# Patient Record
Sex: FEMALE | Race: WHITE | NOT HISPANIC OR LATINO | URBAN - METROPOLITAN AREA
[De-identification: names, ages, dates, MRNs, and addresses within clinical notes are randomized per-mention and may not be internally consistent; named-entity substitution may affect disease eponyms.]

---

## 2018-01-15 ENCOUNTER — OUTPATIENT (OUTPATIENT)
Dept: OUTPATIENT SERVICES | Facility: HOSPITAL | Age: 83
LOS: 1 days | Discharge: HOME | End: 2018-01-15

## 2018-01-15 DIAGNOSIS — R55 SYNCOPE AND COLLAPSE: ICD-10-CM

## 2018-01-15 DIAGNOSIS — I48.91 UNSPECIFIED ATRIAL FIBRILLATION: ICD-10-CM

## 2018-01-15 DIAGNOSIS — E78.5 HYPERLIPIDEMIA, UNSPECIFIED: ICD-10-CM

## 2024-01-24 ENCOUNTER — INPATIENT (INPATIENT)
Facility: HOSPITAL | Age: 89
LOS: 38 days | Discharge: NURSING, REHAB OR LONG-TERM CARE | DRG: 673 | End: 2024-03-03
Attending: HOSPITALIST | Admitting: HOSPITALIST
Payer: MEDICARE

## 2024-01-24 VITALS
HEART RATE: 75 BPM | SYSTOLIC BLOOD PRESSURE: 155 MMHG | RESPIRATION RATE: 18 BRPM | TEMPERATURE: 98 F | WEIGHT: 229.06 LBS | OXYGEN SATURATION: 96 % | DIASTOLIC BLOOD PRESSURE: 88 MMHG

## 2024-01-24 DIAGNOSIS — R26.2 DIFFICULTY IN WALKING, NOT ELSEWHERE CLASSIFIED: ICD-10-CM

## 2024-01-24 DIAGNOSIS — I48.20 CHRONIC ATRIAL FIBRILLATION, UNSPECIFIED: ICD-10-CM

## 2024-01-24 DIAGNOSIS — M48.50XA COLLAPSED VERTEBRA, NOT ELSEWHERE CLASSIFIED, SITE UNSPECIFIED, INITIAL ENCOUNTER FOR FRACTURE: ICD-10-CM

## 2024-01-24 DIAGNOSIS — I50.9 HEART FAILURE, UNSPECIFIED: ICD-10-CM

## 2024-01-24 DIAGNOSIS — K21.9 GASTRO-ESOPHAGEAL REFLUX DISEASE WITHOUT ESOPHAGITIS: ICD-10-CM

## 2024-01-24 LAB
ANION GAP SERPL CALC-SCNC: 11 MMOL/L — SIGNIFICANT CHANGE UP (ref 7–14)
ANION GAP SERPL CALC-SCNC: 12 MMOL/L — SIGNIFICANT CHANGE UP (ref 7–14)
BASOPHILS # BLD AUTO: 0.03 K/UL — SIGNIFICANT CHANGE UP (ref 0–0.2)
BASOPHILS NFR BLD AUTO: 0.6 % — SIGNIFICANT CHANGE UP (ref 0–1)
BUN SERPL-MCNC: 31 MG/DL — HIGH (ref 10–20)
BUN SERPL-MCNC: 32 MG/DL — HIGH (ref 10–20)
CALCIUM SERPL-MCNC: 9.1 MG/DL — SIGNIFICANT CHANGE UP (ref 8.4–10.5)
CALCIUM SERPL-MCNC: 9.1 MG/DL — SIGNIFICANT CHANGE UP (ref 8.4–10.5)
CHLORIDE SERPL-SCNC: 100 MMOL/L — SIGNIFICANT CHANGE UP (ref 98–110)
CHLORIDE SERPL-SCNC: 99 MMOL/L — SIGNIFICANT CHANGE UP (ref 98–110)
CO2 SERPL-SCNC: 25 MMOL/L — SIGNIFICANT CHANGE UP (ref 17–32)
CO2 SERPL-SCNC: 26 MMOL/L — SIGNIFICANT CHANGE UP (ref 17–32)
CREAT SERPL-MCNC: 1.2 MG/DL — SIGNIFICANT CHANGE UP (ref 0.7–1.5)
CREAT SERPL-MCNC: 1.3 MG/DL — SIGNIFICANT CHANGE UP (ref 0.7–1.5)
EGFR: 39 ML/MIN/1.73M2 — LOW
EGFR: 43 ML/MIN/1.73M2 — LOW
EOSINOPHIL # BLD AUTO: 0.07 K/UL — SIGNIFICANT CHANGE UP (ref 0–0.7)
EOSINOPHIL NFR BLD AUTO: 1.4 % — SIGNIFICANT CHANGE UP (ref 0–8)
GLUCOSE SERPL-MCNC: 101 MG/DL — HIGH (ref 70–99)
GLUCOSE SERPL-MCNC: 90 MG/DL — SIGNIFICANT CHANGE UP (ref 70–99)
HCT VFR BLD CALC: 37.2 % — SIGNIFICANT CHANGE UP (ref 37–47)
HGB BLD-MCNC: 12.3 G/DL — SIGNIFICANT CHANGE UP (ref 12–16)
IMM GRANULOCYTES NFR BLD AUTO: 0.4 % — HIGH (ref 0.1–0.3)
LYMPHOCYTES # BLD AUTO: 1.11 K/UL — LOW (ref 1.2–3.4)
LYMPHOCYTES # BLD AUTO: 21.5 % — SIGNIFICANT CHANGE UP (ref 20.5–51.1)
MCHC RBC-ENTMCNC: 32.3 PG — HIGH (ref 27–31)
MCHC RBC-ENTMCNC: 33.1 G/DL — SIGNIFICANT CHANGE UP (ref 32–37)
MCV RBC AUTO: 97.6 FL — SIGNIFICANT CHANGE UP (ref 81–99)
MONOCYTES # BLD AUTO: 0.33 K/UL — SIGNIFICANT CHANGE UP (ref 0.1–0.6)
MONOCYTES NFR BLD AUTO: 6.4 % — SIGNIFICANT CHANGE UP (ref 1.7–9.3)
NEUTROPHILS # BLD AUTO: 3.6 K/UL — SIGNIFICANT CHANGE UP (ref 1.4–6.5)
NEUTROPHILS NFR BLD AUTO: 69.7 % — SIGNIFICANT CHANGE UP (ref 42.2–75.2)
NRBC # BLD: 0 /100 WBCS — SIGNIFICANT CHANGE UP (ref 0–0)
NT-PROBNP SERPL-SCNC: 5909 PG/ML — HIGH (ref 0–300)
PLATELET # BLD AUTO: 139 K/UL — SIGNIFICANT CHANGE UP (ref 130–400)
PMV BLD: 9.9 FL — SIGNIFICANT CHANGE UP (ref 7.4–10.4)
POTASSIUM SERPL-MCNC: 4.4 MMOL/L — SIGNIFICANT CHANGE UP (ref 3.5–5)
POTASSIUM SERPL-MCNC: 5.6 MMOL/L — HIGH (ref 3.5–5)
POTASSIUM SERPL-SCNC: 4.4 MMOL/L — SIGNIFICANT CHANGE UP (ref 3.5–5)
POTASSIUM SERPL-SCNC: 5.6 MMOL/L — HIGH (ref 3.5–5)
RBC # BLD: 3.81 M/UL — LOW (ref 4.2–5.4)
RBC # FLD: 13.6 % — SIGNIFICANT CHANGE UP (ref 11.5–14.5)
SODIUM SERPL-SCNC: 136 MMOL/L — SIGNIFICANT CHANGE UP (ref 135–146)
SODIUM SERPL-SCNC: 137 MMOL/L — SIGNIFICANT CHANGE UP (ref 135–146)
WBC # BLD: 5.16 K/UL — SIGNIFICANT CHANGE UP (ref 4.8–10.8)
WBC # FLD AUTO: 5.16 K/UL — SIGNIFICANT CHANGE UP (ref 4.8–10.8)

## 2024-01-24 PROCEDURE — 83540 ASSAY OF IRON: CPT

## 2024-01-24 PROCEDURE — 74177 CT ABD & PELVIS W/CONTRAST: CPT

## 2024-01-24 PROCEDURE — 94640 AIRWAY INHALATION TREATMENT: CPT

## 2024-01-24 PROCEDURE — 72100 X-RAY EXAM L-S SPINE 2/3 VWS: CPT | Mod: 26

## 2024-01-24 PROCEDURE — 84165 PROTEIN E-PHORESIS SERUM: CPT

## 2024-01-24 PROCEDURE — 88360 TUMOR IMMUNOHISTOCHEM/MANUAL: CPT

## 2024-01-24 PROCEDURE — 72131 CT LUMBAR SPINE W/O DYE: CPT

## 2024-01-24 PROCEDURE — 36415 COLL VENOUS BLD VENIPUNCTURE: CPT

## 2024-01-24 PROCEDURE — 88305 TISSUE EXAM BY PATHOLOGIST: CPT

## 2024-01-24 PROCEDURE — 86334 IMMUNOFIX E-PHORESIS SERUM: CPT

## 2024-01-24 PROCEDURE — 83735 ASSAY OF MAGNESIUM: CPT

## 2024-01-24 PROCEDURE — 80048 BASIC METABOLIC PNL TOTAL CA: CPT

## 2024-01-24 PROCEDURE — 85027 COMPLETE CBC AUTOMATED: CPT

## 2024-01-24 PROCEDURE — 20225 BONE BIOPSY TROCAR/NDL DEEP: CPT

## 2024-01-24 PROCEDURE — 78306 BONE IMAGING WHOLE BODY: CPT

## 2024-01-24 PROCEDURE — 83036 HEMOGLOBIN GLYCOSYLATED A1C: CPT

## 2024-01-24 PROCEDURE — 95819 EEG AWAKE AND ASLEEP: CPT

## 2024-01-24 PROCEDURE — 99222 1ST HOSP IP/OBS MODERATE 55: CPT

## 2024-01-24 PROCEDURE — 83521 IG LIGHT CHAINS FREE EACH: CPT

## 2024-01-24 PROCEDURE — 88173 CYTOPATH EVAL FNA REPORT: CPT

## 2024-01-24 PROCEDURE — 88172 CYTP DX EVAL FNA 1ST EA SITE: CPT

## 2024-01-24 PROCEDURE — 71045 X-RAY EXAM CHEST 1 VIEW: CPT

## 2024-01-24 PROCEDURE — 74018 RADEX ABDOMEN 1 VIEW: CPT

## 2024-01-24 PROCEDURE — 83605 ASSAY OF LACTIC ACID: CPT

## 2024-01-24 PROCEDURE — 86901 BLOOD TYPING SEROLOGIC RH(D): CPT

## 2024-01-24 PROCEDURE — 82306 VITAMIN D 25 HYDROXY: CPT

## 2024-01-24 PROCEDURE — 84295 ASSAY OF SERUM SODIUM: CPT

## 2024-01-24 PROCEDURE — 84443 ASSAY THYROID STIM HORMONE: CPT

## 2024-01-24 PROCEDURE — 80053 COMPREHEN METABOLIC PANEL: CPT

## 2024-01-24 PROCEDURE — 99223 1ST HOSP IP/OBS HIGH 75: CPT

## 2024-01-24 PROCEDURE — 97530 THERAPEUTIC ACTIVITIES: CPT | Mod: GP

## 2024-01-24 PROCEDURE — 85730 THROMBOPLASTIN TIME PARTIAL: CPT

## 2024-01-24 PROCEDURE — 83550 IRON BINDING TEST: CPT

## 2024-01-24 PROCEDURE — 97110 THERAPEUTIC EXERCISES: CPT | Mod: GP

## 2024-01-24 PROCEDURE — 82570 ASSAY OF URINE CREATININE: CPT

## 2024-01-24 PROCEDURE — 88344 IMHCHEM/IMCYTCHM EA MLT ANTB: CPT

## 2024-01-24 PROCEDURE — 87040 BLOOD CULTURE FOR BACTERIA: CPT

## 2024-01-24 PROCEDURE — 84156 ASSAY OF PROTEIN URINE: CPT

## 2024-01-24 PROCEDURE — 97162 PT EVAL MOD COMPLEX 30 MIN: CPT | Mod: GP

## 2024-01-24 PROCEDURE — 93306 TTE W/DOPPLER COMPLETE: CPT

## 2024-01-24 PROCEDURE — 83935 ASSAY OF URINE OSMOLALITY: CPT

## 2024-01-24 PROCEDURE — 72148 MRI LUMBAR SPINE W/O DYE: CPT

## 2024-01-24 PROCEDURE — 87635 SARS-COV-2 COVID-19 AMP PRB: CPT

## 2024-01-24 PROCEDURE — 86304 IMMUNOASSAY TUMOR CA 125: CPT

## 2024-01-24 PROCEDURE — 85018 HEMOGLOBIN: CPT

## 2024-01-24 PROCEDURE — 86300 IMMUNOASSAY TUMOR CA 15-3: CPT

## 2024-01-24 PROCEDURE — 78803 RP LOCLZJ TUM SPECT 1 AREA: CPT

## 2024-01-24 PROCEDURE — 84300 ASSAY OF URINE SODIUM: CPT

## 2024-01-24 PROCEDURE — C1751: CPT

## 2024-01-24 PROCEDURE — 72131 CT LUMBAR SPINE W/O DYE: CPT | Mod: 26

## 2024-01-24 PROCEDURE — 84100 ASSAY OF PHOSPHORUS: CPT

## 2024-01-24 PROCEDURE — 71045 X-RAY EXAM CHEST 1 VIEW: CPT | Mod: 26

## 2024-01-24 PROCEDURE — 83880 ASSAY OF NATRIURETIC PEPTIDE: CPT

## 2024-01-24 PROCEDURE — 93005 ELECTROCARDIOGRAM TRACING: CPT

## 2024-01-24 PROCEDURE — 84540 ASSAY OF URINE/UREA-N: CPT

## 2024-01-24 PROCEDURE — A9579: CPT

## 2024-01-24 PROCEDURE — 86900 BLOOD TYPING SEROLOGIC ABO: CPT

## 2024-01-24 PROCEDURE — 77417 THER RADIOLOGY PORT IMAGE(S): CPT

## 2024-01-24 PROCEDURE — 83930 ASSAY OF BLOOD OSMOLALITY: CPT

## 2024-01-24 PROCEDURE — 77307 TELETHX ISODOSE PLAN CPLX: CPT

## 2024-01-24 PROCEDURE — 82728 ASSAY OF FERRITIN: CPT

## 2024-01-24 PROCEDURE — 70553 MRI BRAIN STEM W/O & W/DYE: CPT

## 2024-01-24 PROCEDURE — 82330 ASSAY OF CALCIUM: CPT

## 2024-01-24 PROCEDURE — 85610 PROTHROMBIN TIME: CPT

## 2024-01-24 PROCEDURE — 77290 THER RAD SIMULAJ FIELD CPLX: CPT

## 2024-01-24 PROCEDURE — A9503: CPT

## 2024-01-24 PROCEDURE — 70450 CT HEAD/BRAIN W/O DYE: CPT

## 2024-01-24 PROCEDURE — 73552 X-RAY EXAM OF FEMUR 2/>: CPT | Mod: LT

## 2024-01-24 PROCEDURE — 74178 CT ABD&PLV WO CNTR FLWD CNTR: CPT

## 2024-01-24 PROCEDURE — 86301 IMMUNOASSAY TUMOR CA 19-9: CPT

## 2024-01-24 PROCEDURE — 86850 RBC ANTIBODY SCREEN: CPT

## 2024-01-24 PROCEDURE — 84484 ASSAY OF TROPONIN QUANT: CPT

## 2024-01-24 PROCEDURE — 77412 RADIATION TX DELIVERY LVL 3: CPT

## 2024-01-24 PROCEDURE — 77334 RADIATION TREATMENT AID(S): CPT

## 2024-01-24 PROCEDURE — 84132 ASSAY OF SERUM POTASSIUM: CPT

## 2024-01-24 PROCEDURE — 82378 CARCINOEMBRYONIC ANTIGEN: CPT

## 2024-01-24 PROCEDURE — 77333 RADIATION TREATMENT AID(S): CPT

## 2024-01-24 PROCEDURE — 84155 ASSAY OF PROTEIN SERUM: CPT

## 2024-01-24 PROCEDURE — 88341 IMHCHEM/IMCYTCHM EA ADD ANTB: CPT

## 2024-01-24 PROCEDURE — 88112 CYTOPATH CELL ENHANCE TECH: CPT

## 2024-01-24 PROCEDURE — 77012 CT SCAN FOR NEEDLE BIOPSY: CPT

## 2024-01-24 PROCEDURE — 73522 X-RAY EXAM HIPS BI 3-4 VIEWS: CPT | Mod: 26

## 2024-01-24 PROCEDURE — 0225U NFCT DS DNA&RNA 21 SARSCOV2: CPT

## 2024-01-24 PROCEDURE — 85014 HEMATOCRIT: CPT

## 2024-01-24 PROCEDURE — 93010 ELECTROCARDIOGRAM REPORT: CPT

## 2024-01-24 PROCEDURE — 82962 GLUCOSE BLOOD TEST: CPT

## 2024-01-24 PROCEDURE — 77280 THER RAD SIMULAJ FIELD SMPL: CPT

## 2024-01-24 PROCEDURE — 77336 RADIATION PHYSICS CONSULT: CPT

## 2024-01-24 PROCEDURE — 99285 EMERGENCY DEPT VISIT HI MDM: CPT

## 2024-01-24 PROCEDURE — 85025 COMPLETE CBC W/AUTO DIFF WBC: CPT

## 2024-01-24 PROCEDURE — 71260 CT THORAX DX C+: CPT

## 2024-01-24 PROCEDURE — 84133 ASSAY OF URINE POTASSIUM: CPT

## 2024-01-24 PROCEDURE — 81001 URINALYSIS AUTO W/SCOPE: CPT

## 2024-01-24 PROCEDURE — 82803 BLOOD GASES ANY COMBINATION: CPT

## 2024-01-24 RX ORDER — SENNA PLUS 8.6 MG/1
2 TABLET ORAL
Refills: 0 | DISCHARGE

## 2024-01-24 RX ORDER — MORPHINE SULFATE 50 MG/1
4 CAPSULE, EXTENDED RELEASE ORAL ONCE
Refills: 0 | Status: DISCONTINUED | OUTPATIENT
Start: 2024-01-24 | End: 2024-01-24

## 2024-01-24 RX ORDER — APIXABAN 2.5 MG/1
2.5 TABLET, FILM COATED ORAL
Refills: 0 | Status: DISCONTINUED | OUTPATIENT
Start: 2024-01-24 | End: 2024-01-28

## 2024-01-24 RX ORDER — CALCIUM CARBONATE 500(1250)
1 TABLET ORAL DAILY
Refills: 0 | Status: DISCONTINUED | OUTPATIENT
Start: 2024-01-24 | End: 2024-02-15

## 2024-01-24 RX ORDER — GABAPENTIN 400 MG/1
2 CAPSULE ORAL
Refills: 0 | DISCHARGE

## 2024-01-24 RX ORDER — LIDOCAINE 4 G/100G
1 CREAM TOPICAL EVERY 24 HOURS
Refills: 0 | Status: DISCONTINUED | OUTPATIENT
Start: 2024-01-24 | End: 2024-02-15

## 2024-01-24 RX ORDER — FUROSEMIDE 40 MG
1 TABLET ORAL
Refills: 0 | DISCHARGE

## 2024-01-24 RX ORDER — CHOLECALCIFEROL (VITAMIN D3) 125 MCG
5000 CAPSULE ORAL DAILY
Refills: 0 | Status: DISCONTINUED | OUTPATIENT
Start: 2024-01-24 | End: 2024-02-15

## 2024-01-24 RX ORDER — MEMANTINE HYDROCHLORIDE 10 MG/1
1 TABLET ORAL
Refills: 0 | DISCHARGE

## 2024-01-24 RX ORDER — ACETAMINOPHEN 500 MG
650 TABLET ORAL EVERY 6 HOURS
Refills: 0 | Status: DISCONTINUED | OUTPATIENT
Start: 2024-01-24 | End: 2024-01-24

## 2024-01-24 RX ORDER — FUROSEMIDE 40 MG
40 TABLET ORAL ONCE
Refills: 0 | Status: COMPLETED | OUTPATIENT
Start: 2024-01-24 | End: 2024-01-24

## 2024-01-24 RX ORDER — METHOCARBAMOL 500 MG/1
750 TABLET, FILM COATED ORAL EVERY 12 HOURS
Refills: 0 | Status: DISCONTINUED | OUTPATIENT
Start: 2024-01-24 | End: 2024-01-25

## 2024-01-24 RX ORDER — ASCORBIC ACID 60 MG
1 TABLET,CHEWABLE ORAL
Refills: 0 | DISCHARGE

## 2024-01-24 RX ORDER — ACETAMINOPHEN 500 MG
975 TABLET ORAL EVERY 8 HOURS
Refills: 0 | Status: DISCONTINUED | OUTPATIENT
Start: 2024-01-24 | End: 2024-02-15

## 2024-01-24 RX ORDER — DICLOFENAC SODIUM 75 MG/1
1 TABLET, DELAYED RELEASE ORAL
Refills: 0 | DISCHARGE

## 2024-01-24 RX ORDER — DOCUSATE SODIUM 100 MG
1 CAPSULE ORAL
Refills: 0 | DISCHARGE

## 2024-01-24 RX ORDER — OMEPRAZOLE 10 MG/1
1 CAPSULE, DELAYED RELEASE ORAL
Refills: 0 | DISCHARGE

## 2024-01-24 RX ORDER — GABAPENTIN 400 MG/1
300 CAPSULE ORAL THREE TIMES A DAY
Refills: 0 | Status: DISCONTINUED | OUTPATIENT
Start: 2024-01-24 | End: 2024-02-14

## 2024-01-24 RX ORDER — METOPROLOL TARTRATE 50 MG
3 TABLET ORAL
Refills: 0 | DISCHARGE

## 2024-01-24 RX ORDER — PANTOPRAZOLE SODIUM 20 MG/1
40 TABLET, DELAYED RELEASE ORAL
Refills: 0 | Status: DISCONTINUED | OUTPATIENT
Start: 2024-01-25 | End: 2024-02-15

## 2024-01-24 RX ORDER — METHOCARBAMOL 500 MG/1
1000 TABLET, FILM COATED ORAL ONCE
Refills: 0 | Status: COMPLETED | OUTPATIENT
Start: 2024-01-24 | End: 2024-01-24

## 2024-01-24 RX ORDER — AZELASTINE HCL 0.05 %
1 DROPS OPHTHALMIC (EYE)
Refills: 0 | DISCHARGE

## 2024-01-24 RX ORDER — SENNA PLUS 8.6 MG/1
2 TABLET ORAL AT BEDTIME
Refills: 0 | Status: DISCONTINUED | OUTPATIENT
Start: 2024-01-24 | End: 2024-02-15

## 2024-01-24 RX ORDER — FEXOFENADINE HCL 30 MG
1 TABLET ORAL
Refills: 0 | DISCHARGE

## 2024-01-24 RX ADMIN — METHOCARBAMOL 1000 MILLIGRAM(S): 500 TABLET, FILM COATED ORAL at 11:09

## 2024-01-24 RX ADMIN — Medication 975 MILLIGRAM(S): at 21:56

## 2024-01-24 RX ADMIN — APIXABAN 2.5 MILLIGRAM(S): 2.5 TABLET, FILM COATED ORAL at 18:55

## 2024-01-24 RX ADMIN — METHOCARBAMOL 750 MILLIGRAM(S): 500 TABLET, FILM COATED ORAL at 23:03

## 2024-01-24 RX ADMIN — Medication 40 MILLIGRAM(S): at 18:55

## 2024-01-24 RX ADMIN — GABAPENTIN 300 MILLIGRAM(S): 400 CAPSULE ORAL at 21:57

## 2024-01-24 RX ADMIN — MORPHINE SULFATE 4 MILLIGRAM(S): 50 CAPSULE, EXTENDED RELEASE ORAL at 11:10

## 2024-01-24 NOTE — H&P ADULT - NSHPSOCIALHISTORY_GEN_ALL_CORE
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 No reported tobacco, alcohol, or illicit/recreational drug use. ADL's limited by progressively worsening ability to ambulate. Ambulates w/ walker.

## 2024-01-24 NOTE — ED ADULT TRIAGE NOTE - CHIEF COMPLAINT QUOTE
Pt BIBEMS for fell this morning onto her butt. No LOC, No HT, pt on Eliquis.  PT reports pain to back from a fall two weeks ago. PT also reports leg swelling. Pt also is non compliant with her medication per her daughter

## 2024-01-24 NOTE — H&P ADULT - ASSESSMENT
89F w/ h/o chronic afib (on Eliquis), GERD, and chronic low back pain presenting s/p fall. Admitted to medicine for inability to ambulate s/p fall.    #Fall, s/p  #Inability to Ambulate  #Acute on Chronic Low Back Pain  Initially presenting s/p fall. XR Bilateral Hips, Pelvis, and L-Spine negative for fracture (wet read). Known to have progressively worsening chronic low back pain over several years.  - start Tylenol 975mg PO Q8H  - increase gabapentin from 200mg to 300mg Q8H (increasing to max dose based on CrCl; monitor for oversedation)  - start lidocaine 4% patch Q24H  - obtain UA (r/o UTI; no reported dysuria, but daughter reports malodorous urine)  - f/u final XR reads  - pain management consulted; f/u recs  - PT consulted; f/u recs    #CHF, suspected  Reports LE swelling over past 1-2 weeks. Recently started on furosemide 20 qd for swelling. No reported CP or dyspnea at rest or exertion (but functional status limited by lower back pain). No known prior h/o CHF. TTE (May 2013) demonstrated LVEF 55-65% w/ normal global LVSF and no significant valvulopathy. No other recent TTE available. Exam concerning for volume overload (bibasilar crackles; b/l LE pitting edema). Overall concerning for new onset CHF (possibly contributing to pt's inability to ambulate).   - give furosemide 40mg IV once now  - obtain TTE and BNP  - daily standing weights; strict I's & O's; 1.5L fluid restrict  - monitor 'lytes and replete accordingly (K>4; Mg>2)  - obtain iron studies  - if TSAT <20% + ferritin <300 OR ferritin <100, start Venofer 200mg IV x5 days  - consider transfer to telemetry if CHF confirmed    #Chronic Atrial Fibrillation  CHADS-VASC 3+ (Age x2, Female). Only on AC. Previously on Lopressor, but only takes intermittently as HR currently rate-controlled off meds.  - c/w apixaban 2.5mg PO BID (consider dose increase if Cr persistently <1.5)    #GERD  - c/w pantoprazole 40mg PO QD (therapeutic interchange for omeprazole)    DVT PPX: apixaban 2.5mg PO BID  GI PPX: pantoprazole 40mg PO QD  DIET: DASH/TLC + 1.5L fluid restrict  ACTIVITY: IAT  CODE STATUS: Full Code  DISPOSITION: From Home; possible d/c to home w/ HCS vs SNF (f/u PT consult)    PENDING: CXR; 4PM BMP (K) & BNP; TTE; PT consult 89F w/ h/o chronic afib (on Eliquis), GERD, and chronic low back pain presenting s/p fall. Admitted to medicine for inability to ambulate s/p fall.    #Fall, s/p  #Inability to Ambulate  #Acute on Chronic Low Back Pain  Initially presenting s/p fall. XR Bilateral Hips, Pelvis, and L-Spine negative for fracture (wet read). Known to have progressively worsening chronic low back pain over several years. Previously managed via gabapentin 200 tid. Previously trialed Mobic and diclofenac w/ minimal relief. Recently started on Percocet 5-325 q12h w/ mild relief.  - start Tylenol 975mg PO Q8H  - increase gabapentin from 200mg to 300mg Q8H (increasing to max dose based on CrCl; monitor for oversedation)  - start lidocaine 4% patch Q24H  - obtain UA (r/o UTI; no reported dysuria, but daughter reports malodorous urine)  - f/u final XR reads  - pain management consulted; f/u recs  - PT consulted; f/u recs    #CHF, suspected  Reports LE swelling over past 1-2 weeks. Recently started on furosemide 20 qd for swelling. No reported CP or dyspnea at rest or exertion (but functional status limited by lower back pain). No known prior h/o CHF. TTE (May 2013) demonstrated LVEF 55-65% w/ normal global LVSF and no significant valvulopathy. No other recent TTE available. Exam concerning for volume overload (bibasilar crackles; b/l LE pitting edema). Overall concerning for new onset CHF (possibly contributing to pt's inability to ambulate).   - give furosemide 40mg IV once now; consider additional dosing based on response  - obtain TTE and BNP  - daily standing weights; strict I's & O's; 1.5L fluid restrict  - monitor 'lytes and replete accordingly (K>4; Mg>2)  - obtain iron studies  - if TSAT <20% + ferritin <300 OR ferritin <100, start Venofer 200mg IV x5 days (if labs/TTE confirm CHF)  - consider transfer to telemetry if CHF confirmed    #Chronic Atrial Fibrillation  CHADS-VASC 3+ (Age x2, Female). Only on AC. Previously on Lopressor, but only takes intermittently as HR currently rate-controlled off meds.  - c/w apixaban 2.5mg PO BID (consider dose increase if Cr persistently <1.5)    #GERD  - c/w pantoprazole 40mg PO QD (therapeutic interchange for omeprazole)    DVT PPX: apixaban 2.5mg PO BID  GI PPX: pantoprazole 40mg PO QD  DIET: DASH/TLC + 1.5L fluid restrict  ACTIVITY: IAT  CODE STATUS: Full Code  DISPOSITION: From Home; possible d/c to home w/ HCS vs SNF (f/u PT consult)    PENDING: CXR; 4PM BMP (K) & BNP; TTE; PT consult 89F w/ h/o chronic afib (on Eliquis), GERD, and chronic low back pain presenting s/p fall. Admitted to medicine for back pain 2/2 Lumbar compression fracture    #Lumbar Compression Fracture  Initially presenting s/p fa. Known to have progressively worsening chronic low back pain over several years. Previously managed via gabapentin 200 tid. Previously trialed Mobic and diclofenac w/ minimal relief. Recently started on Percocet 5-325 q12h w/ mild relief.  - start Tylenol 975mg PO Q8H  - increase gabapentin from 200mg to 300mg Q8H (increasing to max dose based on CrCl; monitor for oversedation)  - start lidocaine 4% patch Q24H  - obtain UA (r/o UTI; no reported dysuria, but daughter reports malodorous urine)  - CT lumbar spine to further characterize  - pain management consulted; f/u recs  - PT consulted; f/u recs    #CHF, suspected  Reports LE swelling over past 1-2 weeks. Recently started on furosemide 20 qd for swelling. No reported CP or dyspnea at rest or exertion (but functional status limited by lower back pain). No known prior h/o CHF. TTE (May 2013) demonstrated LVEF 55-65% w/ normal global LVSF and no significant valvulopathy. No other recent TTE available. Exam concerning for volume overload (bibasilar crackles; b/l LE pitting edema). Overall concerning for new onset CHF (possibly contributing to pt's inability to ambulate).   - give furosemide 40mg IV once now; consider additional dosing based on response  - obtain TTE and BNP  - daily standing weights; strict I's & O's; 1.5L fluid restrict  - monitor 'lytes and replete accordingly (K>4; Mg>2)  - obtain iron studies  - if TSAT <20% + ferritin <300 OR ferritin <100, start Venofer 200mg IV x5 days (if labs/TTE confirm CHF)  - consider transfer to telemetry if CHF confirmed    #Chronic Atrial Fibrillation  CHADS-VASC 3+ (Age x2, Female). Only on AC. Previously on Lopressor, but only takes intermittently as HR currently rate-controlled off meds.  - c/w apixaban 2.5mg PO BID (consider dose increase if Cr persistently <1.5)    #GERD  - c/w pantoprazole 40mg PO QD (therapeutic interchange for omeprazole)    DVT PPX: apixaban 2.5mg PO BID  GI PPX: pantoprazole 40mg PO QD  DIET: DASH/TLC + 1.5L fluid restrict  ACTIVITY: IAT  CODE STATUS: Full Code  DISPOSITION: From Home; possible d/c to home w/ HCS vs SNF (f/u PT consult)    PENDING: CXR; 4PM BMP (K) & BNP; TTE; PT consult

## 2024-01-24 NOTE — H&P ADULT - HISTORY OF PRESENT ILLNESS
89F w/ h/o chronic afib (on Eliquis), GERD, and chronic low back pain presenting s/p fall. Patient known to have chronic low back pain that has progressively worsened over the years. Pain acutely worsened two weeks ago and patient subsequently fell due to inability to support own weight. Evaluated by PCP after initial fall due to worsening pain. Prescribed Percocet 5-325 bid w/ mild relief. However, pt once again fell earlier today after pt unable to support her own weight while trying to ambulate. Now unable to ambulate or bear weight, even w/ assistance. No reported HT, LOC, lightheadedness, confusion, loss of continence, or focal weakness a/w either fall. No reported fevers, chills, CP, palpitations, SOB, abdominal pain, n/v/d, or dysuria (although daughter did note malodorous urine recently).    Of note, pt also reports new onset LE swelling over past 1-2 weeks. Recently started on furosemide 20 qd by PCP for swelling. No reported CP or dyspnea at rest or exertion (but functional status limited by lower back pain). No known prior h/o CHF.     In ED, pt HD stable on vitals. Labs demonstrated K 5.6 (hemolyzed). XR Bilateral Hips, Pelvis, and L-Spine overall unremarkable (wet read). S/P morphine 4mg IV and methocarbamol 1000mg PO. Admitted to medicine for inability to ambulate s/p fall.

## 2024-01-24 NOTE — H&P ADULT - NSICDXPASTMEDICALHX_GEN_ALL_CORE_FT
PAST MEDICAL HISTORY:  Chronic atrial fibrillation     Chronic lower back pain     GERD (gastroesophageal reflux disease)

## 2024-01-24 NOTE — ED PROVIDER NOTE - PHYSICAL EXAMINATION
CONST: Well appearing in NAD  EYES: PERRL, EOMI, Sclera and conjunctiva clear.   NECK: Non-tender, no meningeal signs  CARD  Normal rate and rhythm  RESP: Equal BS B/L, No wheezes, rhonchi or rales. No distress  GI: Soft, non-tender, non-distended.  MS: Normal ROM in all extremities. tender lower lumbar. No hip tenderness or pain on ROM. Pulses intact distally  SKIN: Warm, dry, no acute rashes. Good turgor  NEURO: A&Ox3, No focal deficits. Strength 5/5 with no sensory deficits.

## 2024-01-24 NOTE — ED PROVIDER NOTE - ATTENDING APP SHARED VISIT CONTRIBUTION OF CARE
I personally evaluated the patient. I reviewed the Resident´s or Physician Assistant´s note (as assigned above), and agree with the findings and plan except as documented in my note.    89-year-old female presents to the emergency department for fall from home.  Patient is a limited historian, family present to assist with HPI and review of systems.  Admits to bilateral hip pain not improved with home opiates and gabapentin   PMH includes atrial fibrillation on Eliquis but is noncompliant      GENERAL: female in no distress.   HEENT: EOMI non icteric no hematomas   CHEST: normal work of breathing noted. CTA bilateral.   CV: pulses intact S1S2 regular  ABD: soft, non rigid, non distended   EXTR: FROM   NEURO: AAO  globally deconditioned no hemiparesis  SKIN: normal no pallor    Impression: Back pain, gait instability    Plan: IV, labs, imaging, supportive care & reevaluation

## 2024-01-24 NOTE — ED PROVIDER NOTE - CLINICAL SUMMARY MEDICAL DECISION MAKING FREE TEXT BOX
89-year-old female presents to the emergency department for fall with inability to ambulate.  Symptoms are refractory to outpatient medications by PMD.  In the emergency department screening exam and imaging, labs, will admit for PT OT and gait stability potential DC to rehab for continued therapy.

## 2024-01-24 NOTE — ED ADULT NURSE NOTE - NSFALLUNIVINTERV_ED_ALL_ED
Bed/Stretcher in lowest position, wheels locked, appropriate side rails in place/Call bell, personal items and telephone in reach/Instruct patient to call for assistance before getting out of bed/chair/stretcher/Non-slip footwear applied when patient is off stretcher/[place] to call system/Physically safe environment - no spills, clutter or unnecessary equipment/Purposeful proactive rounding/Room/bathroom lighting operational, light cord in reach

## 2024-01-24 NOTE — H&P ADULT - CONVERSATION DETAILS
Discussed advanced care planning with patient and daughter (Marlys Felixzulyvalerie) at bedside. Discussed concept of goals of care, including limited vs full medical management. Further explained concepts of DNR/DNI. 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 Patient has not thought about goals of care previously. Will discuss with daughter, but as of now patient is FULL CODE.

## 2024-01-24 NOTE — H&P ADULT - ATTENDING COMMENTS
89F w/ h/o chronic afib (on Eliquis), GERD, and chronic low back pain presenting s/p fall. Admitted to medicine for back pain 2/2 Lumbar compression fracture    - back pain 2/2 L1 compression fracture identified on lumbar xray- will get lumbar CT spine to further characterize and determine if other vertebral compression fracture present. will need TLSO brace. pt. check vit D level. most likely 2/2 osteoporosis  - concern for recent fluid overload likely mild acute HF, no reported cp (symptom hx progressive over weeks duration). agree w/ lasix IV today, eval tomorrow if still required. TTE to further characterize  - chronic afib- c/w eliquis

## 2024-01-24 NOTE — ED PROVIDER NOTE - OBJECTIVE STATEMENT
Patient with history A-fib on Eliquis presents with pain to lower back and bilateral hips since fall 2 weeks ago.  Patient always has chronic back pain but since fall has worse pain and now unable to walk without assistance.  Fell again today and since the fall unable to bear weight or walk even with assistance.  Pain today is in bilateral hips and lower back.  Patient takes Percocet and gabapentin for the past 2 weeks without relief.  Denies head injury.  Patient is supposed to take Eliquis but is noncompliant according to the daughter

## 2024-01-24 NOTE — ED PROVIDER NOTE - CARE PLAN
Principal Discharge DX:	Inability to ambulate due to multiple joints  Secondary Diagnosis:	Back pain   1

## 2024-01-24 NOTE — CONSULT NOTE ADULT - SUBJECTIVE AND OBJECTIVE BOX
PAIN MANAGEMENT CONSULT NOTE    Chief Complaint: low back pain    HPI:  89F w/ h/o chronic afib (on Eliquis), GERD, and chronic low back pain presenting s/p fall. Patient known to have chronic low back pain that has progressively worsened over the years. Pain acutely worsened two weeks ago and patient subsequently fell due to inability to support own weight. Evaluated by PCP after initial fall due to worsening pain. Prescribed Percocet 5-325 bid w/ mild relief. However, pt once again fell earlier today after pt unable to support her own weight while trying to ambulate. Now unable to ambulate or bear weight, even w/ assistance. No reported HT, LOC, lightheadedness, confusion, loss of continence, or focal weakness a/w either fall. No reported fevers, chills, CP, palpitations, SOB, abdominal pain, n/v/d, or dysuria (although daughter did note malodorous urine recently).    Of note, pt also reports new onset LE swelling over past 1-2 weeks. Recently started on furosemide 20 qd by PCP for swelling. No reported CP or dyspnea at rest or exertion (but functional status limited by lower back pain). No known prior h/o CHF.     In ED, pt HD stable on vitals. Labs demonstrated K 5.6 (hemolyzed). XR Bilateral Hips, Pelvis, and L-Spine overall unremarkable (wet read). S/P morphine 4mg IV and methocarbamol 1000mg PO. Admitted to medicine for inability to ambulate s/p fall. (24 Jan 2024 15:07)      PAST MEDICAL & SURGICAL HISTORY:  Chronic atrial fibrillation      GERD (gastroesophageal reflux disease)      Chronic lower back pain      No significant past surgical history          FAMILY HISTORY:  Family history of stroke (Father)        SOCIAL HISTORY:  [x ] Denies Smoking, Alcohol, or Drug Use    HOME MEDICATIONS:   Please refer to initial HNP    PAIN HOME MEDICATIONS:    Allergies    No Known Allergies    Intolerances        PAIN MEDICATIONS:  acetaminophen     Tablet .. 975 milliGRAM(s) Oral every 8 hours  gabapentin 300 milliGRAM(s) Oral three times a day    Heme:  apixaban 2.5 milliGRAM(s) Oral two times a day    Antibiotics:    Cardiovascular:    GI:  senna 2 Tablet(s) Oral at bedtime    Endocrine:    All Other Medications:  lidocaine   4% Patch 1 Patch Transdermal every 24 hours  multivitamin 1 Tablet(s) Oral daily      Vital Signs Last 24 Hrs  T(C): 36.5 (24 Jan 2024 16:04), Max: 36.5 (24 Jan 2024 16:04)  T(F): 97.7 (24 Jan 2024 16:04), Max: 97.7 (24 Jan 2024 16:04)  HR: 88 (24 Jan 2024 16:04) (75 - 88)  BP: 141/76 (24 Jan 2024 16:04) (141/76 - 155/88)  BP(mean): 110 (24 Jan 2024 15:07) (110 - 110)  RR: 18 (24 Jan 2024 16:04) (18 - 18)  SpO2: 94% (24 Jan 2024 16:04) (94% - 96%)    Parameters below as of 24 Jan 2024 16:04  Patient On (Oxygen Delivery Method): room air        LABS:                        12.3   5.16  )-----------( 139      ( 24 Jan 2024 11:15 )             37.2     01-24    137  |  100  |  31<H>  ----------------------------<  90  4.4   |  25  |  1.2    Ca    9.1      24 Jan 2024 16:54        Urinalysis Basic - ( 24 Jan 2024 16:54 )    Color: x / Appearance: x / SG: x / pH: x  Gluc: 90 mg/dL / Ketone: x  / Bili: x / Urobili: x   Blood: x / Protein: x / Nitrite: x   Leuk Esterase: x / RBC: x / WBC x   Sq Epi: x / Non Sq Epi: x / Bacteria: x      REVIEW OF SYSTEMS:  see hpi    PHYSICAL EXAM  GENERAL: Laying in bed, NAD  Neuro:  EOMI  Cranial nerves grossly intact  CHEST/LUNG: no audible wheeze, no accessory muscle usage  HEART: Regular rate and rhythm; No edema  ABDOMEN: Soft, Nontender  EXTREMITIES: No clubbing, cyanosis  SKIN: No rashes or lesions      ASSESSMENT:   low back pain    PLAN:   - Pain:  c/w tylenol 975 q8hr  increase gabapentin 400mg TID  start lidocaine patch to low back  start methocarbomal 750mg q8hr  can start oxycodone 5mg q6hr prn

## 2024-01-25 LAB
24R-OH-CALCIDIOL SERPL-MCNC: 50 NG/ML — SIGNIFICANT CHANGE UP (ref 30–80)
A1C WITH ESTIMATED AVERAGE GLUCOSE RESULT: 5.8 % — HIGH (ref 4–5.6)
ALBUMIN SERPL ELPH-MCNC: 3.9 G/DL — SIGNIFICANT CHANGE UP (ref 3.5–5.2)
ALP SERPL-CCNC: 61 U/L — SIGNIFICANT CHANGE UP (ref 30–115)
ALT FLD-CCNC: 9 U/L — SIGNIFICANT CHANGE UP (ref 0–41)
ANION GAP SERPL CALC-SCNC: 11 MMOL/L — SIGNIFICANT CHANGE UP (ref 7–14)
AST SERPL-CCNC: 21 U/L — SIGNIFICANT CHANGE UP (ref 0–41)
BASOPHILS # BLD AUTO: 0.04 K/UL — SIGNIFICANT CHANGE UP (ref 0–0.2)
BASOPHILS NFR BLD AUTO: 1 % — SIGNIFICANT CHANGE UP (ref 0–1)
BILIRUB SERPL-MCNC: 0.6 MG/DL — SIGNIFICANT CHANGE UP (ref 0.2–1.2)
BUN SERPL-MCNC: 29 MG/DL — HIGH (ref 10–20)
CALCIUM SERPL-MCNC: 9.4 MG/DL — SIGNIFICANT CHANGE UP (ref 8.4–10.4)
CHLORIDE SERPL-SCNC: 99 MMOL/L — SIGNIFICANT CHANGE UP (ref 98–110)
CO2 SERPL-SCNC: 28 MMOL/L — SIGNIFICANT CHANGE UP (ref 17–32)
CREAT SERPL-MCNC: 1.4 MG/DL — SIGNIFICANT CHANGE UP (ref 0.7–1.5)
EGFR: 36 ML/MIN/1.73M2 — LOW
EOSINOPHIL # BLD AUTO: 0.25 K/UL — SIGNIFICANT CHANGE UP (ref 0–0.7)
EOSINOPHIL NFR BLD AUTO: 6.1 % — SIGNIFICANT CHANGE UP (ref 0–8)
ESTIMATED AVERAGE GLUCOSE: 120 MG/DL — HIGH (ref 68–114)
FERRITIN SERPL-MCNC: 80 NG/ML — SIGNIFICANT CHANGE UP (ref 13–330)
GLUCOSE SERPL-MCNC: 87 MG/DL — SIGNIFICANT CHANGE UP (ref 70–99)
HCT VFR BLD CALC: 38.1 % — SIGNIFICANT CHANGE UP (ref 37–47)
HGB BLD-MCNC: 12.6 G/DL — SIGNIFICANT CHANGE UP (ref 12–16)
IMM GRANULOCYTES NFR BLD AUTO: 0.2 % — SIGNIFICANT CHANGE UP (ref 0.1–0.3)
IRON SATN MFR SERPL: 18 % — SIGNIFICANT CHANGE UP (ref 15–50)
IRON SATN MFR SERPL: 47 UG/DL — SIGNIFICANT CHANGE UP (ref 35–150)
LYMPHOCYTES # BLD AUTO: 1.4 K/UL — SIGNIFICANT CHANGE UP (ref 1.2–3.4)
LYMPHOCYTES # BLD AUTO: 34.2 % — SIGNIFICANT CHANGE UP (ref 20.5–51.1)
MAGNESIUM SERPL-MCNC: 2.5 MG/DL — HIGH (ref 1.8–2.4)
MCHC RBC-ENTMCNC: 32.5 PG — HIGH (ref 27–31)
MCHC RBC-ENTMCNC: 33.1 G/DL — SIGNIFICANT CHANGE UP (ref 32–37)
MCV RBC AUTO: 98.2 FL — SIGNIFICANT CHANGE UP (ref 81–99)
MONOCYTES # BLD AUTO: 0.33 K/UL — SIGNIFICANT CHANGE UP (ref 0.1–0.6)
MONOCYTES NFR BLD AUTO: 8.1 % — SIGNIFICANT CHANGE UP (ref 1.7–9.3)
NEUTROPHILS # BLD AUTO: 2.06 K/UL — SIGNIFICANT CHANGE UP (ref 1.4–6.5)
NEUTROPHILS NFR BLD AUTO: 50.4 % — SIGNIFICANT CHANGE UP (ref 42.2–75.2)
NRBC # BLD: 0 /100 WBCS — SIGNIFICANT CHANGE UP (ref 0–0)
PHOSPHATE SERPL-MCNC: 4.2 MG/DL — SIGNIFICANT CHANGE UP (ref 2.1–4.9)
PLATELET # BLD AUTO: 129 K/UL — LOW (ref 130–400)
PMV BLD: 9.7 FL — SIGNIFICANT CHANGE UP (ref 7.4–10.4)
POTASSIUM SERPL-MCNC: 4.2 MMOL/L — SIGNIFICANT CHANGE UP (ref 3.5–5)
POTASSIUM SERPL-SCNC: 4.2 MMOL/L — SIGNIFICANT CHANGE UP (ref 3.5–5)
PROT SERPL-MCNC: 5.7 G/DL — LOW (ref 6–8)
RBC # BLD: 3.88 M/UL — LOW (ref 4.2–5.4)
RBC # FLD: 13.7 % — SIGNIFICANT CHANGE UP (ref 11.5–14.5)
SODIUM SERPL-SCNC: 138 MMOL/L — SIGNIFICANT CHANGE UP (ref 135–146)
T4 FREE+ TSH PNL SERPL: 1.19 UIU/ML — SIGNIFICANT CHANGE UP (ref 0.27–4.2)
TIBC SERPL-MCNC: 260 UG/DL — SIGNIFICANT CHANGE UP (ref 220–430)
UIBC SERPL-MCNC: 213 UG/DL — SIGNIFICANT CHANGE UP (ref 110–370)
WBC # BLD: 4.09 K/UL — LOW (ref 4.8–10.8)
WBC # FLD AUTO: 4.09 K/UL — LOW (ref 4.8–10.8)

## 2024-01-25 PROCEDURE — 93010 ELECTROCARDIOGRAM REPORT: CPT

## 2024-01-25 PROCEDURE — 71045 X-RAY EXAM CHEST 1 VIEW: CPT | Mod: 26

## 2024-01-25 PROCEDURE — 99232 SBSQ HOSP IP/OBS MODERATE 35: CPT

## 2024-01-25 RX ORDER — BUMETANIDE 0.25 MG/ML
2 INJECTION INTRAMUSCULAR; INTRAVENOUS ONCE
Refills: 0 | Status: COMPLETED | OUTPATIENT
Start: 2024-01-25 | End: 2024-01-25

## 2024-01-25 RX ORDER — METHOCARBAMOL 500 MG/1
750 TABLET, FILM COATED ORAL EVERY 8 HOURS
Refills: 0 | Status: DISCONTINUED | OUTPATIENT
Start: 2024-01-25 | End: 2024-02-10

## 2024-01-25 RX ORDER — OXYCODONE HYDROCHLORIDE 5 MG/1
5 TABLET ORAL EVERY 6 HOURS
Refills: 0 | Status: DISCONTINUED | OUTPATIENT
Start: 2024-01-25 | End: 2024-01-26

## 2024-01-25 RX ORDER — ROPINIROLE 8 MG/1
0.25 TABLET, FILM COATED, EXTENDED RELEASE ORAL
Refills: 0 | Status: DISCONTINUED | OUTPATIENT
Start: 2024-01-25 | End: 2024-02-15

## 2024-01-25 RX ADMIN — Medication 975 MILLIGRAM(S): at 14:54

## 2024-01-25 RX ADMIN — METHOCARBAMOL 750 MILLIGRAM(S): 500 TABLET, FILM COATED ORAL at 13:47

## 2024-01-25 RX ADMIN — METHOCARBAMOL 750 MILLIGRAM(S): 500 TABLET, FILM COATED ORAL at 21:50

## 2024-01-25 RX ADMIN — Medication 1 TABLET(S): at 13:47

## 2024-01-25 RX ADMIN — OXYCODONE HYDROCHLORIDE 5 MILLIGRAM(S): 5 TABLET ORAL at 18:36

## 2024-01-25 RX ADMIN — GABAPENTIN 300 MILLIGRAM(S): 400 CAPSULE ORAL at 21:49

## 2024-01-25 RX ADMIN — Medication 975 MILLIGRAM(S): at 13:46

## 2024-01-25 RX ADMIN — Medication 975 MILLIGRAM(S): at 05:26

## 2024-01-25 RX ADMIN — Medication 975 MILLIGRAM(S): at 21:49

## 2024-01-25 RX ADMIN — BUMETANIDE 2 MILLIGRAM(S): 0.25 INJECTION INTRAMUSCULAR; INTRAVENOUS at 14:01

## 2024-01-25 RX ADMIN — APIXABAN 2.5 MILLIGRAM(S): 2.5 TABLET, FILM COATED ORAL at 05:26

## 2024-01-25 RX ADMIN — ROPINIROLE 0.25 MILLIGRAM(S): 8 TABLET, FILM COATED, EXTENDED RELEASE ORAL at 21:49

## 2024-01-25 RX ADMIN — SENNA PLUS 2 TABLET(S): 8.6 TABLET ORAL at 21:50

## 2024-01-25 RX ADMIN — PANTOPRAZOLE SODIUM 40 MILLIGRAM(S): 20 TABLET, DELAYED RELEASE ORAL at 05:26

## 2024-01-25 RX ADMIN — OXYCODONE HYDROCHLORIDE 5 MILLIGRAM(S): 5 TABLET ORAL at 17:21

## 2024-01-25 RX ADMIN — METHOCARBAMOL 750 MILLIGRAM(S): 500 TABLET, FILM COATED ORAL at 05:27

## 2024-01-25 RX ADMIN — GABAPENTIN 300 MILLIGRAM(S): 400 CAPSULE ORAL at 13:47

## 2024-01-25 RX ADMIN — APIXABAN 2.5 MILLIGRAM(S): 2.5 TABLET, FILM COATED ORAL at 17:20

## 2024-01-25 RX ADMIN — Medication 5000 UNIT(S): at 14:12

## 2024-01-25 RX ADMIN — Medication 1 TABLET(S): at 13:46

## 2024-01-25 RX ADMIN — GABAPENTIN 300 MILLIGRAM(S): 400 CAPSULE ORAL at 05:26

## 2024-01-25 NOTE — PROGRESS NOTE ADULT - SUBJECTIVE AND OBJECTIVE BOX
Patient is a 89y old  Female who presents with a chief complaint of Back Pain (25 Jan 2024 16:26)      Patient seen and examined at bedside.  Patient reports leg cramps and back pain, denies any chest pain or shortness of breath   ALLERGIES:  No Known Allergies    MEDICATIONS:  acetaminophen     Tablet .. 975 milliGRAM(s) Oral every 8 hours  apixaban 2.5 milliGRAM(s) Oral two times a day  calcium carbonate   1250 mG (OsCal) 1 Tablet(s) Oral daily  cholecalciferol 5000 Unit(s) Oral daily  gabapentin 300 milliGRAM(s) Oral three times a day  lidocaine   4% Patch 1 Patch Transdermal every 24 hours  methocarbamol 750 milliGRAM(s) Oral every 8 hours  multivitamin 1 Tablet(s) Oral daily  oxyCODONE    IR 5 milliGRAM(s) Oral every 6 hours PRN  pantoprazole    Tablet 40 milliGRAM(s) Oral before breakfast  rOPINIRole 0.25 milliGRAM(s) Oral two times a day  senna 2 Tablet(s) Oral at bedtime    Vital Signs Last 24 Hrs  T(F): 97.3 (25 Jan 2024 15:27), Max: 97.8 (25 Jan 2024 02:00)  HR: 99 (25 Jan 2024 15:27) (79 - 106)  BP: 104/61 (25 Jan 2024 15:27) (104/61 - 151/70)  RR: 18 (25 Jan 2024 15:27) (18 - 18)  SpO2: 97% (25 Jan 2024 08:07) (94% - 97%)  I&O's Summary    24 Jan 2024 07:01  -  25 Jan 2024 07:00  --------------------------------------------------------  IN: 0 mL / OUT: 900 mL / NET: -900 mL    25 Jan 2024 07:01  -  25 Jan 2024 17:25  --------------------------------------------------------  IN: 0 mL / OUT: 300 mL / NET: -300 mL        PHYSICAL EXAM:  General: NAD, A/O x 3  ENT: MMM  Neck: Supple, No JVD  Lungs: left lower crackles  Cardio: IRR, S1/S2, 2/6 systolic murmur   Abdomen: Soft, Nontender, Nondistended; Bowel sounds present  Extremities: No cyanosis, +1 LE edema    LABS:                        12.6   4.09  )-----------( 129      ( 25 Jan 2024 06:42 )             38.1     01-25    138  |  99  |  29  ----------------------------<  87  4.2   |  28  |  1.4    Ca    9.4      25 Jan 2024 06:42  Phos  4.2     01-25  Mg     2.5     01-25    TPro  5.7  /  Alb  3.9  /  TBili  0.6  /  DBili  x   /  AST  21  /  ALT  9   /  AlkPhos  61  01-25                TSH --   TSH with FT4 reflex 1.19  Total T3 --                  Urinalysis Basic - ( 25 Jan 2024 06:42 )    Color: x / Appearance: x / SG: x / pH: x  Gluc: 87 mg/dL / Ketone: x  / Bili: x / Urobili: x   Blood: x / Protein: x / Nitrite: x   Leuk Esterase: x / RBC: x / WBC x   Sq Epi: x / Non Sq Epi: x / Bacteria: x            RADIOLOGY & ADDITIONAL TESTS:    Care Discussed with Consultants/Other Providers:

## 2024-01-25 NOTE — PHYSICAL THERAPY INITIAL EVALUATION ADULT - PERTINENT HX OF CURRENT PROBLEM, REHAB EVAL
89F w/ h/o chronic afib (on Eliquis), GERD, and chronic low back pain presenting s/p fall. Patient known to have chronic low back pain that has progressively worsened over the years. Pain acutely worsened two weeks ago and patient subsequently fell due to inability to support own weight. Evaluated by PCP after initial fall due to worsening pain. Prescribed Percocet 5-325 bid w/ mild relief. However, pt once again fell earlier today after pt unable to support her own weight while trying to ambulate. Now unable to ambulate or bear weight, even w/ assistance. No reported HT, LOC, lightheadedness, confusion, loss of continence, or focal weakness a/w either fall. No reported fevers, chills, CP, palpitations, SOB, abdominal pain, n/v/d, or dysuria (although daughter did note malodorous urine recently).    Of note, pt also reports new onset LE swelling over past 1-2 weeks. Recently started on furosemide 20 qd by PCP for swelling. No reported CP or dyspnea at rest or exertion (but functional status limited by lower back pain). No known prior h/o CHF.

## 2024-01-25 NOTE — PROGRESS NOTE ADULT - ASSESSMENT
89F w/ h/o chronic afib (on Eliquis), GERD, and chronic low back pain presenting s/p fall. Admitted to medicine for back pain 2/2 Lumbar compression fracture    #Lumbar Compression Fracture  Initially presenting s/p fa. Known to have progressively worsening chronic low back pain over several years. Previously managed via gabapentin 200 tid. Previously trialed Mobic and diclofenac w/ minimal relief. Recently started on Percocet 5-325 q12h w/ mild relief.  - start Tylenol 975mg PO Q8H  - increase gabapentin from 200mg to 300mg Q8H (increasing to max dose based on CrCl; monitor for oversedation)  - start lidocaine 4% patch Q24H  - 1/25 started ropinirole for leg cramps   - obtain UA (r/o UTI; no reported dysuria, but daughter reports malodorous urine)  - CT lumbar spine: pathologic severe compression fracture of L1  - neuroSx consulted  - pain management eval appreciated  - PT consulted; STR vs home PT    #CHF, new onset  Reports LE swelling over past 1-2 weeks.   Recently started on furosemide 20 qd for swelling.   No reported CP or dyspnea at rest or exertion (but functional status limited by lower back pain).   - No known prior h/o CHF.  (bibasilar crackles; b/l LE pitting edema)    - s/p furosemide 40mg IV once   - TTE (May 2013) demonstrated LVEF 55-65% w/ normal global LVSF and no significant valvulopathy.  -  TTE ordered    - elevated BNP  - daily standing weights; strict I's & O's; 1.5L fluid restrict  - monitor 'lytes and replete accordingly (K>4; Mg>2)  - s/p bumex 2g IV 1x dose (1/25)  - pain mgt: percocet 5mg BID  - f/u troponin    #Chronic Atrial Fibrillation  CHADS-VASC 3+ (Age x2, Female). Only on AC. Previously on Lopressor, but only takes intermittently as HR currently rate-controlled off meds.  - c/w apixaban 2.5mg PO BID (consider dose increase if Cr persistently <1.5)    #GERD  - c/w pantoprazole 40mg PO QD (therapeutic interchange for omeprazole)    #CKD 3b   per PCP baseline is 1.2, currently 1.4    DVT PPX: apixaban 2.5mg PO BID  GI PPX: pantoprazole 40mg PO QD  DIET: DASH/TLC + 1.5L fluid restrict  ACTIVITY: IAT  CODE STATUS: Full Code  DISPOSITION: active     PENDING:  TTE; NeuroSx consult, f/u troponin, f/u   CXr AM, pain control

## 2024-01-25 NOTE — PROGRESS NOTE ADULT - SUBJECTIVE AND OBJECTIVE BOX
24H events:    Patient is a 89y old Female who presents with a chief complaint of Fall (24 Jan 2024 20:12)    Primary diagnosis of Inability to ambulate due to multiple joints      Today is hospital day 1d. This morning patient was seen and examined at bedside, resting comfortably in bed. Patient complaining of b/l hip/lumbar pain this AM. CT lumbar showing pathologic severe compression fracture, neuroSx consulted.      Code Status: full    Family communication:  Contact date: 1/25/24  Relationship to patient: Daughter at bedside  Communication details: discussed plan for CT and that we will plan based on findings, neuroSx consulted.  What matters most: pain mgt    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 No reported tobacco, alcohol, or illicit/recreational drug use. ADL's limited by progressively worsening ability to ambulate. Ambulates w/ walker. (24 Jan 2024 15:07)      ALLERGIES:  No Known Allergies    MEDICATIONS:  STANDING MEDICATIONS  acetaminophen     Tablet .. 975 milliGRAM(s) Oral every 8 hours  apixaban 2.5 milliGRAM(s) Oral two times a day  calcium carbonate   1250 mG (OsCal) 1 Tablet(s) Oral daily  cholecalciferol 5000 Unit(s) Oral daily  gabapentin 300 milliGRAM(s) Oral three times a day  lidocaine   4% Patch 1 Patch Transdermal every 24 hours  methocarbamol 750 milliGRAM(s) Oral every 8 hours  multivitamin 1 Tablet(s) Oral daily  pantoprazole    Tablet 40 milliGRAM(s) Oral before breakfast  rOPINIRole 0.25 milliGRAM(s) Oral two times a day  senna 2 Tablet(s) Oral at bedtime    PRN MEDICATIONS  oxyCODONE    IR 5 milliGRAM(s) Oral every 6 hours PRN    VITALS:   T(F): 97.3  HR: 99  BP: 104/61  RR: 18  SpO2: 97%    PHYSICAL EXAM:  GENERAL:   ( + ) NAD, lying in bed comfortably     (  ) obtunded     (  ) lethargic     (  ) somnolent    HEAD:   (  +) Atraumatic     (  ) hematoma     (  ) laceration (specify location:       )     NECK:  ( + ) Supple     (  ) neck stiffness     (  ) nuchal rigidity     (  )  no JVD     (  ) JVD present ( -- cm)    HEART:  Rate -->     ( ) normal rate     (  ) bradycardic     ( + ) tachycardic  Rhythm -->     (  +) regular     (  ) regularly irregular     (  ) irregularly irregular  Murmurs -->     (  ) normal s1s2     (  ) systolic murmur     (  ) diastolic murmur     (  ) continuous murmur      (  ) S3 present     (  ) S4 present    LUNGS:   ( + )Unlabored respirations     (  ) tachypnea  ( + ) B/L air entry     (  ) decreased breath sounds in:  (location     )    (  ) no adventitious sound     ( + ) bibasilar crackles     (  ) wheezing      (  ) rhonchi      (specify location:       )  (  ) chest wall tenderness (specify location:       )    ABDOMEN:   ( + ) Soft     (  ) tense   |   ( + ) nondistended     (  ) distended   |   (  ) +BS     (  ) hypoactive bowel sounds     (  ) hyperactive bowel sounds  ( + ) nontender     (  ) RUQ tenderness     (  ) RLQ tenderness     (  ) LLQ tenderness     (  ) epigastric tenderness     (  ) diffuse tenderness  (  ) Splenomegaly      (  ) Hepatomegaly      (  ) Jaundice     (  ) ecchymosis     EXTREMITIES: 2+ peripheral pulses bilaterally. No clubbing, cyanosis, or edema  (  ) Normal     (  ) Rash     (  ) ecchymosis     (  ) varicose veins      ( + ) 1+ pitting edema     (  ) non-pitting edema   (  ) ulceration     (  ) gangrene:     (location:     )    NERVOUS SYSTEM:    ( + ) A&Ox3     (  ) confused     (  ) lethargic  CN II-XII:     ( + ) Intact     (  ) deficits found     (Specify:     )   Upper extremities:     (  ) no sensorimotor deficits     (  ) weakness     (  ) loss of proprioception/vibration     (  ) loss of touch/temperature (specify:    )  Lower extremities:     (  ) no sensorimotor deficits     (  ) weakness     (  ) loss of proprioception/vibration     (  ) loss of touch/temperature (specify:    )    SKIN:   (  ) No rashes or lesions     (  ) maculopapular rash     (  ) pustules     (  ) vesicles     (  ) ulcer     (  ) ecchymosis     (specify location:     )    AMPAC score:    ( - ) Indwelling David Catheter:   Date insterted:    Reason (  ) Critical illness     (  ) urinary retention    (  ) Accurate Ins/Outs Monitoring     (  ) CMO patient    ( - ) Central Line:   Date inserted:  Location: (  ) Right IJ     (  ) Left IJ     (  ) Right Fem     (  ) Left Fem    (  ) SPC        (  ) pigtail       (  ) PEG tube       (  ) colostomy       (  ) jejunostomy  (  ) U-Dall    LABS:                        12.6   4.09  )-----------( 129      ( 25 Jan 2024 06:42 )             38.1     01-25    138  |  99  |  29<H>  ----------------------------<  87  4.2   |  28  |  1.4    Ca    9.4      25 Jan 2024 06:42  Phos  4.2     01-25  Mg     2.5     01-25    TPro  5.7<L>  /  Alb  3.9  /  TBili  0.6  /  DBili  x   /  AST  21  /  ALT  9   /  AlkPhos  61  01-25      Urinalysis Basic - ( 25 Jan 2024 06:42 )    Color: x / Appearance: x / SG: x / pH: x  Gluc: 87 mg/dL / Ketone: x  / Bili: x / Urobili: x   Blood: x / Protein: x / Nitrite: x   Leuk Esterase: x / RBC: x / WBC x   Sq Epi: x / Non Sq Epi: x / Bacteria: x                RADIOLOGY:      < from: CT Lumbar Spine No Cont (01.24.24 @ 20:58) >  IMPRESSION:    Age-indeterminate severe compression deformity of L1 with mixed   lytic/sclerotic changes extending to the posterior elements, likely   pathologic in nature. Soft tissue component associated with the lytic   changes in the posterior inferior endplate of L1 encroaches the spinal   space resulting in L1-2 severe spinal stenosis and severe bilateral   foraminal stenosis.    Multilevel lumbar spondylosis exacerbated by moderate levoscoliosis   resulting in multilevel high-grade foraminal stenosis as above.    Partially imaged markedly distended urinary bladder. Please clinically   correlate for neurogenic bladder.    < end of copied text >

## 2024-01-25 NOTE — PHYSICAL THERAPY INITIAL EVALUATION ADULT - TRANSFER SAFETY CONCERNS NOTED: BED/CHAIR, REHAB EVAL
B knees buckling/decreased safety awareness/decreased sequencing ability/decreased weight-shifting ability

## 2024-01-25 NOTE — PHYSICAL THERAPY INITIAL EVALUATION ADULT - ADDITIONAL COMMENTS
Patient lives with daughter in house with 8 steps top enter home. Prior to fall, patient independent in ADLs and ambulation using RW.

## 2024-01-25 NOTE — PROGRESS NOTE ADULT - ASSESSMENT
89F w/ h/o chronic afib (on Eliquis), GERD, and chronic low back pain presenting s/p fall. Admitted to medicine for back pain 2/2 Lumbar compression fracture    #Lumbar Compression Fracture  Initially presenting s/p fa. Known to have progressively worsening chronic low back pain over several years. Previously managed via gabapentin 200 tid. Previously trialed Mobic and diclofenac w/ minimal relief. Recently started on Percocet 5-325 q12h w/ mild relief.  - start Tylenol 975mg PO Q8H  - increase gabapentin from 200mg to 300mg Q8H (increasing to max dose based on CrCl; monitor for oversedation)  - start lidocaine 4% patch Q24H  - obtain UA (r/o UTI; no reported dysuria, but daughter reports malodorous urine)  - CT lumbar spine: pathologic severe compression fracture of L1  - neuroSx consulted  - pain management eval appreciated  - PT consulted; STR vs home PT    #CHF, new onset  Reports LE swelling over past 1-2 weeks.   Recently started on furosemide 20 qd for swelling.   No reported CP or dyspnea at rest or exertion (but functional status limited by lower back pain).   - No known prior h/o CHF.  (bibasilar crackles; b/l LE pitting edema)    - s/p furosemide 40mg IV once   - TTE (May 2013) demonstrated LVEF 55-65% w/ normal global LVSF and no significant valvulopathy.  - obtain TTE   - elevated BNP  - daily standing weights; strict I's & O's; 1.5L fluid restrict  - monitor 'lytes and replete accordingly (K>4; Mg>2)  - obtain iron studies  - if TSAT <20% + ferritin <300 OR ferritin <100, start Venofer 200mg IV x5 days (if labs/TTE confirm CHF)  - consider transfer to telemetry if CHF confirmed  - s/p bumex 2g IV 1x dose (1/25)  - pain mgt: percocet 5mg BID  - f/u troponin    #Chronic Atrial Fibrillation  CHADS-VASC 3+ (Age x2, Female). Only on AC. Previously on Lopressor, but only takes intermittently as HR currently rate-controlled off meds.  - c/w apixaban 2.5mg PO BID (consider dose increase if Cr persistently <1.5)    #GERD  - c/w pantoprazole 40mg PO QD (therapeutic interchange for omeprazole)    #Elevated Cr: per PCP baseline is 1.2, currently 1.4    DVT PPX: apixaban 2.5mg PO BID  GI PPX: pantoprazole 40mg PO QD  DIET: DASH/TLC + 1.5L fluid restrict  ACTIVITY: IAT  CODE STATUS: Full Code  DISPOSITION: From Home; possible d/c to home w/ HCS vs STR    PENDING:  TTE; NeuroSx consult, f/u troponin   89F w/ h/o chronic afib (on Eliquis), GERD, and chronic low back pain presenting s/p fall. Admitted to medicine for back pain 2/2 Lumbar compression fracture    #Lumbar Compression Fracture  Initially presenting s/p fa. Known to have progressively worsening chronic low back pain over several years. Previously managed via gabapentin 200 tid. Previously trialed Mobic and diclofenac w/ minimal relief. Recently started on Percocet 5-325 q12h w/ mild relief.  - start Tylenol 975mg PO Q8H  - increase gabapentin from 200mg to 300mg Q8H (increasing to max dose based on CrCl; monitor for oversedation)  - start lidocaine 4% patch Q24H  - obtain UA (r/o UTI; no reported dysuria, but daughter reports malodorous urine)  - CT lumbar spine: pathologic severe compression fracture of L1  - neuroSx consulted  - pain management eval appreciated  - PT consulted; STR vs home PT    #CHF, new onset  Reports LE swelling over past 1-2 weeks.   Recently started on furosemide 20 qd for swelling.   No reported CP or dyspnea at rest or exertion (but functional status limited by lower back pain).   - No known prior h/o CHF.  (bibasilar crackles; b/l LE pitting edema)    - s/p furosemide 40mg IV once   - TTE (May 2013) demonstrated LVEF 55-65% w/ normal global LVSF and no significant valvulopathy.  - obtain TTE   - elevated BNP  - daily standing weights; strict I's & O's; 1.5L fluid restrict  - monitor 'lytes and replete accordingly (K>4; Mg>2)  - obtain iron studies  - if TSAT <20% + ferritin <300 OR ferritin <100, start Venofer 200mg IV x5 days (if labs/TTE confirm CHF)  - consider transfer to telemetry if CHF confirmed  - s/p bumex 2g IV 1x dose (1/25)  - pain mgt: percocet 5mg BID  - f/u troponin    #Chronic Atrial Fibrillation  CHADS-VASC 3+ (Age x2, Female). Only on AC. Previously on Lopressor, but only takes intermittently as HR currently rate-controlled off meds.  - c/w apixaban 2.5mg PO BID (consider dose increase if Cr persistently <1.5)    #GERD  - c/w pantoprazole 40mg PO QD (therapeutic interchange for omeprazole)    #Elevated Cr: per PCP baseline is 1.2, currently 1.4    DVT PPX: apixaban 2.5mg PO BID  GI PPX: pantoprazole 40mg PO QD  DIET: DASH/TLC + 1.5L fluid restrict  ACTIVITY: IAT  CODE STATUS: Full Code  DISPOSITION: From Home; possible d/c to home w/ HCS vs STR    PENDING:  TTE; NeuroSx consult, f/u troponin, f/u   CXr AM

## 2024-01-25 NOTE — PHYSICAL THERAPY INITIAL EVALUATION ADULT - GENERAL OBSERVATIONS, REHAB EVAL
Patient encountered lying in bed. Daughter at bedside to interpret. Agreed to participate in therapy

## 2024-01-26 LAB
ALBUMIN SERPL ELPH-MCNC: 3.7 G/DL — SIGNIFICANT CHANGE UP (ref 3.5–5.2)
ALP SERPL-CCNC: 63 U/L — SIGNIFICANT CHANGE UP (ref 30–115)
ALT FLD-CCNC: 10 U/L — SIGNIFICANT CHANGE UP (ref 0–41)
ANION GAP SERPL CALC-SCNC: 16 MMOL/L — HIGH (ref 7–14)
AST SERPL-CCNC: 24 U/L — SIGNIFICANT CHANGE UP (ref 0–41)
BASOPHILS # BLD AUTO: 0.03 K/UL — SIGNIFICANT CHANGE UP (ref 0–0.2)
BASOPHILS NFR BLD AUTO: 0.7 % — SIGNIFICANT CHANGE UP (ref 0–1)
BILIRUB SERPL-MCNC: 0.5 MG/DL — SIGNIFICANT CHANGE UP (ref 0.2–1.2)
BUN SERPL-MCNC: 33 MG/DL — HIGH (ref 10–20)
CALCIUM SERPL-MCNC: 9.2 MG/DL — SIGNIFICANT CHANGE UP (ref 8.4–10.5)
CHLORIDE SERPL-SCNC: 99 MMOL/L — SIGNIFICANT CHANGE UP (ref 98–110)
CO2 SERPL-SCNC: 24 MMOL/L — SIGNIFICANT CHANGE UP (ref 17–32)
CREAT SERPL-MCNC: 1.5 MG/DL — SIGNIFICANT CHANGE UP (ref 0.7–1.5)
EGFR: 33 ML/MIN/1.73M2 — LOW
EOSINOPHIL # BLD AUTO: 0.43 K/UL — SIGNIFICANT CHANGE UP (ref 0–0.7)
EOSINOPHIL NFR BLD AUTO: 10 % — HIGH (ref 0–8)
GLUCOSE SERPL-MCNC: 91 MG/DL — SIGNIFICANT CHANGE UP (ref 70–99)
HCT VFR BLD CALC: 38.7 % — SIGNIFICANT CHANGE UP (ref 37–47)
HGB BLD-MCNC: 13 G/DL — SIGNIFICANT CHANGE UP (ref 12–16)
IMM GRANULOCYTES NFR BLD AUTO: 0.2 % — SIGNIFICANT CHANGE UP (ref 0.1–0.3)
LYMPHOCYTES # BLD AUTO: 1.76 K/UL — SIGNIFICANT CHANGE UP (ref 1.2–3.4)
LYMPHOCYTES # BLD AUTO: 40.7 % — SIGNIFICANT CHANGE UP (ref 20.5–51.1)
MAGNESIUM SERPL-MCNC: 2.3 MG/DL — SIGNIFICANT CHANGE UP (ref 1.8–2.4)
MCHC RBC-ENTMCNC: 32.5 PG — HIGH (ref 27–31)
MCHC RBC-ENTMCNC: 33.6 G/DL — SIGNIFICANT CHANGE UP (ref 32–37)
MCV RBC AUTO: 96.8 FL — SIGNIFICANT CHANGE UP (ref 81–99)
MONOCYTES # BLD AUTO: 0.31 K/UL — SIGNIFICANT CHANGE UP (ref 0.1–0.6)
MONOCYTES NFR BLD AUTO: 7.2 % — SIGNIFICANT CHANGE UP (ref 1.7–9.3)
NEUTROPHILS # BLD AUTO: 1.78 K/UL — SIGNIFICANT CHANGE UP (ref 1.4–6.5)
NEUTROPHILS NFR BLD AUTO: 41.2 % — LOW (ref 42.2–75.2)
NRBC # BLD: 0 /100 WBCS — SIGNIFICANT CHANGE UP (ref 0–0)
PHOSPHATE SERPL-MCNC: 4.8 MG/DL — SIGNIFICANT CHANGE UP (ref 2.1–4.9)
PLATELET # BLD AUTO: 136 K/UL — SIGNIFICANT CHANGE UP (ref 130–400)
PMV BLD: 10.1 FL — SIGNIFICANT CHANGE UP (ref 7.4–10.4)
POTASSIUM SERPL-MCNC: 3.9 MMOL/L — SIGNIFICANT CHANGE UP (ref 3.5–5)
POTASSIUM SERPL-SCNC: 3.9 MMOL/L — SIGNIFICANT CHANGE UP (ref 3.5–5)
PROT SERPL-MCNC: 5.6 G/DL — LOW (ref 6–8)
RBC # BLD: 4 M/UL — LOW (ref 4.2–5.4)
RBC # FLD: 13.7 % — SIGNIFICANT CHANGE UP (ref 11.5–14.5)
SODIUM SERPL-SCNC: 139 MMOL/L — SIGNIFICANT CHANGE UP (ref 135–146)
TROPONIN T, HIGH SENSITIVITY RESULT: 38 NG/L — HIGH (ref 6–13)
TROPONIN T, HIGH SENSITIVITY RESULT: 39 NG/L — HIGH (ref 6–13)
TROPONIN T, HIGH SENSITIVITY RESULT: 42 NG/L — HIGH (ref 6–13)
WBC # BLD: 4.32 K/UL — LOW (ref 4.8–10.8)
WBC # FLD AUTO: 4.32 K/UL — LOW (ref 4.8–10.8)

## 2024-01-26 PROCEDURE — 71045 X-RAY EXAM CHEST 1 VIEW: CPT | Mod: 26

## 2024-01-26 PROCEDURE — 99232 SBSQ HOSP IP/OBS MODERATE 35: CPT

## 2024-01-26 RX ORDER — OXYCODONE HYDROCHLORIDE 5 MG/1
10 TABLET ORAL EVERY 6 HOURS
Refills: 0 | Status: DISCONTINUED | OUTPATIENT
Start: 2024-01-26 | End: 2024-01-29

## 2024-01-26 RX ORDER — OXYCODONE HYDROCHLORIDE 5 MG/1
2.5 TABLET ORAL DAILY
Refills: 0 | Status: DISCONTINUED | OUTPATIENT
Start: 2024-01-26 | End: 2024-01-28

## 2024-01-26 RX ADMIN — Medication 975 MILLIGRAM(S): at 13:14

## 2024-01-26 RX ADMIN — METHOCARBAMOL 750 MILLIGRAM(S): 500 TABLET, FILM COATED ORAL at 21:15

## 2024-01-26 RX ADMIN — Medication 5000 UNIT(S): at 11:50

## 2024-01-26 RX ADMIN — OXYCODONE HYDROCHLORIDE 5 MILLIGRAM(S): 5 TABLET ORAL at 10:24

## 2024-01-26 RX ADMIN — GABAPENTIN 300 MILLIGRAM(S): 400 CAPSULE ORAL at 21:14

## 2024-01-26 RX ADMIN — METHOCARBAMOL 750 MILLIGRAM(S): 500 TABLET, FILM COATED ORAL at 06:01

## 2024-01-26 RX ADMIN — APIXABAN 2.5 MILLIGRAM(S): 2.5 TABLET, FILM COATED ORAL at 17:33

## 2024-01-26 RX ADMIN — ROPINIROLE 0.25 MILLIGRAM(S): 8 TABLET, FILM COATED, EXTENDED RELEASE ORAL at 17:33

## 2024-01-26 RX ADMIN — GABAPENTIN 300 MILLIGRAM(S): 400 CAPSULE ORAL at 13:14

## 2024-01-26 RX ADMIN — ROPINIROLE 0.25 MILLIGRAM(S): 8 TABLET, FILM COATED, EXTENDED RELEASE ORAL at 06:02

## 2024-01-26 RX ADMIN — Medication 1 TABLET(S): at 11:53

## 2024-01-26 RX ADMIN — APIXABAN 2.5 MILLIGRAM(S): 2.5 TABLET, FILM COATED ORAL at 06:01

## 2024-01-26 RX ADMIN — Medication 1 TABLET(S): at 11:49

## 2024-01-26 RX ADMIN — GABAPENTIN 300 MILLIGRAM(S): 400 CAPSULE ORAL at 06:01

## 2024-01-26 RX ADMIN — OXYCODONE HYDROCHLORIDE 10 MILLIGRAM(S): 5 TABLET ORAL at 17:33

## 2024-01-26 RX ADMIN — PANTOPRAZOLE SODIUM 40 MILLIGRAM(S): 20 TABLET, DELAYED RELEASE ORAL at 06:01

## 2024-01-26 RX ADMIN — METHOCARBAMOL 750 MILLIGRAM(S): 500 TABLET, FILM COATED ORAL at 13:15

## 2024-01-26 RX ADMIN — SENNA PLUS 2 TABLET(S): 8.6 TABLET ORAL at 21:15

## 2024-01-26 RX ADMIN — Medication 975 MILLIGRAM(S): at 21:14

## 2024-01-26 RX ADMIN — Medication 975 MILLIGRAM(S): at 06:01

## 2024-01-26 NOTE — CONSULT NOTE ADULT - SUBJECTIVE AND OBJECTIVE BOX
HPI:  89F w/ h/o chronic afib (on Eliquis), GERD, and chronic low back pain presenting s/p fall. Patient known to have chronic low back pain that has progressively worsened over the years. Pain acutely worsened two weeks ago and patient subsequently fell due to inability to support own weight. Evaluated by PCP after initial fall due to worsening pain. Prescribed Percocet 5-325 bid w/ mild relief. However, pt once again fell earlier today after pt unable to support her own weight while trying to ambulate. Now unable to ambulate or bear weight, even w/ assistance. No reported HT, LOC, lightheadedness, confusion, loss of continence, or focal weakness a/w either fall. No reported fevers, chills, CP, palpitations, SOB, abdominal pain, n/v/d, or dysuria (although daughter did note malodorous urine recently).    Of note, pt also reports new onset LE swelling over past 1-2 weeks. Recently started on furosemide 20 qd by PCP for swelling. No reported CP or dyspnea at rest or exertion (but functional status limited by lower back pain). No known prior h/o CHF.     Neurosurgery was consulted after imaging demonstrated an L1 pathologic compression fracture. Patient was seen and examined at bedside on 4B. Patient sitting upright in chair A&Ox3 and following all commands. Patient is Citizen of [place]-speaking. Daughter at bedside assisting with history and translation. Patient is functional and ambulates independently at baseline. Per daughter, patient has had difficulty walking since fall secondary to back pain with radiation to LLE. Patient endorses radicular pain but otherwise denies weakness, paresthesias, saddle anesthesia, urinary or fecal retention or incontinence, headaches at this time. Denies hx of cancer.       PAST MEDICAL & SURGICAL HISTORY:  Chronic atrial fibrillation      GERD (gastroesophageal reflux disease)      Chronic lower back pain      No significant past surgical history          Home Medications:  apixaban 2.5 mg oral tablet: 1 tab(s) orally 2 times a day (24 Jan 2024 13:48)  furosemide 20 mg oral tablet: 1 tab(s) orally once a day (24 Jan 2024 16:04)  gabapentin 100 mg oral capsule: 2 cap(s) orally 3 times a day (24 Jan 2024 14:46)  omeprazole 20 mg oral delayed release capsule: 1 cap(s) orally once a day (24 Jan 2024 14:47)  senna (sennosides) 8.6 mg oral tablet: 2 tab(s) orally once a day (at bedtime) (24 Jan 2024 14:46)  Vitamin C 500 mg oral tablet: 1 tab(s) orally once a day (24 Jan 2024 14:47)      Allergies    No Known Allergies    Intolerances        ROS:  [X] A ten-point review of systems is negative except as noted   [  ] Due to altered mental status/intubation, subjective information were not able to be obtained from the patient. History was obtained, to the extent possible, from review of the chart and collateral sources of information    MEDICATIONS  (STANDING):  acetaminophen     Tablet .. 975 milliGRAM(s) Oral every 8 hours  apixaban 2.5 milliGRAM(s) Oral two times a day  calcium carbonate   1250 mG (OsCal) 1 Tablet(s) Oral daily  cholecalciferol 5000 Unit(s) Oral daily  gabapentin 300 milliGRAM(s) Oral three times a day  lidocaine   4% Patch 1 Patch Transdermal every 24 hours  methocarbamol 750 milliGRAM(s) Oral every 8 hours  multivitamin 1 Tablet(s) Oral daily  pantoprazole    Tablet 40 milliGRAM(s) Oral before breakfast  rOPINIRole 0.25 milliGRAM(s) Oral two times a day  senna 2 Tablet(s) Oral at bedtime    MEDICATIONS  (PRN):  oxyCODONE    IR 2.5 milliGRAM(s) Oral daily PRN Severe Pain (7 - 10)  oxyCODONE    IR 10 milliGRAM(s) Oral every 6 hours PRN Severe Pain (7 - 10)      ICU Vital Signs Last 24 Hrs  T(C): 35.6 (26 Jan 2024 08:03), Max: 36.6 (25 Jan 2024 23:38)  T(F): 96 (26 Jan 2024 08:03), Max: 97.9 (25 Jan 2024 23:38)  HR: 123 (26 Jan 2024 08:03) (89 - 123)  BP: 145/63 (26 Jan 2024 08:03) (104/61 - 145/63)  BP(mean): --  ABP: --  ABP(mean): --  RR: 19 (26 Jan 2024 08:03) (18 - 19)  SpO2: --        I&O's Detail    25 Jan 2024 07:01  -  26 Jan 2024 07:00  --------------------------------------------------------  IN:    Oral Fluid: 400 mL  Total IN: 400 mL    OUT:    Voided (mL): 2800 mL  Total OUT: 2800 mL    Total NET: -2400 mL      26 Jan 2024 07:01  -  26 Jan 2024 15:14  --------------------------------------------------------  IN:    Oral Fluid: 180 mL  Total IN: 180 mL    OUT:  Total OUT: 0 mL    Total NET: 180 mL          CBC Full  -  ( 26 Jan 2024 07:14 )  WBC Count : 4.32 K/uL  RBC Count : 4.00 M/uL  Hemoglobin : 13.0 g/dL  Hematocrit : 38.7 %  Platelet Count - Automated : 136 K/uL  Mean Cell Volume : 96.8 fL  Mean Cell Hemoglobin : 32.5 pg  Mean Cell Hemoglobin Concentration : 33.6 g/dL  Auto Neutrophil # : 1.78 K/uL  Auto Lymphocyte # : 1.76 K/uL  Auto Monocyte # : 0.31 K/uL  Auto Eosinophil # : 0.43 K/uL  Auto Basophil # : 0.03 K/uL  Auto Neutrophil % : 41.2 %  Auto Lymphocyte % : 40.7 %  Auto Monocyte % : 7.2 %  Auto Eosinophil % : 10.0 %  Auto Basophil % : 0.7 %    01-26    139  |  99  |  33<H>  ----------------------------<  91  3.9   |  24  |  1.5    Ca    9.2      26 Jan 2024 07:14  Phos  4.8     01-26  Mg     2.3     01-26    TPro  5.6<L>  /  Alb  3.7  /  TBili  0.5  /  DBili  x   /  AST  24  /  ALT  10  /  AlkPhos  63  01-26        Urinalysis Basic - ( 26 Jan 2024 07:14 )    Color: x / Appearance: x / SG: x / pH: x  Gluc: 91 mg/dL / Ketone: x  / Bili: x / Urobili: x   Blood: x / Protein: x / Nitrite: x   Leuk Esterase: x / RBC: x / WBC x   Sq Epi: x / Non Sq Epi: x / Bacteria: x        Physical Exam:  General: Lying in bed, following all commands  AAOX3. Verbal function intact  Facial motions symmetric  EOMI  Motor: MAEx4  5/5 strength in b/l UE's and LE's  Reflexes: Patellar reflexes 1+ b/l   Sensation: intact to touch in all extremities  Mild tenderness to palpation of midline back   (-) SLR      Imaging:  < from: CT Lumbar Spine No Cont (01.24.24 @ 20:58) >  IMPRESSION:    Age-indeterminate severe compression deformity of L1 with mixed   lytic/sclerotic changes extending to the posterior elements, likely   pathologic in nature. Soft tissue component associated with the lytic   changes in the posterior inferior endplate of L1 encroaches the spinal   space resulting in L1-2 severe spinal stenosis and severe bilateral   foraminal stenosis.    Multilevel lumbar spondylosis exacerbated by moderate levoscoliosis   resulting in multilevel high-grade foraminal stenosis as above.    Partially imaged markedly distended urinary bladder. Please clinically   correlate for neurogenic bladder.      Assessment/Plan:  89 year-old female found to have age-indeterminate compression deformity of L1.   -PRN analgesia  -PT/OT/Rehab  -MRI L spine +/- contrast if no contraindications  -Met w/u  -Abdominal binder for comfort/support when OOB while in hospital  -TLSO brace upon discharge  -D/w attending

## 2024-01-26 NOTE — PROGRESS NOTE ADULT - ASSESSMENT
89F w/ h/o chronic afib (on Eliquis), GERD, and chronic low back pain presenting s/p fall. Admitted to medicine for back pain 2/2 Lumbar compression fracture    #Lumbar Compression Fracture  Initially presenting s/p fa. Known to have progressively worsening chronic low back pain over several years. Previously managed via gabapentin 200 tid. Previously trialed Mobic and diclofenac w/ minimal relief. Recently started on Percocet 5-325 q12h w/ mild relief.  - start Tylenol 975mg PO Q8H  - increase gabapentin from 200mg to 300mg Q8H (increasing to max dose based on CrCl; monitor for oversedation)  - start lidocaine 4% patch Q24H  - 1/25 started ropinirole for leg cramps   - obtain UA (r/o UTI; no reported dysuria, but daughter reports malodorous urine)  - CT lumbar spine: pathologic severe compression fracture of L1  - neuroSx consulted  - pain management eval appreciated  - PT consulted; STR vs home PT    #CHF, new onset  Reports LE swelling over past 1-2 weeks.   Recently started on furosemide 20 qd for swelling.   No reported CP or dyspnea at rest or exertion (but functional status limited by lower back pain).   - No known prior h/o CHF.  (bibasilar crackles; b/l LE pitting edema)    - s/p furosemide 40mg IV once   - TTE (May 2013) demonstrated LVEF 55-65% w/ normal global LVSF and no significant valvulopathy.  -  TTE ordered    - elevated BNP  - daily standing weights; strict I's & O's; 1.5L fluid restrict  - monitor 'lytes and replete accordingly (K>4; Mg>2)  - s/p bumex 2g IV 1x dose (1/25)  - pain mgt: percocet 5mg BID  - f/u troponin    #Chronic Atrial Fibrillation  CHADS-VASC 3+ (Age x2, Female). Only on AC. Previously on Lopressor, but only takes intermittently as HR currently rate-controlled off meds.  - c/w apixaban 2.5mg PO BID (consider dose increase if Cr persistently <1.5)    #GERD  - c/w pantoprazole 40mg PO QD (therapeutic interchange for omeprazole)    #CKD 3b   per PCP baseline is 1.2, currently 1.4    DVT PPX: apixaban 2.5mg PO BID  GI PPX: pantoprazole 40mg PO QD  DIET: DASH/TLC + 1.5L fluid restrict  ACTIVITY: IAT  CODE STATUS: Full Code  DISPOSITION: active     PENDING:  TTE; NeuroSx consult, f/u troponin, f/u   CXr AM, pain control    89F w/ h/o chronic afib (on Eliquis), GERD, and chronic low back pain presenting s/p fall. Admitted to medicine for back pain 2/2 Lumbar compression fracture    #Lumbar Compression Fracture  Initially presenting s/p fa. Known to have progressively worsening chronic low back pain over several years. Previously managed via gabapentin 200 tid. Previously trialed Mobic and diclofenac w/ minimal relief. Recently started on Percocet 5-325 q12h w/ mild relief.  - start Tylenol 975mg PO Q8H  - increase gabapentin from 200mg to 300mg Q8H (increasing to max dose based on CrCl; monitor for oversedation)  - start lidocaine 4% patch Q24H  - 1/25 started ropinirole for leg cramps   - obtain UA (r/o UTI; no reported dysuria, but daughter reports malodorous urine)  - CT lumbar spine: pathologic severe compression fracture of L1  - neuroSx consult-to order MRI, abd binder, TLSO brace on discharge   - pain management eval appreciated  - PT consulted; STR vs home PT    #CHF, new onset  Reports LE swelling over past 1-2 weeks.   Recently started on furosemide 20 qd for swelling.   No reported CP or dyspnea at rest or exertion (but functional status limited by lower back pain).   - No known prior h/o CHF.  (bibasilar crackles; b/l LE pitting edema)    - s/p furosemide 40mg IV once   - TTE (May 2013) demonstrated LVEF 55-65% w/ normal global LVSF and no significant valvulopathy.  -  TTE ordered    - elevated BNP  - daily standing weights; strict I's & O's; 1.5L fluid restrict  - monitor 'lytes and replete accordingly (K>4; Mg>2)  - s/p bumex 2g IV 1x dose (1/25)  - pain mgt: percocet increased to 10mg   - troponin-negative   - Tele monitored for 24 hrs with no dysrhythmia     #Chronic Atrial Fibrillation  CHADS-VASC 3+ (Age x2, Female). Only on AC. Previously on Lopressor, but only takes intermittently as HR currently rate-controlled off meds.  - c/w apixaban 2.5mg PO BID (consider dose increase if Cr persistently <1.5)    #GERD  - c/w pantoprazole 40mg PO QD (therapeutic interchange for omeprazole)    #CKD 3b   per PCP baseline is 1.2, currently 1.4    DVT PPX: apixaban 2.5mg PO BID  GI PPX: pantoprazole 40mg PO QD  DIET: DASH/TLC + 1.5L fluid restrict  ACTIVITY: IAT  CODE STATUS: Full Code  DISPOSITION: active     PENDING:  TTE; MRI, pain control, physical therapy  Family: daughter updated 1/26 at bedside

## 2024-01-26 NOTE — CONSULT NOTE ADULT - NS ATTEND AMEND GEN_ALL_CORE FT
Films and case reviewed. I agree with above. Patient requires MRI +/- lumbar spine for further plan of care. fracture is concerning for underlying pathology. Not consistent with osteoporotic fracture.

## 2024-01-26 NOTE — PROGRESS NOTE ADULT - SUBJECTIVE AND OBJECTIVE BOX
Patient is a 89y old  Female who presents with a chief complaint of Back Pain (26 Jan 2024 15:14)      Patient seen and examined at bedside.  Patient reports continues severe back pain  ALLERGIES:  No Known Allergies    MEDICATIONS:  acetaminophen     Tablet .. 975 milliGRAM(s) Oral every 8 hours  apixaban 2.5 milliGRAM(s) Oral two times a day  calcium carbonate   1250 mG (OsCal) 1 Tablet(s) Oral daily  cholecalciferol 5000 Unit(s) Oral daily  gabapentin 300 milliGRAM(s) Oral three times a day  lidocaine   4% Patch 1 Patch Transdermal every 24 hours  methocarbamol 750 milliGRAM(s) Oral every 8 hours  multivitamin 1 Tablet(s) Oral daily  oxyCODONE    IR 2.5 milliGRAM(s) Oral daily PRN  oxyCODONE    IR 10 milliGRAM(s) Oral every 6 hours PRN  pantoprazole    Tablet 40 milliGRAM(s) Oral before breakfast  rOPINIRole 0.25 milliGRAM(s) Oral two times a day  senna 2 Tablet(s) Oral at bedtime    Vital Signs Last 24 Hrs  T(F): 96 (26 Jan 2024 08:03), Max: 97.9 (25 Jan 2024 23:38)  HR: 123 (26 Jan 2024 08:03) (89 - 123)  BP: 145/63 (26 Jan 2024 08:03) (109/59 - 145/63)  RR: 19 (26 Jan 2024 08:03) (18 - 19)  SpO2: --  I&O's Summary    25 Jan 2024 07:01  -  26 Jan 2024 07:00  --------------------------------------------------------  IN: 400 mL / OUT: 2800 mL / NET: -2400 mL    26 Jan 2024 07:01  -  26 Jan 2024 17:53  --------------------------------------------------------  IN: 180 mL / OUT: 0 mL / NET: 180 mL        PHYSICAL EXAM:  General: NAD, A/O x 3  ENT: MMM  Neck: Supple, No JVD  Lungs: Clear to auscultation bilaterally  Cardio: IRR, S1/S2, 2/6 systolic murmur   Abdomen: Soft, Nontender, Nondistended; Bowel sounds present  Extremities: No cyanosis, No edema    LABS:                        13.0   4.32  )-----------( 136      ( 26 Jan 2024 07:14 )             38.7     01-26    139  |  99  |  33  ----------------------------<  91  3.9   |  24  |  1.5    Ca    9.2      26 Jan 2024 07:14  Phos  4.8     01-26  Mg     2.3     01-26    TPro  5.6  /  Alb  3.7  /  TBili  0.5  /  DBili  x   /  AST  24  /  ALT  10  /  AlkPhos  63  01-26                TSH --   TSH with FT4 reflex 1.19  Total T3 --                  Urinalysis Basic - ( 26 Jan 2024 07:14 )    Color: x / Appearance: x / SG: x / pH: x  Gluc: 91 mg/dL / Ketone: x  / Bili: x / Urobili: x   Blood: x / Protein: x / Nitrite: x   Leuk Esterase: x / RBC: x / WBC x   Sq Epi: x / Non Sq Epi: x / Bacteria: x            RADIOLOGY & ADDITIONAL TESTS:    Care Discussed with Consultants/Other Providers:

## 2024-01-26 NOTE — PROGRESS NOTE ADULT - ASSESSMENT
89F w/ h/o chronic afib (on Eliquis), GERD, and chronic low back pain presenting s/p fall. Admitted to medicine for back pain 2/2 Lumbar compression fracture    #Lumbar Compression Fracture  Initially presenting s/p fa. Known to have progressively worsening chronic low back pain over several years.   Previously managed via gabapentin 200 tid. Previously trialed Mobic and diclofenac w/ minimal relief.   Recently started on Percocet 5-325 q12h w/ mild relief.  - continueTylenol 975mg PO Q8H  - continue percocet 5mg BID  - continue gabapentin 300mg Q8H (max dose based on CrCl; monitor for oversedation)  - continue lidocaine 4% patch Q24H  - obtain UA (r/o UTI; no reported dysuria, but daughter reports malodorous urine)  - CT lumbar spine: pathologic severe compression fracture of L1  - neuroSx (x5885): conservative measures no surgical interventions   - pain management eval appreciated  - PT consulted; STR vs home PT    #CHF, new onset  Reports LE swelling over past 1-2 weeks.   Recently started on furosemide 20 qd for swelling.   No reported CP or dyspnea at rest or exertion (but functional status limited by lower back pain).   - No known prior h/o CHF.  (bibasilar crackles; b/l LE pitting edema)    - s/p furosemide 40mg IV once   - TTE (May 2013) demonstrated LVEF 55-65% w/ normal global LVSF and no significant valvulopathy.  - obtain TTE   - elevated BNP  - daily standing weights; strict I's & O's; 1.5L fluid restrict  - monitor 'lytes and replete accordingly (K>4; Mg>2)  - iron studies wnl  - if TSAT <20% + ferritin <300 OR ferritin <100, start Venofer 200mg IV x5 days (if labs/TTE confirm CHF)  - consider transfer to telemetry if CHF confirmed  - s/p bumex 2g IV 1x dose (1/25)  - troponin: 38, repeat 39  - CXr: ischemic changes (T wave inversions) of inferior leads    #Chronic Atrial Fibrillation  CHADS-VASC 3+ (Age x2, Female). Only on AC. Previously on Lopressor, but only takes intermittently as HR currently rate-controlled off meds.  - c/w apixaban 2.5mg PO BID (consider dose increase if Cr persistently <1.5)    #GERD  - c/w pantoprazole 40mg PO QD (therapeutic interchange for omeprazole)    #Elevated Cr: per PCP baseline is 1.2, currently 1.4    DVT PPX: apixaban 2.5mg PO BID  GI PPX: pantoprazole 40mg PO QD  DIET: DASH/TLC + 1.5L fluid restrict  ACTIVITY: IAT  CODE STATUS: Full Code  DISPOSITION: From Home; possible d/c to home w/ HCS vs STR    PENDING:  TTE

## 2024-01-26 NOTE — PROGRESS NOTE ADULT - SUBJECTIVE AND OBJECTIVE BOX
24H events:    Patient is a 89y old Female who presents with a chief complaint of Fall (24 Jan 2024 20:12)    Primary diagnosis of Inability to ambulate due to multiple joints      Today is hospital day 2d. This morning patient was seen and examined at bedside, resting comfortably in bed. Patient complaining of b/l hip pain this AM.      Code Status: full    Family communication:  Contact date: 1/25/24  Relationship to patient: Daughter at bedside  Communication details: discussed plan for CT and that we will plan based on findings, neuroSx consulted.  What matters most: pain mgt    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 No reported tobacco, alcohol, or illicit/recreational drug use. ADL's limited by progressively worsening ability to ambulate. Ambulates w/ walker. (24 Jan 2024 15:07)      ALLERGIES:  No Known Allergies    MEDICATIONS:  STANDING MEDICATIONS  acetaminophen     Tablet .. 975 milliGRAM(s) Oral every 8 hours  apixaban 2.5 milliGRAM(s) Oral two times a day  calcium carbonate   1250 mG (OsCal) 1 Tablet(s) Oral daily  cholecalciferol 5000 Unit(s) Oral daily  gabapentin 300 milliGRAM(s) Oral three times a day  lidocaine   4% Patch 1 Patch Transdermal every 24 hours  methocarbamol 750 milliGRAM(s) Oral every 8 hours  multivitamin 1 Tablet(s) Oral daily  pantoprazole    Tablet 40 milliGRAM(s) Oral before breakfast  rOPINIRole 0.25 milliGRAM(s) Oral two times a day  senna 2 Tablet(s) Oral at bedtime    PRN MEDICATIONS  oxyCODONE    IR 5 milliGRAM(s) Oral every 6 hours PRN    VITALS:   T(F): 97.3  HR: 99  BP: 104/61  RR: 18  SpO2: 97%    PHYSICAL EXAM:  GENERAL:   ( + ) NAD, lying in bed comfortably     (  ) obtunded     (  ) lethargic     (  ) somnolent    HEAD:   (  +) Atraumatic     (  ) hematoma     (  ) laceration (specify location:       )     NECK:  ( + ) Supple     (  ) neck stiffness     (  ) nuchal rigidity     (  )  no JVD     (  ) JVD present ( -- cm)    HEART:  Rate -->     ( ) normal rate     (  ) bradycardic     ( + ) tachycardic  Rhythm -->     (  +) regular     (  ) regularly irregular     (  ) irregularly irregular  Murmurs -->     (  ) normal s1s2     (  ) systolic murmur     (  ) diastolic murmur     (  ) continuous murmur      (  ) S3 present     (  ) S4 present    LUNGS:   ( + )Unlabored respirations     (  ) tachypnea  ( + ) B/L air entry     (  ) decreased breath sounds in:  (location     )    (  ) no adventitious sound     ( + ) bibasilar crackles     (  ) wheezing      (  ) rhonchi      (specify location:       )  (  ) chest wall tenderness (specify location:       )    ABDOMEN:   ( + ) Soft     (  ) tense   |   ( + ) nondistended     (  ) distended   |   (  ) +BS     (  ) hypoactive bowel sounds     (  ) hyperactive bowel sounds  ( + ) nontender     (  ) RUQ tenderness     (  ) RLQ tenderness     (  ) LLQ tenderness     (  ) epigastric tenderness     (  ) diffuse tenderness  (  ) Splenomegaly      (  ) Hepatomegaly      (  ) Jaundice     (  ) ecchymosis     EXTREMITIES: 2+ peripheral pulses bilaterally. No clubbing, cyanosis, or edema  (  ) Normal     (  ) Rash     (  ) ecchymosis     (  ) varicose veins      ( + ) 1+ pitting edema     (  ) non-pitting edema   (  ) ulceration     (  ) gangrene:     (location:     )    NERVOUS SYSTEM:    ( + ) A&Ox3     (  ) confused     (  ) lethargic  CN II-XII:     ( + ) Intact     (  ) deficits found     (Specify:     )   Upper extremities:     (  ) no sensorimotor deficits     (  ) weakness     (  ) loss of proprioception/vibration     (  ) loss of touch/temperature (specify:    )  Lower extremities:     (  ) no sensorimotor deficits     (  ) weakness     (  ) loss of proprioception/vibration     (  ) loss of touch/temperature (specify:    )    SKIN:   (  ) No rashes or lesions     (  ) maculopapular rash     (  ) pustules     (  ) vesicles     (  ) ulcer     (  ) ecchymosis     (specify location:     )    AMPAC score:    ( - ) Indwelling David Catheter:   Date insterted:    Reason (  ) Critical illness     (  ) urinary retention    (  ) Accurate Ins/Outs Monitoring     (  ) CMO patient    ( - ) Central Line:   Date inserted:  Location: (  ) Right IJ     (  ) Left IJ     (  ) Right Fem     (  ) Left Fem    (  ) SPC        (  ) pigtail       (  ) PEG tube       (  ) colostomy       (  ) jejunostomy  (  ) U-Dall    LABS:                        12.6   4.09  )-----------( 129      ( 25 Jan 2024 06:42 )             38.1     01-25    138  |  99  |  29<H>  ----------------------------<  87  4.2   |  28  |  1.4    Ca    9.4      25 Jan 2024 06:42  Phos  4.2     01-25  Mg     2.5     01-25    TPro  5.7<L>  /  Alb  3.9  /  TBili  0.6  /  DBili  x   /  AST  21  /  ALT  9   /  AlkPhos  61  01-25      Urinalysis Basic - ( 25 Jan 2024 06:42 )    Color: x / Appearance: x / SG: x / pH: x  Gluc: 87 mg/dL / Ketone: x  / Bili: x / Urobili: x   Blood: x / Protein: x / Nitrite: x   Leuk Esterase: x / RBC: x / WBC x   Sq Epi: x / Non Sq Epi: x / Bacteria: x                RADIOLOGY:      < from: CT Lumbar Spine No Cont (01.24.24 @ 20:58) >  IMPRESSION:    Age-indeterminate severe compression deformity of L1 with mixed   lytic/sclerotic changes extending to the posterior elements, likely   pathologic in nature. Soft tissue component associated with the lytic   changes in the posterior inferior endplate of L1 encroaches the spinal   space resulting in L1-2 severe spinal stenosis and severe bilateral   foraminal stenosis.    Multilevel lumbar spondylosis exacerbated by moderate levoscoliosis   resulting in multilevel high-grade foraminal stenosis as above.    Partially imaged markedly distended urinary bladder. Please clinically   correlate for neurogenic bladder.    < end of copied text >

## 2024-01-27 LAB
ALBUMIN SERPL ELPH-MCNC: 3.6 G/DL — SIGNIFICANT CHANGE UP (ref 3.5–5.2)
ALP SERPL-CCNC: 56 U/L — SIGNIFICANT CHANGE UP (ref 30–115)
ALT FLD-CCNC: 10 U/L — SIGNIFICANT CHANGE UP (ref 0–41)
ANION GAP SERPL CALC-SCNC: 12 MMOL/L — SIGNIFICANT CHANGE UP (ref 7–14)
AST SERPL-CCNC: 23 U/L — SIGNIFICANT CHANGE UP (ref 0–41)
BASOPHILS # BLD AUTO: 0.04 K/UL — SIGNIFICANT CHANGE UP (ref 0–0.2)
BASOPHILS NFR BLD AUTO: 0.8 % — SIGNIFICANT CHANGE UP (ref 0–1)
BILIRUB SERPL-MCNC: 0.5 MG/DL — SIGNIFICANT CHANGE UP (ref 0.2–1.2)
BUN SERPL-MCNC: 34 MG/DL — HIGH (ref 10–20)
CALCIUM SERPL-MCNC: 9.1 MG/DL — SIGNIFICANT CHANGE UP (ref 8.4–10.4)
CHLORIDE SERPL-SCNC: 98 MMOL/L — SIGNIFICANT CHANGE UP (ref 98–110)
CO2 SERPL-SCNC: 27 MMOL/L — SIGNIFICANT CHANGE UP (ref 17–32)
CREAT SERPL-MCNC: 1.5 MG/DL — SIGNIFICANT CHANGE UP (ref 0.7–1.5)
EGFR: 33 ML/MIN/1.73M2 — LOW
EOSINOPHIL # BLD AUTO: 0.51 K/UL — SIGNIFICANT CHANGE UP (ref 0–0.7)
EOSINOPHIL NFR BLD AUTO: 10.2 % — HIGH (ref 0–8)
GLUCOSE SERPL-MCNC: 99 MG/DL — SIGNIFICANT CHANGE UP (ref 70–99)
HCT VFR BLD CALC: 35.6 % — LOW (ref 37–47)
HGB BLD-MCNC: 11.9 G/DL — LOW (ref 12–16)
IMM GRANULOCYTES NFR BLD AUTO: 0.2 % — SIGNIFICANT CHANGE UP (ref 0.1–0.3)
LYMPHOCYTES # BLD AUTO: 1.69 K/UL — SIGNIFICANT CHANGE UP (ref 1.2–3.4)
LYMPHOCYTES # BLD AUTO: 33.8 % — SIGNIFICANT CHANGE UP (ref 20.5–51.1)
MAGNESIUM SERPL-MCNC: 2.2 MG/DL — SIGNIFICANT CHANGE UP (ref 1.8–2.4)
MCHC RBC-ENTMCNC: 32.1 PG — HIGH (ref 27–31)
MCHC RBC-ENTMCNC: 33.4 G/DL — SIGNIFICANT CHANGE UP (ref 32–37)
MCV RBC AUTO: 96 FL — SIGNIFICANT CHANGE UP (ref 81–99)
MONOCYTES # BLD AUTO: 0.38 K/UL — SIGNIFICANT CHANGE UP (ref 0.1–0.6)
MONOCYTES NFR BLD AUTO: 7.6 % — SIGNIFICANT CHANGE UP (ref 1.7–9.3)
NEUTROPHILS # BLD AUTO: 2.37 K/UL — SIGNIFICANT CHANGE UP (ref 1.4–6.5)
NEUTROPHILS NFR BLD AUTO: 47.4 % — SIGNIFICANT CHANGE UP (ref 42.2–75.2)
NRBC # BLD: 0 /100 WBCS — SIGNIFICANT CHANGE UP (ref 0–0)
PHOSPHATE SERPL-MCNC: 4.9 MG/DL — SIGNIFICANT CHANGE UP (ref 2.1–4.9)
PLATELET # BLD AUTO: 128 K/UL — LOW (ref 130–400)
PMV BLD: 9.9 FL — SIGNIFICANT CHANGE UP (ref 7.4–10.4)
POTASSIUM SERPL-MCNC: 3.7 MMOL/L — SIGNIFICANT CHANGE UP (ref 3.5–5)
POTASSIUM SERPL-SCNC: 3.7 MMOL/L — SIGNIFICANT CHANGE UP (ref 3.5–5)
PROT SERPL-MCNC: 5.2 G/DL — LOW (ref 6–8)
RBC # BLD: 3.71 M/UL — LOW (ref 4.2–5.4)
RBC # FLD: 13.6 % — SIGNIFICANT CHANGE UP (ref 11.5–14.5)
SODIUM SERPL-SCNC: 137 MMOL/L — SIGNIFICANT CHANGE UP (ref 135–146)
WBC # BLD: 5 K/UL — SIGNIFICANT CHANGE UP (ref 4.8–10.8)
WBC # FLD AUTO: 5 K/UL — SIGNIFICANT CHANGE UP (ref 4.8–10.8)

## 2024-01-27 PROCEDURE — 72148 MRI LUMBAR SPINE W/O DYE: CPT | Mod: 26

## 2024-01-27 PROCEDURE — 99232 SBSQ HOSP IP/OBS MODERATE 35: CPT

## 2024-01-27 RX ADMIN — Medication 975 MILLIGRAM(S): at 21:20

## 2024-01-27 RX ADMIN — ROPINIROLE 0.25 MILLIGRAM(S): 8 TABLET, FILM COATED, EXTENDED RELEASE ORAL at 18:47

## 2024-01-27 RX ADMIN — METHOCARBAMOL 750 MILLIGRAM(S): 500 TABLET, FILM COATED ORAL at 21:19

## 2024-01-27 RX ADMIN — Medication 975 MILLIGRAM(S): at 18:46

## 2024-01-27 RX ADMIN — APIXABAN 2.5 MILLIGRAM(S): 2.5 TABLET, FILM COATED ORAL at 05:26

## 2024-01-27 RX ADMIN — APIXABAN 2.5 MILLIGRAM(S): 2.5 TABLET, FILM COATED ORAL at 18:47

## 2024-01-27 RX ADMIN — OXYCODONE HYDROCHLORIDE 10 MILLIGRAM(S): 5 TABLET ORAL at 13:43

## 2024-01-27 RX ADMIN — METHOCARBAMOL 750 MILLIGRAM(S): 500 TABLET, FILM COATED ORAL at 13:45

## 2024-01-27 RX ADMIN — ROPINIROLE 0.25 MILLIGRAM(S): 8 TABLET, FILM COATED, EXTENDED RELEASE ORAL at 05:27

## 2024-01-27 RX ADMIN — Medication 1 TABLET(S): at 12:13

## 2024-01-27 RX ADMIN — Medication 975 MILLIGRAM(S): at 22:20

## 2024-01-27 RX ADMIN — GABAPENTIN 300 MILLIGRAM(S): 400 CAPSULE ORAL at 21:20

## 2024-01-27 RX ADMIN — PANTOPRAZOLE SODIUM 40 MILLIGRAM(S): 20 TABLET, DELAYED RELEASE ORAL at 05:48

## 2024-01-27 RX ADMIN — METHOCARBAMOL 750 MILLIGRAM(S): 500 TABLET, FILM COATED ORAL at 05:26

## 2024-01-27 RX ADMIN — GABAPENTIN 300 MILLIGRAM(S): 400 CAPSULE ORAL at 05:26

## 2024-01-27 RX ADMIN — Medication 975 MILLIGRAM(S): at 05:26

## 2024-01-27 RX ADMIN — SENNA PLUS 2 TABLET(S): 8.6 TABLET ORAL at 21:19

## 2024-01-27 RX ADMIN — Medication 5000 UNIT(S): at 12:14

## 2024-01-27 RX ADMIN — GABAPENTIN 300 MILLIGRAM(S): 400 CAPSULE ORAL at 13:43

## 2024-01-27 RX ADMIN — Medication 1 TABLET(S): at 12:14

## 2024-01-27 NOTE — PROGRESS NOTE ADULT - ASSESSMENT
89F w/ h/o chronic afib (on Eliquis), GERD, and chronic low back pain presenting s/p fall. Admitted to medicine for back pain 2/2 Lumbar compression fracture    #Lumbar Compression Fracture  Initially presenting s/p fa. Known to have progressively worsening chronic low back pain over several years.   Previously managed via gabapentin 200 tid. Previously trialed Mobic and diclofenac w/ minimal relief.   Recently started on Percocet 5-325 q12h w/ mild relief.  - continueTylenol 975mg PO Q8H  - continue percocet 5mg BID  - continue gabapentin 300mg Q8H (max dose based on CrCl; monitor for oversedation)  - continue lidocaine 4% patch Q24H  - obtain UA (r/o UTI; no reported dysuria, but daughter reports malodorous urine)  - CT lumbar spine: pathologic severe compression fracture of L1  - neuroSx (x5885): conservative measures no surgical interventions. Recommend MR lumbar  - pain management eval appreciated  - PT consulted; STR vs home PT  - f/u MR lumbar     #CHF, new onset  Reports LE swelling over past 1-2 weeks.   Recently started on furosemide 20 qd for swelling.   No reported CP or dyspnea at rest or exertion (but functional status limited by lower back pain).   - No known prior h/o CHF.  (bibasilar crackles; b/l LE pitting edema)    - s/p furosemide 40mg IV once   - TTE (May 2013) demonstrated LVEF 55-65% w/ normal global LVSF and no significant valvulopathy.  - obtain TTE   - elevated BNP  - daily standing weights; strict I's & O's; 1.5L fluid restrict  - monitor 'lytes and replete accordingly (K>4; Mg>2)  - iron studies wnl  - if TSAT <20% + ferritin <300 OR ferritin <100, start Venofer 200mg IV x5 days (if labs/TTE confirm CHF)  - consider transfer to telemetry if CHF confirmed  - s/p bumex 2g IV 1x dose (1/25)  - troponin: 38, repeat 39  - CXr: ischemic changes (T wave inversions) of inferior leads    #Chronic Atrial Fibrillation  CHADS-VASC 3+ (Age x2, Female). Only on AC. Previously on Lopressor, but only takes intermittently as HR currently rate-controlled off meds.  - c/w apixaban 2.5mg PO BID (consider dose increase if Cr persistently <1.5)    #GERD  - c/w pantoprazole 40mg PO QD (therapeutic interchange for omeprazole)    #Elevated Cr: per PCP baseline is 1.2, currently 1.4    DVT PPX: apixaban 2.5mg PO BID  GI PPX: pantoprazole 40mg PO QD  DIET: DASH/TLC + 1.5L fluid restrict  ACTIVITY: IAT  CODE STATUS: Full Code  DISPOSITION: From Home; possible d/c to home w/ HCS vs STR    PENDING:  TTE and MR lumbar

## 2024-01-27 NOTE — CHART NOTE - NSCHARTNOTEFT_GEN_A_CORE
I was called by the radiology attending for finding of bladder distention found on MRI in the setting of malignant compression deformity of L1. Raising the suspicion for Neurogenic bladder  Informed the nurse to straight cath the patient, will bladder scan her every 6 hours.

## 2024-01-27 NOTE — PROGRESS NOTE ADULT - SUBJECTIVE AND OBJECTIVE BOX
SUBJECTIVE:    Patient is a 89y old Female who presents with a chief complaint of Back Pain (26 Jan 2024 17:50)    Currently admitted to medicine with the primary diagnosis of Inability to ambulate due to multiple joints       Today is hospital day 3d. This morning she is resting comfortably in bed and reports no new issues or overnight events.     PAST MEDICAL & SURGICAL HISTORY  Chronic atrial fibrillation    GERD (gastroesophageal reflux disease)    Chronic lower back pain    No significant past surgical history      SOCIAL HISTORY:  Negative for smoking/alcohol/drug use.     ALLERGIES:  No Known Allergies    MEDICATIONS:  STANDING MEDICATIONS  acetaminophen     Tablet .. 975 milliGRAM(s) Oral every 8 hours  apixaban 2.5 milliGRAM(s) Oral two times a day  calcium carbonate   1250 mG (OsCal) 1 Tablet(s) Oral daily  cholecalciferol 5000 Unit(s) Oral daily  gabapentin 300 milliGRAM(s) Oral three times a day  lidocaine   4% Patch 1 Patch Transdermal every 24 hours  methocarbamol 750 milliGRAM(s) Oral every 8 hours  multivitamin 1 Tablet(s) Oral daily  pantoprazole    Tablet 40 milliGRAM(s) Oral before breakfast  rOPINIRole 0.25 milliGRAM(s) Oral two times a day  senna 2 Tablet(s) Oral at bedtime    PRN MEDICATIONS  oxyCODONE    IR 2.5 milliGRAM(s) Oral daily PRN  oxyCODONE    IR 10 milliGRAM(s) Oral every 6 hours PRN    VITALS:   T(F): 96.6  HR: 83  BP: 142/64  RR: 18  SpO2: --    LABS:                        11.9   5.00  )-----------( 128      ( 27 Jan 2024 07:23 )             35.6     01-27    137  |  98  |  34<H>  ----------------------------<  99  3.7   |  27  |  1.5    Ca    9.1      27 Jan 2024 07:23  Phos  4.9     01-27  Mg     2.2     01-27    TPro  5.2<L>  /  Alb  3.6  /  TBili  0.5  /  DBili  x   /  AST  23  /  ALT  10  /  AlkPhos  56  01-27      Urinalysis Basic - ( 27 Jan 2024 07:23 )    Color: x / Appearance: x / SG: x / pH: x  Gluc: 99 mg/dL / Ketone: x  / Bili: x / Urobili: x   Blood: x / Protein: x / Nitrite: x   Leuk Esterase: x / RBC: x / WBC x   Sq Epi: x / Non Sq Epi: x / Bacteria: x                RADIOLOGY:    PHYSICAL EXAM:  GEN: No acute distress  LUNGS: Clear to auscultation bilaterally   HEART: S1/S2 present. RRR.   ABD: Soft, non-tender, non-distended. Bowel sounds present  EXT: NC/NC/NE/2+PP/DIEHL/Skin Intact.   NEURO: AAOX3

## 2024-01-27 NOTE — PROGRESS NOTE ADULT - SUBJECTIVE AND OBJECTIVE BOX
Patient is a 89y old  Female who presents with a chief complaint of Back Pain (27 Jan 2024 11:32)      Patient seen and examined at bedside.  Patient reports pain is improved   ALLERGIES:  No Known Allergies    MEDICATIONS:  acetaminophen     Tablet .. 975 milliGRAM(s) Oral every 8 hours  apixaban 2.5 milliGRAM(s) Oral two times a day  calcium carbonate   1250 mG (OsCal) 1 Tablet(s) Oral daily  cholecalciferol 5000 Unit(s) Oral daily  gabapentin 300 milliGRAM(s) Oral three times a day  lidocaine   4% Patch 1 Patch Transdermal every 24 hours  methocarbamol 750 milliGRAM(s) Oral every 8 hours  multivitamin 1 Tablet(s) Oral daily  oxyCODONE    IR 2.5 milliGRAM(s) Oral daily PRN  oxyCODONE    IR 10 milliGRAM(s) Oral every 6 hours PRN  pantoprazole    Tablet 40 milliGRAM(s) Oral before breakfast  rOPINIRole 0.25 milliGRAM(s) Oral two times a day  senna 2 Tablet(s) Oral at bedtime    Vital Signs Last 24 Hrs  T(F): 96.6 (27 Jan 2024 07:34), Max: 97.2 (26 Jan 2024 23:50)  HR: 83 (27 Jan 2024 07:34) (75 - 95)  BP: 142/64 (27 Jan 2024 07:34) (105/61 - 142/64)  RR: 18 (26 Jan 2024 23:50) (18 - 18)  SpO2: --  I&O's Summary    26 Jan 2024 07:01  -  27 Jan 2024 07:00  --------------------------------------------------------  IN: 340 mL / OUT: 400 mL / NET: -60 mL        PHYSICAL EXAM:  General: NAD, A/O x 3  ENT: MMM  Neck: Supple, No JVD  Lungs: Clear to auscultation bilaterally  Cardio: IRR, S1/S2, 2/6 systolic murmur  Abdomen: Soft, Nontender, Nondistended; Bowel sounds present  Extremities: No cyanosis, No edema    LABS:                        11.9   5.00  )-----------( 128      ( 27 Jan 2024 07:23 )             35.6     01-27    137  |  98  |  34  ----------------------------<  99  3.7   |  27  |  1.5    Ca    9.1      27 Jan 2024 07:23  Phos  4.9     01-27  Mg     2.2     01-27    TPro  5.2  /  Alb  3.6  /  TBili  0.5  /  DBili  x   /  AST  23  /  ALT  10  /  AlkPhos  56  01-27                TSH --   TSH with FT4 reflex 1.19  Total T3 --                  Urinalysis Basic - ( 27 Jan 2024 07:23 )    Color: x / Appearance: x / SG: x / pH: x  Gluc: 99 mg/dL / Ketone: x  / Bili: x / Urobili: x   Blood: x / Protein: x / Nitrite: x   Leuk Esterase: x / RBC: x / WBC x   Sq Epi: x / Non Sq Epi: x / Bacteria: x            RADIOLOGY & ADDITIONAL TESTS:  < from: MR Lumbar Spine No Cont (01.27.24 @ 15:05) >  Malignant compression deformity of L1 with significant bowing of the   posterior cortex contributing to mass effect upon the conus. Of note   there is significant dilation of the urinary bladder. Correlate   clinically for neurogenic bladder.    Additional degenerative changes in combination with lumbar levoscoliosis   contributes to neuroforaminal narrowing most significantly involving the   left L4-L5 and L5-S1 levels.    < end of copied text >    Care Discussed with Consultants/Other Providers:

## 2024-01-27 NOTE — PROGRESS NOTE ADULT - ASSESSMENT
89F w/ h/o chronic afib (on Eliquis), GERD, and chronic low back pain presenting s/p fall. Admitted to medicine for back pain 2/2 Lumbar compression fracture    #Lumbar Compression Fracture  Initially presenting s/p fa. Known to have progressively worsening chronic low back pain over several years. Previously managed via gabapentin 200 tid. Previously trialed Mobic and diclofenac w/ minimal relief. Recently started on Percocet 5-325 q12h w/ mild relief.  - start Tylenol 975mg PO Q8H  - increase gabapentin from 200mg to 300mg Q8H (increasing to max dose based on CrCl; monitor for oversedation)  - start lidocaine 4% patch Q24H  - 1/25 started ropinirole for leg cramps   - obtain UA (r/o UTI; no reported dysuria, but daughter reports malodorous urine)  - CT lumbar spine: pathologic severe compression fracture of L1  - MRI - L1 bowing of the posterior cortex contributing to mass effect on the conus   - neuroSx consult-abd binder, TLSO brace on discharge   - pain management eval appreciated  - PT consulted; STR vs home PT    #CHF, new onset  Reports LE swelling over past 1-2 weeks.   Recently started on furosemide 20 qd for swelling.   No reported CP or dyspnea at rest or exertion (but functional status limited by lower back pain).   - No known prior h/o CHF.  (bibasilar crackles; b/l LE pitting edema)    - s/p furosemide 40mg IV once   - TTE (May 2013) demonstrated LVEF 55-65% w/ normal global LVSF and no significant valvulopathy.  -  TTE ordered    - elevated BNP  - daily standing weights; strict I's & O's; 1.5L fluid restrict  - monitor 'lytes and replete accordingly (K>4; Mg>2)  - s/p bumex 2g IV 1x dose (1/25)  - pain mgt: percocet increased to 10mg   - troponin-negative   - Tele monitored for 24 hrs with no dysrhythmia     #Chronic Atrial Fibrillation  CHADS-VASC 3+ (Age x2, Female). Only on AC. Previously on Lopressor, but only takes intermittently as HR currently rate-controlled off meds.  - c/w apixaban 2.5mg PO BID (consider dose increase if Cr persistently <1.5)    #GERD  - c/w pantoprazole 40mg PO QD (therapeutic interchange for omeprazole)    #CKD 3b   per PCP baseline is 1.2, currently 1.5    DVT PPX: apixaban 2.5mg PO BID  GI PPX: pantoprazole 40mg PO QD  DIET: DASH/TLC + 1.5L fluid restrict  ACTIVITY: IAT  CODE STATUS: Full Code  DISPOSITION: active     PENDING:  TTE; neuro recs, bladder scan, tlso   Family: daughter updated 1/27 at bedside

## 2024-01-28 LAB
ALBUMIN SERPL ELPH-MCNC: 3.4 G/DL — LOW (ref 3.5–5.2)
ALP SERPL-CCNC: 57 U/L — SIGNIFICANT CHANGE UP (ref 30–115)
ALT FLD-CCNC: 11 U/L — SIGNIFICANT CHANGE UP (ref 0–41)
ANION GAP SERPL CALC-SCNC: 12 MMOL/L — SIGNIFICANT CHANGE UP (ref 7–14)
AST SERPL-CCNC: 24 U/L — SIGNIFICANT CHANGE UP (ref 0–41)
BASOPHILS # BLD AUTO: 0.03 K/UL — SIGNIFICANT CHANGE UP (ref 0–0.2)
BASOPHILS NFR BLD AUTO: 0.7 % — SIGNIFICANT CHANGE UP (ref 0–1)
BILIRUB SERPL-MCNC: 0.5 MG/DL — SIGNIFICANT CHANGE UP (ref 0.2–1.2)
BUN SERPL-MCNC: 29 MG/DL — HIGH (ref 10–20)
CALCIUM SERPL-MCNC: 8.8 MG/DL — SIGNIFICANT CHANGE UP (ref 8.4–10.5)
CHLORIDE SERPL-SCNC: 98 MMOL/L — SIGNIFICANT CHANGE UP (ref 98–110)
CO2 SERPL-SCNC: 26 MMOL/L — SIGNIFICANT CHANGE UP (ref 17–32)
CREAT SERPL-MCNC: 1.3 MG/DL — SIGNIFICANT CHANGE UP (ref 0.7–1.5)
EGFR: 39 ML/MIN/1.73M2 — LOW
EOSINOPHIL # BLD AUTO: 0.44 K/UL — SIGNIFICANT CHANGE UP (ref 0–0.7)
EOSINOPHIL NFR BLD AUTO: 9.8 % — HIGH (ref 0–8)
GLUCOSE SERPL-MCNC: 101 MG/DL — HIGH (ref 70–99)
HCT VFR BLD CALC: 36.8 % — LOW (ref 37–47)
HGB BLD-MCNC: 12.2 G/DL — SIGNIFICANT CHANGE UP (ref 12–16)
IMM GRANULOCYTES NFR BLD AUTO: 0.4 % — HIGH (ref 0.1–0.3)
LYMPHOCYTES # BLD AUTO: 1.15 K/UL — LOW (ref 1.2–3.4)
LYMPHOCYTES # BLD AUTO: 25.5 % — SIGNIFICANT CHANGE UP (ref 20.5–51.1)
MAGNESIUM SERPL-MCNC: 2.1 MG/DL — SIGNIFICANT CHANGE UP (ref 1.8–2.4)
MCHC RBC-ENTMCNC: 32.6 PG — HIGH (ref 27–31)
MCHC RBC-ENTMCNC: 33.2 G/DL — SIGNIFICANT CHANGE UP (ref 32–37)
MCV RBC AUTO: 98.4 FL — SIGNIFICANT CHANGE UP (ref 81–99)
MONOCYTES # BLD AUTO: 0.38 K/UL — SIGNIFICANT CHANGE UP (ref 0.1–0.6)
MONOCYTES NFR BLD AUTO: 8.4 % — SIGNIFICANT CHANGE UP (ref 1.7–9.3)
NEUTROPHILS # BLD AUTO: 2.49 K/UL — SIGNIFICANT CHANGE UP (ref 1.4–6.5)
NEUTROPHILS NFR BLD AUTO: 55.2 % — SIGNIFICANT CHANGE UP (ref 42.2–75.2)
NRBC # BLD: 0 /100 WBCS — SIGNIFICANT CHANGE UP (ref 0–0)
PHOSPHATE SERPL-MCNC: 4.2 MG/DL — SIGNIFICANT CHANGE UP (ref 2.1–4.9)
PLATELET # BLD AUTO: 126 K/UL — LOW (ref 130–400)
PMV BLD: 10 FL — SIGNIFICANT CHANGE UP (ref 7.4–10.4)
POTASSIUM SERPL-MCNC: 4 MMOL/L — SIGNIFICANT CHANGE UP (ref 3.5–5)
POTASSIUM SERPL-SCNC: 4 MMOL/L — SIGNIFICANT CHANGE UP (ref 3.5–5)
PROT SERPL-MCNC: 5.1 G/DL — LOW (ref 6–8)
RBC # BLD: 3.74 M/UL — LOW (ref 4.2–5.4)
RBC # FLD: 13.6 % — SIGNIFICANT CHANGE UP (ref 11.5–14.5)
SODIUM SERPL-SCNC: 136 MMOL/L — SIGNIFICANT CHANGE UP (ref 135–146)
WBC # BLD: 4.51 K/UL — LOW (ref 4.8–10.8)
WBC # FLD AUTO: 4.51 K/UL — LOW (ref 4.8–10.8)

## 2024-01-28 PROCEDURE — 99232 SBSQ HOSP IP/OBS MODERATE 35: CPT

## 2024-01-28 PROCEDURE — 74018 RADEX ABDOMEN 1 VIEW: CPT | Mod: 26

## 2024-01-28 RX ORDER — LACTULOSE 10 G/15ML
20 SOLUTION ORAL THREE TIMES A DAY
Refills: 0 | Status: DISCONTINUED | OUTPATIENT
Start: 2024-01-28 | End: 2024-01-29

## 2024-01-28 RX ORDER — DEXAMETHASONE 0.5 MG/5ML
2 ELIXIR ORAL
Refills: 0 | Status: DISCONTINUED | OUTPATIENT
Start: 2024-01-28 | End: 2024-01-30

## 2024-01-28 RX ORDER — FLUTICASONE PROPIONATE 50 MCG
1 SPRAY, SUSPENSION NASAL
Refills: 0 | Status: DISCONTINUED | OUTPATIENT
Start: 2024-01-28 | End: 2024-02-15

## 2024-01-28 RX ORDER — TAMSULOSIN HYDROCHLORIDE 0.4 MG/1
0.4 CAPSULE ORAL AT BEDTIME
Refills: 0 | Status: DISCONTINUED | OUTPATIENT
Start: 2024-01-28 | End: 2024-01-30

## 2024-01-28 RX ORDER — POLYETHYLENE GLYCOL 3350 17 G/17G
17 POWDER, FOR SOLUTION ORAL
Refills: 0 | Status: DISCONTINUED | OUTPATIENT
Start: 2024-01-28 | End: 2024-02-15

## 2024-01-28 RX ADMIN — Medication 5000 UNIT(S): at 12:10

## 2024-01-28 RX ADMIN — TAMSULOSIN HYDROCHLORIDE 0.4 MILLIGRAM(S): 0.4 CAPSULE ORAL at 23:33

## 2024-01-28 RX ADMIN — GABAPENTIN 300 MILLIGRAM(S): 400 CAPSULE ORAL at 23:33

## 2024-01-28 RX ADMIN — SENNA PLUS 2 TABLET(S): 8.6 TABLET ORAL at 23:33

## 2024-01-28 RX ADMIN — Medication 1 TABLET(S): at 12:10

## 2024-01-28 RX ADMIN — ROPINIROLE 0.25 MILLIGRAM(S): 8 TABLET, FILM COATED, EXTENDED RELEASE ORAL at 18:08

## 2024-01-28 RX ADMIN — APIXABAN 2.5 MILLIGRAM(S): 2.5 TABLET, FILM COATED ORAL at 06:08

## 2024-01-28 RX ADMIN — Medication 975 MILLIGRAM(S): at 07:08

## 2024-01-28 RX ADMIN — Medication 975 MILLIGRAM(S): at 06:09

## 2024-01-28 RX ADMIN — PANTOPRAZOLE SODIUM 40 MILLIGRAM(S): 20 TABLET, DELAYED RELEASE ORAL at 06:08

## 2024-01-28 RX ADMIN — METHOCARBAMOL 750 MILLIGRAM(S): 500 TABLET, FILM COATED ORAL at 06:09

## 2024-01-28 RX ADMIN — ROPINIROLE 0.25 MILLIGRAM(S): 8 TABLET, FILM COATED, EXTENDED RELEASE ORAL at 06:08

## 2024-01-28 RX ADMIN — OXYCODONE HYDROCHLORIDE 10 MILLIGRAM(S): 5 TABLET ORAL at 12:27

## 2024-01-28 RX ADMIN — GABAPENTIN 300 MILLIGRAM(S): 400 CAPSULE ORAL at 15:46

## 2024-01-28 RX ADMIN — METHOCARBAMOL 750 MILLIGRAM(S): 500 TABLET, FILM COATED ORAL at 15:46

## 2024-01-28 RX ADMIN — GABAPENTIN 300 MILLIGRAM(S): 400 CAPSULE ORAL at 06:09

## 2024-01-28 RX ADMIN — POLYETHYLENE GLYCOL 3350 17 GRAM(S): 17 POWDER, FOR SOLUTION ORAL at 18:08

## 2024-01-28 RX ADMIN — Medication 975 MILLIGRAM(S): at 23:34

## 2024-01-28 RX ADMIN — Medication 975 MILLIGRAM(S): at 15:46

## 2024-01-28 RX ADMIN — METHOCARBAMOL 750 MILLIGRAM(S): 500 TABLET, FILM COATED ORAL at 23:33

## 2024-01-28 RX ADMIN — Medication 1 SPRAY(S): at 18:08

## 2024-01-28 RX ADMIN — Medication 2 MILLIGRAM(S): at 18:08

## 2024-01-28 NOTE — PROGRESS NOTE ADULT - SUBJECTIVE AND OBJECTIVE BOX
Patient is a 89y old  Female who presents with a chief complaint of Back Pain (27 Jan 2024 15:58)      Patient seen and examined at bedside.  Patient denies any chest pain or shortness of breath, reports not feeling a full bladder, reports feeling constipated   ALLERGIES:  No Known Allergies    MEDICATIONS:  acetaminophen     Tablet .. 975 milliGRAM(s) Oral every 8 hours  calcium carbonate   1250 mG (OsCal) 1 Tablet(s) Oral daily  cholecalciferol 5000 Unit(s) Oral daily  dexAMETHasone  Injectable 2 milliGRAM(s) IV Push two times a day  fluticasone propionate 50 MICROgram(s)/spray Nasal Spray 1 Spray(s) Both Nostrils two times a day  gabapentin 300 milliGRAM(s) Oral three times a day  lactulose Syrup 20 Gram(s) Oral three times a day PRN  lidocaine   4% Patch 1 Patch Transdermal every 24 hours  methocarbamol 750 milliGRAM(s) Oral every 8 hours  multivitamin 1 Tablet(s) Oral daily  oxyCODONE    IR 10 milliGRAM(s) Oral every 6 hours PRN  pantoprazole    Tablet 40 milliGRAM(s) Oral before breakfast  polyethylene glycol 3350 17 Gram(s) Oral two times a day  rOPINIRole 0.25 milliGRAM(s) Oral two times a day  senna 2 Tablet(s) Oral at bedtime  tamsulosin 0.4 milliGRAM(s) Oral at bedtime    Vital Signs Last 24 Hrs  T(F): 96 (28 Jan 2024 07:30), Max: 97 (27 Jan 2024 23:38)  HR: 106 (28 Jan 2024 07:30) (106 - 133)  BP: 115/57 (28 Jan 2024 07:30) (115/57 - 144/65)  RR: 18 (27 Jan 2024 23:38) (18 - 18)  SpO2: --  I&O's Summary      PHYSICAL EXAM:  General: NAD, A/O x 3  ENT: MMM  Neck: Supple, No JVD  Lungs: diminished bilaterally, no crackles   Cardio: IRR, S1/S2, 2/6 systolic murmur   Abdomen: Soft, Nontender, Nondistended; Bowel sounds present  Extremities: No cyanosis, +1 LE edema    LABS:                        12.2   4.51  )-----------( 126      ( 28 Jan 2024 07:39 )             36.8     01-28    136  |  98  |  29  ----------------------------<  101  4.0   |  26  |  1.3    Ca    8.8      28 Jan 2024 07:39  Phos  4.2     01-28  Mg     2.1     01-28    TPro  5.1  /  Alb  3.4  /  TBili  0.5  /  DBili  x   /  AST  24  /  ALT  11  /  AlkPhos  57  01-28                                Urinalysis Basic - ( 28 Jan 2024 07:39 )    Color: x / Appearance: x / SG: x / pH: x  Gluc: 101 mg/dL / Ketone: x  / Bili: x / Urobili: x   Blood: x / Protein: x / Nitrite: x   Leuk Esterase: x / RBC: x / WBC x   Sq Epi: x / Non Sq Epi: x / Bacteria: x            RADIOLOGY & ADDITIONAL TESTS:    Care Discussed with Consultants/Other Providers:

## 2024-01-28 NOTE — PROGRESS NOTE ADULT - ASSESSMENT
89F w/ h/o chronic afib (on Eliquis), GERD, and chronic low back pain presenting s/p fall. Admitted to medicine for back pain 2/2 Lumbar compression fracture    #Lumbar Compression Fracture  Initially presenting s/p fa. Known to have progressively worsening chronic low back pain over several years. Previously managed via gabapentin 200 tid. Previously trialed Mobic and diclofenac w/ minimal relief. Recently started on Percocet 5-325 q12h w/ mild relief.  - start Tylenol 975mg PO Q8H  - increase gabapentin from 200mg to 300mg Q8H (increasing to max dose based on CrCl; monitor for oversedation)  - start lidocaine 4% patch Q24H  - 1/25 started ropinirole for leg cramps   - obtain UA (r/o UTI; no reported dysuria, but daughter reports malodorous urine)  - CT lumbar spine: pathologic severe compression fracture of L1  - MRI - L1 bowing of the posterior cortex contributing to mass effect on the conus   - neuroSx consult-abd binder, TLSO brace on discharge, recommend 2mg decadron IV q12hr, IR biopsy  - 1/28 spoke with daughter - she understands mostly like cancer - she wants biopsy --eliquis stopped 1/28, IR consulted   - pain management eval appreciated  - PT consulted; STR vs home PT    #CHF, new onset  Reports LE swelling over past 1-2 weeks.   Recently started on furosemide 20 qd for swelling.   No reported CP or dyspnea at rest or exertion (but functional status limited by lower back pain).   - No known prior h/o CHF.  (bibasilar crackles; b/l LE pitting edema)    - s/p furosemide 40mg IV once   - TTE (May 2013) demonstrated LVEF 55-65% w/ normal global LVSF and no significant valvulopathy.  -  TTE ordered    - elevated BNP  - daily standing weights; strict I's & O's; 1.5L fluid restrict  - monitor 'lytes and replete accordingly (K>4; Mg>2)  - s/p bumex 2g IV 1x dose (1/25)  - pain mgt: percocet increased to 10mg   - troponin-negative   - Tele monitored for 24 hrs with no dysrhythmia     #Chronic Atrial Fibrillation  CHADS-VASC 3+ (Age x2, Female). Only on AC. Previously on Lopressor, but only takes intermittently as HR currently rate-controlled off meds.  - c/w apixaban 2.5mg PO BID (consider dose increase if Cr persistently <1.5)    #GERD  - c/w pantoprazole 40mg PO QD (therapeutic interchange for omeprazole)    #CKD 3b   per PCP baseline is 1.2, currently 1.5    #Upper resp drainage, cough  -flonase ordered  -rvp ordered    #Urinary retention  -q6hr bladder scan  -daughter wants us to do straight cath no hanson for now  -flomax started    #Constipation  -kub ordered  -senna, miralax, lactulose ordered     DVT PPX: apixaban 2.5mg PO BID  GI PPX: pantoprazole 40mg PO QD  DIET: DASH/TLC + 1.5L fluid restrict  ACTIVITY: IAT  CODE STATUS: Full Code  DISPOSITION: active     PENDING:  TTE; urinary retention, IR consult, rvp tlso   Family: daughter updated 1/28 at bedside

## 2024-01-28 NOTE — CHART NOTE - NSCHARTNOTEFT_GEN_A_CORE
MRI reviewed by Dr. Nair. As per Dr. Nair, no Neurosurgical Intervention. Recommend IR Bx for diagnosis, Decadron 2q12 if no contraindication, pain management

## 2024-01-29 LAB
ALBUMIN SERPL ELPH-MCNC: 3.7 G/DL — SIGNIFICANT CHANGE UP (ref 3.5–5.2)
ALP SERPL-CCNC: 68 U/L — SIGNIFICANT CHANGE UP (ref 30–115)
ALT FLD-CCNC: 13 U/L — SIGNIFICANT CHANGE UP (ref 0–41)
ANION GAP SERPL CALC-SCNC: 16 MMOL/L — HIGH (ref 7–14)
APTT BLD: 30.1 SEC — SIGNIFICANT CHANGE UP (ref 27–39.2)
AST SERPL-CCNC: 29 U/L — SIGNIFICANT CHANGE UP (ref 0–41)
BASOPHILS # BLD AUTO: 0.01 K/UL — SIGNIFICANT CHANGE UP (ref 0–0.2)
BASOPHILS NFR BLD AUTO: 0.2 % — SIGNIFICANT CHANGE UP (ref 0–1)
BILIRUB SERPL-MCNC: 0.7 MG/DL — SIGNIFICANT CHANGE UP (ref 0.2–1.2)
BUN SERPL-MCNC: 26 MG/DL — HIGH (ref 10–20)
CALCIUM SERPL-MCNC: 9.3 MG/DL — SIGNIFICANT CHANGE UP (ref 8.4–10.5)
CHLORIDE SERPL-SCNC: 96 MMOL/L — LOW (ref 98–110)
CO2 SERPL-SCNC: 22 MMOL/L — SIGNIFICANT CHANGE UP (ref 17–32)
CREAT SERPL-MCNC: 1.1 MG/DL — SIGNIFICANT CHANGE UP (ref 0.7–1.5)
EGFR: 48 ML/MIN/1.73M2 — LOW
EOSINOPHIL # BLD AUTO: 0.02 K/UL — SIGNIFICANT CHANGE UP (ref 0–0.7)
EOSINOPHIL NFR BLD AUTO: 0.4 % — SIGNIFICANT CHANGE UP (ref 0–8)
GLUCOSE SERPL-MCNC: 118 MG/DL — HIGH (ref 70–99)
HCT VFR BLD CALC: 39.2 % — SIGNIFICANT CHANGE UP (ref 37–47)
HGB BLD-MCNC: 13.5 G/DL — SIGNIFICANT CHANGE UP (ref 12–16)
IMM GRANULOCYTES NFR BLD AUTO: 0.2 % — SIGNIFICANT CHANGE UP (ref 0.1–0.3)
INR BLD: 0.97 RATIO — SIGNIFICANT CHANGE UP (ref 0.65–1.3)
LYMPHOCYTES # BLD AUTO: 1.11 K/UL — LOW (ref 1.2–3.4)
LYMPHOCYTES # BLD AUTO: 23.7 % — SIGNIFICANT CHANGE UP (ref 20.5–51.1)
MAGNESIUM SERPL-MCNC: 2 MG/DL — SIGNIFICANT CHANGE UP (ref 1.8–2.4)
MCHC RBC-ENTMCNC: 32.7 PG — HIGH (ref 27–31)
MCHC RBC-ENTMCNC: 34.4 G/DL — SIGNIFICANT CHANGE UP (ref 32–37)
MCV RBC AUTO: 94.9 FL — SIGNIFICANT CHANGE UP (ref 81–99)
MONOCYTES # BLD AUTO: 0.21 K/UL — SIGNIFICANT CHANGE UP (ref 0.1–0.6)
MONOCYTES NFR BLD AUTO: 4.5 % — SIGNIFICANT CHANGE UP (ref 1.7–9.3)
NEUTROPHILS # BLD AUTO: 3.33 K/UL — SIGNIFICANT CHANGE UP (ref 1.4–6.5)
NEUTROPHILS NFR BLD AUTO: 71 % — SIGNIFICANT CHANGE UP (ref 42.2–75.2)
NRBC # BLD: 0 /100 WBCS — SIGNIFICANT CHANGE UP (ref 0–0)
PHOSPHATE SERPL-MCNC: 4 MG/DL — SIGNIFICANT CHANGE UP (ref 2.1–4.9)
PLATELET # BLD AUTO: 142 K/UL — SIGNIFICANT CHANGE UP (ref 130–400)
PMV BLD: 10.2 FL — SIGNIFICANT CHANGE UP (ref 7.4–10.4)
POTASSIUM SERPL-MCNC: 4.5 MMOL/L — SIGNIFICANT CHANGE UP (ref 3.5–5)
POTASSIUM SERPL-SCNC: 4.5 MMOL/L — SIGNIFICANT CHANGE UP (ref 3.5–5)
PROT SERPL-MCNC: 5.6 G/DL — LOW (ref 6–8)
PROTHROM AB SERPL-ACNC: 11 SEC — SIGNIFICANT CHANGE UP (ref 9.95–12.87)
RBC # BLD: 4.13 M/UL — LOW (ref 4.2–5.4)
RBC # FLD: 13.2 % — SIGNIFICANT CHANGE UP (ref 11.5–14.5)
SODIUM SERPL-SCNC: 134 MMOL/L — LOW (ref 135–146)
WBC # BLD: 4.69 K/UL — LOW (ref 4.8–10.8)
WBC # FLD AUTO: 4.69 K/UL — LOW (ref 4.8–10.8)

## 2024-01-29 PROCEDURE — 93306 TTE W/DOPPLER COMPLETE: CPT | Mod: 26

## 2024-01-29 PROCEDURE — 99232 SBSQ HOSP IP/OBS MODERATE 35: CPT

## 2024-01-29 RX ORDER — LACTULOSE 10 G/15ML
20 SOLUTION ORAL EVERY 4 HOURS
Refills: 0 | Status: DISCONTINUED | OUTPATIENT
Start: 2024-01-29 | End: 2024-02-07

## 2024-01-29 RX ORDER — METOPROLOL TARTRATE 50 MG
12.5 TABLET ORAL
Refills: 0 | Status: DISCONTINUED | OUTPATIENT
Start: 2024-01-29 | End: 2024-02-10

## 2024-01-29 RX ORDER — OXYCODONE HYDROCHLORIDE 5 MG/1
15 TABLET ORAL EVERY 6 HOURS
Refills: 0 | Status: DISCONTINUED | OUTPATIENT
Start: 2024-01-29 | End: 2024-02-02

## 2024-01-29 RX ORDER — MORPHINE SULFATE 50 MG/1
2 CAPSULE, EXTENDED RELEASE ORAL EVERY 4 HOURS
Refills: 0 | Status: DISCONTINUED | OUTPATIENT
Start: 2024-01-29 | End: 2024-02-02

## 2024-01-29 RX ORDER — HYDRALAZINE HCL 50 MG
10 TABLET ORAL ONCE
Refills: 0 | Status: COMPLETED | OUTPATIENT
Start: 2024-01-29 | End: 2024-01-29

## 2024-01-29 RX ADMIN — Medication 1 SPRAY(S): at 18:26

## 2024-01-29 RX ADMIN — MORPHINE SULFATE 2 MILLIGRAM(S): 50 CAPSULE, EXTENDED RELEASE ORAL at 21:49

## 2024-01-29 RX ADMIN — Medication 2 MILLIGRAM(S): at 06:25

## 2024-01-29 RX ADMIN — Medication 975 MILLIGRAM(S): at 21:47

## 2024-01-29 RX ADMIN — Medication 12.5 MILLIGRAM(S): at 11:37

## 2024-01-29 RX ADMIN — TAMSULOSIN HYDROCHLORIDE 0.4 MILLIGRAM(S): 0.4 CAPSULE ORAL at 21:48

## 2024-01-29 RX ADMIN — Medication 975 MILLIGRAM(S): at 15:09

## 2024-01-29 RX ADMIN — Medication 5000 UNIT(S): at 11:37

## 2024-01-29 RX ADMIN — LACTULOSE 20 GRAM(S): 10 SOLUTION ORAL at 21:49

## 2024-01-29 RX ADMIN — POLYETHYLENE GLYCOL 3350 17 GRAM(S): 17 POWDER, FOR SOLUTION ORAL at 18:25

## 2024-01-29 RX ADMIN — Medication 975 MILLIGRAM(S): at 00:35

## 2024-01-29 RX ADMIN — GABAPENTIN 300 MILLIGRAM(S): 400 CAPSULE ORAL at 15:10

## 2024-01-29 RX ADMIN — GABAPENTIN 300 MILLIGRAM(S): 400 CAPSULE ORAL at 21:48

## 2024-01-29 RX ADMIN — LACTULOSE 20 GRAM(S): 10 SOLUTION ORAL at 15:09

## 2024-01-29 RX ADMIN — ROPINIROLE 0.25 MILLIGRAM(S): 8 TABLET, FILM COATED, EXTENDED RELEASE ORAL at 18:24

## 2024-01-29 RX ADMIN — Medication 975 MILLIGRAM(S): at 07:21

## 2024-01-29 RX ADMIN — METHOCARBAMOL 750 MILLIGRAM(S): 500 TABLET, FILM COATED ORAL at 21:48

## 2024-01-29 RX ADMIN — GABAPENTIN 300 MILLIGRAM(S): 400 CAPSULE ORAL at 06:26

## 2024-01-29 RX ADMIN — ROPINIROLE 0.25 MILLIGRAM(S): 8 TABLET, FILM COATED, EXTENDED RELEASE ORAL at 06:25

## 2024-01-29 RX ADMIN — METHOCARBAMOL 750 MILLIGRAM(S): 500 TABLET, FILM COATED ORAL at 15:10

## 2024-01-29 RX ADMIN — POLYETHYLENE GLYCOL 3350 17 GRAM(S): 17 POWDER, FOR SOLUTION ORAL at 06:25

## 2024-01-29 RX ADMIN — Medication 10 MILLIGRAM(S): at 01:11

## 2024-01-29 RX ADMIN — METHOCARBAMOL 750 MILLIGRAM(S): 500 TABLET, FILM COATED ORAL at 06:25

## 2024-01-29 RX ADMIN — MORPHINE SULFATE 2 MILLIGRAM(S): 50 CAPSULE, EXTENDED RELEASE ORAL at 22:30

## 2024-01-29 RX ADMIN — OXYCODONE HYDROCHLORIDE 10 MILLIGRAM(S): 5 TABLET ORAL at 08:06

## 2024-01-29 RX ADMIN — Medication 1 TABLET(S): at 11:38

## 2024-01-29 RX ADMIN — LACTULOSE 20 GRAM(S): 10 SOLUTION ORAL at 11:38

## 2024-01-29 RX ADMIN — PANTOPRAZOLE SODIUM 40 MILLIGRAM(S): 20 TABLET, DELAYED RELEASE ORAL at 06:25

## 2024-01-29 RX ADMIN — Medication 1 TABLET(S): at 11:37

## 2024-01-29 RX ADMIN — Medication 1 SPRAY(S): at 06:24

## 2024-01-29 RX ADMIN — Medication 975 MILLIGRAM(S): at 06:26

## 2024-01-29 RX ADMIN — Medication 2 MILLIGRAM(S): at 18:24

## 2024-01-29 RX ADMIN — Medication 975 MILLIGRAM(S): at 22:37

## 2024-01-29 RX ADMIN — LACTULOSE 20 GRAM(S): 10 SOLUTION ORAL at 18:24

## 2024-01-29 RX ADMIN — Medication 12.5 MILLIGRAM(S): at 18:24

## 2024-01-29 RX ADMIN — SENNA PLUS 2 TABLET(S): 8.6 TABLET ORAL at 21:48

## 2024-01-29 NOTE — PROGRESS NOTE ADULT - ASSESSMENT
89F w/ h/o chronic afib (on Eliquis), GERD, and chronic low back pain presenting s/p fall. Admitted to medicine for back pain 2/2 Lumbar compression fracture    #Lumbar Compression Fracture  Initially presenting s/p fa. Known to have progressively worsening chronic low back pain over several years. Previously managed via gabapentin 200 tid. Previously trialed Mobic and diclofenac w/ minimal relief. Recently started on Percocet 5-325 q12h w/ mild relief.  - obtain UA (r/o UTI; no reported dysuria, but daughter reports malodorous urine)  - CT lumbar spine: pathologic severe compression fracture of L1  - MRI - L1 bowing of the posterior cortex contributing to mass effect on the conus   - neuroSx consult-abd binder, TLSO brace on discharge, recommend 2mg decadron IV q12hr, IR biopsy  - 1/28 spoke with daughter - she understands mostly like cancer - she wants biopsy --eliquis stopped 1/28, IR consulted   - patient never had a colonoscopy or pap smear. Last mammo > 5 years ago.   - pain management eval appreciated  - For pain control:      ** Tylenol 975mg PO Q8H     ** gabapentin 300mg Q8H (increasing to max dose based on CrCl; monitor for oversedation)     ** Lidocaine 4% patch Q24H     ** Methocarbamol 750 mg q8hrs      ** Oxycodone 15 mg PRN q6hrs for severe pain     ** ropinirole for leg cramps      ** Dexamethasone 2 mg IV BID   - PT consulted; STR vs home PT    #CHF, new onset  Reports LE swelling over past 1-2 weeks.   Recently started on furosemide 20 qd for swelling.   No reported CP or dyspnea at rest or exertion (but functional status limited by lower back pain).   - No known prior h/o CHF.  (bibasilar crackles; b/l LE pitting edema)    - s/p furosemide 40mg IV once   - TTE (May 2013) demonstrated LVEF 55-65% w/ normal global LVSF and no significant valvulopathy.  -  TTE 1/29: 60 to 65% // Mildly enlarged left atrium // Mildly enlarged right atrium // Mild-to-moderate AR // Mild MR // Mild-moderate TR // moderate pulmonary hypertension.  - elevated BNP 5K (on presentation)  - daily standing weights; strict I's & O's; 1.5L fluid restrict  - monitor lytes and replete accordingly (K>4; Mg>2)  - s/p bumex 2g IV 1x dose (1/25) -> now no edema.   - troponin-negative   - Tele monitored for 24 hrs with no dysrhythmia     #Chronic Atrial Fibrillation  CHADS-VASC 3+ (Age x2, Female). Only on AC. Previously on Lopressor, but only takes intermittently as HR currently rate-controlled off meds.  - apixaban 2.5mg PO BID to be resumed after the biopsy (consider dose increase if Cr persistently <1.5)  - patient was previously on metoprolol 25 mg BID -> but stopped taking it months ago given that her HR was in the 60s -> will start metoprolol tartrate 12.5 mg BID given the HR>100 -> monitor.     #GERD  - c/w pantoprazole 40mg PO QD (therapeutic interchange for omeprazole)    #CKD 3b  - back to baseline     #Upper resp drainage, cough  -flonase ordered  -rvp ordered    #Urinary retention  -q6hr bladder scan  -daughter wants us to do straight cath no hanson for now (last 20 hours -> straight cath was done twice: draining the first time 600 cc and the second time 300 cc)  -flomax started    #Constipation  -senna, miralax, lactulose     DVT PPX: apixaban 2.5mg PO BID -> withheld now for possible IR biopsy -> will start lovenox meanwhile.   GI PPX: pantoprazole 40mg PO QD  DIET: DASH/TLC + 1.5L fluid restrict  ACTIVITY: IAT  CODE STATUS: Full Code  DISPOSITION: active

## 2024-01-29 NOTE — CHART NOTE - NSCHARTNOTEFT_GEN_A_CORE
As per IR team   - will schedule patient for biopsy tomorrow.  - NPO after midnight   - pre-op labs   - keep off anticoagulation tonight  - will place SCDs meanwhile

## 2024-01-29 NOTE — PROGRESS NOTE ADULT - SUBJECTIVE AND OBJECTIVE BOX
24H events:    Patient is a 89y old Female who presents with a chief complaint of Back Pain (28 Jan 2024 13:59)    Primary diagnosis of Inability to ambulate due to multiple joints       Today is hospital day 5d. This morning patient was seen and examined at bedside, resting comfortably in bed.    No acute or major events overnight.    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 No reported tobacco, alcohol, or illicit/recreational drug use. ADL's limited by progressively worsening ability to ambulate. Ambulates w/ walker. (24 Jan 2024 15:07)      ALLERGIES:  No Known Allergies    MEDICATIONS:  STANDING MEDICATIONS  acetaminophen     Tablet .. 975 milliGRAM(s) Oral every 8 hours  calcium carbonate   1250 mG (OsCal) 1 Tablet(s) Oral daily  cholecalciferol 5000 Unit(s) Oral daily  dexAMETHasone  Injectable 2 milliGRAM(s) IV Push two times a day  fluticasone propionate 50 MICROgram(s)/spray Nasal Spray 1 Spray(s) Both Nostrils two times a day  gabapentin 300 milliGRAM(s) Oral three times a day  lactulose Syrup 20 Gram(s) Oral every 4 hours  lidocaine   4% Patch 1 Patch Transdermal every 24 hours  methocarbamol 750 milliGRAM(s) Oral every 8 hours  metoprolol tartrate 12.5 milliGRAM(s) Oral two times a day  multivitamin 1 Tablet(s) Oral daily  pantoprazole    Tablet 40 milliGRAM(s) Oral before breakfast  polyethylene glycol 3350 17 Gram(s) Oral two times a day  rOPINIRole 0.25 milliGRAM(s) Oral two times a day  senna 2 Tablet(s) Oral at bedtime  tamsulosin 0.4 milliGRAM(s) Oral at bedtime    PRN MEDICATIONS  morphine  - Injectable 2 milliGRAM(s) IV Push every 4 hours PRN  oxyCODONE    IR 15 milliGRAM(s) Oral every 6 hours PRN    VITALS:   T(F): 96.5  HR: 115  BP: 139/71  RR: 18  SpO2: --    PHYSICAL EXAM:  General: NAD, A/O x 3 (Serbian speaking)-daughter at bedside helped with the translation.   Lungs: diminished bilaterally, no crackles   Cardio: IRR, S1/S2, 2/6 systolic murmur   Abdomen: Soft, Nontender, Nondistended; Bowel sounds present  Extremities: No cyanosis, no edema  Motor function difficult to assess in the LEs given the back pain whenever she tries to move her LEs.    Normal sensation bilaterally in the LEs    LABS:                        13.5   4.69  )-----------( 142      ( 29 Jan 2024 07:05 )             39.2     01-29    134<L>  |  96<L>  |  26<H>  ----------------------------<  118<H>  4.5   |  22  |  1.1    Ca    9.3      29 Jan 2024 07:05  Phos  4.0     01-29  Mg     2.0     01-29    TPro  5.6<L>  /  Alb  3.7  /  TBili  0.7  /  DBili  x   /  AST  29  /  ALT  13  /  AlkPhos  68  01-29      Urinalysis Basic - ( 29 Jan 2024 07:05 )    Color: x / Appearance: x / SG: x / pH: x  Gluc: 118 mg/dL / Ketone: x  / Bili: x / Urobili: x   Blood: x / Protein: x / Nitrite: x   Leuk Esterase: x / RBC: x / WBC x   Sq Epi: x / Non Sq Epi: x / Bacteria: x

## 2024-01-29 NOTE — PROGRESS NOTE ADULT - SUBJECTIVE AND OBJECTIVE BOX
Patient is a 89y old  Female who presents with a chief complaint of Back Pain (29 Jan 2024 14:03)      Patient seen and examined at bedside.  Patient denies any chest pain or shortness and breath, states back pain is still very significant   ALLERGIES:  No Known Allergies    MEDICATIONS:  acetaminophen     Tablet .. 975 milliGRAM(s) Oral every 8 hours  calcium carbonate   1250 mG (OsCal) 1 Tablet(s) Oral daily  cholecalciferol 5000 Unit(s) Oral daily  dexAMETHasone  Injectable 2 milliGRAM(s) IV Push two times a day  fluticasone propionate 50 MICROgram(s)/spray Nasal Spray 1 Spray(s) Both Nostrils two times a day  gabapentin 300 milliGRAM(s) Oral three times a day  lactulose Syrup 20 Gram(s) Oral every 4 hours  lidocaine   4% Patch 1 Patch Transdermal every 24 hours  methocarbamol 750 milliGRAM(s) Oral every 8 hours  metoprolol tartrate 12.5 milliGRAM(s) Oral two times a day  morphine  - Injectable 2 milliGRAM(s) IV Push every 4 hours PRN  multivitamin 1 Tablet(s) Oral daily  oxyCODONE    IR 15 milliGRAM(s) Oral every 6 hours PRN  pantoprazole    Tablet 40 milliGRAM(s) Oral before breakfast  polyethylene glycol 3350 17 Gram(s) Oral two times a day  rOPINIRole 0.25 milliGRAM(s) Oral two times a day  senna 2 Tablet(s) Oral at bedtime  tamsulosin 0.4 milliGRAM(s) Oral at bedtime    Vital Signs Last 24 Hrs  T(F): 98.2 (29 Jan 2024 15:01), Max: 98.4 (28 Jan 2024 23:44)  HR: 92 (29 Jan 2024 15:01) (92 - 115)  BP: 124/57 (29 Jan 2024 15:01) (124/57 - 172/98)  RR: 18 (29 Jan 2024 15:01) (18 - 19)  SpO2: --  I&O's Summary    29 Jan 2024 07:01  -  29 Jan 2024 17:05  --------------------------------------------------------  IN: 0 mL / OUT: 475 mL / NET: -475 mL        PHYSICAL EXAM:  General: NAD, A/O x 3  ENT: MMM  Neck: Supple, No JVD  Lungs: Clear to auscultation bilaterally  Cardio: IRR, S1/S2, 2/6 systolic murmur   Abdomen: Soft, Nontender, Nondistended; Bowel sounds present  Extremities: No cyanosis, No edema    LABS:                        13.5   4.69  )-----------( 142      ( 29 Jan 2024 07:05 )             39.2     01-29    134  |  96  |  26  ----------------------------<  118  4.5   |  22  |  1.1    Ca    9.3      29 Jan 2024 07:05  Phos  4.0     01-29  Mg     2.0     01-29    TPro  5.6  /  Alb  3.7  /  TBili  0.7  /  DBili  x   /  AST  29  /  ALT  13  /  AlkPhos  68  01-29                                Urinalysis Basic - ( 29 Jan 2024 07:05 )    Color: x / Appearance: x / SG: x / pH: x  Gluc: 118 mg/dL / Ketone: x  / Bili: x / Urobili: x   Blood: x / Protein: x / Nitrite: x   Leuk Esterase: x / RBC: x / WBC x   Sq Epi: x / Non Sq Epi: x / Bacteria: x            RADIOLOGY & ADDITIONAL TESTS:    Care Discussed with Consultants/Other Providers:

## 2024-01-29 NOTE — PROGRESS NOTE ADULT - ASSESSMENT
89F w/ h/o chronic afib (on Eliquis), GERD, and chronic low back pain presenting s/p fall. Admitted to medicine for back pain 2/2 Lumbar compression fracture    #Lumbar Compression Fracture  Initially presenting s/p fa. Known to have progressively worsening chronic low back pain over several years. Previously managed via gabapentin 200 tid. Previously trialed Mobic and diclofenac w/ minimal relief. Recently started on Percocet 5-325 q12h w/ mild relief.  - start Tylenol 975mg PO Q8H  - increase gabapentin from 200mg to 300mg Q8H (increasing to max dose based on CrCl; monitor for oversedation)  - continue lidocaine 4% patch Q24H, oxycodone increased to 15mg and morphine added for severe pain   - 1/25 started ropinirole for leg cramps   - obtain UA (r/o UTI; no reported dysuria, but daughter reports malodorous urine)  - CT lumbar spine: pathologic severe compression fracture of L1  - MRI - L1 bowing of the posterior cortex contributing to mass effect on the conus   - neuroSx consult-abd binder, TLSO brace on discharge, recommend 2mg decadron IV q12hr, IR biopsy  - 1/28 spoke with daughter - she understands mostly like cancer - she wants biopsy --eliquis stopped 1/28, IR consulted   - IR biopsy set for 1/30  - pain management eval appreciated  - PT consulted; STR vs home PT    #CHF, diastolic - acute   Reports LE swelling over past 1-2 weeks.   Recently started on furosemide 20 qd for swelling.   No reported CP or dyspnea at rest or exertion (but functional status limited by lower back pain).   - No known prior h/o CHF.  (bibasilar crackles; b/l LE pitting edema)    - s/p furosemide 40mg IV once   - TTE (May 2013) demonstrated LVEF 55-65% w/ normal global LVSF and no significant valvulopathy.  -  TTE 1/28- EF 60-65% mod pulm HTN   - elevated BNP  - daily standing weights; strict I's & O's; 1.5L fluid restrict  - monitor 'lytes and replete accordingly (K>4; Mg>2)  - s/p bumex 2g IV 1x dose (1/25)  - pain mgt: percocet increased to 10mg   - troponin-negative   - Tele monitored for 24 hrs with no dysrhythmia     #Chronic Atrial Fibrillation  CHADS-VASC 3+ (Age x2, Female). Only on AC. Previously on Lopressor, but only takes intermittently as HR currently rate-controlled off meds.  - holding apixaban 2.5mg PO BID (consider dose increase if Cr persistently <1.5)    #GERD  - c/w pantoprazole 40mg PO QD (therapeutic interchange for omeprazole)    #CKD 3b   per PCP baseline is 1.2, currently 1.5    #Upper resp drainage, cough  -flonase ordered  -rvp sent     #Urinary retention-likely neurogenic   -hanson placed 1/29  -flomax started    #Constipation  -kub nonobstrutive   -senna, miralax, lactulose ordered     DVT PPX: apixaban 2.5mg PO BID  GI PPX: pantoprazole 40mg PO QD  DIET: DASH/TLC + 1.5L fluid restrict  ACTIVITY: IAT  CODE STATUS: Full Code  DISPOSITION: active     PENDING:  urinary retention, IR biopsy, rvp tlso   Family: daughter updated 1/29 at bedside

## 2024-01-30 ENCOUNTER — RESULT REVIEW (OUTPATIENT)
Age: 89
End: 2024-01-30

## 2024-01-30 LAB
ALBUMIN SERPL ELPH-MCNC: 3.9 G/DL — SIGNIFICANT CHANGE UP (ref 3.5–5.2)
ALP SERPL-CCNC: 71 U/L — SIGNIFICANT CHANGE UP (ref 30–115)
ALT FLD-CCNC: 17 U/L — SIGNIFICANT CHANGE UP (ref 0–41)
ANION GAP SERPL CALC-SCNC: 16 MMOL/L — HIGH (ref 7–14)
AST SERPL-CCNC: 34 U/L — SIGNIFICANT CHANGE UP (ref 0–41)
BASOPHILS # BLD AUTO: 0.01 K/UL — SIGNIFICANT CHANGE UP (ref 0–0.2)
BASOPHILS NFR BLD AUTO: 0.1 % — SIGNIFICANT CHANGE UP (ref 0–1)
BILIRUB SERPL-MCNC: 0.6 MG/DL — SIGNIFICANT CHANGE UP (ref 0.2–1.2)
BUN SERPL-MCNC: 25 MG/DL — HIGH (ref 10–20)
CALCIUM SERPL-MCNC: 9.3 MG/DL — SIGNIFICANT CHANGE UP (ref 8.4–10.5)
CHLORIDE SERPL-SCNC: 94 MMOL/L — LOW (ref 98–110)
CO2 SERPL-SCNC: 22 MMOL/L — SIGNIFICANT CHANGE UP (ref 17–32)
CREAT SERPL-MCNC: 1.1 MG/DL — SIGNIFICANT CHANGE UP (ref 0.7–1.5)
EGFR: 48 ML/MIN/1.73M2 — LOW
EOSINOPHIL # BLD AUTO: 0.03 K/UL — SIGNIFICANT CHANGE UP (ref 0–0.7)
EOSINOPHIL NFR BLD AUTO: 0.3 % — SIGNIFICANT CHANGE UP (ref 0–8)
GLUCOSE SERPL-MCNC: 115 MG/DL — HIGH (ref 70–99)
HCT VFR BLD CALC: 39.5 % — SIGNIFICANT CHANGE UP (ref 37–47)
HGB BLD-MCNC: 13.7 G/DL — SIGNIFICANT CHANGE UP (ref 12–16)
IMM GRANULOCYTES NFR BLD AUTO: 0.5 % — HIGH (ref 0.1–0.3)
LYMPHOCYTES # BLD AUTO: 1.06 K/UL — LOW (ref 1.2–3.4)
LYMPHOCYTES # BLD AUTO: 9.6 % — LOW (ref 20.5–51.1)
MAGNESIUM SERPL-MCNC: 1.9 MG/DL — SIGNIFICANT CHANGE UP (ref 1.8–2.4)
MCHC RBC-ENTMCNC: 32.5 PG — HIGH (ref 27–31)
MCHC RBC-ENTMCNC: 34.7 G/DL — SIGNIFICANT CHANGE UP (ref 32–37)
MCV RBC AUTO: 93.6 FL — SIGNIFICANT CHANGE UP (ref 81–99)
MONOCYTES # BLD AUTO: 0.53 K/UL — SIGNIFICANT CHANGE UP (ref 0.1–0.6)
MONOCYTES NFR BLD AUTO: 4.8 % — SIGNIFICANT CHANGE UP (ref 1.7–9.3)
NEUTROPHILS # BLD AUTO: 9.34 K/UL — HIGH (ref 1.4–6.5)
NEUTROPHILS NFR BLD AUTO: 84.7 % — HIGH (ref 42.2–75.2)
NRBC # BLD: 0 /100 WBCS — SIGNIFICANT CHANGE UP (ref 0–0)
PHOSPHATE SERPL-MCNC: 4.2 MG/DL — SIGNIFICANT CHANGE UP (ref 2.1–4.9)
PLATELET # BLD AUTO: 161 K/UL — SIGNIFICANT CHANGE UP (ref 130–400)
PMV BLD: 10.4 FL — SIGNIFICANT CHANGE UP (ref 7.4–10.4)
POTASSIUM SERPL-MCNC: 4.6 MMOL/L — SIGNIFICANT CHANGE UP (ref 3.5–5)
POTASSIUM SERPL-SCNC: 4.6 MMOL/L — SIGNIFICANT CHANGE UP (ref 3.5–5)
PROT SERPL-MCNC: 5.7 G/DL — LOW (ref 6–8)
RAPID RVP RESULT: SIGNIFICANT CHANGE UP
RBC # BLD: 4.22 M/UL — SIGNIFICANT CHANGE UP (ref 4.2–5.4)
RBC # FLD: 13 % — SIGNIFICANT CHANGE UP (ref 11.5–14.5)
SARS-COV-2 RNA SPEC QL NAA+PROBE: SIGNIFICANT CHANGE UP
SODIUM SERPL-SCNC: 132 MMOL/L — LOW (ref 135–146)
WBC # BLD: 11.02 K/UL — HIGH (ref 4.8–10.8)
WBC # FLD AUTO: 11.02 K/UL — HIGH (ref 4.8–10.8)

## 2024-01-30 PROCEDURE — 88341 IMHCHEM/IMCYTCHM EA ADD ANTB: CPT | Mod: 26

## 2024-01-30 PROCEDURE — 77333 RADIATION TREATMENT AID(S): CPT | Mod: 26

## 2024-01-30 PROCEDURE — 20225 BONE BIOPSY TROCAR/NDL DEEP: CPT

## 2024-01-30 PROCEDURE — 88305 TISSUE EXAM BY PATHOLOGIST: CPT | Mod: 26

## 2024-01-30 PROCEDURE — 77290 THER RAD SIMULAJ FIELD CPLX: CPT | Mod: 26

## 2024-01-30 PROCEDURE — 74177 CT ABD & PELVIS W/CONTRAST: CPT | Mod: 26

## 2024-01-30 PROCEDURE — 77263 THER RADIOLOGY TX PLNG CPLX: CPT

## 2024-01-30 PROCEDURE — 88342 IMHCHEM/IMCYTCHM 1ST ANTB: CPT | Mod: 26

## 2024-01-30 PROCEDURE — 71260 CT THORAX DX C+: CPT | Mod: 26

## 2024-01-30 PROCEDURE — 88172 CYTP DX EVAL FNA 1ST EA SITE: CPT | Mod: 26

## 2024-01-30 PROCEDURE — 77012 CT SCAN FOR NEEDLE BIOPSY: CPT | Mod: 26

## 2024-01-30 PROCEDURE — 99232 SBSQ HOSP IP/OBS MODERATE 35: CPT

## 2024-01-30 PROCEDURE — 88173 CYTOPATH EVAL FNA REPORT: CPT | Mod: 26

## 2024-01-30 PROCEDURE — 99221 1ST HOSP IP/OBS SF/LOW 40: CPT | Mod: 25

## 2024-01-30 RX ORDER — IOHEXOL 300 MG/ML
30 INJECTION, SOLUTION INTRAVENOUS ONCE
Refills: 0 | Status: COMPLETED | OUTPATIENT
Start: 2024-01-30 | End: 2024-01-30

## 2024-01-30 RX ORDER — APIXABAN 2.5 MG/1
2.5 TABLET, FILM COATED ORAL
Refills: 0 | Status: DISCONTINUED | OUTPATIENT
Start: 2024-01-30 | End: 2024-01-31

## 2024-01-30 RX ORDER — TAMSULOSIN HYDROCHLORIDE 0.4 MG/1
0.8 CAPSULE ORAL AT BEDTIME
Refills: 0 | Status: DISCONTINUED | OUTPATIENT
Start: 2024-01-30 | End: 2024-02-15

## 2024-01-30 RX ORDER — SODIUM CHLORIDE 9 MG/ML
1000 INJECTION, SOLUTION INTRAVENOUS
Refills: 0 | Status: DISCONTINUED | OUTPATIENT
Start: 2024-01-30 | End: 2024-01-30

## 2024-01-30 RX ORDER — SODIUM CHLORIDE 9 MG/ML
1000 INJECTION, SOLUTION INTRAVENOUS
Refills: 0 | Status: DISCONTINUED | OUTPATIENT
Start: 2024-01-30 | End: 2024-02-01

## 2024-01-30 RX ORDER — DEXAMETHASONE 0.5 MG/5ML
4 ELIXIR ORAL
Refills: 0 | Status: DISCONTINUED | OUTPATIENT
Start: 2024-01-30 | End: 2024-02-06

## 2024-01-30 RX ORDER — CHLORHEXIDINE GLUCONATE 213 G/1000ML
1 SOLUTION TOPICAL DAILY
Refills: 0 | Status: DISCONTINUED | OUTPATIENT
Start: 2024-01-30 | End: 2024-02-15

## 2024-01-30 RX ADMIN — POLYETHYLENE GLYCOL 3350 17 GRAM(S): 17 POWDER, FOR SOLUTION ORAL at 05:37

## 2024-01-30 RX ADMIN — ROPINIROLE 0.25 MILLIGRAM(S): 8 TABLET, FILM COATED, EXTENDED RELEASE ORAL at 05:37

## 2024-01-30 RX ADMIN — IOHEXOL 30 MILLILITER(S): 300 INJECTION, SOLUTION INTRAVENOUS at 16:48

## 2024-01-30 RX ADMIN — APIXABAN 2.5 MILLIGRAM(S): 2.5 TABLET, FILM COATED ORAL at 18:07

## 2024-01-30 RX ADMIN — PANTOPRAZOLE SODIUM 40 MILLIGRAM(S): 20 TABLET, DELAYED RELEASE ORAL at 05:36

## 2024-01-30 RX ADMIN — LACTULOSE 20 GRAM(S): 10 SOLUTION ORAL at 17:20

## 2024-01-30 RX ADMIN — Medication 975 MILLIGRAM(S): at 05:37

## 2024-01-30 RX ADMIN — Medication 975 MILLIGRAM(S): at 06:05

## 2024-01-30 RX ADMIN — METHOCARBAMOL 750 MILLIGRAM(S): 500 TABLET, FILM COATED ORAL at 21:16

## 2024-01-30 RX ADMIN — Medication 975 MILLIGRAM(S): at 21:54

## 2024-01-30 RX ADMIN — Medication 1 SPRAY(S): at 05:38

## 2024-01-30 RX ADMIN — Medication 1 SPRAY(S): at 17:21

## 2024-01-30 RX ADMIN — Medication 12.5 MILLIGRAM(S): at 05:36

## 2024-01-30 RX ADMIN — GABAPENTIN 300 MILLIGRAM(S): 400 CAPSULE ORAL at 21:16

## 2024-01-30 RX ADMIN — LACTULOSE 20 GRAM(S): 10 SOLUTION ORAL at 21:15

## 2024-01-30 RX ADMIN — ROPINIROLE 0.25 MILLIGRAM(S): 8 TABLET, FILM COATED, EXTENDED RELEASE ORAL at 17:20

## 2024-01-30 RX ADMIN — METHOCARBAMOL 750 MILLIGRAM(S): 500 TABLET, FILM COATED ORAL at 05:36

## 2024-01-30 RX ADMIN — POLYETHYLENE GLYCOL 3350 17 GRAM(S): 17 POWDER, FOR SOLUTION ORAL at 17:23

## 2024-01-30 RX ADMIN — Medication 975 MILLIGRAM(S): at 21:16

## 2024-01-30 RX ADMIN — LACTULOSE 20 GRAM(S): 10 SOLUTION ORAL at 09:28

## 2024-01-30 RX ADMIN — GABAPENTIN 300 MILLIGRAM(S): 400 CAPSULE ORAL at 05:36

## 2024-01-30 RX ADMIN — OXYCODONE HYDROCHLORIDE 15 MILLIGRAM(S): 5 TABLET ORAL at 06:05

## 2024-01-30 RX ADMIN — SENNA PLUS 2 TABLET(S): 8.6 TABLET ORAL at 21:15

## 2024-01-30 RX ADMIN — Medication 4 MILLIGRAM(S): at 17:22

## 2024-01-30 RX ADMIN — Medication 2 MILLIGRAM(S): at 05:37

## 2024-01-30 RX ADMIN — MORPHINE SULFATE 2 MILLIGRAM(S): 50 CAPSULE, EXTENDED RELEASE ORAL at 21:24

## 2024-01-30 RX ADMIN — TAMSULOSIN HYDROCHLORIDE 0.8 MILLIGRAM(S): 0.4 CAPSULE ORAL at 21:14

## 2024-01-30 RX ADMIN — MORPHINE SULFATE 2 MILLIGRAM(S): 50 CAPSULE, EXTENDED RELEASE ORAL at 21:54

## 2024-01-30 RX ADMIN — OXYCODONE HYDROCHLORIDE 15 MILLIGRAM(S): 5 TABLET ORAL at 05:35

## 2024-01-30 RX ADMIN — Medication 12.5 MILLIGRAM(S): at 17:20

## 2024-01-30 NOTE — PROGRESS NOTE ADULT - SUBJECTIVE AND OBJECTIVE BOX
Patient is a 89y old  Female who presents with a chief complaint of Back Pain (30 Jan 2024 15:25)      Patient seen and examined at bedside.    ALLERGIES:  No Known Allergies    MEDICATIONS:  acetaminophen     Tablet .. 975 milliGRAM(s) Oral every 8 hours  calcium carbonate   1250 mG (OsCal) 1 Tablet(s) Oral daily  chlorhexidine 2% Cloths 1 Application(s) Topical daily  cholecalciferol 5000 Unit(s) Oral daily  dexAMETHasone  Injectable 4 milliGRAM(s) IV Push <User Schedule>  fluticasone propionate 50 MICROgram(s)/spray Nasal Spray 1 Spray(s) Both Nostrils two times a day  gabapentin 300 milliGRAM(s) Oral three times a day  lactated ringers. 1000 milliLiter(s) IV Continuous <Continuous>  lactulose Syrup 20 Gram(s) Oral every 4 hours  lidocaine   4% Patch 1 Patch Transdermal every 24 hours  methocarbamol 750 milliGRAM(s) Oral every 8 hours  metoprolol tartrate 12.5 milliGRAM(s) Oral two times a day  morphine  - Injectable 2 milliGRAM(s) IV Push every 4 hours PRN  multivitamin 1 Tablet(s) Oral daily  oxyCODONE    IR 15 milliGRAM(s) Oral every 6 hours PRN  pantoprazole    Tablet 40 milliGRAM(s) Oral before breakfast  polyethylene glycol 3350 17 Gram(s) Oral two times a day  rOPINIRole 0.25 milliGRAM(s) Oral two times a day  senna 2 Tablet(s) Oral at bedtime  tamsulosin 0.8 milliGRAM(s) Oral at bedtime    Vital Signs Last 24 Hrs  T(F): 98 (30 Jan 2024 13:25), Max: 98 (30 Jan 2024 11:24)  HR: 75 (30 Jan 2024 13:55) (71 - 89)  BP: 131/65 (30 Jan 2024 13:55) (114/65 - 152/70)  RR: 18 (30 Jan 2024 13:55) (18 - 18)  SpO2: 95% (30 Jan 2024 13:55) (95% - 95%)  I&O's Summary    29 Jan 2024 07:01  -  30 Jan 2024 07:00  --------------------------------------------------------  IN: 0 mL / OUT: 475 mL / NET: -475 mL        PHYSICAL EXAM:  General: NAD, A/O x 3  ENT: MMM  Neck: Supple, No JVD  Lungs: Clear to auscultation bilaterally  Cardio: IRR, S1/S2, 2/6 systolic murmur   Abdomen: Soft, Nontender, Nondistended; Bowel sounds present  Extremities: No cyanosis, No edema    LABS:                        13.7   11.02 )-----------( 161      ( 30 Jan 2024 07:36 )             39.5     01-30    132  |  94  |  25  ----------------------------<  115  4.6   |  22  |  1.1    Ca    9.3      30 Jan 2024 07:36  Phos  4.2     01-30  Mg     1.9     01-30    TPro  5.7  /  Alb  3.9  /  TBili  0.6  /  DBili  x   /  AST  34  /  ALT  17  /  AlkPhos  71  01-30      PT/INR - ( 29 Jan 2024 21:01 )   PT: 11.00 sec;   INR: 0.97 ratio         PTT - ( 29 Jan 2024 21:01 )  PTT:30.1 sec                          Urinalysis Basic - ( 30 Jan 2024 07:36 )    Color: x / Appearance: x / SG: x / pH: x  Gluc: 115 mg/dL / Ketone: x  / Bili: x / Urobili: x   Blood: x / Protein: x / Nitrite: x   Leuk Esterase: x / RBC: x / WBC x   Sq Epi: x / Non Sq Epi: x / Bacteria: x            RADIOLOGY & ADDITIONAL TESTS:    Care Discussed with Consultants/Other Providers:

## 2024-01-30 NOTE — PROGRESS NOTE ADULT - ASSESSMENT
89F w/ h/o chronic afib (on Eliquis), GERD, and chronic low back pain presenting s/p fall. Admitted to medicine for back pain 2/2 Lumbar compression fracture    #Lumbar Compression Fracture  Initially presenting s/p fa. Known to have progressively worsening chronic low back pain over several years. Previously managed via gabapentin 200 tid. Previously trialed Mobic and diclofenac w/ minimal relief. Recently started on Percocet 5-325 q12h w/ mild relief.  - start Tylenol 975mg PO Q8H  - increase gabapentin from 200mg to 300mg Q8H (increasing to max dose based on CrCl; monitor for oversedation)  - continue lidocaine 4% patch Q24H, oxycodone increased to 15mg and IV morphine added for severe pain   - 1/25 started ropinirole for leg cramps   - CT lumbar spine: pathologic severe compression fracture of L1  - MRI - L1 bowing of the posterior cortex contributing to mass effect on the conus   - neuroSx consult-abd binder, TLSO brace on discharge  - 1/30 increased decadron to 4mg q12hrs IV   - 1/28 spoke with daughter - she understands mostly like cancer - she wants biopsy --eliquis stopped 1/28, IR consulted   - IR biopsy done 1/30  - pain management eval appreciated  - Rad onc consult appreciated - plan for 5 sessions of radition therapy  - oncology consult placed     #CHF, diastolic - acute   Reports LE swelling over past 1-2 weeks.   Recently started on furosemide 20 qd for swelling.   No reported CP or dyspnea at rest or exertion (but functional status limited by lower back pain).   - No known prior h/o CHF.  (bibasilar crackles; b/l LE pitting edema)    - s/p furosemide 40mg IV once   - TTE (May 2013) demonstrated LVEF 55-65% w/ normal global LVSF and no significant valvulopathy.  -  TTE 1/28- EF 60-65% mod pulm HTN   - elevated BNP  - daily standing weights; strict I's & O's; 1.5L fluid restrict  - monitor 'lytes and replete accordingly (K>4; Mg>2)  - s/p bumex 2g IV 1x dose (1/25)  - pain mgt: percocet increased to 10mg   - troponin-negative   - Tele monitored for 24 hrs with no dysrhythmia     #Chronic Atrial Fibrillation  CHADS-VASC 3+ (Age x2, Female). Only on AC. Previously on Lopressor, but only takes intermittently as HR currently rate-controlled off meds.  - holding apixaban 2.5mg PO BID (consider dose increase if Cr persistently <1.5)    #GERD  - c/w pantoprazole 40mg PO QD (therapeutic interchange for omeprazole)    #CKD 3b   per PCP baseline is 1.2, currently 1.5    #Upper resp drainage, cough  -flonase ordered  -rvp neg 1/29    #Urinary retention-likely neurogenic   -hanson placed 1/29  -flomax increased to 0.8mg     #Constipation  -kub nonobstrutive   -senna, miralax, lactulose ordered     DVT PPX: apixaban 2.5mg PO BID  GI PPX: pantoprazole 40mg PO QD  DIET: DASH/TLC + 1.5L fluid restrict  ACTIVITY: IAT  CODE STATUS: Full Code  DISPOSITION: active     PENDING:  urinary retention, biopsy results, oncology consult, radiation therapy x 5 sessions   Family: daughter updated 1/30 at bedside

## 2024-01-30 NOTE — CONSULT NOTE ADULT - SUBJECTIVE AND OBJECTIVE BOX
HPI:  89F w/ h/o chronic afib (on Eliquis), GERD, and chronic low back pain presenting s/p fall. Patient known to have chronic low back pain that has progressively worsened over the years. Pain acutely worsened two weeks ago and patient subsequently fell due to inability to support own weight. Evaluated by PCP after initial fall due to worsening pain. Prescribed Percocet 5-325 bid w/ mild relief. However, pt once again fell earlier today after pt unable to support her own weight while trying to ambulate. Now unable to ambulate or bear weight, even w/ assistance. No reported HT, LOC, lightheadedness, confusion, loss of continence, or focal weakness a/w either fall. No reported fevers, chills, CP, palpitations, SOB, abdominal pain, n/v/d, or dysuria (although daughter did note malodorous urine recently).    Of note, pt also reports new onset LE swelling over past 1-2 weeks. Recently started on furosemide 20 qd by PCP for swelling. No reported CP or dyspnea at rest or exertion (but functional status limited by lower back pain). No known prior h/o CHF.     In ED, pt HD stable on vitals. Labs demonstrated K 5.6 (hemolyzed). XR Bilateral Hips, Pelvis, and L-Spine overall unremarkable (wet read). S/P morphine 4mg IV and methocarbamol 1000mg PO. Admitted to medicine for inability to ambulate s/p fall. (24 Jan 2024 15:07)      Allergies    No Known Allergies    Intolerances        ROS: [  ] Fever  [  ] Chills  [  ]Chest Pain [  ] SOB  [  ]Cough [  ] N/V  [  ] Diarrhea [  ]Constipation [  ]Other ROS:  [  ] ROS otherwise negative    PAST MEDICAL & SURGICAL HISTORY:  Atrial fibrillation, chronic    Chronic atrial fibrillation    GERD (gastroesophageal reflux disease)    Chronic lower back pain    No significant past surgical history        FAMILY HISTORY:  Family history of stroke (Father)        MEDICATIONS  (STANDING):  acetaminophen     Tablet .. 975 milliGRAM(s) Oral every 8 hours  calcium carbonate   1250 mG (OsCal) 1 Tablet(s) Oral daily  chlorhexidine 2% Cloths 1 Application(s) Topical daily  cholecalciferol 5000 Unit(s) Oral daily  dexAMETHasone  Injectable 4 milliGRAM(s) IV Push <User Schedule>  fluticasone propionate 50 MICROgram(s)/spray Nasal Spray 1 Spray(s) Both Nostrils two times a day  gabapentin 300 milliGRAM(s) Oral three times a day  lactated ringers. 1000 milliLiter(s) (50 mL/Hr) IV Continuous <Continuous>  lactulose Syrup 20 Gram(s) Oral every 4 hours  lidocaine   4% Patch 1 Patch Transdermal every 24 hours  methocarbamol 750 milliGRAM(s) Oral every 8 hours  metoprolol tartrate 12.5 milliGRAM(s) Oral two times a day  multivitamin 1 Tablet(s) Oral daily  pantoprazole    Tablet 40 milliGRAM(s) Oral before breakfast  polyethylene glycol 3350 17 Gram(s) Oral two times a day  rOPINIRole 0.25 milliGRAM(s) Oral two times a day  senna 2 Tablet(s) Oral at bedtime  tamsulosin 0.8 milliGRAM(s) Oral at bedtime    MEDICATIONS  (PRN):  morphine  - Injectable 2 milliGRAM(s) IV Push every 4 hours PRN Severe Pain (7 - 10)  oxyCODONE    IR 15 milliGRAM(s) Oral every 6 hours PRN Moderate Pain (4 - 6)      PHYSICAL EXAM  Vital Signs Last 24 Hrs  T(C): 36.7 (30 Jan 2024 13:25), Max: 36.7 (30 Jan 2024 11:24)  T(F): 98 (30 Jan 2024 13:25), Max: 98 (30 Jan 2024 11:24)  HR: 75 (30 Jan 2024 13:55) (71 - 89)  BP: 131/65 (30 Jan 2024 13:55) (114/65 - 152/70)  BP(mean): --  RR: 18 (30 Jan 2024 13:55) (18 - 18)  SpO2: 95% (30 Jan 2024 13:55) (95% - 95%)    Parameters below as of 30 Jan 2024 13:55  Patient On (Oxygen Delivery Method): room air    IMAGING/LABS/PATHOLOGY: I have personally reviewed the relevant labs, pathology, and imaging as noted in the HPI.  In addition,                          13.7   11.02 )-----------( 161      ( 30 Jan 2024 07:36 )             39.5     01-30    132<L>  |  94<L>  |  25<H>  ----------------------------<  115<H>  4.6   |  22  |  1.1    Ca    9.3      30 Jan 2024 07:36  Phos  4.2     01-30  Mg     1.9     01-30    TPro  5.7<L>  /  Alb  3.9  /  TBili  0.6  /  DBili  x   /  AST  34  /  ALT  17  /  AlkPhos  71  01-30    PT/INR - ( 29 Jan 2024 21:01 )   PT: 11.00 sec;   INR: 0.97 ratio         PTT - ( 29 Jan 2024 21:01 )  PTT:30.1 sec    < from: MR Lumbar Spine No Cont (01.27.24 @ 15:05) >  IMPRESSION:  Malignant compression deformity of L1 with significant bowing of the   posterior cortex contributing to mass effect upon the conus. Of note   there is significant dilation of the urinary bladder. Correlate   clinically for neurogenic bladder.    Additional degenerative changes in combination with lumbar levoscoliosis   contributes to neuroforaminal narrowing most significantly involving the   left L4-L5 and L5-S1 levels.    < end of copied text >

## 2024-01-30 NOTE — PROGRESS NOTE ADULT - ASSESSMENT
89F w/ h/o chronic afib (on Eliquis), GERD, and chronic low back pain presenting s/p fall. Admitted to medicine for back pain 2/2 Lumbar compression fracture    #Lumbar Compression Fracture  Initially presenting s/p fa. Known to have progressively worsening chronic low back pain over several years. Previously managed via gabapentin 200 tid. Previously trialed Mobic and diclofenac w/ minimal relief. Recently started on Percocet 5-325 q12h w/ mild relief.  - obtain UA (r/o UTI; no reported dysuria, but daughter reports malodorous urine)  - CT lumbar spine: pathologic severe compression fracture of L1  - MRI - L1 bowing of the posterior cortex contributing to mass effect on the conus   - neuroSx consult-abd binder, TLSO brace on discharge, recommend 2mg decadron IV q12hr, IR biopsy  - 1/28 spoke with daughter - she understands mostly like cancer - she wants biopsy --eliquis stopped 1/28, IR consulted   - patient never had a colonoscopy or pap smear. Last mammo > 5 years ago.   - pain management eval appreciated  - For pain control:      ** Tylenol 975mg PO Q8H     ** gabapentin 300mg Q8H (increasing to max dose based on CrCl; monitor for oversedation)     ** Lidocaine 4% patch Q24H     ** Methocarbamol 750 mg q8hrs      ** Oxycodone 15 mg PRN q6hrs for severe pain     ** ropinirole for leg cramps      ** Dexamethasone 2 mg IV BID   - PT consulted; STR vs home PT  - today IR biopsy of the mass  - will cs rad onc for possible radiotherapy to the mass -> might help with the pain.     #Urinary retention  - Hanson placed on 1/29 (straight cath was done 3 times prior to placing the hanson and patient was retaining between 600 and 700 cc each time)  - on flomax -> will double the dose starting 1/30 (BP in the 140s-150s)  - will cs rad onc for possible radiotherapy to the mass -> might help with reducing the mass effect on the nerves.     #CHF, new onset  Reports LE swelling over past 1-2 weeks.   Recently started on furosemide 20 qd for swelling.   No reported CP or dyspnea at rest or exertion (but functional status limited by lower back pain).   - No known prior h/o CHF.  (bibasilar crackles; b/l LE pitting edema)    - s/p furosemide 40mg IV once   - TTE (May 2013) demonstrated LVEF 55-65% w/ normal global LVSF and no significant valvulopathy.  -  TTE 1/29: 60 to 65% // Mildly enlarged left atrium // Mildly enlarged right atrium // Mild-to-moderate AR // Mild MR // Mild-moderate TR // moderate pulmonary hypertension.  - elevated BNP 5K (on presentation)  - daily standing weights; strict I's & O's; 1.5L fluid restrict  - monitor lytes and replete accordingly (K>4; Mg>2)  - s/p bumex 2g IV 1x dose (1/25) -> now no edema.   - troponin-negative   - Tele monitored for 24 hrs with no dysrhythmia     #Chronic Atrial Fibrillation  CHADS-VASC 3+ (Age x2, Female). Only on AC. Previously on Lopressor, but only takes intermittently as HR currently rate-controlled off meds.  - apixaban 2.5mg PO BID to be resumed after the biopsy (consider dose increase if Cr persistently <1.5)  - patient was previously on metoprolol 25 mg BID -> but stopped taking it months ago given that her HR was in the 60s   - on 1/29 we started metoprolol tartrate 12.5 mg BID given the HR>100  - 1/30: HR better controlled in the 70s-80s. BP stable    #GERD  - c/w pantoprazole 40mg PO QD (therapeutic interchange for omeprazole)    #CKD 3b  - back to baseline     #Upper resp drainage, cough -> resolved  -flonase ordered  -rvp 1/29: negative    #Constipation  -senna, miralax, lactulose     DVT PPX: SCD--  apixaban 2.5mg PO BID -> withheld now for IR biopsy today -> will resume when cleared by IR  GI PPX: pantoprazole 40mg PO QD  DIET: DASH/TLC + 1.5L fluid restrict  ACTIVITY: IAT  CODE STATUS: Full Code  DISPOSITION: active

## 2024-01-30 NOTE — PROGRESS NOTE ADULT - SUBJECTIVE AND OBJECTIVE BOX
24H events:    Patient is a 89y old Female who presents with a chief complaint of Back Pain (29 Jan 2024 17:04)    Primary diagnosis of Inability to ambulate due to multiple joints       Today is hospital day 6d. This morning patient was seen and examined at bedside, resting comfortably in bed.  David in place. patient reports pain at the level of the back.  No acute or major events overnight.    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 No reported tobacco, alcohol, or illicit/recreational drug use. ADL's limited by progressively worsening ability to ambulate. Ambulates w/ walker. (24 Jan 2024 15:07)      ALLERGIES:  No Known Allergies    MEDICATIONS:  STANDING MEDICATIONS  acetaminophen     Tablet .. 975 milliGRAM(s) Oral every 8 hours  calcium carbonate   1250 mG (OsCal) 1 Tablet(s) Oral daily  cholecalciferol 5000 Unit(s) Oral daily  dexAMETHasone  Injectable 2 milliGRAM(s) IV Push two times a day  dextrose 5% + lactated ringers. 1000 milliLiter(s) IV Continuous <Continuous>  fluticasone propionate 50 MICROgram(s)/spray Nasal Spray 1 Spray(s) Both Nostrils two times a day  gabapentin 300 milliGRAM(s) Oral three times a day  lactulose Syrup 20 Gram(s) Oral every 4 hours  lidocaine   4% Patch 1 Patch Transdermal every 24 hours  methocarbamol 750 milliGRAM(s) Oral every 8 hours  metoprolol tartrate 12.5 milliGRAM(s) Oral two times a day  multivitamin 1 Tablet(s) Oral daily  pantoprazole    Tablet 40 milliGRAM(s) Oral before breakfast  polyethylene glycol 3350 17 Gram(s) Oral two times a day  rOPINIRole 0.25 milliGRAM(s) Oral two times a day  senna 2 Tablet(s) Oral at bedtime  tamsulosin 0.8 milliGRAM(s) Oral at bedtime    PRN MEDICATIONS  morphine  - Injectable 2 milliGRAM(s) IV Push every 4 hours PRN  oxyCODONE    IR 15 milliGRAM(s) Oral every 6 hours PRN    VITALS:   T(F): 98  HR: 71  BP: 114/65  RR: 18  SpO2: 95%    PHYSICAL EXAM:  General: NAD, A/O x 3 (Belizean speaking)-daughter at bedside helped with the translation.   Lungs: diminished bilaterally, no crackles   Cardio: IRR, S1/S2, 2/6 systolic murmur   Abdomen: Soft, Nontender, Nondistended; Bowel sounds present  Extremities: No cyanosis, no edema  Motor function difficult to assess in the LEs given the back pain whenever she tries to move her LEs.    Normal sensation bilaterally in the LEs      LABS:                        13.7   11.02 )-----------( 161      ( 30 Jan 2024 07:36 )             39.5     01-30    132<L>  |  94<L>  |  25<H>  ----------------------------<  115<H>  4.6   |  22  |  1.1    Ca    9.3      30 Jan 2024 07:36  Phos  4.2     01-30  Mg     1.9     01-30    TPro  5.7<L>  /  Alb  3.9  /  TBili  0.6  /  DBili  x   /  AST  34  /  ALT  17  /  AlkPhos  71  01-30    PT/INR - ( 29 Jan 2024 21:01 )   PT: 11.00 sec;   INR: 0.97 ratio         PTT - ( 29 Jan 2024 21:01 )  PTT:30.1 sec  Urinalysis Basic - ( 30 Jan 2024 07:36 )    Color: x / Appearance: x / SG: x / pH: x  Gluc: 115 mg/dL / Ketone: x  / Bili: x / Urobili: x   Blood: x / Protein: x / Nitrite: x   Leuk Esterase: x / RBC: x / WBC x   Sq Epi: x / Non Sq Epi: x / Bacteria: x

## 2024-01-30 NOTE — CONSULT NOTE ADULT - ASSESSMENT
89 yo woman with no prior history of cancer presenting with back pain,  lower extremity weakness and worsening urinary retention since she was admitted to the hospital. MRI showed malignant compression deformity of L1 and mass effect on the conus. No acute intervention per neurosurgery. She is s/p biopsy of the L1 lesion and I confirmed w/ the pathologist Dr. Neves that there are malignant cells in the frozen path. Given s/s of cord compression we recommend urgent treatment. Her daughter is agreeable.    Plan  -She will be simulated today with treatment to start tomorrow. 5 fractions, to be completed on Tuesday (no treatment on weekends)  - please give her pain medication before she comes down for treatment  - cont decadron  - she'll need work up to identify a primary--CT C/A/P. Once final pathology is back please consult [facility]

## 2024-01-31 LAB
ALBUMIN SERPL ELPH-MCNC: 3.5 G/DL — SIGNIFICANT CHANGE UP (ref 3.5–5.2)
ALP SERPL-CCNC: 71 U/L — SIGNIFICANT CHANGE UP (ref 30–115)
ALT FLD-CCNC: 16 U/L — SIGNIFICANT CHANGE UP (ref 0–41)
ANION GAP SERPL CALC-SCNC: 11 MMOL/L — SIGNIFICANT CHANGE UP (ref 7–14)
APPEARANCE UR: ABNORMAL
AST SERPL-CCNC: 28 U/L — SIGNIFICANT CHANGE UP (ref 0–41)
BASOPHILS # BLD AUTO: 0 K/UL — SIGNIFICANT CHANGE UP (ref 0–0.2)
BASOPHILS NFR BLD AUTO: 0 % — SIGNIFICANT CHANGE UP (ref 0–1)
BILIRUB SERPL-MCNC: 0.4 MG/DL — SIGNIFICANT CHANGE UP (ref 0.2–1.2)
BILIRUB UR-MCNC: NEGATIVE — SIGNIFICANT CHANGE UP
BUN SERPL-MCNC: 24 MG/DL — HIGH (ref 10–20)
CALCIUM SERPL-MCNC: 9.3 MG/DL — SIGNIFICANT CHANGE UP (ref 8.4–10.5)
CHLORIDE SERPL-SCNC: 92 MMOL/L — LOW (ref 98–110)
CO2 SERPL-SCNC: 27 MMOL/L — SIGNIFICANT CHANGE UP (ref 17–32)
COLOR SPEC: YELLOW — SIGNIFICANT CHANGE UP
CREAT SERPL-MCNC: 1 MG/DL — SIGNIFICANT CHANGE UP (ref 0.7–1.5)
DIFF PNL FLD: ABNORMAL
EGFR: 54 ML/MIN/1.73M2 — LOW
EOSINOPHIL # BLD AUTO: 0.01 K/UL — SIGNIFICANT CHANGE UP (ref 0–0.7)
EOSINOPHIL NFR BLD AUTO: 0.1 % — SIGNIFICANT CHANGE UP (ref 0–8)
GLUCOSE SERPL-MCNC: 112 MG/DL — HIGH (ref 70–99)
GLUCOSE UR QL: NEGATIVE MG/DL — SIGNIFICANT CHANGE UP
HCT VFR BLD CALC: 37.6 % — SIGNIFICANT CHANGE UP (ref 37–47)
HGB BLD-MCNC: 12.8 G/DL — SIGNIFICANT CHANGE UP (ref 12–16)
IMM GRANULOCYTES NFR BLD AUTO: 0.7 % — HIGH (ref 0.1–0.3)
KETONES UR-MCNC: NEGATIVE MG/DL — SIGNIFICANT CHANGE UP
LEUKOCYTE ESTERASE UR-ACNC: ABNORMAL
LYMPHOCYTES # BLD AUTO: 0.93 K/UL — LOW (ref 1.2–3.4)
LYMPHOCYTES # BLD AUTO: 8.8 % — LOW (ref 20.5–51.1)
MAGNESIUM SERPL-MCNC: 2.1 MG/DL — SIGNIFICANT CHANGE UP (ref 1.8–2.4)
MCHC RBC-ENTMCNC: 32.8 PG — HIGH (ref 27–31)
MCHC RBC-ENTMCNC: 34 G/DL — SIGNIFICANT CHANGE UP (ref 32–37)
MCV RBC AUTO: 96.4 FL — SIGNIFICANT CHANGE UP (ref 81–99)
MONOCYTES # BLD AUTO: 0.59 K/UL — SIGNIFICANT CHANGE UP (ref 0.1–0.6)
MONOCYTES NFR BLD AUTO: 5.6 % — SIGNIFICANT CHANGE UP (ref 1.7–9.3)
NEUTROPHILS # BLD AUTO: 9.01 K/UL — HIGH (ref 1.4–6.5)
NEUTROPHILS NFR BLD AUTO: 84.8 % — HIGH (ref 42.2–75.2)
NITRITE UR-MCNC: POSITIVE
NRBC # BLD: 0 /100 WBCS — SIGNIFICANT CHANGE UP (ref 0–0)
PH UR: 5.5 — SIGNIFICANT CHANGE UP (ref 5–8)
PHOSPHATE SERPL-MCNC: 3.6 MG/DL — SIGNIFICANT CHANGE UP (ref 2.1–4.9)
PLATELET # BLD AUTO: 167 K/UL — SIGNIFICANT CHANGE UP (ref 130–400)
PMV BLD: 10.1 FL — SIGNIFICANT CHANGE UP (ref 7.4–10.4)
POTASSIUM SERPL-MCNC: 4.7 MMOL/L — SIGNIFICANT CHANGE UP (ref 3.5–5)
POTASSIUM SERPL-SCNC: 4.7 MMOL/L — SIGNIFICANT CHANGE UP (ref 3.5–5)
PROT SERPL-MCNC: 5.5 G/DL — LOW (ref 6–8)
PROT UR-MCNC: SIGNIFICANT CHANGE UP MG/DL
RBC # BLD: 3.9 M/UL — LOW (ref 4.2–5.4)
RBC # FLD: 13.6 % — SIGNIFICANT CHANGE UP (ref 11.5–14.5)
SODIUM SERPL-SCNC: 130 MMOL/L — LOW (ref 135–146)
SP GR SPEC: >1.03 — HIGH (ref 1–1.03)
UROBILINOGEN FLD QL: 0.2 MG/DL — SIGNIFICANT CHANGE UP (ref 0.2–1)
WBC # BLD: 10.61 K/UL — SIGNIFICANT CHANGE UP (ref 4.8–10.8)
WBC # FLD AUTO: 10.61 K/UL — SIGNIFICANT CHANGE UP (ref 4.8–10.8)

## 2024-01-31 PROCEDURE — 99232 SBSQ HOSP IP/OBS MODERATE 35: CPT

## 2024-01-31 PROCEDURE — 99222 1ST HOSP IP/OBS MODERATE 55: CPT

## 2024-01-31 PROCEDURE — 77333 RADIATION TREATMENT AID(S): CPT | Mod: 26

## 2024-01-31 PROCEDURE — 77307 TELETHX ISODOSE PLAN CPLX: CPT | Mod: 26

## 2024-01-31 PROCEDURE — 77280 THER RAD SIMULAJ FIELD SMPL: CPT | Mod: 26

## 2024-01-31 RX ORDER — APIXABAN 2.5 MG/1
5 TABLET, FILM COATED ORAL EVERY 12 HOURS
Refills: 0 | Status: DISCONTINUED | OUTPATIENT
Start: 2024-01-31 | End: 2024-02-10

## 2024-01-31 RX ADMIN — GABAPENTIN 300 MILLIGRAM(S): 400 CAPSULE ORAL at 13:14

## 2024-01-31 RX ADMIN — SODIUM CHLORIDE 50 MILLILITER(S): 9 INJECTION, SOLUTION INTRAVENOUS at 00:21

## 2024-01-31 RX ADMIN — ROPINIROLE 0.25 MILLIGRAM(S): 8 TABLET, FILM COATED, EXTENDED RELEASE ORAL at 05:45

## 2024-01-31 RX ADMIN — Medication 975 MILLIGRAM(S): at 13:14

## 2024-01-31 RX ADMIN — GABAPENTIN 300 MILLIGRAM(S): 400 CAPSULE ORAL at 05:43

## 2024-01-31 RX ADMIN — OXYCODONE HYDROCHLORIDE 15 MILLIGRAM(S): 5 TABLET ORAL at 05:48

## 2024-01-31 RX ADMIN — LACTULOSE 20 GRAM(S): 10 SOLUTION ORAL at 09:06

## 2024-01-31 RX ADMIN — MORPHINE SULFATE 2 MILLIGRAM(S): 50 CAPSULE, EXTENDED RELEASE ORAL at 16:06

## 2024-01-31 RX ADMIN — TAMSULOSIN HYDROCHLORIDE 0.8 MILLIGRAM(S): 0.4 CAPSULE ORAL at 21:29

## 2024-01-31 RX ADMIN — Medication 975 MILLIGRAM(S): at 06:15

## 2024-01-31 RX ADMIN — Medication 975 MILLIGRAM(S): at 21:28

## 2024-01-31 RX ADMIN — Medication 1 TABLET(S): at 11:45

## 2024-01-31 RX ADMIN — Medication 1 TABLET(S): at 11:53

## 2024-01-31 RX ADMIN — GABAPENTIN 300 MILLIGRAM(S): 400 CAPSULE ORAL at 21:27

## 2024-01-31 RX ADMIN — APIXABAN 5 MILLIGRAM(S): 2.5 TABLET, FILM COATED ORAL at 21:31

## 2024-01-31 RX ADMIN — Medication 5000 UNIT(S): at 11:53

## 2024-01-31 RX ADMIN — APIXABAN 2.5 MILLIGRAM(S): 2.5 TABLET, FILM COATED ORAL at 05:45

## 2024-01-31 RX ADMIN — METHOCARBAMOL 750 MILLIGRAM(S): 500 TABLET, FILM COATED ORAL at 21:27

## 2024-01-31 RX ADMIN — SODIUM CHLORIDE 50 MILLILITER(S): 9 INJECTION, SOLUTION INTRAVENOUS at 04:10

## 2024-01-31 RX ADMIN — Medication 12.5 MILLIGRAM(S): at 18:05

## 2024-01-31 RX ADMIN — METHOCARBAMOL 750 MILLIGRAM(S): 500 TABLET, FILM COATED ORAL at 05:45

## 2024-01-31 RX ADMIN — Medication 975 MILLIGRAM(S): at 14:00

## 2024-01-31 RX ADMIN — Medication 4 MILLIGRAM(S): at 09:06

## 2024-01-31 RX ADMIN — METHOCARBAMOL 750 MILLIGRAM(S): 500 TABLET, FILM COATED ORAL at 13:14

## 2024-01-31 RX ADMIN — POLYETHYLENE GLYCOL 3350 17 GRAM(S): 17 POWDER, FOR SOLUTION ORAL at 05:44

## 2024-01-31 RX ADMIN — Medication 4 MILLIGRAM(S): at 18:05

## 2024-01-31 RX ADMIN — Medication 975 MILLIGRAM(S): at 05:43

## 2024-01-31 RX ADMIN — Medication 12.5 MILLIGRAM(S): at 05:43

## 2024-01-31 RX ADMIN — ROPINIROLE 0.25 MILLIGRAM(S): 8 TABLET, FILM COATED, EXTENDED RELEASE ORAL at 18:05

## 2024-01-31 RX ADMIN — OXYCODONE HYDROCHLORIDE 15 MILLIGRAM(S): 5 TABLET ORAL at 06:18

## 2024-01-31 RX ADMIN — PANTOPRAZOLE SODIUM 40 MILLIGRAM(S): 20 TABLET, DELAYED RELEASE ORAL at 05:43

## 2024-01-31 RX ADMIN — LACTULOSE 20 GRAM(S): 10 SOLUTION ORAL at 05:42

## 2024-01-31 RX ADMIN — Medication 1 SPRAY(S): at 05:46

## 2024-01-31 RX ADMIN — CHLORHEXIDINE GLUCONATE 1 APPLICATION(S): 213 SOLUTION TOPICAL at 11:54

## 2024-01-31 NOTE — CONSULT NOTE ADULT - ATTENDING COMMENTS
Patient examined by myself , above noted , agree with recommendations . discussed with daughter at bedside.   Admitted with cauda equina syndrome , bowel and bladder symptoms , low back pain with L5 radiculopathy  awaiting biopsy result , to start on palliative radiation .

## 2024-01-31 NOTE — PROGRESS NOTE ADULT - ATTENDING COMMENTS
89F w/ h/o chronic afib (on Eliquis), GERD, and chronic low back pain presenting s/p fall. Admitted to medicine for back pain 2/2 Lumbar compression fracture    #Lumbar Compression Fracture  #Pathologic Fracture   - CT lumbar spine:   Age-indeterminate severe compression deformity of L1 with mixed   lytic/sclerotic changes extending to the posterior elements, likely   pathologic in nature. Soft tissue component associated with the lytic   changes in the posterior inferior endplate of L1 encroaches the spinal   space resulting in L1-2 severe spinal stenosis and severe bilateral   foraminal stenosis.  - MRI L Spine - Malignant compression deformity of L1 with significant bowing of the   posterior cortex contributing to mass effect upon the conus  Plan:   - neuroSx consult-no surgical intervention, abd binder, TLSO brace on discharge, advised decadron   - IR biopsy done 1/30- official path report pending, but initial report with epithelial carcinoma   - Radiation Oncology consulted, and plan for 5 sessions of RT,  to be completed next tuesday   - 1/30 increased decadron to 4mg q12hrs IV   - Heme Onc consult appreciated   - Pain Management consult appreciated   - Palliative Consult pending     #CHF, diastolic - acute   Reports LE swelling over past 1-2 weeks.   Recently started on furosemide 20 qd for swelling.   No reported CP or dyspnea at rest or exertion (but functional status limited by lower back pain).   - TTE (May 2013) demonstrated LVEF 55-65% w/ normal global LVSF and no significant valvulopathy.  - TTE 1/28- EF 60-65% mod pulm HTN   - elevated BNP  - Tele monitored for 24 hrs with no dysrhythmia   - received 1x dose of Furosemide and 1x dose of Bumex but currently off diuretics   - monitor volume status closely, and resume diuretics if needed     #Chronic Atrial Fibrillation  CHADS-VASC 3+ (Age x2, Female). Only on AC. Previously on Lopressor, but only takes intermittently as HR currently rate-controlled off meds.  - resume eliquis     #GERD  - c/w pantoprazole 40mg PO QD (therapeutic interchange for omeprazole)    #CKD 3b   per PCP baseline is 1.2, currently 1.0    #Urinary retention-likely neurogenic   -hanson placed 1/29  -flomax increased to 0.8mg   - can attempt TOV when RT is completed and patient is able to ambulate     #Constipation  -senna, miralax, lactulose ordered       PENDING:  RT for 5 sessions to be completed next tuesday. Heme Onc following, Palliative consult pending   Family: daughter updated 1/31 at bedside      Total time spent to complete patient's bedside assessment, review medical chart, discuss medical plan of care with covering medical team was more than 35 minutes  with >50% of time spent face to face with patient, discussion with patient/family and/or coordination of care. My note supersedes them medical resident note in case of discrepancy.      Azra Oneil, DO

## 2024-01-31 NOTE — PROGRESS NOTE ADULT - ASSESSMENT
89F w/ h/o chronic afib (on Eliquis), GERD, and chronic low back pain presenting s/p fall. Admitted to medicine for back pain 2/2 Lumbar compression fracture    #Lumbar Compression Fracture  Initially presenting s/p fall Known to have progressively worsening chronic low back pain over several years. Previously managed via gabapentin 200 tid. Previously trialed Mobic and diclofenac w/ minimal relief. Recently started on Percocet 5-325 q12h w/ mild relief.  - CT lumbar spine: pathologic severe compression fracture of L1  - MRI - L1 bowing of the posterior cortex contributing to mass effect on the conus   - neuroSx consult-abd binder, TLSO brace on discharge, recommend 2mg decadron IV q12hr, IR biopsy  - patient never had a colonoscopy or pap smear. Last mammo > 5 years ago.   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  - F up official pathology  - rad onc recs appreciated -> simulation session performed on 1/30 -> will receive 5 sessions of RT; to be completed on Tuesday (no treatment on weekends)  - CT C/A/P to identify a primary  - CT chest: Cardiomegaly. CHF. Right-sided pleural effusion. Nonspecific right upper lobe pleural-based opacity.  - f up CT abdomen- pelvis for result.     - medon cs  - eliquis resumed on 1/30    #Urinary retention  - Hanson placed on 1/29 (straight cath was done 3 times prior to placing the hanson and patient was retaining between 600 and 700 cc each time)  - on flomax -> will double the dose starting 1/30 (BP in the 140s-150s)  - dexa dose increased on 1/30.   - F up UA   - RT sessions    #CHF, new onset  Reports LE swelling over past 1-2 weeks.   Recently started on furosemide 20 qd for swelling.   No reported CP or dyspnea at rest or exertion (but functional status limited by lower back pain).   - No known prior h/o CHF.  (bibasilar crackles; b/l LE pitting edema)    - s/p furosemide 40mg IV once   - TTE (May 2013) demonstrated LVEF 55-65% w/ normal global LVSF and no significant valvulopathy.  -  TTE 1/29: 60 to 65% // Mildly enlarged left atrium // Mildly enlarged right atrium // Mild-to-moderate AR // Mild MR // Mild-moderate TR // moderate pulmonary hypertension.  - elevated BNP 5K (on presentation)  - strict I's & O's; 1.5L fluid restrict  - monitor lytes and replete accordingly (K>4; Mg>2)  - s/p bumex 2g IV 1x dose (1/25) -> now no edema.   - troponin-negative   - Tele monitored for 24 hrs with no dysrhythmia     #Chronic Atrial Fibrillation  CHADS-VASC 3+ (Age x2, Female). Only on AC. Previously on Lopressor, but only takes intermittently as HR currently rate-controlled off meds.  - apixaban 2.5mg PO BID to be resumed after the biopsy (consider dose increase if Cr persistently <1.5)  - patient was previously on metoprolol 25 mg BID -> but stopped taking it months ago given that her HR was in the 60s   - on 1/29 we started metoprolol tartrate 12.5 mg BID given the HR>100  - 1/30: HR better controlled in the 70s-80s. BP stable    #GERD  - c/w pantoprazole 40mg PO QD (therapeutic interchange for omeprazole)    #CKD 3b  - back to baseline     #Upper resp drainage, cough -> resolved  -flonase ordered  -rvp 1/29: negative    #Constipation  -senna, miralax, lactulose   - fecal loading on CT -> enemas -> monitor    DVT PPX: apixaban 2.5mg PO BID   GI PPX: pantoprazole 40mg PO QD  DIET: DASH/TLC + 1.5L fluid restrict  ACTIVITY: IAT  CODE STATUS: Full Code  DISPOSITION: active

## 2024-01-31 NOTE — CHART NOTE - NSCHARTNOTEFT_GEN_A_CORE
Palliative Care chart note    Palliative care consult received electronically for pain and GOC. Chart reviewed. Patient was off the floor for radiation at time of attempted visit.    Will plan to see patient for full consult tomorrow, 2/1.    Please call [phone] PRN for any questions, needs, or concerns prior to that time.

## 2024-01-31 NOTE — CONSULT NOTE ADULT - SUBJECTIVE AND OBJECTIVE BOX
SUNNY COLBERT 89y Female  MRN#: [id number]     Hospital Day: 7d    Pt is currently admitted with the primary diagnosis of pathologic vertebral fracture    H&P    89F w/ h/o chronic afib (on Eliquis), GERD, and chronic low back pain presenting s/p fall. Patient known to have chronic low back pain that has progressively worsened over the years. Pain acutely worsened two weeks ago and patient subsequently fell due to inability to support own weight. Evaluated by PCP after initial fall due to worsening pain. Prescribed Percocet 5-325 bid w/ mild relief. However, pt once again fell earlier today after pt unable to support her own weight while trying to ambulate. Now unable to ambulate or bear weight, even w/ assistance. No reported HT, LOC, lightheadedness, confusion, loss of continence, or focal weakness a/w either fall. No reported fevers, chills, CP, palpitations, SOB, abdominal pain, n/v/d, or dysuria (although daughter did note malodorous urine recently).    Of note, pt also reports new onset LE swelling over past 1-2 weeks. Recently started on furosemide 20 qd by PCP for swelling. No reported CP or dyspnea at rest or exertion (but functional status limited by lower back pain). No known prior h/o CHF.     In ED, pt HD stable on vitals. Labs demonstrated K 5.6 (hemolyzed). XR Bilateral Hips, Pelvis, and L-Spine overall unremarkable (wet read). S/P morphine 4mg IV and methocarbamol 1000mg PO. Admitted to medicine for inability to ambulate s/p fall.                                            ----------------------------------------------------------  OBJECTIVE  PAST MEDICAL & SURGICAL HISTORY  Chronic atrial fibrillation    GERD (gastroesophageal reflux disease)    Chronic lower back pain    No significant past surgical history                                              -----------------------------------------------------------  MEDICATIONS:  STANDING MEDICATIONS  acetaminophen     Tablet .. 975 milliGRAM(s) Oral every 8 hours  apixaban 2.5 milliGRAM(s) Oral two times a day  calcium carbonate   1250 mG (OsCal) 1 Tablet(s) Oral daily  chlorhexidine 2% Cloths 1 Application(s) Topical daily  cholecalciferol 5000 Unit(s) Oral daily  dexAMETHasone  Injectable 4 milliGRAM(s) IV Push <User Schedule>  fluticasone propionate 50 MICROgram(s)/spray Nasal Spray 1 Spray(s) Both Nostrils two times a day  gabapentin 300 milliGRAM(s) Oral three times a day  lactated ringers. 1000 milliLiter(s) IV Continuous <Continuous>  lactulose Syrup 20 Gram(s) Oral every 4 hours  lidocaine   4% Patch 1 Patch Transdermal every 24 hours  methocarbamol 750 milliGRAM(s) Oral every 8 hours  metoprolol tartrate 12.5 milliGRAM(s) Oral two times a day  multivitamin 1 Tablet(s) Oral daily  pantoprazole    Tablet 40 milliGRAM(s) Oral before breakfast  polyethylene glycol 3350 17 Gram(s) Oral two times a day  rOPINIRole 0.25 milliGRAM(s) Oral two times a day  senna 2 Tablet(s) Oral at bedtime  tamsulosin 0.8 milliGRAM(s) Oral at bedtime    PRN MEDICATIONS  morphine  - Injectable 2 milliGRAM(s) IV Push every 4 hours PRN  oxyCODONE    IR 15 milliGRAM(s) Oral every 6 hours PRN                                            ------------------------------------------------------------  VITAL SIGNS: Last 24 Hours  T(C): 36.3 (31 Jan 2024 07:35), Max: 36.3 (31 Jan 2024 00:52)  T(F): 97.3 (31 Jan 2024 07:35), Max: 97.4 (31 Jan 2024 00:52)  HR: 97 (31 Jan 2024 07:35) (75 - 104)  BP: 119/77 (31 Jan 2024 07:35) (119/77 - 131/65)  BP(mean): --  RR: 18 (31 Jan 2024 07:35) (18 - 18)  SpO2: 96% (31 Jan 2024 00:52) (95% - 96%)      01-30-24 @ 07:01  -  01-31-24 @ 07:00  --------------------------------------------------------  IN: 300 mL / OUT: 1400 mL / NET: -1100 mL    01-31-24 @ 07:01  -  01-31-24 @ 13:48  --------------------------------------------------------  IN: 200 mL / OUT: 0 mL / NET: 200 mL                                             --------------------------------------------------------------  LABS:                        12.8   10.61 )-----------( 167      ( 31 Jan 2024 08:18 )             37.6     01-31    130<L>  |  92<L>  |  24<H>  ----------------------------<  112<H>  4.7   |  27  |  1.0    Ca    9.3      31 Jan 2024 08:18  Phos  3.6     01-31  Mg     2.1     01-31    TPro  5.5<L>  /  Alb  3.5  /  TBili  0.4  /  DBili  x   /  AST  28  /  ALT  16  /  AlkPhos  71  01-31    PT/INR - ( 29 Jan 2024 21:01 )   PT: 11.00 sec;   INR: 0.97 ratio         PTT - ( 29 Jan 2024 21:01 )  PTT:30.1 sec  Urinalysis Basic - ( 31 Jan 2024 12:05 )    Color: Yellow / Appearance: Cloudy / SG: >1.030 / pH: x  Gluc: x / Ketone: Negative mg/dL  / Bili: Negative / Urobili: 0.2 mg/dL   Blood: x / Protein: Trace mg/dL / Nitrite: Positive   Leuk Esterase: Moderate / RBC: 3 /HPF / WBC 63 /HPF   Sq Epi: x / Non Sq Epi: 4 /HPF / Bacteria: Many /HPF                                                            --------------------------------------------------------------  PHYSICAL EXAM:  GENERAL: Awake, alert. Well-nourished, laying in bed appearing in no acute distress  HEENT: No FNDs, atraumatic, normocephalic  LUNGS: Clear to auscultation bilaterally  HEART: S1/S2. No heaves or thrills  ABD: Soft, non-tender, non-distended.  EXT/NEURO: Sensation/ROM grossly intact. Exam limited due to pt refusal due to lower back pain  SKIN: No edema

## 2024-01-31 NOTE — PROGRESS NOTE ADULT - SUBJECTIVE AND OBJECTIVE BOX
24H events:    Patient is a 89y old Female who presents with a chief complaint of Back Pain (30 Jan 2024 17:30)    Primary diagnosis of Inability to ambulate due to multiple joints       Today is hospital day 7d. This morning patient was seen and examined at bedside, resting comfortably in bed.    Pain is stable  patient had large bowel movement today  No acute or major events overnight.    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 No reported tobacco, alcohol, or illicit/recreational drug use. ADL's limited by progressively worsening ability to ambulate. Ambulates w/ walker. (24 Jan 2024 15:07)      ALLERGIES:  No Known Allergies    MEDICATIONS:  STANDING MEDICATIONS  acetaminophen     Tablet .. 975 milliGRAM(s) Oral every 8 hours  apixaban 2.5 milliGRAM(s) Oral two times a day  calcium carbonate   1250 mG (OsCal) 1 Tablet(s) Oral daily  chlorhexidine 2% Cloths 1 Application(s) Topical daily  cholecalciferol 5000 Unit(s) Oral daily  dexAMETHasone  Injectable 4 milliGRAM(s) IV Push <User Schedule>  fluticasone propionate 50 MICROgram(s)/spray Nasal Spray 1 Spray(s) Both Nostrils two times a day  gabapentin 300 milliGRAM(s) Oral three times a day  lactated ringers. 1000 milliLiter(s) IV Continuous <Continuous>  lactulose Syrup 20 Gram(s) Oral every 4 hours  lidocaine   4% Patch 1 Patch Transdermal every 24 hours  methocarbamol 750 milliGRAM(s) Oral every 8 hours  metoprolol tartrate 12.5 milliGRAM(s) Oral two times a day  multivitamin 1 Tablet(s) Oral daily  pantoprazole    Tablet 40 milliGRAM(s) Oral before breakfast  polyethylene glycol 3350 17 Gram(s) Oral two times a day  rOPINIRole 0.25 milliGRAM(s) Oral two times a day  senna 2 Tablet(s) Oral at bedtime  tamsulosin 0.8 milliGRAM(s) Oral at bedtime    PRN MEDICATIONS  morphine  - Injectable 2 milliGRAM(s) IV Push every 4 hours PRN  oxyCODONE    IR 15 milliGRAM(s) Oral every 6 hours PRN    VITALS:   T(F): 97.4  HR: 97  BP: 119/77  RR: 18  SpO2: 96%    PHYSICAL EXAM:  General: NAD, A/O x 3 (Salvadorean speaking)-daughter at bedside helped with the translation.   Lungs: diminished bilaterally, no crackles   Cardio: IRR, S1/S2, 2/6 systolic murmur   Abdomen: Soft, Nontender, slightly distended; Bowel sounds present  Extremities: No cyanosis, no edema  Motor function difficult to assess in the LEs given the back pain whenever she tries to move her LEs.   Normal sensation bilaterally in the LEs    David in place draining clear urine    LABS:                        12.8   10.61 )-----------( 167      ( 31 Jan 2024 08:18 )             37.6     01-31    130<L>  |  92<L>  |  24<H>  ----------------------------<  112<H>  4.7   |  27  |  1.0    Ca    9.3      31 Jan 2024 08:18  Phos  3.6     01-31  Mg     2.1     01-31    TPro  5.5<L>  /  Alb  3.5  /  TBili  0.4  /  DBili  x   /  AST  28  /  ALT  16  /  AlkPhos  71  01-31    PT/INR - ( 29 Jan 2024 21:01 )   PT: 11.00 sec;   INR: 0.97 ratio         PTT - ( 29 Jan 2024 21:01 )  PTT:30.1 sec  Urinalysis Basic - ( 31 Jan 2024 08:18 )    Color: x / Appearance: x / SG: x / pH: x  Gluc: 112 mg/dL / Ketone: x  / Bili: x / Urobili: x   Blood: x / Protein: x / Nitrite: x   Leuk Esterase: x / RBC: x / WBC x   Sq Epi: x / Non Sq Epi: x / Bacteria: x

## 2024-02-01 DIAGNOSIS — Z51.5 ENCOUNTER FOR PALLIATIVE CARE: ICD-10-CM

## 2024-02-01 LAB
ALBUMIN SERPL ELPH-MCNC: 3.7 G/DL — SIGNIFICANT CHANGE UP (ref 3.5–5.2)
ALP SERPL-CCNC: 75 U/L — SIGNIFICANT CHANGE UP (ref 30–115)
ALT FLD-CCNC: 15 U/L — SIGNIFICANT CHANGE UP (ref 0–41)
ANION GAP SERPL CALC-SCNC: 11 MMOL/L — SIGNIFICANT CHANGE UP (ref 7–14)
AST SERPL-CCNC: 24 U/L — SIGNIFICANT CHANGE UP (ref 0–41)
BASOPHILS # BLD AUTO: 0 K/UL — SIGNIFICANT CHANGE UP (ref 0–0.2)
BASOPHILS NFR BLD AUTO: 0 % — SIGNIFICANT CHANGE UP (ref 0–1)
BCA 255 TISS QL IMSTN: 31.1 U/ML — HIGH
BILIRUB SERPL-MCNC: 0.4 MG/DL — SIGNIFICANT CHANGE UP (ref 0.2–1.2)
BUN SERPL-MCNC: 28 MG/DL — HIGH (ref 10–20)
CALCIUM SERPL-MCNC: 9.3 MG/DL — SIGNIFICANT CHANGE UP (ref 8.4–10.5)
CANCER AG19-9 SERPL-ACNC: 7 U/ML — SIGNIFICANT CHANGE UP
CEA SERPL-MCNC: 5.2 NG/ML — HIGH (ref 0–3.8)
CHLORIDE SERPL-SCNC: 94 MMOL/L — LOW (ref 98–110)
CO2 SERPL-SCNC: 26 MMOL/L — SIGNIFICANT CHANGE UP (ref 17–32)
CREAT SERPL-MCNC: 1.2 MG/DL — SIGNIFICANT CHANGE UP (ref 0.7–1.5)
EGFR: 43 ML/MIN/1.73M2 — LOW
EOSINOPHIL # BLD AUTO: 0.01 K/UL — SIGNIFICANT CHANGE UP (ref 0–0.7)
EOSINOPHIL NFR BLD AUTO: 0.1 % — SIGNIFICANT CHANGE UP (ref 0–8)
GLUCOSE SERPL-MCNC: 113 MG/DL — HIGH (ref 70–99)
HCT VFR BLD CALC: 37.8 % — SIGNIFICANT CHANGE UP (ref 37–47)
HGB BLD-MCNC: 12.7 G/DL — SIGNIFICANT CHANGE UP (ref 12–16)
IMM GRANULOCYTES NFR BLD AUTO: 0.8 % — HIGH (ref 0.1–0.3)
LYMPHOCYTES # BLD AUTO: 0.79 K/UL — LOW (ref 1.2–3.4)
LYMPHOCYTES # BLD AUTO: 9.9 % — LOW (ref 20.5–51.1)
MAGNESIUM SERPL-MCNC: 2.2 MG/DL — SIGNIFICANT CHANGE UP (ref 1.8–2.4)
MCHC RBC-ENTMCNC: 32.5 PG — HIGH (ref 27–31)
MCHC RBC-ENTMCNC: 33.6 G/DL — SIGNIFICANT CHANGE UP (ref 32–37)
MCV RBC AUTO: 96.7 FL — SIGNIFICANT CHANGE UP (ref 81–99)
MONOCYTES # BLD AUTO: 0.38 K/UL — SIGNIFICANT CHANGE UP (ref 0.1–0.6)
MONOCYTES NFR BLD AUTO: 4.8 % — SIGNIFICANT CHANGE UP (ref 1.7–9.3)
NEUTROPHILS # BLD AUTO: 6.74 K/UL — HIGH (ref 1.4–6.5)
NEUTROPHILS NFR BLD AUTO: 84.4 % — HIGH (ref 42.2–75.2)
NRBC # BLD: 0 /100 WBCS — SIGNIFICANT CHANGE UP (ref 0–0)
PHOSPHATE SERPL-MCNC: 3.7 MG/DL — SIGNIFICANT CHANGE UP (ref 2.1–4.9)
PLATELET # BLD AUTO: 173 K/UL — SIGNIFICANT CHANGE UP (ref 130–400)
PMV BLD: 10.1 FL — SIGNIFICANT CHANGE UP (ref 7.4–10.4)
POTASSIUM SERPL-MCNC: 4.5 MMOL/L — SIGNIFICANT CHANGE UP (ref 3.5–5)
POTASSIUM SERPL-SCNC: 4.5 MMOL/L — SIGNIFICANT CHANGE UP (ref 3.5–5)
PROT SERPL-MCNC: 5.7 G/DL — LOW (ref 6–8)
RBC # BLD: 3.91 M/UL — LOW (ref 4.2–5.4)
RBC # FLD: 13.3 % — SIGNIFICANT CHANGE UP (ref 11.5–14.5)
SODIUM SERPL-SCNC: 131 MMOL/L — LOW (ref 135–146)
WBC # BLD: 7.98 K/UL — SIGNIFICANT CHANGE UP (ref 4.8–10.8)
WBC # FLD AUTO: 7.98 K/UL — SIGNIFICANT CHANGE UP (ref 4.8–10.8)

## 2024-02-01 PROCEDURE — 73552 X-RAY EXAM OF FEMUR 2/>: CPT | Mod: 26,LT

## 2024-02-01 PROCEDURE — 99232 SBSQ HOSP IP/OBS MODERATE 35: CPT

## 2024-02-01 PROCEDURE — 78803 RP LOCLZJ TUM SPECT 1 AREA: CPT | Mod: 26

## 2024-02-01 PROCEDURE — 99222 1ST HOSP IP/OBS MODERATE 55: CPT

## 2024-02-01 PROCEDURE — 78306 BONE IMAGING WHOLE BODY: CPT | Mod: 26

## 2024-02-01 RX ORDER — SODIUM CHLORIDE 9 MG/ML
1000 INJECTION INTRAMUSCULAR; INTRAVENOUS; SUBCUTANEOUS
Refills: 0 | Status: DISCONTINUED | OUTPATIENT
Start: 2024-02-01 | End: 2024-02-02

## 2024-02-01 RX ADMIN — Medication 1 SPRAY(S): at 17:45

## 2024-02-01 RX ADMIN — APIXABAN 5 MILLIGRAM(S): 2.5 TABLET, FILM COATED ORAL at 08:39

## 2024-02-01 RX ADMIN — PANTOPRAZOLE SODIUM 40 MILLIGRAM(S): 20 TABLET, DELAYED RELEASE ORAL at 06:00

## 2024-02-01 RX ADMIN — METHOCARBAMOL 750 MILLIGRAM(S): 500 TABLET, FILM COATED ORAL at 21:05

## 2024-02-01 RX ADMIN — METHOCARBAMOL 750 MILLIGRAM(S): 500 TABLET, FILM COATED ORAL at 13:20

## 2024-02-01 RX ADMIN — GABAPENTIN 300 MILLIGRAM(S): 400 CAPSULE ORAL at 13:20

## 2024-02-01 RX ADMIN — Medication 1 TABLET(S): at 12:18

## 2024-02-01 RX ADMIN — Medication 5000 UNIT(S): at 12:17

## 2024-02-01 RX ADMIN — GABAPENTIN 300 MILLIGRAM(S): 400 CAPSULE ORAL at 21:06

## 2024-02-01 RX ADMIN — ROPINIROLE 0.25 MILLIGRAM(S): 8 TABLET, FILM COATED, EXTENDED RELEASE ORAL at 05:59

## 2024-02-01 RX ADMIN — ROPINIROLE 0.25 MILLIGRAM(S): 8 TABLET, FILM COATED, EXTENDED RELEASE ORAL at 17:42

## 2024-02-01 RX ADMIN — Medication 4 MILLIGRAM(S): at 17:43

## 2024-02-01 RX ADMIN — Medication 12.5 MILLIGRAM(S): at 17:42

## 2024-02-01 RX ADMIN — Medication 12.5 MILLIGRAM(S): at 05:59

## 2024-02-01 RX ADMIN — OXYCODONE HYDROCHLORIDE 15 MILLIGRAM(S): 5 TABLET ORAL at 12:20

## 2024-02-01 RX ADMIN — LIDOCAINE 1 PATCH: 4 CREAM TOPICAL at 12:18

## 2024-02-01 RX ADMIN — APIXABAN 5 MILLIGRAM(S): 2.5 TABLET, FILM COATED ORAL at 21:06

## 2024-02-01 RX ADMIN — CHLORHEXIDINE GLUCONATE 1 APPLICATION(S): 213 SOLUTION TOPICAL at 12:17

## 2024-02-01 RX ADMIN — Medication 975 MILLIGRAM(S): at 05:59

## 2024-02-01 RX ADMIN — Medication 1 SPRAY(S): at 06:00

## 2024-02-01 RX ADMIN — OXYCODONE HYDROCHLORIDE 15 MILLIGRAM(S): 5 TABLET ORAL at 12:53

## 2024-02-01 RX ADMIN — Medication 4 MILLIGRAM(S): at 08:39

## 2024-02-01 RX ADMIN — METHOCARBAMOL 750 MILLIGRAM(S): 500 TABLET, FILM COATED ORAL at 06:00

## 2024-02-01 RX ADMIN — TAMSULOSIN HYDROCHLORIDE 0.8 MILLIGRAM(S): 0.4 CAPSULE ORAL at 21:06

## 2024-02-01 RX ADMIN — SODIUM CHLORIDE 75 MILLILITER(S): 9 INJECTION INTRAMUSCULAR; INTRAVENOUS; SUBCUTANEOUS at 12:19

## 2024-02-01 RX ADMIN — Medication 975 MILLIGRAM(S): at 21:06

## 2024-02-01 RX ADMIN — Medication 1 TABLET(S): at 12:17

## 2024-02-01 RX ADMIN — GABAPENTIN 300 MILLIGRAM(S): 400 CAPSULE ORAL at 06:00

## 2024-02-01 RX ADMIN — Medication 975 MILLIGRAM(S): at 13:54

## 2024-02-01 RX ADMIN — Medication 975 MILLIGRAM(S): at 13:20

## 2024-02-01 NOTE — CONSULT NOTE ADULT - SUBJECTIVE AND OBJECTIVE BOX
CC:  pain    HPI:  89F w/ h/o chronic afib (on Eliquis), GERD, and chronic low back pain presenting s/p fall. Patient known to have chronic low back pain that has progressively worsened over the years. Pain acutely worsened two weeks ago and patient subsequently fell due to inability to support own weight. Evaluated by PCP after initial fall due to worsening pain. Prescribed Percocet 5-325 bid w/ mild relief. However, pt once again fell earlier today after pt unable to support her own weight while trying to ambulate. Now unable to ambulate or bear weight, even w/ assistance. No reported HT, LOC, lightheadedness, confusion, loss of continence, or focal weakness a/w either fall. No reported fevers, chills, CP, palpitations, SOB, abdominal pain, n/v/d, or dysuria (although daughter did note malodorous urine recently).    Of note, pt also reports new onset LE swelling over past 1-2 weeks. Recently started on furosemide 20 qd by PCP for swelling. No reported CP or dyspnea at rest or exertion (but functional status limited by lower back pain). No known prior h/o CHF.     In ED, pt HD stable on vitals. Labs demonstrated K 5.6 (hemolyzed). XR Bilateral Hips, Pelvis, and L-Spine overall unremarkable (wet read). S/P morphine 4mg IV and methocarbamol 1000mg PO. Admitted to medicine for inability to ambulate s/p fall. (24 Jan 2024 15:07)    PERTINENT PM/SXH:   Atrial fibrillation, chronic    Chronic atrial fibrillation    GERD (gastroesophageal reflux disease)    Chronic lower back pain      No significant past surgical history      FAMILY HISTORY:  Family history of stroke (Father)      ITEMS NOT CHECKED ARE NOT PRESENT    SOCIAL HISTORY:   Significant other/partner[ ]  Children[ ]  Samaritan/Spirituality:  Substance hx:  [ ]   Tobacco hx:  [ ]   Alcohol hx: [ ]   Living Situation: [ x]Home  [ ]Long term care  [ ]Rehab [ ]Other  Home Services: [ ] [facility] [ ] Marcio RN [ ] Hospice  Occupation:  Home Opioid hx:  [ ] Y [ ] N [x ] I-Stop Reference No:    Reference #: [id number]    You have not added a SAYRA number. Keeping your SAYRA number(s) up to date on the My SAYRA # tab will enable the separation of your prescriptions from others in the search results.    Practitioner Count: 1  Pharmacy Count: 1  Current Opioid Prescriptions: 0  Current Benzodiazepine Prescriptions: 0  Current Stimulant Prescriptions: 0      Patient Demographic Information (PDI)       PDI	First Name	Last Name	Birth Date	Gender	Street Address	[facility]	Zip Code  RIMA Mortensen	03/17/1933	Female	[address] [facility] [place]	[address]    Prescription Information      PDI Filter:    PDI	Current Rx	Drug Type	Rx Written	Rx Dispensed	Drug	Quantity	Days Supply	Prescriber Name	Prescriber SAYRA #	Payment Method	Dispenser  A	N	O	01/05/2024	01/08/2024	oxycodone-acetaminophen 5-325 mg tablet	30	10	Yanira Garcia DO	[id number]	Insurance	[facility]  A	N	O	12/29/2023	12/30/2023	oxycodone-acetaminophen 5-325 mg tablet	5	5	Yanira Garcia DO	[id number]	Insurance	[facility]  A	N	O	12/12/2023	12/14/2023	tramadol hcl 50 mg tablet	10	10	Yanira Garcia DO	[id number]	Insurance	[facility]       ADVANCE DIRECTIVES:     [x ] Full Code [ ] DNR  MOLST  [ ]  Living Will  [ ]   DECISION MAKER(s):  [ ] Health Care Proxy(s)  [x ] Surrogate(s)  [ ] Guardian           Name(s): Phone Number(s):  Daughter      BASELINE (I)ADL(s) (prior to admission):    Sunflower: [ ]Total  [ ] Moderate [ ]Dependent  Palliative Performance Status Version 2:         %    [email or web address]    Allergies    No Known Allergies    Intolerances    MEDICATIONS  (STANDING):  acetaminophen     Tablet .. 975 milliGRAM(s) Oral every 8 hours  apixaban 5 milliGRAM(s) Oral every 12 hours  calcium carbonate   1250 mG (OsCal) 1 Tablet(s) Oral daily  chlorhexidine 2% Cloths 1 Application(s) Topical daily  cholecalciferol 5000 Unit(s) Oral daily  dexAMETHasone  Injectable 4 milliGRAM(s) IV Push <User Schedule>  fluticasone propionate 50 MICROgram(s)/spray Nasal Spray 1 Spray(s) Both Nostrils two times a day  gabapentin 300 milliGRAM(s) Oral three times a day  lactulose Syrup 20 Gram(s) Oral every 4 hours  lidocaine   4% Patch 1 Patch Transdermal every 24 hours  methocarbamol 750 milliGRAM(s) Oral every 8 hours  metoprolol tartrate 12.5 milliGRAM(s) Oral two times a day  multivitamin 1 Tablet(s) Oral daily  pantoprazole    Tablet 40 milliGRAM(s) Oral before breakfast  polyethylene glycol 3350 17 Gram(s) Oral two times a day  rOPINIRole 0.25 milliGRAM(s) Oral two times a day  senna 2 Tablet(s) Oral at bedtime  sodium chloride 0.9%. 1000 milliLiter(s) (75 mL/Hr) IV Continuous <Continuous>  tamsulosin 0.8 milliGRAM(s) Oral at bedtime    MEDICATIONS  (PRN):  morphine  - Injectable 2 milliGRAM(s) IV Push every 4 hours PRN Severe Pain (7 - 10)  oxyCODONE    IR 15 milliGRAM(s) Oral every 6 hours PRN Moderate Pain (4 - 6)    PRESENT SYMPTOMS: [ ]Unable to obtain due to poor mentation   Source if other than patient:  [ ]Family   [ ]Team     Pain: [ x]yes [ ]no  QOL impact -  significant  Location -          lower extremities           Aggravating factors - none given  Quality -  none given  Radiation - none  Timing-  constant  Severity (0-10 scale): no number given  Minimal acceptable level (0-10 scale):     CPOT:    https://www.[facility].org/getattachment/[email or web address]-1b5c-4b5o-0367p2889r4f/Critical-Care-Pain-Observation-Tool-(CPOT)    PAIN AD Score:   [email or web address] (press ctrl +  left click to view)    Dyspnea:                           [ ]None[ x]Mild [ ]Moderate [ ]Severe     Respiratory Distress Observation Scale (RDOS):   A score of 0 to 2 signifies little or no respiratory distress, 3 signifies mild distress, scores 4 to 6 indicate moderate distress, and scores greater than 7 signify severe distress  [email or web address]    Anxiety:                             [ x]None[ ]Mild [ ]Moderate [ ]Severe   Fatigue:                             [x ]None[ ]Mild [ ]Moderate [ ]Severe   Nausea:                             [ x]None[ ]Mild [ ]Moderate [ ]Severe   Loss of appetite:              [x ]None[ ]Mild [ ]Moderate [ ]Severe   Constipation:                    [x ]None[ ]Mild [ ]Moderate [ ]Severe    Other Symptoms:  [ x]All other review of systems negative     Palliative Performance Status Version 2:         30%    [email or web address]    PHYSICAL EXAM:  Vital Signs Last 24 Hrs  T(C): 35.9 (01 Feb 2024 07:45), Max: 36 (01 Feb 2024 00:04)  T(F): 96.6 (01 Feb 2024 07:45), Max: 96.8 (01 Feb 2024 00:04)  HR: 80 (01 Feb 2024 07:45) (80 - 106)  BP: 131/70 (01 Feb 2024 07:45) (111/62 - 146/85)  BP(mean): --  RR: 18 (01 Feb 2024 07:45) (18 - 18)  SpO2: --     I&O's Summary    31 Jan 2024 07:01  -  01 Feb 2024 07:00  --------------------------------------------------------  IN: 500 mL / OUT: 750 mL / NET: -250 mL        GENERAL:  [ ] No acute distress [ ]Lethargic  [ ]Unarousable  [ x]Verbal  [ ]Non-Verbal [ ]Cachexia    BEHAVIORAL/PSYCH:  [x ]Alert and Oriented x2  [ ] Anxiety [ ] Delirium [ ] Agitation [ ] Calm   EYES: [ x] No scleral icterus [ ] Scleral icterus [ ] Closed  ENMT:  [ ]Dry mouth  [x ]No external oral lesions [ ] No external ear or nose lesions  CARDIOVASCULAR:  [ ]Regular [ ]Irregular [ x]Tachy [ ]Not Tachy  [ ]Sung [ ] Edema [ ] No edema  PULMONARY:  [ ]Tachypnea  [ ]Audible excessive secretions [ x] No labored breathing [ ] labored breathing  GASTROINTESTINAL: [ ]Soft  [ ]Distended  [x ]Not distended [ ]Non tender [ ]Tender  MUSCULOSKELETAL: [ ]No clubbing [ ] clubbing  [ ] No cyanosis [ ] cyanosis  NEUROLOGIC: [ ]No focal deficits  [x ]Follows commands  [ ]Does not follow commands  [ x]Cognitive impairment  [ ]Dysphagia  [ ]Dysarthria  [ ]Paresis   SKIN: [ ] Jaundiced [ x] Non-jaundiced [ ]Rash [ ]No Rash [ ] Warm [ ] Dry  MISC/LINES: [ ] ET tube [ ] Trach [ ]NGT/OGT [ ]PEG [ ]David    LABS: reviewed by me                        12.7   7.98  )-----------( 173      ( 01 Feb 2024 06:14 )             37.8   02-01    131<L>  |  94<L>  |  28<H>  ----------------------------<  113<H>  4.5   |  26  |  1.2    Ca    9.3      01 Feb 2024 06:14  Phos  3.7     02-01  Mg     2.2     02-01    TPro  5.7<L>  /  Alb  3.7  /  TBili  0.4  /  DBili  x   /  AST  24  /  ALT  15  /  AlkPhos  75  02-01      Urinalysis Basic - ( 01 Feb 2024 06:14 )    Color: x / Appearance: x / SG: x / pH: x  Gluc: 113 mg/dL / Ketone: x  / Bili: x / Urobili: x   Blood: x / Protein: x / Nitrite: x   Leuk Esterase: x / RBC: x / WBC x   Sq Epi: x / Non Sq Epi: x / Bacteria: x      RADIOLOGY & ADDITIONAL STUDIES: reviewed by me    < from: Xray Femur 2 Views, Left (02.01.24 @ 10:55) >  Findings/  impression:    No acute fracture or dislocation. Moderate osteoarthritis of the left hip   and mild/moderate osteoarthritis of the left knee with chondrocalcinosis.   Diffuse osteopenia. There are vascular calcifications.    < end of copied text >      EKG: reviewed by me    < from: 12 Lead ECG (01.25.24 @ 11:34) >    Ventricular Rate 94 BPM    Atrial Rate 81 BPM    QRS Duration 90 ms    Q-T Interval 384 ms    QTC Calculation(Bazett) 480 ms    R Axis 166 degrees    T Axis -48 degrees    Diagnosis Line Atrial fibrillation  Right ventricular hypertrophy  Anterior infarct , age undetermined  ST & T wave abnormality, consider inferior ischemia  Abnormal ECG    < end of copied text >      PROTEIN CALORIE MALNUTRITION PRESENT: [ ]mild [ ]moderate [ ]severe [ ]underweight [ ]morbid obesity  https://www.andeal.org/vault/2440/web/files/ONC/Table_Clinical%20Characteristics%20to%20Document%20Malnutrition-White%20JV%20et%20al%202012.pdf    Height (cm): 152.4 (01-30-24 @ 11:24)  Weight (kg): 70.3 (01-30-24 @ 11:24)  BMI (kg/m2): 30.3 (01-30-24 @ 11:24)  [ ]PPSV2 < or = to 30% [ ]significant weight loss  [ ]poor nutritional intake  [ ]anasarca      [ ]Artificial Nutrition      Palliative Care Spiritual/Emotional Screening Tool Question  Severity (0-4):                    OR                    [ x] Unable to determine. Will assess at later time if appropriate.  Score of 2 or greater indicates recommendation of Chaplaincy and/or SW referral  Chaplaincy Referral: [ ] Yes [ ] Refused [ ] Following     Caregiver [place]:  [ ] Yes [ ] No    OR    [x ] Unable to determine. Will assess at later time if appropriate.  Social Work Referral [ ]  Patient and Family Centered Care Referral [ ]    Anticipatory Grief Present: [ ] Yes [ ] No    OR     [ x] Unable to determine. Will assess at later time if appropriate.  Social Work Referral [ ]  Patient and Family Centered Care Referral [ ]    Patient discussed with primary medical team MD  Palliative care education provided to patient and/or family

## 2024-02-01 NOTE — CONSULT NOTE ADULT - PROBLEM SELECTOR RECOMMENDATION 9
Compression fractures with mixed lytic/sclerotic lesions showing pathologic fracture  -no neurosurgical intervention  -continue dexamethasone  -biopsy - awaiting final results, but showing epithelial carcinoma  -continue radiation  -f/u heme onc  -previous Pain Management recs appreciated  -continue tylenol, lidocaine patch, gabapentin per PAin managmenet  -continue oxycodone 15mg q6h PRN for pain  -recommend stopping IV morphine as dose is minimal compared to oxycodone dose Compression fractures with mixed lytic/sclerotic lesions showing pathologic fracture  -no neurosurgical intervention  -continue dexamethasone  -biopsy - awaiting final results, but showing epithelial carcinoma  -continue radiation  -f/u heme onc  -previous Pain Management recs appreciated  -continue tylenol, lidocaine patch, gabapentin per PAin managmenet  -continue oxycodone 15mg q6h PRN for pain  -recommend stopping IV morphine as dose is minimal compared to oxycodone dose  -continue bowel regimen

## 2024-02-01 NOTE — PROGRESS NOTE ADULT - ASSESSMENT
89F w/ h/o chronic afib (on Eliquis), GERD, and chronic low back pain presenting s/p fall. 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 Previously managed via gabapentin 200 tid. Previously trialed Mobic and diclofenac w/ minimal relief. Recently started on Percocet 5-325 q12h w/ mild relief.  - CT lumbar spine: pathologic severe compression fracture of L1  - MRI - L1 bowing of the posterior cortex contributing to mass effect on the conus   - neuroSx consult-abd binder, TLSO brace on discharge, recommend 2mg decadron IV q12hr, IR biopsy  - patient never had a colonoscopy or pap smear. Last mammo > 5 years ago.   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  - F up official pathology  - rad onc recs appreciated -> simulation session performed on 1/30 -> 5 sessions of RT; to be completed on Tuesday (no treatment on weekends)  - CT C/A/P to identify a primary  - CT chest: Cardiomegaly. CHF. Right-sided pleural effusion. Nonspecific right upper lobe pleural-based opacity.  - Ct abdomen pelvis: 1.  No abdominopelvic lymphadenopathy or evidence of intra-abdominal metastatic disease.  - no hx of smoking, no family hx of malignancy   - [facility] cs appreciated  - F up:   ** ca 19-9, ca 15.3, cea, and ca125  ** XR  left Femur givens severe pain on exam  ** NM bone scan to assess for other kfoi lesions  ** MR w/wo abilio of brain  ** MM panel- SPEP, serum immunofixation, and free light chains     #Urinary retention  - Hanson placed on 1/29 (straight cath was done 3 times prior to placing the hanson and patient was retaining between 600 and 700 cc each time)  - on flomax -> will double the dose starting 1/30 (BP in the 140s-150s)  - dexa dose increased on 1/30.   - RT sessions    #CHF, new onset -> stable  Reports LE swelling over past 1-2 weeks.   Recently started on furosemide 20 qd for swelling.   No reported CP or dyspnea at rest or exertion (but functional status limited by lower back pain).   - No known prior h/o CHF.  (bibasilar crackles; b/l LE pitting edema)    - s/p furosemide 40mg IV once   - TTE (May 2013) demonstrated LVEF 55-65% w/ normal global LVSF and no significant valvulopathy.  - TTE 1/29: 60 to 65% // Mildly enlarged left atrium // Mildly enlarged right atrium // Mild-to-moderate AR // Mild MR // Mild-moderate TR // moderate pulmonary hypertension.  - elevated BNP 5K (on presentation)  - strict I's & O's  - monitor lytes and replete accordingly (K>4; Mg>2)  - s/p bumex 2g IV 1x dose (1/25) -> now no edema.   - troponin-negative   - Tele monitored for 24 hrs with no dysrhythmia   - poor PO intake and creatinine increased slightly from 1 to 1.2 -> will give today NSS 75 mL/hr for 10 hours.       #Chronic Atrial Fibrillation  CHADS-VASC 3+ (Age x2, Female). Only on AC. Previously on Lopressor, but only takes intermittently as HR currently rate-controlled off meds.  - apixaban 5mg PO BID  - patient was previously on metoprolol 25 mg BID -> but stopped taking it months ago given that her HR was in the 60s   - on 1/29 we started metoprolol tartrate 12.5 mg BID given the HR>100 -> after that HR has been better controlled in the 70s-80s. BP stable    #GERD  - c/w pantoprazole 40mg PO QD (therapeutic interchange for omeprazole)    #CKD 3b  - back to baseline     #Upper resp drainage, cough -> resolved  -flonase ordered  -rvp 1/29: negative    #Constipation  -senna, miralax, lactulose   - fecal loading on CT -> enemas were given -> patient had BMs   - monitor    DVT PPX: apixaban 5 mg PO BID   GI PPX: pantoprazole 40mg PO QD  DIET: DASH/TLC + 1.5L fluid restrict  ACTIVITY: IAT  CODE STATUS: Full Code  DISPOSITION: active      Vitamin D 33, normal. Continue her 3,000 IU daily.   AST still elevated, even higher than in hospital at 84. Glucose over 200.  I ordered A1C to be done in next week some time.      Please tell her again she needs to stop drinking alcohol.

## 2024-02-01 NOTE — PROGRESS NOTE ADULT - SUBJECTIVE AND OBJECTIVE BOX
SUBJECTIVE:    Patient is a 89y old Female who presents with a chief complaint of Back Pain (01 Feb 2024 10:12)    Currently admitted to medicine with the primary diagnosis of Inability to ambulate due to multiple joints       Today is hospital day 8d.     PAST MEDICAL & SURGICAL HISTORY  Chronic atrial fibrillation    GERD (gastroesophageal reflux disease)    Chronic lower back pain    No significant past surgical history      ALLERGIES:  No Known Allergies    MEDICATIONS:  STANDING MEDICATIONS  acetaminophen     Tablet .. 975 milliGRAM(s) Oral every 8 hours  apixaban 5 milliGRAM(s) Oral every 12 hours  calcium carbonate   1250 mG (OsCal) 1 Tablet(s) Oral daily  chlorhexidine 2% Cloths 1 Application(s) Topical daily  cholecalciferol 5000 Unit(s) Oral daily  dexAMETHasone  Injectable 4 milliGRAM(s) IV Push <User Schedule>  fluticasone propionate 50 MICROgram(s)/spray Nasal Spray 1 Spray(s) Both Nostrils two times a day  gabapentin 300 milliGRAM(s) Oral three times a day  lactulose Syrup 20 Gram(s) Oral every 4 hours  lidocaine   4% Patch 1 Patch Transdermal every 24 hours  methocarbamol 750 milliGRAM(s) Oral every 8 hours  metoprolol tartrate 12.5 milliGRAM(s) Oral two times a day  multivitamin 1 Tablet(s) Oral daily  pantoprazole    Tablet 40 milliGRAM(s) Oral before breakfast  polyethylene glycol 3350 17 Gram(s) Oral two times a day  rOPINIRole 0.25 milliGRAM(s) Oral two times a day  senna 2 Tablet(s) Oral at bedtime  sodium chloride 0.9%. 1000 milliLiter(s) IV Continuous <Continuous>  tamsulosin 0.8 milliGRAM(s) Oral at bedtime    PRN MEDICATIONS  morphine  - Injectable 2 milliGRAM(s) IV Push every 4 hours PRN  oxyCODONE    IR 15 milliGRAM(s) Oral every 6 hours PRN    VITALS:   T(F): 96.6  HR: 80  BP: 131/70  RR: 18  SpO2: --    LABS:                        12.7   7.98  )-----------( 173      ( 01 Feb 2024 06:14 )             37.8     02-01    131<L>  |  94<L>  |  28<H>  ----------------------------<  113<H>  4.5   |  26  |  1.2    Ca    9.3      01 Feb 2024 06:14  Phos  3.7     02-01  Mg     2.2     02-01    TPro  5.7<L>  /  Alb  3.7  /  TBili  0.4  /  DBili  x   /  AST  24  /  ALT  15  /  AlkPhos  75  02-01      Urinalysis Basic - ( 01 Feb 2024 06:14 )    Color: x / Appearance: x / SG: x / pH: x  Gluc: 113 mg/dL / Ketone: x  / Bili: x / Urobili: x   Blood: x / Protein: x / Nitrite: x   Leuk Esterase: x / RBC: x / WBC x   Sq Epi: x / Non Sq Epi: x / Bacteria: x                RADIOLOGY:    PHYSICAL EXAM:  GEN: No acute distress  LUNGS: Clear to auscultation bilaterally   HEART: S1/S2 present. RRR.   ABD/ GI: Soft, non-tender, non-distended. Bowel sounds present  EXT: NC/NC/NE/2+PP/DIEHL  NEURO: AAOX3

## 2024-02-01 NOTE — PROGRESS NOTE ADULT - SUBJECTIVE AND OBJECTIVE BOX
24H events:    Patient is a 89y old Female who presents with a chief complaint of Back Pain (31 Jan 2024 13:47)    Primary diagnosis of Inability to ambulate due to multiple joints       Today is hospital day 8d. This morning patient was seen and examined at bedside, resting comfortably in bed.    No acute or major events overnight.    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 No reported tobacco, alcohol, or illicit/recreational drug use. ADL's limited by progressively worsening ability to ambulate. Ambulates w/ walker. (24 Jan 2024 15:07)      ALLERGIES:  No Known Allergies    MEDICATIONS:  STANDING MEDICATIONS  acetaminophen     Tablet .. 975 milliGRAM(s) Oral every 8 hours  apixaban 5 milliGRAM(s) Oral every 12 hours  calcium carbonate   1250 mG (OsCal) 1 Tablet(s) Oral daily  chlorhexidine 2% Cloths 1 Application(s) Topical daily  cholecalciferol 5000 Unit(s) Oral daily  dexAMETHasone  Injectable 4 milliGRAM(s) IV Push <User Schedule>  fluticasone propionate 50 MICROgram(s)/spray Nasal Spray 1 Spray(s) Both Nostrils two times a day  gabapentin 300 milliGRAM(s) Oral three times a day  lactulose Syrup 20 Gram(s) Oral every 4 hours  lidocaine   4% Patch 1 Patch Transdermal every 24 hours  methocarbamol 750 milliGRAM(s) Oral every 8 hours  metoprolol tartrate 12.5 milliGRAM(s) Oral two times a day  multivitamin 1 Tablet(s) Oral daily  pantoprazole    Tablet 40 milliGRAM(s) Oral before breakfast  polyethylene glycol 3350 17 Gram(s) Oral two times a day  rOPINIRole 0.25 milliGRAM(s) Oral two times a day  senna 2 Tablet(s) Oral at bedtime  sodium chloride 0.9%. 1000 milliLiter(s) IV Continuous <Continuous>  tamsulosin 0.8 milliGRAM(s) Oral at bedtime    PRN MEDICATIONS  morphine  - Injectable 2 milliGRAM(s) IV Push every 4 hours PRN  oxyCODONE    IR 15 milliGRAM(s) Oral every 6 hours PRN    VITALS:   T(F): 96.6  HR: 80  BP: 131/70  RR: 18  SpO2: --    PHYSICAL EXAM:        LABS:                        12.7   7.98  )-----------( 173      ( 01 Feb 2024 06:14 )             37.8     02-01    131<L>  |  94<L>  |  28<H>  ----------------------------<  113<H>  4.5   |  26  |  1.2    Ca    9.3      01 Feb 2024 06:14  Phos  3.7     02-01  Mg     2.2     02-01    TPro  5.7<L>  /  Alb  3.7  /  TBili  0.4  /  DBili  x   /  AST  24  /  ALT  15  /  AlkPhos  75  02-01      Urinalysis Basic - ( 01 Feb 2024 06:14 )    Color: x / Appearance: x / SG: x / pH: x  Gluc: 113 mg/dL / Ketone: x  / Bili: x / Urobili: x   Blood: x / Protein: x / Nitrite: x   Leuk Esterase: x / RBC: x / WBC x   Sq Epi: x / Non Sq Epi: x / Bacteria: x

## 2024-02-01 NOTE — PROGRESS NOTE ADULT - ASSESSMENT
89F w/ h/o chronic afib (on Eliquis), GERD, and chronic low back pain presenting s/p fall. Admitted to medicine for back pain 2/2 Lumbar compression fracture    #Lumbar Compression Fracture  #Pathologic Fracture   - CT lumbar spine:   Age-indeterminate severe compression deformity of L1 with mixed   lytic/sclerotic changes extending to the posterior elements, likely   pathologic in nature. Soft tissue component associated with the lytic   changes in the posterior inferior endplate of L1 encroaches the spinal   space resulting in L1-2 severe spinal stenosis and severe bilateral   foraminal stenosis.  - MRI L Spine - Malignant compression deformity of L1 with significant bowing of the   posterior cortex contributing to mass effect upon the conus  Plan:   - neuroSx consult-no surgical intervention, abd binder, TLSO brace on discharge, advised decadron   - IR biopsy done 1/30- official path report pending, but initial report with epithelial carcinoma   - Radiation Oncology consulted, and plan for 5 sessions of RT,  to be completed next tuesday   - 1/30 increased decadron to 4mg q12hrs IV   - Heme Onc consult appreciated   - Pain Management consult appreciated   - Palliative Consult pending     #CHF, diastolic - acute   Reports LE swelling over past 1-2 weeks.   Recently started on furosemide 20 qd for swelling.   No reported CP or dyspnea at rest or exertion (but functional status limited by lower back pain).   - TTE (May 2013) demonstrated LVEF 55-65% w/ normal global LVSF and no significant valvulopathy.  - TTE 1/28- EF 60-65% mod pulm HTN   - elevated BNP  - Tele monitored for 24 hrs with no dysrhythmia   - received 1x dose of Furosemide and 1x dose of Bumex but currently off diuretics   - monitor volume status closely, and resume diuretics if needed     #Chronic Atrial Fibrillation  CHADS-VASC 3+ (Age x2, Female). Only on AC. Previously on Lopressor, but only takes intermittently as HR currently rate-controlled off meds.  - resume eliquis     #GERD  - c/w pantoprazole 40mg PO QD (therapeutic interchange for omeprazole)    #CKD 3b   per PCP baseline is 1.2, currently 1.0    #Urinary retention-likely neurogenic   -hanson placed 1/29  -flomax increased to 0.8mg   - can attempt TOV when RT is completed and patient is able to ambulate     #Constipation  -senna, miralax, lactulose-- had bowel movement      PENDING:  RT for 5 sessions to be completed next tuesday. Heme Onc following, Palliative consult pending   spoke with daughter today.

## 2024-02-01 NOTE — CONSULT NOTE ADULT - ASSESSMENT
89yFemale with history of afib on eliquis, GERD, chronic low back pain presents after fall. Patient found to have lumbar pathological fracture on admission. Palliative care consulted for GOC and symptom management.    Patient seen at bedside with granddaughter on the phone. Palliative care introduced and role discussed. Patient's granddaughter was translating for the patient, but she noted the patient was confused. The patient did note leg pain improved with pain medication. All questions answered.       MEDD (morphine equivalent daily dose): 28.5    Education about palliative care provided to patient/family.  See Recs below.    Please call [phone] with questions or concerns 24/7.   We will continue to follow.

## 2024-02-02 LAB
ALBUMIN SERPL ELPH-MCNC: 3.2 G/DL — LOW (ref 3.5–5.2)
ALP SERPL-CCNC: 62 U/L — SIGNIFICANT CHANGE UP (ref 30–115)
ALT FLD-CCNC: 13 U/L — SIGNIFICANT CHANGE UP (ref 0–41)
ANION GAP SERPL CALC-SCNC: 9 MMOL/L — SIGNIFICANT CHANGE UP (ref 7–14)
AST SERPL-CCNC: 20 U/L — SIGNIFICANT CHANGE UP (ref 0–41)
BASOPHILS # BLD AUTO: 0.01 K/UL — SIGNIFICANT CHANGE UP (ref 0–0.2)
BASOPHILS NFR BLD AUTO: 0.1 % — SIGNIFICANT CHANGE UP (ref 0–1)
BILIRUB SERPL-MCNC: 0.4 MG/DL — SIGNIFICANT CHANGE UP (ref 0.2–1.2)
BUN SERPL-MCNC: 37 MG/DL — HIGH (ref 10–20)
CALCIUM SERPL-MCNC: 8.5 MG/DL — SIGNIFICANT CHANGE UP (ref 8.4–10.5)
CANCER AG125 SERPL-ACNC: 68 U/ML — HIGH
CHLORIDE SERPL-SCNC: 98 MMOL/L — SIGNIFICANT CHANGE UP (ref 98–110)
CO2 SERPL-SCNC: 26 MMOL/L — SIGNIFICANT CHANGE UP (ref 17–32)
CREAT SERPL-MCNC: 1.2 MG/DL — SIGNIFICANT CHANGE UP (ref 0.7–1.5)
EGFR: 43 ML/MIN/1.73M2 — LOW
EOSINOPHIL # BLD AUTO: 0 K/UL — SIGNIFICANT CHANGE UP (ref 0–0.7)
EOSINOPHIL NFR BLD AUTO: 0 % — SIGNIFICANT CHANGE UP (ref 0–8)
GLUCOSE BLDC GLUCOMTR-MCNC: 111 MG/DL — HIGH (ref 70–99)
GLUCOSE BLDC GLUCOMTR-MCNC: 127 MG/DL — HIGH (ref 70–99)
GLUCOSE SERPL-MCNC: 104 MG/DL — HIGH (ref 70–99)
HCT VFR BLD CALC: 35.1 % — LOW (ref 37–47)
HGB BLD-MCNC: 12.1 G/DL — SIGNIFICANT CHANGE UP (ref 12–16)
IMM GRANULOCYTES NFR BLD AUTO: 0.7 % — HIGH (ref 0.1–0.3)
KAPPA LC SER QL IFE: 1.68 MG/DL — SIGNIFICANT CHANGE UP (ref 0.33–1.94)
KAPPA/LAMBDA FREE LIGHT CHAIN RATIO, SERUM: 1.51 RATIO — SIGNIFICANT CHANGE UP (ref 0.26–1.65)
LAMBDA LC SER QL IFE: 1.11 MG/DL — SIGNIFICANT CHANGE UP (ref 0.57–2.63)
LYMPHOCYTES # BLD AUTO: 0.57 K/UL — LOW (ref 1.2–3.4)
LYMPHOCYTES # BLD AUTO: 8.4 % — LOW (ref 20.5–51.1)
MAGNESIUM SERPL-MCNC: 2.1 MG/DL — SIGNIFICANT CHANGE UP (ref 1.8–2.4)
MCHC RBC-ENTMCNC: 33.2 PG — HIGH (ref 27–31)
MCHC RBC-ENTMCNC: 34.5 G/DL — SIGNIFICANT CHANGE UP (ref 32–37)
MCV RBC AUTO: 96.4 FL — SIGNIFICANT CHANGE UP (ref 81–99)
MONOCYTES # BLD AUTO: 0.32 K/UL — SIGNIFICANT CHANGE UP (ref 0.1–0.6)
MONOCYTES NFR BLD AUTO: 4.7 % — SIGNIFICANT CHANGE UP (ref 1.7–9.3)
NEUTROPHILS # BLD AUTO: 5.83 K/UL — SIGNIFICANT CHANGE UP (ref 1.4–6.5)
NEUTROPHILS NFR BLD AUTO: 86.1 % — HIGH (ref 42.2–75.2)
NON-GYNECOLOGICAL CYTOLOGY STUDY: SIGNIFICANT CHANGE UP
NRBC # BLD: 0 /100 WBCS — SIGNIFICANT CHANGE UP (ref 0–0)
PHOSPHATE SERPL-MCNC: 3.7 MG/DL — SIGNIFICANT CHANGE UP (ref 2.1–4.9)
PLATELET # BLD AUTO: 173 K/UL — SIGNIFICANT CHANGE UP (ref 130–400)
PMV BLD: 10.2 FL — SIGNIFICANT CHANGE UP (ref 7.4–10.4)
POTASSIUM SERPL-MCNC: 5.1 MMOL/L — HIGH (ref 3.5–5)
POTASSIUM SERPL-SCNC: 5.1 MMOL/L — HIGH (ref 3.5–5)
PROT SERPL-MCNC: 4.9 G/DL — LOW (ref 6–8)
RBC # BLD: 3.64 M/UL — LOW (ref 4.2–5.4)
RBC # FLD: 13.4 % — SIGNIFICANT CHANGE UP (ref 11.5–14.5)
SODIUM SERPL-SCNC: 133 MMOL/L — LOW (ref 135–146)
WBC # BLD: 6.78 K/UL — SIGNIFICANT CHANGE UP (ref 4.8–10.8)
WBC # FLD AUTO: 6.78 K/UL — SIGNIFICANT CHANGE UP (ref 4.8–10.8)

## 2024-02-02 PROCEDURE — 99232 SBSQ HOSP IP/OBS MODERATE 35: CPT

## 2024-02-02 PROCEDURE — 99497 ADVNCD CARE PLAN 30 MIN: CPT | Mod: 25

## 2024-02-02 PROCEDURE — 70553 MRI BRAIN STEM W/O & W/DYE: CPT | Mod: 26

## 2024-02-02 RX ORDER — SODIUM ZIRCONIUM CYCLOSILICATE 10 G/10G
5 POWDER, FOR SUSPENSION ORAL ONCE
Refills: 0 | Status: COMPLETED | OUTPATIENT
Start: 2024-02-02 | End: 2024-02-02

## 2024-02-02 RX ORDER — SODIUM CHLORIDE 9 MG/ML
1000 INJECTION, SOLUTION INTRAVENOUS
Refills: 0 | Status: DISCONTINUED | OUTPATIENT
Start: 2024-02-02 | End: 2024-02-11

## 2024-02-02 RX ORDER — OXYCODONE HYDROCHLORIDE 5 MG/1
15 TABLET ORAL EVERY 6 HOURS
Refills: 0 | Status: DISCONTINUED | OUTPATIENT
Start: 2024-02-02 | End: 2024-02-09

## 2024-02-02 RX ADMIN — Medication 1 SPRAY(S): at 06:14

## 2024-02-02 RX ADMIN — GABAPENTIN 300 MILLIGRAM(S): 400 CAPSULE ORAL at 21:16

## 2024-02-02 RX ADMIN — OXYCODONE HYDROCHLORIDE 15 MILLIGRAM(S): 5 TABLET ORAL at 17:55

## 2024-02-02 RX ADMIN — Medication 5000 UNIT(S): at 11:38

## 2024-02-02 RX ADMIN — Medication 1 TABLET(S): at 11:38

## 2024-02-02 RX ADMIN — APIXABAN 5 MILLIGRAM(S): 2.5 TABLET, FILM COATED ORAL at 08:15

## 2024-02-02 RX ADMIN — ROPINIROLE 0.25 MILLIGRAM(S): 8 TABLET, FILM COATED, EXTENDED RELEASE ORAL at 06:15

## 2024-02-02 RX ADMIN — Medication 975 MILLIGRAM(S): at 14:22

## 2024-02-02 RX ADMIN — PANTOPRAZOLE SODIUM 40 MILLIGRAM(S): 20 TABLET, DELAYED RELEASE ORAL at 06:15

## 2024-02-02 RX ADMIN — SODIUM CHLORIDE 75 MILLILITER(S): 9 INJECTION, SOLUTION INTRAVENOUS at 11:49

## 2024-02-02 RX ADMIN — SENNA PLUS 2 TABLET(S): 8.6 TABLET ORAL at 21:17

## 2024-02-02 RX ADMIN — Medication 975 MILLIGRAM(S): at 14:52

## 2024-02-02 RX ADMIN — Medication 975 MILLIGRAM(S): at 21:16

## 2024-02-02 RX ADMIN — OXYCODONE HYDROCHLORIDE 15 MILLIGRAM(S): 5 TABLET ORAL at 12:19

## 2024-02-02 RX ADMIN — OXYCODONE HYDROCHLORIDE 15 MILLIGRAM(S): 5 TABLET ORAL at 11:36

## 2024-02-02 RX ADMIN — Medication 1 TABLET(S): at 11:37

## 2024-02-02 RX ADMIN — CHLORHEXIDINE GLUCONATE 1 APPLICATION(S): 213 SOLUTION TOPICAL at 11:38

## 2024-02-02 RX ADMIN — Medication 12.5 MILLIGRAM(S): at 06:15

## 2024-02-02 RX ADMIN — TAMSULOSIN HYDROCHLORIDE 0.8 MILLIGRAM(S): 0.4 CAPSULE ORAL at 21:17

## 2024-02-02 RX ADMIN — OXYCODONE HYDROCHLORIDE 15 MILLIGRAM(S): 5 TABLET ORAL at 18:25

## 2024-02-02 RX ADMIN — Medication 12.5 MILLIGRAM(S): at 17:56

## 2024-02-02 RX ADMIN — METHOCARBAMOL 750 MILLIGRAM(S): 500 TABLET, FILM COATED ORAL at 21:15

## 2024-02-02 RX ADMIN — APIXABAN 5 MILLIGRAM(S): 2.5 TABLET, FILM COATED ORAL at 21:16

## 2024-02-02 RX ADMIN — ROPINIROLE 0.25 MILLIGRAM(S): 8 TABLET, FILM COATED, EXTENDED RELEASE ORAL at 17:56

## 2024-02-02 RX ADMIN — SODIUM ZIRCONIUM CYCLOSILICATE 5 GRAM(S): 10 POWDER, FOR SUSPENSION ORAL at 11:36

## 2024-02-02 RX ADMIN — METHOCARBAMOL 750 MILLIGRAM(S): 500 TABLET, FILM COATED ORAL at 14:22

## 2024-02-02 RX ADMIN — GABAPENTIN 300 MILLIGRAM(S): 400 CAPSULE ORAL at 14:21

## 2024-02-02 RX ADMIN — Medication 975 MILLIGRAM(S): at 06:15

## 2024-02-02 RX ADMIN — LIDOCAINE 1 PATCH: 4 CREAM TOPICAL at 11:39

## 2024-02-02 RX ADMIN — Medication 4 MILLIGRAM(S): at 08:15

## 2024-02-02 RX ADMIN — Medication 4 MILLIGRAM(S): at 17:56

## 2024-02-02 RX ADMIN — GABAPENTIN 300 MILLIGRAM(S): 400 CAPSULE ORAL at 06:15

## 2024-02-02 RX ADMIN — Medication 1 SPRAY(S): at 17:55

## 2024-02-02 RX ADMIN — METHOCARBAMOL 750 MILLIGRAM(S): 500 TABLET, FILM COATED ORAL at 06:15

## 2024-02-02 NOTE — PROGRESS NOTE ADULT - ASSESSMENT
89F w/ h/o chronic afib (on Eliquis), GERD, and chronic low back pain presenting s/p fall. 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 Previously managed via gabapentin 200 tid. Previously trialed Mobic and diclofenac w/ minimal relief. Recently started on Percocet 5-325 q12h w/ mild relief.  - CT lumbar spine: pathologic severe compression fracture of L1  - MRI - L1 bowing of the posterior cortex contributing to mass effect on the conus   - neuroSx consult-abd binder, TLSO brace on discharge, recommend 2mg decadron IV q12hr, IR biopsy  - patient never had a colonoscopy or pap smear. Last mammo > 5 years ago.   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  - F up official pathology  - rad onc recs appreciated -> simulation session performed on 1/30 -> 5 sessions of RT; to be completed on Tuesday (no treatment on weekends)  - CT chest: Cardiomegaly. CHF. Right-sided pleural effusion. Nonspecific right upper lobe pleural-based opacity.  - Ct abdomen pelvis: 1.  No abdominopelvic lymphadenopathy or evidence of intra-abdominal metastatic disease.  - Bone scan: Abnormal uptake in the L1 vertebral body, corresponding to known compression fracture deformity secondary to vertebral body mass. No other evidence for metastatic bone disease.  - no hx of smoking, no family hx of malignancy   [facility]  - ca 19-9: within Nl limits  - ca 15.3: slightly elevated at 31  - CEA: slightly elevated at 5.2  - F up ca125  - XR left Femur: negative    Follow up:   ** MR w/wo abilio of brain  ** MM panel- SPEP, serum immunofixation, and free light chains     #Urinary retention  - Hanson placed on 1/29 (straight cath was done 3 times prior to placing the hanson and patient was retaining between 600 and 700 cc each time)  - on flomax -> will double the dose starting 1/30 (BP in the 140s-150s)  - dexa dose increased on 1/30.   - RT sessions    #CHF, new onset -> stable  Reports LE swelling over past 1-2 weeks.   Recently started on furosemide 20 qd for swelling.   No reported CP or dyspnea at rest or exertion (but functional status limited by lower back pain).   - No known prior h/o CHF.  (bibasilar crackles; b/l LE pitting edema)    - s/p furosemide 40mg IV once   - TTE (May 2013) demonstrated LVEF 55-65% w/ normal global LVSF and no significant valvulopathy.  - TTE 1/29: 60 to 65% // Mildly enlarged left atrium // Mildly enlarged right atrium // Mild-to-moderate AR // Mild MR // Mild-moderate TR // moderate pulmonary hypertension.  - elevated BNP 5K (on presentation)  - strict I's & O's  - monitor lytes and replete accordingly (K>4; Mg>2)  - s/p bumex 2g IV 1x dose (1/25) -> now no edema.   - troponin-negative   - Tele monitored for 24 hrs with no dysrhythmia   - poor PO intake will give LR 75 cc/hr over 12 hours.       #Chronic Atrial Fibrillation  CHADS-VASC 3+ (Age x2, Female). Only on AC. Previously on Lopressor, but only takes intermittently as HR currently rate-controlled off meds.  - apixaban 5mg PO BID  - patient was previously on metoprolol 25 mg BID -> but stopped taking it months ago given that her HR was in the 60s   - on 1/29 we started metoprolol tartrate 12.5 mg BID given the HR>100 -> after that HR has been better controlled in the 70s-80s. BP stable    #GERD  - c/w pantoprazole 40mg PO QD (therapeutic interchange for omeprazole)    #CKD 3b  - back to baseline     #Upper resp drainage, cough -> resolved  -flonase ordered  -rvp 1/29: negative    #Constipation  -senna, miralax, lactulose   - fecal loading on CT -> enemas were given -> patient had BMs   - monitor    DVT PPX: apixaban 5 mg PO BID   GI PPX: pantoprazole 40mg PO QD  DIET: DASH/TLC + 1.5L fluid restrict  ACTIVITY: IAT  CODE STATUS: Full Code  DISPOSITION: active

## 2024-02-02 NOTE — PROGRESS NOTE ADULT - SUBJECTIVE AND OBJECTIVE BOX
SUBJECTIVE:    Patient is a 89y old Female who presents with a chief complaint of Back Pain (02 Feb 2024 11:00)    Currently admitted to medicine with the primary diagnosis of Inability to ambulate due to multiple joints       Today is hospital day 9d.     PAST MEDICAL & SURGICAL HISTORY  Chronic atrial fibrillation    GERD (gastroesophageal reflux disease)    Chronic lower back pain    No significant past surgical history      ALLERGIES:  No Known Allergies    MEDICATIONS:  STANDING MEDICATIONS  acetaminophen     Tablet .. 975 milliGRAM(s) Oral every 8 hours  apixaban 5 milliGRAM(s) Oral every 12 hours  calcium carbonate   1250 mG (OsCal) 1 Tablet(s) Oral daily  chlorhexidine 2% Cloths 1 Application(s) Topical daily  cholecalciferol 5000 Unit(s) Oral daily  dexAMETHasone  Injectable 4 milliGRAM(s) IV Push <User Schedule>  fluticasone propionate 50 MICROgram(s)/spray Nasal Spray 1 Spray(s) Both Nostrils two times a day  gabapentin 300 milliGRAM(s) Oral three times a day  lactated ringers. 1000 milliLiter(s) IV Continuous <Continuous>  lactulose Syrup 20 Gram(s) Oral every 4 hours  lidocaine   4% Patch 1 Patch Transdermal every 24 hours  methocarbamol 750 milliGRAM(s) Oral every 8 hours  metoprolol tartrate 12.5 milliGRAM(s) Oral two times a day  multivitamin 1 Tablet(s) Oral daily  pantoprazole    Tablet 40 milliGRAM(s) Oral before breakfast  polyethylene glycol 3350 17 Gram(s) Oral two times a day  rOPINIRole 0.25 milliGRAM(s) Oral two times a day  senna 2 Tablet(s) Oral at bedtime  tamsulosin 0.8 milliGRAM(s) Oral at bedtime    PRN MEDICATIONS  oxyCODONE    IR 15 milliGRAM(s) Oral every 6 hours PRN    VITALS:   T(F): 97.3  HR: 103  BP: 130/72  RR: 17  SpO2: 97%    LABS:                        12.1   6.78  )-----------( 173      ( 02 Feb 2024 07:31 )             35.1     02-02    133<L>  |  98  |  37<H>  ----------------------------<  104<H>  5.1<H>   |  26  |  1.2    Ca    8.5      02 Feb 2024 07:31  Phos  3.7     02-02  Mg     2.1     02-02    TPro  4.9<L>  /  Alb  3.2<L>  /  TBili  0.4  /  DBili  x   /  AST  20  /  ALT  13  /  AlkPhos  62  02-02      Urinalysis Basic - ( 02 Feb 2024 07:31 )    Color: x / Appearance: x / SG: x / pH: x  Gluc: 104 mg/dL / Ketone: x  / Bili: x / Urobili: x   Blood: x / Protein: x / Nitrite: x   Leuk Esterase: x / RBC: x / WBC x   Sq Epi: x / Non Sq Epi: x / Bacteria: x                RADIOLOGY:    PHYSICAL EXAM:  GEN: No acute distress  LUNGS: Clear to auscultation bilaterally   HEART: S1/S2 present. RRR.   ABD/ GI: Soft, non-tender, non-distended. Bowel sounds present  EXT: pain on  moving legs  NEURO: AAOX3

## 2024-02-02 NOTE — PROGRESS NOTE ADULT - SUBJECTIVE AND OBJECTIVE BOX
24H events:    Patient is a 89y old Female who presents with a chief complaint of Back Pain (01 Feb 2024 16:28)    Primary diagnosis of Inability to ambulate due to multiple joints       Today is hospital day 9d. This morning patient was seen and examined at bedside, resting comfortably in bed.    No acute or major events overnight.    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 No reported tobacco, alcohol, or illicit/recreational drug use. ADL's limited by progressively worsening ability to ambulate. Ambulates w/ walker. (24 Jan 2024 15:07)      ALLERGIES:  No Known Allergies    MEDICATIONS:  STANDING MEDICATIONS  acetaminophen     Tablet .. 975 milliGRAM(s) Oral every 8 hours  apixaban 5 milliGRAM(s) Oral every 12 hours  calcium carbonate   1250 mG (OsCal) 1 Tablet(s) Oral daily  chlorhexidine 2% Cloths 1 Application(s) Topical daily  cholecalciferol 5000 Unit(s) Oral daily  dexAMETHasone  Injectable 4 milliGRAM(s) IV Push <User Schedule>  fluticasone propionate 50 MICROgram(s)/spray Nasal Spray 1 Spray(s) Both Nostrils two times a day  gabapentin 300 milliGRAM(s) Oral three times a day  lactated ringers. 1000 milliLiter(s) IV Continuous <Continuous>  lactulose Syrup 20 Gram(s) Oral every 4 hours  lidocaine   4% Patch 1 Patch Transdermal every 24 hours  methocarbamol 750 milliGRAM(s) Oral every 8 hours  metoprolol tartrate 12.5 milliGRAM(s) Oral two times a day  multivitamin 1 Tablet(s) Oral daily  pantoprazole    Tablet 40 milliGRAM(s) Oral before breakfast  polyethylene glycol 3350 17 Gram(s) Oral two times a day  rOPINIRole 0.25 milliGRAM(s) Oral two times a day  senna 2 Tablet(s) Oral at bedtime  sodium zirconium cyclosilicate 5 Gram(s) Oral once  tamsulosin 0.8 milliGRAM(s) Oral at bedtime    PRN MEDICATIONS  morphine  - Injectable 2 milliGRAM(s) IV Push every 4 hours PRN  oxyCODONE    IR 15 milliGRAM(s) Oral every 6 hours PRN    VITALS:   T(F): 97.1  HR: 103  BP: 144/83  RR: 18  SpO2: 97%    PHYSICAL EXAM:  GEN: No acute distress  LUNGS: Clear to auscultation bilaterally   HEART: S1/S2 present. RRR.   ABD/ GI: Soft, non-tender, non-distended. Bowel sounds present  EXT: NC/NC/NE/2+PP/DIEHL  NEURO: AAOX3      LABS:                        12.1   6.78  )-----------( 173      ( 02 Feb 2024 07:31 )             35.1     02-02    133<L>  |  98  |  37<H>  ----------------------------<  104<H>  5.1<H>   |  26  |  1.2    Ca    8.5      02 Feb 2024 07:31  Phos  3.7     02-02  Mg     2.1     02-02    TPro  4.9<L>  /  Alb  3.2<L>  /  TBili  0.4  /  DBili  x   /  AST  20  /  ALT  13  /  AlkPhos  62  02-02      Urinalysis Basic - ( 02 Feb 2024 07:31 )    Color: x / Appearance: x / SG: x / pH: x  Gluc: 104 mg/dL / Ketone: x  / Bili: x / Urobili: x   Blood: x / Protein: x / Nitrite: x   Leuk Esterase: x / RBC: x / WBC x   Sq Epi: x / Non Sq Epi: x / Bacteria: x

## 2024-02-02 NOTE — PROGRESS NOTE ADULT - SUBJECTIVE AND OBJECTIVE BOX
HPI:  89F w/ h/o chronic afib (on Eliquis), GERD, and chronic low back pain presenting s/p fall. Patient known to have chronic low back pain that has progressively worsened over the years. Pain acutely worsened two weeks ago and patient subsequently fell due to inability to support own weight. Evaluated by PCP after initial fall due to worsening pain. Prescribed Percocet 5-325 bid w/ mild relief. However, pt once again fell earlier today after pt unable to support her own weight while trying to ambulate. Now unable to ambulate or bear weight, even w/ assistance. No reported HT, LOC, lightheadedness, confusion, loss of continence, or focal weakness a/w either fall. No reported fevers, chills, CP, palpitations, SOB, abdominal pain, n/v/d, or dysuria (although daughter did note malodorous urine recently).    Interval history  -Patient seen at bedside with alyssa Whittier  -Patient notes leg pain with movement, but only mild pain at rest     ADVANCE DIRECTIVES:     [x ] Full Code [ ] DNR  MOLST  [ ]  Living Will  [ ]   DECISION MAKER(s):  [ ] Health Care Proxy(s)  [x ] Surrogate(s)  [ ] Guardian           Name(s): Phone Number(s):  Daughter      BASELINE (I)ADL(s) (prior to admission):    [place]: [ ]Total  [ ] Moderate [ ]Dependent  Palliative Performance Status Version 2:         %    http://npcrc.org/files/news/palliative_performance_scale_[email or web address].pdf    Allergies    No Known Allergies    Intolerances    MEDICATIONS  (STANDING):  acetaminophen     Tablet .. 975 milliGRAM(s) Oral every 8 hours  apixaban 5 milliGRAM(s) Oral every 12 hours  calcium carbonate   1250 mG (OsCal) 1 Tablet(s) Oral daily  chlorhexidine 2% Cloths 1 Application(s) Topical daily  cholecalciferol 5000 Unit(s) Oral daily  dexAMETHasone  Injectable 4 milliGRAM(s) IV Push <User Schedule>  fluticasone propionate 50 MICROgram(s)/spray Nasal Spray 1 Spray(s) Both Nostrils two times a day  gabapentin 300 milliGRAM(s) Oral three times a day  lactated ringers. 1000 milliLiter(s) (75 mL/Hr) IV Continuous <Continuous>  lactulose Syrup 20 Gram(s) Oral every 4 hours  lidocaine   4% Patch 1 Patch Transdermal every 24 hours  methocarbamol 750 milliGRAM(s) Oral every 8 hours  metoprolol tartrate 12.5 milliGRAM(s) Oral two times a day  multivitamin 1 Tablet(s) Oral daily  pantoprazole    Tablet 40 milliGRAM(s) Oral before breakfast  polyethylene glycol 3350 17 Gram(s) Oral two times a day  rOPINIRole 0.25 milliGRAM(s) Oral two times a day  senna 2 Tablet(s) Oral at bedtime  tamsulosin 0.8 milliGRAM(s) Oral at bedtime    MEDICATIONS  (PRN):  morphine  - Injectable 2 milliGRAM(s) IV Push every 4 hours PRN Severe Pain (7 - 10)  oxyCODONE    IR 15 milliGRAM(s) Oral every 6 hours PRN Moderate Pain (4 - 6)      PRESENT SYMPTOMS: [ ]Unable to obtain due to poor mentation   Source if other than patient:  [ ]Family   [ ]Team     Pain: [ x]yes [ ]no  QOL impact -  mild at rest, severe with movement  Location -          lower extremities           Aggravating factors - none given  Quality -  none given  Radiation - none  Timing-  constant  Severity (0-10 scale): no number given  Minimal acceptable level (0-10 scale):     CPOT:    [email or web address]://[email or web address]/Critical-Care-Pain-Observation-Tool-(CPOT)    PAIN AD Score:   [email or web address]/painad.pdf (press ctrl +  left click to view)    Dyspnea:                           [ ]None[ x]Mild [ ]Moderate [ ]Severe     Respiratory Distress Observation Scale (RDOS):   A score of 0 to 2 signifies little or no respiratory distress, 3 signifies mild distress, scores 4 to 6 indicate moderate distress, and scores greater than 7 signify severe distress  [email or web address]-ljwdvflguet-mqdqzwyr-tyitcjyoyqw-yvsvd.[email or web address]    Anxiety:                             [ x]None[ ]Mild [ ]Moderate [ ]Severe   Fatigue:                             [x ]None[ ]Mild [ ]Moderate [ ]Severe   Nausea:                             [ x]None[ ]Mild [ ]Moderate [ ]Severe   Loss of appetite:              [x ]None[ ]Mild [ ]Moderate [ ]Severe   Constipation:                    [x ]None[ ]Mild [ ]Moderate [ ]Severe    Other Symptoms:  [ x]All other review of systems negative     Palliative Performance Status Version 2:         30%    [email or web address]/[email or web address]_performance_[email or web address]_[email or web address].pdf    PHYSICAL EXAM:  Vital Signs Last 24 Hrs  T(C): 36.2 (02 Feb 2024 07:00), Max: 36.9 (01 Feb 2024 17:39)  T(F): 97.1 (02 Feb 2024 07:00), Max: 98.4 (01 Feb 2024 17:39)  HR: 103 (02 Feb 2024 07:06) (76 - 103)  BP: 144/83 (02 Feb 2024 07:06) (127/58 - 171/81)  BP(mean): --  RR: 18 (02 Feb 2024 07:06) (17 - 18)  SpO2: 97% (01 Feb 2024 17:39) (97% - 97%)    GENERAL:  [ ] No acute distress [ ]Lethargic  [ ]Unarousable  [ x]Verbal  [ ]Non-Verbal [ ]Cachexia    BEHAVIORAL/PSYCH:  [x ]Alert and Oriented x2  [ ] Anxiety [ ] Delirium [ ] Agitation [ ] Calm   EYES: [ x] No scleral icterus [ ] Scleral icterus [ ] Closed  ENMT:  [ ]Dry mouth  [x ]No external oral lesions [ ] No external ear or nose lesions  CARDIOVASCULAR:  [ ]Regular [ ]Irregular [ x]Tachy [ ]Not Tachy  [ ]Sung [ ] Edema [ ] No edema  PULMONARY:  [ ]Tachypnea  [ ]Audible excessive secretions [ x] No labored breathing [ ] labored breathing  GASTROINTESTINAL: [ ]Soft  [ ]Distended  [x ]Not distended [ ]Non tender [ ]Tender  MUSCULOSKELETAL: [ ]No clubbing [ ] clubbing  [ ] No cyanosis [ ] cyanosis  NEUROLOGIC: [ ]No focal deficits  [x ]Follows commands  [ ]Does not follow commands  [ x]Cognitive impairment  [ ]Dysphagia  [ ]Dysarthria  [ ]Paresis   SKIN: [ ] Jaundiced [ x] Non-jaundiced [ ]Rash [ ]No Rash [ ] Warm [ ] Dry  MISC/LINES: [ ] ET tube [ ] Trach [ ]NGT/OGT [ ]PEG [ ]David    LABS: reviewed by me                                   12.1   6.78  )-----------( 173      ( 02 Feb 2024 07:31 )             35.1       02-02    133<L>  |  98  |  37<H>  ----------------------------<  104<H>  5.1<H>   |  26  |  1.2    Ca    8.5      02 Feb 2024 07:31  Phos  3.7     02-02  Mg     2.1     02-02    TPro  4.9<L>  /  Alb  3.2<L>  /  TBili  0.4  /  DBili  x   /  AST  20  /  ALT  13  /  AlkPhos  62  02-02              Urinalysis Basic - ( 02 Feb 2024 07:31 )    Color: x / Appearance: x / SG: x / pH: x  Gluc: 104 mg/dL / Ketone: x  / Bili: x / Urobili: x   Blood: x / Protein: x / Nitrite: x   Leuk Esterase: x / RBC: x / WBC x   Sq Epi: x / Non Sq Epi: x / Bacteria: x                  CAPILLARY BLOOD GLUCOSE      POCT Blood Glucose.: 111 mg/dL (02 Feb 2024 07:43)              RADIOLOGY & ADDITIONAL STUDIES: reviewed by me    < from: NM SPECT Bone Scan, Single Area Single Day (02.01.24 @ 16:43) >  IMPRESSION:    Abnormal uptake in the L1 vertebral body, corresponding to known   compression fracture deformity secondary to vertebral body mass.    No other evidence for metastatic bone disease.    < end of copied text >        EKG: reviewed by me    < from: 12 Lead ECG (01.25.24 @ 11:34) >  Ventricular Rate 94 BPM    Atrial Rate 81 BPM    QRS Duration 90 ms    Q-T Interval 384 ms    QTC Calculation(Bazett) 480 ms    R Axis 166 degrees    T Axis -48 degrees    Diagnosis Line Atrial fibrillation  Right ventricular hypertrophy  Anterior infarct , age undetermined  ST & T wave abnormality, consider inferior ischemia  Abnormal ECG    < end of copied text >        PROTEIN CALORIE MALNUTRITION PRESENT: [ ]mild [ ]moderate [ ]severe [ ]underweight [ ]morbid obesity  https://www.andeal.org/vault/6080/web/files/ONC/Table_Clinical%20Characteristics%20to%20Document%20Malnutrition-White%20JV%20et%20al%202012.pdf    Height (cm): 152.4 (01-30-24 @ 11:24)  Weight (kg): 70.3 (01-30-24 @ 11:24)  BMI (kg/m2): 30.3 (01-30-24 @ 11:24)  [ ]PPSV2 < or = to 30% [ ]significant weight loss  [ ]poor nutritional intake  [ ]anasarca      [ ]Artificial Nutrition      Palliative Care Spiritual/Emotional Screening Tool Question  Severity (0-4):                    OR                    [ x] Unable to determine. Will assess at later time if appropriate.  Score of 2 or greater indicates recommendation of Chaplaincy and/or SW referral  Chaplaincy Referral: [ ] Yes [ ] Refused [ ] Following     Caregiver [place]bald:  [ ] Yes [ ] No    OR    [x ] Unable to determine. Will assess at later time if appropriate.  Social Work Referral [ ]  Patient and Family Centered Care Referral [ ]    Anticipatory Grief Present: [ ] Yes [ ] No    OR     [ x] Unable to determine. Will assess at later time if appropriate.  Social Work Referral [ ]  Patient and Family Centered Care Referral [ ]    Patient discussed with primary medical team MD  Palliative care education provided to patient and/or family

## 2024-02-02 NOTE — PROGRESS NOTE ADULT - ASSESSMENT
89yFemale with history of afib on eliquis, GERD, chronic low back pain presents after fall. Patient found to have lumbar pathological fracture on admission. Palliative care consulted for GOC and symptom management.    Patient seen at bedside with daughter. Palliative care introduced to daughter, Radha, who works in health care. She was able to provide a medical history and hospital course, and we noted that we're waiting for biopsy results to discuss next steps.  We discussed code status and DNR/DNI.  All questions answered.  Patient's daughter wishes to consider code status and talk with the patient alone about it. All questions answered.    Education about palliative care provided to patient/family.  See Recs below.    Please call [phone] with questions or concerns 24/7.   We will continue to follow.

## 2024-02-02 NOTE — PROGRESS NOTE ADULT - ASSESSMENT
89F w/ h/o chronic afib (on Eliquis), GERD, and chronic low back pain presenting s/p fall. Admitted to medicine for back pain 2/2 Lumbar compression fracture    #Lumbar Compression Fracture  #Pathologic Fracture   - CT lumbar spine:   Age-indeterminate severe compression deformity of L1 with mixed   lytic/sclerotic changes extending to the posterior elements, likely   pathologic in nature. Soft tissue component associated with the lytic   changes in the posterior inferior endplate of L1 encroaches the spinal   space resulting in L1-2 severe spinal stenosis and severe bilateral   foraminal stenosis.  - MRI L Spine - Malignant compression deformity of L1 with significant bowing of the   posterior cortex contributing to mass effect upon the conus  Plan:   - neuroSx consult-no surgical intervention, abd binder, TLSO brace on discharge, advised decadron   - IR biopsy done 1/30- official path report pending, but initial report with epithelial carcinoma   - Radiation Oncology consulted, and plan for 5 sessions of RT,  to be completed next tuesday   - 1/30 increased decadron to 4mg q12hrs IV   - Heme Onc consult appreciated   - Pain Management consult appreciated   - Palliative Consult  done today   tumor markers not conclusive   Bone scan -- L1 compression fracture sec to vertebral body mass    #CHF, diastolic - acute -- now resolved  Reports LE swelling over past 1-2 weeks.   Recently started on furosemide 20 qd for swelling.   No reported CP or dyspnea at rest or exertion (but functional status limited by lower back pain).   - TTE (May 2013) demonstrated LVEF 55-65% w/ normal global LVSF and no significant valvulopathy.  - TTE 1/28- EF 60-65% mod pulm HTN   - elevated BNP  - Tele monitored for 24 hrs with no dysrhythmia   - received 1x dose of Furosemide and 1x dose of Bumex but currently off diuretics   - monitor volume status closely, and resume diuretics if needed     #Chronic Atrial Fibrillation  CHADS-VASC 3+ (Age x2, Female). Only on AC. Previously on Lopressor, but only takes intermittently as HR currently rate-controlled off meds.  - resume eliquis     #GERD  - c/w pantoprazole 40mg PO QD (therapeutic interchange for omeprazole)    #CKD 3b   per PCP baseline is 1.2, currently 1.0    #Urinary retention-likely neurogenic   -hanson placed 1/29  -flomax increased to 0.8mg   - can attempt TOV when RT is completed and patient is able to ambulate     #Constipation  -senna, miralax, lactulose-- had bowel movement      PENDING:  RT for 5 sessions to be completed next tuesday. Roger Onc following,    spoke with daughter today.

## 2024-02-03 LAB
ALBUMIN SERPL ELPH-MCNC: 3.4 G/DL — LOW (ref 3.5–5.2)
ALP SERPL-CCNC: 62 U/L — SIGNIFICANT CHANGE UP (ref 30–115)
ALT FLD-CCNC: 14 U/L — SIGNIFICANT CHANGE UP (ref 0–41)
ANION GAP SERPL CALC-SCNC: 11 MMOL/L — SIGNIFICANT CHANGE UP (ref 7–14)
AST SERPL-CCNC: 22 U/L — SIGNIFICANT CHANGE UP (ref 0–41)
BASOPHILS # BLD AUTO: 0.01 K/UL — SIGNIFICANT CHANGE UP (ref 0–0.2)
BASOPHILS NFR BLD AUTO: 0.1 % — SIGNIFICANT CHANGE UP (ref 0–1)
BILIRUB SERPL-MCNC: 0.6 MG/DL — SIGNIFICANT CHANGE UP (ref 0.2–1.2)
BUN SERPL-MCNC: 34 MG/DL — HIGH (ref 10–20)
CALCIUM SERPL-MCNC: 9.1 MG/DL — SIGNIFICANT CHANGE UP (ref 8.4–10.5)
CHLORIDE SERPL-SCNC: 95 MMOL/L — LOW (ref 98–110)
CO2 SERPL-SCNC: 26 MMOL/L — SIGNIFICANT CHANGE UP (ref 17–32)
CREAT SERPL-MCNC: 1.1 MG/DL — SIGNIFICANT CHANGE UP (ref 0.7–1.5)
EGFR: 48 ML/MIN/1.73M2 — LOW
EOSINOPHIL # BLD AUTO: 0 K/UL — SIGNIFICANT CHANGE UP (ref 0–0.7)
EOSINOPHIL NFR BLD AUTO: 0 % — SIGNIFICANT CHANGE UP (ref 0–8)
GLUCOSE SERPL-MCNC: 95 MG/DL — SIGNIFICANT CHANGE UP (ref 70–99)
HCT VFR BLD CALC: 38.1 % — SIGNIFICANT CHANGE UP (ref 37–47)
HGB BLD-MCNC: 13 G/DL — SIGNIFICANT CHANGE UP (ref 12–16)
IMM GRANULOCYTES NFR BLD AUTO: 0.7 % — HIGH (ref 0.1–0.3)
LYMPHOCYTES # BLD AUTO: 0.61 K/UL — LOW (ref 1.2–3.4)
LYMPHOCYTES # BLD AUTO: 8.9 % — LOW (ref 20.5–51.1)
MAGNESIUM SERPL-MCNC: 2 MG/DL — SIGNIFICANT CHANGE UP (ref 1.8–2.4)
MCHC RBC-ENTMCNC: 32.6 PG — HIGH (ref 27–31)
MCHC RBC-ENTMCNC: 34.1 G/DL — SIGNIFICANT CHANGE UP (ref 32–37)
MCV RBC AUTO: 95.5 FL — SIGNIFICANT CHANGE UP (ref 81–99)
MONOCYTES # BLD AUTO: 0.34 K/UL — SIGNIFICANT CHANGE UP (ref 0.1–0.6)
MONOCYTES NFR BLD AUTO: 4.9 % — SIGNIFICANT CHANGE UP (ref 1.7–9.3)
NEUTROPHILS # BLD AUTO: 5.87 K/UL — SIGNIFICANT CHANGE UP (ref 1.4–6.5)
NEUTROPHILS NFR BLD AUTO: 85.4 % — HIGH (ref 42.2–75.2)
NRBC # BLD: 0 /100 WBCS — SIGNIFICANT CHANGE UP (ref 0–0)
PHOSPHATE SERPL-MCNC: 4 MG/DL — SIGNIFICANT CHANGE UP (ref 2.1–4.9)
PLATELET # BLD AUTO: 206 K/UL — SIGNIFICANT CHANGE UP (ref 130–400)
PMV BLD: 9.5 FL — SIGNIFICANT CHANGE UP (ref 7.4–10.4)
POTASSIUM SERPL-MCNC: 4.5 MMOL/L — SIGNIFICANT CHANGE UP (ref 3.5–5)
POTASSIUM SERPL-SCNC: 4.5 MMOL/L — SIGNIFICANT CHANGE UP (ref 3.5–5)
PROT SERPL-MCNC: 5.5 G/DL — LOW (ref 6–8)
RBC # BLD: 3.99 M/UL — LOW (ref 4.2–5.4)
RBC # FLD: 13.4 % — SIGNIFICANT CHANGE UP (ref 11.5–14.5)
SODIUM SERPL-SCNC: 132 MMOL/L — LOW (ref 135–146)
WBC # BLD: 6.88 K/UL — SIGNIFICANT CHANGE UP (ref 4.8–10.8)
WBC # FLD AUTO: 6.88 K/UL — SIGNIFICANT CHANGE UP (ref 4.8–10.8)

## 2024-02-03 PROCEDURE — 99232 SBSQ HOSP IP/OBS MODERATE 35: CPT

## 2024-02-03 RX ORDER — FUROSEMIDE 40 MG
20 TABLET ORAL ONCE
Refills: 0 | Status: COMPLETED | OUTPATIENT
Start: 2024-02-03 | End: 2024-02-03

## 2024-02-03 RX ADMIN — TAMSULOSIN HYDROCHLORIDE 0.8 MILLIGRAM(S): 0.4 CAPSULE ORAL at 21:27

## 2024-02-03 RX ADMIN — Medication 1 TABLET(S): at 11:32

## 2024-02-03 RX ADMIN — GABAPENTIN 300 MILLIGRAM(S): 400 CAPSULE ORAL at 21:28

## 2024-02-03 RX ADMIN — APIXABAN 5 MILLIGRAM(S): 2.5 TABLET, FILM COATED ORAL at 07:56

## 2024-02-03 RX ADMIN — OXYCODONE HYDROCHLORIDE 15 MILLIGRAM(S): 5 TABLET ORAL at 11:43

## 2024-02-03 RX ADMIN — Medication 4 MILLIGRAM(S): at 07:56

## 2024-02-03 RX ADMIN — METHOCARBAMOL 750 MILLIGRAM(S): 500 TABLET, FILM COATED ORAL at 05:27

## 2024-02-03 RX ADMIN — Medication 1 SPRAY(S): at 05:33

## 2024-02-03 RX ADMIN — Medication 975 MILLIGRAM(S): at 05:29

## 2024-02-03 RX ADMIN — Medication 975 MILLIGRAM(S): at 13:30

## 2024-02-03 RX ADMIN — POLYETHYLENE GLYCOL 3350 17 GRAM(S): 17 POWDER, FOR SOLUTION ORAL at 17:01

## 2024-02-03 RX ADMIN — ROPINIROLE 0.25 MILLIGRAM(S): 8 TABLET, FILM COATED, EXTENDED RELEASE ORAL at 05:28

## 2024-02-03 RX ADMIN — METHOCARBAMOL 750 MILLIGRAM(S): 500 TABLET, FILM COATED ORAL at 21:28

## 2024-02-03 RX ADMIN — POLYETHYLENE GLYCOL 3350 17 GRAM(S): 17 POWDER, FOR SOLUTION ORAL at 05:31

## 2024-02-03 RX ADMIN — GABAPENTIN 300 MILLIGRAM(S): 400 CAPSULE ORAL at 13:38

## 2024-02-03 RX ADMIN — PANTOPRAZOLE SODIUM 40 MILLIGRAM(S): 20 TABLET, DELAYED RELEASE ORAL at 05:27

## 2024-02-03 RX ADMIN — Medication 12.5 MILLIGRAM(S): at 05:28

## 2024-02-03 RX ADMIN — Medication 5000 UNIT(S): at 11:33

## 2024-02-03 RX ADMIN — GABAPENTIN 300 MILLIGRAM(S): 400 CAPSULE ORAL at 05:28

## 2024-02-03 RX ADMIN — OXYCODONE HYDROCHLORIDE 15 MILLIGRAM(S): 5 TABLET ORAL at 12:15

## 2024-02-03 RX ADMIN — LACTULOSE 20 GRAM(S): 10 SOLUTION ORAL at 17:40

## 2024-02-03 RX ADMIN — Medication 975 MILLIGRAM(S): at 13:38

## 2024-02-03 RX ADMIN — LACTULOSE 20 GRAM(S): 10 SOLUTION ORAL at 11:48

## 2024-02-03 RX ADMIN — ROPINIROLE 0.25 MILLIGRAM(S): 8 TABLET, FILM COATED, EXTENDED RELEASE ORAL at 17:01

## 2024-02-03 RX ADMIN — Medication 1 SPRAY(S): at 17:01

## 2024-02-03 RX ADMIN — METHOCARBAMOL 750 MILLIGRAM(S): 500 TABLET, FILM COATED ORAL at 13:38

## 2024-02-03 RX ADMIN — Medication 975 MILLIGRAM(S): at 21:29

## 2024-02-03 RX ADMIN — CHLORHEXIDINE GLUCONATE 1 APPLICATION(S): 213 SOLUTION TOPICAL at 11:34

## 2024-02-03 RX ADMIN — Medication 12.5 MILLIGRAM(S): at 17:01

## 2024-02-03 RX ADMIN — APIXABAN 5 MILLIGRAM(S): 2.5 TABLET, FILM COATED ORAL at 21:28

## 2024-02-03 RX ADMIN — SENNA PLUS 2 TABLET(S): 8.6 TABLET ORAL at 21:28

## 2024-02-03 RX ADMIN — Medication 20 MILLIGRAM(S): at 17:01

## 2024-02-03 RX ADMIN — Medication 4 MILLIGRAM(S): at 17:01

## 2024-02-03 NOTE — PROGRESS NOTE ADULT - ASSESSMENT
89F w/ h/o chronic afib (on Eliquis), GERD, and chronic low back pain presenting s/p fall. Admitted to medicine for back pain 2/2 Lumbar compression fracture    #Lumbar Compression Fracture  #Pathologic Fracture   - CT lumbar spine:   Age-indeterminate severe compression deformity of L1 with mixed   lytic/sclerotic changes extending to the posterior elements, likely   pathologic in nature. Soft tissue component associated with the lytic   changes in the posterior inferior endplate of L1 encroaches the spinal   space resulting in L1-2 severe spinal stenosis and severe bilateral   foraminal stenosis.  - MRI L Spine - Malignant compression deformity of L1 with significant bowing of the   posterior cortex contributing to mass effect upon the conus  Plan:   - neuroSx consult-no surgical intervention, abd binder, TLSO brace on discharge, advised decadron   - IR biopsy done 1/30- official path report pending, but initial report with epithelial carcinoma   - Radiation Oncology consulted, and plan for 5 sessions of RT,  to be completed next tuesday   - 1/30 increased decadron to 4mg q12hrs IV   - Heme Onc consult appreciated   - Pain Management consult appreciated   - Palliative Consult  done today   tumor markers not conclusive   Bone scan -- L1 compression fracture sec to vertebral body mass    #CHF, diastolic - acute -- now resolved  Reports LE swelling over past 1-2 weeks.   Recently started on furosemide 20 qd for swelling.   No reported CP or dyspnea at rest or exertion (but functional status limited by lower back pain).   - TTE (May 2013) demonstrated LVEF 55-65% w/ normal global LVSF and no significant valvulopathy.  - TTE 1/28- EF 60-65% mod pulm HTN   - elevated BNP  - Tele monitored for 24 hrs with no dysrhythmia   - received 1x dose of Furosemide and 1x dose of Bumex but currently off diuretics   - monitor volume status closely, and  will give her lasix 20mg once today    #Chronic Atrial Fibrillation  CHADS-VASC 3+ (Age x2, Female). Only on AC. Previously on Lopressor, but only takes intermittently as HR currently rate-controlled off meds.  - resume eliquis     #GERD  - c/w pantoprazole 40mg PO QD (therapeutic interchange for omeprazole)    #CKD 3b   per PCP baseline is 1.2, currently 1.0    #Urinary retention-likely neurogenic   -hanson placed 1/29  -flomax increased to 0.8mg   - can attempt TOV when RT is completed and patient is able to ambulate     #Constipation  -senna, miralax, lactulose--  bowel movements once in 2-3 days.      PENDING:  RT for 5 sessions to be completed next tuesday. Heme Onc following,

## 2024-02-03 NOTE — PROGRESS NOTE ADULT - SUBJECTIVE AND OBJECTIVE BOX
SUBJECTIVE:    Patient is a 89y old Female who presents with a chief complaint of Back Pain (02 Feb 2024 17:23)    Currently admitted to medicine with the primary diagnosis of Inability to ambulate due to multiple joints       Today is hospital day 10d.     PAST MEDICAL & SURGICAL HISTORY  Chronic atrial fibrillation    GERD (gastroesophageal reflux disease)    Chronic lower back pain    No significant past surgical history      ALLERGIES:  No Known Allergies    MEDICATIONS:  STANDING MEDICATIONS  acetaminophen     Tablet .. 975 milliGRAM(s) Oral every 8 hours  apixaban 5 milliGRAM(s) Oral every 12 hours  calcium carbonate   1250 mG (OsCal) 1 Tablet(s) Oral daily  chlorhexidine 2% Cloths 1 Application(s) Topical daily  cholecalciferol 5000 Unit(s) Oral daily  dexAMETHasone  Injectable 4 milliGRAM(s) IV Push <User Schedule>  fluticasone propionate 50 MICROgram(s)/spray Nasal Spray 1 Spray(s) Both Nostrils two times a day  gabapentin 300 milliGRAM(s) Oral three times a day  lactated ringers. 1000 milliLiter(s) IV Continuous <Continuous>  lactulose Syrup 20 Gram(s) Oral every 4 hours  lidocaine   4% Patch 1 Patch Transdermal every 24 hours  methocarbamol 750 milliGRAM(s) Oral every 8 hours  metoprolol tartrate 12.5 milliGRAM(s) Oral two times a day  multivitamin 1 Tablet(s) Oral daily  pantoprazole    Tablet 40 milliGRAM(s) Oral before breakfast  polyethylene glycol 3350 17 Gram(s) Oral two times a day  rOPINIRole 0.25 milliGRAM(s) Oral two times a day  senna 2 Tablet(s) Oral at bedtime  tamsulosin 0.8 milliGRAM(s) Oral at bedtime    PRN MEDICATIONS  oxyCODONE    IR 15 milliGRAM(s) Oral every 6 hours PRN    VITALS:   T(F): 96.8  HR: 75  BP: 167/85  RR: 18  SpO2: --    LABS:                        13.0   6.88  )-----------( 206      ( 03 Feb 2024 08:05 )             38.1     02-03    132<L>  |  95<L>  |  34<H>  ----------------------------<  95  4.5   |  26  |  1.1    Ca    9.1      03 Feb 2024 08:05  Phos  4.0     02-03  Mg     2.0     02-03    TPro  5.5<L>  /  Alb  3.4<L>  /  TBili  0.6  /  DBili  x   /  AST  22  /  ALT  14  /  AlkPhos  62  02-03      Urinalysis Basic - ( 03 Feb 2024 08:05 )    Color: x / Appearance: x / SG: x / pH: x  Gluc: 95 mg/dL / Ketone: x  / Bili: x / Urobili: x   Blood: x / Protein: x / Nitrite: x   Leuk Esterase: x / RBC: x / WBC x   Sq Epi: x / Non Sq Epi: x / Bacteria: x                RADIOLOGY:    PHYSICAL EXAM:  GEN: No acute distress  LUNGS: Clear to auscultation bilaterally   HEART: S1/S2 present. RRR.   ABD/ GI: Soft, non-tender, non-distended. Bowel sounds present  EXT: NC/NC/NE/2+PP/DIEHL  NEURO: AAOX3

## 2024-02-04 LAB
ALBUMIN SERPL ELPH-MCNC: 3.3 G/DL — LOW (ref 3.5–5.2)
ALP SERPL-CCNC: 66 U/L — SIGNIFICANT CHANGE UP (ref 30–115)
ALT FLD-CCNC: 15 U/L — SIGNIFICANT CHANGE UP (ref 0–41)
ANION GAP SERPL CALC-SCNC: 7 MMOL/L — SIGNIFICANT CHANGE UP (ref 7–14)
AST SERPL-CCNC: 22 U/L — SIGNIFICANT CHANGE UP (ref 0–41)
BASOPHILS # BLD AUTO: 0.01 K/UL — SIGNIFICANT CHANGE UP (ref 0–0.2)
BASOPHILS NFR BLD AUTO: 0.1 % — SIGNIFICANT CHANGE UP (ref 0–1)
BILIRUB SERPL-MCNC: 0.5 MG/DL — SIGNIFICANT CHANGE UP (ref 0.2–1.2)
BUN SERPL-MCNC: 31 MG/DL — HIGH (ref 10–20)
CALCIUM SERPL-MCNC: 8.9 MG/DL — SIGNIFICANT CHANGE UP (ref 8.4–10.4)
CHLORIDE SERPL-SCNC: 98 MMOL/L — SIGNIFICANT CHANGE UP (ref 98–110)
CO2 SERPL-SCNC: 27 MMOL/L — SIGNIFICANT CHANGE UP (ref 17–32)
CREAT SERPL-MCNC: 1.1 MG/DL — SIGNIFICANT CHANGE UP (ref 0.7–1.5)
EGFR: 48 ML/MIN/1.73M2 — LOW
EOSINOPHIL # BLD AUTO: 0.06 K/UL — SIGNIFICANT CHANGE UP (ref 0–0.7)
EOSINOPHIL NFR BLD AUTO: 0.7 % — SIGNIFICANT CHANGE UP (ref 0–8)
GLUCOSE SERPL-MCNC: 101 MG/DL — HIGH (ref 70–99)
HCT VFR BLD CALC: 35.9 % — LOW (ref 37–47)
HGB BLD-MCNC: 12.3 G/DL — SIGNIFICANT CHANGE UP (ref 12–16)
IMM GRANULOCYTES NFR BLD AUTO: 0.9 % — HIGH (ref 0.1–0.3)
LYMPHOCYTES # BLD AUTO: 0.7 K/UL — LOW (ref 1.2–3.4)
LYMPHOCYTES # BLD AUTO: 8.6 % — LOW (ref 20.5–51.1)
MAGNESIUM SERPL-MCNC: 2.1 MG/DL — SIGNIFICANT CHANGE UP (ref 1.8–2.4)
MCHC RBC-ENTMCNC: 32.3 PG — HIGH (ref 27–31)
MCHC RBC-ENTMCNC: 34.3 G/DL — SIGNIFICANT CHANGE UP (ref 32–37)
MCV RBC AUTO: 94.2 FL — SIGNIFICANT CHANGE UP (ref 81–99)
MONOCYTES # BLD AUTO: 0.5 K/UL — SIGNIFICANT CHANGE UP (ref 0.1–0.6)
MONOCYTES NFR BLD AUTO: 6.1 % — SIGNIFICANT CHANGE UP (ref 1.7–9.3)
NEUTROPHILS # BLD AUTO: 6.81 K/UL — HIGH (ref 1.4–6.5)
NEUTROPHILS NFR BLD AUTO: 83.6 % — HIGH (ref 42.2–75.2)
NRBC # BLD: 0 /100 WBCS — SIGNIFICANT CHANGE UP (ref 0–0)
PHOSPHATE SERPL-MCNC: 3.4 MG/DL — SIGNIFICANT CHANGE UP (ref 2.1–4.9)
PLATELET # BLD AUTO: 201 K/UL — SIGNIFICANT CHANGE UP (ref 130–400)
PMV BLD: 9.5 FL — SIGNIFICANT CHANGE UP (ref 7.4–10.4)
POTASSIUM SERPL-MCNC: 4.7 MMOL/L — SIGNIFICANT CHANGE UP (ref 3.5–5)
POTASSIUM SERPL-SCNC: 4.7 MMOL/L — SIGNIFICANT CHANGE UP (ref 3.5–5)
PROT SERPL-MCNC: 5.1 G/DL — LOW (ref 6–8)
RBC # BLD: 3.81 M/UL — LOW (ref 4.2–5.4)
RBC # FLD: 13.2 % — SIGNIFICANT CHANGE UP (ref 11.5–14.5)
SODIUM SERPL-SCNC: 132 MMOL/L — LOW (ref 135–146)
WBC # BLD: 8.15 K/UL — SIGNIFICANT CHANGE UP (ref 4.8–10.8)
WBC # FLD AUTO: 8.15 K/UL — SIGNIFICANT CHANGE UP (ref 4.8–10.8)

## 2024-02-04 PROCEDURE — 99232 SBSQ HOSP IP/OBS MODERATE 35: CPT

## 2024-02-04 RX ORDER — LISINOPRIL 2.5 MG/1
10 TABLET ORAL DAILY
Refills: 0 | Status: DISCONTINUED | OUTPATIENT
Start: 2024-02-04 | End: 2024-02-15

## 2024-02-04 RX ADMIN — Medication 5000 UNIT(S): at 11:21

## 2024-02-04 RX ADMIN — GABAPENTIN 300 MILLIGRAM(S): 400 CAPSULE ORAL at 21:11

## 2024-02-04 RX ADMIN — Medication 4 MILLIGRAM(S): at 17:36

## 2024-02-04 RX ADMIN — APIXABAN 5 MILLIGRAM(S): 2.5 TABLET, FILM COATED ORAL at 08:26

## 2024-02-04 RX ADMIN — GABAPENTIN 300 MILLIGRAM(S): 400 CAPSULE ORAL at 05:32

## 2024-02-04 RX ADMIN — Medication 12.5 MILLIGRAM(S): at 17:35

## 2024-02-04 RX ADMIN — OXYCODONE HYDROCHLORIDE 15 MILLIGRAM(S): 5 TABLET ORAL at 11:20

## 2024-02-04 RX ADMIN — METHOCARBAMOL 750 MILLIGRAM(S): 500 TABLET, FILM COATED ORAL at 05:32

## 2024-02-04 RX ADMIN — LACTULOSE 20 GRAM(S): 10 SOLUTION ORAL at 11:21

## 2024-02-04 RX ADMIN — GABAPENTIN 300 MILLIGRAM(S): 400 CAPSULE ORAL at 14:48

## 2024-02-04 RX ADMIN — POLYETHYLENE GLYCOL 3350 17 GRAM(S): 17 POWDER, FOR SOLUTION ORAL at 17:36

## 2024-02-04 RX ADMIN — LACTULOSE 20 GRAM(S): 10 SOLUTION ORAL at 14:49

## 2024-02-04 RX ADMIN — ROPINIROLE 0.25 MILLIGRAM(S): 8 TABLET, FILM COATED, EXTENDED RELEASE ORAL at 17:36

## 2024-02-04 RX ADMIN — METHOCARBAMOL 750 MILLIGRAM(S): 500 TABLET, FILM COATED ORAL at 21:10

## 2024-02-04 RX ADMIN — CHLORHEXIDINE GLUCONATE 1 APPLICATION(S): 213 SOLUTION TOPICAL at 11:20

## 2024-02-04 RX ADMIN — Medication 1 TABLET(S): at 11:21

## 2024-02-04 RX ADMIN — SENNA PLUS 2 TABLET(S): 8.6 TABLET ORAL at 21:11

## 2024-02-04 RX ADMIN — Medication 4 MILLIGRAM(S): at 08:38

## 2024-02-04 RX ADMIN — LISINOPRIL 10 MILLIGRAM(S): 2.5 TABLET ORAL at 14:49

## 2024-02-04 RX ADMIN — Medication 1 SPRAY(S): at 17:44

## 2024-02-04 RX ADMIN — TAMSULOSIN HYDROCHLORIDE 0.8 MILLIGRAM(S): 0.4 CAPSULE ORAL at 21:11

## 2024-02-04 RX ADMIN — APIXABAN 5 MILLIGRAM(S): 2.5 TABLET, FILM COATED ORAL at 21:12

## 2024-02-04 RX ADMIN — ROPINIROLE 0.25 MILLIGRAM(S): 8 TABLET, FILM COATED, EXTENDED RELEASE ORAL at 05:32

## 2024-02-04 RX ADMIN — Medication 1 ENEMA: at 17:34

## 2024-02-04 RX ADMIN — METHOCARBAMOL 750 MILLIGRAM(S): 500 TABLET, FILM COATED ORAL at 14:48

## 2024-02-04 RX ADMIN — PANTOPRAZOLE SODIUM 40 MILLIGRAM(S): 20 TABLET, DELAYED RELEASE ORAL at 05:31

## 2024-02-04 RX ADMIN — Medication 975 MILLIGRAM(S): at 05:33

## 2024-02-04 RX ADMIN — Medication 975 MILLIGRAM(S): at 21:12

## 2024-02-04 RX ADMIN — LACTULOSE 20 GRAM(S): 10 SOLUTION ORAL at 17:34

## 2024-02-04 RX ADMIN — Medication 975 MILLIGRAM(S): at 14:48

## 2024-02-04 RX ADMIN — Medication 1 TABLET(S): at 11:23

## 2024-02-04 RX ADMIN — Medication 12.5 MILLIGRAM(S): at 05:32

## 2024-02-04 NOTE — PROGRESS NOTE ADULT - SUBJECTIVE AND OBJECTIVE BOX
SUBJECTIVE:    Patient is a 89y old Female who presents with a chief complaint of Back Pain (03 Feb 2024 15:56)    Currently admitted to medicine with the primary diagnosis of Inability to ambulate due to multiple joints       Today is hospital day 11d.     PAST MEDICAL & SURGICAL HISTORY  Chronic atrial fibrillation    GERD (gastroesophageal reflux disease)    Chronic lower back pain    No significant past surgical history      ALLERGIES:  No Known Allergies    MEDICATIONS:  STANDING MEDICATIONS  acetaminophen     Tablet .. 975 milliGRAM(s) Oral every 8 hours  apixaban 5 milliGRAM(s) Oral every 12 hours  calcium carbonate   1250 mG (OsCal) 1 Tablet(s) Oral daily  chlorhexidine 2% Cloths 1 Application(s) Topical daily  cholecalciferol 5000 Unit(s) Oral daily  dexAMETHasone  Injectable 4 milliGRAM(s) IV Push <User Schedule>  fluticasone propionate 50 MICROgram(s)/spray Nasal Spray 1 Spray(s) Both Nostrils two times a day  gabapentin 300 milliGRAM(s) Oral three times a day  lactated ringers. 1000 milliLiter(s) IV Continuous <Continuous>  lactulose Syrup 20 Gram(s) Oral every 4 hours  lidocaine   4% Patch 1 Patch Transdermal every 24 hours  methocarbamol 750 milliGRAM(s) Oral every 8 hours  metoprolol tartrate 12.5 milliGRAM(s) Oral two times a day  multivitamin 1 Tablet(s) Oral daily  pantoprazole    Tablet 40 milliGRAM(s) Oral before breakfast  polyethylene glycol 3350 17 Gram(s) Oral two times a day  rOPINIRole 0.25 milliGRAM(s) Oral two times a day  saline laxative (FLEET) Rectal Enema 1 Enema Rectal once  senna 2 Tablet(s) Oral at bedtime  tamsulosin 0.8 milliGRAM(s) Oral at bedtime    PRN MEDICATIONS  oxyCODONE    IR 15 milliGRAM(s) Oral every 6 hours PRN    VITALS:   T(F): 97.4  HR: 77  BP: 172/82  RR: 18  SpO2: --    LABS:                        12.3   8.15  )-----------( 201      ( 04 Feb 2024 07:52 )             35.9     02-04    132<L>  |  98  |  31<H>  ----------------------------<  101<H>  4.7   |  27  |  1.1    Ca    8.9      04 Feb 2024 07:52  Phos  3.4     02-04  Mg     2.1     02-04    TPro  5.1<L>  /  Alb  3.3<L>  /  TBili  0.5  /  DBili  x   /  AST  22  /  ALT  15  /  AlkPhos  66  02-04      Urinalysis Basic - ( 04 Feb 2024 07:52 )    Color: x / Appearance: x / SG: x / pH: x  Gluc: 101 mg/dL / Ketone: x  / Bili: x / Urobili: x   Blood: x / Protein: x / Nitrite: x   Leuk Esterase: x / RBC: x / WBC x   Sq Epi: x / Non Sq Epi: x / Bacteria: x                RADIOLOGY:    PHYSICAL EXAM:  GEN: No acute distress  LUNGS: Clear to auscultation bilaterally   HEART: S1/S2 present. RRR.   ABD/ GI: Soft, non-tender, non-distended. Bowel sounds present  EXT: lymphedema lower ext  NEURO: AAOX3

## 2024-02-04 NOTE — PROGRESS NOTE ADULT - ASSESSMENT
89F w/ h/o chronic afib (on Eliquis), GERD, and chronic low back pain presenting s/p fall. Admitted to medicine for back pain 2/2 Lumbar compression fracture    #Lumbar Compression Fracture  #Pathologic Fracture   - CT lumbar spine:   Age-indeterminate severe compression deformity of L1 with mixed   lytic/sclerotic changes extending to the posterior elements, likely   pathologic in nature. Soft tissue component associated with the lytic   changes in the posterior inferior endplate of L1 encroaches the spinal   space resulting in L1-2 severe spinal stenosis and severe bilateral   foraminal stenosis.  - MRI L Spine - Malignant compression deformity of L1 with significant bowing of the   posterior cortex contributing to mass effect upon the conus  Plan:   - neuroSx consult-no surgical intervention, abd binder, TLSO brace on discharge, advised decadron   - IR biopsy done 1/30- official path report pending, but initial report with epithelial carcinoma   - Radiation Oncology consulted, and plan for 5 sessions of RT,  to be completed next tuesday   - 1/30 increased decadron to 4mg q12hrs IV   - Heme Onc consult appreciated   - Pain Management consult appreciated   - Palliative Consult  done today   tumor markers not conclusive   Bone scan -- L1 compression fracture sec to vertebral body mass    #CHF, diastolic - acute -- now resolved  Reports LE swelling over past 1-2 weeks.   Recently started on furosemide 20 qd for swelling.   No reported CP or dyspnea at rest or exertion (but functional status limited by lower back pain).   - TTE (May 2013) demonstrated LVEF 55-65% w/ normal global LVSF and no significant valvulopathy.  - TTE 1/28- EF 60-65% mod pulm HTN   - elevated BNP  - Tele monitored for 24 hrs with no dysrhythmia   - received few doses of Furosemide and 1x dose of Bumex but currently off diuretics   - monitor volume status closely, lasix 20mg given 2/3/24    #Chronic Atrial Fibrillation  CHADS-VASC 3+ (Age x2, Female). Only on AC. Previously on Lopressor, but only takes intermittently as HR currently rate-controlled off meds.  - resume eliquis     #GERD  - c/w pantoprazole 40mg PO QD (therapeutic interchange for omeprazole)    #CKD 3b   per PCP baseline is 1.2, currently 1.0    #Urinary retention-likely neurogenic   -hanson placed 1/29  -flomax increased to 0.8mg   - can attempt TOV when RT is completed and patient is able to ambulate     #Constipation  -senna, miralax, lactulose--  bowel movements once in 2-3 days. enema given  # HTN-- on metoprolol and started on lisinopril 10mg 2/4      PENDING:  RT for 5 sessions to be completed next tuesday. Heme Onc following,    spoke with daughter on phone

## 2024-02-05 DIAGNOSIS — C68.9 MALIGNANT NEOPLASM OF URINARY ORGAN, UNSPECIFIED: ICD-10-CM

## 2024-02-05 LAB
ALBUMIN SERPL ELPH-MCNC: 3.4 G/DL — LOW (ref 3.5–5.2)
ALP SERPL-CCNC: 68 U/L — SIGNIFICANT CHANGE UP (ref 30–115)
ALT FLD-CCNC: 17 U/L — SIGNIFICANT CHANGE UP (ref 0–41)
ANION GAP SERPL CALC-SCNC: 10 MMOL/L — SIGNIFICANT CHANGE UP (ref 7–14)
AST SERPL-CCNC: 22 U/L — SIGNIFICANT CHANGE UP (ref 0–41)
BASOPHILS # BLD AUTO: 0.02 K/UL — SIGNIFICANT CHANGE UP (ref 0–0.2)
BASOPHILS NFR BLD AUTO: 0.2 % — SIGNIFICANT CHANGE UP (ref 0–1)
BILIRUB SERPL-MCNC: 0.6 MG/DL — SIGNIFICANT CHANGE UP (ref 0.2–1.2)
BUN SERPL-MCNC: 30 MG/DL — HIGH (ref 10–20)
CALCIUM SERPL-MCNC: 8.7 MG/DL — SIGNIFICANT CHANGE UP (ref 8.4–10.5)
CHLORIDE SERPL-SCNC: 97 MMOL/L — LOW (ref 98–110)
CO2 SERPL-SCNC: 27 MMOL/L — SIGNIFICANT CHANGE UP (ref 17–32)
CREAT SERPL-MCNC: 1 MG/DL — SIGNIFICANT CHANGE UP (ref 0.7–1.5)
EGFR: 54 ML/MIN/1.73M2 — LOW
EOSINOPHIL # BLD AUTO: 0.01 K/UL — SIGNIFICANT CHANGE UP (ref 0–0.7)
EOSINOPHIL NFR BLD AUTO: 0.1 % — SIGNIFICANT CHANGE UP (ref 0–8)
GLUCOSE SERPL-MCNC: 95 MG/DL — SIGNIFICANT CHANGE UP (ref 70–99)
HCT VFR BLD CALC: 39.3 % — SIGNIFICANT CHANGE UP (ref 37–47)
HGB BLD-MCNC: 13.3 G/DL — SIGNIFICANT CHANGE UP (ref 12–16)
IMM GRANULOCYTES NFR BLD AUTO: 1.6 % — HIGH (ref 0.1–0.3)
LYMPHOCYTES # BLD AUTO: 0.6 K/UL — LOW (ref 1.2–3.4)
LYMPHOCYTES # BLD AUTO: 7.2 % — LOW (ref 20.5–51.1)
MAGNESIUM SERPL-MCNC: 1.9 MG/DL — SIGNIFICANT CHANGE UP (ref 1.8–2.4)
MCHC RBC-ENTMCNC: 32.6 PG — HIGH (ref 27–31)
MCHC RBC-ENTMCNC: 33.8 G/DL — SIGNIFICANT CHANGE UP (ref 32–37)
MCV RBC AUTO: 96.3 FL — SIGNIFICANT CHANGE UP (ref 81–99)
MONOCYTES # BLD AUTO: 0.44 K/UL — SIGNIFICANT CHANGE UP (ref 0.1–0.6)
MONOCYTES NFR BLD AUTO: 5.3 % — SIGNIFICANT CHANGE UP (ref 1.7–9.3)
NEUTROPHILS # BLD AUTO: 7.11 K/UL — HIGH (ref 1.4–6.5)
NEUTROPHILS NFR BLD AUTO: 85.6 % — HIGH (ref 42.2–75.2)
NRBC # BLD: 0 /100 WBCS — SIGNIFICANT CHANGE UP (ref 0–0)
PHOSPHATE SERPL-MCNC: 3.2 MG/DL — SIGNIFICANT CHANGE UP (ref 2.1–4.9)
PLATELET # BLD AUTO: 213 K/UL — SIGNIFICANT CHANGE UP (ref 130–400)
PMV BLD: 9.7 FL — SIGNIFICANT CHANGE UP (ref 7.4–10.4)
POTASSIUM SERPL-MCNC: 4.3 MMOL/L — SIGNIFICANT CHANGE UP (ref 3.5–5)
POTASSIUM SERPL-SCNC: 4.3 MMOL/L — SIGNIFICANT CHANGE UP (ref 3.5–5)
PROT SERPL-MCNC: 5.3 G/DL — LOW (ref 6–8)
RBC # BLD: 4.08 M/UL — LOW (ref 4.2–5.4)
RBC # FLD: 13.4 % — SIGNIFICANT CHANGE UP (ref 11.5–14.5)
SODIUM SERPL-SCNC: 134 MMOL/L — LOW (ref 135–146)
WBC # BLD: 8.31 K/UL — SIGNIFICANT CHANGE UP (ref 4.8–10.8)
WBC # FLD AUTO: 8.31 K/UL — SIGNIFICANT CHANGE UP (ref 4.8–10.8)

## 2024-02-05 PROCEDURE — 99232 SBSQ HOSP IP/OBS MODERATE 35: CPT

## 2024-02-05 PROCEDURE — 99233 SBSQ HOSP IP/OBS HIGH 50: CPT

## 2024-02-05 RX ADMIN — Medication 1 SPRAY(S): at 17:46

## 2024-02-05 RX ADMIN — LACTULOSE 20 GRAM(S): 10 SOLUTION ORAL at 17:48

## 2024-02-05 RX ADMIN — POLYETHYLENE GLYCOL 3350 17 GRAM(S): 17 POWDER, FOR SOLUTION ORAL at 17:49

## 2024-02-05 RX ADMIN — CHLORHEXIDINE GLUCONATE 1 APPLICATION(S): 213 SOLUTION TOPICAL at 11:25

## 2024-02-05 RX ADMIN — ROPINIROLE 0.25 MILLIGRAM(S): 8 TABLET, FILM COATED, EXTENDED RELEASE ORAL at 05:25

## 2024-02-05 RX ADMIN — METHOCARBAMOL 750 MILLIGRAM(S): 500 TABLET, FILM COATED ORAL at 13:17

## 2024-02-05 RX ADMIN — METHOCARBAMOL 750 MILLIGRAM(S): 500 TABLET, FILM COATED ORAL at 21:34

## 2024-02-05 RX ADMIN — GABAPENTIN 300 MILLIGRAM(S): 400 CAPSULE ORAL at 05:27

## 2024-02-05 RX ADMIN — POLYETHYLENE GLYCOL 3350 17 GRAM(S): 17 POWDER, FOR SOLUTION ORAL at 05:35

## 2024-02-05 RX ADMIN — OXYCODONE HYDROCHLORIDE 15 MILLIGRAM(S): 5 TABLET ORAL at 07:39

## 2024-02-05 RX ADMIN — GABAPENTIN 300 MILLIGRAM(S): 400 CAPSULE ORAL at 21:35

## 2024-02-05 RX ADMIN — Medication 1 TABLET(S): at 11:24

## 2024-02-05 RX ADMIN — Medication 12.5 MILLIGRAM(S): at 17:48

## 2024-02-05 RX ADMIN — Medication 5000 UNIT(S): at 11:26

## 2024-02-05 RX ADMIN — Medication 4 MILLIGRAM(S): at 07:41

## 2024-02-05 RX ADMIN — TAMSULOSIN HYDROCHLORIDE 0.8 MILLIGRAM(S): 0.4 CAPSULE ORAL at 21:35

## 2024-02-05 RX ADMIN — LISINOPRIL 10 MILLIGRAM(S): 2.5 TABLET ORAL at 05:27

## 2024-02-05 RX ADMIN — ROPINIROLE 0.25 MILLIGRAM(S): 8 TABLET, FILM COATED, EXTENDED RELEASE ORAL at 17:48

## 2024-02-05 RX ADMIN — Medication 975 MILLIGRAM(S): at 21:35

## 2024-02-05 RX ADMIN — PANTOPRAZOLE SODIUM 40 MILLIGRAM(S): 20 TABLET, DELAYED RELEASE ORAL at 05:26

## 2024-02-05 RX ADMIN — Medication 4 MILLIGRAM(S): at 17:49

## 2024-02-05 RX ADMIN — Medication 12.5 MILLIGRAM(S): at 05:26

## 2024-02-05 RX ADMIN — Medication 975 MILLIGRAM(S): at 05:25

## 2024-02-05 RX ADMIN — METHOCARBAMOL 750 MILLIGRAM(S): 500 TABLET, FILM COATED ORAL at 05:26

## 2024-02-05 RX ADMIN — Medication 1 TABLET(S): at 11:25

## 2024-02-05 RX ADMIN — Medication 975 MILLIGRAM(S): at 13:17

## 2024-02-05 RX ADMIN — APIXABAN 5 MILLIGRAM(S): 2.5 TABLET, FILM COATED ORAL at 07:41

## 2024-02-05 RX ADMIN — GABAPENTIN 300 MILLIGRAM(S): 400 CAPSULE ORAL at 13:17

## 2024-02-05 RX ADMIN — APIXABAN 5 MILLIGRAM(S): 2.5 TABLET, FILM COATED ORAL at 21:35

## 2024-02-05 NOTE — PROGRESS NOTE ADULT - SUBJECTIVE AND OBJECTIVE BOX
SUNNY COLBERT 89y Female  MRN#: [id number]   Hospital Day: 12d    SUBJECTIVE  Patient is a 89y old Female who presents with a chief complaint of Back Pain (04 Feb 2024 14:13)  Currently admitted to medicine with the primary diagnosis of Inability to ambulate due to multiple joints      INTERVAL HPI AND OVERNIGHT EVENTS:  Patient was examined and seen at bedside. This morning she is resting comfortably in bed and reports no issues or overnight events.    REVIEW OF SYMPTOMS:  CONSTITUTIONAL: No weakness, fevers or chills; No headaches  EYES: No visual changes, eye pain, or discharge  ENT: No vertigo; No ear pain or change in hearing; No sore throat or difficulty swallowing  NECK: No pain or stiffness  RESPIRATORY: No cough, wheezing, or hemoptysis; No shortness of breath  CARDIOVASCULAR: No chest pain or palpitations  GASTROINTESTINAL: No abdominal or epigastric pain; No nausea, vomiting, or hematemesis; No diarrhea or constipation; No melena or hematochezia  GENITOURINARY: No dysuria, frequency or hematuria  MUSCULOSKELETAL: No joint pain, no muscle pain, no weakness  NEUROLOGICAL: No numbness or weakness  SKIN: No itching or rashes    OBJECTIVE  PAST MEDICAL & SURGICAL HISTORY  Chronic atrial fibrillation    GERD (gastroesophageal reflux disease)    Chronic lower back pain    No significant past surgical history      ALLERGIES:  No Known Allergies    MEDICATIONS:  STANDING MEDICATIONS  acetaminophen     Tablet .. 975 milliGRAM(s) Oral every 8 hours  apixaban 5 milliGRAM(s) Oral every 12 hours  calcium carbonate   1250 mG (OsCal) 1 Tablet(s) Oral daily  chlorhexidine 2% Cloths 1 Application(s) Topical daily  cholecalciferol 5000 Unit(s) Oral daily  dexAMETHasone  Injectable 4 milliGRAM(s) IV Push <User Schedule>  fluticasone propionate 50 MICROgram(s)/spray Nasal Spray 1 Spray(s) Both Nostrils two times a day  gabapentin 300 milliGRAM(s) Oral three times a day  lactated ringers. 1000 milliLiter(s) IV Continuous <Continuous>  lactulose Syrup 20 Gram(s) Oral every 4 hours  lidocaine   4% Patch 1 Patch Transdermal every 24 hours  lisinopril 10 milliGRAM(s) Oral daily  methocarbamol 750 milliGRAM(s) Oral every 8 hours  metoprolol tartrate 12.5 milliGRAM(s) Oral two times a day  multivitamin 1 Tablet(s) Oral daily  pantoprazole    Tablet 40 milliGRAM(s) Oral before breakfast  polyethylene glycol 3350 17 Gram(s) Oral two times a day  rOPINIRole 0.25 milliGRAM(s) Oral two times a day  senna 2 Tablet(s) Oral at bedtime  tamsulosin 0.8 milliGRAM(s) Oral at bedtime    PRN MEDICATIONS  oxyCODONE    IR 15 milliGRAM(s) Oral every 6 hours PRN      VITAL SIGNS: Last 24 Hours  T(C): 37.1 (04 Feb 2024 16:00), Max: 37.1 (04 Feb 2024 16:00)  T(F): 98.7 (04 Feb 2024 16:00), Max: 98.7 (04 Feb 2024 16:00)  HR: 68 (05 Feb 2024 04:45) (68 - 95)  BP: 148/73 (05 Feb 2024 04:45) (122/73 - 148/73)  BP(mean): --  RR: 18 (05 Feb 2024 04:45) (18 - 18)  SpO2: --    LABS:                        12.3   8.15  )-----------( 201      ( 04 Feb 2024 07:52 )             35.9     02-04    132<L>  |  98  |  31<H>  ----------------------------<  101<H>  4.7   |  27  |  1.1    Ca    8.9      04 Feb 2024 07:52  Phos  3.4     02-04  Mg     2.1     02-04    TPro  5.1<L>  /  Alb  3.3<L>  /  TBili  0.5  /  DBili  x   /  AST  22  /  ALT  15  /  AlkPhos  66  02-04      Urinalysis Basic - ( 04 Feb 2024 07:52 )    Color: x / Appearance: x / SG: x / pH: x  Gluc: 101 mg/dL / Ketone: x  / Bili: x / Urobili: x   Blood: x / Protein: x / Nitrite: x   Leuk Esterase: x / RBC: x / WBC x   Sq Epi: x / Non Sq Epi: x / Bacteria: x                RADIOLOGY:      PHYSICAL EXAM:  CONSTITUTIONAL: No acute distress, well-developed, well-groomed, AAOx3  HEAD: Atraumatic, normocephalic  EYES: EOM intact, PERRLA, conjunctiva and sclera clear  ENT: Supple, no masses, no thyromegaly, no bruits, no JVD; moist mucous membranes  PULMONARY: Clear to auscultation bilaterally; no wheezes, rales, or rhonchi  CARDIOVASCULAR: Regular rate and rhythm; no murmurs, rubs, or gallops  GASTROINTESTINAL: Soft, non-tender, non-distended; bowel sounds present  MUSCULOSKELETAL: 2+ peripheral pulses; no clubbing, no cyanosis, no edema  NEUROLOGY: non-focal  SKIN: No rashes or lesions; warm and dry    ASSESSMENT & PLAN  Pathologic L1 vertebrae fracture  Conus Medullaris Syndrome  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 Of note there is significant dilation of the urinary bladder. Correlate clinically for neurogenic bladder. Additional degenerative changes in combination with lumbar levoscoliosis contributes to neuroforaminal narrowing most significantly involving the left L4-L5 and L5-S1 levels.  -  CT Chest w/ IV Cont (01.30.24) Cardiomegaly. CHF. Right-sided pleural effusion.Nonspecific right upper lobe pleural-based opacity.  - labs overall nonrevealing   - no hx of smoking, no family hx of malignancy   - CEA 5.2 (elevated)  - CA19.9 WNL   - CA 15.3 31 (elevated)   - Cytopathology - ACCESSION No:  [id number] LUMBAR MASS; CT-GUIDED, BIOPSY: POSITIVE FOR MALIGNANT CELLS- Metastatic carcinoma. By immunohistochemistry, the tumor cells are positive for CK7, p40 and GATA3 and negative for TTF1/Napsin A, CK20, CDX2, GCDFP15, ER, Hepar, PAX-8 and WT1. The immunohistochemical profile is not site specific, however it can be suggestive of a urothelial origin. Clinicopathologic correlation is recommended. The case was reviewed interdepartmentally with another pathologist and the diagnosis represents a consensusopinion. The case was reported to the cancer registry. Block 1A his limited neoplastic content for further studies if clinically indicated.   SUNNY COLBERT 89y Female  MRN#: [id number]   Hospital Day: 12d    SUBJECTIVE  Patient is a 89y old Female who presents with a chief complaint of Back Pain (04 Feb 2024 14:13)  Currently admitted to medicine with the primary diagnosis of Inability to ambulate due to multiple joints      INTERVAL HPI AND OVERNIGHT EVENTS:  Patient was examined and seen at bedside. This morning she is resting comfortably in bed and reports no issues or overnight events.    REVIEW OF SYMPTOMS:  CONSTITUTIONAL: No weakness, fevers or chills; No headaches  EYES: No visual changes, eye pain, or discharge  ENT: No vertigo; No ear pain or change in hearing; No sore throat or difficulty swallowing  NECK: No pain or stiffness  RESPIRATORY: No cough, wheezing, or hemoptysis; No shortness of breath  CARDIOVASCULAR: No chest pain or palpitations  GASTROINTESTINAL: No abdominal or epigastric pain; No nausea, vomiting, or hematemesis; No diarrhea or constipation; No melena or hematochezia  GENITOURINARY: No dysuria, frequency or hematuria  MUSCULOSKELETAL: No joint pain, no muscle pain, no weakness  NEUROLOGICAL: No numbness or weakness  SKIN: No itching or rashes    OBJECTIVE  PAST MEDICAL & SURGICAL HISTORY  Chronic atrial fibrillation    GERD (gastroesophageal reflux disease)    Chronic lower back pain    No significant past surgical history      ALLERGIES:  No Known Allergies    MEDICATIONS:  STANDING MEDICATIONS  acetaminophen     Tablet .. 975 milliGRAM(s) Oral every 8 hours  apixaban 5 milliGRAM(s) Oral every 12 hours  calcium carbonate   1250 mG (OsCal) 1 Tablet(s) Oral daily  chlorhexidine 2% Cloths 1 Application(s) Topical daily  cholecalciferol 5000 Unit(s) Oral daily  dexAMETHasone  Injectable 4 milliGRAM(s) IV Push <User Schedule>  fluticasone propionate 50 MICROgram(s)/spray Nasal Spray 1 Spray(s) Both Nostrils two times a day  gabapentin 300 milliGRAM(s) Oral three times a day  lactated ringers. 1000 milliLiter(s) IV Continuous <Continuous>  lactulose Syrup 20 Gram(s) Oral every 4 hours  lidocaine   4% Patch 1 Patch Transdermal every 24 hours  lisinopril 10 milliGRAM(s) Oral daily  methocarbamol 750 milliGRAM(s) Oral every 8 hours  metoprolol tartrate 12.5 milliGRAM(s) Oral two times a day  multivitamin 1 Tablet(s) Oral daily  pantoprazole    Tablet 40 milliGRAM(s) Oral before breakfast  polyethylene glycol 3350 17 Gram(s) Oral two times a day  rOPINIRole 0.25 milliGRAM(s) Oral two times a day  senna 2 Tablet(s) Oral at bedtime  tamsulosin 0.8 milliGRAM(s) Oral at bedtime    PRN MEDICATIONS  oxyCODONE    IR 15 milliGRAM(s) Oral every 6 hours PRN      VITAL SIGNS: Last 24 Hours  T(C): 37.1 (04 Feb 2024 16:00), Max: 37.1 (04 Feb 2024 16:00)  T(F): 98.7 (04 Feb 2024 16:00), Max: 98.7 (04 Feb 2024 16:00)  HR: 68 (05 Feb 2024 04:45) (68 - 95)  BP: 148/73 (05 Feb 2024 04:45) (122/73 - 148/73)  BP(mean): --  RR: 18 (05 Feb 2024 04:45) (18 - 18)  SpO2: --    LABS:                        12.3   8.15  )-----------( 201      ( 04 Feb 2024 07:52 )             35.9     02-04    132<L>  |  98  |  31<H>  ----------------------------<  101<H>  4.7   |  27  |  1.1    Ca    8.9      04 Feb 2024 07:52  Phos  3.4     02-04  Mg     2.1     02-04    TPro  5.1<L>  /  Alb  3.3<L>  /  TBili  0.5  /  DBili  x   /  AST  22  /  ALT  15  /  AlkPhos  66  02-04      Urinalysis Basic - ( 04 Feb 2024 07:52 )    Color: x / Appearance: x / SG: x / pH: x  Gluc: 101 mg/dL / Ketone: x  / Bili: x / Urobili: x   Blood: x / Protein: x / Nitrite: x   Leuk Esterase: x / RBC: x / WBC x   Sq Epi: x / Non Sq Epi: x / Bacteria: x                RADIOLOGY:      PHYSICAL EXAM:  CONSTITUTIONAL: No acute distress, well-developed, well-groomed, AAOx3  HEAD: Atraumatic, normocephalic  EYES: EOM intact, PERRLA, conjunctiva and sclera clear  ENT: Supple, no masses, no thyromegaly, no bruits, no JVD; moist mucous membranes  PULMONARY: Clear to auscultation bilaterally; no wheezes, rales, or rhonchi  CARDIOVASCULAR: Regular rate and rhythm; no murmurs, rubs, or gallops  GASTROINTESTINAL: Soft, non-tender, non-distended; bowel sounds present  MUSCULOSKELETAL: 2+ peripheral pulses; no clubbing, no cyanosis, no edema  NEUROLOGY: non-focal  SKIN: No rashes or lesions; warm and dry    ASSESSMENT & PLAN  Pathologic L1 vertebrae fracture  Conus Medullaris Syndrome  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 Of note there is significant dilation of the urinary bladder. Correlate clinically for neurogenic bladder. Additional degenerative changes in combination with lumbar levoscoliosis contributes to neuroforaminal narrowing most significantly involving the left L4-L5 and L5-S1 levels.  -  CT Chest w/ IV Cont (01.30.24) Cardiomegaly. CHF. Right-sided pleural effusion.Nonspecific right upper lobe pleural-based opacity.  - labs overall nonrevealing   - no hx of smoking, no family hx of malignancy   - CEA 5.2 (elevated)  - CA19.9 WNL   - CA 15.3 31 (elevated)   - Cytopathology - ACCESSION No:  [id number] LUMBAR MASS; CT-GUIDED, BIOPSY: POSITIVE FOR MALIGNANT CELLS- Metastatic carcinoma. By immunohistochemistry, the tumor cells are positive for CK7, p40 and GATA3 and negative for TTF1/Napsin A, CK20, CDX2, GCDFP15, ER, Hepar, PAX-8 and WT1. The immunohistochemical profile is not site specific, however it can be suggestive of a urothelial origin. Clinicopathologic correlation is recommended. The case was reviewed interdepartmentally with another pathologist and the diagnosis represents a consensusopinion. The case was reported to the cancer registry. Block 1A his limited neoplastic content for further studies if clinically indicated.      Recommendations:   - pathology discussed with attending pathologist. Have requested to add on PDL-1, NGS, and MMR/MSI to the specimen  - please send urine cytology  - Urology consult for cystoscopy  - Patient reporting no improvement of pain after 4 sessions of RT. Please discuss with Neurosurgery if patient is a candidate for surgical intervention given severe pain  - Radiation Oncology follow up to see if additional radiation can be given for severe pain

## 2024-02-05 NOTE — PROGRESS NOTE ADULT - ASSESSMENT
89F w/ h/o chronic afib (on Eliquis), GERD, and chronic low back pain presenting s/p fall. 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 Soft tissue component associated with the lytic   changes in the posterior inferior endplate of L1 encroaches the spinal   space resulting in L1-2 severe spinal stenosis and severe bilateral   foraminal stenosis.  - MRI L Spine - Malignant compression deformity of L1 with significant bowing of the   posterior cortex contributing to mass effect upon the conus  - neuroSx consult-no surgical intervention, abd binder, TLSO brace on discharge, advised decadron   - IR biopsy done 1/30  - CEA 5.2 (elevated)  - CA19.9 WNL   - CA 15.3 31 (elevated)   - Cytopathology - ACCESSION No:  [id number]- LUMBAR MASS; CT-GUIDED, BIOPSY: POSITIVE FOR MALIGNANT CELLS- Metastatic carcinoma. By immunohistochemistry, the tumor cells are positive for CK7, p40 and GATA3 and negative for TTF1/Napsin A, CK20, CDX2, GCDFP15, ER, Hepar, PAX-8 and WT1. The immunohistochemical profile is not site specific, however it can be suggestive of a urothelial origin. Clinicopathologic correlation is recommended. The case was reviewed interdepartmentally with another pathologist and the diagnosis represents a consensusopinion. The case was reported to the cancer registry. Block 1A his limited neoplastic content for further studies if clinically indicated.  - Radiation Oncology consulted, and plan for 5 sessions of RT,  to be completed tuesday   - c/w decadron to 4mg q12hrs IV   - Heme Onc consult following  - Pain Management consult appreciated   - Palliative following, family are waiting for full pathology report to further discuss. Full code    #CHF, diastolic - acute -- now resolved  - TTE (May 2013) demonstrated LVEF 55-65% w/ normal global LVSF and no significant valvulopathy.  - TTE 1/28- EF 60-65% mod pulm HTN   - elevated BNP  - Tele monitored for 24 hrs with no dysrhythmia   - received few doses of Furosemide and 1x dose of Bumex but currently off diuretics   - monitor volume status closely    #Chronic Atrial Fibrillation  CHADS-VASC 3+ (Age x2, Female). Only on AC. Previously on Lopressor, but only takes intermittently as HR currently rate-controlled off meds.  - c/w eliquis     #GERD  - c/w pantoprazole 40mg PO QD (therapeutic interchange for omeprazole)    #CKD 3b   per PCP baseline is 1.2, currently 1.0    #Urinary retention-likely neurogenic   -hanson placed 1/29  -flomax increased to 0.8mg   - can attempt TOV when RT is completed and patient is able to ambulate     #Constipation  -senna, miralax, lactulose--  bowel movements once in 2-3 days. enema given  # HTN-- on metoprolol and started on lisinopril 10mg 2/4    PENDING:  RT one more session tomorrow, Oncology plan for chemo

## 2024-02-05 NOTE — PROGRESS NOTE ADULT - SUBJECTIVE AND OBJECTIVE BOX
SUBJECTIVE:    Patient is a 89y old Female who presents with a chief complaint of Back Pain (05 Feb 2024 09:09)    Currently admitted to medicine with the primary diagnosis of Inability to ambulate due to multiple joints     Today is hospital day 12d. Patient seen and examined at bedside. Patient tolerating RT. still c/o pain in the back. No new issues or overnight events.       PAST MEDICAL & SURGICAL HISTORY  Chronic atrial fibrillation    GERD (gastroesophageal reflux disease)    Chronic lower back pain    No significant past surgical history        ALLERGIES:  No Known Allergies    MEDICATIONS:  STANDING MEDICATIONS  acetaminophen     Tablet .. 975 milliGRAM(s) Oral every 8 hours  apixaban 5 milliGRAM(s) Oral every 12 hours  calcium carbonate   1250 mG (OsCal) 1 Tablet(s) Oral daily  chlorhexidine 2% Cloths 1 Application(s) Topical daily  cholecalciferol 5000 Unit(s) Oral daily  dexAMETHasone  Injectable 4 milliGRAM(s) IV Push <User Schedule>  fluticasone propionate 50 MICROgram(s)/spray Nasal Spray 1 Spray(s) Both Nostrils two times a day  gabapentin 300 milliGRAM(s) Oral three times a day  lactated ringers. 1000 milliLiter(s) IV Continuous <Continuous>  lactulose Syrup 20 Gram(s) Oral every 4 hours  lidocaine   4% Patch 1 Patch Transdermal every 24 hours  lisinopril 10 milliGRAM(s) Oral daily  methocarbamol 750 milliGRAM(s) Oral every 8 hours  metoprolol tartrate 12.5 milliGRAM(s) Oral two times a day  multivitamin 1 Tablet(s) Oral daily  pantoprazole    Tablet 40 milliGRAM(s) Oral before breakfast  polyethylene glycol 3350 17 Gram(s) Oral two times a day  rOPINIRole 0.25 milliGRAM(s) Oral two times a day  senna 2 Tablet(s) Oral at bedtime  tamsulosin 0.8 milliGRAM(s) Oral at bedtime    PRN MEDICATIONS  oxyCODONE    IR 15 milliGRAM(s) Oral every 6 hours PRN    VITALS:   T(F): 98.7  HR: 68  BP: 148/73  RR: 18  SpO2: --    LABS:                        13.3   8.31  )-----------( 213      ( 05 Feb 2024 08:44 )             39.3     02-05    134<L>  |  97<L>  |  30<H>  ----------------------------<  95  4.3   |  27  |  1.0    Ca    8.7      05 Feb 2024 08:44  Phos  3.2     02-05  Mg     1.9     02-05    TPro  5.3<L>  /  Alb  3.4<L>  /  TBili  0.6  /  DBili  x   /  AST  22  /  ALT  17  /  AlkPhos  68  02-05      Urinalysis Basic - ( 05 Feb 2024 08:44 )    Color: x / Appearance: x / SG: x / pH: x  Gluc: 95 mg/dL / Ketone: x  / Bili: x / Urobili: x   Blood: x / Protein: x / Nitrite: x   Leuk Esterase: x / RBC: x / WBC x   Sq Epi: x / Non Sq Epi: x / Bacteria: x                RADIOLOGY:    PHYSICAL EXAM:  GEN: No acute distress  PULM/CHEST: Clear to auscultation bilaterally, no rales, rhonchi or wheezes   CVS: Regular rate and rhythm, S1-S2, no murmurs  ABD: Soft, non-tender, non-distended, +BS  EXT: No edema  NEURO: AAOx3    David Catheter:   Indwelling Urethral Catheter:     Connect To:  Straight Drainage/[place]    Indication:  Urinary Retention / Obstruction (02-04-24 @ 15:35) (not performed) [Active]  Indwelling Urethral Catheter:     Connect To:  Straight Drainage/[place]    Indication:  Urinary Retention / Obstruction (02-03-24 @ 14:43) (not performed) [Active]  Indwelling Urethral Catheter:     Connect To:  Straight Drainage/[place]    Indication:  Urinary Retention / Obstruction (02-02-24 @ 13:15) (not performed) [Active]  Indwelling Urethral Catheter:     Connect To:  Straight Drainage/[place]    Indication:  Urinary Retention / Obstruction (01-31-24 @ 14:28) (not performed) [Active]  Indwelling Urethral Catheter:     Connect To:  Straight Drainage/[place]    Indication:  Urinary Retention / Obstruction (01-31-24 @ 09:00) (not performed) [Active]

## 2024-02-05 NOTE — PROGRESS NOTE ADULT - ASSESSMENT
89F w/ h/o chronic afib (on Eliquis), GERD, and chronic low back pain presenting s/p fall. Admitted to medicine for back pain 2/2 Lumbar compression fracture    #Lumbar Compression Fracture  #Pathologic Fracture   - CT lumbar spine:   Age-indeterminate severe compression deformity of L1 with mixed   lytic/sclerotic changes extending to the posterior elements, likely   pathologic in nature. Soft tissue component associated with the lytic   changes in the posterior inferior endplate of L1 encroaches the spinal   space resulting in L1-2 severe spinal stenosis and severe bilateral   foraminal stenosis.  - MRI L Spine - Malignant compression deformity of L1 with significant bowing of the   posterior cortex contributing to mass effect upon the conus  Plan:   - neuroSx consult-no surgical intervention, abd binder, TLSO brace on discharge, advised decadron   - IR biopsy done 1/30- official path report pending, but initial report with epithelial carcinoma   - Radiation Oncology consulted, and plan for 5 sessions of RT,  to be completed next tuesday   - 1/30 increased decadron to 4mg q12hrs IV   -  tumor markers not conclusive-- pathology suggests urothelial origin  seen by urology-- CT urogram planned   Bone scan -- L1 compression fracture sec to vertebral body mass    #CHF, diastolic - acute -- now resolved  Reports LE swelling over past 1-2 weeks.   Recently started on furosemide 20 qd for swelling.   No reported CP or dyspnea at rest or exertion (but functional status limited by lower back pain).   - TTE (May 2013) demonstrated LVEF 55-65% w/ normal global LVSF and no significant valvulopathy.  - TTE 1/28- EF 60-65% mod pulm HTN   - elevated BNP  - Tele monitored for 24 hrs with no dysrhythmia   - received few doses of Furosemide and 1x dose of Bumex but currently off diuretics   -  lasix 20mg given 2/3/24    #Chronic Atrial Fibrillation  CHADS-VASC 3+ (Age x2, Female). Only on AC. Previously on Lopressor, but only takes intermittently as HR currently rate-controlled off meds.  - resume eliquis     #GERD  - c/w pantoprazole 40mg PO QD (therapeutic interchange for omeprazole)    #CKD 3b   per PCP baseline is 1.2, currently 1.0    #Urinary retention-likely neurogenic   -hanson placed 1/29  -flomax increased to 0.8mg   - can attempt TOV when RT is completed and patient is able to ambulate     #Constipation  -senna, miralax, lactulose--  bowel movements once in 2-3 days. enema given  # HTN-- on metoprolol and started on lisinopril 10mg 2/4      PENDING:  RT for 5 sessions to be completed 2/6/24.  CT urogram pending AM    spoke with daughter on phone

## 2024-02-05 NOTE — PROGRESS NOTE ADULT - ATTENDING COMMENTS
seen/ examined w [facility] team; note reviewed; case discussed:  1. 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 The review of the current CT scans do not show obvious evidence of other sites of the disease.   a) will request NGS, MMR/MSI, PDL1  b)  request urology evaluation for cystoscopy to better assess if any lesions are found  c) strong pain in lumbar area, has been getting RT and today is the 4th dose out of 5 planned, with no significant improvement; pt and her daughter are concerned pt is not mobile and would like her to improve functional status: suggest to call neurosurgery and IR to review findings and suggest if any procedures could be done in the case RT is not successful to manage pain; meantime, continue palliative care daily f/u  d) systemic therapy depends on the diagnosis and her ECOG. if the consensus the tumor represents urothelial carcinoma, would offer immunotherapy  e) recommend physical therapy eval/follow up and strongly sugges STR evaluation

## 2024-02-05 NOTE — PROGRESS NOTE ADULT - SUBJECTIVE AND OBJECTIVE BOX
SUBJECTIVE:    Patient is a 89y old Female who presents with a chief complaint of Back Pain (05 Feb 2024 17:07)    Currently admitted to medicine with the primary diagnosis of Inability to ambulate due to multiple joints       Today is hospital day 12d.     PAST MEDICAL & SURGICAL HISTORY  Chronic atrial fibrillation    GERD (gastroesophageal reflux disease)    Chronic lower back pain    No significant past surgical history      ALLERGIES:  No Known Allergies    MEDICATIONS:  STANDING MEDICATIONS  acetaminophen     Tablet .. 975 milliGRAM(s) Oral every 8 hours  apixaban 5 milliGRAM(s) Oral every 12 hours  calcium carbonate   1250 mG (OsCal) 1 Tablet(s) Oral daily  chlorhexidine 2% Cloths 1 Application(s) Topical daily  cholecalciferol 5000 Unit(s) Oral daily  dexAMETHasone  Injectable 4 milliGRAM(s) IV Push <User Schedule>  fluticasone propionate 50 MICROgram(s)/spray Nasal Spray 1 Spray(s) Both Nostrils two times a day  gabapentin 300 milliGRAM(s) Oral three times a day  lactated ringers. 1000 milliLiter(s) IV Continuous <Continuous>  lactulose Syrup 20 Gram(s) Oral every 4 hours  lidocaine   4% Patch 1 Patch Transdermal every 24 hours  lisinopril 10 milliGRAM(s) Oral daily  methocarbamol 750 milliGRAM(s) Oral every 8 hours  metoprolol tartrate 12.5 milliGRAM(s) Oral two times a day  multivitamin 1 Tablet(s) Oral daily  pantoprazole    Tablet 40 milliGRAM(s) Oral before breakfast  polyethylene glycol 3350 17 Gram(s) Oral two times a day  rOPINIRole 0.25 milliGRAM(s) Oral two times a day  senna 2 Tablet(s) Oral at bedtime  tamsulosin 0.8 milliGRAM(s) Oral at bedtime    PRN MEDICATIONS  oxyCODONE    IR 15 milliGRAM(s) Oral every 6 hours PRN    VITALS:   T(F): 98.1  HR: 76  BP: 122/58  RR: 18  SpO2: 99%    LABS:                        13.3   8.31  )-----------( 213      ( 05 Feb 2024 08:44 )             39.3     02-05    134<L>  |  97<L>  |  30<H>  ----------------------------<  95  4.3   |  27  |  1.0    Ca    8.7      05 Feb 2024 08:44  Phos  3.2     02-05  Mg     1.9     02-05    TPro  5.3<L>  /  Alb  3.4<L>  /  TBili  0.6  /  DBili  x   /  AST  22  /  ALT  17  /  AlkPhos  68  02-05      Urinalysis Basic - ( 05 Feb 2024 08:44 )    Color: x / Appearance: x / SG: x / pH: x  Gluc: 95 mg/dL / Ketone: x  / Bili: x / Urobili: x   Blood: x / Protein: x / Nitrite: x   Leuk Esterase: x / RBC: x / WBC x   Sq Epi: x / Non Sq Epi: x / Bacteria: x                RADIOLOGY:    PHYSICAL EXAM:  GEN: No acute distress  LUNGS: Clear to auscultation bilaterally   HEART: S1/S2 present. RRR.   ABD/ GI: Soft, non-tender, non-distended. Bowel sounds present  EXT: NC/NC/NE/2+PP/DIEHL  NEURO: AAOX3

## 2024-02-05 NOTE — CONSULT NOTE ADULT - SUBJECTIVE AND OBJECTIVE BOX
89F w/ h/o chronic afib (on Eliquis), GERD, and chronic low back pain presenting s/p fall. Patient known to have chronic low back pain that has progressively worsened over the years. Pain acutely worsened two weeks ago and patient subsequently fell due to inability to support own weight. Evaluated by PCP after initial fall due to worsening pain. Prescribed Percocet 5-325 bid w/ mild relief. However, pt once again fell earlier today after pt unable to support her own weight while trying to ambulate. Now unable to ambulate or bear weight, even w/ assistance. No reported HT, LOC, lightheadedness, confusion, loss of continence, or focal weakness a/w either fall. No reported fevers, chills, CP, palpitations, SOB, abdominal pain, n/v/d, or dysuria (although daughter did note malodorous urine recently).  Pt is s/p Lumbar vertebrae biopsy Jan 30,2024 which showed metastatic disease of urothelial origin.  Urology is consulted for further investigation            PAST MEDICAL & SURGICAL HISTORY:  Chronic atrial fibrillation      GERD (gastroesophageal reflux disease)      Chronic lower back pain      No significant past surgical history          REVIEW OF SYSTEMS:    CONSTITUTIONAL:  fevers or chills  HEENT: No visual changes  ENDO: No sweating  NECK: No pain or stiffness  MUSCULOSKELETAL: No back pain, no joint pain  RESPIRATORY: No shortness of breath  CARDIOVASCULAR: No chest pain  GASTROINTESTINAL: No abdominal or epigastric pain. No nausea, vomiting,  No diarrhea or constipation.   NEUROLOGICAL: No mental status changes  PSYCH: No depression, no mood changes  SKIN: No itching      MEDICATIONS  (STANDING):  acetaminophen     Tablet .. 975 milliGRAM(s) Oral every 8 hours  apixaban 5 milliGRAM(s) Oral every 12 hours  calcium carbonate   1250 mG (OsCal) 1 Tablet(s) Oral daily  chlorhexidine 2% Cloths 1 Application(s) Topical daily  cholecalciferol 5000 Unit(s) Oral daily  dexAMETHasone  Injectable 4 milliGRAM(s) IV Push <User Schedule>  fluticasone propionate 50 MICROgram(s)/spray Nasal Spray 1 Spray(s) Both Nostrils two times a day  gabapentin 300 milliGRAM(s) Oral three times a day  lactated ringers. 1000 milliLiter(s) (75 mL/Hr) IV Continuous <Continuous>  lactulose Syrup 20 Gram(s) Oral every 4 hours  lidocaine   4% Patch 1 Patch Transdermal every 24 hours  lisinopril 10 milliGRAM(s) Oral daily  methocarbamol 750 milliGRAM(s) Oral every 8 hours  metoprolol tartrate 12.5 milliGRAM(s) Oral two times a day  multivitamin 1 Tablet(s) Oral daily  pantoprazole    Tablet 40 milliGRAM(s) Oral before breakfast  polyethylene glycol 3350 17 Gram(s) Oral two times a day  rOPINIRole 0.25 milliGRAM(s) Oral two times a day  senna 2 Tablet(s) Oral at bedtime  tamsulosin 0.8 milliGRAM(s) Oral at bedtime    MEDICATIONS  (PRN):  oxyCODONE    IR 15 milliGRAM(s) Oral every 6 hours PRN moderate/severe pain (4-10)      Allergies    No Known Allergies    Intolerances        SOCIAL HISTORY: No illicit drug use    FAMILY HISTORY:  Family history of stroke (Father)        Vital Signs Last 24 Hrs  T(C): 36.7 (05 Feb 2024 16:11), Max: 36.7 (05 Feb 2024 16:11)  T(F): 98.1 (05 Feb 2024 16:11), Max: 98.1 (05 Feb 2024 16:11)  HR: 76 (05 Feb 2024 16:11) (68 - 84)  BP: 122/58 (05 Feb 2024 16:11) (122/58 - 148/73)  BP(mean): --  RR: 18 (05 Feb 2024 16:11) (18 - 18)  SpO2: 99% (05 Feb 2024 16:11) (99% - 99%)    Parameters below as of 05 Feb 2024 16:11  Patient On (Oxygen Delivery Method): room air        PHYSICAL EXAM:    Constitutional: NAD, well-developed  HEENT: EOMI  Neck: no pain  Back: No CVA tenderness  Respiratory: No accessory respiratory muscle use  Abd: Soft, NT/ND  no organomegally  no hernia  : David catheter in place, urine color yellow  Extremities: no edema  Neurological: A/O x 3  Psychiatric: Normal mood, normal affect  Skin: No rashes    I&O's Summary    04 Feb 2024 07:01  -  05 Feb 2024 07:00  --------------------------------------------------------  IN: 0 mL / OUT: 700 mL / NET: -700 mL        LABS:                        13.3   8.31  )-----------( 213      ( 05 Feb 2024 08:44 )             39.3     02-05    134<L>  |  97<L>  |  30<H>  ----------------------------<  95  4.3   |  27  |  1.0    Ca    8.7      05 Feb 2024 08:44  Phos  3.2     02-05  Mg     1.9     02-05    TPro  5.3<L>  /  Alb  3.4<L>  /  TBili  0.6  /  DBili  x   /  AST  22  /  ALT  17  /  AlkPhos  68  02-05      Urinalysis Basic - ( 05 Feb 2024 08:44 )    Color: x / Appearance: x / SG: x / pH: x  Gluc: 95 mg/dL / Ketone: x  / Bili: x / Urobili: x   Blood: x / Protein: x / Nitrite: x   Leuk Esterase: x / RBC: x / WBC x   Sq Epi: x / Non Sq Epi: x / Bacteria: x              RADIOLOGY & ADDITIONAL STUDIES:    Comment   By immunohistochemistry, the tumor cells are positive for CK7, p40 and   GATA3 and negative for TTF1/Napsin A, CK20, CDX2, GCDFP15, ER, Hepar,   PAX-8 and WT1. The immunohistochemical profile is not site specific,   however it can be suggestive of a urothelial origin. Clinicopathologic   correlation is recommended. The case was reviewed intradepartmentally   with another pathologist and the diagnosis represents a consensus   opinion. The case was reported to the cancer registry. Block 1A his   limited neoplastic content for further studies if clinically indicated.

## 2024-02-05 NOTE — CONSULT NOTE ADULT - PROBLEM SELECTOR RECOMMENDATION 9
A CT Urogram is recommended to evaluate for a specific lesion of the  tract  Heme/Onc should set up a treatment plan based on the Lumbar lesion biopsy results  A Cystoscopy at this time may not contribute any more information in the treatment of this patient.

## 2024-02-05 NOTE — CONSULT NOTE ADULT - ASSESSMENT
s/p Lumbar bx revealing urothelial origin  I spoke to pt daughter on telephone and explained to the pt and daughter that our recommendation at this time is a CT Urogram

## 2024-02-06 ENCOUNTER — RESULT REVIEW (OUTPATIENT)
Age: 89
End: 2024-02-06

## 2024-02-06 ENCOUNTER — NON-APPOINTMENT (OUTPATIENT)
Age: 89
End: 2024-02-06

## 2024-02-06 DIAGNOSIS — G95.20 UNSPECIFIED CORD COMPRESSION: ICD-10-CM

## 2024-02-06 PROBLEM — Z00.00 ENCOUNTER FOR PREVENTIVE HEALTH EXAMINATION: Status: ACTIVE | Noted: 2024-02-06

## 2024-02-06 LAB
ALBUMIN SERPL ELPH-MCNC: 3.1 G/DL — LOW (ref 3.5–5.2)
ALP SERPL-CCNC: 55 U/L — SIGNIFICANT CHANGE UP (ref 30–115)
ALT FLD-CCNC: 15 U/L — SIGNIFICANT CHANGE UP (ref 0–41)
ANION GAP SERPL CALC-SCNC: 10 MMOL/L — SIGNIFICANT CHANGE UP (ref 7–14)
AST SERPL-CCNC: 19 U/L — SIGNIFICANT CHANGE UP (ref 0–41)
BASOPHILS # BLD AUTO: 0.02 K/UL — SIGNIFICANT CHANGE UP (ref 0–0.2)
BASOPHILS NFR BLD AUTO: 0.3 % — SIGNIFICANT CHANGE UP (ref 0–1)
BILIRUB SERPL-MCNC: 0.6 MG/DL — SIGNIFICANT CHANGE UP (ref 0.2–1.2)
BUN SERPL-MCNC: 30 MG/DL — HIGH (ref 10–20)
CALCIUM SERPL-MCNC: 8.6 MG/DL — SIGNIFICANT CHANGE UP (ref 8.4–10.5)
CHLORIDE SERPL-SCNC: 99 MMOL/L — SIGNIFICANT CHANGE UP (ref 98–110)
CO2 SERPL-SCNC: 24 MMOL/L — SIGNIFICANT CHANGE UP (ref 17–32)
CREAT SERPL-MCNC: 0.8 MG/DL — SIGNIFICANT CHANGE UP (ref 0.7–1.5)
EGFR: 70 ML/MIN/1.73M2 — SIGNIFICANT CHANGE UP
EOSINOPHIL # BLD AUTO: 0.01 K/UL — SIGNIFICANT CHANGE UP (ref 0–0.7)
EOSINOPHIL NFR BLD AUTO: 0.1 % — SIGNIFICANT CHANGE UP (ref 0–8)
GLUCOSE SERPL-MCNC: 99 MG/DL — SIGNIFICANT CHANGE UP (ref 70–99)
HCT VFR BLD CALC: 35.6 % — LOW (ref 37–47)
HGB BLD-MCNC: 12.2 G/DL — SIGNIFICANT CHANGE UP (ref 12–16)
IMM GRANULOCYTES NFR BLD AUTO: 1.8 % — HIGH (ref 0.1–0.3)
LYMPHOCYTES # BLD AUTO: 0.52 K/UL — LOW (ref 1.2–3.4)
LYMPHOCYTES # BLD AUTO: 6.8 % — LOW (ref 20.5–51.1)
MAGNESIUM SERPL-MCNC: 2 MG/DL — SIGNIFICANT CHANGE UP (ref 1.8–2.4)
MCHC RBC-ENTMCNC: 32.1 PG — HIGH (ref 27–31)
MCHC RBC-ENTMCNC: 34.3 G/DL — SIGNIFICANT CHANGE UP (ref 32–37)
MCV RBC AUTO: 93.7 FL — SIGNIFICANT CHANGE UP (ref 81–99)
MONOCYTES # BLD AUTO: 0.35 K/UL — SIGNIFICANT CHANGE UP (ref 0.1–0.6)
MONOCYTES NFR BLD AUTO: 4.6 % — SIGNIFICANT CHANGE UP (ref 1.7–9.3)
NEUTROPHILS # BLD AUTO: 6.57 K/UL — HIGH (ref 1.4–6.5)
NEUTROPHILS NFR BLD AUTO: 86.4 % — HIGH (ref 42.2–75.2)
NRBC # BLD: 0 /100 WBCS — SIGNIFICANT CHANGE UP (ref 0–0)
PLATELET # BLD AUTO: 199 K/UL — SIGNIFICANT CHANGE UP (ref 130–400)
PMV BLD: 9.3 FL — SIGNIFICANT CHANGE UP (ref 7.4–10.4)
POTASSIUM SERPL-MCNC: 4.8 MMOL/L — SIGNIFICANT CHANGE UP (ref 3.5–5)
POTASSIUM SERPL-SCNC: 4.8 MMOL/L — SIGNIFICANT CHANGE UP (ref 3.5–5)
PROT SERPL-MCNC: 4.7 G/DL — LOW (ref 6–8)
RBC # BLD: 3.8 M/UL — LOW (ref 4.2–5.4)
RBC # FLD: 13 % — SIGNIFICANT CHANGE UP (ref 11.5–14.5)
SODIUM SERPL-SCNC: 133 MMOL/L — LOW (ref 135–146)
WBC # BLD: 7.61 K/UL — SIGNIFICANT CHANGE UP (ref 4.8–10.8)
WBC # FLD AUTO: 7.61 K/UL — SIGNIFICANT CHANGE UP (ref 4.8–10.8)

## 2024-02-06 PROCEDURE — 74178 CT ABD&PLV WO CNTR FLWD CNTR: CPT | Mod: 26

## 2024-02-06 PROCEDURE — 99232 SBSQ HOSP IP/OBS MODERATE 35: CPT

## 2024-02-06 PROCEDURE — 88112 CYTOPATH CELL ENHANCE TECH: CPT | Mod: 26

## 2024-02-06 PROCEDURE — 77427 RADIATION TX MANAGEMENT X5: CPT

## 2024-02-06 RX ORDER — DEXAMETHASONE 4 MG/1
4 TABLET ORAL
Refills: 0 | Status: ACTIVE | COMMUNITY

## 2024-02-06 RX ORDER — GABAPENTIN 300 MG/1
300 CAPSULE ORAL
Refills: 0 | Status: ACTIVE | COMMUNITY

## 2024-02-06 RX ORDER — LORATADINE 10 MG
17 TABLET,DISINTEGRATING ORAL
Refills: 0 | Status: ACTIVE | COMMUNITY

## 2024-02-06 RX ORDER — APIXABAN 5 MG/1
5 TABLET, FILM COATED ORAL
Refills: 0 | Status: ACTIVE | COMMUNITY

## 2024-02-06 RX ORDER — FLUTICASONE PROPIONATE 50 UG/1
50 SPRAY, METERED NASAL
Refills: 0 | Status: ACTIVE | COMMUNITY

## 2024-02-06 RX ORDER — SENNOSIDES 8.6 MG/1
CAPSULE, GELATIN COATED ORAL
Refills: 0 | Status: ACTIVE | COMMUNITY

## 2024-02-06 RX ORDER — LISINOPRIL 10 MG/1
10 TABLET ORAL
Refills: 0 | Status: ACTIVE | COMMUNITY

## 2024-02-06 RX ORDER — DEXAMETHASONE 0.5 MG/5ML
4 ELIXIR ORAL
Refills: 0 | Status: COMPLETED | OUTPATIENT
Start: 2024-02-07 | End: 2024-02-08

## 2024-02-06 RX ORDER — METHOCARBAMOL 750 MG/1
750 TABLET, FILM COATED ORAL
Refills: 0 | Status: ACTIVE | COMMUNITY

## 2024-02-06 RX ORDER — OXYCODONE HYDROCHLORIDE 15 MG/1
15 TABLET ORAL
Refills: 0 | Status: ACTIVE | COMMUNITY

## 2024-02-06 RX ORDER — ACETAMINOPHEN EXTRA STRENGTH 500 MG/1
TABLET ORAL
Refills: 0 | Status: ACTIVE | COMMUNITY

## 2024-02-06 RX ORDER — ROPINIROLE 0.25 MG/1
0.25 TABLET, FILM COATED ORAL
Refills: 0 | Status: ACTIVE | COMMUNITY

## 2024-02-06 RX ORDER — ELECTROLYTES/DEXTROSE
SOLUTION, ORAL ORAL
Refills: 0 | Status: ACTIVE | COMMUNITY

## 2024-02-06 RX ORDER — TAMSULOSIN HYDROCHLORIDE 0.4 MG/1
0.4 CAPSULE ORAL
Refills: 0 | Status: ACTIVE | COMMUNITY

## 2024-02-06 RX ORDER — METOPROLOL TARTRATE 25 MG/1
25 TABLET, FILM COATED ORAL
Refills: 0 | Status: ACTIVE | COMMUNITY

## 2024-02-06 RX ORDER — PANTOPRAZOLE 40 MG/1
40 TABLET, DELAYED RELEASE ORAL
Refills: 0 | Status: ACTIVE | COMMUNITY

## 2024-02-06 RX ADMIN — OXYCODONE HYDROCHLORIDE 15 MILLIGRAM(S): 5 TABLET ORAL at 12:04

## 2024-02-06 RX ADMIN — Medication 1 SPRAY(S): at 05:28

## 2024-02-06 RX ADMIN — OXYCODONE HYDROCHLORIDE 15 MILLIGRAM(S): 5 TABLET ORAL at 08:19

## 2024-02-06 RX ADMIN — Medication 12.5 MILLIGRAM(S): at 05:27

## 2024-02-06 RX ADMIN — Medication 975 MILLIGRAM(S): at 13:10

## 2024-02-06 RX ADMIN — Medication 5000 UNIT(S): at 12:01

## 2024-02-06 RX ADMIN — Medication 12.5 MILLIGRAM(S): at 17:08

## 2024-02-06 RX ADMIN — ROPINIROLE 0.25 MILLIGRAM(S): 8 TABLET, FILM COATED, EXTENDED RELEASE ORAL at 05:26

## 2024-02-06 RX ADMIN — GABAPENTIN 300 MILLIGRAM(S): 400 CAPSULE ORAL at 21:12

## 2024-02-06 RX ADMIN — SENNA PLUS 2 TABLET(S): 8.6 TABLET ORAL at 21:12

## 2024-02-06 RX ADMIN — CHLORHEXIDINE GLUCONATE 1 APPLICATION(S): 213 SOLUTION TOPICAL at 12:03

## 2024-02-06 RX ADMIN — Medication 4 MILLIGRAM(S): at 08:14

## 2024-02-06 RX ADMIN — Medication 1 SPRAY(S): at 17:08

## 2024-02-06 RX ADMIN — Medication 1 TABLET(S): at 12:02

## 2024-02-06 RX ADMIN — Medication 975 MILLIGRAM(S): at 05:27

## 2024-02-06 RX ADMIN — PANTOPRAZOLE SODIUM 40 MILLIGRAM(S): 20 TABLET, DELAYED RELEASE ORAL at 06:01

## 2024-02-06 RX ADMIN — LISINOPRIL 10 MILLIGRAM(S): 2.5 TABLET ORAL at 05:27

## 2024-02-06 RX ADMIN — GABAPENTIN 300 MILLIGRAM(S): 400 CAPSULE ORAL at 13:12

## 2024-02-06 RX ADMIN — OXYCODONE HYDROCHLORIDE 15 MILLIGRAM(S): 5 TABLET ORAL at 21:10

## 2024-02-06 RX ADMIN — POLYETHYLENE GLYCOL 3350 17 GRAM(S): 17 POWDER, FOR SOLUTION ORAL at 17:09

## 2024-02-06 RX ADMIN — TAMSULOSIN HYDROCHLORIDE 0.8 MILLIGRAM(S): 0.4 CAPSULE ORAL at 21:13

## 2024-02-06 RX ADMIN — OXYCODONE HYDROCHLORIDE 15 MILLIGRAM(S): 5 TABLET ORAL at 21:53

## 2024-02-06 RX ADMIN — ROPINIROLE 0.25 MILLIGRAM(S): 8 TABLET, FILM COATED, EXTENDED RELEASE ORAL at 17:09

## 2024-02-06 RX ADMIN — APIXABAN 5 MILLIGRAM(S): 2.5 TABLET, FILM COATED ORAL at 21:13

## 2024-02-06 RX ADMIN — METHOCARBAMOL 750 MILLIGRAM(S): 500 TABLET, FILM COATED ORAL at 05:27

## 2024-02-06 RX ADMIN — METHOCARBAMOL 750 MILLIGRAM(S): 500 TABLET, FILM COATED ORAL at 21:12

## 2024-02-06 RX ADMIN — GABAPENTIN 300 MILLIGRAM(S): 400 CAPSULE ORAL at 05:26

## 2024-02-06 RX ADMIN — APIXABAN 5 MILLIGRAM(S): 2.5 TABLET, FILM COATED ORAL at 08:19

## 2024-02-06 RX ADMIN — METHOCARBAMOL 750 MILLIGRAM(S): 500 TABLET, FILM COATED ORAL at 13:07

## 2024-02-06 RX ADMIN — Medication 975 MILLIGRAM(S): at 13:40

## 2024-02-06 NOTE — PROGRESS NOTE ADULT - SUBJECTIVE AND OBJECTIVE BOX
SUBJECTIVE:    Patient is a 89y old Female who presents with a chief complaint of Back Pain (06 Feb 2024 10:25)    Currently admitted to medicine with the primary diagnosis of Inability to ambulate due to multiple joints       Today is hospital day 13d.     PAST MEDICAL & SURGICAL HISTORY  Chronic atrial fibrillation    GERD (gastroesophageal reflux disease)    Chronic lower back pain    No significant past surgical history      ALLERGIES:  No Known Allergies    MEDICATIONS:  STANDING MEDICATIONS  acetaminophen     Tablet .. 975 milliGRAM(s) Oral every 8 hours  apixaban 5 milliGRAM(s) Oral every 12 hours  calcium carbonate   1250 mG (OsCal) 1 Tablet(s) Oral daily  chlorhexidine 2% Cloths 1 Application(s) Topical daily  cholecalciferol 5000 Unit(s) Oral daily  fluticasone propionate 50 MICROgram(s)/spray Nasal Spray 1 Spray(s) Both Nostrils two times a day  gabapentin 300 milliGRAM(s) Oral three times a day  lactated ringers. 1000 milliLiter(s) IV Continuous <Continuous>  lactulose Syrup 20 Gram(s) Oral every 4 hours  lidocaine   4% Patch 1 Patch Transdermal every 24 hours  lisinopril 10 milliGRAM(s) Oral daily  methocarbamol 750 milliGRAM(s) Oral every 8 hours  metoprolol tartrate 12.5 milliGRAM(s) Oral two times a day  multivitamin 1 Tablet(s) Oral daily  pantoprazole    Tablet 40 milliGRAM(s) Oral before breakfast  polyethylene glycol 3350 17 Gram(s) Oral two times a day  rOPINIRole 0.25 milliGRAM(s) Oral two times a day  senna 2 Tablet(s) Oral at bedtime  tamsulosin 0.8 milliGRAM(s) Oral at bedtime    PRN MEDICATIONS  oxyCODONE    IR 15 milliGRAM(s) Oral every 6 hours PRN    VITALS:   T(F): 96.9  HR: 86  BP: 113/56  RR: 18  SpO2: --    LABS:                        12.2   7.61  )-----------( 199      ( 06 Feb 2024 07:14 )             35.6     02-06    133<L>  |  99  |  30<H>  ----------------------------<  99  4.8   |  24  |  0.8    Ca    8.6      06 Feb 2024 07:14  Phos  3.2     02-05  Mg     2.0     02-06    TPro  4.7<L>  /  Alb  3.1<L>  /  TBili  0.6  /  DBili  x   /  AST  19  /  ALT  15  /  AlkPhos  55  02-06      Urinalysis Basic - ( 06 Feb 2024 07:14 )    Color: x / Appearance: x / SG: x / pH: x  Gluc: 99 mg/dL / Ketone: x  / Bili: x / Urobili: x   Blood: x / Protein: x / Nitrite: x   Leuk Esterase: x / RBC: x / WBC x   Sq Epi: x / Non Sq Epi: x / Bacteria: x                RADIOLOGY:    PHYSICAL EXAM:  GEN: No acute distress  LUNGS: Clear to auscultation bilaterally   HEART: S1/S2 present. RRR.   ABD/ GI: Soft, non-tender, non-distended. Bowel sounds present  EXT: NC/NC/NE/2+PP/DIEHL  NEURO: AAOX3

## 2024-02-06 NOTE — HISTORY OF PRESENT ILLNESS
[FreeTextEntry1] : 2/6/2024:OTV: 2000cGy/5Fx to T12-L2. Patient completed her RT today. She remains admitted at [facility], [id number]. Accompanied by daughter. As per daughter continues to have pain but today, she was slightly more comfortable. She did take her oxycodone 15mg before treatment today. Continues decadron 4mg po q12hrs.  Vitals: 7:25 am:  Temp 97.5, P74, R 18, /78    Pathology:1/30/2024: Final Diagnosis LUMBAR MASS; CT-GUIDED, BIOPSY: - POSITIVE FOR MALIGNANT CELLS - Metastatic carcinoma (please see the comment)    Jan 30,2024: [facility] Consultation: HPI: 89F w/ h/o chronic afib (on Eliquis), GERD, and chronic low back pain presenting s/p fall. Patient known to have chronic low back pain that has progressively worsened over the years. Pain acutely worsened two weeks ago and patient subsequently fell due to inability to support own weight. Evaluated by PCP after initial fall due to worsening pain. Prescribed Percocet 5-325 bid w/ mild relief. However, pt once again fell earlier today after pt unable to support her own weight while trying to ambulate. Now unable to ambulate or bear weight, even w/ assistance. No reported HT, LOC, lightheadedness, confusion, loss of continence, or focal weakness a/w either fall. No reported fevers, chills, CP, palpitations, SOB, abdominal pain, n/v/d, or dysuria (although daughter did note malodorous urine recently).  Of note, pt also reports new onset LE swelling over past 1-2 weeks. Recently started on furosemide 20 qd by PCP for swelling. No reported CP or dyspnea at rest or exertion (but functional status limited by lower back pain). No known prior h/o CHF.   In ED, pt HD stable on vitals. Labs demonstrated K 5.6 (hemolyzed). XR Bilateral Hips, Pelvis, and L-Spine overall unremarkable (wet read). S/P morphine 4mg IV and methocarbamol 1000mg PO. Admitted to medicine for inability to ambulate s/p fall. (24 Jan 2024 15:07)  < from: MR Lumbar Spine No Cont (01.27.24 @ 15:05) > IMPRESSION: Malignant compression deformity of L1 with significant bowing of the  posterior cortex contributing to mass effect upon the conus. Of note  there is significant dilation of the urinary bladder. Correlate  clinically for neurogenic bladder.  Additional degenerative changes in combination with lumbar levoscoliosis  contributes to neuroforaminal narrowing most significantly involving the  left L4-L5 and L5-S1 levels.  Assessment:	 89 yo woman with no prior history of cancer presenting with back pain,  lower extremity weakness and worsening urinary retention since she was admitted to the hospital. MRI showed malignant compression deformity of L1 and mass effect on the conus. No acute intervention per neurosurgery. She is s/p biopsy of the L1 lesion and I confirmed w/ the pathologist Dr. Neves that there are malignant cells in the frozen path. Given s/s of cord compression we recommend urgent treatment. Her daughter is agreeable.  Plan -She will be simulated today with treatment to start tomorrow. 5 fractions, to be completed on Tuesday (no treatment on weekends) - please give her pain medication before she comes down for treatment - cont decadron - she'll need work up to identify a primary--CT C/A/P. Once final pathology is back please consult [facility]

## 2024-02-06 NOTE — PROGRESS NOTE ADULT - ASSESSMENT
89F w/ h/o chronic afib (on Eliquis), GERD, and chronic low back pain presenting s/p fall. Admitted to medicine for back pain 2/2 Lumbar compression fracture    #Lumbar Compression Fracture  #Pathologic Fracture   - CT lumbar spine:   Age-indeterminate severe compression deformity of L1 with mixed   lytic/sclerotic changes extending to the posterior elements, likely   pathologic in nature. Soft tissue component associated with the lytic   changes in the posterior inferior endplate of L1 encroaches the spinal   space resulting in L1-2 severe spinal stenosis and severe bilateral   foraminal stenosis.  - MRI L Spine - Malignant compression deformity of L1 with significant bowing of the   posterior cortex contributing to mass effect upon the conus  Plan:   - neuroSx consult-no surgical intervention, abd binder, TLSO brace on discharge, advised decadron   - IR biopsy done 1/30- official path report pending, but initial report with epithelial carcinoma   - Radiation Oncology consulted, and plan for 5 sessions of RT,  to be completed next tuesday   - 1/30 increased decadron to 4mg q12hrs IV --- now tapering decadron.  -  tumor markers not conclusive-- pathology suggests urothelial origin  seen by urology-- CT urogram -- no suspicious renal origin mass identified   Bone scan -- L1 compression fracture sec to vertebral body mass    #CHF, diastolic - acute -- now resolved  Reports LE swelling over past 1-2 weeks.   Recently started on furosemide 20 qd for swelling.   No reported CP or dyspnea at rest or exertion (but functional status limited by lower back pain).   - TTE (May 2013) demonstrated LVEF 55-65% w/ normal global LVSF and no significant valvulopathy.  - TTE 1/28- EF 60-65% mod pulm HTN   - elevated BNP  - Tele monitored for 24 hrs with no dysrhythmia   - received few doses of Furosemide and 1x dose of Bumex but currently off diuretics   -  lasix 20mg given 2/3/24    #Chronic Atrial Fibrillation  CHADS-VASC 3+ (Age x2, Female). Only on AC. Previously on Lopressor, but only takes intermittently as HR currently rate-controlled off meds.  - resume eliquis     #GERD  - c/w pantoprazole 40mg PO QD (therapeutic interchange for omeprazole)    #CKD 3b   per PCP baseline is 1.2, currently 1.0    #Urinary retention-likely neurogenic   -hanson placed 1/29  -flomax increased to 0.8mg   - can attempt TOV when RT is completed and patient is able to ambulate     #Constipation  -senna, miralax, lactulose--  bowel movements once in 2-3 days. enema given  # HTN-- on metoprolol and started on lisinopril 10mg 2/4      PENDING:  RT for 5 sessions completed 2/6/24.   Hem oncology to start immunotherapy.

## 2024-02-06 NOTE — DISEASE MANAGEMENT
[Clinical] : TNM Stage: c [N/A] : Currently not applicable [TTNM] : x [NTNM] : xx [MTNM] : x [de-identified] : 2000cGy [de-identified] : 2000cGy [de-identified] : T12-L2

## 2024-02-06 NOTE — PHYSICAL EXAM
[General Appearance - Alert] : alert [General Appearance - In No Acute Distress] : in no acute distress [] : no respiratory distress [de-identified] : bedridden

## 2024-02-06 NOTE — PROGRESS NOTE ADULT - SUBJECTIVE AND OBJECTIVE BOX
ENT DAILY PROGRESS NOTE    Pt is a 89y Female admitted with back pain, pathologic lumbar compression fx s/p biopsy by IR - found to be urothelial in origin. Pt reports feeling weak today.    REVIEW OF SYSTEMS   [x] A ten-point review of systems was otherwise negative except as noted.    Allergies  No Known Allergies    MEDICATIONS:  acetaminophen     Tablet .. 975 milliGRAM(s) Oral every 8 hours  apixaban 5 milliGRAM(s) Oral every 12 hours  calcium carbonate   1250 mG (OsCal) 1 Tablet(s) Oral daily  chlorhexidine 2% Cloths 1 Application(s) Topical daily  cholecalciferol 5000 Unit(s) Oral daily  fluticasone propionate 50 MICROgram(s)/spray Nasal Spray 1 Spray(s) Both Nostrils two times a day  gabapentin 300 milliGRAM(s) Oral three times a day  lactated ringers. 1000 milliLiter(s) IV Continuous <Continuous>  lactulose Syrup 20 Gram(s) Oral every 4 hours  lidocaine   4% Patch 1 Patch Transdermal every 24 hours  lisinopril 10 milliGRAM(s) Oral daily  methocarbamol 750 milliGRAM(s) Oral every 8 hours  metoprolol tartrate 12.5 milliGRAM(s) Oral two times a day  multivitamin 1 Tablet(s) Oral daily  oxyCODONE    IR 15 milliGRAM(s) Oral every 6 hours PRN  pantoprazole    Tablet 40 milliGRAM(s) Oral before breakfast  polyethylene glycol 3350 17 Gram(s) Oral two times a day  rOPINIRole 0.25 milliGRAM(s) Oral two times a day  senna 2 Tablet(s) Oral at bedtime  tamsulosin 0.8 milliGRAM(s) Oral at bedtime    Vital Signs Last 24 Hrs  T(C): 36.1 (06 Feb 2024 15:30), Max: 36.4 (06 Feb 2024 07:25)  T(F): 96.9 (06 Feb 2024 15:30), Max: 97.5 (06 Feb 2024 07:25)  HR: 86 (06 Feb 2024 15:30) (74 - 98)  BP: 113/56 (06 Feb 2024 15:30) (113/56 - 167/86)  RR: 18 (06 Feb 2024 15:30) (18 - 18)    02-05 @ 07:01  -  02-06 @ 07:00  --------------------------------------------------------  IN:  Total IN: 0 mL    OUT:    Voided (mL): 900 mL  Total OUT: 900 mL    Total NET: -900 mL    02-06 @ 07:01  -  02-06 @ 17:26  --------------------------------------------------------  IN:    Oral Fluid: 120 mL  Total IN: 120 mL    OUT:    Indwelling Catheter - Urethral (mL): 400 mL  Total OUT: 400 mL    Total NET: -280 mL    PHYSICAL EXAM:  GEN: Elderly female in NAD, well-developed, awake and alert.  SKIN: Good color, non diaphoretic.  HEENT: NC/AT.  RESP: No dyspnea, non-labored breathing. No use of accessory muscles.  CARDIO: +S1/S2  ABDO: Soft, NT/ND, no palpable bladder, no suprapubic tenderness.  : David draining yellow.    LABS:  CBC-             12.2   7.61  )-----------( 199      ( 06 Feb 2024 07:14 )             35.6     BMP/CMP-06 Feb 2024 07:14  133    |  99     |  30     ----------------------------<  99     4.8     |  24     |  0.8      Ca    8.6        06 Feb 2024 07:14  Phos  3.2       05 Feb 2024 08:44  Mg     2.0       06 Feb 2024 07:14    TPro  4.7    /  Alb  3.1    /  TBili  0.6    /  DBili  x      /  AST  19     /  ALT  15     /  AlkPhos  55     06 Feb 2024 07:14    RADIOLOGY & ADDITIONAL STUDIES:  < from: CT Abdomen and Pelvis Urogram w/wo IV Cont (02.06.24 @ 11:30) >  FINDINGS:    LOWER CHEST: Small bilateral pleural effusions and bibasilar atelectasis.    HEPATOBILIARY: Unremarkable.    SPLEEN: Unremarkable.    PANCREAS: Unremarkable.    ADRENAL GLANDS: Unremarkable.    KIDNEYS/COLLECTING SYSTEMS: No nephroureteral calculi or hydronephrosis.   No suspicious renal mass. Symmetric excretion of contrast on delayed   images from both kidneys. No filling defects seen in the visualized   portion of bilateral renal calyces, collecting systems or ureters.    ABDOMINOPELVIC NODES: No lymphadenopathy.    PELVIC ORGANS/BLADDER: No bladder wall thickening or mass. David catheter   seen within the bladder.    PERITONEUM/MESENTERY/BOWEL: Moderate diffuse colonic stool burden. No   bowel obstruction, ascites or pneumoperitoneum.    BONES/SOFT TISSUES: L1 vertebral body lytic lesionwith associated   compression deformity.    OTHER: Scattered atherosclerotic vascular calcifications.    IMPRESSION:  1.  No suspicious renal mass or urothelial lesion.  2.  Lytic L1 vertebral body lesion again noted.  < end of copied text >

## 2024-02-06 NOTE — VITALS
[Pain Location: ___] : Pain Location: [unfilled] [OTC] : OTC [Opioid] : opioid [NSAID/Non-Opioid] : NSAID/Non-Opioid [50: Requires considerable assistance and frequent medical care.] : 50: Requires considerable assistance and frequent medical care.

## 2024-02-06 NOTE — REASON FOR VISIT
[Bone Metastasis] : bone metastasis [Other: _____] : [unfilled] [Routine On-Treatment] : a routine on-treatment visit for

## 2024-02-06 NOTE — PROGRESS NOTE ADULT - NS ATTEND AMEND GEN_ALL_CORE FT
seen on feb 13th  agree with above  CT shows no signs of urothelial lesions  will follow up with Dr Herrera as outpatient

## 2024-02-06 NOTE — PROGRESS NOTE ADULT - ASSESSMENT
Pt is a 89y Female admitted with back pain, pathologic lumbar compression fx s/p biopsy by IR - found to be urothelial in origin.     PLAN:  Patient seen and examined with Dr. Calvillo  -CT urogram reviewed  -No acute urologic intervention at this point in time - per Dr. Herrera: urothelial diagnosis obtained via IR biopsy, no indication for cystoscopy at this point in time  -Completing last session of radiation therapy today  -Heme/onc following - planning for immunotherapy  -Follow-up outpatient with Dr. Herrera  -Please recall urology with any questions or concerns

## 2024-02-06 NOTE — PROGRESS NOTE ADULT - ASSESSMENT
89F w/ h/o chronic afib (on Eliquis), GERD, and chronic low back pain presenting s/p fall. 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 Soft tissue component associated with the lytic   changes in the posterior inferior endplate of L1 encroaches the spinal   space resulting in L1-2 severe spinal stenosis and severe bilateral   foraminal stenosis.  - MRI L Spine - Malignant compression deformity of L1 with significant bowing of the   posterior cortex contributing to mass effect upon the conus  - neuroSx consult-no surgical intervention, abd binder, TLSO brace on discharge, advised decadron   - IR biopsy done 1/30  - CEA 5.2 (elevated)  - CA19.9 WNL   - CA 15.3 31 (elevated)   - Cytopathology -LUMBAR MASS; CT-GUIDED, BIOPSY: POSITIVE FOR MALIGNANT CELLS- Metastatic carcinoma. By immunohistochemistry, the tumor cells are positive for CK7, p40 and GATA3 and negative for TTF1/Napsin A, CK20, CDX2, GCDFP15, ER, Hepar, PAX-8 and WT1. The immunohistochemical profile is not site specific, however it can be suggestive of a urothelial origin. Clinicopathologic correlation is recommended. The case was reviewed interdepartmentally with another pathologist and the diagnosis represents a consensusopinion. The case was reported to the cancer registry. Block 1A his limited neoplastic content for further studies if clinically indicated.  - Radiation Oncology consulted, and plan for 5 sessions of RT,  to be completed tuesday 2/6/24  - c/w decadron to 4mg q12hrs IV, pending Nsx rcs for taper  - Heme Onc following, plan for immunotherapy  - Pain Management consult appreciated   - Palliative following, family are waiting for full pathology report to further discuss. Full code  - Patient in severe pain, yeison with movement. Encouraged her to work with PT today. F/U neurosx cslt for possible surgery   - Urology consulted, will do CT urogram     #CHF, diastolic - acute -- now resolved  - TTE (May 2013) demonstrated LVEF 55-65% w/ normal global LVSF and no significant valvulopathy.  - TTE 1/28- EF 60-65% mod pulm HTN   - elevated BNP  - Tele monitored for 24 hrs with no dysrhythmia   - received few doses of Furosemide and 1x dose of Bumex but currently off diuretics   - monitor volume status closely    #Chronic Atrial Fibrillation  CHADS-VASC 3+ (Age x2, Female). Only on AC. Previously on Lopressor, but only takes intermittently as HR currently rate-controlled off meds.  - c/w eliquis     #GERD  - c/w pantoprazole 40mg PO QD (therapeutic interchange for omeprazole)    #CKD 3b   per PCP baseline is 1.2, currently 1.0    #Urinary retention-likely neurogenic   -hanson placed 1/29  -flomax increased to 0.8mg   - can attempt TOV when RT is completed and patient is able to ambulate     #Constipation  -senna, miralax, lactulose--  bowel movements once in 2-3 days. enema given  # HTN-- on metoprolol and started on lisinopril 10mg 2/4    PENDING:  Neurosx rcs for pain and steroid taper, oncology plan for immunotherapy, PT for functional status, Urology for primary tumor f/u CT urogram

## 2024-02-06 NOTE — PROGRESS NOTE ADULT - SUBJECTIVE AND OBJECTIVE BOX
SUBJECTIVE:    Patient is a 89y old Female who presents with a chief complaint of Back Pain (05 Feb 2024 17:56)    Currently admitted to medicine with the primary diagnosis of Inability to ambulate due to multiple joints     Today is hospital day 13d. Patient seen and examined at bedside. Patient still c/o of severe back pain yeison on moevemnt. Today last RT session. No new issues or overnight events.     PAST MEDICAL & SURGICAL HISTORY  Chronic atrial fibrillation    GERD (gastroesophageal reflux disease)    Chronic lower back pain    No significant past surgical history        ALLERGIES:  No Known Allergies    MEDICATIONS:  STANDING MEDICATIONS  acetaminophen     Tablet .. 975 milliGRAM(s) Oral every 8 hours  apixaban 5 milliGRAM(s) Oral every 12 hours  calcium carbonate   1250 mG (OsCal) 1 Tablet(s) Oral daily  chlorhexidine 2% Cloths 1 Application(s) Topical daily  cholecalciferol 5000 Unit(s) Oral daily  dexAMETHasone  Injectable 4 milliGRAM(s) IV Push <User Schedule>  fluticasone propionate 50 MICROgram(s)/spray Nasal Spray 1 Spray(s) Both Nostrils two times a day  gabapentin 300 milliGRAM(s) Oral three times a day  lactated ringers. 1000 milliLiter(s) IV Continuous <Continuous>  lactulose Syrup 20 Gram(s) Oral every 4 hours  lidocaine   4% Patch 1 Patch Transdermal every 24 hours  lisinopril 10 milliGRAM(s) Oral daily  methocarbamol 750 milliGRAM(s) Oral every 8 hours  metoprolol tartrate 12.5 milliGRAM(s) Oral two times a day  multivitamin 1 Tablet(s) Oral daily  pantoprazole    Tablet 40 milliGRAM(s) Oral before breakfast  polyethylene glycol 3350 17 Gram(s) Oral two times a day  rOPINIRole 0.25 milliGRAM(s) Oral two times a day  senna 2 Tablet(s) Oral at bedtime  tamsulosin 0.8 milliGRAM(s) Oral at bedtime    PRN MEDICATIONS  oxyCODONE    IR 15 milliGRAM(s) Oral every 6 hours PRN    VITALS:   T(F): 97.5  HR: 74  BP: 158/78  RR: 18  SpO2: 99%    LABS:                        12.2   7.61  )-----------( 199      ( 06 Feb 2024 07:14 )             35.6     02-06    133<L>  |  99  |  30<H>  ----------------------------<  99  4.8   |  24  |  0.8    Ca    8.6      06 Feb 2024 07:14  Phos  3.2     02-05  Mg     2.0     02-06    TPro  4.7<L>  /  Alb  3.1<L>  /  TBili  0.6  /  DBili  x   /  AST  19  /  ALT  15  /  AlkPhos  55  02-06      Urinalysis Basic - ( 06 Feb 2024 07:14 )    Color: x / Appearance: x / SG: x / pH: x  Gluc: 99 mg/dL / Ketone: x  / Bili: x / Urobili: x   Blood: x / Protein: x / Nitrite: x   Leuk Esterase: x / RBC: x / WBC x   Sq Epi: x / Non Sq Epi: x / Bacteria: x                RADIOLOGY:    PHYSICAL EXAM:  GEN: No acute distress, severe back pain  PULM/CHEST: Clear to auscultation bilaterally, no rales, rhonchi or wheezes   CVS: Regular rate and rhythm, S1-S2, no murmurs  ABD: Soft, non-tender, non-distended, +BS  EXT: No edema  NEURO: AAOx3    David Catheter:   Indwelling Urethral Catheter:     Connect To:  Straight Drainage/[place]    Indication:  Urinary Retention / Obstruction (02-05-24 @ 14:04) (not performed) [Active]  Indwelling Urethral Catheter:     Connect To:  Straight Drainage/[place]    Indication:  Urinary Retention / Obstruction (02-04-24 @ 15:35) (not performed) [Active]  Indwelling Urethral Catheter:     Connect To:  Straight Drainage/[place]    Indication:  Urinary Retention / Obstruction (02-03-24 @ 14:43) (not performed) [Active]  Indwelling Urethral Catheter:     Connect To:  Straight Drainage/[place]    Indication:  Urinary Retention / Obstruction (02-02-24 @ 13:15) (not performed) [Active]  Indwelling Urethral Catheter:     Connect To:  Straight Drainage/[place]    Indication:  Urinary Retention / Obstruction (01-31-24 @ 14:28) (not performed) [Active]  Indwelling Urethral Catheter:     Connect To:  Straight Drainage/[place]    Indication:  Urinary Retention / Obstruction (01-31-24 @ 09:00) (not performed) [Active]

## 2024-02-06 NOTE — PROGRESS NOTE ADULT - ATTENDING COMMENTS
patient seen and examined independently on morning rounds for the first time today, chart reviewed and discussed with the medicine resident and on interdisciplinary rounds and agree with the above resident progress note with the following addendum:    hospital day #14    -d/w patients daughter bedside- patient oob in chair but was unable to work with PT  -awaiting decision from oncology regarding further treatment/immunotherapy  -f/u with neurosurgery if any plan for further surgical intervention  -pain control and resting comfortably    DNR/DNI--palliative care following --continue medical management for now    pcp- Dr. Johnson    Total time spent to complete patient's bedside assessment, review medical chart, discuss medical plan of care with covering medical team was more than 35 minutes  with >50% of time spendt face to face with patient, discussion with patient/family and/or coordination of care

## 2024-02-07 LAB
% ALBUMIN: 57.7 % — SIGNIFICANT CHANGE UP
% ALPHA 1: 7.8 % — SIGNIFICANT CHANGE UP
% ALPHA 2: 14.6 % — SIGNIFICANT CHANGE UP
% BETA: 11.5 % — SIGNIFICANT CHANGE UP
% GAMMA: 8.4 % — SIGNIFICANT CHANGE UP
ALBUMIN SERPL ELPH-MCNC: 3.8 G/DL — SIGNIFICANT CHANGE UP (ref 3.6–5.5)
ALBUMIN/GLOB SERPL ELPH: 1.4 RATIO — SIGNIFICANT CHANGE UP
ALPHA1 GLOB SERPL ELPH-MCNC: 0.5 G/DL — HIGH (ref 0.1–0.4)
ALPHA2 GLOB SERPL ELPH-MCNC: 0.9 G/DL — SIGNIFICANT CHANGE UP (ref 0.5–1)
ANION GAP SERPL CALC-SCNC: 10 MMOL/L — SIGNIFICANT CHANGE UP (ref 7–14)
B-GLOBULIN SERPL ELPH-MCNC: 0.7 G/DL — SIGNIFICANT CHANGE UP (ref 0.5–1)
BUN SERPL-MCNC: 28 MG/DL — HIGH (ref 10–20)
CALCIUM SERPL-MCNC: 8.6 MG/DL — SIGNIFICANT CHANGE UP (ref 8.4–10.5)
CHLORIDE SERPL-SCNC: 97 MMOL/L — LOW (ref 98–110)
CO2 SERPL-SCNC: 26 MMOL/L — SIGNIFICANT CHANGE UP (ref 17–32)
CREAT SERPL-MCNC: 1 MG/DL — SIGNIFICANT CHANGE UP (ref 0.7–1.5)
EGFR: 54 ML/MIN/1.73M2 — LOW
GAMMA GLOBULIN: 0.5 G/DL — LOW (ref 0.6–1.6)
GLUCOSE SERPL-MCNC: 97 MG/DL — SIGNIFICANT CHANGE UP (ref 70–99)
HCT VFR BLD CALC: 38.7 % — SIGNIFICANT CHANGE UP (ref 37–47)
HGB BLD-MCNC: 13.4 G/DL — SIGNIFICANT CHANGE UP (ref 12–16)
INTERPRETATION SERPL IFE-IMP: SIGNIFICANT CHANGE UP
MAGNESIUM SERPL-MCNC: 2 MG/DL — SIGNIFICANT CHANGE UP (ref 1.8–2.4)
MCHC RBC-ENTMCNC: 32.8 PG — HIGH (ref 27–31)
MCHC RBC-ENTMCNC: 34.6 G/DL — SIGNIFICANT CHANGE UP (ref 32–37)
MCV RBC AUTO: 94.9 FL — SIGNIFICANT CHANGE UP (ref 81–99)
NRBC # BLD: 0 /100 WBCS — SIGNIFICANT CHANGE UP (ref 0–0)
PLATELET # BLD AUTO: 207 K/UL — SIGNIFICANT CHANGE UP (ref 130–400)
PMV BLD: 9.5 FL — SIGNIFICANT CHANGE UP (ref 7.4–10.4)
POTASSIUM SERPL-MCNC: 4.2 MMOL/L — SIGNIFICANT CHANGE UP (ref 3.5–5)
POTASSIUM SERPL-SCNC: 4.2 MMOL/L — SIGNIFICANT CHANGE UP (ref 3.5–5)
PROT PATTERN SERPL ELPH-IMP: SIGNIFICANT CHANGE UP
PROT SERPL-MCNC: 6.5 G/DL — SIGNIFICANT CHANGE UP (ref 6–8.3)
PROT SERPL-MCNC: 6.5 G/DL — SIGNIFICANT CHANGE UP (ref 6–8.3)
RBC # BLD: 4.08 M/UL — LOW (ref 4.2–5.4)
RBC # FLD: 13.3 % — SIGNIFICANT CHANGE UP (ref 11.5–14.5)
SODIUM SERPL-SCNC: 133 MMOL/L — LOW (ref 135–146)
WBC # BLD: 10.42 K/UL — SIGNIFICANT CHANGE UP (ref 4.8–10.8)
WBC # FLD AUTO: 10.42 K/UL — SIGNIFICANT CHANGE UP (ref 4.8–10.8)

## 2024-02-07 PROCEDURE — 99232 SBSQ HOSP IP/OBS MODERATE 35: CPT

## 2024-02-07 PROCEDURE — 99233 SBSQ HOSP IP/OBS HIGH 50: CPT

## 2024-02-07 PROCEDURE — 99497 ADVNCD CARE PLAN 30 MIN: CPT

## 2024-02-07 RX ORDER — OXYCODONE HYDROCHLORIDE 5 MG/1
10 TABLET ORAL EVERY 12 HOURS
Refills: 0 | Status: DISCONTINUED | OUTPATIENT
Start: 2024-02-07 | End: 2024-02-14

## 2024-02-07 RX ADMIN — Medication 12.5 MILLIGRAM(S): at 18:13

## 2024-02-07 RX ADMIN — Medication 1 TABLET(S): at 12:08

## 2024-02-07 RX ADMIN — Medication 975 MILLIGRAM(S): at 13:17

## 2024-02-07 RX ADMIN — OXYCODONE HYDROCHLORIDE 15 MILLIGRAM(S): 5 TABLET ORAL at 12:08

## 2024-02-07 RX ADMIN — METHOCARBAMOL 750 MILLIGRAM(S): 500 TABLET, FILM COATED ORAL at 13:17

## 2024-02-07 RX ADMIN — APIXABAN 5 MILLIGRAM(S): 2.5 TABLET, FILM COATED ORAL at 21:39

## 2024-02-07 RX ADMIN — METHOCARBAMOL 750 MILLIGRAM(S): 500 TABLET, FILM COATED ORAL at 05:35

## 2024-02-07 RX ADMIN — SENNA PLUS 2 TABLET(S): 8.6 TABLET ORAL at 21:39

## 2024-02-07 RX ADMIN — LISINOPRIL 10 MILLIGRAM(S): 2.5 TABLET ORAL at 05:35

## 2024-02-07 RX ADMIN — Medication 4 MILLIGRAM(S): at 05:35

## 2024-02-07 RX ADMIN — ROPINIROLE 0.25 MILLIGRAM(S): 8 TABLET, FILM COATED, EXTENDED RELEASE ORAL at 05:35

## 2024-02-07 RX ADMIN — Medication 4 MILLIGRAM(S): at 18:14

## 2024-02-07 RX ADMIN — POLYETHYLENE GLYCOL 3350 17 GRAM(S): 17 POWDER, FOR SOLUTION ORAL at 18:15

## 2024-02-07 RX ADMIN — Medication 975 MILLIGRAM(S): at 22:10

## 2024-02-07 RX ADMIN — PANTOPRAZOLE SODIUM 40 MILLIGRAM(S): 20 TABLET, DELAYED RELEASE ORAL at 05:36

## 2024-02-07 RX ADMIN — OXYCODONE HYDROCHLORIDE 15 MILLIGRAM(S): 5 TABLET ORAL at 06:25

## 2024-02-07 RX ADMIN — Medication 975 MILLIGRAM(S): at 21:40

## 2024-02-07 RX ADMIN — GABAPENTIN 300 MILLIGRAM(S): 400 CAPSULE ORAL at 05:36

## 2024-02-07 RX ADMIN — OXYCODONE HYDROCHLORIDE 15 MILLIGRAM(S): 5 TABLET ORAL at 05:34

## 2024-02-07 RX ADMIN — Medication 1 SPRAY(S): at 18:16

## 2024-02-07 RX ADMIN — Medication 5000 UNIT(S): at 12:07

## 2024-02-07 RX ADMIN — METHOCARBAMOL 750 MILLIGRAM(S): 500 TABLET, FILM COATED ORAL at 21:39

## 2024-02-07 RX ADMIN — Medication 1 SPRAY(S): at 05:40

## 2024-02-07 RX ADMIN — CHLORHEXIDINE GLUCONATE 1 APPLICATION(S): 213 SOLUTION TOPICAL at 12:10

## 2024-02-07 RX ADMIN — ROPINIROLE 0.25 MILLIGRAM(S): 8 TABLET, FILM COATED, EXTENDED RELEASE ORAL at 18:13

## 2024-02-07 RX ADMIN — GABAPENTIN 300 MILLIGRAM(S): 400 CAPSULE ORAL at 21:40

## 2024-02-07 RX ADMIN — Medication 975 MILLIGRAM(S): at 14:17

## 2024-02-07 RX ADMIN — OXYCODONE HYDROCHLORIDE 10 MILLIGRAM(S): 5 TABLET ORAL at 19:48

## 2024-02-07 RX ADMIN — OXYCODONE HYDROCHLORIDE 15 MILLIGRAM(S): 5 TABLET ORAL at 13:08

## 2024-02-07 RX ADMIN — OXYCODONE HYDROCHLORIDE 10 MILLIGRAM(S): 5 TABLET ORAL at 18:14

## 2024-02-07 RX ADMIN — TAMSULOSIN HYDROCHLORIDE 0.8 MILLIGRAM(S): 0.4 CAPSULE ORAL at 21:39

## 2024-02-07 RX ADMIN — APIXABAN 5 MILLIGRAM(S): 2.5 TABLET, FILM COATED ORAL at 09:23

## 2024-02-07 RX ADMIN — Medication 12.5 MILLIGRAM(S): at 05:37

## 2024-02-07 RX ADMIN — GABAPENTIN 300 MILLIGRAM(S): 400 CAPSULE ORAL at 13:18

## 2024-02-07 NOTE — PROGRESS NOTE ADULT - ASSESSMENT
Pathologic L1 vertebrae fracture  Conus Medullaris Syndrome  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 Of note there is significant dilation of the urinary bladder. Correlate clinically for neurogenic bladder. Additional degenerative changes in combination with lumbar levoscoliosis contributes to neuroforaminal narrowing most significantly involving the left L4-L5 and L5-S1 levels.  -  CT Chest w/ IV Cont (01.30.24) Cardiomegaly. CHF. Right-sided pleural effusion.Nonspecific right upper lobe pleural-based opacity.  - labs overall nonrevealing   - no hx of smoking, no family hx of malignancy   - CEA 5.2 (elevated)  - CA19.9 WNL   - CA 15.3 31 (elevated)   - Cytopathology - ACCESSION No:  [id number]- LUMBAR MASS; CT-GUIDED, BIOPSY: POSITIVE FOR MALIGNANT CELLS- Metastatic carcinoma. By immunohistochemistry, the tumor cells are positive for CK7, p40 and GATA3 and negative for TTF1/Napsin A, CK20, CDX2, GCDFP15, ER, Hepar, PAX-8 and WT1. The immunohistochemical profile is not site specific, however it can be suggestive of a urothelial origin. Clinicopathologic correlation is recommended. The case was reviewed interdepartmentally with another pathologist and the diagnosis represents a consensusopinion. The case was reported to the cancer registry. Block 1A his limited neoplastic content for further studies if clinically indicated.      Recommendations:   - pathology discussed with attending pathologist. Have requested to add on PDL-1, NGS, and MMR/MSI to the specimen  - Follow up urine cytology   - case discussed with Dr. Nair, neurosurgery will likely offer patient laminectomy, follow up official neurosurgical plan  - Will initiate immunotherapy, likely Keytruda, following recovery from surgery

## 2024-02-07 NOTE — PROGRESS NOTE ADULT - ASSESSMENT
89F w/ h/o chronic afib (on Eliquis), GERD, and chronic low back pain presenting s/p fall. 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 Soft tissue component associated with the lytic   changes in the posterior inferior endplate of L1 encroaches the spinal   space resulting in L1-2 severe spinal stenosis and severe bilateral   foraminal stenosis.  - MRI L Spine - Malignant compression deformity of L1 with significant bowing of the   posterior cortex contributing to mass effect upon the conus  - neuroSx consult-no surgical intervention, abd binder, TLSO brace on discharge, advised decadron   - IR biopsy done 1/30  - CEA 5.2 (elevated)  - CA19.9 WNL   - CA 15.3 31 (elevated)   - Cytopathology -LUMBAR MASS; CT-GUIDED, BIOPSY: POSITIVE FOR MALIGNANT CELLS- Metastatic carcinoma. By immunohistochemistry, the tumor cells are positive for CK7, p40 and GATA3 and negative for TTF1/Napsin A, CK20, CDX2, GCDFP15, ER, Hepar, PAX-8 and WT1. The immunohistochemical profile is not site specific, however it can be suggestive of a urothelial origin. Clinicopathologic correlation is recommended. The case was reviewed interdepartmentally with another pathologist and the diagnosis represents a consensusopinion. The case was reported to the cancer registry. Block 1A his limited neoplastic content for further studies if clinically indicated.  - Radiation Oncology consulted, and plan for 5 sessions of RT, completed tuesday 2/6/24  - s/p decadron to 4mg q12hrs IV, started taper 2/7 4mg BID for 2 days then once a day for 2 days  - Heme Onc following, plan for immunotherapy  - Pain Management consult appreciated   - Palliative following, family are waiting for full pathology report to further discuss. Full code  - Patient in severe pain, yeison with movement. Encouraged her to work with PT today. F/U neurosx cslt for possible surgery   - Urology consulted, will do CT urogram, CT urogram reviewed No acute urologic intervention at this point in time - per Dr. Herrera: urothelial diagnosis obtained via IR biopsy, no indication for cystoscopy at this point in time -Follow-up outpatient with Dr. Herrera    #CHF, diastolic - acute -- now resolved  - TTE (May 2013) demonstrated LVEF 55-65% w/ normal global LVSF and no significant valvulopathy.  - TTE 1/28- EF 60-65% mod pulm HTN   - elevated BNP  - Tele monitored for 24 hrs with no dysrhythmia   - received few doses of Furosemide and 1x dose of Bumex but currently off diuretics   - monitor volume status closely    #Chronic Atrial Fibrillation  CHADS-VASC 3+ (Age x2, Female). Only on AC. Previously on Lopressor, but only takes intermittently as HR currently rate-controlled off meds.  - c/w eliquis     #GERD  - c/w pantoprazole 40mg PO QD (therapeutic interchange for omeprazole)    #CKD 3b   per PCP baseline is 1.2, currently 1.0    #Urinary retention-likely neurogenic   -hanson placed 1/29  -flomax increased to 0.8mg   - completed RT, will try TOV after walking with PT    #Constipation  -senna, miralax, lactulose--  bowel movements once in 2-3 days. enema given  # HTN-- on metoprolol and started on lisinopril 10mg 2/4    PENDING:  Oncology plan for immunotherapy and transfer to 3b, PT for functional status and TOV

## 2024-02-07 NOTE — PROGRESS NOTE ADULT - SUBJECTIVE AND OBJECTIVE BOX
HPI:  89F w/ h/o chronic afib (on Eliquis), GERD, and chronic low back pain presenting s/p fall. Patient known to have chronic low back pain that has progressively worsened over the years. Pain acutely worsened two weeks ago and patient subsequently fell due to inability to support own weight. Evaluated by PCP after initial fall due to worsening pain. Prescribed Percocet 5-325 bid w/ mild relief. However, pt once again fell earlier today after pt unable to support her own weight while trying to ambulate. Now unable to ambulate or bear weight, even w/ assistance. No reported HT, LOC, lightheadedness, confusion, loss of continence, or focal weakness a/w either fall. No reported fevers, chills, CP, palpitations, SOB, abdominal pain, n/v/d, or dysuria (although daughter did note malodorous urine recently).    Interval history  -Patient seen at bedside with alyssa Garcia  -Patient notes leg pain with movement, but only mild pain at rest  2/7: Patient now DNR/DNI, added 10mg oxycontin Q12H for consistent pain     ADVANCE DIRECTIVES:     [ ] Full Code [x ] DNR  MOLST  [ ]  Living Will  [ ]   DECISION MAKER(s):  [ ] Health Care Proxy(s)  [x ] Surrogate(s)  [ ] Guardian           Name(s): Phone Number(s):  Daughter      BASELINE (I)ADL(s) (prior to admission):    [place]: [ ]Total  [ ] Moderate [ ]Dependent  Palliative Performance Status Version 2:         %    http://npcrc.org/files/news/palliative_performance_scale_ppsv2.pdf    Allergies    No Known Allergies    Intolerances    MEDICATIONS  (STANDING):  acetaminophen     Tablet .. 975 milliGRAM(s) Oral every 8 hours  apixaban 5 milliGRAM(s) Oral every 12 hours  calcium carbonate   1250 mG (OsCal) 1 Tablet(s) Oral daily  chlorhexidine 2% Cloths 1 Application(s) Topical daily  cholecalciferol 5000 Unit(s) Oral daily  dexAMETHasone     Tablet 4 milliGRAM(s) Oral two times a day  fluticasone propionate 50 MICROgram(s)/spray Nasal Spray 1 Spray(s) Both Nostrils two times a day  gabapentin 300 milliGRAM(s) Oral three times a day  lactated ringers. 1000 milliLiter(s) (75 mL/Hr) IV Continuous <Continuous>  lidocaine   4% Patch 1 Patch Transdermal every 24 hours  lisinopril 10 milliGRAM(s) Oral daily  methocarbamol 750 milliGRAM(s) Oral every 8 hours  metoprolol tartrate 12.5 milliGRAM(s) Oral two times a day  multivitamin 1 Tablet(s) Oral daily  oxyCODONE  ER Tablet 10 milliGRAM(s) Oral every 12 hours  pantoprazole    Tablet 40 milliGRAM(s) Oral before breakfast  polyethylene glycol 3350 17 Gram(s) Oral two times a day  rOPINIRole 0.25 milliGRAM(s) Oral two times a day  senna 2 Tablet(s) Oral at bedtime  tamsulosin 0.8 milliGRAM(s) Oral at bedtime    MEDICATIONS  (PRN):  oxyCODONE    IR 15 milliGRAM(s) Oral every 6 hours PRN moderate/severe pain (4-10)        PRESENT SYMPTOMS: [ ]Unable to obtain due to poor mentation   Source if other than patient:  [ ]Family   [ ]Team     Pain: [ x]yes [ ]no  QOL impact -  mild at rest, severe with movement  Location -          lower extremities           Aggravating factors - none given  Quality -  none given  Radiation - none  Timing-  constant  Severity (0-10 scale): no number given  Minimal acceptable level (0-10 scale):     CPOT:    https://www.sccm.org/getattachment/nng38i12-0q4p-7a4k-6p3n-5509q4485w8r/Critical-Care-Pain-Observation-Tool-(CPOT)    PAIN AD Score:   [email or web address].[email or web address]/painad.pdf (press ctrl +  left click to view)    Dyspnea:                           [x ]None[ ]Mild [ ]Moderate [ ]Severe     Respiratory Distress Observation Scale (RDOS):   A score of 0 to 2 signifies little or no respiratory distress, 3 signifies mild distress, scores 4 to 6 indicate moderate distress, and scores greater than 7 signify severe distress  [email or web address]://[email or web address]/sites/default/files/PDFS/194487-iqxskgkuvji-otcmtpel-bikgfqrnlru-dtjwm.pdf    Anxiety:                             [ x]None[ ]Mild [ ]Moderate [ ]Severe   Fatigue:                             [x ]None[ ]Mild [ ]Moderate [ ]Severe   Nausea:                             [ x]None[ ]Mild [ ]Moderate [ ]Severe   Loss of appetite:              [x ]None[ ]Mild [ ]Moderate [ ]Severe   Constipation:                    [x ]None[ ]Mild [ ]Moderate [ ]Severe    Other Symptoms:  [ x]All other review of systems negative     Palliative Performance Status Version 2:         30%    http://[facility].org/files/news/palliative_performance_scale_ppsv2.pdf    PHYSICAL EXAM:    Vital Signs Last 24 Hrs  T(C): 35.7 (07 Feb 2024 07:20), Max: 36.1 (06 Feb 2024 15:30)  T(F): 96.3 (07 Feb 2024 07:20), Max: 96.9 (06 Feb 2024 15:30)  HR: 75 (07 Feb 2024 07:20) (74 - 100)  BP: 108/59 (07 Feb 2024 07:20) (108/59 - 128/92)  BP(mean): --  RR: 18 (07 Feb 2024 07:20) (18 - 18)  SpO2: --        GENERAL:  [ ] No acute distress [ ]Lethargic  [ ]Unarousable  [ x]Verbal  [ ]Non-Verbal [ ]Cachexia    BEHAVIORAL/PSYCH:  [x ]Alert and Oriented x2  [ ] Anxiety [ ] Delirium [ ] Agitation [ x] Calm   EYES: [ x] No scleral icterus [ ] Scleral icterus [ ] Closed  ENMT:  [ ]Dry mouth  [x ]No external oral lesions [ ] No external ear or nose lesions  CARDIOVASCULAR:  [ ]Regular [ ]Irregular [ ]Tachy [x ]Not Tachy  [ ]Sung [ ] Edema [ ] No edema  PULMONARY:  [ ]Tachypnea  [ ]Audible excessive secretions [ x] No labored breathing [ ] labored breathing  GASTROINTESTINAL: [ ]Soft  [ ]Distended  [x ]Not distended [ ]Non tender [ ]Tender  MUSCULOSKELETAL: [ ]No clubbing [ ] clubbing  [x ] No cyanosis [ ] cyanosis  NEUROLOGIC: [ ]No focal deficits  [x ]Follows commands  [ ]Does not follow commands  [ x]Cognitive impairment  [ ]Dysphagia  [ ]Dysarthria  [ ]Paresis   SKIN: [ ] Jaundiced [ x] Non-jaundiced [ ]Rash [ ]No Rash [ ] Warm [ ] Dry  MISC/LINES: [ ] ET tube [ ] Trach [ ]NGT/OGT [ ]PEG [ ]David    LABS: reviewed by me                                     13.4   10.42 )-----------( 207      ( 07 Feb 2024 08:31 )             38.7     02-07    133<L>  |  97<L>  |  28<H>  ----------------------------<  97  4.2   |  26  |  1.0    Ca    8.6      07 Feb 2024 08:31  Mg     2.0     02-07    TPro  4.7<L>  /  Alb  3.1<L>  /  TBili  0.6  /  DBili  x   /  AST  19  /  ALT  15  /  AlkPhos  55  02-06      Urinalysis Basic - ( 07 Feb 2024 08:31 )    Color: x / Appearance: x / SG: x / pH: x  Gluc: 97 mg/dL / Ketone: x  / Bili: x / Urobili: x   Blood: x / Protein: x / Nitrite: x   Leuk Esterase: x / RBC: x / WBC x   Sq Epi: x / Non Sq Epi: x / Bacteria: x              RADIOLOGY & ADDITIONAL STUDIES: reviewed by me    < from: NM SPECT Bone Scan, Single Area Single Day (02.01.24 @ 16:43) >  IMPRESSION:    Abnormal uptake in the L1 vertebral body, corresponding to known   compression fracture deformity secondary to vertebral body mass.    No other evidence for metastatic bone disease.    < end of copied text >        EKG: reviewed by me    < from: 12 Lead ECG (01.25.24 @ 11:34) >  Ventricular Rate 94 BPM    Atrial Rate 81 BPM    QRS Duration 90 ms    Q-T Interval 384 ms    QTC Calculation(Bazett) 480 ms    R Axis 166 degrees    T Axis -48 degrees    Diagnosis Line Atrial fibrillation  Right ventricular hypertrophy  Anterior infarct , age undetermined  ST & T wave abnormality, consider inferior ischemia  Abnormal ECG    < end of copied text >        PROTEIN CALORIE MALNUTRITION PRESENT: [ ]mild [ ]moderate [ ]severe [ ]underweight [ ]morbid obesity  https://www.andeal.org/vault/2440/web/files/ONC/Table_Clinical%20Characteristics%20to%20Document%20Malnutrition-White%20JV%20et%20al%202012.pdf    Height (cm): 152.4 (01-30-24 @ 11:24)  Weight (kg): 70.3 (01-30-24 @ 11:24)  BMI (kg/m2): 30.3 (01-30-24 @ 11:24)  [ ]PPSV2 < or = to 30% [ ]significant weight loss  [ ]poor nutritional intake  [ ]anasarca      [ ]Artificial Nutrition      Palliative Care Spiritual/Emotional Screening Tool Question  Severity (0-4):                    OR                    [ x] Unable to determine. Will assess at later time if appropriate.  Score of 2 or greater indicates recommendation of Chaplaincy and/or SW referral  Chaplaincy Referral: [ ] Yes [ ] Refused [ ] Following     Caregiver [place]:  [ ] Yes [ ] No    OR    [x ] Unable to determine. Will assess at later time if appropriate.  Social Work Referral [ ]  Patient and Family Centered Care Referral [ ]    Anticipatory Grief Present: [ ] Yes [ ] No    OR     [ x] Unable to determine. Will assess at later time if appropriate.  Social Work Referral [ ]  Patient and Family Centered Care Referral [ ]    Patient discussed with primary medical team MD  Palliative care education provided to patient and/or family

## 2024-02-07 NOTE — PROGRESS NOTE ADULT - ASSESSMENT
89yFemale with history of afib on eliquis, GERD, chronic low back pain presents after fall. Patient found to have lumbar pathological fracture on admission. Palliative care consulted for GOC and symptom management.    Patient seen at bedside with daughter. Discussed goals of care and advanced directives. Daughter stated that she discussed with the patient previously about DNR/DNI and that would be something she wanted. She would still want full medical management at this time and was waiting to discuss further options with heme/onc. Filled out DNR/DNI MOLST and placed in chart. Patient was also having pain and was used PRN oxycodone 15mg IR x3/24H, added standing 10mg of oxycodone ER Q12H for pain and keeping PRN oxycodone 15mg IR as is.    Education about palliative care provided to patient/family.  See Recs below.    Please call [phone] with questions or concerns 24/7.   We will continue to follow.

## 2024-02-07 NOTE — PROGRESS NOTE ADULT - SUBJECTIVE AND OBJECTIVE BOX
Hematology Consult Note    HPI:  89F w/ h/o chronic afib (on Eliquis), GERD, and chronic low back pain presenting s/p fall. Patient known to have chronic low back pain that has progressively worsened over the years. Pain acutely worsened two weeks ago and patient subsequently fell due to inability to support own weight. Evaluated by PCP after initial fall due to worsening pain. Prescribed Percocet 5-325 bid w/ mild relief. However, pt once again fell earlier today after pt unable to support her own weight while trying to ambulate. Now unable to ambulate or bear weight, even w/ assistance. No reported HT, LOC, lightheadedness, confusion, loss of continence, or focal weakness a/w either fall. No reported fevers, chills, CP, palpitations, SOB, abdominal pain, n/v/d, or dysuria (although daughter did note malodorous urine recently).    Of note, pt also reports new onset LE swelling over past 1-2 weeks. Recently started on furosemide 20 qd by PCP for swelling. No reported CP or dyspnea at rest or exertion (but functional status limited by lower back pain). No known prior h/o CHF.     In ED, pt HD stable on vitals. Labs demonstrated K 5.6 (hemolyzed). XR Bilateral Hips, Pelvis, and L-Spine overall unremarkable (wet read). S/P morphine 4mg IV and methocarbamol 1000mg PO. Admitted to medicine for inability to ambulate s/p fall. (24 Jan 2024 15:07)      Allergies    No Known Allergies    Intolerances        MEDICATIONS  (STANDING):  acetaminophen     Tablet .. 975 milliGRAM(s) Oral every 8 hours  apixaban 5 milliGRAM(s) Oral every 12 hours  calcium carbonate   1250 mG (OsCal) 1 Tablet(s) Oral daily  chlorhexidine 2% Cloths 1 Application(s) Topical daily  cholecalciferol 5000 Unit(s) Oral daily  dexAMETHasone     Tablet 4 milliGRAM(s) Oral two times a day  fluticasone propionate 50 MICROgram(s)/spray Nasal Spray 1 Spray(s) Both Nostrils two times a day  gabapentin 300 milliGRAM(s) Oral three times a day  lactated ringers. 1000 milliLiter(s) (75 mL/Hr) IV Continuous <Continuous>  lidocaine   4% Patch 1 Patch Transdermal every 24 hours  lisinopril 10 milliGRAM(s) Oral daily  methocarbamol 750 milliGRAM(s) Oral every 8 hours  metoprolol tartrate 12.5 milliGRAM(s) Oral two times a day  multivitamin 1 Tablet(s) Oral daily  oxyCODONE  ER Tablet 10 milliGRAM(s) Oral every 12 hours  pantoprazole    Tablet 40 milliGRAM(s) Oral before breakfast  polyethylene glycol 3350 17 Gram(s) Oral two times a day  rOPINIRole 0.25 milliGRAM(s) Oral two times a day  senna 2 Tablet(s) Oral at bedtime  tamsulosin 0.8 milliGRAM(s) Oral at bedtime    MEDICATIONS  (PRN):  oxyCODONE    IR 15 milliGRAM(s) Oral every 6 hours PRN moderate/severe pain (4-10)      PAST MEDICAL & SURGICAL HISTORY:  Chronic atrial fibrillation      GERD (gastroesophageal reflux disease)      Chronic lower back pain      No significant past surgical history          FAMILY HISTORY:  Family history of stroke (Father)        SOCIAL HISTORY: No EtOH, no tobacco    REVIEW OF SYSTEMS:    CONSTITUTIONAL: No weakness, fevers or chills  EYES/ENT: No visual changes;  No vertigo or throat pain   NECK: No pain or stiffness  RESPIRATORY: No cough, wheezing, hemoptysis; No shortness of breath  CARDIOVASCULAR: No chest pain or palpitations  GASTROINTESTINAL: No abdominal or epigastric pain. No nausea, vomiting, or hematemesis; No diarrhea or constipation. No melena or hematochezia.  GENITOURINARY: No dysuria, frequency or hematuria  NEUROLOGICAL: No numbness or weakness  SKIN: No itching, burning, rashes, or lesions   All other review of systems is negative unless indicated above.        T(F): 96.3 (02-07-24 @ 07:20), Max: 96.3 (02-07-24 @ 07:20)  HR: 75 (02-07-24 @ 07:20)  BP: 108/59 (02-07-24 @ 07:20)  RR: 18 (02-07-24 @ 07:20)  SpO2: --  Wt(kg): --    GENERAL: NAD, well-developed  HEAD:  Atraumatic, Normocephalic  EYES: EOMI, PERRLA, conjunctiva and sclera clear  NECK: Supple, No JVD  CHEST/LUNG: Clear to auscultation bilaterally; No wheeze  HEART: Regular rate and rhythm; No murmurs, rubs, or gallops  ABDOMEN: Soft, Nontender, Nondistended; Bowel sounds present  EXTREMITIES:  2+ Peripheral Pulses, No clubbing, cyanosis, or edema  NEUROLOGY: non-focal  SKIN: No rashes or lesions                          13.4   10.42 )-----------( 207      ( 07 Feb 2024 08:31 )             38.7       02-07    133<L>  |  97<L>  |  28<H>  ----------------------------<  97  4.2   |  26  |  1.0    Ca    8.6      07 Feb 2024 08:31  Mg     2.0     02-07    TPro  4.7<L>  /  Alb  3.1<L>  /  TBili  0.6  /  DBili  x   /  AST  19  /  ALT  15  /  AlkPhos  55  02-06      Magnesium: 2.0 mg/dL (02-07 @ 08:31)

## 2024-02-07 NOTE — PROGRESS NOTE ADULT - SUBJECTIVE AND OBJECTIVE BOX
SUBJECTIVE:    Patient is a 89y old Female who presents with a chief complaint of Back Pain (06 Feb 2024 17:24)    Currently admitted to medicine with the primary diagnosis of Inability to ambulate due to multiple joints    Today is hospital day 14d. This morning she is resting comfortably in bed and reports no new issues or overnight events.     PAST MEDICAL & SURGICAL HISTORY  Chronic atrial fibrillation    GERD (gastroesophageal reflux disease)    Chronic lower back pain    No significant past surgical history        ALLERGIES:  No Known Allergies    MEDICATIONS:  STANDING MEDICATIONS  acetaminophen     Tablet .. 975 milliGRAM(s) Oral every 8 hours  apixaban 5 milliGRAM(s) Oral every 12 hours  calcium carbonate   1250 mG (OsCal) 1 Tablet(s) Oral daily  chlorhexidine 2% Cloths 1 Application(s) Topical daily  cholecalciferol 5000 Unit(s) Oral daily  dexAMETHasone     Tablet 4 milliGRAM(s) Oral two times a day  fluticasone propionate 50 MICROgram(s)/spray Nasal Spray 1 Spray(s) Both Nostrils two times a day  gabapentin 300 milliGRAM(s) Oral three times a day  lactated ringers. 1000 milliLiter(s) IV Continuous <Continuous>  lidocaine   4% Patch 1 Patch Transdermal every 24 hours  lisinopril 10 milliGRAM(s) Oral daily  methocarbamol 750 milliGRAM(s) Oral every 8 hours  metoprolol tartrate 12.5 milliGRAM(s) Oral two times a day  multivitamin 1 Tablet(s) Oral daily  pantoprazole    Tablet 40 milliGRAM(s) Oral before breakfast  polyethylene glycol 3350 17 Gram(s) Oral two times a day  rOPINIRole 0.25 milliGRAM(s) Oral two times a day  senna 2 Tablet(s) Oral at bedtime  tamsulosin 0.8 milliGRAM(s) Oral at bedtime    PRN MEDICATIONS  oxyCODONE    IR 15 milliGRAM(s) Oral every 6 hours PRN    VITALS:   T(F): 96.3  HR: 75  BP: 108/59  RR: 18  SpO2: --    LABS:                        13.4   10.42 )-----------( 207      ( 07 Feb 2024 08:31 )             38.7     02-07    133<L>  |  97<L>  |  28<H>  ----------------------------<  97  4.2   |  26  |  1.0    Ca    8.6      07 Feb 2024 08:31  Mg     2.0     02-07    TPro  4.7<L>  /  Alb  3.1<L>  /  TBili  0.6  /  DBili  x   /  AST  19  /  ALT  15  /  AlkPhos  55  02-06      Urinalysis Basic - ( 07 Feb 2024 08:31 )    Color: x / Appearance: x / SG: x / pH: x  Gluc: 97 mg/dL / Ketone: x  / Bili: x / Urobili: x   Blood: x / Protein: x / Nitrite: x   Leuk Esterase: x / RBC: x / WBC x   Sq Epi: x / Non Sq Epi: x / Bacteria: x                RADIOLOGY:    PHYSICAL EXAM:  GEN: No acute distress, back pain on mvts  PULM/CHEST: Clear to auscultation bilaterally, no rales, rhonchi or wheezes   CVS: Regular rate and rhythm, S1-S2, no murmurs  ABD: Soft, non-tender, non-distended, +BS  EXT: No edema  NEURO: AAOx3    David Catheter:   Indwelling Urethral Catheter:     Connect To:  Straight Drainage/[place]    Indication:  Urinary Retention / Obstruction (02-06-24 @ 18:12) (not performed) [Active]  Indwelling Urethral Catheter:     Connect To:  Straight Drainage/[place]    Indication:  Urinary Retention / Obstruction (02-05-24 @ 14:04) (not performed) [Active]  Indwelling Urethral Catheter:     Connect To:  Straight Drainage/[place]    Indication:  Urinary Retention / Obstruction (02-04-24 @ 15:35) (not performed) [Active]  Indwelling Urethral Catheter:     Connect To:  Straight Drainage/[place]    Indication:  Urinary Retention / Obstruction (02-03-24 @ 14:43) (not performed) [Active]  Indwelling Urethral Catheter:     Connect To:  Straight Drainage/[place]    Indication:  Urinary Retention / Obstruction (02-02-24 @ 13:15) (not performed) [Active]  Indwelling Urethral Catheter:     Connect To:  Straight Drainage/[place]    Indication:  Urinary Retention / Obstruction (01-31-24 @ 14:28) (not performed) [Active]  Indwelling Urethral Catheter:     Connect To:  Straight Drainage/[place]    Indication:  Urinary Retention / Obstruction (01-31-24 @ 09:00) (not performed) [Active]

## 2024-02-07 NOTE — PROGRESS NOTE ADULT - ATTENDING COMMENTS
seen/ examined w/ hemonc fellow; note reviewed; case discussed; agree w/ plan  [facility]. [facility] control after the completion of RT  2. palliative f/u appreciated; poor control w/ medications:  3. in this case, spoke to NEUROSURGERY and discussed operation options: this should be to improve her quality of life and ability to ambulate; explained that she has no other foci of malignancy; systemic therapy can wait until her pain is under control

## 2024-02-08 ENCOUNTER — NON-APPOINTMENT (OUTPATIENT)
Age: 89
End: 2024-02-08

## 2024-02-08 LAB
ALBUMIN SERPL ELPH-MCNC: 3.1 G/DL — LOW (ref 3.5–5.2)
ALP SERPL-CCNC: 57 U/L — SIGNIFICANT CHANGE UP (ref 30–115)
ALT FLD-CCNC: 14 U/L — SIGNIFICANT CHANGE UP (ref 0–41)
ANION GAP SERPL CALC-SCNC: 7 MMOL/L — SIGNIFICANT CHANGE UP (ref 7–14)
AST SERPL-CCNC: 18 U/L — SIGNIFICANT CHANGE UP (ref 0–41)
BASOPHILS # BLD AUTO: 0.02 K/UL — SIGNIFICANT CHANGE UP (ref 0–0.2)
BASOPHILS NFR BLD AUTO: 0.2 % — SIGNIFICANT CHANGE UP (ref 0–1)
BILIRUB SERPL-MCNC: 0.6 MG/DL — SIGNIFICANT CHANGE UP (ref 0.2–1.2)
BUN SERPL-MCNC: 36 MG/DL — HIGH (ref 10–20)
CALCIUM SERPL-MCNC: 8.6 MG/DL — SIGNIFICANT CHANGE UP (ref 8.4–10.4)
CHLORIDE SERPL-SCNC: 97 MMOL/L — LOW (ref 98–110)
CO2 SERPL-SCNC: 27 MMOL/L — SIGNIFICANT CHANGE UP (ref 17–32)
CREAT SERPL-MCNC: 1.1 MG/DL — SIGNIFICANT CHANGE UP (ref 0.7–1.5)
EGFR: 48 ML/MIN/1.73M2 — LOW
EOSINOPHIL # BLD AUTO: 0.01 K/UL — SIGNIFICANT CHANGE UP (ref 0–0.7)
EOSINOPHIL NFR BLD AUTO: 0.1 % — SIGNIFICANT CHANGE UP (ref 0–8)
GLUCOSE SERPL-MCNC: 107 MG/DL — HIGH (ref 70–99)
HCT VFR BLD CALC: 35.8 % — LOW (ref 37–47)
HGB BLD-MCNC: 12 G/DL — SIGNIFICANT CHANGE UP (ref 12–16)
IMM GRANULOCYTES NFR BLD AUTO: 1.1 % — HIGH (ref 0.1–0.3)
LYMPHOCYTES # BLD AUTO: 0.31 K/UL — LOW (ref 1.2–3.4)
LYMPHOCYTES # BLD AUTO: 3.6 % — LOW (ref 20.5–51.1)
MAGNESIUM SERPL-MCNC: 2.1 MG/DL — SIGNIFICANT CHANGE UP (ref 1.8–2.4)
MCHC RBC-ENTMCNC: 32.1 PG — HIGH (ref 27–31)
MCHC RBC-ENTMCNC: 33.5 G/DL — SIGNIFICANT CHANGE UP (ref 32–37)
MCV RBC AUTO: 95.7 FL — SIGNIFICANT CHANGE UP (ref 81–99)
MONOCYTES # BLD AUTO: 0.3 K/UL — SIGNIFICANT CHANGE UP (ref 0.1–0.6)
MONOCYTES NFR BLD AUTO: 3.4 % — SIGNIFICANT CHANGE UP (ref 1.7–9.3)
NEUTROPHILS # BLD AUTO: 7.99 K/UL — HIGH (ref 1.4–6.5)
NEUTROPHILS NFR BLD AUTO: 91.6 % — HIGH (ref 42.2–75.2)
NRBC # BLD: 0 /100 WBCS — SIGNIFICANT CHANGE UP (ref 0–0)
PLATELET # BLD AUTO: 186 K/UL — SIGNIFICANT CHANGE UP (ref 130–400)
PMV BLD: 9.8 FL — SIGNIFICANT CHANGE UP (ref 7.4–10.4)
POTASSIUM SERPL-MCNC: 5.6 MMOL/L — HIGH (ref 3.5–5)
POTASSIUM SERPL-SCNC: 5.6 MMOL/L — HIGH (ref 3.5–5)
PROT SERPL-MCNC: 4.7 G/DL — LOW (ref 6–8)
RBC # BLD: 3.74 M/UL — LOW (ref 4.2–5.4)
RBC # FLD: 13.6 % — SIGNIFICANT CHANGE UP (ref 11.5–14.5)
SODIUM SERPL-SCNC: 131 MMOL/L — LOW (ref 135–146)
WBC # BLD: 8.73 K/UL — SIGNIFICANT CHANGE UP (ref 4.8–10.8)
WBC # FLD AUTO: 8.73 K/UL — SIGNIFICANT CHANGE UP (ref 4.8–10.8)

## 2024-02-08 PROCEDURE — 99232 SBSQ HOSP IP/OBS MODERATE 35: CPT

## 2024-02-08 RX ORDER — SODIUM ZIRCONIUM CYCLOSILICATE 10 G/10G
5 POWDER, FOR SUSPENSION ORAL ONCE
Refills: 0 | Status: COMPLETED | OUTPATIENT
Start: 2024-02-08 | End: 2024-02-08

## 2024-02-08 RX ADMIN — Medication 975 MILLIGRAM(S): at 21:58

## 2024-02-08 RX ADMIN — TAMSULOSIN HYDROCHLORIDE 0.8 MILLIGRAM(S): 0.4 CAPSULE ORAL at 21:57

## 2024-02-08 RX ADMIN — Medication 975 MILLIGRAM(S): at 13:30

## 2024-02-08 RX ADMIN — OXYCODONE HYDROCHLORIDE 10 MILLIGRAM(S): 5 TABLET ORAL at 05:32

## 2024-02-08 RX ADMIN — Medication 1 SPRAY(S): at 17:26

## 2024-02-08 RX ADMIN — Medication 5000 UNIT(S): at 11:13

## 2024-02-08 RX ADMIN — POLYETHYLENE GLYCOL 3350 17 GRAM(S): 17 POWDER, FOR SOLUTION ORAL at 17:26

## 2024-02-08 RX ADMIN — ROPINIROLE 0.25 MILLIGRAM(S): 8 TABLET, FILM COATED, EXTENDED RELEASE ORAL at 05:32

## 2024-02-08 RX ADMIN — METHOCARBAMOL 750 MILLIGRAM(S): 500 TABLET, FILM COATED ORAL at 21:56

## 2024-02-08 RX ADMIN — ROPINIROLE 0.25 MILLIGRAM(S): 8 TABLET, FILM COATED, EXTENDED RELEASE ORAL at 17:25

## 2024-02-08 RX ADMIN — METHOCARBAMOL 750 MILLIGRAM(S): 500 TABLET, FILM COATED ORAL at 05:33

## 2024-02-08 RX ADMIN — Medication 1 TABLET(S): at 11:13

## 2024-02-08 RX ADMIN — APIXABAN 5 MILLIGRAM(S): 2.5 TABLET, FILM COATED ORAL at 08:30

## 2024-02-08 RX ADMIN — Medication 975 MILLIGRAM(S): at 22:58

## 2024-02-08 RX ADMIN — SODIUM ZIRCONIUM CYCLOSILICATE 5 GRAM(S): 10 POWDER, FOR SUSPENSION ORAL at 15:06

## 2024-02-08 RX ADMIN — OXYCODONE HYDROCHLORIDE 10 MILLIGRAM(S): 5 TABLET ORAL at 17:27

## 2024-02-08 RX ADMIN — LISINOPRIL 10 MILLIGRAM(S): 2.5 TABLET ORAL at 05:37

## 2024-02-08 RX ADMIN — GABAPENTIN 300 MILLIGRAM(S): 400 CAPSULE ORAL at 13:10

## 2024-02-08 RX ADMIN — GABAPENTIN 300 MILLIGRAM(S): 400 CAPSULE ORAL at 21:57

## 2024-02-08 RX ADMIN — Medication 975 MILLIGRAM(S): at 05:33

## 2024-02-08 RX ADMIN — GABAPENTIN 300 MILLIGRAM(S): 400 CAPSULE ORAL at 05:33

## 2024-02-08 RX ADMIN — METHOCARBAMOL 750 MILLIGRAM(S): 500 TABLET, FILM COATED ORAL at 13:10

## 2024-02-08 RX ADMIN — SENNA PLUS 2 TABLET(S): 8.6 TABLET ORAL at 21:57

## 2024-02-08 RX ADMIN — PANTOPRAZOLE SODIUM 40 MILLIGRAM(S): 20 TABLET, DELAYED RELEASE ORAL at 05:33

## 2024-02-08 RX ADMIN — APIXABAN 5 MILLIGRAM(S): 2.5 TABLET, FILM COATED ORAL at 21:57

## 2024-02-08 RX ADMIN — Medication 975 MILLIGRAM(S): at 13:10

## 2024-02-08 RX ADMIN — Medication 4 MILLIGRAM(S): at 05:33

## 2024-02-08 RX ADMIN — OXYCODONE HYDROCHLORIDE 10 MILLIGRAM(S): 5 TABLET ORAL at 17:25

## 2024-02-08 RX ADMIN — Medication 1 TABLET(S): at 11:12

## 2024-02-08 RX ADMIN — CHLORHEXIDINE GLUCONATE 1 APPLICATION(S): 213 SOLUTION TOPICAL at 11:14

## 2024-02-08 RX ADMIN — Medication 12.5 MILLIGRAM(S): at 05:33

## 2024-02-08 RX ADMIN — Medication 975 MILLIGRAM(S): at 06:05

## 2024-02-08 RX ADMIN — OXYCODONE HYDROCHLORIDE 10 MILLIGRAM(S): 5 TABLET ORAL at 06:05

## 2024-02-08 RX ADMIN — Medication 4 MILLIGRAM(S): at 17:25

## 2024-02-08 RX ADMIN — Medication 12.5 MILLIGRAM(S): at 17:25

## 2024-02-08 NOTE — PROGRESS NOTE ADULT - ASSESSMENT
89F w/ h/o chronic afib (on Eliquis), GERD, and chronic low back pain presenting s/p fall. 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 Soft tissue component associated with the lytic   changes in the posterior inferior endplate of L1 encroaches the spinal   space resulting in L1-2 severe spinal stenosis and severe bilateral   foraminal stenosis.  - MRI L Spine - Malignant compression deformity of L1 with significant bowing of the   posterior cortex contributing to mass effect upon the conus  - neuroSx consult-no surgical intervention, abd binder, TLSO brace on discharge, advised decadron   - IR biopsy done 1/30  - CEA 5.2 (elevated)  - CA19.9 WNL   - CA 15.3 31 (elevated)   - Cytopathology -LUMBAR MASS; CT-GUIDED, BIOPSY: POSITIVE FOR MALIGNANT CELLS- Metastatic carcinoma. By immunohistochemistry, the tumor cells are positive for CK7, p40 and GATA3 and negative for TTF1/Napsin A, CK20, CDX2, GCDFP15, ER, Hepar, PAX-8 and WT1. The immunohistochemical profile is not site specific, however it can be suggestive of a urothelial origin. Clinicopathologic correlation is recommended. The case was reviewed interdepartmentally with another pathologist and the diagnosis represents a consensusopinion. The case was reported to the cancer registry. Block 1A his limited neoplastic content for further studies if clinically indicated.  - Radiation Oncology consulted, and plan for 5 sessions of RT, completed tuesday 2/6/24  - s/p decadron to 4mg q12hrs IV, started taper 2/7 4mg BID for 2 days then once a day for 2 days  - Heme Onc following, plan for immunotherapy  - Pain Management consult appreciated   - Palliative following, DNR/DNI  - Patient in severe pain, yeison with movement. Encouraged her to work with PT today.  - Urology consulted,CT urogram reviewed No acute urologic intervention at this point in time - per Dr. Herrera: urothelial diagnosis obtained via IR biopsy, no indication for cystoscopy at this point in time -Follow-up outpatient with Dr. Karanikolas  - Neurosurgery plan for laminectomy  - Oncology will start immunotherapy after surgery, slowly growing tumor  - Needs cardio clearance prior to laminectomy    #CHF, diastolic - acute -- now resolved  - TTE (May 2013) demonstrated LVEF 55-65% w/ normal global LVSF and no significant valvulopathy.  - TTE 1/28- EF 60-65% mod pulm HTN   - elevated BNP  - Tele monitored for 24 hrs with no dysrhythmia   - received few doses of Furosemide and 1x dose of Bumex but currently off diuretics   - monitor volume status closely    #Chronic Atrial Fibrillation  CHADS-VASC 3+ (Age x2, Female). Only on AC. Previously on Lopressor, but only takes intermittently as HR currently rate-controlled off meds.  - c/w eliquis     #GERD  - c/w pantoprazole 40mg PO QD (therapeutic interchange for omeprazole)    #CKD 3b   per PCP baseline is 1.2, currently 1.0    #Urinary retention-likely neurogenic   -hanson placed 1/29  -flomax increased to 0.8mg   - completed RT, will try TOV after walking with PT    #Constipation  -senna, miralax, lactulose--  bowel movements once in 2-3 days. enema given  # HTN-- on metoprolol and started on lisinopril 10mg 2/4    PENDING: neurosx for laminectomy, cardio clearance, PT and TOV

## 2024-02-08 NOTE — PROGRESS NOTE ADULT - SUBJECTIVE AND OBJECTIVE BOX
SUBJECTIVE:    Patient is a 89y old Female who presents with a chief complaint of Back Pain (07 Feb 2024 15:58)    Currently admitted to medicine with the primary diagnosis of Inability to ambulate due to multiple joints     Today is hospital day 15d. This morning she is resting comfortably in bed and reports no new issues or overnight events.     PAST MEDICAL & SURGICAL HISTORY  Chronic atrial fibrillation    GERD (gastroesophageal reflux disease)    Chronic lower back pain    No significant past surgical history        ALLERGIES:  No Known Allergies    MEDICATIONS:  STANDING MEDICATIONS  acetaminophen     Tablet .. 975 milliGRAM(s) Oral every 8 hours  apixaban 5 milliGRAM(s) Oral every 12 hours  calcium carbonate   1250 mG (OsCal) 1 Tablet(s) Oral daily  chlorhexidine 2% Cloths 1 Application(s) Topical daily  cholecalciferol 5000 Unit(s) Oral daily  dexAMETHasone     Tablet 4 milliGRAM(s) Oral two times a day  fluticasone propionate 50 MICROgram(s)/spray Nasal Spray 1 Spray(s) Both Nostrils two times a day  gabapentin 300 milliGRAM(s) Oral three times a day  lactated ringers. 1000 milliLiter(s) IV Continuous <Continuous>  lidocaine   4% Patch 1 Patch Transdermal every 24 hours  lisinopril 10 milliGRAM(s) Oral daily  methocarbamol 750 milliGRAM(s) Oral every 8 hours  metoprolol tartrate 12.5 milliGRAM(s) Oral two times a day  multivitamin 1 Tablet(s) Oral daily  oxyCODONE  ER Tablet 10 milliGRAM(s) Oral every 12 hours  pantoprazole    Tablet 40 milliGRAM(s) Oral before breakfast  polyethylene glycol 3350 17 Gram(s) Oral two times a day  rOPINIRole 0.25 milliGRAM(s) Oral two times a day  senna 2 Tablet(s) Oral at bedtime  tamsulosin 0.8 milliGRAM(s) Oral at bedtime    PRN MEDICATIONS  oxyCODONE    IR 15 milliGRAM(s) Oral every 6 hours PRN    VITALS:   T(F): 97.2  HR: 71  BP: 126/72  RR: 18  SpO2: --    LABS:                        12.0   8.73  )-----------( 186      ( 08 Feb 2024 08:14 )             35.8     02-08    131<L>  |  97<L>  |  36<H>  ----------------------------<  107<H>  5.6<H>   |  27  |  1.1    Ca    8.6      08 Feb 2024 08:14  Mg     2.1     02-08    TPro  4.7<L>  /  Alb  3.1<L>  /  TBili  0.6  /  DBili  x   /  AST  18  /  ALT  14  /  AlkPhos  57  02-08      Urinalysis Basic - ( 08 Feb 2024 08:14 )    Color: x / Appearance: x / SG: x / pH: x  Gluc: 107 mg/dL / Ketone: x  / Bili: x / Urobili: x   Blood: x / Protein: x / Nitrite: x   Leuk Esterase: x / RBC: x / WBC x   Sq Epi: x / Non Sq Epi: x / Bacteria: x                RADIOLOGY:    PHYSICAL EXAM:  GEN: No acute distress, pain with mvts  PULM/CHEST: Clear to auscultation bilaterally, no rales, rhonchi or wheezes   CVS: Regular rate and rhythm, S1-S2, no murmurs  ABD: Soft, non-tender, non-distended, +BS  EXT: No edema  NEURO: AAOx3    David Catheter:   Indwelling Urethral Catheter:     Connect To:  Straight Drainage/[place]    Indication:  Urinary Retention / Obstruction (02-07-24 @ 20:23) (not performed) [Active]  Indwelling Urethral Catheter:     Connect To:  Straight Drainage/[place]    Indication:  Urinary Retention / Obstruction (02-06-24 @ 18:12) (not performed) [Active]  Indwelling Urethral Catheter:     Connect To:  Straight Drainage/[place]    Indication:  Urinary Retention / Obstruction (02-05-24 @ 14:04) (not performed) [Active]  Indwelling Urethral Catheter:     Connect To:  Straight Drainage/[place]    Indication:  Urinary Retention / Obstruction (02-04-24 @ 15:35) (not performed) [Active]  Indwelling Urethral Catheter:     Connect To:  Straight Drainage/[place]    Indication:  Urinary Retention / Obstruction (02-03-24 @ 14:43) (not performed) [Active]  Indwelling Urethral Catheter:     Connect To:  Straight Drainage/[place]    Indication:  Urinary Retention / Obstruction (02-02-24 @ 13:15) (not performed) [Active]  Indwelling Urethral Catheter:     Connect To:  Straight Drainage/[place]    Indication:  Urinary Retention / Obstruction (01-31-24 @ 14:28) (not performed) [Active]  Indwelling Urethral Catheter:     Connect To:  Straight Drainage/[place]    Indication:  Urinary Retention / Obstruction (01-31-24 @ 09:00) (not performed) [Active]

## 2024-02-08 NOTE — CONSULT NOTE ADULT - SUBJECTIVE AND OBJECTIVE BOX
Neurosurgery Consultation Note    HPI  89F w/ h/o chronic afib (on Eliquis), GERD, and chronic low back pain presenting s/p fall. Patient known to have chronic low back pain that has progressively worsened over the years. Pain acutely worsened two weeks ago and patient subsequently fell due to inability to support own weight. Evaluated by PCP after initial fall due to worsening pain. Prescribed Percocet 5-325 bid w/ mild relief. However, pt once again fell earlier today after pt unable to support her own weight while trying to ambulate. Now unable to ambulate or bear weight, even w/ assistance. No reported HT, LOC, lightheadedness, confusion, loss of continence, or focal weakness a/w either fall. No reported fevers, chills, CP, palpitations, SOB, abdominal pain, n/v/d, or dysuria (although daughter did note malodorous urine recently).    Of note, pt also reports new onset LE swelling over past 1-2 weeks. Recently started on furosemide 20 qd by PCP for swelling. No reported CP or dyspnea at rest or exertion (but functional status limited by lower back pain). No known prior h/o CHF.     In ED, pt HD stable on vitals. Labs demonstrated K 5.6 (hemolyzed). XR Bilateral Hips, Pelvis, and L-Spine overall unremarkable (wet read). S/P morphine 4mg IV and methocarbamol 1000mg PO. Admitted to medicine for inability to ambulate s/p fall.    -------------------  Neurosurgery    Pt seen at the bedside with Dr Nair.  At present time resting in bed in NAD, pt endorsed continued LLB back pain radiating to b/l buttock into b/l thighs, intermittently relieved with oral analgesics.  MRI independently  reviewed by Dr Sands.          Subjective: 89yFemale with a pmhx of Difficulty in walking, not elsewhere classified    DNI    Family history of stroke (Father)    Handoff    MEWS Score    Atrial fibrillation, chronic    Chronic atrial fibrillation    GERD (gastroesophageal reflux disease)    Chronic lower back pain    Inability to ambulate due to multiple joints    Compression fracture of spine    Acute heart failure    Chronic atrial fibrillation    GERD (gastroesophageal reflux disease)    Palliative care by specialist    Urothelial carcinoma    No significant past surgical history    FALL    Back pain    [email or web address]      s/p     Allergies    No Known Allergies    Intolerances        Vital Signs Last 24 Hrs  T(C): 36.5 (08 Feb 2024 15:00), Max: 36.5 (08 Feb 2024 15:00)  T(F): 97.7 (08 Feb 2024 15:00), Max: 97.7 (08 Feb 2024 15:00)  HR: 107 (08 Feb 2024 15:00) (71 - 107)  BP: 102/64 (08 Feb 2024 15:00) (102/64 - 140/64)  BP(mean): --  RR: 18 (08 Feb 2024 15:00) (18 - 18)  SpO2: --      acetaminophen     Tablet .. 975 milliGRAM(s) Oral every 8 hours  apixaban 5 milliGRAM(s) Oral every 12 hours  calcium carbonate   1250 mG (OsCal) 1 Tablet(s) Oral daily  chlorhexidine 2% Cloths 1 Application(s) Topical daily  cholecalciferol 5000 Unit(s) Oral daily  fluticasone propionate 50 MICROgram(s)/spray Nasal Spray 1 Spray(s) Both Nostrils two times a day  gabapentin 300 milliGRAM(s) Oral three times a day  lactated ringers. 1000 milliLiter(s) IV Continuous <Continuous>  lidocaine   4% Patch 1 Patch Transdermal every 24 hours  lisinopril 10 milliGRAM(s) Oral daily  methocarbamol 750 milliGRAM(s) Oral every 8 hours  metoprolol tartrate 12.5 milliGRAM(s) Oral two times a day  multivitamin 1 Tablet(s) Oral daily  oxyCODONE    IR 15 milliGRAM(s) Oral every 6 hours PRN  oxyCODONE  ER Tablet 10 milliGRAM(s) Oral every 12 hours  pantoprazole    Tablet 40 milliGRAM(s) Oral before breakfast  polyethylene glycol 3350 17 Gram(s) Oral two times a day  rOPINIRole 0.25 milliGRAM(s) Oral two times a day  senna 2 Tablet(s) Oral at bedtime  tamsulosin 0.8 milliGRAM(s) Oral at bedtime        02-07-24 @ 07:01  -  02-08-24 @ 07:00  --------------------------------------------------------  IN: 540 mL / OUT: 1080 mL / NET: -540 mL        REVIEW OF SYSTEMS    [ ] A ten-point review of systems was otherwise negative except as noted.  [ ] Due to altered mental status/intubation, subjective information were not able to be obtained from the patient. History was obtained, to the extent possible, from review of the chart and collateral sources of information.          CBC Full  -  ( 08 Feb 2024 08:14 )  WBC Count : 8.73 K/uL  RBC Count : 3.74 M/uL  Hemoglobin : 12.0 g/dL  Hematocrit : 35.8 %  Platelet Count - Automated : 186 K/uL  Mean Cell Volume : 95.7 fL  Mean Cell Hemoglobin : 32.1 pg  Mean Cell Hemoglobin Concentration : 33.5 g/dL  Auto Neutrophil # : 7.99 K/uL  Auto Lymphocyte # : 0.31 K/uL  Auto Monocyte # : 0.30 K/uL  Auto Eosinophil # : 0.01 K/uL  Auto Basophil # : 0.02 K/uL  Auto Neutrophil % : 91.6 %  Auto Lymphocyte % : 3.6 %  Auto Monocyte % : 3.4 %  Auto Eosinophil % : 0.1 %  Auto Basophil % : 0.2 %    02-08    131<L>  |  97<L>  |  36<H>  ----------------------------<  107<H>  5.6<H>   |  27  |  1.1    Ca    8.6      08 Feb 2024 08:14  Mg     2.1     02-08    TPro  4.7<L>  /  Alb  3.1<L>  /  TBili  0.6  /  DBili  x   /  AST  18  /  ALT  14  /  AlkPhos  57  02-08            Imaging:  < from: MR Lumbar Spine No Cont (01.27.24 @ 15:05) >      IMPRESSION:  Malignant compression deformity of L1 with significant bowing of the   posterior cortex contributing to mass effect upon the conus. Of note   there is significant dilation of the urinary bladder. Correlate   clinically for neurogenic bladder.    Additional degenerative changes in combination with lumbar levoscoliosis   contributes to neuroforaminal narrowing most significantly involving the   left L4-L5 and L5-S1 levels.    These findings were discussed with Dr. Deng at 1/27/2024 3:52 PM by Dr. Rios with read back confirmation.    --- End of Report ---            TOM RIOS MD; Attending Radiologist  This document has been electronically signed. Jan 27 2024  3:53PM    < end of copied text >      Assessment/Plan:       Seen by Neurosurgery team  Will discuss with family if amendable for surgical intervention  Possible L1 Kyphoplasty on Monday 2/12?  Further recommendations/management pending family discussion    Plan and care discussed with Dr Nair

## 2024-02-08 NOTE — PROGRESS NOTE ADULT - ATTENDING COMMENTS
patient seen and examined independently on morning rounds, chart reviewed and discussed with the medicine resident and on interdisciplinary rounds and agree with the above resident progress note with the following addendum:    hospital day #15    -d/w patients daughter bedside-patient working with PT--getting up and oob- standing with assistance- patient will consider possible surgical intervention if necessary     -awaiting decision from neurosurgery regarding further surgical treatment options    -f/u with onc for further treatment plan after surgery (s/p RT x 5 sessions---considering immunotherapy)  -pain control    DNR/DNI--palliative care following --continue medical management    pcp- Dr. Johnson    Total time spent to complete patient's bedside assessment, review medical chart, discuss medical plan of care with covering medical team was more than 35 minutes  with >50% of time spendt face to face with patient, discussion with patient/family and/or coordination of care .

## 2024-02-08 NOTE — PROGRESS NOTE ADULT - SUBJECTIVE AND OBJECTIVE BOX
HPI:  89F w/ h/o chronic afib (on Eliquis), GERD, and chronic low back pain presenting s/p fall. Patient known to have chronic low back pain that has progressively worsened over the years. Pain acutely worsened two weeks ago and patient subsequently fell due to inability to support own weight. Evaluated by PCP after initial fall due to worsening pain. Prescribed Percocet 5-325 bid w/ mild relief. However, pt once again fell earlier today after pt unable to support her own weight while trying to ambulate. Now unable to ambulate or bear weight, even w/ assistance. No reported HT, LOC, lightheadedness, confusion, loss of continence, or focal weakness a/w either fall. No reported fevers, chills, CP, palpitations, SOB, abdominal pain, n/v/d, or dysuria (although daughter did note malodorous urine recently).    Interval history  -Patient seen at bedside with alyssa Garcia  -Patient notes leg pain with movement, but only mild pain at rest  2/7: Patient now DNR/DNI, added 10mg oxycontin Q12H for consistent pain     ADVANCE DIRECTIVES:     [ ] Full Code [x ] DNR  MOLST  [ ]  Living Will  [ ]   DECISION MAKER(s):  [ ] Health Care Proxy(s)  [x ] Surrogate(s)  [ ] Guardian           Name(s): Phone Number(s):  Daughter      BASELINE (I)ADL(s) (prior to admission):    [place]: [ ]Total  [ ] Moderate [ ]Dependent  Palliative Performance Status Version 2:         %    http://npcrc.org/files/news/palliative_performance_scale_[email or web address].pdf    Allergies    No Known Allergies    Intolerances    MEDICATIONS  (STANDING):  acetaminophen     Tablet .. 975 milliGRAM(s) Oral every 8 hours  apixaban 5 milliGRAM(s) Oral every 12 hours  calcium carbonate   1250 mG (OsCal) 1 Tablet(s) Oral daily  chlorhexidine 2% Cloths 1 Application(s) Topical daily  cholecalciferol 5000 Unit(s) Oral daily  dexAMETHasone     Tablet 4 milliGRAM(s) Oral two times a day  fluticasone propionate 50 MICROgram(s)/spray Nasal Spray 1 Spray(s) Both Nostrils two times a day  gabapentin 300 milliGRAM(s) Oral three times a day  lactated ringers. 1000 milliLiter(s) (75 mL/Hr) IV Continuous <Continuous>  lidocaine   4% Patch 1 Patch Transdermal every 24 hours  lisinopril 10 milliGRAM(s) Oral daily  methocarbamol 750 milliGRAM(s) Oral every 8 hours  metoprolol tartrate 12.5 milliGRAM(s) Oral two times a day  multivitamin 1 Tablet(s) Oral daily  oxyCODONE  ER Tablet 10 milliGRAM(s) Oral every 12 hours  pantoprazole    Tablet 40 milliGRAM(s) Oral before breakfast  polyethylene glycol 3350 17 Gram(s) Oral two times a day  rOPINIRole 0.25 milliGRAM(s) Oral two times a day  senna 2 Tablet(s) Oral at bedtime  tamsulosin 0.8 milliGRAM(s) Oral at bedtime    MEDICATIONS  (PRN):  oxyCODONE    IR 15 milliGRAM(s) Oral every 6 hours PRN moderate/severe pain (4-10)          PRESENT SYMPTOMS: [ ]Unable to obtain due to poor mentation   Source if other than patient:  [ ]Family   [ ]Team     Pain: [ ]yes [x ]no  QOL impact -    Location -          Aggravating factors -   Quality -    Radiation -   Timing-    Severity (0-10 scale):   Minimal acceptable level (0-10 scale):     CPOT:    https://www.[facility].org/getattachment/[email or web address]/Critical-Care-Pain-Observation-Tool-(CPOT)    PAIN AD Score:   http://geriatrictoolkit.[place].[facility]/cog/painad.pdf (press ctrl +  left click to view)    Dyspnea:                           [x ]None[ ]Mild [ ]Moderate [ ]Severe     Respiratory Distress Observation Scale (RDOS):   A score of 0 to 2 signifies little or no respiratory distress, 3 signifies mild distress, scores 4 to 6 indicate moderate distress, and scores greater than 7 signify severe distress  [email or web address]://[email or web address]/sites/default/files/PDFS/[email or web address]-lqtsfjbblvt-egujyfyf-mnavrwcqkqr-[email or web address].pdf    Anxiety:                             [ x]None[ ]Mild [ ]Moderate [ ]Severe   Fatigue:                             [x ]None[ ]Mild [ ]Moderate [ ]Severe   Nausea:                             [ x]None[ ]Mild [ ]Moderate [ ]Severe   Loss of appetite:              [x ]None[ ]Mild [ ]Moderate [ ]Severe   Constipation:                    [x ]None[ ]Mild [ ]Moderate [ ]Severe    Other Symptoms:  [ x]All other review of systems negative     Palliative Performance Status Version 2:         30%    http://[facility].org/files/news/palliative_performance_scale_[email or web address].pdf    PHYSICAL EXAM:    Vital Signs Last 24 Hrs  T(C): 36.2 (08 Feb 2024 07:21), Max: 36.2 (07 Feb 2024 15:10)  T(F): 97.2 (08 Feb 2024 07:21), Max: 97.2 (07 Feb 2024 15:10)  HR: 71 (08 Feb 2024 07:21) (71 - 102)  BP: 126/72 (08 Feb 2024 07:21) (105/59 - 140/64)  BP(mean): --  RR: 18 (08 Feb 2024 07:21) (18 - 18)  SpO2: --            GENERAL:  [ ] No acute distress [ ]Lethargic  [ ]Unarousable  [ x]Verbal  [ ]Non-Verbal [ ]Cachexia    BEHAVIORAL/PSYCH:  [x ]Alert and Oriented x2  [ ] Anxiety [ ] Delirium [ ] Agitation [ x] Calm   EYES: [ x] No scleral icterus [ ] Scleral icterus [ ] Closed  ENMT:  [ ]Dry mouth  [x ]No external oral lesions [ ] No external ear or nose lesions  CARDIOVASCULAR:  [ ]Regular [ ]Irregular [ ]Tachy [x ]Not Tachy  [ ]Sung [ ] Edema [ ] No edema  PULMONARY:  [ ]Tachypnea  [ ]Audible excessive secretions [ x] No labored breathing [ ] labored breathing  GASTROINTESTINAL: [ ]Soft  [ ]Distended  [x ]Not distended [ ]Non tender [ ]Tender  MUSCULOSKELETAL: [ ]No clubbing [ ] clubbing  [x ] No cyanosis [ ] cyanosis  NEUROLOGIC: [ ]No focal deficits  [x ]Follows commands  [ ]Does not follow commands  [ x]Cognitive impairment  [ ]Dysphagia  [ ]Dysarthria  [ ]Paresis   SKIN: [ ] Jaundiced [ x] Non-jaundiced [ ]Rash [ ]No Rash [ ] Warm [ ] Dry  MISC/LINES: [ ] ET tube [ ] Trach [ ]NGT/OGT [ ]PEG [ ]David    LABS: reviewed by me                                     12.0   8.73  )-----------( 186      ( 08 Feb 2024 08:14 )             35.8     02-08    131<L>  |  97<L>  |  36<H>  ----------------------------<  107<H>  5.6<H>   |  27  |  1.1    Ca    8.6      08 Feb 2024 08:14  Mg     2.1     02-08    TPro  4.7<L>  /  Alb  3.1<L>  /  TBili  0.6  /  DBili  x   /  AST  18  /  ALT  14  /  AlkPhos  57  02-08      Urinalysis Basic - ( 08 Feb 2024 08:14 )    Color: x / Appearance: x / SG: x / pH: x  Gluc: 107 mg/dL / Ketone: x  / Bili: x / Urobili: x   Blood: x / Protein: x / Nitrite: x   Leuk Esterase: x / RBC: x / WBC x   Sq Epi: x / Non Sq Epi: x / Bacteria: x          RADIOLOGY & ADDITIONAL STUDIES: reviewed by me    < from: NM SPECT Bone Scan, Single Area Single Day (02.01.24 @ 16:43) >  IMPRESSION:    Abnormal uptake in the L1 vertebral body, corresponding to known   compression fracture deformity secondary to vertebral body mass.    No other evidence for metastatic bone disease.    < end of copied text >        EKG: reviewed by me    < from: 12 Lead ECG (01.25.24 @ 11:34) >  Ventricular Rate 94 BPM    Atrial Rate 81 BPM    QRS Duration 90 ms    Q-T Interval 384 ms    QTC Calculation(Bazett) 480 ms    R Axis 166 degrees    T Axis -48 degrees    Diagnosis Line Atrial fibrillation  Right ventricular hypertrophy  Anterior infarct , age undetermined  ST & T wave abnormality, consider inferior ischemia  Abnormal ECG    < end of copied text >        PROTEIN CALORIE MALNUTRITION PRESENT: [ ]mild [ ]moderate [ ]severe [ ]underweight [ ]morbid obesity  https://www.andeal.org/vault/6490/web/files/ONC/Table_Clinical%20Characteristics%20to%20Document%20Malnutrition-White%20JV%20et%20al%202012.pdf    Height (cm): 152.4 (01-30-24 @ 11:24)  Weight (kg): 70.3 (01-30-24 @ 11:24)  BMI (kg/m2): 30.3 (01-30-24 @ 11:24)  [ ]PPSV2 < or = to 30% [ ]significant weight loss  [ ]poor nutritional intake  [ ]anasarca      [ ]Artificial Nutrition      Palliative Care Spiritual/Emotional Screening Tool Question  Severity (0-4):                    OR                    [ x] Unable to determine. Will assess at later time if appropriate.  Score of 2 or greater indicates recommendation of Chaplaincy and/or SW referral  Chaplaincy Referral: [ ] Yes [ ] Refused [ ] Following     Caregiver [place]:  [ ] Yes [ ] No    OR    [x ] Unable to determine. Will assess at later time if appropriate.  Social Work Referral [ ]  Patient and Family Centered Care Referral [ ]    Anticipatory Grief Present: [ ] Yes [ ] No    OR     [ x] Unable to determine. Will assess at later time if appropriate.  Social Work Referral [ ]  Patient and Family Centered Care Referral [ ]    Patient discussed with primary medical team MD  Palliative care education provided to patient and/or family

## 2024-02-08 NOTE — PROGRESS NOTE ADULT - ASSESSMENT
89yFemale with history of afib on eliquis, GERD, chronic low back pain presents after fall. Patient found to have lumbar pathological fracture on admission. Palliative care consulted for GOC and symptom management.    Patient seen at bedside with daughter. Patient denied any pain or discomfort at this time. Patient stated that her pain has gotten better once she started the oxycodone ER 10mg and is able to work with physical therapy with legg pain present.     Education about palliative care provided to patient/family.  See Recs below.    Please call [phone] with questions or concerns 24/7.   We will continue to follow.

## 2024-02-09 LAB
ALBUMIN SERPL ELPH-MCNC: 3.1 G/DL — LOW (ref 3.5–5.2)
ALP SERPL-CCNC: 55 U/L — SIGNIFICANT CHANGE UP (ref 30–115)
ALT FLD-CCNC: 12 U/L — SIGNIFICANT CHANGE UP (ref 0–41)
ANION GAP SERPL CALC-SCNC: 6 MMOL/L — LOW (ref 7–14)
AST SERPL-CCNC: 16 U/L — SIGNIFICANT CHANGE UP (ref 0–41)
BASOPHILS # BLD AUTO: 0.01 K/UL — SIGNIFICANT CHANGE UP (ref 0–0.2)
BASOPHILS NFR BLD AUTO: 0.1 % — SIGNIFICANT CHANGE UP (ref 0–1)
BILIRUB SERPL-MCNC: 0.6 MG/DL — SIGNIFICANT CHANGE UP (ref 0.2–1.2)
BUN SERPL-MCNC: 35 MG/DL — HIGH (ref 10–20)
CALCIUM SERPL-MCNC: 8.5 MG/DL — SIGNIFICANT CHANGE UP (ref 8.4–10.5)
CHLORIDE SERPL-SCNC: 96 MMOL/L — LOW (ref 98–110)
CO2 SERPL-SCNC: 27 MMOL/L — SIGNIFICANT CHANGE UP (ref 17–32)
CREAT SERPL-MCNC: 1.2 MG/DL — SIGNIFICANT CHANGE UP (ref 0.7–1.5)
EGFR: 43 ML/MIN/1.73M2 — LOW
EOSINOPHIL # BLD AUTO: 0.01 K/UL — SIGNIFICANT CHANGE UP (ref 0–0.7)
EOSINOPHIL NFR BLD AUTO: 0.1 % — SIGNIFICANT CHANGE UP (ref 0–8)
GLUCOSE SERPL-MCNC: 97 MG/DL — SIGNIFICANT CHANGE UP (ref 70–99)
HCT VFR BLD CALC: 34.8 % — LOW (ref 37–47)
HGB BLD-MCNC: 12 G/DL — SIGNIFICANT CHANGE UP (ref 12–16)
IMM GRANULOCYTES NFR BLD AUTO: 0.7 % — HIGH (ref 0.1–0.3)
LYMPHOCYTES # BLD AUTO: 0.37 K/UL — LOW (ref 1.2–3.4)
LYMPHOCYTES # BLD AUTO: 4.2 % — LOW (ref 20.5–51.1)
MAGNESIUM SERPL-MCNC: 2 MG/DL — SIGNIFICANT CHANGE UP (ref 1.8–2.4)
MCHC RBC-ENTMCNC: 33.1 PG — HIGH (ref 27–31)
MCHC RBC-ENTMCNC: 34.5 G/DL — SIGNIFICANT CHANGE UP (ref 32–37)
MCV RBC AUTO: 95.9 FL — SIGNIFICANT CHANGE UP (ref 81–99)
MONOCYTES # BLD AUTO: 0.31 K/UL — SIGNIFICANT CHANGE UP (ref 0.1–0.6)
MONOCYTES NFR BLD AUTO: 3.5 % — SIGNIFICANT CHANGE UP (ref 1.7–9.3)
NEUTROPHILS # BLD AUTO: 8.06 K/UL — HIGH (ref 1.4–6.5)
NEUTROPHILS NFR BLD AUTO: 91.4 % — HIGH (ref 42.2–75.2)
NRBC # BLD: 0 /100 WBCS — SIGNIFICANT CHANGE UP (ref 0–0)
PLATELET # BLD AUTO: 165 K/UL — SIGNIFICANT CHANGE UP (ref 130–400)
PMV BLD: 9.4 FL — SIGNIFICANT CHANGE UP (ref 7.4–10.4)
POTASSIUM SERPL-MCNC: 5.3 MMOL/L — HIGH (ref 3.5–5)
POTASSIUM SERPL-SCNC: 5.3 MMOL/L — HIGH (ref 3.5–5)
PROT SERPL-MCNC: 4.5 G/DL — LOW (ref 6–8)
RBC # BLD: 3.63 M/UL — LOW (ref 4.2–5.4)
RBC # FLD: 13.4 % — SIGNIFICANT CHANGE UP (ref 11.5–14.5)
SODIUM SERPL-SCNC: 129 MMOL/L — LOW (ref 135–146)
WBC # BLD: 8.82 K/UL — SIGNIFICANT CHANGE UP (ref 4.8–10.8)
WBC # FLD AUTO: 8.82 K/UL — SIGNIFICANT CHANGE UP (ref 4.8–10.8)

## 2024-02-09 PROCEDURE — 99233 SBSQ HOSP IP/OBS HIGH 50: CPT

## 2024-02-09 PROCEDURE — 99223 1ST HOSP IP/OBS HIGH 75: CPT

## 2024-02-09 PROCEDURE — 99232 SBSQ HOSP IP/OBS MODERATE 35: CPT

## 2024-02-09 RX ORDER — FUROSEMIDE 40 MG
40 TABLET ORAL DAILY
Refills: 0 | Status: DISCONTINUED | OUTPATIENT
Start: 2024-02-09 | End: 2024-02-10

## 2024-02-09 RX ORDER — OXYCODONE HYDROCHLORIDE 5 MG/1
15 TABLET ORAL EVERY 6 HOURS
Refills: 0 | Status: DISCONTINUED | OUTPATIENT
Start: 2024-02-10 | End: 2024-02-10

## 2024-02-09 RX ORDER — SODIUM ZIRCONIUM CYCLOSILICATE 10 G/10G
10 POWDER, FOR SUSPENSION ORAL ONCE
Refills: 0 | Status: COMPLETED | OUTPATIENT
Start: 2024-02-09 | End: 2024-02-09

## 2024-02-09 RX ORDER — DEXAMETHASONE 0.5 MG/5ML
4 ELIXIR ORAL DAILY
Refills: 0 | Status: COMPLETED | OUTPATIENT
Start: 2024-02-09 | End: 2024-02-11

## 2024-02-09 RX ADMIN — Medication 12.5 MILLIGRAM(S): at 05:34

## 2024-02-09 RX ADMIN — Medication 5000 UNIT(S): at 11:12

## 2024-02-09 RX ADMIN — Medication 975 MILLIGRAM(S): at 21:37

## 2024-02-09 RX ADMIN — GABAPENTIN 300 MILLIGRAM(S): 400 CAPSULE ORAL at 13:20

## 2024-02-09 RX ADMIN — POLYETHYLENE GLYCOL 3350 17 GRAM(S): 17 POWDER, FOR SOLUTION ORAL at 17:11

## 2024-02-09 RX ADMIN — ROPINIROLE 0.25 MILLIGRAM(S): 8 TABLET, FILM COATED, EXTENDED RELEASE ORAL at 17:07

## 2024-02-09 RX ADMIN — GABAPENTIN 300 MILLIGRAM(S): 400 CAPSULE ORAL at 05:24

## 2024-02-09 RX ADMIN — OXYCODONE HYDROCHLORIDE 15 MILLIGRAM(S): 5 TABLET ORAL at 10:36

## 2024-02-09 RX ADMIN — Medication 12.5 MILLIGRAM(S): at 17:07

## 2024-02-09 RX ADMIN — METHOCARBAMOL 750 MILLIGRAM(S): 500 TABLET, FILM COATED ORAL at 21:36

## 2024-02-09 RX ADMIN — ROPINIROLE 0.25 MILLIGRAM(S): 8 TABLET, FILM COATED, EXTENDED RELEASE ORAL at 05:23

## 2024-02-09 RX ADMIN — POLYETHYLENE GLYCOL 3350 17 GRAM(S): 17 POWDER, FOR SOLUTION ORAL at 05:23

## 2024-02-09 RX ADMIN — CHLORHEXIDINE GLUCONATE 1 APPLICATION(S): 213 SOLUTION TOPICAL at 11:13

## 2024-02-09 RX ADMIN — Medication 975 MILLIGRAM(S): at 22:42

## 2024-02-09 RX ADMIN — Medication 975 MILLIGRAM(S): at 13:20

## 2024-02-09 RX ADMIN — Medication 1 SPRAY(S): at 05:22

## 2024-02-09 RX ADMIN — Medication 4 MILLIGRAM(S): at 12:07

## 2024-02-09 RX ADMIN — APIXABAN 5 MILLIGRAM(S): 2.5 TABLET, FILM COATED ORAL at 08:04

## 2024-02-09 RX ADMIN — GABAPENTIN 300 MILLIGRAM(S): 400 CAPSULE ORAL at 21:36

## 2024-02-09 RX ADMIN — LISINOPRIL 10 MILLIGRAM(S): 2.5 TABLET ORAL at 05:23

## 2024-02-09 RX ADMIN — Medication 1 TABLET(S): at 11:11

## 2024-02-09 RX ADMIN — TAMSULOSIN HYDROCHLORIDE 0.8 MILLIGRAM(S): 0.4 CAPSULE ORAL at 21:36

## 2024-02-09 RX ADMIN — APIXABAN 5 MILLIGRAM(S): 2.5 TABLET, FILM COATED ORAL at 21:37

## 2024-02-09 RX ADMIN — OXYCODONE HYDROCHLORIDE 10 MILLIGRAM(S): 5 TABLET ORAL at 17:08

## 2024-02-09 RX ADMIN — Medication 40 MILLIGRAM(S): at 17:08

## 2024-02-09 RX ADMIN — METHOCARBAMOL 750 MILLIGRAM(S): 500 TABLET, FILM COATED ORAL at 13:20

## 2024-02-09 RX ADMIN — OXYCODONE HYDROCHLORIDE 15 MILLIGRAM(S): 5 TABLET ORAL at 11:27

## 2024-02-09 RX ADMIN — SENNA PLUS 2 TABLET(S): 8.6 TABLET ORAL at 21:36

## 2024-02-09 RX ADMIN — METHOCARBAMOL 750 MILLIGRAM(S): 500 TABLET, FILM COATED ORAL at 05:23

## 2024-02-09 RX ADMIN — PANTOPRAZOLE SODIUM 40 MILLIGRAM(S): 20 TABLET, DELAYED RELEASE ORAL at 05:23

## 2024-02-09 RX ADMIN — OXYCODONE HYDROCHLORIDE 10 MILLIGRAM(S): 5 TABLET ORAL at 05:33

## 2024-02-09 RX ADMIN — OXYCODONE HYDROCHLORIDE 10 MILLIGRAM(S): 5 TABLET ORAL at 06:31

## 2024-02-09 RX ADMIN — SODIUM ZIRCONIUM CYCLOSILICATE 10 GRAM(S): 10 POWDER, FOR SUSPENSION ORAL at 12:07

## 2024-02-09 RX ADMIN — Medication 975 MILLIGRAM(S): at 06:20

## 2024-02-09 RX ADMIN — Medication 975 MILLIGRAM(S): at 05:24

## 2024-02-09 RX ADMIN — Medication 1 SPRAY(S): at 17:11

## 2024-02-09 NOTE — PROGRESS NOTE ADULT - SUBJECTIVE AND OBJECTIVE BOX
HPI:  89F w/ h/o chronic afib (on Eliquis), GERD, and chronic low back pain presenting s/p fall. Patient known to have chronic low back pain that has progressively worsened over the years. Pain acutely worsened two weeks ago and patient subsequently fell due to inability to support own weight. Evaluated by PCP after initial fall due to worsening pain. Prescribed Percocet 5-325 bid w/ mild relief. However, pt once again fell earlier today after pt unable to support her own weight while trying to ambulate. Now unable to ambulate or bear weight, even w/ assistance. No reported HT, LOC, lightheadedness, confusion, loss of continence, or focal weakness a/w either fall. No reported fevers, chills, CP, palpitations, SOB, abdominal pain, n/v/d, or dysuria (although daughter did note malodorous urine recently).    Interval history  -Patient seen at bedside with alyssa Garcia  -Patient notes leg pain with movement, but only mild pain at rest  2/7: Patient now DNR/DNI, added 10mg oxycontin Q12H for consistent pain     ADVANCE DIRECTIVES:     [ ] Full Code [x ] DNR  MOLST  [ ]  Living Will  [ ]   DECISION MAKER(s):  [ ] Health Care Proxy(s)  [x ] Surrogate(s)  [ ] Guardian           Name(s): Phone Number(s):  Daughter      BASELINE (I)ADL(s) (prior to admission):    [place]: [ ]Total  [ ] Moderate [ ]Dependent  Palliative Performance Status Version 2:         %    http://npcrc.org/files/news/palliative_performance_scale_[email or web address].pdf    Allergies    No Known Allergies    Intolerances    MEDICATIONS  (STANDING):  acetaminophen     Tablet .. 975 milliGRAM(s) Oral every 8 hours  apixaban 5 milliGRAM(s) Oral every 12 hours  calcium carbonate   1250 mG (OsCal) 1 Tablet(s) Oral daily  chlorhexidine 2% Cloths 1 Application(s) Topical daily  cholecalciferol 5000 Unit(s) Oral daily  dexAMETHasone     Tablet 4 milliGRAM(s) Oral daily  fluticasone propionate 50 MICROgram(s)/spray Nasal Spray 1 Spray(s) Both Nostrils two times a day  gabapentin 300 milliGRAM(s) Oral three times a day  lactated ringers. 1000 milliLiter(s) (75 mL/Hr) IV Continuous <Continuous>  lidocaine   4% Patch 1 Patch Transdermal every 24 hours  lisinopril 10 milliGRAM(s) Oral daily  methocarbamol 750 milliGRAM(s) Oral every 8 hours  metoprolol tartrate 12.5 milliGRAM(s) Oral two times a day  multivitamin 1 Tablet(s) Oral daily  oxyCODONE  ER Tablet 10 milliGRAM(s) Oral every 12 hours  pantoprazole    Tablet 40 milliGRAM(s) Oral before breakfast  polyethylene glycol 3350 17 Gram(s) Oral two times a day  rOPINIRole 0.25 milliGRAM(s) Oral two times a day  senna 2 Tablet(s) Oral at bedtime  tamsulosin 0.8 milliGRAM(s) Oral at bedtime    MEDICATIONS  (PRN):  oxyCODONE    IR 15 milliGRAM(s) Oral every 6 hours PRN moderate/severe pain (4-10)            PRESENT SYMPTOMS: [ ]Unable to obtain due to poor mentation   Source if other than patient:  [ ]Family   [ ]Team     Pain: [ ]yes [x ]no  QOL impact -    Location -          Aggravating factors -   Quality -    Radiation -   Timing-    Severity (0-10 scale):   Minimal acceptable level (0-10 scale):     CPOT:    https://www.[facility].org/getattachment/[email or web address]/Critical-Care-Pain-Observation-Tool-(CPOT)    PAIN AD Score:   http://geriatrictoolkit.[place].[facility]/cog/painad.pdf (press ctrl +  left click to view)    Dyspnea:                           [x ]None[ ]Mild [ ]Moderate [ ]Severe     Respiratory Distress Observation Scale (RDOS):   A score of 0 to 2 signifies little or no respiratory distress, 3 signifies mild distress, scores 4 to 6 indicate moderate distress, and scores greater than 7 signify severe distress  https://www.[facility].ca/sites/default/files/PDFS/737959-vskuztoflbu-jjhvvjxy-htkaudhvrbm-lxysh.pdf    Anxiety:                             [ x]None[ ]Mild [ ]Moderate [ ]Severe   Fatigue:                             [x ]None[ ]Mild [ ]Moderate [ ]Severe   Nausea:                             [ x]None[ ]Mild [ ]Moderate [ ]Severe   Loss of appetite:              [x ]None[ ]Mild [ ]Moderate [ ]Severe   Constipation:                    [x ]None[ ]Mild [ ]Moderate [ ]Severe    Other Symptoms:  [ x]All other review of systems negative     Palliative Performance Status Version 2:         30%    http://[facility].org/files/news/palliative_performance_scale_ppsv2.pdf    PHYSICAL EXAM:    Vital Signs Last 24 Hrs  T(C): 36.2 (08 Feb 2024 07:21), Max: 36.2 (07 Feb 2024 15:10)  T(F): 97.2 (08 Feb 2024 07:21), Max: 97.2 (07 Feb 2024 15:10)  HR: 71 (08 Feb 2024 07:21) (71 - 102)  BP: 126/72 (08 Feb 2024 07:21) (105/59 - 140/64)  BP(mean): --  RR: 18 (08 Feb 2024 07:21) (18 - 18)  SpO2: --            GENERAL:  [ ] No acute distress [ ]Lethargic  [ ]Unarousable  [ x]Verbal  [ ]Non-Verbal [ ]Cachexia    BEHAVIORAL/PSYCH:  [x ]Alert and Oriented x2  [ ] Anxiety [ ] Delirium [ ] Agitation [ x] Calm   EYES: [ x] No scleral icterus [ ] Scleral icterus [ ] Closed  ENMT:  [ ]Dry mouth  [x ]No external oral lesions [ ] No external ear or nose lesions  CARDIOVASCULAR:  [ ]Regular [ ]Irregular [ ]Tachy [x ]Not Tachy  [ ]Sung [ ] Edema [ ] No edema  PULMONARY:  [ ]Tachypnea  [ ]Audible excessive secretions [ x] No labored breathing [ ] labored breathing  GASTROINTESTINAL: [ ]Soft  [ ]Distended  [x ]Not distended [ ]Non tender [ ]Tender  MUSCULOSKELETAL: [ ]No clubbing [ ] clubbing  [x ] No cyanosis [ ] cyanosis  NEUROLOGIC: [ ]No focal deficits  [x ]Follows commands  [ ]Does not follow commands  [ x]Cognitive impairment  [ ]Dysphagia  [ ]Dysarthria  [ ]Paresis   SKIN: [ ] Jaundiced [ x] Non-jaundiced [ ]Rash [ ]No Rash [ ] Warm [ ] Dry  MISC/LINES: [ ] ET tube [ ] Trach [ ]NGT/OGT [ ]PEG [ ]David    LABS: reviewed by me                                       12.0   8.82  )-----------( 165      ( 09 Feb 2024 06:38 )             34.8     02-09    129<L>  |  96<L>  |  35<H>  ----------------------------<  97  5.3<H>   |  27  |  1.2    Ca    8.5      09 Feb 2024 06:38  Mg     2.0     02-09    TPro  4.5<L>  /  Alb  3.1<L>  /  TBili  0.6  /  DBili  x   /  AST  16  /  ALT  12  /  AlkPhos  55  02-09      Urinalysis Basic - ( 09 Feb 2024 06:38 )    Color: x / Appearance: x / SG: x / pH: x  Gluc: 97 mg/dL / Ketone: x  / Bili: x / Urobili: x   Blood: x / Protein: x / Nitrite: x   Leuk Esterase: x / RBC: x / WBC x   Sq Epi: x / Non Sq Epi: x / Bacteria: x                  RADIOLOGY & ADDITIONAL STUDIES: reviewed by me    < from: NM SPECT Bone Scan, Single Area Single Day (02.01.24 @ 16:43) >  IMPRESSION:    Abnormal uptake in the L1 vertebral body, corresponding to known   compression fracture deformity secondary to vertebral body mass.    No other evidence for metastatic bone disease.    < end of copied text >        EKG: reviewed by me    < from: 12 Lead ECG (01.25.24 @ 11:34) >  Ventricular Rate 94 BPM    Atrial Rate 81 BPM    QRS Duration 90 ms    Q-T Interval 384 ms    QTC Calculation(Bazett) 480 ms    R Axis 166 degrees    T Axis -48 degrees    Diagnosis Line Atrial fibrillation  Right ventricular hypertrophy  Anterior infarct , age undetermined  ST & T wave abnormality, consider inferior ischemia  Abnormal ECG    < end of copied text >        PROTEIN CALORIE MALNUTRITION PRESENT: [ ]mild [ ]moderate [ ]severe [ ]underweight [ ]morbid obesity  https://www.andeal.org/vault/2440/web/files/ONC/Table_Clinical%20Characteristics%20to%20Document%20Malnutrition-White%20JV%20et%20al%202012.pdf    Height (cm): 152.4 (01-30-24 @ 11:24)  Weight (kg): 70.3 (01-30-24 @ 11:24)  BMI (kg/m2): 30.3 (01-30-24 @ 11:24)  [ ]PPSV2 < or = to 30% [ ]significant weight loss  [ ]poor nutritional intake  [ ]anasarca      [ ]Artificial Nutrition      Palliative Care Spiritual/Emotional Screening Tool Question  Severity (0-4):                    OR                    [ x] Unable to determine. Will assess at later time if appropriate.  Score of 2 or greater indicates recommendation of Chaplaincy and/or SW referral  Chaplaincy Referral: [ ] Yes [ ] Refused [ ] Following     Caregiver [place]:  [ ] Yes [ ] No    OR    [x ] Unable to determine. Will assess at later time if appropriate.  Social Work Referral [ ]  Patient and Family Centered Care Referral [ ]    Anticipatory Grief Present: [ ] Yes [ ] No    OR     [ x] Unable to determine. Will assess at later time if appropriate.  Social Work Referral [ ]  Patient and Family Centered Care Referral [ ]    Patient discussed with primary medical team MD  Palliative care education provided to patient and/or family

## 2024-02-09 NOTE — PROGRESS NOTE ADULT - ASSESSMENT
89yFemale with history of afib on eliquis, GERD, chronic low back pain presents after fall. Patient found to have lumbar pathological fracture on admission. Palliative care consulted for GOC and symptom management.    Patient seen at bedside with daughter. Patient denied any pain or discomfort at this time. Planned for kyphoplasty on monday.    Education about palliative care provided to patient/family.  See Recs below.    Please call [phone] with questions or concerns 24/7.   We will continue to follow.

## 2024-02-09 NOTE — CONSULT NOTE ADULT - ATTENDING COMMENTS
89 yo with chronic a fib who presented with fall and recent UMANZOR and ALEX. Planning to undergo L1 kyphoplasty for compression deformity.    Apixaban can be held as needed for surgery. Metoprolol tartrate can be increased to 25 mg BID for better rate control. She appears still volume overloaded on exam. Would continue IV diuresis for goal net negative 1-2L until euvolemic for better optimization prior to surgery. 89 yo with chronic a fib who presented with fall and recent UMANZOR and ALEX consistent with HFpEF. Planning to undergo L1 kyphoplasty for compression deformity.    Apixaban can be held as needed for surgery. Metoprolol tartrate can be increased to 25 mg BID for better rate control. She appears still volume overloaded on exam. Would continue IV diuresis for goal net negative 1-2L until euvolemic for better optimization prior to surgery.

## 2024-02-09 NOTE — PROGRESS NOTE ADULT - SUBJECTIVE AND OBJECTIVE BOX
SUNNY COLBERT 89y Female  MRN#: [id number]   Hospital Day: 16d    SUBJECTIVE  Patient is a 89y old Female who presents with a chief complaint of Back Pain (09 Feb 2024 12:36)  Currently admitted to medicine with the primary diagnosis of Inability to ambulate due to multiple joints      INTERVAL HPI AND OVERNIGHT EVENTS:  Patient was examined and seen at bedside. This morning she is resting comfortably in bed and reports no issues or overnight events.    REVIEW OF SYMPTOMS:  CONSTITUTIONAL: No weakness, fevers or chills; No headaches  EYES: No visual changes, eye pain, or discharge  ENT: No vertigo; No ear pain or change in hearing; No sore throat or difficulty swallowing  NECK: No pain or stiffness  RESPIRATORY: No cough, wheezing, or hemoptysis; No shortness of breath  CARDIOVASCULAR: No chest pain or palpitations  GASTROINTESTINAL: No abdominal or epigastric pain; No nausea, vomiting, or hematemesis; No diarrhea or constipation; No melena or hematochezia  GENITOURINARY: No dysuria, frequency or hematuria  MUSCULOSKELETAL: No joint pain, no muscle pain, no weakness  NEUROLOGICAL: No numbness or weakness  SKIN: No itching or rashes    OBJECTIVE  PAST MEDICAL & SURGICAL HISTORY  Chronic atrial fibrillation    GERD (gastroesophageal reflux disease)    Chronic lower back pain    No significant past surgical history      ALLERGIES:  No Known Allergies    MEDICATIONS:  STANDING MEDICATIONS  acetaminophen     Tablet .. 975 milliGRAM(s) Oral every 8 hours  apixaban 5 milliGRAM(s) Oral every 12 hours  calcium carbonate   1250 mG (OsCal) 1 Tablet(s) Oral daily  chlorhexidine 2% Cloths 1 Application(s) Topical daily  cholecalciferol 5000 Unit(s) Oral daily  dexAMETHasone     Tablet 4 milliGRAM(s) Oral daily  fluticasone propionate 50 MICROgram(s)/spray Nasal Spray 1 Spray(s) Both Nostrils two times a day  gabapentin 300 milliGRAM(s) Oral three times a day  lactated ringers. 1000 milliLiter(s) IV Continuous <Continuous>  lidocaine   4% Patch 1 Patch Transdermal every 24 hours  lisinopril 10 milliGRAM(s) Oral daily  methocarbamol 750 milliGRAM(s) Oral every 8 hours  metoprolol tartrate 12.5 milliGRAM(s) Oral two times a day  multivitamin 1 Tablet(s) Oral daily  oxyCODONE  ER Tablet 10 milliGRAM(s) Oral every 12 hours  pantoprazole    Tablet 40 milliGRAM(s) Oral before breakfast  polyethylene glycol 3350 17 Gram(s) Oral two times a day  rOPINIRole 0.25 milliGRAM(s) Oral two times a day  senna 2 Tablet(s) Oral at bedtime  tamsulosin 0.8 milliGRAM(s) Oral at bedtime    PRN MEDICATIONS  oxyCODONE    IR 15 milliGRAM(s) Oral every 6 hours PRN      VITAL SIGNS: Last 24 Hours  T(C): 35.9 (09 Feb 2024 07:23), Max: 36.5 (08 Feb 2024 15:00)  T(F): 96.6 (09 Feb 2024 07:23), Max: 97.7 (08 Feb 2024 15:00)  HR: 94 (09 Feb 2024 07:23) (94 - 107)  BP: 138/67 (09 Feb 2024 07:23) (102/64 - 138/67)  BP(mean): --  RR: 18 (09 Feb 2024 07:23) (18 - 18)  SpO2: --    LABS:                        12.0   8.82  )-----------( 165      ( 09 Feb 2024 06:38 )             34.8     02-09    129<L>  |  96<L>  |  35<H>  ----------------------------<  97  5.3<H>   |  27  |  1.2    Ca    8.5      09 Feb 2024 06:38  Mg     2.0     02-09    TPro  4.5<L>  /  Alb  3.1<L>  /  TBili  0.6  /  DBili  x   /  AST  16  /  ALT  12  /  AlkPhos  55  02-09      Urinalysis Basic - ( 09 Feb 2024 06:38 )    Color: x / Appearance: x / SG: x / pH: x  Gluc: 97 mg/dL / Ketone: x  / Bili: x / Urobili: x   Blood: x / Protein: x / Nitrite: x   Leuk Esterase: x / RBC: x / WBC x   Sq Epi: x / Non Sq Epi: x / Bacteria: x      PHYSICAL EXAM:  CONSTITUTIONAL: No acute distress, well-developed, well-groomed, AAOx3  HEAD: Atraumatic, normocephalic  EYES: EOM intact, PERRLA, conjunctiva and sclera clear  ENT: Supple, no masses, no thyromegaly, no bruits, no JVD; moist mucous membranes  PULMONARY: Clear to auscultation bilaterally; no wheezes, rales, or rhonchi  CARDIOVASCULAR: Regular rate and rhythm; no murmurs, rubs, or gallops  GASTROINTESTINAL: Soft, non-tender, non-distended; bowel sounds present  MUSCULOSKELETAL: 2+ peripheral pulses; no clubbing, no cyanosis, no edema  NEUROLOGY: non-focal  SKIN: No rashes or lesions; warm and dry    ASSESSMENT & PLAN  Pathologic L1 vertebrae fracture  Conus Medullaris Syndrome  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 Of note there is significant dilation of the urinary bladder. Correlate clinically for neurogenic bladder. Additional degenerative changes in combination with lumbar levoscoliosis contributes to neuroforaminal narrowing most significantly involving the left L4-L5 and L5-S1 levels.  -  CT Chest w/ IV Cont (01.30.24) Cardiomegaly. CHF. Right-sided pleural effusion.Nonspecific right upper lobe pleural-based opacity.  - labs overall nonrevealing   - no hx of smoking, no family hx of malignancy   - CEA 5.2 (elevated)  - CA19.9 WNL   - CA 15.3 31 (elevated)   - Cytopathology - ACCESSION No:  [id number]- LUMBAR MASS; CT-GUIDED, BIOPSY: POSITIVE FOR MALIGNANT CELLS- Metastatic carcinoma. By immunohistochemistry, the tumor cells are positive for CK7, p40 and GATA3 and negative for TTF1/Napsin A, CK20, CDX2, GCDFP15, ER, Hepar, PAX-8 and WT1. The immunohistochemical profile is not site specific, however it can be suggestive of a urothelial origin. Clinicopathologic correlation is recommended. The case was reviewed interdepartmentally with another pathologist and the diagnosis represents a consensusopinion. The case was reported to the cancer registry. Block 1A his limited neoplastic content for further studies if clinically indicated.      Recommendations:   - pathology discussed with attending pathologist. Have requested to add on PDL-1, NGS, and MMR/MSI to the specimen  - Follow up urine cytology   - Follow up with Neurosurgery regarding plan for surgical intervention

## 2024-02-09 NOTE — CONSULT NOTE ADULT - SUBJECTIVE AND OBJECTIVE BOX
Outpt cardiologist:    HPI:  89F w/ h/o chronic afib (on Eliquis), GERD, and chronic low back pain presenting s/p fall. Patient known to have chronic low back pain that has progressively worsened over the years. Pain acutely worsened two weeks ago and patient subsequently fell due to inability to support own weight. Evaluated by PCP after initial fall due to worsening pain. Prescribed Percocet 5-325 bid w/ mild relief. However, pt once again fell earlier today after pt unable to support her own weight while trying to ambulate. Now unable to ambulate or bear weight, even w/ assistance. No reported HT, LOC, lightheadedness, confusion, loss of continence, or focal weakness a/w either fall. No reported fevers, chills, CP, palpitations, SOB, abdominal pain, n/v/d, or dysuria (although daughter did note malodorous urine recently).    Of note, pt also reports new onset LE swelling over past 1-2 weeks. Recently started on furosemide 20 qd by PCP for swelling. No reported CP or dyspnea at rest or exertion (but functional status limited by lower back pain). No known prior h/o CHF.     In ED, pt HD stable on vitals. Labs demonstrated K 5.6 (hemolyzed). XR Bilateral Hips, Pelvis, and L-Spine overall unremarkable (wet read). S/P morphine 4mg IV and methocarbamol 1000mg PO. Admitted to medicine for inability to ambulate s/p fall. (24 Jan 2024 15:07)    ---  cardio fellow additional notes:      Patient is feeling fine   EKG showed NSR - TWI in inferior leads - Q waves in anterior leads   Troponin N/A - BNP baseline 5.9K   TTE showed EF 60% - sclerotic aortic valve - moderate PHTN   No LHC in chart   Patient is compliant with her medications  Does not f/u with cardiology   Non-smoker/Non-alcoholic         PAST MEDICAL & SURGICAL HISTORY  Chronic atrial fibrillation    GERD (gastroesophageal reflux disease)    Chronic lower back pain    No significant past surgical history        FAMILY HISTORY:  FAMILY HISTORY:  Family history of stroke (Father)        SOCIAL HISTORY:  Social History:  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 No reported tobacco, alcohol, or illicit/recreational drug use. ADL's limited by progressively worsening ability to ambulate. Ambulates w/ walker. (24 Jan 2024 15:07)      ALLERGIES:  No Known Allergies      MEDICATIONS:  acetaminophen     Tablet .. 975 milliGRAM(s) Oral every 8 hours  apixaban 5 milliGRAM(s) Oral every 12 hours  calcium carbonate   1250 mG (OsCal) 1 Tablet(s) Oral daily  chlorhexidine 2% Cloths 1 Application(s) Topical daily  cholecalciferol 5000 Unit(s) Oral daily  fluticasone propionate 50 MICROgram(s)/spray Nasal Spray 1 Spray(s) Both Nostrils two times a day  gabapentin 300 milliGRAM(s) Oral three times a day  lactated ringers. 1000 milliLiter(s) (75 mL/Hr) IV Continuous <Continuous>  lidocaine   4% Patch 1 Patch Transdermal every 24 hours  lisinopril 10 milliGRAM(s) Oral daily  methocarbamol 750 milliGRAM(s) Oral every 8 hours  metoprolol tartrate 12.5 milliGRAM(s) Oral two times a day  multivitamin 1 Tablet(s) Oral daily  oxyCODONE  ER Tablet 10 milliGRAM(s) Oral every 12 hours  pantoprazole    Tablet 40 milliGRAM(s) Oral before breakfast  polyethylene glycol 3350 17 Gram(s) Oral two times a day  rOPINIRole 0.25 milliGRAM(s) Oral two times a day  senna 2 Tablet(s) Oral at bedtime  sodium zirconium cyclosilicate 10 Gram(s) Oral once  tamsulosin 0.8 milliGRAM(s) Oral at bedtime    PRN:  oxyCODONE    IR 15 milliGRAM(s) Oral every 6 hours PRN      HOME MEDICATIONS:  Home Medications:  apixaban 2.5 mg oral tablet: 1 tab(s) orally 2 times a day (24 Jan 2024 13:48)  furosemide 20 mg oral tablet: 1 tab(s) orally once a day (24 Jan 2024 16:04)  gabapentin 100 mg oral capsule: 2 cap(s) orally 3 times a day (24 Jan 2024 14:46)  omeprazole 20 mg oral delayed release capsule: 1 cap(s) orally once a day (24 Jan 2024 14:47)  senna (sennosides) 8.6 mg oral tablet: 2 tab(s) orally once a day (at bedtime) (24 Jan 2024 14:46)  Vitamin C 500 mg oral tablet: 1 tab(s) orally once a day (24 Jan 2024 14:47)      VITALS:   T(F): 96.6 (02-09 @ 07:23), Max: 97.7 (02-08 @ 15:00)  HR: 94 (02-09 @ 07:23) (71 - 107)  BP: 138/67 (02-09 @ 07:23) (102/64 - 167/86)  BP(mean): --  RR: 18 (02-09 @ 07:23) (18 - 18)  SpO2: --    I&O's Summary    08 Feb 2024 07:01  -  09 Feb 2024 07:00  --------------------------------------------------------  IN: 0 mL / OUT: 700 mL / NET: -700 mL    09 Feb 2024 07:01  -  09 Feb 2024 11:19  --------------------------------------------------------  IN: 0 mL / OUT: 900 mL / NET: -900 mL        REVIEW OF SYSTEMS:  CONSTITUTIONAL: No weakness, fevers or chills  HEENT: No visual changes, neck/ear pain  RESPIRATORY: No cough, sob  CARDIOVASCULAR: See HPI  GASTROINTESTINAL: No abdominal pain. No nausea, vomiting, diarrhea   GENITOURINARY: No dysuria, frequency or hematuria  NEUROLOGICAL: No new focal deficits  SKIN: No new rashes    PHYSICAL EXAM:  General: Not in distress.  Non-toxic appearing.   HEENT: EOMI  Cardio: regular, S1, S2, no murmur  Pulm: B/L BS.  No wheezing / crackles / rales  Abdomen: Soft, non-tender, non-distended. Normoactive bowel sounds  Extremities: No edema b/l le  Neuro: A&O x3. No focal deficits    LABS:                        12.0   8.82  )-----------( 165      ( 09 Feb 2024 06:38 )             34.8     02-09    129<L>  |  96<L>  |  35<H>  ----------------------------<  97  5.3<H>   |  27  |  1.2    Ca    8.5      09 Feb 2024 06:38  Mg     2.0     02-09    TPro  4.5<L>  /  Alb  3.1<L>  /  TBili  0.6  /  DBili  x   /  AST  16  /  ALT  12  /  AlkPhos  55  02-09              Troponin trend:        SARS-CoV-2: Varsha (29 Jan 2024 11:08)      RADIOLOGY:  -CXR:  -TTE:  -CCTA:  -STRESS TEST:  -CATHETERIZATION:  -OTHER:  ECG:      TELEMETRY EVENTS:   Outpt cardiologist:    HPI:  89F w/ h/o chronic afib (on Eliquis), GERD, and chronic low back pain presenting s/p fall. Patient known to have chronic low back pain that has progressively worsened over the years. Pain acutely worsened two weeks ago and patient subsequently fell due to inability to support own weight. Evaluated by PCP after initial fall due to worsening pain. Prescribed Percocet 5-325 bid w/ mild relief. However, pt once again fell earlier today after pt unable to support her own weight while trying to ambulate. Now unable to ambulate or bear weight, even w/ assistance. No reported HT, LOC, lightheadedness, confusion, loss of continence, or focal weakness a/w either fall. No reported fevers, chills, CP, palpitations, SOB, abdominal pain, n/v/d, or dysuria (although daughter did note malodorous urine recently).    Of note, pt also reports new onset LE swelling over past 1-2 weeks. Recently started on furosemide 20 qd by PCP for swelling. No reported CP or dyspnea at rest or exertion (but functional status limited by lower back pain). No known prior h/o CHF.     In ED, pt HD stable on vitals. Labs demonstrated K 5.6 (hemolyzed). XR Bilateral Hips, Pelvis, and L-Spine overall unremarkable (wet read). S/P morphine 4mg IV and methocarbamol 1000mg PO. Admitted to medicine for inability to ambulate s/p fall. (24 Jan 2024 15:07)    ---  cardio fellow additional notes:      Patient is feeling fine   EKG showed NSR - TWI in inferior leads - Q waves in anterior leads   Troponin N/A - BNP baseline 5.9K   TTE showed EF 60% - sclerotic aortic valve - moderate PHTN   No LHC in chart   Patient is compliant with her medications  Does not f/u with cardiology   Non-smoker/Non-alcoholic         PAST MEDICAL & SURGICAL HISTORY  Chronic atrial fibrillation    GERD (gastroesophageal reflux disease)    Chronic lower back pain    No significant past surgical history        FAMILY HISTORY:  FAMILY HISTORY:  Family history of stroke (Father)        SOCIAL HISTORY:  Social History:  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 No reported tobacco, alcohol, or illicit/recreational drug use. ADL's limited by progressively worsening ability to ambulate. Ambulates w/ walker. (24 Jan 2024 15:07)      ALLERGIES:  No Known Allergies      MEDICATIONS:  acetaminophen     Tablet .. 975 milliGRAM(s) Oral every 8 hours  apixaban 5 milliGRAM(s) Oral every 12 hours  calcium carbonate   1250 mG (OsCal) 1 Tablet(s) Oral daily  chlorhexidine 2% Cloths 1 Application(s) Topical daily  cholecalciferol 5000 Unit(s) Oral daily  fluticasone propionate 50 MICROgram(s)/spray Nasal Spray 1 Spray(s) Both Nostrils two times a day  gabapentin 300 milliGRAM(s) Oral three times a day  lactated ringers. 1000 milliLiter(s) (75 mL/Hr) IV Continuous <Continuous>  lidocaine   4% Patch 1 Patch Transdermal every 24 hours  lisinopril 10 milliGRAM(s) Oral daily  methocarbamol 750 milliGRAM(s) Oral every 8 hours  metoprolol tartrate 12.5 milliGRAM(s) Oral two times a day  multivitamin 1 Tablet(s) Oral daily  oxyCODONE  ER Tablet 10 milliGRAM(s) Oral every 12 hours  pantoprazole    Tablet 40 milliGRAM(s) Oral before breakfast  polyethylene glycol 3350 17 Gram(s) Oral two times a day  rOPINIRole 0.25 milliGRAM(s) Oral two times a day  senna 2 Tablet(s) Oral at bedtime  sodium zirconium cyclosilicate 10 Gram(s) Oral once  tamsulosin 0.8 milliGRAM(s) Oral at bedtime    PRN:  oxyCODONE    IR 15 milliGRAM(s) Oral every 6 hours PRN      HOME MEDICATIONS:  Home Medications:  apixaban 2.5 mg oral tablet: 1 tab(s) orally 2 times a day (24 Jan 2024 13:48)  furosemide 20 mg oral tablet: 1 tab(s) orally once a day (24 Jan 2024 16:04)  gabapentin 100 mg oral capsule: 2 cap(s) orally 3 times a day (24 Jan 2024 14:46)  omeprazole 20 mg oral delayed release capsule: 1 cap(s) orally once a day (24 Jan 2024 14:47)  senna (sennosides) 8.6 mg oral tablet: 2 tab(s) orally once a day (at bedtime) (24 Jan 2024 14:46)  Vitamin C 500 mg oral tablet: 1 tab(s) orally once a day (24 Jan 2024 14:47)      VITALS:   T(F): 96.6 (02-09 @ 07:23), Max: 97.7 (02-08 @ 15:00)  HR: 94 (02-09 @ 07:23) (71 - 107)  BP: 138/67 (02-09 @ 07:23) (102/64 - 167/86)  BP(mean): --  RR: 18 (02-09 @ 07:23) (18 - 18)  SpO2: --    I&O's Summary    08 Feb 2024 07:01  -  09 Feb 2024 07:00  --------------------------------------------------------  IN: 0 mL / OUT: 700 mL / NET: -700 mL    09 Feb 2024 07:01  -  09 Feb 2024 11:19  --------------------------------------------------------  IN: 0 mL / OUT: 900 mL / NET: -900 mL        REVIEW OF SYSTEMS:  CONSTITUTIONAL: No weakness, fevers or chills  HEENT: No visual changes, neck/ear pain,   RESPIRATORY: No cough, sob  CARDIOVASCULAR: See HPI  GASTROINTESTINAL: No abdominal pain. No nausea, vomiting, diarrhea   GENITOURINARY: No dysuria, frequency or hematuria  NEUROLOGICAL: No new focal deficits  SKIN: No new rashes    PHYSICAL EXAM:  General: Not in distress.  Non-toxic appearing.   HEENT: EOMI, + JVD  Cardio: regular, S1, S2, no murmur  Pulm: B/L BS.  No wheezing / crackles / rales  Abdomen: Soft, non-tender, non-distended. Normoactive bowel sounds  Extremities: Mild edema b/l le  Neuro: A&O x3. No focal deficits    LABS:                        12.0   8.82  )-----------( 165      ( 09 Feb 2024 06:38 )             34.8     02-09    129<L>  |  96<L>  |  35<H>  ----------------------------<  97  5.3<H>   |  27  |  1.2    Ca    8.5      09 Feb 2024 06:38  Mg     2.0     02-09    TPro  4.5<L>  /  Alb  3.1<L>  /  TBili  0.6  /  DBili  x   /  AST  16  /  ALT  12  /  AlkPhos  55  02-09              Troponin trend:        SARS-CoV-2: NotDetec (29 Jan 2024 11:08)      RADIOLOGY:  -CXR:  -TTE:  -CCTA:  -STRESS TEST:  -CATHETERIZATION:  -OTHER:  ECG:      TELEMETRY EVENTS:   Outpt cardiologist:    HPI:  89F w/ h/o chronic afib (on Eliquis), GERD, and chronic low back pain presenting s/p fall. Patient known to have chronic low back pain that has progressively worsened over the years. Pain acutely worsened two weeks ago and patient subsequently fell due to inability to support own weight. Evaluated by PCP after initial fall due to worsening pain. Prescribed Percocet 5-325 bid w/ mild relief. However, pt once again fell earlier today after pt unable to support her own weight while trying to ambulate. Now unable to ambulate or bear weight, even w/ assistance. No reported HT, LOC, lightheadedness, confusion, loss of continence, or focal weakness a/w either fall. No reported fevers, chills, CP, palpitations, SOB, abdominal pain, n/v/d, or dysuria (although daughter did note malodorous urine recently).    Of note, pt also reports new onset LE swelling over past 1-2 weeks. Recently started on furosemide 20 qd by PCP for swelling. No reported CP or dyspnea at rest or exertion (but functional status limited by lower back pain). No known prior h/o CHF.     In ED, pt HD stable on vitals. Labs demonstrated K 5.6 (hemolyzed). XR Bilateral Hips, Pelvis, and L-Spine overall unremarkable (wet read). S/P morphine 4mg IV and methocarbamol 1000mg PO. Admitted to medicine for inability to ambulate s/p fall. (24 Jan 2024 15:07)    ---  cardio fellow additional notes:      Patient is feeling fine   EKG showed NSR - TWI in inferior leads - Q waves in anterior leads   Troponin N/A - BNP baseline 5.9K   TTE showed EF 60% - sclerotic aortic valve - moderate PHTN   No LHC in chart   Patient is compliant with her medications  Does not f/u with cardiology   Non-smoker/Non-alcoholic         PAST MEDICAL & SURGICAL HISTORY  Chronic atrial fibrillation    GERD (gastroesophageal reflux disease)    Chronic lower back pain    No significant past surgical history        FAMILY HISTORY:  FAMILY HISTORY:  Family history of stroke (Father)        SOCIAL HISTORY:  Social History:  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 No reported tobacco, alcohol, or illicit/recreational drug use. ADL's limited by progressively worsening ability to ambulate. Ambulates w/ walker. (24 Jan 2024 15:07)      ALLERGIES:  No Known Allergies      MEDICATIONS:  acetaminophen     Tablet .. 975 milliGRAM(s) Oral every 8 hours  apixaban 5 milliGRAM(s) Oral every 12 hours  calcium carbonate   1250 mG (OsCal) 1 Tablet(s) Oral daily  chlorhexidine 2% Cloths 1 Application(s) Topical daily  cholecalciferol 5000 Unit(s) Oral daily  fluticasone propionate 50 MICROgram(s)/spray Nasal Spray 1 Spray(s) Both Nostrils two times a day  gabapentin 300 milliGRAM(s) Oral three times a day  lactated ringers. 1000 milliLiter(s) (75 mL/Hr) IV Continuous <Continuous>  lidocaine   4% Patch 1 Patch Transdermal every 24 hours  lisinopril 10 milliGRAM(s) Oral daily  methocarbamol 750 milliGRAM(s) Oral every 8 hours  metoprolol tartrate 12.5 milliGRAM(s) Oral two times a day  multivitamin 1 Tablet(s) Oral daily  oxyCODONE  ER Tablet 10 milliGRAM(s) Oral every 12 hours  pantoprazole    Tablet 40 milliGRAM(s) Oral before breakfast  polyethylene glycol 3350 17 Gram(s) Oral two times a day  rOPINIRole 0.25 milliGRAM(s) Oral two times a day  senna 2 Tablet(s) Oral at bedtime  sodium zirconium cyclosilicate 10 Gram(s) Oral once  tamsulosin 0.8 milliGRAM(s) Oral at bedtime    PRN:  oxyCODONE    IR 15 milliGRAM(s) Oral every 6 hours PRN      HOME MEDICATIONS:  Home Medications:  apixaban 2.5 mg oral tablet: 1 tab(s) orally 2 times a day (24 Jan 2024 13:48)  furosemide 20 mg oral tablet: 1 tab(s) orally once a day (24 Jan 2024 16:04)  gabapentin 100 mg oral capsule: 2 cap(s) orally 3 times a day (24 Jan 2024 14:46)  omeprazole 20 mg oral delayed release capsule: 1 cap(s) orally once a day (24 Jan 2024 14:47)  senna (sennosides) 8.6 mg oral tablet: 2 tab(s) orally once a day (at bedtime) (24 Jan 2024 14:46)  Vitamin C 500 mg oral tablet: 1 tab(s) orally once a day (24 Jan 2024 14:47)      VITALS:   T(F): 96.6 (02-09 @ 07:23), Max: 97.7 (02-08 @ 15:00)  HR: 94 (02-09 @ 07:23) (71 - 107)  BP: 138/67 (02-09 @ 07:23) (102/64 - 167/86)  BP(mean): --  RR: 18 (02-09 @ 07:23) (18 - 18)  SpO2: --    I&O's Summary    08 Feb 2024 07:01  -  09 Feb 2024 07:00  --------------------------------------------------------  IN: 0 mL / OUT: 700 mL / NET: -700 mL    09 Feb 2024 07:01  -  09 Feb 2024 11:19  --------------------------------------------------------  IN: 0 mL / OUT: 900 mL / NET: -900 mL        REVIEW OF SYSTEMS:  CONSTITUTIONAL: No weakness, fevers or chills  HEENT: No visual changes, neck/ear pain,   RESPIRATORY: No cough, sob  CARDIOVASCULAR: See HPI  GASTROINTESTINAL: No abdominal pain. No nausea, vomiting, diarrhea   GENITOURINARY: No dysuria, frequency or hematuria  NEUROLOGICAL: No new focal deficits  SKIN: No new rashes    PHYSICAL EXAM:  General: Not in distress.  Non-toxic appearing.   HEENT: EOMI, JVP ~ 7 cm water  Cardio: regular, S1, S2, no murmur  Pulm: B/L BS.  No wheezing / crackles / rales  Abdomen: Soft, non-tender, non-distended. Normoactive bowel sounds  Extremities: Mild edema b/l le  Neuro: A&O x3. No focal deficits    LABS:                        12.0   8.82  )-----------( 165      ( 09 Feb 2024 06:38 )             34.8     02-09    129<L>  |  96<L>  |  35<H>  ----------------------------<  97  5.3<H>   |  27  |  1.2    Ca    8.5      09 Feb 2024 06:38  Mg     2.0     02-09    TPro  4.5<L>  /  Alb  3.1<L>  /  TBili  0.6  /  DBili  x   /  AST  16  /  ALT  12  /  AlkPhos  55  02-09              Troponin trend:        SARS-CoV-2: NotDetec (29 Jan 2024 11:08)      RADIOLOGY:  -CXR:  -TTE:  -CCTA:  -STRESS TEST:  -CATHETERIZATION:  -OTHER:  ECG:      TELEMETRY EVENTS:

## 2024-02-09 NOTE — PROGRESS NOTE ADULT - ATTENDING COMMENTS
patient seen and examined independently on morning rounds, chart reviewed and discussed with the medicine resident and on interdisciplinary rounds and agree with the above resident progress note with the following addendum:    hospital day #16     patient agrees for surgical intervention depending on neurosurgery plan- no overnight events- on exam more fluid overloaded and restart lasix40 mg iv daily    s/p RT x 5 sessions---considering immunotherapy after possible surgical intervention (?for possible kyphoplasty next week)    -pain control    DNR/DNI--palliative care following --continue medical management    pcp- Dr. Johnson    Total time spent to complete patient's bedside assessment, review medical chart, discuss medical plan of care with covering medical team was more than 35 minutes  with >50% of time spendt face to face with patient, discussion with patient/family and/or coordination of care .

## 2024-02-09 NOTE — CONSULT NOTE ADULT - ASSESSMENT
IMPRESSION     #Fall  #Risk stratification for kyphoplasty     PLAN  RCRI 1-2 - Class III risk 10.1% risk of 30 day MACE   -Patient is a high risk patient for a moderate risk procedure  IMPRESSION     #Fall  #Risk stratification for kyphoplasty   #Persistent Atrial Fibrillation on A/C     PLAN  RCRI 1-2 - Class III risk 10.1% risk of 30 day MACE   METS <4 - including valvular abn + moderate PHTN   -Patient is a high risk patient for a moderate risk procedure  IMPRESSION     #Fall  #Risk stratification for kyphoplasty   #Persistent Atrial Fibrillation on A/C     PLAN  RCRI 1-2 - Class III risk 10.1% risk of 30 day MACE   METS <4 - including valvular abn + moderate PHTN   - Optimize volume status before planning for surgery - c/w IV lasix 40 OD   - high risk patient for moderate risk surgery  IMPRESSION     #Fall  #Risk stratification for kyphoplasty   #Persistent Atrial Fibrillation on A/C     PLAN  RCRI 1-2 - Class III risk 10.1% risk of 30 day MACE   METS <4 - including valvular abn + moderate PHTN   - Optimize volume status before planning for surgery - c/w IV lasix 40 qd for goal negative 1L and uptitrate as needed  - high risk patient for moderate risk surgery

## 2024-02-09 NOTE — PROGRESS NOTE ADULT - ASSESSMENT
89F w/ h/o chronic afib (on Eliquis), GERD, and chronic low back pain presenting s/p fall. 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 Soft tissue component associated with the lytic   changes in the posterior inferior endplate of L1 encroaches the spinal   space resulting in L1-2 severe spinal stenosis and severe bilateral   foraminal stenosis.  - MRI L Spine - Malignant compression deformity of L1 with significant bowing of the   posterior cortex contributing to mass effect upon the conus  - neuroSx consult-no surgical intervention, abd binder, TLSO brace on discharge, advised decadron   - IR biopsy done 1/30  - CEA 5.2 (elevated)  - CA19.9 WNL   - CA 15.3 31 (elevated)   - Cytopathology -LUMBAR MASS; CT-GUIDED, BIOPSY: POSITIVE FOR MALIGNANT CELLS- Metastatic carcinoma. By immunohistochemistry, the tumor cells are positive for CK7, p40 and GATA3 and negative for TTF1/Napsin A, CK20, CDX2, GCDFP15, ER, Hepar, PAX-8 and WT1. The immunohistochemical profile is not site specific, however it can be suggestive of a urothelial origin. Clinicopathologic correlation is recommended. The case was reviewed interdepartmentally with another pathologist and the diagnosis represents a consensusopinion. The case was reported to the cancer registry. Block 1A his limited neoplastic content for further studies if clinically indicated.  - Radiation Oncology consulted, and plan for 5 sessions of RT, completed tuesday 2/6/24  - s/p decadron to 4mg q12hrs IV, started taper 2/7 4mg BID for 2 days then once a day for 2 days  - Heme Onc following, plan for immunotherapy  - Pain Management consult appreciated   - Palliative following, DNR/DNI  - Patient in severe pain, yeison with movement. Encouraged her to work with PT today.  - Urology consulted,CT urogram reviewed No acute urologic intervention at this point in time - per Dr. Herrera: urothelial diagnosis obtained via IR biopsy, no indication for cystoscopy at this point in time -Follow-up outpatient with Dr. Karanikolas  - Neurosurgery plan for kyphoplasty monday  - Oncology will start immunotherapy after surgery, slowly growing tumor  - f/u cardio clearance for kyphoplasty  - PT following, OOB yest    #CHF, diastolic - acute -- now resolved  - TTE (May 2013) demonstrated LVEF 55-65% w/ normal global LVSF and no significant valvulopathy.  - TTE 1/28- EF 60-65% mod pulm HTN   - elevated BNP  - Tele monitored for 24 hrs with no dysrhythmia   - received few doses of Furosemide and 1x dose of Bumex but currently off diuretics   - monitor volume status closely    #Chronic Atrial Fibrillation  CHADS-VASC 3+ (Age x2, Female). Only on AC. Previously on Lopressor, but only takes intermittently as HR currently rate-controlled off meds.  - c/w eliquis     #GERD  - c/w pantoprazole 40mg PO QD (therapeutic interchange for omeprazole)    #CKD 3b   per PCP baseline is 1.2, currently 1.0    #Urinary retention-likely neurogenic   -hanson placed 1/29  -flomax increased to 0.8mg   - completed RT, will try TOV after walking with PT    #Constipation  -senna, miralax, lactulose    # HTN-- on metoprolol and started on lisinopril 10mg 2/4    PENDING: neurosx for kyphoplasty moday, cardio clearance, PT and TOV

## 2024-02-09 NOTE — PROGRESS NOTE ADULT - ATTENDING COMMENTS
seen/ examined w/[facility]monc fellow; note reviewed;case discussed; agree w/ above  spoke to neurosurgery Dr Narvaez; clarified the situation; explained that no other known sites of neoplasm besides vertebral lesion; she has at baseline decent ECOG and unable to ambulate due to pain despite RT; the operative approach would be to improve QUALITY of life; in fact, not clear if she even needs systemic therapy at this point and if systemic therapy would be useful if no change in pain occurred with RT

## 2024-02-09 NOTE — PROGRESS NOTE ADULT - SUBJECTIVE AND OBJECTIVE BOX
SUBJECTIVE:    Patient is a 89y old Female who presents with a chief complaint of Back Pain (08 Feb 2024 18:51)    Currently admitted to medicine with the primary diagnosis of Inability to ambulate due to multiple joints     Today is hospital day 16d. This morning she is resting comfortably in bed and reports no new issues or overnight events.     PAST MEDICAL & SURGICAL HISTORY  Chronic atrial fibrillation    GERD (gastroesophageal reflux disease)    Chronic lower back pain    No significant past surgical history        ALLERGIES:  No Known Allergies    MEDICATIONS:  STANDING MEDICATIONS  acetaminophen     Tablet .. 975 milliGRAM(s) Oral every 8 hours  apixaban 5 milliGRAM(s) Oral every 12 hours  calcium carbonate   1250 mG (OsCal) 1 Tablet(s) Oral daily  chlorhexidine 2% Cloths 1 Application(s) Topical daily  cholecalciferol 5000 Unit(s) Oral daily  fluticasone propionate 50 MICROgram(s)/spray Nasal Spray 1 Spray(s) Both Nostrils two times a day  gabapentin 300 milliGRAM(s) Oral three times a day  lactated ringers. 1000 milliLiter(s) IV Continuous <Continuous>  lidocaine   4% Patch 1 Patch Transdermal every 24 hours  lisinopril 10 milliGRAM(s) Oral daily  methocarbamol 750 milliGRAM(s) Oral every 8 hours  metoprolol tartrate 12.5 milliGRAM(s) Oral two times a day  multivitamin 1 Tablet(s) Oral daily  oxyCODONE  ER Tablet 10 milliGRAM(s) Oral every 12 hours  pantoprazole    Tablet 40 milliGRAM(s) Oral before breakfast  polyethylene glycol 3350 17 Gram(s) Oral two times a day  rOPINIRole 0.25 milliGRAM(s) Oral two times a day  senna 2 Tablet(s) Oral at bedtime  sodium zirconium cyclosilicate 10 Gram(s) Oral once  tamsulosin 0.8 milliGRAM(s) Oral at bedtime    PRN MEDICATIONS  oxyCODONE    IR 15 milliGRAM(s) Oral every 6 hours PRN    VITALS:   T(F): 96.6  HR: 94  BP: 138/67  RR: 18  SpO2: --    LABS:                        12.0   8.82  )-----------( 165      ( 09 Feb 2024 06:38 )             34.8     02-09    129<L>  |  96<L>  |  35<H>  ----------------------------<  97  5.3<H>   |  27  |  1.2    Ca    8.5      09 Feb 2024 06:38  Mg     2.0     02-09    TPro  4.5<L>  /  Alb  3.1<L>  /  TBili  0.6  /  DBili  x   /  AST  16  /  ALT  12  /  AlkPhos  55  02-09      Urinalysis Basic - ( 09 Feb 2024 06:38 )    Color: x / Appearance: x / SG: x / pH: x  Gluc: 97 mg/dL / Ketone: x  / Bili: x / Urobili: x   Blood: x / Protein: x / Nitrite: x   Leuk Esterase: x / RBC: x / WBC x   Sq Epi: x / Non Sq Epi: x / Bacteria: x                RADIOLOGY:    PHYSICAL EXAM:  GEN: No acute distress  PULM/CHEST: Clear to auscultation bilaterally, no rales, rhonchi or wheezes   CVS: Regular rate and rhythm, S1-S2, no murmurs  ABD: Soft, non-tender, non-distended, +BS  EXT: No edema  NEURO: AAOx3    David Catheter:   Indwelling Urethral Catheter:     Connect To:  Straight Drainage/Pilgrim    Indication:  Urinary Retention / Obstruction (02-08-24 @ 22:28) (not performed) [Active]  Indwelling Urethral Catheter:     Connect To:  Straight Drainage/Pilgrim    Indication:  Urinary Retention / Obstruction (02-07-24 @ 20:23) (not performed) [Active]  Indwelling Urethral Catheter:     Connect To:  Straight Drainage/P[place]grim    Indication:  Urinary Retention / Obstruction (02-06-24 @ 18:12) (not performed) [Active]  Indwelling Urethral Catheter:     Connect To:  Straight Drainage/Pilgrim    Indication:  Urinary Retention / Obstruction (02-05-24 @ 14:04) (not performed) [Active]  Indwelling Urethral Catheter:     Connect To:  Straight Drainage/Pilgrim    Indication:  Urinary Retention / Obstruction (02-04-24 @ 15:35) (not performed) [Active]  Indwelling Urethral Catheter:     Connect To:  Straight Drainage/P[place]grim    Indication:  Urinary Retention / Obstruction (02-03-24 @ 14:43) (not performed) [Active]  Indwelling Urethral Catheter:     Connect To:  Straight Drainage/P[place]grim    Indication:  Urinary Retention / Obstruction (02-02-24 @ 13:15) (not performed) [Active]  Indwelling Urethral Catheter:     Connect To:  Straight Drainage/P[place]grim    Indication:  Urinary Retention / Obstruction (01-31-24 @ 14:28) (not performed) [Active]  Indwelling Urethral Catheter:     Connect To:  Straight Drainage/Pilgrim    Indication:  Urinary Retention / Obstruction (01-31-24 @ 09:00) (not performed) [Active]

## 2024-02-10 LAB
ALBUMIN SERPL ELPH-MCNC: 3.2 G/DL — LOW (ref 3.5–5.2)
ALBUMIN SERPL ELPH-MCNC: 3.5 G/DL — SIGNIFICANT CHANGE UP (ref 3.5–5.2)
ALP SERPL-CCNC: 57 U/L — SIGNIFICANT CHANGE UP (ref 30–115)
ALP SERPL-CCNC: 61 U/L — SIGNIFICANT CHANGE UP (ref 30–115)
ALT FLD-CCNC: 12 U/L — SIGNIFICANT CHANGE UP (ref 0–41)
ALT FLD-CCNC: 16 U/L — SIGNIFICANT CHANGE UP (ref 0–41)
ANION GAP SERPL CALC-SCNC: 13 MMOL/L — SIGNIFICANT CHANGE UP (ref 7–14)
ANION GAP SERPL CALC-SCNC: 14 MMOL/L — SIGNIFICANT CHANGE UP (ref 7–14)
ANION GAP SERPL CALC-SCNC: 17 MMOL/L — HIGH (ref 7–14)
APTT BLD: 28.6 SEC — SIGNIFICANT CHANGE UP (ref 27–39.2)
AST SERPL-CCNC: 17 U/L — SIGNIFICANT CHANGE UP (ref 0–41)
AST SERPL-CCNC: 23 U/L — SIGNIFICANT CHANGE UP (ref 0–41)
BASOPHILS # BLD AUTO: 0.01 K/UL — SIGNIFICANT CHANGE UP (ref 0–0.2)
BASOPHILS # BLD AUTO: 0.02 K/UL — SIGNIFICANT CHANGE UP (ref 0–0.2)
BASOPHILS NFR BLD AUTO: 0.1 % — SIGNIFICANT CHANGE UP (ref 0–1)
BASOPHILS NFR BLD AUTO: 0.1 % — SIGNIFICANT CHANGE UP (ref 0–1)
BILIRUB SERPL-MCNC: 0.7 MG/DL — SIGNIFICANT CHANGE UP (ref 0.2–1.2)
BILIRUB SERPL-MCNC: 0.8 MG/DL — SIGNIFICANT CHANGE UP (ref 0.2–1.2)
BLD GP AB SCN SERPL QL: SIGNIFICANT CHANGE UP
BUN SERPL-MCNC: 39 MG/DL — HIGH (ref 10–20)
BUN SERPL-MCNC: 42 MG/DL — HIGH (ref 10–20)
BUN SERPL-MCNC: 48 MG/DL — HIGH (ref 10–20)
CALCIUM SERPL-MCNC: 8.4 MG/DL — SIGNIFICANT CHANGE UP (ref 8.4–10.5)
CALCIUM SERPL-MCNC: 8.6 MG/DL — SIGNIFICANT CHANGE UP (ref 8.4–10.5)
CALCIUM SERPL-MCNC: 8.7 MG/DL — SIGNIFICANT CHANGE UP (ref 8.4–10.5)
CHLORIDE SERPL-SCNC: 94 MMOL/L — LOW (ref 98–110)
CHLORIDE SERPL-SCNC: 95 MMOL/L — LOW (ref 98–110)
CHLORIDE SERPL-SCNC: 96 MMOL/L — LOW (ref 98–110)
CO2 SERPL-SCNC: 21 MMOL/L — SIGNIFICANT CHANGE UP (ref 17–32)
CO2 SERPL-SCNC: 22 MMOL/L — SIGNIFICANT CHANGE UP (ref 17–32)
CO2 SERPL-SCNC: 25 MMOL/L — SIGNIFICANT CHANGE UP (ref 17–32)
CREAT SERPL-MCNC: 1.1 MG/DL — SIGNIFICANT CHANGE UP (ref 0.7–1.5)
CREAT SERPL-MCNC: 1.4 MG/DL — SIGNIFICANT CHANGE UP (ref 0.7–1.5)
CREAT SERPL-MCNC: 1.4 MG/DL — SIGNIFICANT CHANGE UP (ref 0.7–1.5)
EGFR: 36 ML/MIN/1.73M2 — LOW
EGFR: 36 ML/MIN/1.73M2 — LOW
EGFR: 48 ML/MIN/1.73M2 — LOW
EOSINOPHIL # BLD AUTO: 0.01 K/UL — SIGNIFICANT CHANGE UP (ref 0–0.7)
EOSINOPHIL # BLD AUTO: 0.04 K/UL — SIGNIFICANT CHANGE UP (ref 0–0.7)
EOSINOPHIL NFR BLD AUTO: 0.1 % — SIGNIFICANT CHANGE UP (ref 0–8)
EOSINOPHIL NFR BLD AUTO: 0.4 % — SIGNIFICANT CHANGE UP (ref 0–8)
GAS PNL BLDA: SIGNIFICANT CHANGE UP
GLUCOSE BLDC GLUCOMTR-MCNC: 149 MG/DL — HIGH (ref 70–99)
GLUCOSE SERPL-MCNC: 118 MG/DL — HIGH (ref 70–99)
GLUCOSE SERPL-MCNC: 158 MG/DL — HIGH (ref 70–99)
GLUCOSE SERPL-MCNC: 78 MG/DL — SIGNIFICANT CHANGE UP (ref 70–99)
HCT VFR BLD CALC: 35.4 % — LOW (ref 37–47)
HCT VFR BLD CALC: 38.3 % — SIGNIFICANT CHANGE UP (ref 37–47)
HCT VFR BLD CALC: 39.7 % — SIGNIFICANT CHANGE UP (ref 37–47)
HGB BLD-MCNC: 12.4 G/DL — SIGNIFICANT CHANGE UP (ref 12–16)
HGB BLD-MCNC: 13 G/DL — SIGNIFICANT CHANGE UP (ref 12–16)
HGB BLD-MCNC: 13.6 G/DL — SIGNIFICANT CHANGE UP (ref 12–16)
IMM GRANULOCYTES NFR BLD AUTO: 0.7 % — HIGH (ref 0.1–0.3)
IMM GRANULOCYTES NFR BLD AUTO: 1.2 % — HIGH (ref 0.1–0.3)
INR BLD: 1.42 RATIO — HIGH (ref 0.65–1.3)
LACTATE SERPL-SCNC: 2.1 MMOL/L — HIGH (ref 0.7–2)
LACTATE SERPL-SCNC: 4 MMOL/L — CRITICAL HIGH (ref 0.7–2)
LYMPHOCYTES # BLD AUTO: 0.29 K/UL — LOW (ref 1.2–3.4)
LYMPHOCYTES # BLD AUTO: 0.41 K/UL — LOW (ref 1.2–3.4)
LYMPHOCYTES # BLD AUTO: 1.8 % — LOW (ref 20.5–51.1)
LYMPHOCYTES # BLD AUTO: 4.4 % — LOW (ref 20.5–51.1)
MAGNESIUM SERPL-MCNC: 1.7 MG/DL — LOW (ref 1.8–2.4)
MAGNESIUM SERPL-MCNC: 1.9 MG/DL — SIGNIFICANT CHANGE UP (ref 1.8–2.4)
MCHC RBC-ENTMCNC: 32.6 PG — HIGH (ref 27–31)
MCHC RBC-ENTMCNC: 32.7 PG — HIGH (ref 27–31)
MCHC RBC-ENTMCNC: 33.2 PG — HIGH (ref 27–31)
MCHC RBC-ENTMCNC: 33.9 G/DL — SIGNIFICANT CHANGE UP (ref 32–37)
MCHC RBC-ENTMCNC: 34.3 G/DL — SIGNIFICANT CHANGE UP (ref 32–37)
MCHC RBC-ENTMCNC: 35 G/DL — SIGNIFICANT CHANGE UP (ref 32–37)
MCV RBC AUTO: 94.9 FL — SIGNIFICANT CHANGE UP (ref 81–99)
MCV RBC AUTO: 95.2 FL — SIGNIFICANT CHANGE UP (ref 81–99)
MCV RBC AUTO: 96.2 FL — SIGNIFICANT CHANGE UP (ref 81–99)
MONOCYTES # BLD AUTO: 0.38 K/UL — SIGNIFICANT CHANGE UP (ref 0.1–0.6)
MONOCYTES # BLD AUTO: 0.48 K/UL — SIGNIFICANT CHANGE UP (ref 0.1–0.6)
MONOCYTES NFR BLD AUTO: 2.9 % — SIGNIFICANT CHANGE UP (ref 1.7–9.3)
MONOCYTES NFR BLD AUTO: 4 % — SIGNIFICANT CHANGE UP (ref 1.7–9.3)
NEUTROPHILS # BLD AUTO: 15.43 K/UL — HIGH (ref 1.4–6.5)
NEUTROPHILS # BLD AUTO: 8.5 K/UL — HIGH (ref 1.4–6.5)
NEUTROPHILS NFR BLD AUTO: 90.4 % — HIGH (ref 42.2–75.2)
NEUTROPHILS NFR BLD AUTO: 93.9 % — HIGH (ref 42.2–75.2)
NRBC # BLD: 0 /100 WBCS — SIGNIFICANT CHANGE UP (ref 0–0)
PLATELET # BLD AUTO: 146 K/UL — SIGNIFICANT CHANGE UP (ref 130–400)
PLATELET # BLD AUTO: 155 K/UL — SIGNIFICANT CHANGE UP (ref 130–400)
PLATELET # BLD AUTO: 177 K/UL — SIGNIFICANT CHANGE UP (ref 130–400)
PMV BLD: 9.5 FL — SIGNIFICANT CHANGE UP (ref 7.4–10.4)
PMV BLD: 9.5 FL — SIGNIFICANT CHANGE UP (ref 7.4–10.4)
PMV BLD: 9.6 FL — SIGNIFICANT CHANGE UP (ref 7.4–10.4)
POTASSIUM SERPL-MCNC: 4.1 MMOL/L — SIGNIFICANT CHANGE UP (ref 3.5–5)
POTASSIUM SERPL-MCNC: 4.5 MMOL/L — SIGNIFICANT CHANGE UP (ref 3.5–5)
POTASSIUM SERPL-MCNC: 4.6 MMOL/L — SIGNIFICANT CHANGE UP (ref 3.5–5)
POTASSIUM SERPL-SCNC: 4.1 MMOL/L — SIGNIFICANT CHANGE UP (ref 3.5–5)
POTASSIUM SERPL-SCNC: 4.5 MMOL/L — SIGNIFICANT CHANGE UP (ref 3.5–5)
POTASSIUM SERPL-SCNC: 4.6 MMOL/L — SIGNIFICANT CHANGE UP (ref 3.5–5)
PROT SERPL-MCNC: 4.6 G/DL — LOW (ref 6–8)
PROT SERPL-MCNC: 5.3 G/DL — LOW (ref 6–8)
PROTHROM AB SERPL-ACNC: 16.3 SEC — HIGH (ref 9.95–12.87)
RBC # BLD: 3.73 M/UL — LOW (ref 4.2–5.4)
RBC # BLD: 3.98 M/UL — LOW (ref 4.2–5.4)
RBC # BLD: 4.17 M/UL — LOW (ref 4.2–5.4)
RBC # FLD: 13.5 % — SIGNIFICANT CHANGE UP (ref 11.5–14.5)
RBC # FLD: 13.5 % — SIGNIFICANT CHANGE UP (ref 11.5–14.5)
RBC # FLD: 13.6 % — SIGNIFICANT CHANGE UP (ref 11.5–14.5)
SODIUM SERPL-SCNC: 131 MMOL/L — LOW (ref 135–146)
SODIUM SERPL-SCNC: 132 MMOL/L — LOW (ref 135–146)
SODIUM SERPL-SCNC: 134 MMOL/L — LOW (ref 135–146)
TROPONIN T, HIGH SENSITIVITY RESULT: 47 NG/L — HIGH (ref 6–13)
TROPONIN T, HIGH SENSITIVITY RESULT: 51 NG/L — HIGH (ref 6–13)
WBC # BLD: 14.57 K/UL — HIGH (ref 4.8–10.8)
WBC # BLD: 16.43 K/UL — HIGH (ref 4.8–10.8)
WBC # BLD: 9.41 K/UL — SIGNIFICANT CHANGE UP (ref 4.8–10.8)
WBC # FLD AUTO: 14.57 K/UL — HIGH (ref 4.8–10.8)
WBC # FLD AUTO: 16.43 K/UL — HIGH (ref 4.8–10.8)
WBC # FLD AUTO: 9.41 K/UL — SIGNIFICANT CHANGE UP (ref 4.8–10.8)

## 2024-02-10 PROCEDURE — 99233 SBSQ HOSP IP/OBS HIGH 50: CPT

## 2024-02-10 PROCEDURE — 70450 CT HEAD/BRAIN W/O DYE: CPT | Mod: 26

## 2024-02-10 PROCEDURE — 93010 ELECTROCARDIOGRAM REPORT: CPT

## 2024-02-10 RX ORDER — SODIUM CHLORIDE 9 MG/ML
500 INJECTION, SOLUTION INTRAVENOUS ONCE
Refills: 0 | Status: DISCONTINUED | OUTPATIENT
Start: 2024-02-10 | End: 2024-02-10

## 2024-02-10 RX ORDER — METHOCARBAMOL 500 MG/1
750 TABLET, FILM COATED ORAL EVERY 8 HOURS
Refills: 0 | Status: DISCONTINUED | OUTPATIENT
Start: 2024-02-10 | End: 2024-02-15

## 2024-02-10 RX ORDER — NALOXONE HYDROCHLORIDE 4 MG/.1ML
0.4 SPRAY NASAL ONCE
Refills: 0 | Status: COMPLETED | OUTPATIENT
Start: 2024-02-10 | End: 2024-02-10

## 2024-02-10 RX ORDER — SODIUM CHLORIDE 9 MG/ML
500 INJECTION, SOLUTION INTRAVENOUS ONCE
Refills: 0 | Status: COMPLETED | OUTPATIENT
Start: 2024-02-10 | End: 2024-02-10

## 2024-02-10 RX ORDER — METOPROLOL TARTRATE 50 MG
25 TABLET ORAL
Refills: 0 | Status: DISCONTINUED | OUTPATIENT
Start: 2024-02-10 | End: 2024-02-15

## 2024-02-10 RX ORDER — FUROSEMIDE 40 MG
20 TABLET ORAL DAILY
Refills: 0 | Status: DISCONTINUED | OUTPATIENT
Start: 2024-02-11 | End: 2024-02-11

## 2024-02-10 RX ADMIN — OXYCODONE HYDROCHLORIDE 10 MILLIGRAM(S): 5 TABLET ORAL at 05:34

## 2024-02-10 RX ADMIN — Medication 975 MILLIGRAM(S): at 13:37

## 2024-02-10 RX ADMIN — OXYCODONE HYDROCHLORIDE 10 MILLIGRAM(S): 5 TABLET ORAL at 06:31

## 2024-02-10 RX ADMIN — Medication 40 MILLIGRAM(S): at 05:33

## 2024-02-10 RX ADMIN — GABAPENTIN 300 MILLIGRAM(S): 400 CAPSULE ORAL at 05:34

## 2024-02-10 RX ADMIN — Medication 4 MILLIGRAM(S): at 05:35

## 2024-02-10 RX ADMIN — ROPINIROLE 0.25 MILLIGRAM(S): 8 TABLET, FILM COATED, EXTENDED RELEASE ORAL at 05:34

## 2024-02-10 RX ADMIN — Medication 1 SPRAY(S): at 05:42

## 2024-02-10 RX ADMIN — NALOXONE HYDROCHLORIDE 0.4 MILLIGRAM(S): 4 SPRAY NASAL at 12:45

## 2024-02-10 RX ADMIN — Medication 975 MILLIGRAM(S): at 05:35

## 2024-02-10 RX ADMIN — GABAPENTIN 300 MILLIGRAM(S): 400 CAPSULE ORAL at 21:23

## 2024-02-10 RX ADMIN — TAMSULOSIN HYDROCHLORIDE 0.8 MILLIGRAM(S): 0.4 CAPSULE ORAL at 21:22

## 2024-02-10 RX ADMIN — Medication 975 MILLIGRAM(S): at 22:23

## 2024-02-10 RX ADMIN — Medication 1 TABLET(S): at 11:12

## 2024-02-10 RX ADMIN — Medication 12.5 MILLIGRAM(S): at 05:34

## 2024-02-10 RX ADMIN — SODIUM CHLORIDE 500 MILLILITER(S): 9 INJECTION, SOLUTION INTRAVENOUS at 16:00

## 2024-02-10 RX ADMIN — Medication 975 MILLIGRAM(S): at 06:30

## 2024-02-10 RX ADMIN — METHOCARBAMOL 750 MILLIGRAM(S): 500 TABLET, FILM COATED ORAL at 05:34

## 2024-02-10 RX ADMIN — SENNA PLUS 2 TABLET(S): 8.6 TABLET ORAL at 21:23

## 2024-02-10 RX ADMIN — LISINOPRIL 10 MILLIGRAM(S): 2.5 TABLET ORAL at 05:34

## 2024-02-10 RX ADMIN — CHLORHEXIDINE GLUCONATE 1 APPLICATION(S): 213 SOLUTION TOPICAL at 11:12

## 2024-02-10 RX ADMIN — Medication 975 MILLIGRAM(S): at 21:23

## 2024-02-10 RX ADMIN — ROPINIROLE 0.25 MILLIGRAM(S): 8 TABLET, FILM COATED, EXTENDED RELEASE ORAL at 17:09

## 2024-02-10 RX ADMIN — Medication 5000 UNIT(S): at 11:12

## 2024-02-10 RX ADMIN — PANTOPRAZOLE SODIUM 40 MILLIGRAM(S): 20 TABLET, DELAYED RELEASE ORAL at 05:34

## 2024-02-10 RX ADMIN — GABAPENTIN 300 MILLIGRAM(S): 400 CAPSULE ORAL at 13:37

## 2024-02-10 NOTE — RAPID RESPONSE TEAM SUMMARY - NSSITUATIONBACKGROUNDRRT_GEN_ALL_CORE
Called by the nurse as patient was suddenly unresponsive.  At bedside, patient was hypotensive 75/50, non responding to stimuli, .  Started IVFs, gave 0.4mg of narcan, stat labs drawn and EKG performed.  Patient was having a bowel movement when she became unresponsive.  Patient progressively regained consciousness, BP stabilized, probably vasovagal episode in HFPEF patient on IV lasix. Decreased lasix to 20mg IV daily. Will place pt on telemonitor. Called by the nurse as patient was suddenly unresponsive.  At bedside, patient was hypotensive 75/50, non responding to stimuli, .  Started IVFs, gave 0.4mg of narcan, stat labs drawn and EKG performed afib hr of 85, EEG ordered.  Patient was having a bowel movement when she became unresponsive.  Patient progressively regained consciousness, BP stabilized, probably vasovagal episode in HFPEF patient on IV lasix. Decreased lasix to 20mg IV daily. Will place pt on telemonitor. Contraindicated

## 2024-02-10 NOTE — PROGRESS NOTE ADULT - ASSESSMENT
89F w/ h/o chronic afib (on Eliquis), GERD, and chronic low back pain presenting s/p fall. 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 Soft tissue component associated with the lytic   changes in the posterior inferior endplate of L1 encroaches the spinal   space resulting in L1-2 severe spinal stenosis and severe bilateral   foraminal stenosis.  - MRI L Spine - Malignant compression deformity of L1 with significant bowing of the   posterior cortex contributing to mass effect upon the conus  - neuroSx consult-no surgical intervention, abd binder, TLSO brace on discharge, advised decadron   - IR biopsy done 1/30  - CEA 5.2 (elevated)  - CA19.9 WNL   - CA 15.3 31 (elevated)   - Cytopathology -LUMBAR MASS; CT-GUIDED, BIOPSY: POSITIVE FOR MALIGNANT CELLS- Metastatic carcinoma. By immunohistochemistry, the tumor cells are positive for CK7, p40 and GATA3 and negative for TTF1/Napsin A, CK20, CDX2, GCDFP15, ER, Hepar, PAX-8 and WT1. The immunohistochemical profile is not site specific, however it can be suggestive of a urothelial origin. Clinicopathologic correlation is recommended. The case was reviewed interdepartmentally with another pathologist and the diagnosis represents a consensusopinion. The case was reported to the cancer registry. Block 1A his limited neoplastic content for further studies if clinically indicated.  - Radiation Oncology consulted, and plan for 5 sessions of RT, completed tuesday 2/6/24  - s/p decadron to 4mg q12hrs IV, started taper 2/7 4mg BID for 2 days then once a day for 2 days  - Heme Onc following, plan for immunotherapy  - Pain Management consult appreciated   - Palliative following, DNR/DNI  - Patient in severe pain, yeison with movement. Encouraged her to work with PT today.  - Urology consulted,CT urogram reviewed No acute urologic intervention at this point in time - per Dr. Herrera: urothelial diagnosis obtained via IR biopsy, no indication for cystoscopy at this point in time -Follow-up outpatient with Dr. Karanikolas  - Neurosurgery plan for kyphoplasty monday  - Oncology will start immunotherapy after surgery, slowly growing tumor  - cardio cslt for clearance for kyphoplasty: - high risk patient for moderate risk surgery   - increased metoprolol to 25 BID  - c/w IV lasix 40mg daily  - c/w PT  - If confirmed Kyphoplasty, will need to hold eliquis starting tonight    #CHF, diastolic - acute -- now resolved  - TTE (May 2013) demonstrated LVEF 55-65% w/ normal global LVSF and no significant valvulopathy.  - TTE 1/28- EF 60-65% mod pulm HTN   - elevated BNP  - Tele monitored for 24 hrs with no dysrhythmia   - received few doses of Furosemide and 1x dose of Bumex but currently off diuretics   - monitor volume status closely    #Chronic Atrial Fibrillation  CHADS-VASC 3+ (Age x2, Female). Only on AC. Previously on Lopressor, but only takes intermittently as HR currently rate-controlled off meds.  - c/w eliquis     #GERD  - c/w pantoprazole 40mg PO QD (therapeutic interchange for omeprazole)    #CKD 3b   per PCP baseline is 1.2, currently 1.0    #Urinary retention-likely neurogenic   -hanson placed 1/29  -flomax increased to 0.8mg   - completed RT, will try TOV after walking with PT    #Constipation  -senna, miralax, lactulose    # HTN-- on metoprolol and started on lisinopril 10mg 2/4    PENDING: neurosx for kyphoplasty monday? if confirmed will need to hold eliquis   89F w/ h/o chronic afib (on Eliquis), GERD, and chronic low back pain presenting s/p fall. 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 Soft tissue component associated with the lytic   changes in the posterior inferior endplate of L1 encroaches the spinal   space resulting in L1-2 severe spinal stenosis and severe bilateral   foraminal stenosis.  - MRI L Spine - Malignant compression deformity of L1 with significant bowing of the   posterior cortex contributing to mass effect upon the conus  - neuroSx consult-no surgical intervention, abd binder, TLSO brace on discharge, advised decadron   - IR biopsy done 1/30  - CEA 5.2 (elevated)  - CA19.9 WNL   - CA 15.3 31 (elevated)   - Cytopathology -LUMBAR MASS; CT-GUIDED, BIOPSY: POSITIVE FOR MALIGNANT CELLS- Metastatic carcinoma. By immunohistochemistry, the tumor cells are positive for CK7, p40 and GATA3 and negative for TTF1/Napsin A, CK20, CDX2, GCDFP15, ER, Hepar, PAX-8 and WT1. The immunohistochemical profile is not site specific, however it can be suggestive of a urothelial origin. Clinicopathologic correlation is recommended. The case was reviewed interdepartmentally with another pathologist and the diagnosis represents a consensusopinion. The case was reported to the cancer registry. Block 1A his limited neoplastic content for further studies if clinically indicated.  - Radiation Oncology consulted, and plan for 5 sessions of RT, completed tuesday 2/6/24  - s/p decadron to 4mg q12hrs IV, started taper 2/7 4mg BID for 2 days then once a day for 2 days  - Heme Onc following, plan for immunotherapy  - Pain Management consult appreciated   - Palliative following, DNR/DNI  - Patient in severe pain, yeison with movement. Encouraged her to work with PT today.  - Urology consulted,CT urogram reviewed No acute urologic intervention at this point in time - per Dr. Herrera: urothelial diagnosis obtained via IR biopsy, no indication for cystoscopy at this point in time -Follow-up outpatient with Dr. Karanikolas  - Neurosurgery plan for kyphoplasty monday  - Oncology will start immunotherapy after surgery, slowly growing tumor  - cardio cslt for clearance for kyphoplasty: - high risk patient for moderate risk surgery   - increased metoprolol to 25 BID  - c/w IV lasix 40mg daily  - c/w PT  - Spoke with neurosx, Kyphoplasty on Wednesday, will hold eliquis starting today    #CHF, diastolic - acute -- now resolved  - TTE (May 2013) demonstrated LVEF 55-65% w/ normal global LVSF and no significant valvulopathy.  - TTE 1/28- EF 60-65% mod pulm HTN   - elevated BNP  - Tele monitored for 24 hrs with no dysrhythmia   - received few doses of Furosemide and 1x dose of Bumex but currently off diuretics   - monitor volume status closely    #Chronic Atrial Fibrillation  CHADS-VASC 3+ (Age x2, Female). Only on AC. Previously on Lopressor, but only takes intermittently as HR currently rate-controlled off meds.  - c/w eliquis     #GERD  - c/w pantoprazole 40mg PO QD (therapeutic interchange for omeprazole)    #CKD 3b   per PCP baseline is 1.2, currently 1.0    #Urinary retention-likely neurogenic   -hanson placed 1/29  -flomax increased to 0.8mg   - completed RT, will try TOV after walking with PT    #Constipation  -senna, miralax, lactulose    # HTN-- on metoprolol and started on lisinopril 10mg 2/4    PENDING: neurosx for kyphoplasty wednesday, holding eliquis starting today

## 2024-02-10 NOTE — PROGRESS NOTE ADULT - ATTENDING COMMENTS
patient seen and examined independently on morning rounds, chart reviewed and discussed with the medicine resident and on interdisciplinary rounds and agree with the above resident progress note with the following addendum:    hospital day #16    -RRT today after patient was in chair and unresponsive --bp 75/30 and was not responding to verbal or painful stimuli  -suspect vasovagal after leslyt got out of bed and was perhaps straining to have bowel movement (also restarted on lasix 40 mg iv yesterday today on exam appears more euvolemic)--- extra  diuresis and increased bblocker (metoprolol dose was increased  to 25 mg q12 hr_) could have also contributed  -will decrease lasix to 20 mg iv daily starting tomorrow and continue to readdress fluid status  -avoid vs over sedation with opiates (patient is on oxycontin 10 mg ER q12 hr, standing gabapentin 300 mg q8hr, robaxin q8hr standing and oxycodone prn) and responded to narcan given x1 during rrt  -daughter bedside during event---patient DNR/DNI    -f/u CThead and EEG  -f/u all labs---including LA, ua/ucx, procal, electrolytes,   -will change robaxin to prn and hold oxycodone q4hr  -monitor neurochecks  -remote telemetry monitoring  -if hr becomes bradycardic would decrease metoprolol dose back to 12.5 q12 hr (was just increased to 25 mg q12 hr yesterday)  -f/u with onc for further treatment plan after surgery (s/p RT x 5 sessions---considering immunotherapy)  -neurosurgery planning for kyphoplasty to improve pain control possible this week on wednesday 2/14---holding eliquis from today and will need pre-op labs and npo after midnight tuesday   -pain control    -d/w patients daughter bedside-patient was working with PT this week and starting to get up and oob to bedside chair standing with assistance    DNR/DNI--palliative care following --continue medical management    pcp- Dr. Johnson    Total time spent to complete patient's bedside assessment, review medical chart, discuss medical plan of care with covering medical team was more than 50 minutes  with >50% of time spendt face to face with patient, discussion with patient/family and/or coordination of care .

## 2024-02-10 NOTE — PROGRESS NOTE ADULT - SUBJECTIVE AND OBJECTIVE BOX
SUBJECTIVE:    Patient is a 89y old Female who presents with a chief complaint of Back Pain (09 Feb 2024 13:12)    Currently admitted to medicine with the primary diagnosis of Inability to ambulate due to multiple joints     Today is hospital day 17d. This morning she is resting comfortably in bed and reports no new issues or overnight events.     PAST MEDICAL & SURGICAL HISTORY  Chronic atrial fibrillation    GERD (gastroesophageal reflux disease)    Chronic lower back pain    No significant past surgical history        ALLERGIES:  No Known Allergies    MEDICATIONS:  STANDING MEDICATIONS  acetaminophen     Tablet .. 975 milliGRAM(s) Oral every 8 hours  apixaban 5 milliGRAM(s) Oral every 12 hours  calcium carbonate   1250 mG (OsCal) 1 Tablet(s) Oral daily  chlorhexidine 2% Cloths 1 Application(s) Topical daily  cholecalciferol 5000 Unit(s) Oral daily  dexAMETHasone     Tablet 4 milliGRAM(s) Oral daily  fluticasone propionate 50 MICROgram(s)/spray Nasal Spray 1 Spray(s) Both Nostrils two times a day  furosemide   Injectable 40 milliGRAM(s) IV Push daily  gabapentin 300 milliGRAM(s) Oral three times a day  lactated ringers. 1000 milliLiter(s) IV Continuous <Continuous>  lidocaine   4% Patch 1 Patch Transdermal every 24 hours  lisinopril 10 milliGRAM(s) Oral daily  methocarbamol 750 milliGRAM(s) Oral every 8 hours  metoprolol tartrate 25 milliGRAM(s) Oral two times a day  multivitamin 1 Tablet(s) Oral daily  oxyCODONE  ER Tablet 10 milliGRAM(s) Oral every 12 hours  pantoprazole    Tablet 40 milliGRAM(s) Oral before breakfast  polyethylene glycol 3350 17 Gram(s) Oral two times a day  rOPINIRole 0.25 milliGRAM(s) Oral two times a day  senna 2 Tablet(s) Oral at bedtime  tamsulosin 0.8 milliGRAM(s) Oral at bedtime    PRN MEDICATIONS  oxyCODONE    IR 15 milliGRAM(s) Oral every 6 hours PRN    VITALS:   T(F): 97.1  HR: 94  BP: 102/59  RR: 19  SpO2: --    LABS:                        12.4   9.41  )-----------( 146      ( 10 Feb 2024 06:56 )             35.4     02-09    129<L>  |  96<L>  |  35<H>  ----------------------------<  97  5.3<H>   |  27  |  1.2    Ca    8.5      09 Feb 2024 06:38  Mg     2.0     02-09    TPro  4.5<L>  /  Alb  3.1<L>  /  TBili  0.6  /  DBili  x   /  AST  16  /  ALT  12  /  AlkPhos  55  02-09    PT/INR - ( 10 Feb 2024 06:56 )   PT: 16.30 sec;   INR: 1.42 ratio         PTT - ( 10 Feb 2024 06:56 )  PTT:28.6 sec  Urinalysis Basic - ( 09 Feb 2024 06:38 )    Color: x / Appearance: x / SG: x / pH: x  Gluc: 97 mg/dL / Ketone: x  / Bili: x / Urobili: x   Blood: x / Protein: x / Nitrite: x   Leuk Esterase: x / RBC: x / WBC x   Sq Epi: x / Non Sq Epi: x / Bacteria: x                RADIOLOGY:    PHYSICAL EXAM:  GEN: No acute distress  PULM/CHEST: Clear to auscultation bilaterally, no rales, rhonchi or wheezes   CVS: Regular rate and rhythm, S1-S2, no murmurs  ABD: Soft, non-tender, non-distended, +BS  EXT: No edema  NEURO: AAOx3    David Catheter:   Indwelling Urethral Catheter:     Connect To:  Straight Drainage/Lee    Indication:  Urinary Retention / Obstruction (02-09-24 @ 23:28) (not performed) [Active]  Indwelling Urethral Catheter:     Connect To:  Straight Drainage/Lee    Indication:  Urinary Retention / Obstruction (02-08-24 @ 22:28) (not performed) [Active]  Indwelling Urethral Catheter:     Connect To:  Straight Drainage/Lee    Indication:  Urinary Retention / Obstruction (02-07-24 @ 20:23) (not performed) [Active]  Indwelling Urethral Catheter:     Connect To:  Straight Drainage/Lee    Indication:  Urinary Retention / Obstruction (02-06-24 @ 18:12) (not performed) [Active]  Indwelling Urethral Catheter:     Connect To:  Straight Drainage/Lee    Indication:  Urinary Retention / Obstruction (02-05-24 @ 14:04) (not performed) [Active]  Indwelling Urethral Catheter:     Connect To:  Straight Drainage/Lee    Indication:  Urinary Retention / Obstruction (02-04-24 @ 15:35) (not performed) [Active]  Indwelling Urethral Catheter:     Connect To:  Straight Drainage/Lee    Indication:  Urinary Retention / Obstruction (02-03-24 @ 14:43) (not performed) [Active]  Indwelling Urethral Catheter:     Connect To:  Straight Drainage/Lee    Indication:  Urinary Retention / Obstruction (02-02-24 @ 13:15) (not performed) [Active]  Indwelling Urethral Catheter:     Connect To:  Straight Drainage/Lee    Indication:  Urinary Retention / Obstruction (01-31-24 @ 14:28) (not performed) [Active]  Indwelling Urethral Catheter:     Connect To:  Straight Drainage/Lee    Indication:  Urinary Retention / Obstruction (01-31-24 @ 09:00) (not performed) [Active]

## 2024-02-10 NOTE — CHART NOTE - NSCHARTNOTEFT_GEN_A_CORE
I was called for hypotension  Patient had a BP of 85/60. Otherwise Vitals normal, no fever, patient Is awake, no major complaints,  Physical exam showed irregular S1 S2, GBAE, Soft abdomen, No LLE, no signs of infection on IV sites, no calves pain, cold extremities, no diarrhea, no dysuria.  Patient had a rapid earlier that day for hypotension and decreased LOC after defecating. Improved after Narcan,  labs at the time showed increased wbc, lactate of 4.  Patient most likely hypovolemic after starting diuretics. Will stop Lasix and give bolus of LR.  Improved BP after fluids I was called for hypotension  Patient had a BP of 85/60. Otherwise Vitals normal, no fever, patient Is awake, no major complaints,  Physical exam showed irregular S1 S2, GBAE, Soft abdomen, No LLE, no signs of infection on IV sites, no calves pain, cold extremities, no diarrhea, no dysuria.  Patient had a rapid earlier that day for hypotension and decreased LOC after defecating. Improved after Narcan,  labs at the time showed increased wbc, lactate of 4. CT head neg  Patient most likely hypovolemic after starting diuretics. Will stop Lasix and give bolus of LR. trend lactate, repeat cbc and bmp. blood cx and RVP sent  Improved BP after fluids

## 2024-02-10 NOTE — CHART NOTE - NSCHARTNOTEFT_GEN_A_CORE
Plan for Kyphoplasty this Wednesday 2/14 with Dr Nair.  Please hold Eliquis in anticipation of OR.  Please provide medical risk stratification prior to OR.  Pt will need preop labs including T&S on Tuesday 2/13 and NPO after MN order.  Above d/w attg.

## 2024-02-11 LAB
ALBUMIN SERPL ELPH-MCNC: 3.2 G/DL — LOW (ref 3.5–5.2)
ALP SERPL-CCNC: 56 U/L — SIGNIFICANT CHANGE UP (ref 30–115)
ALT FLD-CCNC: 14 U/L — SIGNIFICANT CHANGE UP (ref 0–41)
ANION GAP SERPL CALC-SCNC: 15 MMOL/L — HIGH (ref 7–14)
AST SERPL-CCNC: 20 U/L — SIGNIFICANT CHANGE UP (ref 0–41)
BASOPHILS # BLD AUTO: 0 K/UL — SIGNIFICANT CHANGE UP (ref 0–0.2)
BASOPHILS NFR BLD AUTO: 0 % — SIGNIFICANT CHANGE UP (ref 0–1)
BILIRUB SERPL-MCNC: 0.7 MG/DL — SIGNIFICANT CHANGE UP (ref 0.2–1.2)
BUN SERPL-MCNC: 45 MG/DL — HIGH (ref 10–20)
CALCIUM SERPL-MCNC: 8.9 MG/DL — SIGNIFICANT CHANGE UP (ref 8.4–10.5)
CHLORIDE SERPL-SCNC: 98 MMOL/L — SIGNIFICANT CHANGE UP (ref 98–110)
CO2 SERPL-SCNC: 25 MMOL/L — SIGNIFICANT CHANGE UP (ref 17–32)
CREAT SERPL-MCNC: 1.3 MG/DL — SIGNIFICANT CHANGE UP (ref 0.7–1.5)
EGFR: 39 ML/MIN/1.73M2 — LOW
EOSINOPHIL # BLD AUTO: 0.06 K/UL — SIGNIFICANT CHANGE UP (ref 0–0.7)
EOSINOPHIL NFR BLD AUTO: 0.6 % — SIGNIFICANT CHANGE UP (ref 0–8)
GLUCOSE SERPL-MCNC: 93 MG/DL — SIGNIFICANT CHANGE UP (ref 70–99)
HCT VFR BLD CALC: 35.5 % — LOW (ref 37–47)
HGB BLD-MCNC: 12.3 G/DL — SIGNIFICANT CHANGE UP (ref 12–16)
IMM GRANULOCYTES NFR BLD AUTO: 1.4 % — HIGH (ref 0.1–0.3)
INR BLD: 1.15 RATIO — SIGNIFICANT CHANGE UP (ref 0.65–1.3)
LACTATE SERPL-SCNC: 1.3 MMOL/L — SIGNIFICANT CHANGE UP (ref 0.7–2)
LYMPHOCYTES # BLD AUTO: 0.35 K/UL — LOW (ref 1.2–3.4)
LYMPHOCYTES # BLD AUTO: 3.3 % — LOW (ref 20.5–51.1)
MAGNESIUM SERPL-MCNC: 1.8 MG/DL — SIGNIFICANT CHANGE UP (ref 1.8–2.4)
MCHC RBC-ENTMCNC: 32.8 PG — HIGH (ref 27–31)
MCHC RBC-ENTMCNC: 34.6 G/DL — SIGNIFICANT CHANGE UP (ref 32–37)
MCV RBC AUTO: 94.7 FL — SIGNIFICANT CHANGE UP (ref 81–99)
MONOCYTES # BLD AUTO: 0.32 K/UL — SIGNIFICANT CHANGE UP (ref 0.1–0.6)
MONOCYTES NFR BLD AUTO: 3 % — SIGNIFICANT CHANGE UP (ref 1.7–9.3)
NEUTROPHILS # BLD AUTO: 9.8 K/UL — HIGH (ref 1.4–6.5)
NEUTROPHILS NFR BLD AUTO: 91.7 % — HIGH (ref 42.2–75.2)
NRBC # BLD: 0 /100 WBCS — SIGNIFICANT CHANGE UP (ref 0–0)
PLATELET # BLD AUTO: 135 K/UL — SIGNIFICANT CHANGE UP (ref 130–400)
PMV BLD: 9.8 FL — SIGNIFICANT CHANGE UP (ref 7.4–10.4)
POTASSIUM SERPL-MCNC: 4.8 MMOL/L — SIGNIFICANT CHANGE UP (ref 3.5–5)
POTASSIUM SERPL-SCNC: 4.8 MMOL/L — SIGNIFICANT CHANGE UP (ref 3.5–5)
PROT SERPL-MCNC: 4.7 G/DL — LOW (ref 6–8)
PROTHROM AB SERPL-ACNC: 13.1 SEC — HIGH (ref 9.95–12.87)
RAPID RVP RESULT: SIGNIFICANT CHANGE UP
RBC # BLD: 3.75 M/UL — LOW (ref 4.2–5.4)
RBC # FLD: 13.7 % — SIGNIFICANT CHANGE UP (ref 11.5–14.5)
SARS-COV-2 RNA SPEC QL NAA+PROBE: SIGNIFICANT CHANGE UP
SODIUM SERPL-SCNC: 138 MMOL/L — SIGNIFICANT CHANGE UP (ref 135–146)
TROPONIN T, HIGH SENSITIVITY RESULT: 53 NG/L — CRITICAL HIGH (ref 6–13)
WBC # BLD: 10.68 K/UL — SIGNIFICANT CHANGE UP (ref 4.8–10.8)
WBC # FLD AUTO: 10.68 K/UL — SIGNIFICANT CHANGE UP (ref 4.8–10.8)

## 2024-02-11 PROCEDURE — 99232 SBSQ HOSP IP/OBS MODERATE 35: CPT

## 2024-02-11 PROCEDURE — 95816 EEG AWAKE AND DROWSY: CPT | Mod: 26

## 2024-02-11 RX ORDER — FUROSEMIDE 40 MG
20 TABLET ORAL DAILY
Refills: 0 | Status: DISCONTINUED | OUTPATIENT
Start: 2024-02-12 | End: 2024-02-14

## 2024-02-11 RX ADMIN — LISINOPRIL 10 MILLIGRAM(S): 2.5 TABLET ORAL at 05:21

## 2024-02-11 RX ADMIN — Medication 1 SPRAY(S): at 17:13

## 2024-02-11 RX ADMIN — ROPINIROLE 0.25 MILLIGRAM(S): 8 TABLET, FILM COATED, EXTENDED RELEASE ORAL at 17:15

## 2024-02-11 RX ADMIN — OXYCODONE HYDROCHLORIDE 10 MILLIGRAM(S): 5 TABLET ORAL at 17:16

## 2024-02-11 RX ADMIN — GABAPENTIN 300 MILLIGRAM(S): 400 CAPSULE ORAL at 13:02

## 2024-02-11 RX ADMIN — Medication 1 TABLET(S): at 13:03

## 2024-02-11 RX ADMIN — PANTOPRAZOLE SODIUM 40 MILLIGRAM(S): 20 TABLET, DELAYED RELEASE ORAL at 05:20

## 2024-02-11 RX ADMIN — Medication 1 SPRAY(S): at 05:19

## 2024-02-11 RX ADMIN — GABAPENTIN 300 MILLIGRAM(S): 400 CAPSULE ORAL at 21:37

## 2024-02-11 RX ADMIN — Medication 25 MILLIGRAM(S): at 05:20

## 2024-02-11 RX ADMIN — GABAPENTIN 300 MILLIGRAM(S): 400 CAPSULE ORAL at 05:21

## 2024-02-11 RX ADMIN — Medication 975 MILLIGRAM(S): at 05:21

## 2024-02-11 RX ADMIN — POLYETHYLENE GLYCOL 3350 17 GRAM(S): 17 POWDER, FOR SOLUTION ORAL at 17:13

## 2024-02-11 RX ADMIN — TAMSULOSIN HYDROCHLORIDE 0.8 MILLIGRAM(S): 0.4 CAPSULE ORAL at 21:37

## 2024-02-11 RX ADMIN — POLYETHYLENE GLYCOL 3350 17 GRAM(S): 17 POWDER, FOR SOLUTION ORAL at 05:20

## 2024-02-11 RX ADMIN — Medication 1 TABLET(S): at 13:02

## 2024-02-11 RX ADMIN — Medication 975 MILLIGRAM(S): at 22:42

## 2024-02-11 RX ADMIN — Medication 975 MILLIGRAM(S): at 21:37

## 2024-02-11 RX ADMIN — LIDOCAINE 1 PATCH: 4 CREAM TOPICAL at 13:03

## 2024-02-11 RX ADMIN — Medication 975 MILLIGRAM(S): at 06:20

## 2024-02-11 RX ADMIN — CHLORHEXIDINE GLUCONATE 1 APPLICATION(S): 213 SOLUTION TOPICAL at 13:02

## 2024-02-11 RX ADMIN — ROPINIROLE 0.25 MILLIGRAM(S): 8 TABLET, FILM COATED, EXTENDED RELEASE ORAL at 05:20

## 2024-02-11 RX ADMIN — Medication 975 MILLIGRAM(S): at 13:02

## 2024-02-11 RX ADMIN — SENNA PLUS 2 TABLET(S): 8.6 TABLET ORAL at 21:37

## 2024-02-11 RX ADMIN — LIDOCAINE 1 PATCH: 4 CREAM TOPICAL at 19:48

## 2024-02-11 RX ADMIN — Medication 20 MILLIGRAM(S): at 05:19

## 2024-02-11 RX ADMIN — Medication 5000 UNIT(S): at 13:01

## 2024-02-11 RX ADMIN — Medication 4 MILLIGRAM(S): at 05:21

## 2024-02-11 NOTE — PROGRESS NOTE ADULT - ASSESSMENT
89F w/ h/o chronic afib (on Eliquis), GERD, and chronic low back pain presenting s/p fall. 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 Likely vasovagal. CT head negative  - TTE (May 2013) demonstrated LVEF 55-65% w/ normal global LVSF and no significant valvulopathy.  - TTE 1/28- EF 60-65% mod pulm HTN   - elevated BNP  - Tele monitored for 24 hrs with no dysrhythmia   - Currently euvolemic on exam, transition back to home oral lasix 20 daily    #Chronic Atrial Fibrillation  CHADS-VASC 3+ (Age x2, Female). Only on AC. Previously on Lopressor, but only takes intermittently as HR currently rate-controlled off meds.  - Eliquis held for surgery     #GERD  - c/w pantoprazole 40mg PO QD (therapeutic interchange for omeprazole)    #CKD 3b   per PCP baseline is 1.2    #Urinary retention-likely neurogenic   -hanson placed 1/29  -flomax increased to 0.8mg   - completed RT, will try TOV after walking with PT    #Constipation  -senna, miralax, lactulose    # HTN-- on metoprolol and started on lisinopril 10mg 2/4    PENDING: neurosx for kyphoplasty wednesday, holding eliquis starting today

## 2024-02-11 NOTE — PROGRESS NOTE ADULT - SUBJECTIVE AND OBJECTIVE BOX
Hospital Day:  18d    Subjective:    Patient is a 89y old  Female who presents with a chief complaint of Back Pain (10 Feb 2024 10:00)    This morning patient is lying in bed comfortably. Shereports no new complaints, including SOB, chest pain, abdominal pain, nausea, vomiting, dysuria, constipation, or diarrhea.  Nurse and daughter reports no overnight events.       Past Medical Hx:   Atrial fibrillation, chronic    Chronic atrial fibrillation    GERD (gastroesophageal reflux disease)    Chronic lower back pain      Past Sx:  No significant past surgical history      Allergies:  No Known Allergies    Current Meds:   Standng Meds:  acetaminophen     Tablet .. 975 milliGRAM(s) Oral every 8 hours  calcium carbonate   1250 mG (OsCal) 1 Tablet(s) Oral daily  chlorhexidine 2% Cloths 1 Application(s) Topical daily  cholecalciferol 5000 Unit(s) Oral daily  fluticasone propionate 50 MICROgram(s)/spray Nasal Spray 1 Spray(s) Both Nostrils two times a day  gabapentin 300 milliGRAM(s) Oral three times a day  lidocaine   4% Patch 1 Patch Transdermal every 24 hours  lisinopril 10 milliGRAM(s) Oral daily  metoprolol tartrate 25 milliGRAM(s) Oral two times a day  multivitamin 1 Tablet(s) Oral daily  oxyCODONE  ER Tablet 10 milliGRAM(s) Oral every 12 hours  pantoprazole    Tablet 40 milliGRAM(s) Oral before breakfast  polyethylene glycol 3350 17 Gram(s) Oral two times a day  rOPINIRole 0.25 milliGRAM(s) Oral two times a day  senna 2 Tablet(s) Oral at bedtime  tamsulosin 0.8 milliGRAM(s) Oral at bedtime    PRN Meds:  methocarbamol 750 milliGRAM(s) Oral every 8 hours PRN Muscle Spasm    HOME MEDICATIONS:  apixaban 2.5 mg oral tablet: 1 tab(s) orally 2 times a day  furosemide 20 mg oral tablet: 1 tab(s) orally once a day  gabapentin 100 mg oral capsule: 2 cap(s) orally 3 times a day  omeprazole 20 mg oral delayed release capsule: 1 cap(s) orally once a day  senna (sennosides) 8.6 mg oral tablet: 2 tab(s) orally once a day (at bedtime)  Vitamin C 500 mg oral tablet: 1 tab(s) orally once a day      Vital Signs:   T(F): 95.8 (02-11-24 @ 08:11), Max: 97 (02-10-24 @ 14:41)  HR: 80 (02-11-24 @ 08:11) (66 - 91)  BP: 109/72 (02-11-24 @ 08:11) (88/50 - 130/65)  RR: 18 (02-11-24 @ 08:11) (18 - 18)  SpO2: 95% (02-10-24 @ 21:34) (95% - 96%)      02-10-24 @ 07:01  -  02-11-24 @ 07:00  --------------------------------------------------------  IN: 0 mL / OUT: 1100 mL / NET: -1100 mL        Physical Exam:   GENERAL: NAD  HEENT: NCAT  CHEST/LUNG: CTAB  HEART: Regular rate and rhythm; s1 s2 appreciated, No murmurs, rubs, or gallops  ABDOMEN: Soft, Nontender, Nondistended; Bowel sounds present  EXTREMITIES: No LE edema b/l  SKIN: no rashes, no new lesions  NERVOUS SYSTEM:  Alert         Labs:                         12.3   10.68 )-----------( 135      ( 11 Feb 2024 04:30 )             35.5     Neutophil% 91.7, Lymphocyte% 3.3, Monocyte% 3.0, Bands% 1.4 02-11-24 @ 04:30    11 Feb 2024 04:30    138    |  98     |  45     ----------------------------<  93     4.8     |  25     |  1.3      Ca    8.9        11 Feb 2024 04:30  Mg     1.8       11 Feb 2024 04:30    TPro  4.7    /  Alb  3.2    /  TBili  0.7    /  DBili  x      /  AST  20     /  ALT  14     /  AlkPhos  56     11 Feb 2024 04:30       pTT    --             ----< 1.15 INR  (02-11-24 @ 04:30)    13.10        PT                Urinalysis Basic - ( 11 Feb 2024 04:30 )    Color: x / Appearance: x / SG: x / pH: x  Gluc: 93 mg/dL / Ketone: x  / Bili: x / Urobili: x   Blood: x / Protein: x / Nitrite: x   Leuk Esterase: x / RBC: x / WBC x   Sq Epi: x / Non Sq Epi: x / Bacteria: x                Assessment and Plan:

## 2024-02-12 LAB
ALBUMIN SERPL ELPH-MCNC: 3.1 G/DL — LOW (ref 3.5–5.2)
ALP SERPL-CCNC: 55 U/L — SIGNIFICANT CHANGE UP (ref 30–115)
ALT FLD-CCNC: 12 U/L — SIGNIFICANT CHANGE UP (ref 0–41)
ANION GAP SERPL CALC-SCNC: 11 MMOL/L — SIGNIFICANT CHANGE UP (ref 7–14)
AST SERPL-CCNC: 18 U/L — SIGNIFICANT CHANGE UP (ref 0–41)
BASOPHILS # BLD AUTO: 0.01 K/UL — SIGNIFICANT CHANGE UP (ref 0–0.2)
BASOPHILS NFR BLD AUTO: 0.1 % — SIGNIFICANT CHANGE UP (ref 0–1)
BILIRUB SERPL-MCNC: 0.7 MG/DL — SIGNIFICANT CHANGE UP (ref 0.2–1.2)
BUN SERPL-MCNC: 43 MG/DL — HIGH (ref 10–20)
CALCIUM SERPL-MCNC: 8.8 MG/DL — SIGNIFICANT CHANGE UP (ref 8.4–10.4)
CHLORIDE SERPL-SCNC: 99 MMOL/L — SIGNIFICANT CHANGE UP (ref 98–110)
CO2 SERPL-SCNC: 27 MMOL/L — SIGNIFICANT CHANGE UP (ref 17–32)
CREAT SERPL-MCNC: 1.3 MG/DL — SIGNIFICANT CHANGE UP (ref 0.7–1.5)
EGFR: 39 ML/MIN/1.73M2 — LOW
EOSINOPHIL # BLD AUTO: 0.09 K/UL — SIGNIFICANT CHANGE UP (ref 0–0.7)
EOSINOPHIL NFR BLD AUTO: 1 % — SIGNIFICANT CHANGE UP (ref 0–8)
GLUCOSE SERPL-MCNC: 86 MG/DL — SIGNIFICANT CHANGE UP (ref 70–99)
HCT VFR BLD CALC: 34.9 % — LOW (ref 37–47)
HGB BLD-MCNC: 12 G/DL — SIGNIFICANT CHANGE UP (ref 12–16)
IMM GRANULOCYTES NFR BLD AUTO: 0.8 % — HIGH (ref 0.1–0.3)
INR BLD: 1.06 RATIO — SIGNIFICANT CHANGE UP (ref 0.65–1.3)
LYMPHOCYTES # BLD AUTO: 0.56 K/UL — LOW (ref 1.2–3.4)
LYMPHOCYTES # BLD AUTO: 6.3 % — LOW (ref 20.5–51.1)
MAGNESIUM SERPL-MCNC: 1.7 MG/DL — LOW (ref 1.8–2.4)
MCHC RBC-ENTMCNC: 33 PG — HIGH (ref 27–31)
MCHC RBC-ENTMCNC: 34.4 G/DL — SIGNIFICANT CHANGE UP (ref 32–37)
MCV RBC AUTO: 95.9 FL — SIGNIFICANT CHANGE UP (ref 81–99)
MONOCYTES # BLD AUTO: 0.43 K/UL — SIGNIFICANT CHANGE UP (ref 0.1–0.6)
MONOCYTES NFR BLD AUTO: 4.8 % — SIGNIFICANT CHANGE UP (ref 1.7–9.3)
NEUTROPHILS # BLD AUTO: 7.71 K/UL — HIGH (ref 1.4–6.5)
NEUTROPHILS NFR BLD AUTO: 87 % — HIGH (ref 42.2–75.2)
NRBC # BLD: 0 /100 WBCS — SIGNIFICANT CHANGE UP (ref 0–0)
PLATELET # BLD AUTO: 112 K/UL — LOW (ref 130–400)
PMV BLD: 9.8 FL — SIGNIFICANT CHANGE UP (ref 7.4–10.4)
POTASSIUM SERPL-MCNC: 5 MMOL/L — SIGNIFICANT CHANGE UP (ref 3.5–5)
POTASSIUM SERPL-SCNC: 5 MMOL/L — SIGNIFICANT CHANGE UP (ref 3.5–5)
PROT SERPL-MCNC: 4.7 G/DL — LOW (ref 6–8)
PROTHROM AB SERPL-ACNC: 12.1 SEC — SIGNIFICANT CHANGE UP (ref 9.95–12.87)
RBC # BLD: 3.64 M/UL — LOW (ref 4.2–5.4)
RBC # FLD: 13.8 % — SIGNIFICANT CHANGE UP (ref 11.5–14.5)
SODIUM SERPL-SCNC: 137 MMOL/L — SIGNIFICANT CHANGE UP (ref 135–146)
WBC # BLD: 8.87 K/UL — SIGNIFICANT CHANGE UP (ref 4.8–10.8)
WBC # FLD AUTO: 8.87 K/UL — SIGNIFICANT CHANGE UP (ref 4.8–10.8)

## 2024-02-12 PROCEDURE — 99232 SBSQ HOSP IP/OBS MODERATE 35: CPT

## 2024-02-12 RX ORDER — OXYCODONE HYDROCHLORIDE 5 MG/1
5 TABLET ORAL EVERY 6 HOURS
Refills: 0 | Status: DISCONTINUED | OUTPATIENT
Start: 2024-02-12 | End: 2024-02-14

## 2024-02-12 RX ADMIN — Medication 1 TABLET(S): at 11:29

## 2024-02-12 RX ADMIN — Medication 975 MILLIGRAM(S): at 16:14

## 2024-02-12 RX ADMIN — ROPINIROLE 0.25 MILLIGRAM(S): 8 TABLET, FILM COATED, EXTENDED RELEASE ORAL at 17:12

## 2024-02-12 RX ADMIN — Medication 975 MILLIGRAM(S): at 21:35

## 2024-02-12 RX ADMIN — Medication 5000 UNIT(S): at 11:29

## 2024-02-12 RX ADMIN — POLYETHYLENE GLYCOL 3350 17 GRAM(S): 17 POWDER, FOR SOLUTION ORAL at 05:25

## 2024-02-12 RX ADMIN — OXYCODONE HYDROCHLORIDE 5 MILLIGRAM(S): 5 TABLET ORAL at 11:31

## 2024-02-12 RX ADMIN — ROPINIROLE 0.25 MILLIGRAM(S): 8 TABLET, FILM COATED, EXTENDED RELEASE ORAL at 05:25

## 2024-02-12 RX ADMIN — Medication 975 MILLIGRAM(S): at 06:20

## 2024-02-12 RX ADMIN — LISINOPRIL 10 MILLIGRAM(S): 2.5 TABLET ORAL at 05:25

## 2024-02-12 RX ADMIN — GABAPENTIN 300 MILLIGRAM(S): 400 CAPSULE ORAL at 05:26

## 2024-02-12 RX ADMIN — Medication 20 MILLIGRAM(S): at 05:26

## 2024-02-12 RX ADMIN — Medication 975 MILLIGRAM(S): at 05:26

## 2024-02-12 RX ADMIN — OXYCODONE HYDROCHLORIDE 10 MILLIGRAM(S): 5 TABLET ORAL at 17:08

## 2024-02-12 RX ADMIN — Medication 1 TABLET(S): at 11:30

## 2024-02-12 RX ADMIN — TAMSULOSIN HYDROCHLORIDE 0.8 MILLIGRAM(S): 0.4 CAPSULE ORAL at 21:05

## 2024-02-12 RX ADMIN — OXYCODONE HYDROCHLORIDE 5 MILLIGRAM(S): 5 TABLET ORAL at 21:40

## 2024-02-12 RX ADMIN — SENNA PLUS 2 TABLET(S): 8.6 TABLET ORAL at 21:07

## 2024-02-12 RX ADMIN — Medication 975 MILLIGRAM(S): at 21:05

## 2024-02-12 RX ADMIN — OXYCODONE HYDROCHLORIDE 5 MILLIGRAM(S): 5 TABLET ORAL at 21:10

## 2024-02-12 RX ADMIN — OXYCODONE HYDROCHLORIDE 10 MILLIGRAM(S): 5 TABLET ORAL at 05:34

## 2024-02-12 RX ADMIN — POLYETHYLENE GLYCOL 3350 17 GRAM(S): 17 POWDER, FOR SOLUTION ORAL at 17:12

## 2024-02-12 RX ADMIN — GABAPENTIN 300 MILLIGRAM(S): 400 CAPSULE ORAL at 16:15

## 2024-02-12 RX ADMIN — Medication 1 SPRAY(S): at 05:32

## 2024-02-12 RX ADMIN — LIDOCAINE 1 PATCH: 4 CREAM TOPICAL at 01:30

## 2024-02-12 RX ADMIN — OXYCODONE HYDROCHLORIDE 10 MILLIGRAM(S): 5 TABLET ORAL at 06:20

## 2024-02-12 RX ADMIN — GABAPENTIN 300 MILLIGRAM(S): 400 CAPSULE ORAL at 21:06

## 2024-02-12 RX ADMIN — PANTOPRAZOLE SODIUM 40 MILLIGRAM(S): 20 TABLET, DELAYED RELEASE ORAL at 05:26

## 2024-02-12 RX ADMIN — CHLORHEXIDINE GLUCONATE 1 APPLICATION(S): 213 SOLUTION TOPICAL at 11:29

## 2024-02-12 RX ADMIN — Medication 25 MILLIGRAM(S): at 05:26

## 2024-02-12 NOTE — DIETITIAN INITIAL EVALUATION ADULT - SIGNS/SYMPTOMS
<75% estimated energy requirement for >7 days, moderate muscle wasting (temples) Pt meeting <75% estimated energy needs in past 7 days, moderate muscle mass loss (temples)

## 2024-02-12 NOTE — PROGRESS NOTE ADULT - SUBJECTIVE AND OBJECTIVE BOX
HPI:  89F w/ h/o chronic afib (on Eliquis), GERD, and chronic low back pain presenting s/p fall. Patient known to have chronic low back pain that has progressively worsened over the years. Pain acutely worsened two weeks ago and patient subsequently fell due to inability to support own weight. Evaluated by PCP after initial fall due to worsening pain. Prescribed Percocet 5-325 bid w/ mild relief. However, pt once again fell earlier today after pt unable to support her own weight while trying to ambulate. Now unable to ambulate or bear weight, even w/ assistance. No reported HT, LOC, lightheadedness, confusion, loss of continence, or focal weakness a/w either fall. No reported fevers, chills, CP, palpitations, SOB, abdominal pain, n/v/d, or dysuria (although daughter did note malodorous urine recently).    Interval history  -Patient seen at bedside with alyssa Garcia  -Patient notes leg pain with movement, but only mild pain at rest  2/7: Patient now DNR/DNI, added 10mg oxycontin Q12H for consistent pain  2/12: added PRN oxycodone 5mg Q6H for pain     ADVANCE DIRECTIVES:     [ ] Full Code [x ] DNR  MOLST  [ ]  Living Will  [ ]   DECISION MAKER(s):  [ ] Health Care Proxy(s)  [x ] Surrogate(s)  [ ] Guardian           Name(s): Phone Number(s):  Daughter      BASELINE (I)ADL(s) (prior to admission):    [place]: [ ]Total  [ ] Moderate [ ]Dependent  Palliative Performance Status Version 2:         %    http://npcrc.org/files/news/palliative_performance_scale_[email or web address].pdf    Allergies    No Known Allergies    Intolerances    MEDICATIONS  (STANDING):  acetaminophen     Tablet .. 975 milliGRAM(s) Oral every 8 hours  calcium carbonate   1250 mG (OsCal) 1 Tablet(s) Oral daily  chlorhexidine 2% Cloths 1 Application(s) Topical daily  cholecalciferol 5000 Unit(s) Oral daily  fluticasone propionate 50 MICROgram(s)/spray Nasal Spray 1 Spray(s) Both Nostrils two times a day  furosemide    Tablet 20 milliGRAM(s) Oral daily  gabapentin 300 milliGRAM(s) Oral three times a day  lidocaine   4% Patch 1 Patch Transdermal every 24 hours  lisinopril 10 milliGRAM(s) Oral daily  metoprolol tartrate 25 milliGRAM(s) Oral two times a day  multivitamin 1 Tablet(s) Oral daily  oxyCODONE  ER Tablet 10 milliGRAM(s) Oral every 12 hours  pantoprazole    Tablet 40 milliGRAM(s) Oral before breakfast  polyethylene glycol 3350 17 Gram(s) Oral two times a day  rOPINIRole 0.25 milliGRAM(s) Oral two times a day  senna 2 Tablet(s) Oral at bedtime  tamsulosin 0.8 milliGRAM(s) Oral at bedtime    MEDICATIONS  (PRN):  methocarbamol 750 milliGRAM(s) Oral every 8 hours PRN Muscle Spasm  oxyCODONE    IR 5 milliGRAM(s) Oral every 6 hours PRN pain (4-10)              PRESENT SYMPTOMS: [ ]Unable to obtain due to poor mentation   Source if other than patient:  [ ]Family   [ ]Team     Pain: [x ]yes [ ]no  QOL impact -  moderate  Location - B/L hips  Aggravating factors - movement  Quality -  dull  Radiation - N/A  Timing-  constant  Severity (0-10 scale): 5/10  Minimal acceptable level (0-10 scale): 3/10    CPOT:    https://www.[facility].org/getattachment/[email or web address]/Critical-Care-Pain-Observation-Tool-(CPOT)    PAIN AD Score:   http://geriatrictoolkit.[place].[facility]/cog/painad.pdf (press ctrl +  left click to view)    Dyspnea:                           [x ]None[ ]Mild [ ]Moderate [ ]Severe     Respiratory Distress Observation Scale (RDOS):   A score of 0 to 2 signifies little or no respiratory distress, 3 signifies mild distress, scores 4 to 6 indicate moderate distress, and scores greater than 7 signify severe distress  https://www.[email or web address]/sites/default/files/PDFS/271860-ukvbofkoxjq-omqlmddc-yrmroarekyp-urbkz.pdf    Anxiety:                             [ x]None[ ]Mild [ ]Moderate [ ]Severe   Fatigue:                             [x ]None[ ]Mild [ ]Moderate [ ]Severe   Nausea:                             [ x]None[ ]Mild [ ]Moderate [ ]Severe   Loss of appetite:              [x ]None[ ]Mild [ ]Moderate [ ]Severe   Constipation:                    [x ]None[ ]Mild [ ]Moderate [ ]Severe    Other Symptoms:  [ x]All other review of systems negative     Palliative Performance Status Version 2:         30%    http://[facility].org/files/news/palliative_performance_scale_[email or web address].pdf    PHYSICAL EXAM:    Vital Signs Last 24 Hrs  T(C): 36.1 (12 Feb 2024 07:53), Max: 36.1 (12 Feb 2024 07:53)  T(F): 97 (12 Feb 2024 07:53), Max: 97 (12 Feb 2024 07:53)  HR: 69 (12 Feb 2024 07:53) (69 - 90)  BP: 131/66 (12 Feb 2024 07:53) (110/78 - 131/66)  BP(mean): --  RR: 18 (12 Feb 2024 07:53) (16 - 18)  SpO2: 98% (12 Feb 2024 00:42) (98% - 98%)    Parameters below as of 11 Feb 2024 16:21  Patient On (Oxygen Delivery Method): room air          GENERAL:  [ ] No acute distress [ ]Lethargic  [ ]Unarousable  [ x]Verbal  [ ]Non-Verbal [ ]Cachexia    BEHAVIORAL/PSYCH:  [x ]Alert and Oriented x2  [ ] Anxiety [ ] Delirium [ ] Agitation [ x] Calm   EYES: [ x] No scleral icterus [ ] Scleral icterus [ ] Closed  ENMT:  [ ]Dry mouth  [x ]No external oral lesions [ ] No external ear or nose lesions  CARDIOVASCULAR:  [ ]Regular [ ]Irregular [ ]Tachy [x ]Not Tachy  [ ]Sung [ ] Edema [ ] No edema  PULMONARY:  [ ]Tachypnea  [ ]Audible excessive secretions [ x] No labored breathing [ ] labored breathing  GASTROINTESTINAL: [ ]Soft  [ ]Distended  [x ]Not distended [ ]Non tender [ ]Tender  MUSCULOSKELETAL: [ ]No clubbing [ ] clubbing  [x ] No cyanosis [ ] cyanosis  NEUROLOGIC: [ ]No focal deficits  [x ]Follows commands  [ ]Does not follow commands  [ x]Cognitive impairment  [ ]Dysphagia  [ ]Dysarthria  [ ]Paresis   SKIN: [ ] Jaundiced [ x] Non-jaundiced [ ]Rash [ ]No Rash [ ] Warm [ ] Dry  MISC/LINES: [ ] ET tube [ ] Trach [ ]NGT/OGT [ ]PEG [ ]David    LABS: reviewed by me                                     12.0   8.87  )-----------( 112      ( 12 Feb 2024 08:00 )             34.9   02-12    137  |  99  |  43<H>  ----------------------------<  86  5.0   |  27  |  1.3    Ca    8.8      12 Feb 2024 08:00  Mg     1.7     02-12    TPro  4.7<L>  /  Alb  3.1<L>  /  TBili  0.7  /  DBili  x   /  AST  18  /  ALT  12  /  AlkPhos  55  02-12  PT/INR - ( 12 Feb 2024 08:00 )   PT: 12.10 sec;   INR: 1.06 ratio             Urinalysis Basic - ( 12 Feb 2024 08:00 )    Color: x / Appearance: x / SG: x / pH: x  Gluc: 86 mg/dL / Ketone: x  / Bili: x / Urobili: x   Blood: x / Protein: x / Nitrite: x   Leuk Esterase: x / RBC: x / WBC x   Sq Epi: x / Non Sq Epi: x / Bacteria: x              RADIOLOGY & ADDITIONAL STUDIES: reviewed by me    < from: NM SPECT Bone Scan, Single Area Single Day (02.01.24 @ 16:43) >  IMPRESSION:    Abnormal uptake in the L1 vertebral body, corresponding to known   compression fracture deformity secondary to vertebral body mass.    No other evidence for metastatic bone disease.    < end of copied text >        EKG: reviewed by me    < from: 12 Lead ECG (01.25.24 @ 11:34) >  Ventricular Rate 94 BPM    Atrial Rate 81 BPM    QRS Duration 90 ms    Q-T Interval 384 ms    QTC Calculation(Bazett) 480 ms    R Axis 166 degrees    T Axis -48 degrees    Diagnosis Line Atrial fibrillation  Right ventricular hypertrophy  Anterior infarct , age undetermined  ST & T wave abnormality, consider inferior ischemia  Abnormal ECG    < end of copied text >        PROTEIN CALORIE MALNUTRITION PRESENT: [ ]mild [ ]moderate [ ]severe [ ]underweight [ ]morbid obesity  https://www.andeal.org/vault/1642/web/files/ONC/Table_Clinical%20Characteristics%20to%20Document%20Malnutrition-White%20JV%20et%20al%202012.pdf    Height (cm): 152.4 (01-30-24 @ 11:24)  Weight (kg): 70.3 (01-30-24 @ 11:24)  BMI (kg/m2): 30.3 (01-30-24 @ 11:24)  [ ]PPSV2 < or = to 30% [ ]significant weight loss  [ ]poor nutritional intake  [ ]anasarca      [ ]Artificial Nutrition      Palliative Care Spiritual/Emotional Screening Tool Question  Severity (0-4):                    OR                    [ x] Unable to determine. Will assess at later time if appropriate.  Score of 2 or greater indicates recommendation of Chaplaincy and/or SW referral  Chaplaincy Referral: [ ] Yes [ ] Refused [ ] Following     Caregiver [place]:  [ ] Yes [ ] No    OR    [x ] Unable to determine. Will assess at later time if appropriate.  Social Work Referral [ ]  Patient and Family Centered Care Referral [ ]    Anticipatory Grief Present: [ ] Yes [ ] No    OR     [ x] Unable to determine. Will assess at later time if appropriate.  Social Work Referral [ ]  Patient and Family Centered Care Referral [ ]    Patient discussed with primary medical team MD  Palliative care education provided to patient and/or family

## 2024-02-12 NOTE — DIETITIAN INITIAL EVALUATION ADULT - PERTINENT LABORATORY DATA
02-12    137  |  99  |  43<H>  ----------------------------<  86  5.0   |  27  |  1.3    Ca    8.8      12 Feb 2024 08:00  Mg     1.7     02-12    TPro  4.7<L>  /  Alb  3.1<L>  /  TBili  0.7  /  DBili  x   /  AST  18  /  ALT  12  /  AlkPhos  55  02-12  A1C with Estimated Average Glucose Result: 5.8 % (01-25-24 @ 04:30)

## 2024-02-12 NOTE — DIETITIAN INITIAL EVALUATION ADULT - PERTINENT MEDS FT
MEDICATIONS  (STANDING):  calcium carbonate   1250 mG (OsCal) 1 Tablet(s) Oral daily  cholecalciferol 5000 Unit(s) Oral daily  furosemide    Tablet 20 milliGRAM(s) Oral daily  lisinopril 10 milliGRAM(s) Oral daily  metoprolol tartrate 25 milliGRAM(s) Oral two times a day  multivitamin 1 Tablet(s) Oral daily  pantoprazole    Tablet 40 milliGRAM(s) Oral before breakfast  polyethylene glycol 3350 17 Gram(s) Oral two times a day  senna 2 Tablet(s) Oral at bedtime

## 2024-02-12 NOTE — PROGRESS NOTE ADULT - ASSESSMENT
89F w/ h/o chronic afib (on Eliquis), GERD, and chronic low back pain presenting s/p fall. 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 Likely vasovagal. CT head negative    #CHF, diastolic - acute -- now resolved  - TTE (May 2013) demonstrated LVEF 55-65% w/ normal global LVSF and no significant valvulopathy.  - TTE 1/28- EF 60-65% mod pulm HTN   - elevated BNP  - Tele monitored for 24 hrs with no dysrhythmia   - Currently euvolemic on exam, transition back to home oral lasix 20 daily    #Chronic Atrial Fibrillation  CHADS-VASC 3+ (Age x2, Female). Only on AC. Previously on Lopressor, but only takes intermittently as HR currently rate-controlled off meds.  - Eliquis held for surgery     #GERD  - c/w pantoprazole 40mg PO QD (therapeutic interchange for omeprazole)    #CKD 3b   per PCP baseline is 1.2    #Urinary retention-likely neurogenic   -hanson placed 1/29  -flomax increased to 0.8mg   - completed RT, will try TOV after walking with PT    #Constipation  -senna, miralax, lactulose    # HTN-- on metoprolol and started on lisinopril 10mg 2/4    PENDING: neurosx for kyphoplasty wednesday, holding eliquis, f/u heme onc post surgery regarding starting immunotherapy         Total time spent to complete patient's bedside assessment, review medical chart, discuss medical plan of care with covering medical team was more than 35 minutes  with >50% of time spent face to face with patient, discussion with patient/family and/or coordination of care. My note supersedes them medical resident note in case of discrepancy.      Azra Oneil,

## 2024-02-12 NOTE — DIETITIAN INITIAL EVALUATION ADULT - ENERGY INTAKE
Poor (<50%) RD observed lunch untouched at bedside. Meals recorded in flowsheets 25-50% recently, also noted pt slept through a meal. Earlier in admission meals 50-75%.

## 2024-02-12 NOTE — DIETITIAN INITIAL EVALUATION ADULT - NS FNS WEIGHT CHANGE REASON
UBW unknown.   Pt admitted 1/24 103.9 kg - edema  1/30-70.3 kg  2/12-70.3 kg  Pt no longer with fluid retention, weight appears stable

## 2024-02-12 NOTE — DIETITIAN INITIAL EVALUATION ADULT - NS FNS DIET ORDER
Diet, NPO:   NPO for Procedure/Test     NPO Start Date: 12-Feb-2024,   NPO Start Time: 00:00  Except Medications (02-09-24 @ 17:01) [Available for Activation]  Diet, DASH/TLC:   Sodium & Cholesterol Restricted  Minced and Moist (MINCEDMOIST)  1500mL Fluid Restriction ([id number]) (01-24-24 @ 15:03) [Active]

## 2024-02-12 NOTE — EEG REPORT - NS EEG TEXT BOX
[facility] Department of Neurology         Inpatient Routine-Electroencephalography Report    Patient Name:	SUNNY COLBERT    :	3/17/1933  MRN:	-    Study Start Date/Time:	2024, 10:40:37 AM    End Time (if applicable):      Brief Clinical History:  SUNNY COLBERT is a 89 year old Female; study performed to investigate for seizures or markers of epilepsy.   Technologist notes: AMS, DIFFICULTY IN WALKING, GERD, UROTHELIAL CARCINOMA, PALLIATIVE CARE, AFIB, AHF, COMPRESSION FRACTURE OF SPINE    Diagnosis Code:  R56.9 convulsions/seizure  CPT:   99645 (awake/drowsy)     Pertinent Medication:  n/a    Acquisition Details:  Electroencephalography was acquired using a minimum of 21 channels on an [employer] system v 9.3.1 with electrode placement according to the standard International 10-20 system following ACNS (American Clinical Neurophysiology Society) guidelines.  Anterior temporal T1 and T2 electrodes were utilized whenever possible.  The XLTEK automated spike & seizure detections were all reviewed in detail, in addition to the entire raw EEG.    Findings:  Background:  continuous, with predominantly theta frequencies.  Generalized Slowing:  mild, diffuse   Symmetry/Focality: No persistent asymmetries of voltage or frequency.     Voltage:  Normal (20+ uV)  Organization:  Appropriate anterior-posterior gradient  Posterior Dominant Rhythm:  7 Hz  symmetric, and well-modulated  Sleep:  Absent.  Variability:   Yes		Reactivity:  Yes    Spontaneous Activity:  No epileptiform discharges   Events:  1)	No electrographic seizures or significant clinical events occurred during this study.  Provocations:  "	Hyperventilation: was not performed.  "	Photic stimulation: was not performed.  FINAL Impression:  Abnormalawake and/or drowsy routine EEG  1)	 There was mild excess intermittent slow wave activity    Final Clinical Correlation:  1)	There were indicators of Diffuse or multifocal cerebral dysfunction.          Shayna Song MD   Attending Neurologist, [facility]

## 2024-02-12 NOTE — DIETITIAN INITIAL EVALUATION ADULT - OTHER CALCULATIONS
Needs calculated considering CKD stage 3b, age, cancer, BMI  Kcal: 28-32 kcal/kg = 6558-7388 kcal/d  Protein: 0.9-1.1 g/kg = 63-77 g/d  Fluid 1 mL/kcal = 1559-2850 mL/d

## 2024-02-12 NOTE — PROGRESS NOTE ADULT - ASSESSMENT
89yFemale with history of afib on eliquis, GERD, chronic low back pain presents after fall. Patient found to have lumbar pathological fracture on admission. Palliative care consulted for GOC and symptom management.    Patient seen at bedside. Patient was having some moderate pain, had rapid response over the weekend where primary team discontinued PRN oxycodone 15mg and narcan was administered. Patient has been doing better with her blood pressure and discussed with primary team today to restart oxycodone at a lower dose since she was still experiencing pain. Initiated oxycodone 5mg PRN Q6H. Planned for kyphoplasty on Wednesday.    Education about palliative care provided to patient/family.  See Recs below.    Please call [phone] with questions or concerns 24/7.   We will continue to follow.

## 2024-02-12 NOTE — PROGRESS NOTE ADULT - ASSESSMENT
89F w/ h/o chronic afib (on Eliquis), GERD, and chronic low back pain presenting s/p fall. 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 Likely vasovagal. CT head negative  - TTE (May 2013) demonstrated LVEF 55-65% w/ normal global LVSF and no significant valvulopathy.  - TTE 1/28- EF 60-65% mod pulm HTN   - elevated BNP  - Tele monitored for 24 hrs with no dysrhythmia   - Currently euvolemic on exam, transition back to home oral lasix 20 daily    #Chronic Atrial Fibrillation  CHADS-VASC 3+ (Age x2, Female). Only on AC. Previously on Lopressor, but only takes intermittently as HR currently rate-controlled off meds.  - Eliquis held for surgery     #GERD  - c/w pantoprazole 40mg PO QD (therapeutic interchange for omeprazole)    #CKD 3b   per PCP baseline is 1.2    #Urinary retention-likely neurogenic   -hanson placed 1/29  -flomax increased to 0.8mg   - completed RT, will try TOV after walking with PT    #Constipation  -senna, miralax, lactulose    # HTN-- on metoprolol and started on lisinopril 10mg 2/4    PENDING: neurosx for kyphoplasty wednesday, holding eliquis

## 2024-02-12 NOTE — PROGRESS NOTE ADULT - SUBJECTIVE AND OBJECTIVE BOX
SUNNY COLBERT  89y, Female  Allergy: No Known Allergies    Hospital Day: 19d    Patient seen and examined earlier today. Endorsing pain to palliative today, will adjust pain regimen.     PMH/PSH:  PAST MEDICAL & SURGICAL HISTORY:  Chronic atrial fibrillation      GERD (gastroesophageal reflux disease)      Chronic lower back pain      No significant past surgical history          LAST 24-Hr EVENTS:    VITALS:  T(F): 97 (02-12-24 @ 07:53), Max: 97 (02-12-24 @ 07:53)  HR: 73 (02-12-24 @ 17:10)  BP: 96/62 (02-12-24 @ 17:10) (96/62 - 131/66)  RR: 18 (02-12-24 @ 17:10)  SpO2: 98% (02-12-24 @ 17:10)        TESTS & MEASUREMENTS:  Weight (Kg): 70.3 (02-12-24 @ 04:39)  BMI (kg/m2): 30.3 (02-12)    02-10-24 @ 07:01  -  02-11-24 @ 07:00  --------------------------------------------------------  IN: 0 mL / OUT: 1100 mL / NET: -1100 mL    02-11-24 @ 07:01  -  02-12-24 @ 07:00  --------------------------------------------------------  IN: 560 mL / OUT: 1700 mL / NET: -1140 mL                            12.0   8.87  )-----------( 112      ( 12 Feb 2024 08:00 )             34.9     PT/INR - ( 12 Feb 2024 08:00 )   PT: 12.10 sec;   INR: 1.06 ratio           02-12    137  |  99  |  43<H>  ----------------------------<  86  5.0   |  27  |  1.3    Ca    8.8      12 Feb 2024 08:00  Mg     1.7     02-12    TPro  4.7<L>  /  Alb  3.1<L>  /  TBili  0.7  /  DBili  x   /  AST  18  /  ALT  12  /  AlkPhos  55  02-12    LIVER FUNCTIONS - ( 12 Feb 2024 08:00 )  Alb: 3.1 g/dL / Pro: 4.7 g/dL / ALK PHOS: 55 U/L / ALT: 12 U/L / AST: 18 U/L / GGT: x                 Culture - Blood (collected 02-10-24 @ 18:30)  Source: .Blood Blood-Peripheral  Preliminary Report (02-12-24 @ 02:02):    No growth at 24 hours      Urinalysis Basic - ( 12 Feb 2024 08:00 )    Color: x / Appearance: x / SG: x / pH: x  Gluc: 86 mg/dL / Ketone: x  / Bili: x / Urobili: x   Blood: x / Protein: x / Nitrite: x   Leuk Esterase: x / RBC: x / WBC x   Sq Epi: x / Non Sq Epi: x / Bacteria: x                  RADIOLOGY, ECG, & ADDITIONAL TESTS:      RECENT DIAGNOSTIC ORDERS:  Magnesium: AM Sched. Collection: 13-Feb-2024 04:30 (02-12-24 @ 11:07)  Basic Metabolic Panel: AM Sched. Collection: 13-Feb-2024 04:30 (02-12-24 @ 11:07)      MEDICATIONS:  MEDICATIONS  (STANDING):  acetaminophen     Tablet .. 975 milliGRAM(s) Oral every 8 hours  calcium carbonate   1250 mG (OsCal) 1 Tablet(s) Oral daily  chlorhexidine 2% Cloths 1 Application(s) Topical daily  cholecalciferol 5000 Unit(s) Oral daily  fluticasone propionate 50 MICROgram(s)/spray Nasal Spray 1 Spray(s) Both Nostrils two times a day  furosemide    Tablet 20 milliGRAM(s) Oral daily  gabapentin 300 milliGRAM(s) Oral three times a day  lidocaine   4% Patch 1 Patch Transdermal every 24 hours  lisinopril 10 milliGRAM(s) Oral daily  metoprolol tartrate 25 milliGRAM(s) Oral two times a day  multivitamin 1 Tablet(s) Oral daily  oxyCODONE  ER Tablet 10 milliGRAM(s) Oral every 12 hours  pantoprazole    Tablet 40 milliGRAM(s) Oral before breakfast  polyethylene glycol 3350 17 Gram(s) Oral two times a day  rOPINIRole 0.25 milliGRAM(s) Oral two times a day  senna 2 Tablet(s) Oral at bedtime  tamsulosin 0.8 milliGRAM(s) Oral at bedtime    MEDICATIONS  (PRN):  methocarbamol 750 milliGRAM(s) Oral every 8 hours PRN Muscle Spasm  oxyCODONE    IR 5 milliGRAM(s) Oral every 6 hours PRN pain (4-10)      HOME MEDICATIONS:  apixaban 2.5 mg oral tablet (01-24)  furosemide 20 mg oral tablet (01-24)  gabapentin 100 mg oral capsule (01-24)  omeprazole 20 mg oral delayed release capsule (01-24)  senna (sennosides) 8.6 mg oral tablet (01-24)  Vitamin C 500 mg oral tablet (01-24)      PHYSICAL EXAM:  GENERAL: alert, NAD  HNENT: EOMI, PERRLA    NECK: No LAD/swelling  CHEST/LUNG:  CTAB no wheezes/rales/ronchi  HEART: RRR, No murmurs  ABDOMEN: Soft, NT, ND,  Bowel sounds present  EXTREMITIES:  Warm well perfused, no edema

## 2024-02-12 NOTE — DIETITIAN NUTRITION RISK NOTIFICATION - TREATMENT: THE FOLLOWING DIET HAS BEEN RECOMMENDED
Diet, DASH/TLC:   Sodium & Cholesterol Restricted  Minced and Moist (MINCEDMOIST)  1500mL Fluid Restriction ([id number]) (01-24-24 @ 15:03) [Active]    Ensure compact three times daily

## 2024-02-12 NOTE — DIETITIAN INITIAL EVALUATION ADULT - ORAL INTAKE PTA/DIET HISTORY
Pt sleeping upon RD visit, no family present, attempted to call daughter Marlys with no answer. RD to attempt to obtain hx at follow-up.

## 2024-02-12 NOTE — PROGRESS NOTE ADULT - SUBJECTIVE AND OBJECTIVE BOX
24H events:    Patient is a 89y old Female who presents with a chief complaint of Back Pain (11 Feb 2024 11:41)    Primary diagnosis of Inability to ambulate due to multiple joints        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 No reported tobacco, alcohol, or illicit/recreational drug use. ADL's limited by progressively worsening ability to ambulate. Ambulates w/ walker. (24 Jan 2024 15:07)      ALLERGIES:  No Known Allergies      VITALS:   T(F): 97  HR: 69  BP: 131/66  RR: 18  SpO2: 98%    LABS:                        12.0   8.87  )-----------( 112      ( 12 Feb 2024 08:00 )             34.9     02-12    137  |  99  |  43<H>  ----------------------------<  86  5.0   |  27  |  1.3    Ca    8.8      12 Feb 2024 08:00  Mg     1.7     02-12    TPro  4.7<L>  /  Alb  3.1<L>  /  TBili  0.7  /  DBili  x   /  AST  18  /  ALT  12  /  AlkPhos  55  02-12    PT/INR - ( 12 Feb 2024 08:00 )   PT: 12.10 sec;   INR: 1.06 ratio           Urinalysis Basic - ( 12 Feb 2024 08:00 )    Color: x / Appearance: x / SG: x / pH: x  Gluc: 86 mg/dL / Ketone: x  / Bili: x / Urobili: x   Blood: x / Protein: x / Nitrite: x   Leuk Esterase: x / RBC: x / WBC x   Sq Epi: x / Non Sq Epi: x / Bacteria: x      ABG - ( 10 Feb 2024 12:28 )  pH, Arterial: 7.43  pH, Blood: x     /  pCO2: 33    /  pO2: 315   / HCO3: 22    / Base Excess: -1.6  /  SaO2: 99.9                  Culture - Blood (collected 10 Feb 2024 18:30)  Source: .Blood Blood-Peripheral  Preliminary Report (12 Feb 2024 02:02):    No growth at 24 hours              HOME MEDICATIONS:  apixaban 2.5 mg oral tablet: 1 tab(s) orally 2 times a day  furosemide 20 mg oral tablet: 1 tab(s) orally once a day  gabapentin 100 mg oral capsule: 2 cap(s) orally 3 times a day  omeprazole 20 mg oral delayed release capsule: 1 cap(s) orally once a day  senna (sennosides) 8.6 mg oral tablet: 2 tab(s) orally once a day (at bedtime)  Vitamin C 500 mg oral tablet: 1 tab(s) orally once a day      MEDICATIONS:  STANDING MEDICATIONS  acetaminophen     Tablet .. 975 milliGRAM(s) Oral every 8 hours  calcium carbonate   1250 mG (OsCal) 1 Tablet(s) Oral daily  chlorhexidine 2% Cloths 1 Application(s) Topical daily  cholecalciferol 5000 Unit(s) Oral daily  fluticasone propionate 50 MICROgram(s)/spray Nasal Spray 1 Spray(s) Both Nostrils two times a day  furosemide    Tablet 20 milliGRAM(s) Oral daily  gabapentin 300 milliGRAM(s) Oral three times a day  lidocaine   4% Patch 1 Patch Transdermal every 24 hours  lisinopril 10 milliGRAM(s) Oral daily  metoprolol tartrate 25 milliGRAM(s) Oral two times a day  multivitamin 1 Tablet(s) Oral daily  oxyCODONE  ER Tablet 10 milliGRAM(s) Oral every 12 hours  pantoprazole    Tablet 40 milliGRAM(s) Oral before breakfast  polyethylene glycol 3350 17 Gram(s) Oral two times a day  rOPINIRole 0.25 milliGRAM(s) Oral two times a day  senna 2 Tablet(s) Oral at bedtime  tamsulosin 0.8 milliGRAM(s) Oral at bedtime    PRN MEDICATIONS  methocarbamol 750 milliGRAM(s) Oral every 8 hours PRN

## 2024-02-12 NOTE — DIETITIAN INITIAL EVALUATION ADULT - NUTRITIONGOAL OUTCOME1
Pt to consume >50% meals, snacks, and supplements within 5-7 days Pt to consume 50-75% meals, snacks, and supplements within 5-7 days

## 2024-02-12 NOTE — DIETITIAN INITIAL EVALUATION ADULT - OTHER INFO
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

## 2024-02-12 NOTE — DIETITIAN INITIAL EVALUATION ADULT - ADD RECOMMEND
Recommend order SLP eval - pt on minced and moist with no evidence of chewing/swallowing difficulty  Continue bowel regimen as needed

## 2024-02-13 PROBLEM — K21.9 GASTRO-ESOPHAGEAL REFLUX DISEASE WITHOUT ESOPHAGITIS: Chronic | Status: ACTIVE | Noted: 2024-01-24

## 2024-02-13 PROBLEM — I48.20 CHRONIC ATRIAL FIBRILLATION, UNSPECIFIED: Chronic | Status: ACTIVE | Noted: 2024-01-24

## 2024-02-13 PROBLEM — M54.50 LOW BACK PAIN, UNSPECIFIED: Chronic | Status: ACTIVE | Noted: 2024-01-24

## 2024-02-13 LAB
ALBUMIN SERPL ELPH-MCNC: 3.1 G/DL — LOW (ref 3.5–5.2)
ALP SERPL-CCNC: 56 U/L — SIGNIFICANT CHANGE UP (ref 30–115)
ALT FLD-CCNC: 15 U/L — SIGNIFICANT CHANGE UP (ref 0–41)
ANION GAP SERPL CALC-SCNC: 11 MMOL/L — SIGNIFICANT CHANGE UP (ref 7–14)
ANION GAP SERPL CALC-SCNC: 8 MMOL/L — SIGNIFICANT CHANGE UP (ref 7–14)
APTT BLD: 27.4 SEC — SIGNIFICANT CHANGE UP (ref 27–39.2)
AST SERPL-CCNC: 23 U/L — SIGNIFICANT CHANGE UP (ref 0–41)
BASOPHILS # BLD AUTO: 0.01 K/UL — SIGNIFICANT CHANGE UP (ref 0–0.2)
BASOPHILS NFR BLD AUTO: 0.2 % — SIGNIFICANT CHANGE UP (ref 0–1)
BILIRUB SERPL-MCNC: 0.7 MG/DL — SIGNIFICANT CHANGE UP (ref 0.2–1.2)
BUN SERPL-MCNC: 40 MG/DL — HIGH (ref 10–20)
BUN SERPL-MCNC: 44 MG/DL — HIGH (ref 10–20)
CALCIUM SERPL-MCNC: 8.2 MG/DL — LOW (ref 8.4–10.4)
CALCIUM SERPL-MCNC: 8.5 MG/DL — SIGNIFICANT CHANGE UP (ref 8.4–10.5)
CHLORIDE SERPL-SCNC: 95 MMOL/L — LOW (ref 98–110)
CHLORIDE SERPL-SCNC: 97 MMOL/L — LOW (ref 98–110)
CO2 SERPL-SCNC: 28 MMOL/L — SIGNIFICANT CHANGE UP (ref 17–32)
CO2 SERPL-SCNC: 28 MMOL/L — SIGNIFICANT CHANGE UP (ref 17–32)
CREAT SERPL-MCNC: 1.2 MG/DL — SIGNIFICANT CHANGE UP (ref 0.7–1.5)
CREAT SERPL-MCNC: 1.4 MG/DL — SIGNIFICANT CHANGE UP (ref 0.7–1.5)
EGFR: 36 ML/MIN/1.73M2 — LOW
EGFR: 43 ML/MIN/1.73M2 — LOW
EOSINOPHIL # BLD AUTO: 0.09 K/UL — SIGNIFICANT CHANGE UP (ref 0–0.7)
EOSINOPHIL NFR BLD AUTO: 1.5 % — SIGNIFICANT CHANGE UP (ref 0–8)
GLUCOSE SERPL-MCNC: 100 MG/DL — HIGH (ref 70–99)
GLUCOSE SERPL-MCNC: 90 MG/DL — SIGNIFICANT CHANGE UP (ref 70–99)
HCT VFR BLD CALC: 33.8 % — LOW (ref 37–47)
HCT VFR BLD CALC: 37.3 % — SIGNIFICANT CHANGE UP (ref 37–47)
HGB BLD-MCNC: 11.3 G/DL — LOW (ref 12–16)
HGB BLD-MCNC: 12.5 G/DL — SIGNIFICANT CHANGE UP (ref 12–16)
IMM GRANULOCYTES NFR BLD AUTO: 0.5 % — HIGH (ref 0.1–0.3)
INR BLD: 1.03 RATIO — SIGNIFICANT CHANGE UP (ref 0.65–1.3)
INR BLD: 1.06 RATIO — SIGNIFICANT CHANGE UP (ref 0.65–1.3)
LYMPHOCYTES # BLD AUTO: 0.64 K/UL — LOW (ref 1.2–3.4)
LYMPHOCYTES # BLD AUTO: 10.4 % — LOW (ref 20.5–51.1)
MAGNESIUM SERPL-MCNC: 1.7 MG/DL — LOW (ref 1.8–2.4)
MAGNESIUM SERPL-MCNC: 1.7 MG/DL — LOW (ref 1.8–2.4)
MCHC RBC-ENTMCNC: 32.6 PG — HIGH (ref 27–31)
MCHC RBC-ENTMCNC: 32.6 PG — HIGH (ref 27–31)
MCHC RBC-ENTMCNC: 33.4 G/DL — SIGNIFICANT CHANGE UP (ref 32–37)
MCHC RBC-ENTMCNC: 33.5 G/DL — SIGNIFICANT CHANGE UP (ref 32–37)
MCV RBC AUTO: 97.4 FL — SIGNIFICANT CHANGE UP (ref 81–99)
MCV RBC AUTO: 97.4 FL — SIGNIFICANT CHANGE UP (ref 81–99)
MONOCYTES # BLD AUTO: 0.3 K/UL — SIGNIFICANT CHANGE UP (ref 0.1–0.6)
MONOCYTES NFR BLD AUTO: 4.9 % — SIGNIFICANT CHANGE UP (ref 1.7–9.3)
NEUTROPHILS # BLD AUTO: 5.08 K/UL — SIGNIFICANT CHANGE UP (ref 1.4–6.5)
NEUTROPHILS NFR BLD AUTO: 82.5 % — HIGH (ref 42.2–75.2)
NON-GYNECOLOGICAL CYTOLOGY STUDY: SIGNIFICANT CHANGE UP
NRBC # BLD: 0 /100 WBCS — SIGNIFICANT CHANGE UP (ref 0–0)
NRBC # BLD: 0 /100 WBCS — SIGNIFICANT CHANGE UP (ref 0–0)
PLATELET # BLD AUTO: 112 K/UL — LOW (ref 130–400)
PLATELET # BLD AUTO: 98 K/UL — LOW (ref 130–400)
PMV BLD: 9.6 FL — SIGNIFICANT CHANGE UP (ref 7.4–10.4)
PMV BLD: 9.9 FL — SIGNIFICANT CHANGE UP (ref 7.4–10.4)
POTASSIUM SERPL-MCNC: 4.2 MMOL/L — SIGNIFICANT CHANGE UP (ref 3.5–5)
POTASSIUM SERPL-MCNC: 4.5 MMOL/L — SIGNIFICANT CHANGE UP (ref 3.5–5)
POTASSIUM SERPL-SCNC: 4.2 MMOL/L — SIGNIFICANT CHANGE UP (ref 3.5–5)
POTASSIUM SERPL-SCNC: 4.5 MMOL/L — SIGNIFICANT CHANGE UP (ref 3.5–5)
PROT SERPL-MCNC: 4.5 G/DL — LOW (ref 6–8)
PROTHROM AB SERPL-ACNC: 11.8 SEC — SIGNIFICANT CHANGE UP (ref 9.95–12.87)
PROTHROM AB SERPL-ACNC: 12.1 SEC — SIGNIFICANT CHANGE UP (ref 9.95–12.87)
RBC # BLD: 3.47 M/UL — LOW (ref 4.2–5.4)
RBC # BLD: 3.83 M/UL — LOW (ref 4.2–5.4)
RBC # FLD: 13.9 % — SIGNIFICANT CHANGE UP (ref 11.5–14.5)
RBC # FLD: 14.1 % — SIGNIFICANT CHANGE UP (ref 11.5–14.5)
SODIUM SERPL-SCNC: 131 MMOL/L — LOW (ref 135–146)
SODIUM SERPL-SCNC: 136 MMOL/L — SIGNIFICANT CHANGE UP (ref 135–146)
TROPONIN T, HIGH SENSITIVITY RESULT: 56 NG/L — CRITICAL HIGH (ref 6–13)
WBC # BLD: 6.15 K/UL — SIGNIFICANT CHANGE UP (ref 4.8–10.8)
WBC # BLD: 6.43 K/UL — SIGNIFICANT CHANGE UP (ref 4.8–10.8)
WBC # FLD AUTO: 6.15 K/UL — SIGNIFICANT CHANGE UP (ref 4.8–10.8)
WBC # FLD AUTO: 6.43 K/UL — SIGNIFICANT CHANGE UP (ref 4.8–10.8)

## 2024-02-13 PROCEDURE — 99232 SBSQ HOSP IP/OBS MODERATE 35: CPT

## 2024-02-13 RX ORDER — MAGNESIUM SULFATE 500 MG/ML
2 VIAL (ML) INJECTION ONCE
Refills: 0 | Status: DISCONTINUED | OUTPATIENT
Start: 2024-02-13 | End: 2024-02-15

## 2024-02-13 RX ADMIN — POLYETHYLENE GLYCOL 3350 17 GRAM(S): 17 POWDER, FOR SOLUTION ORAL at 05:48

## 2024-02-13 RX ADMIN — Medication 1 SPRAY(S): at 05:48

## 2024-02-13 RX ADMIN — GABAPENTIN 300 MILLIGRAM(S): 400 CAPSULE ORAL at 21:24

## 2024-02-13 RX ADMIN — Medication 975 MILLIGRAM(S): at 21:24

## 2024-02-13 RX ADMIN — PANTOPRAZOLE SODIUM 40 MILLIGRAM(S): 20 TABLET, DELAYED RELEASE ORAL at 05:50

## 2024-02-13 RX ADMIN — ROPINIROLE 0.25 MILLIGRAM(S): 8 TABLET, FILM COATED, EXTENDED RELEASE ORAL at 17:14

## 2024-02-13 RX ADMIN — Medication 975 MILLIGRAM(S): at 06:15

## 2024-02-13 RX ADMIN — Medication 25 MILLIGRAM(S): at 17:14

## 2024-02-13 RX ADMIN — Medication 975 MILLIGRAM(S): at 05:46

## 2024-02-13 RX ADMIN — TAMSULOSIN HYDROCHLORIDE 0.8 MILLIGRAM(S): 0.4 CAPSULE ORAL at 21:23

## 2024-02-13 RX ADMIN — Medication 5000 UNIT(S): at 11:11

## 2024-02-13 RX ADMIN — Medication 1 TABLET(S): at 11:10

## 2024-02-13 RX ADMIN — OXYCODONE HYDROCHLORIDE 5 MILLIGRAM(S): 5 TABLET ORAL at 22:20

## 2024-02-13 RX ADMIN — Medication 20 MILLIGRAM(S): at 05:47

## 2024-02-13 RX ADMIN — Medication 975 MILLIGRAM(S): at 22:00

## 2024-02-13 RX ADMIN — Medication 1 SPRAY(S): at 17:16

## 2024-02-13 RX ADMIN — ROPINIROLE 0.25 MILLIGRAM(S): 8 TABLET, FILM COATED, EXTENDED RELEASE ORAL at 05:47

## 2024-02-13 RX ADMIN — Medication 975 MILLIGRAM(S): at 13:00

## 2024-02-13 RX ADMIN — OXYCODONE HYDROCHLORIDE 10 MILLIGRAM(S): 5 TABLET ORAL at 17:14

## 2024-02-13 RX ADMIN — Medication 975 MILLIGRAM(S): at 13:36

## 2024-02-13 RX ADMIN — GABAPENTIN 300 MILLIGRAM(S): 400 CAPSULE ORAL at 05:46

## 2024-02-13 RX ADMIN — OXYCODONE HYDROCHLORIDE 10 MILLIGRAM(S): 5 TABLET ORAL at 06:20

## 2024-02-13 RX ADMIN — OXYCODONE HYDROCHLORIDE 5 MILLIGRAM(S): 5 TABLET ORAL at 21:25

## 2024-02-13 RX ADMIN — LISINOPRIL 10 MILLIGRAM(S): 2.5 TABLET ORAL at 05:48

## 2024-02-13 RX ADMIN — CHLORHEXIDINE GLUCONATE 1 APPLICATION(S): 213 SOLUTION TOPICAL at 11:12

## 2024-02-13 RX ADMIN — SENNA PLUS 2 TABLET(S): 8.6 TABLET ORAL at 21:23

## 2024-02-13 RX ADMIN — Medication 25 MILLIGRAM(S): at 05:46

## 2024-02-13 RX ADMIN — Medication 1 TABLET(S): at 11:14

## 2024-02-13 RX ADMIN — POLYETHYLENE GLYCOL 3350 17 GRAM(S): 17 POWDER, FOR SOLUTION ORAL at 17:16

## 2024-02-13 RX ADMIN — OXYCODONE HYDROCHLORIDE 10 MILLIGRAM(S): 5 TABLET ORAL at 05:51

## 2024-02-13 RX ADMIN — GABAPENTIN 300 MILLIGRAM(S): 400 CAPSULE ORAL at 12:59

## 2024-02-13 NOTE — PROGRESS NOTE ADULT - SUBJECTIVE AND OBJECTIVE BOX
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 No reported tobacco, alcohol, or illicit/recreational drug use. ADL's limited by progressively worsening ability to ambulate. Ambulates w/ walker. (24 Jan 2024 15:07)      ALLERGIES:  No Known Allergies      VITALS:   T(F): 95.9  HR: 78  BP: 99/53  RR: 18  SpO2: 98%    LABS:                        12.5   6.15  )-----------( 112      ( 13 Feb 2024 08:38 )             37.3     02-13    136  |  97<L>  |  40<H>  ----------------------------<  90  4.2   |  28  |  1.2    Ca    8.5      13 Feb 2024 08:38  Mg     1.7     02-13    TPro  4.5<L>  /  Alb  3.1<L>  /  TBili  0.7  /  DBili  x   /  AST  23  /  ALT  15  /  AlkPhos  56  02-13    PT/INR - ( 13 Feb 2024 08:38 )   PT: 11.80 sec;   INR: 1.03 ratio           Urinalysis Basic - ( 13 Feb 2024 08:38 )    Color: x / Appearance: x / SG: x / pH: x  Gluc: 90 mg/dL / Ketone: x  / Bili: x / Urobili: x   Blood: x / Protein: x / Nitrite: x   Leuk Esterase: x / RBC: x / WBC x   Sq Epi: x / Non Sq Epi: x / Bacteria: x            Culture - Blood (collected 10 Feb 2024 18:30)  Source: .Blood Blood-Peripheral  Preliminary Report (13 Feb 2024 02:02):    No growth at 48 Hours              HOME MEDICATIONS:  apixaban 2.5 mg oral tablet: 1 tab(s) orally 2 times a day  furosemide 20 mg oral tablet: 1 tab(s) orally once a day  gabapentin 100 mg oral capsule: 2 cap(s) orally 3 times a day  omeprazole 20 mg oral delayed release capsule: 1 cap(s) orally once a day  senna (sennosides) 8.6 mg oral tablet: 2 tab(s) orally once a day (at bedtime)  Vitamin C 500 mg oral tablet: 1 tab(s) orally once a day      MEDICATIONS:  STANDING MEDICATIONS  acetaminophen     Tablet .. 975 milliGRAM(s) Oral every 8 hours  calcium carbonate   1250 mG (OsCal) 1 Tablet(s) Oral daily  chlorhexidine 2% Cloths 1 Application(s) Topical daily  cholecalciferol 5000 Unit(s) Oral daily  fluticasone propionate 50 MICROgram(s)/spray Nasal Spray 1 Spray(s) Both Nostrils two times a day  furosemide    Tablet 20 milliGRAM(s) Oral daily  gabapentin 300 milliGRAM(s) Oral three times a day  lidocaine   4% Patch 1 Patch Transdermal every 24 hours  lisinopril 10 milliGRAM(s) Oral daily  magnesium sulfate  IVPB 2 Gram(s) IV Intermittent once  metoprolol tartrate 25 milliGRAM(s) Oral two times a day  multivitamin 1 Tablet(s) Oral daily  oxyCODONE  ER Tablet 10 milliGRAM(s) Oral every 12 hours  pantoprazole    Tablet 40 milliGRAM(s) Oral before breakfast  polyethylene glycol 3350 17 Gram(s) Oral two times a day  rOPINIRole 0.25 milliGRAM(s) Oral two times a day  senna 2 Tablet(s) Oral at bedtime  tamsulosin 0.8 milliGRAM(s) Oral at bedtime    PRN MEDICATIONS  methocarbamol 750 milliGRAM(s) Oral every 8 hours PRN  oxyCODONE    IR 5 milliGRAM(s) Oral every 6 hours PRN

## 2024-02-13 NOTE — PROGRESS NOTE ADULT - SUBJECTIVE AND OBJECTIVE BOX
SUNNY COLBERT  89y, Female  Allergy: No Known Allergies    Hospital Day: 20d    Patient seen and examined earlier today. Not complaining of pain, no acute events overnight.     PMH/PSH:  PAST MEDICAL & SURGICAL HISTORY:  Chronic atrial fibrillation      GERD (gastroesophageal reflux disease)      Chronic lower back pain      No significant past surgical history          LAST 24-Hr EVENTS:    VITALS:  T(F): 95.9 (02-13-24 @ 09:04), Max: 96.8 (02-13-24 @ 00:00)  HR: 78 (02-13-24 @ 09:04)  BP: 99/53 (02-13-24 @ 09:04) (96/62 - 108/58)  RR: 18 (02-13-24 @ 09:04)  SpO2: 98% (02-12-24 @ 17:10)        TESTS & MEASUREMENTS:  Weight (Kg): 70.3 (02-12-24 @ 04:39)  BMI (kg/m2): 30.3 (02-12)    02-11-24 @ 07:01  -  02-12-24 @ 07:00  --------------------------------------------------------  IN: 560 mL / OUT: 1700 mL / NET: -1140 mL    02-12-24 @ 07:01  -  02-13-24 @ 07:00  --------------------------------------------------------  IN: 0 mL / OUT: 1000 mL / NET: -1000 mL                            12.5   6.15  )-----------( 112      ( 13 Feb 2024 08:38 )             37.3     PT/INR - ( 13 Feb 2024 08:38 )   PT: 11.80 sec;   INR: 1.03 ratio           02-13    136  |  97<L>  |  40<H>  ----------------------------<  90  4.2   |  28  |  1.2    Ca    8.5      13 Feb 2024 08:38  Mg     1.7     02-13    TPro  4.5<L>  /  Alb  3.1<L>  /  TBili  0.7  /  DBili  x   /  AST  23  /  ALT  15  /  AlkPhos  56  02-13    LIVER FUNCTIONS - ( 13 Feb 2024 08:38 )  Alb: 3.1 g/dL / Pro: 4.5 g/dL / ALK PHOS: 56 U/L / ALT: 15 U/L / AST: 23 U/L / GGT: x                 Culture - Blood (collected 02-10-24 @ 18:30)  Source: .Blood Blood-Peripheral  Preliminary Report (02-13-24 @ 02:02):    No growth at 48 Hours      Urinalysis Basic - ( 13 Feb 2024 08:38 )    Color: x / Appearance: x / SG: x / pH: x  Gluc: 90 mg/dL / Ketone: x  / Bili: x / Urobili: x   Blood: x / Protein: x / Nitrite: x   Leuk Esterase: x / RBC: x / WBC x   Sq Epi: x / Non Sq Epi: x / Bacteria: x                  RADIOLOGY, ECG, & ADDITIONAL TESTS:      RECENT DIAGNOSTIC ORDERS:  Activated Partial Thromboplastin Time:  Start Date:13-Feb-2024. 20:00 (02-13-24 @ 13:31)  Prothrombin Time and INR, Plasma:  Start Date:13-Feb-2024. 20:00 (02-13-24 @ 13:31)  Magnesium: 20:00 (02-13-24 @ 13:31)  Basic Metabolic Panel: 20:00 (02-13-24 @ 13:31)  Complete Blood Count: 20:00 (02-13-24 @ 13:31)  Diet, NPO after Midnight:      NPO Start Date: 13-Feb-2024,   NPO Start Time: 23:59 (02-13-24 @ 13:31)  Diet, DASH/TLC:   Sodium & Cholesterol Restricted  Minced and Moist (MINCEDMOIST)  1500mL Fluid Restriction ([id number])  Supplement Feeding Modality:  Oral  Ensure Compact Cans or Servings Per Day:  1       Frequency:  Three Times a day (02-13-24 @ 08:33)      MEDICATIONS:  MEDICATIONS  (STANDING):  acetaminophen     Tablet .. 975 milliGRAM(s) Oral every 8 hours  calcium carbonate   1250 mG (OsCal) 1 Tablet(s) Oral daily  chlorhexidine 2% Cloths 1 Application(s) Topical daily  cholecalciferol 5000 Unit(s) Oral daily  fluticasone propionate 50 MICROgram(s)/spray Nasal Spray 1 Spray(s) Both Nostrils two times a day  furosemide    Tablet 20 milliGRAM(s) Oral daily  gabapentin 300 milliGRAM(s) Oral three times a day  lidocaine   4% Patch 1 Patch Transdermal every 24 hours  lisinopril 10 milliGRAM(s) Oral daily  magnesium sulfate  IVPB 2 Gram(s) IV Intermittent once  metoprolol tartrate 25 milliGRAM(s) Oral two times a day  multivitamin 1 Tablet(s) Oral daily  oxyCODONE  ER Tablet 10 milliGRAM(s) Oral every 12 hours  pantoprazole    Tablet 40 milliGRAM(s) Oral before breakfast  polyethylene glycol 3350 17 Gram(s) Oral two times a day  rOPINIRole 0.25 milliGRAM(s) Oral two times a day  senna 2 Tablet(s) Oral at bedtime  tamsulosin 0.8 milliGRAM(s) Oral at bedtime    MEDICATIONS  (PRN):  methocarbamol 750 milliGRAM(s) Oral every 8 hours PRN Muscle Spasm  oxyCODONE    IR 5 milliGRAM(s) Oral every 6 hours PRN pain (4-10)      HOME MEDICATIONS:  apixaban 2.5 mg oral tablet (01-24)  furosemide 20 mg oral tablet (01-24)  gabapentin 100 mg oral capsule (01-24)  omeprazole 20 mg oral delayed release capsule (01-24)  senna (sennosides) 8.6 mg oral tablet (01-24)  Vitamin C 500 mg oral tablet (01-24)      PHYSICAL EXAM:  GENERAL: alert, NAD  HNENT: EOMI, PERRLA    NECK: No LAD/swelling  CHEST/LUNG:  CTAB no wheezes/rales/ronchi  HEART: RRR, No murmurs  ABDOMEN: Soft, NT, ND,  Bowel sounds present  EXTREMITIES:  Warm well perfused

## 2024-02-13 NOTE — PROGRESS NOTE ADULT - ASSESSMENT
89F w/ h/o chronic afib (on Eliquis), GERD, and chronic low back pain presenting s/p fall. 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 Likely vasovagal. CT head negative  - TTE (May 2013) demonstrated LVEF 55-65% w/ normal global LVSF and no significant valvulopathy.  - TTE 1/28- EF 60-65% mod pulm HTN   - elevated BNP  - Tele monitored for 24 hrs with no dysrhythmia   - Currently euvolemic on exam, transition back to home oral lasix 20 daily    #Chronic Atrial Fibrillation  CHADS-VASC 3+ (Age x2, Female). Only on AC. Previously on Lopressor, but only takes intermittently as HR currently rate-controlled off meds.  - Eliquis held for surgery     #GERD  - c/w pantoprazole 40mg PO QD (therapeutic interchange for omeprazole)    #CKD 3b   per PCP baseline is 1.2    #Urinary retention-likely neurogenic   -hanson placed 1/29  -flomax increased to 0.8mg   - completed RT, will try TOV after walking with PT    #Constipation  -senna, miralax, lactulose    # HTN-- on metoprolol and started on lisinopril 10mg 2/4    PENDING: neurosx for kyphoplasty tomorrow, holding eliquis

## 2024-02-13 NOTE — PROGRESS NOTE ADULT - ASSESSMENT
89yFemale with history of afib on eliquis, GERD, chronic low back pain presents after fall. Patient found to have lumbar pathological fracture on admission. Palliative care consulted for GOC and symptom management.    Patient seen at bedside. Patient stated she feels better and just waiting for her surgery. Planned for kyphoplasty on Wednesday.    Education about palliative care provided to patient/family.  See Recs below.    Please call [phone] with questions or concerns 24/7.   We will continue to follow.

## 2024-02-13 NOTE — PROGRESS NOTE ADULT - SUBJECTIVE AND OBJECTIVE BOX
Surgery:  LUMBAR ONE, LUMBAR TWO LAMINECTOMY, LUMBAR ONE KYPHOPLASTY, THORACIC 12-LUMBAR TWO INSTRUMENTATION (C-ARM)    No Known Allergies        T(C): 35.7 (02-13-24 @ 16:54), Max: 36 (02-13-24 @ 00:00)  HR: 58 (02-13-24 @ 16:54) (58 - 97)  BP: 103/57 (02-13-24 @ 16:54) (99/53 - 108/58)  RR: 18 (02-13-24 @ 16:54) (18 - 18)  SpO2: --  Wt(kg): --        02-13    136  |  97<L>  |  40<H>  ----------------------------<  90  4.2   |  28  |  1.2    Ca    8.5      13 Feb 2024 08:38  Mg     1.7     02-13    TPro  4.5<L>  /  Alb  3.1<L>  /  TBili  0.7  /  DBili  x   /  AST  23  /  ALT  15  /  AlkPhos  56  02-13    CBC Full  -  ( 13 Feb 2024 08:38 )  WBC Count : 6.15 K/uL  RBC Count : 3.83 M/uL  Hemoglobin : 12.5 g/dL  Hematocrit : 37.3 %  Platelet Count - Automated : 112 K/uL  Mean Cell Volume : 97.4 fL  Mean Cell Hemoglobin : 32.6 pg  Mean Cell Hemoglobin Concentration : 33.5 g/dL  Auto Neutrophil # : 5.08 K/uL  Auto Lymphocyte # : 0.64 K/uL  Auto Monocyte # : 0.30 K/uL  Auto Eosinophil # : 0.09 K/uL  Auto Basophil # : 0.01 K/uL  Auto Neutrophil % : 82.5 %  Auto Lymphocyte % : 10.4 %  Auto Monocyte % : 4.9 %  Auto Eosinophil % : 1.5 %  Auto Basophil % : 0.2 %    PT/INR - ( 13 Feb 2024 08:38 )   PT: 11.80 sec;   INR: 1.03 ratio             Pregnancy test: N/A     Type & Screen (in past 72hrs): ABO RH Interpretation: O POS (02.13.24 @ 08:38) Antibody Screen: NEG (02.13.24 @ 08:38)     CXR:   < from: CT Chest w/ IV Cont (01.30.24 @ 23:39) >  Impression:  Cardiomegaly. CHF. Right-sided pleural effusion.  Nonspecific right upper lobe pleural-based opacity.  < end of copied text >    EKG: < from: 12 Lead ECG (02.10.24 @ 12:48) >  Diagnosis Line *** Poor data quality, interpretation may be adversely affected  Atrial fibrillation  Left axis deviation  Pulmonary disease pattern  Abnormal ECG  < end of copied text >      ECHO: < from: TTE Echo Complete w/ Contrast w/ Doppler (01.29.24 @ 13:11) >  Summary:   1. Endocardial visualization was enhanced with intravenous echo contrast.   2. Left ventricular ejection fraction, by visual estimation, is 60 to   65%.   3. Normal global left ventricular systolic function.   4.The left ventricular diastolic function could not be assessed in this   study.   5. Mildly enlarged left atrium.   6. Mildly enlarged right atrium.   7. Sclerotic aortic valve with normal opening.   8. Mild-to-moderate aortic regurgitation.   9. Mildmitral regurgitation.  10. Mild-moderate tricuspid regurgitation.  11. Estimated pulmonary artery systolic pressure is 52.3 mmHg assuming a   right atrial pressure of 3 mmHg, which is consistent with moderate   pulmonary hypertension.  < end of copied text >    Medical Clearances: - Agree with Cardiology, pt is high risk for a moderate risk surgery, but no further workup indicated at this time prior to planned intervention  Cardiology clearance: - Troponin elevated iso hypotension on 2/10, but delta troponin is < 5 and remained stable, therefore ACS ruled out, likely demand ischemia.                                  -  high risk patient for moderate risk surgery       Last dose of antiplatelet/anticoagulation drug: Last dose Eliquis 2/10/24    Implanted Devices (pacemaker, drug pump...etc):  []YES   [] NO                  If yes --> EPS consulted to interrogate/adjust device:      Assessment/Plan:  For OR tomorrow for planned L1-L2  LAMINECTOMY, L1 KYPHOPLASTY, T12-L2 INSTRUMENTATION   NPO p MN  Consent to be obtained by attending    Consent: Signed by patient vs HCP                   NAME/NUMBER of HCP:

## 2024-02-13 NOTE — PROGRESS NOTE ADULT - ASSESSMENT
89F w/ h/o chronic afib (on Eliquis), GERD, and chronic low back pain presenting s/p fall. 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)  - Radiation Oncology consulted, and s/p 5 sessions of RT, completed tuesday 2/6/24  - Pain control oxycodone ER 10 BID and lidocaine patch, will restart Oxy 5 IR for breakthrough pain   - Oncology will start immunotherapy after surgery, slowly growing tumor  -  Neuro sx planning Kyphoplasty on Wednesday, will hold eliquis    #Perioperative Risk Stratification   - Agree with Cardiac Risk Assessment:   RCRI 1-2 - Class III risk 10.1% risk of 30 day MACE   METS <4 - including valvular abn + moderate PHTN   high risk patient for moderate risk surgery   - Cardiology recc optimize volume status before planning for surgery - patient is s/p IV diuresis, and continues to have good output with PO diuretic, volume status is improved   - Troponin elevated iso hypotension on 2/10, but delta troponin is < 5 and remained stable, therefore ACS ruled out, likely demand ischemia.   - Agree with Cardiology, pt is high risk for a moderate risk surgery, but no further workup indicated at this time prior to planned intervention     # On 2/10 had episode of unresponsives and bradycardia/hypotension. Likely vasovagal vs overmedication.     #CHF, diastolic - acute -- now resolved  - TTE (May 2013) demonstrated LVEF 55-65% w/ normal global LVSF and no significant valvulopathy.  - TTE 1/28- EF 60-65% mod pulm HTN   - elevated BNP  - Tele monitored for 24 hrs with no dysrhythmia   - Currently euvolemic on exam, transition back to home oral lasix 20 daily    #Chronic Atrial Fibrillation  CHADS-VASC 3+ (Age x2, Female)  - Cont metoprolol   - Eliquis held for surgery     #GERD  - c/w pantoprazole 40mg PO QD (therapeutic interchange for omeprazole)    #CKD 3b   per PCP baseline is 1.2    #Urinary retention-likely neurogenic   -hanson placed 1/29  -flomax increased to 0.8mg   - completed RT, will try TOV after surgery, and after walking with PT    #Constipation  -senna, miralax, lactulose    # HTN-- on metoprolol and started on lisinopril 10mg 2/4    PENDING: neurosx for kyphoplasty wednesday, holding eliquis, f/u heme onc post surgery regarding starting immunotherapy         Total time spent to complete patient's bedside assessment, review medical chart, discuss medical plan of care with covering medical team was more than 35 minutes  with >50% of time spent face to face with patient, discussion with patient/family and/or coordination of care. My note supersedes them medical resident note in case of discrepancy.      Azra Oneil, DO

## 2024-02-13 NOTE — PROGRESS NOTE ADULT - SUBJECTIVE AND OBJECTIVE BOX
HPI:  89F w/ h/o chronic afib (on Eliquis), GERD, and chronic low back pain presenting s/p fall. Patient known to have chronic low back pain that has progressively worsened over the years. Pain acutely worsened two weeks ago and patient subsequently fell due to inability to support own weight. Evaluated by PCP after initial fall due to worsening pain. Prescribed Percocet 5-325 bid w/ mild relief. However, pt once again fell earlier today after pt unable to support her own weight while trying to ambulate. Now unable to ambulate or bear weight, even w/ assistance. No reported HT, LOC, lightheadedness, confusion, loss of continence, or focal weakness a/w either fall. No reported fevers, chills, CP, palpitations, SOB, abdominal pain, n/v/d, or dysuria (although daughter did note malodorous urine recently).    Interval history  -Patient seen at bedside with alyssa Garcia  -Patient notes leg pain with movement, but only mild pain at rest  2/7: Patient now DNR/DNI, added 10mg oxycontin Q12H for consistent pain  2/12: added PRN oxycodone 5mg Q6H for pain     ADVANCE DIRECTIVES:     [ ] Full Code [x ] DNR  MOLST  [ ]  Living Will  [ ]   DECISION MAKER(s):  [ ] Health Care Proxy(s)  [x ] Surrogate(s)  [ ] Guardian           Name(s): Phone Number(s):  Daughter      BASELINE (I)ADL(s) (prior to admission):    [place]: [ ]Total  [ ] Moderate [ ]Dependent  Palliative Performance Status Version 2:         %    http://npcrc.org/files/news/palliative_performance_scale_ppsv2.pdf    Allergies    No Known Allergies    Intolerances    MEDICATIONS  (STANDING):  acetaminophen     Tablet .. 975 milliGRAM(s) Oral every 8 hours  calcium carbonate   1250 mG (OsCal) 1 Tablet(s) Oral daily  chlorhexidine 2% Cloths 1 Application(s) Topical daily  cholecalciferol 5000 Unit(s) Oral daily  fluticasone propionate 50 MICROgram(s)/spray Nasal Spray 1 Spray(s) Both Nostrils two times a day  furosemide    Tablet 20 milliGRAM(s) Oral daily  gabapentin 300 milliGRAM(s) Oral three times a day  lidocaine   4% Patch 1 Patch Transdermal every 24 hours  lisinopril 10 milliGRAM(s) Oral daily  metoprolol tartrate 25 milliGRAM(s) Oral two times a day  multivitamin 1 Tablet(s) Oral daily  oxyCODONE  ER Tablet 10 milliGRAM(s) Oral every 12 hours  pantoprazole    Tablet 40 milliGRAM(s) Oral before breakfast  polyethylene glycol 3350 17 Gram(s) Oral two times a day  rOPINIRole 0.25 milliGRAM(s) Oral two times a day  senna 2 Tablet(s) Oral at bedtime  tamsulosin 0.8 milliGRAM(s) Oral at bedtime    MEDICATIONS  (PRN):  methocarbamol 750 milliGRAM(s) Oral every 8 hours PRN Muscle Spasm  oxyCODONE    IR 5 milliGRAM(s) Oral every 6 hours PRN pain (4-10)                PRESENT SYMPTOMS: [ ]Unable to obtain due to poor mentation   Source if other than patient:  [ ]Family   [ ]Team     Pain: [ ]yes [x ]no  QOL impact -    Location -   Aggravating factors -   Quality -    Radiation -   Timing-    Severity (0-10 scale):   Minimal acceptable level (0-10 scale):     CPOT:    https://www.sccm.org/getattachment/tam08s99-4v5y-[email or web address]-1e7w-7444m1480g0f/Critical-Care-Pain-Observation-Tool-(CPOT)    PAIN AD Score:   http://geriatrictoolkit.[email or web address]/painad.pdf (press ctrl +  left click to view)    Dyspnea:                           [x ]None[ ]Mild [ ]Moderate [ ]Severe     Respiratory Distress Observation Scale (RDOS):   A score of 0 to 2 signifies little or no respiratory distress, 3 signifies mild distress, scores 4 to 6 indicate moderate distress, and scores greater than 7 signify severe distress  [email or web address]://[email or web address]/sites/default/files/PDFS/299399-ciuzwtnwodr-fyitorgz-jypckruyebh-njbcl.pdf    Anxiety:                             [ x]None[ ]Mild [ ]Moderate [ ]Severe   Fatigue:                             [x ]None[ ]Mild [ ]Moderate [ ]Severe   Nausea:                             [ x]None[ ]Mild [ ]Moderate [ ]Severe   Loss of appetite:              [x ]None[ ]Mild [ ]Moderate [ ]Severe   Constipation:                    [x ]None[ ]Mild [ ]Moderate [ ]Severe    Other Symptoms:  [ x]All other review of systems negative     Palliative Performance Status Version 2:         30%    http://npcrc.org/files/news/palliative_performance_scale_ppsv2.pdf    PHYSICAL EXAM:    Vital Signs Last 24 Hrs  T(C): 35.5 (13 Feb 2024 09:04), Max: 36 (13 Feb 2024 00:00)  T(F): 95.9 (13 Feb 2024 09:04), Max: 96.8 (13 Feb 2024 00:00)  HR: 78 (13 Feb 2024 09:04) (73 - 97)  BP: 99/53 (13 Feb 2024 09:04) (96/62 - 108/58)  BP(mean): --  RR: 18 (13 Feb 2024 09:04) (18 - 18)  SpO2: 98% (12 Feb 2024 17:10) (98% - 98%)    Parameters below as of 12 Feb 2024 17:10  Patient On (Oxygen Delivery Method): room air              GENERAL:  [ ] No acute distress [ ]Lethargic  [ ]Unarousable  [ x]Verbal  [ ]Non-Verbal [ ]Cachexia    BEHAVIORAL/PSYCH:  [x ]Alert and Oriented x2  [ ] Anxiety [ ] Delirium [ ] Agitation [ x] Calm   EYES: [ x] No scleral icterus [ ] Scleral icterus [ ] Closed  ENMT:  [ ]Dry mouth  [x ]No external oral lesions [ ] No external ear or nose lesions  CARDIOVASCULAR:  [ ]Regular [ ]Irregular [ ]Tachy [x ]Not Tachy  [ ]Sung [ ] Edema [ ] No edema  PULMONARY:  [ ]Tachypnea  [ ]Audible excessive secretions [ x] No labored breathing [ ] labored breathing  GASTROINTESTINAL: [ ]Soft  [ ]Distended  [x ]Not distended [ ]Non tender [ ]Tender  MUSCULOSKELETAL: [ ]No clubbing [ ] clubbing  [x ] No cyanosis [ ] cyanosis  NEUROLOGIC: [ ]No focal deficits  [x ]Follows commands  [ ]Does not follow commands  [ x]Cognitive impairment  [ ]Dysphagia  [ ]Dysarthria  [ ]Paresis   SKIN: [ ] Jaundiced [ x] Non-jaundiced [ ]Rash [ ]No Rash [ ] Warm [ ] Dry  MISC/LINES: [ ] ET tube [ ] Trach [ ]NGT/OGT [ ]PEG [ ]David    LABS: reviewed by me                                     12.5   6.15  )-----------( 112      ( 13 Feb 2024 08:38 )             37.3   02-13    136  |  97<L>  |  40<H>  ----------------------------<  90  4.2   |  28  |  1.2    Ca    8.5      13 Feb 2024 08:38  Mg     1.7     02-13    TPro  4.5<L>  /  Alb  3.1<L>  /  TBili  0.7  /  DBili  x   /  AST  23  /  ALT  15  /  AlkPhos  56  02-13  PT/INR - ( 13 Feb 2024 08:38 )   PT: 11.80 sec;   INR: 1.03 ratio             Urinalysis Basic - ( 13 Feb 2024 08:38 )    Color: x / Appearance: x / SG: x / pH: x  Gluc: 90 mg/dL / Ketone: x  / Bili: x / Urobili: x   Blood: x / Protein: x / Nitrite: x   Leuk Esterase: x / RBC: x / WBC x   Sq Epi: x / Non Sq Epi: x / Bacteria: x              RADIOLOGY & ADDITIONAL STUDIES: reviewed by me    < from: NM SPECT Bone Scan, Single Area Single Day (02.01.24 @ 16:43) >  IMPRESSION:    Abnormal uptake in the L1 vertebral body, corresponding to known   compression fracture deformity secondary to vertebral body mass.    No other evidence for metastatic bone disease.    < end of copied text >        EKG: reviewed by me    < from: 12 Lead ECG (01.25.24 @ 11:34) >  Ventricular Rate 94 BPM    Atrial Rate 81 BPM    QRS Duration 90 ms    Q-T Interval 384 ms    QTC Calculation(Bazett) 480 ms    R Axis 166 degrees    T Axis -48 degrees    Diagnosis Line Atrial fibrillation  Right ventricular hypertrophy  Anterior infarct , age undetermined  ST & T wave abnormality, consider inferior ischemia  Abnormal ECG    < end of copied text >        PROTEIN CALORIE MALNUTRITION PRESENT: [ ]mild [ ]moderate [ ]severe [ ]underweight [ ]morbid obesity  https://www.andeal.org/vault/9997/web/files/ONC/Table_Clinical%20Characteristics%20to%20Document%20Malnutrition-White%20JV%20et%20al%202012.pdf    Height (cm): 152.4 (01-30-24 @ 11:24)  Weight (kg): 70.3 (01-30-24 @ 11:24)  BMI (kg/m2): 30.3 (01-30-24 @ 11:24)  [ ]PPSV2 < or = to 30% [ ]significant weight loss  [ ]poor nutritional intake  [ ]anasarca      [ ]Artificial Nutrition      Palliative Care Spiritual/Emotional Screening Tool Question  Severity (0-4):                    OR                    [ x] Unable to determine. Will assess at later time if appropriate.  Score of 2 or greater indicates recommendation of Chaplaincy and/or SW referral  Chaplaincy Referral: [ ] Yes [ ] Refused [ ] Following     Caregiver [place]:  [ ] Yes [ ] No    OR    [x ] Unable to determine. Will assess at later time if appropriate.  Social Work Referral [ ]  Patient and Family Centered Care Referral [ ]    Anticipatory Grief Present: [ ] Yes [ ] No    OR     [ x] Unable to determine. Will assess at later time if appropriate.  Social Work Referral [ ]  Patient and Family Centered Care Referral [ ]    Patient discussed with primary medical team MD  Palliative care education provided to patient and/or family

## 2024-02-14 ENCOUNTER — APPOINTMENT (OUTPATIENT)
Dept: RADIATION ONCOLOGY | Facility: HOSPITAL | Age: 89
End: 2024-02-14

## 2024-02-14 LAB
ANION GAP SERPL CALC-SCNC: 11 MMOL/L — SIGNIFICANT CHANGE UP (ref 7–14)
BUN SERPL-MCNC: 43 MG/DL — HIGH (ref 10–20)
CALCIUM SERPL-MCNC: 8.4 MG/DL — SIGNIFICANT CHANGE UP (ref 8.4–10.4)
CHLORIDE SERPL-SCNC: 97 MMOL/L — LOW (ref 98–110)
CO2 SERPL-SCNC: 24 MMOL/L — SIGNIFICANT CHANGE UP (ref 17–32)
CREAT SERPL-MCNC: 1.5 MG/DL — SIGNIFICANT CHANGE UP (ref 0.7–1.5)
EGFR: 33 ML/MIN/1.73M2 — LOW
GLUCOSE BLDC GLUCOMTR-MCNC: 74 MG/DL — SIGNIFICANT CHANGE UP (ref 70–99)
GLUCOSE SERPL-MCNC: 107 MG/DL — HIGH (ref 70–99)
HCT VFR BLD CALC: 35 % — LOW (ref 37–47)
HGB BLD-MCNC: 11.6 G/DL — LOW (ref 12–16)
MAGNESIUM SERPL-MCNC: 2 MG/DL — SIGNIFICANT CHANGE UP (ref 1.8–2.4)
MCHC RBC-ENTMCNC: 32.5 PG — HIGH (ref 27–31)
MCHC RBC-ENTMCNC: 33.1 G/DL — SIGNIFICANT CHANGE UP (ref 32–37)
MCV RBC AUTO: 98 FL — SIGNIFICANT CHANGE UP (ref 81–99)
NRBC # BLD: 0 /100 WBCS — SIGNIFICANT CHANGE UP (ref 0–0)
PLATELET # BLD AUTO: 85 K/UL — LOW (ref 130–400)
PMV BLD: 10.4 FL — SIGNIFICANT CHANGE UP (ref 7.4–10.4)
POTASSIUM SERPL-MCNC: 4.3 MMOL/L — SIGNIFICANT CHANGE UP (ref 3.5–5)
POTASSIUM SERPL-SCNC: 4.3 MMOL/L — SIGNIFICANT CHANGE UP (ref 3.5–5)
RBC # BLD: 3.57 M/UL — LOW (ref 4.2–5.4)
RBC # FLD: 14.1 % — SIGNIFICANT CHANGE UP (ref 11.5–14.5)
SODIUM SERPL-SCNC: 132 MMOL/L — LOW (ref 135–146)
WBC # BLD: 5.88 K/UL — SIGNIFICANT CHANGE UP (ref 4.8–10.8)
WBC # FLD AUTO: 5.88 K/UL — SIGNIFICANT CHANGE UP (ref 4.8–10.8)

## 2024-02-14 PROCEDURE — 99233 SBSQ HOSP IP/OBS HIGH 50: CPT

## 2024-02-14 PROCEDURE — 93010 ELECTROCARDIOGRAM REPORT: CPT

## 2024-02-14 PROCEDURE — 71045 X-RAY EXAM CHEST 1 VIEW: CPT | Mod: 26

## 2024-02-14 PROCEDURE — 99232 SBSQ HOSP IP/OBS MODERATE 35: CPT

## 2024-02-14 RX ORDER — METOPROLOL TARTRATE 50 MG
5 TABLET ORAL ONCE
Refills: 0 | Status: COMPLETED | OUTPATIENT
Start: 2024-02-14 | End: 2024-02-14

## 2024-02-14 RX ORDER — METOPROLOL TARTRATE 50 MG
5 TABLET ORAL ONCE
Refills: 0 | Status: DISCONTINUED | OUTPATIENT
Start: 2024-02-14 | End: 2024-02-14

## 2024-02-14 RX ORDER — LACTULOSE 10 G/15ML
10 SOLUTION ORAL
Refills: 0 | Status: DISCONTINUED | OUTPATIENT
Start: 2024-02-14 | End: 2024-02-15

## 2024-02-14 RX ORDER — SODIUM CHLORIDE 9 MG/ML
1000 INJECTION INTRAMUSCULAR; INTRAVENOUS; SUBCUTANEOUS
Refills: 0 | Status: DISCONTINUED | OUTPATIENT
Start: 2024-02-14 | End: 2024-02-17

## 2024-02-14 RX ORDER — MAGNESIUM SULFATE 500 MG/ML
2 VIAL (ML) INJECTION ONCE
Refills: 0 | Status: COMPLETED | OUTPATIENT
Start: 2024-02-14 | End: 2024-02-14

## 2024-02-14 RX ADMIN — LISINOPRIL 10 MILLIGRAM(S): 2.5 TABLET ORAL at 05:02

## 2024-02-14 RX ADMIN — Medication 1 SPRAY(S): at 17:32

## 2024-02-14 RX ADMIN — GABAPENTIN 300 MILLIGRAM(S): 400 CAPSULE ORAL at 05:01

## 2024-02-14 RX ADMIN — PANTOPRAZOLE SODIUM 40 MILLIGRAM(S): 20 TABLET, DELAYED RELEASE ORAL at 05:01

## 2024-02-14 RX ADMIN — Medication 1 SPRAY(S): at 05:03

## 2024-02-14 RX ADMIN — OXYCODONE HYDROCHLORIDE 10 MILLIGRAM(S): 5 TABLET ORAL at 05:30

## 2024-02-14 RX ADMIN — SODIUM CHLORIDE 50 MILLILITER(S): 9 INJECTION INTRAMUSCULAR; INTRAVENOUS; SUBCUTANEOUS at 11:34

## 2024-02-14 RX ADMIN — Medication 1 TABLET(S): at 11:14

## 2024-02-14 RX ADMIN — Medication 25 GRAM(S): at 05:02

## 2024-02-14 RX ADMIN — LACTULOSE 10 GRAM(S): 10 SOLUTION ORAL at 17:30

## 2024-02-14 RX ADMIN — Medication 20 MILLIGRAM(S): at 05:02

## 2024-02-14 RX ADMIN — ROPINIROLE 0.25 MILLIGRAM(S): 8 TABLET, FILM COATED, EXTENDED RELEASE ORAL at 05:02

## 2024-02-14 RX ADMIN — CHLORHEXIDINE GLUCONATE 1 APPLICATION(S): 213 SOLUTION TOPICAL at 11:15

## 2024-02-14 RX ADMIN — Medication 1 TABLET(S): at 11:13

## 2024-02-14 RX ADMIN — OXYCODONE HYDROCHLORIDE 10 MILLIGRAM(S): 5 TABLET ORAL at 05:01

## 2024-02-14 RX ADMIN — ROPINIROLE 0.25 MILLIGRAM(S): 8 TABLET, FILM COATED, EXTENDED RELEASE ORAL at 17:30

## 2024-02-14 RX ADMIN — Medication 975 MILLIGRAM(S): at 05:30

## 2024-02-14 RX ADMIN — Medication 975 MILLIGRAM(S): at 05:02

## 2024-02-14 RX ADMIN — Medication 25 MILLIGRAM(S): at 05:01

## 2024-02-14 RX ADMIN — Medication 975 MILLIGRAM(S): at 17:32

## 2024-02-14 RX ADMIN — POLYETHYLENE GLYCOL 3350 17 GRAM(S): 17 POWDER, FOR SOLUTION ORAL at 05:01

## 2024-02-14 RX ADMIN — Medication 5000 UNIT(S): at 11:14

## 2024-02-14 RX ADMIN — LACTULOSE 10 GRAM(S): 10 SOLUTION ORAL at 11:20

## 2024-02-14 RX ADMIN — POLYETHYLENE GLYCOL 3350 17 GRAM(S): 17 POWDER, FOR SOLUTION ORAL at 17:30

## 2024-02-14 NOTE — PRE-OP CHECKLIST - 4.
Based on monitor HR ranged from 135-235. Patient has diagnosis of chronic afib. Dr. Driscoll evaluated patient. 12 EKG done and BP taken.

## 2024-02-14 NOTE — PROGRESS NOTE ADULT - SUBJECTIVE AND OBJECTIVE BOX
HPI:  89F w/ h/o chronic afib (on Eliquis), GERD, and chronic low back pain presenting s/p fall. Patient known to have chronic low back pain that has progressively worsened over the years. Pain acutely worsened two weeks ago and patient subsequently fell due to inability to support own weight. Evaluated by PCP after initial fall due to worsening pain. Prescribed Percocet 5-325 bid w/ mild relief. However, pt once again fell earlier today after pt unable to support her own weight while trying to ambulate. Now unable to ambulate or bear weight, even w/ assistance. No reported HT, LOC, lightheadedness, confusion, loss of continence, or focal weakness a/w either fall. No reported fevers, chills, CP, palpitations, SOB, abdominal pain, n/v/d, or dysuria (although daughter did note malodorous urine recently).    Interval history  -Patient seen at bedside with alyssa Garcia  -Patient notes leg pain with movement, but only mild pain at rest  2/7: Patient now DNR/DNI, added 10mg oxycontin Q12H for consistent pain  2/12: added PRN oxycodone 5mg Q6H for pain  2/14: stopped oxycodone ER 10mg since patient has been more somnolent and hypotensive occasionally      ADVANCE DIRECTIVES:     [ ] Full Code [x ] DNR  MOLST  [ ]  Living Will  [ ]   DECISION MAKER(s):  [ ] Health Care Proxy(s)  [x ] Surrogate(s)  [ ] Guardian           Name(s): Phone Number(s):  Daughter      BASELINE (I)ADL(s) (prior to admission):    [place]: [ ]Total  [ ] Moderate [ ]Dependent  Palliative Performance Status Version 2:         %    http://npcrc.org/files/news/palliative_performance_scale_[email or web address].pdf    Allergies    No Known Allergies    Intolerances    MEDICATIONS  (STANDING):  acetaminophen     Tablet .. 975 milliGRAM(s) Oral every 8 hours  calcium carbonate   1250 mG (OsCal) 1 Tablet(s) Oral daily  chlorhexidine 2% Cloths 1 Application(s) Topical daily  cholecalciferol 5000 Unit(s) Oral daily  fluticasone propionate 50 MICROgram(s)/spray Nasal Spray 1 Spray(s) Both Nostrils two times a day  furosemide    Tablet 20 milliGRAM(s) Oral daily  gabapentin 300 milliGRAM(s) Oral three times a day  lactulose Syrup 10 Gram(s) Oral two times a day  lidocaine   4% Patch 1 Patch Transdermal every 24 hours  lisinopril 10 milliGRAM(s) Oral daily  magnesium sulfate  IVPB 2 Gram(s) IV Intermittent once  metoprolol tartrate 25 milliGRAM(s) Oral two times a day  multivitamin 1 Tablet(s) Oral daily  pantoprazole    Tablet 40 milliGRAM(s) Oral before breakfast  polyethylene glycol 3350 17 Gram(s) Oral two times a day  rOPINIRole 0.25 milliGRAM(s) Oral two times a day  senna 2 Tablet(s) Oral at bedtime  sodium chloride 0.9%. 1000 milliLiter(s) (50 mL/Hr) IV Continuous <Continuous>  tamsulosin 0.8 milliGRAM(s) Oral at bedtime    MEDICATIONS  (PRN):  methocarbamol 750 milliGRAM(s) Oral every 8 hours PRN Muscle Spasm  oxyCODONE    IR 5 milliGRAM(s) Oral every 6 hours PRN pain (4-10)        PRESENT SYMPTOMS: [ ]Unable to obtain due to poor mentation   Source if other than patient:  [ ]Family   [ ]Team     Pain: [ ]yes [x ]no  QOL impact -    Location -   Aggravating factors -   Quality -    Radiation -   Timing-    Severity (0-10 scale):   Minimal acceptable level (0-10 scale):     CPOT:    https://[email or web address]/getattachment/[email or web address]/Critical-Care-Pain-Observation-Tool-(CPOT)    PAIN AD Score:   [email or web address]/[email or web address].pdf (press ctrl +  left click to view)    Dyspnea:                           [x ]None[ ]Mild [ ]Moderate [ ]Severe     Respiratory Distress Observation Scale (RDOS):   A score of 0 to 2 signifies little or no respiratory distress, 3 signifies mild distress, scores 4 to 6 indicate moderate distress, and scores greater than 7 signify severe distress  [email or web address]://[email or web address]/sites/default/files/PDFS/945179-fxktdxmrynd-bgusqmth-okvgcrinsam-kfdxn.pdf    Anxiety:                             [ x]None[ ]Mild [ ]Moderate [ ]Severe   Fatigue:                             [x ]None[ ]Mild [ ]Moderate [ ]Severe   Nausea:                             [ x]None[ ]Mild [ ]Moderate [ ]Severe   Loss of appetite:              [x ]None[ ]Mild [ ]Moderate [ ]Severe   Constipation:                    [x ]None[ ]Mild [ ]Moderate [ ]Severe    Other Symptoms:  [ x]All other review of systems negative     Palliative Performance Status Version 2:         30%    http://[email or web address]/files/[email or web address]_performance_scale_[email or web address].pdf    PHYSICAL EXAM:    Vital Signs Last 24 Hrs  T(C): 35.8 (14 Feb 2024 07:05), Max: 35.8 (14 Feb 2024 00:04)  T(F): 96.4 (14 Feb 2024 05:00), Max: 96.4 (14 Feb 2024 00:04)  HR: 100 (14 Feb 2024 07:05) (58 - 100)  BP: 113/63 (14 Feb 2024 07:05) (103/57 - 113/63)  BP(mean): 84 (14 Feb 2024 07:05) (84 - 84)  RR: 20 (14 Feb 2024 07:05) (18 - 20)  SpO2: 98% (14 Feb 2024 07:05) (98% - 98%)          GENERAL:  [ ] No acute distress [ ]Lethargic  [ ]Unarousable  [ x]Verbal  [ ]Non-Verbal [ ]Cachexia    BEHAVIORAL/PSYCH:  [x ]Alert and Oriented x2  [ ] Anxiety [ ] Delirium [ ] Agitation [ x] Calm   EYES: [ x] No scleral icterus [ ] Scleral icterus [ ] Closed  ENMT:  [ ]Dry mouth  [x ]No external oral lesions [ ] No external ear or nose lesions  CARDIOVASCULAR:  [ ]Regular [ ]Irregular [ ]Tachy [x ]Not Tachy  [ ]Sung [ ] Edema [ ] No edema  PULMONARY:  [ ]Tachypnea  [ ]Audible excessive secretions [ x] No labored breathing [ ] labored breathing  GASTROINTESTINAL: [ ]Soft  [ ]Distended  [x ]Not distended [ ]Non tender [ ]Tender  MUSCULOSKELETAL: [ ]No clubbing [ ] clubbing  [x ] No cyanosis [ ] cyanosis  NEUROLOGIC: [ ]No focal deficits  [x ]Follows commands  [ ]Does not follow commands  [ x]Cognitive impairment  [ ]Dysphagia  [ ]Dysarthria  [ ]Paresis   SKIN: [ ] Jaundiced [ x] Non-jaundiced [ ]Rash [ ]No Rash [ ] Warm [ ] Dry  MISC/LINES: [ ] ET tube [ ] Trach [ ]NGT/OGT [ ]PEG [ ]David    LABS: reviewed by me                                     11.3   6.43  )-----------( 98       ( 13 Feb 2024 21:02 )             33.8   02-13    131<L>  |  95<L>  |  44<H>  ----------------------------<  100<H>  4.5   |  28  |  1.4    Ca    8.2<L>      13 Feb 2024 21:02  Mg     1.7     02-13    TPro  4.5<L>  /  Alb  3.1<L>  /  TBili  0.7  /  DBili  x   /  AST  23  /  ALT  15  /  AlkPhos  56  02-13  PT/INR - ( 13 Feb 2024 21:02 )   PT: 12.10 sec;   INR: 1.06 ratio         PTT - ( 13 Feb 2024 21:02 )  PTT:27.4 sec    Urinalysis Basic - ( 13 Feb 2024 21:02 )    Color: x / Appearance: x / SG: x / pH: x  Gluc: 100 mg/dL / Ketone: x  / Bili: x / Urobili: x   Blood: x / Protein: x / Nitrite: x   Leuk Esterase: x / RBC: x / WBC x   Sq Epi: x / Non Sq Epi: x / Bacteria: x              RADIOLOGY & ADDITIONAL STUDIES: reviewed by me    < from: NM SPECT Bone Scan, Single Area Single Day (02.01.24 @ 16:43) >  IMPRESSION:    Abnormal uptake in the L1 vertebral body, corresponding to known   compression fracture deformity secondary to vertebral body mass.    No other evidence for metastatic bone disease.    < end of copied text >        EKG: reviewed by me    < from: 12 Lead ECG (01.25.24 @ 11:34) >  Ventricular Rate 94 BPM    Atrial Rate 81 BPM    QRS Duration 90 ms    Q-T Interval 384 ms    QTC Calculation(Bazett) 480 ms    R Axis 166 degrees    T Axis -48 degrees    Diagnosis Line Atrial fibrillation  Right ventricular hypertrophy  Anterior infarct , age undetermined  ST & T wave abnormality, consider inferior ischemia  Abnormal ECG    < end of copied text >        PROTEIN CALORIE MALNUTRITION PRESENT: [ ]mild [ ]moderate [ ]severe [ ]underweight [ ]morbid obesity  https://www.andeal.org/vault/2440/web/files/ONC/Table_Clinical%20Characteristics%20to%20Document%20Malnutrition-White%20JV%20et%20al%202012.pdf    Height (cm): 152.4 (01-30-24 @ 11:24)  Weight (kg): 70.3 (01-30-24 @ 11:24)  BMI (kg/m2): 30.3 (01-30-24 @ 11:24)  [ ]PPSV2 < or = to 30% [ ]significant weight loss  [ ]poor nutritional intake  [ ]anasarca      [ ]Artificial Nutrition      Palliative Care Spiritual/Emotional Screening Tool Question  Severity (0-4):                    OR                    [ x] Unable to determine. Will assess at later time if appropriate.  Score of 2 or greater indicates recommendation of Chaplaincy and/or SW referral  Chaplaincy Referral: [ ] Yes [ ] Refused [ ] Following     Caregiver [place]:  [ ] Yes [ ] No    OR    [x ] Unable to determine. Will assess at later time if appropriate.  Social Work Referral [ ]  Patient and Family Centered Care Referral [ ]    Anticipatory Grief Present: [ ] Yes [ ] No    OR     [ x] Unable to determine. Will assess at later time if appropriate.  Social Work Referral [ ]  Patient and Family Centered Care Referral [ ]    Patient discussed with primary medical team MD  Palliative care education provided to patient and/or family

## 2024-02-14 NOTE — PROGRESS NOTE ADULT - SUBJECTIVE AND OBJECTIVE BOX
SUBJECTIVE:    Patient is a 89y old Female who presents with a chief complaint of Back Pain (14 Feb 2024 16:51)    Currently admitted to medicine with the primary diagnosis of Inability to ambulate due to multiple joints       Today is hospital day 21d.     PAST MEDICAL & SURGICAL HISTORY  Chronic atrial fibrillation    GERD (gastroesophageal reflux disease)    Chronic lower back pain    No significant past surgical history      ALLERGIES:  No Known Allergies    MEDICATIONS:  STANDING MEDICATIONS  acetaminophen     Tablet .. 975 milliGRAM(s) Oral every 8 hours  calcium carbonate   1250 mG (OsCal) 1 Tablet(s) Oral daily  chlorhexidine 2% Cloths 1 Application(s) Topical daily  cholecalciferol 5000 Unit(s) Oral daily  fluticasone propionate 50 MICROgram(s)/spray Nasal Spray 1 Spray(s) Both Nostrils two times a day  furosemide    Tablet 20 milliGRAM(s) Oral daily  lactulose Syrup 10 Gram(s) Oral two times a day  lidocaine   4% Patch 1 Patch Transdermal every 24 hours  lisinopril 10 milliGRAM(s) Oral daily  magnesium sulfate  IVPB 2 Gram(s) IV Intermittent once  metoprolol tartrate 25 milliGRAM(s) Oral two times a day  multivitamin 1 Tablet(s) Oral daily  pantoprazole    Tablet 40 milliGRAM(s) Oral before breakfast  polyethylene glycol 3350 17 Gram(s) Oral two times a day  rOPINIRole 0.25 milliGRAM(s) Oral two times a day  senna 2 Tablet(s) Oral at bedtime  sodium chloride 0.9%. 1000 milliLiter(s) IV Continuous <Continuous>  tamsulosin 0.8 milliGRAM(s) Oral at bedtime    PRN MEDICATIONS  methocarbamol 750 milliGRAM(s) Oral every 8 hours PRN    VITALS:   T(F): 96.4  HR: 100  BP: 113/63  RR: 20  SpO2: 98%    LABS:                        11.3   6.43  )-----------( 98       ( 13 Feb 2024 21:02 )             33.8     02-13    131<L>  |  95<L>  |  44<H>  ----------------------------<  100<H>  4.5   |  28  |  1.4    Ca    8.2<L>      13 Feb 2024 21:02  Mg     1.7     02-13    TPro  4.5<L>  /  Alb  3.1<L>  /  TBili  0.7  /  DBili  x   /  AST  23  /  ALT  15  /  AlkPhos  56  02-13    PT/INR - ( 13 Feb 2024 21:02 )   PT: 12.10 sec;   INR: 1.06 ratio         PTT - ( 13 Feb 2024 21:02 )  PTT:27.4 sec  Urinalysis Basic - ( 13 Feb 2024 21:02 )    Color: x / Appearance: x / SG: x / pH: x  Gluc: 100 mg/dL / Ketone: x  / Bili: x / Urobili: x   Blood: x / Protein: x / Nitrite: x   Leuk Esterase: x / RBC: x / WBC x   Sq Epi: x / Non Sq Epi: x / Bacteria: x                RADIOLOGY:    PHYSICAL EXAM:  GEN: No acute distress  LUNGS: Clear to auscultation bilaterally   HEART: S1/S2 present. RRR.   ABD/ GI: Soft, non-tender, non-distended. Bowel sounds present  EXT: NC/NC/NE/2+PP/DIEHL  NEURO: AAOX3

## 2024-02-14 NOTE — PRE-OP CHECKLIST - 3.
Received patient in pre-op. Patient A/Ox2. Denies pain/discomfort. Safety precautions maintained. All questions and concerns were addressed.

## 2024-02-14 NOTE — PROGRESS NOTE ADULT - ASSESSMENT
89F w/ h/o chronic afib (on Eliquis), GERD, and chronic low back pain presenting s/p fall. 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)  - Radiation Oncology consulted, and s/p 5 sessions of RT, completed tuesday 2/6/24  - Pain control oxycodone ER 10 BID and lidocaine patch, will restart Oxy 5 IR for breakthrough pain -- patient obtunded from oxycodone  - Oncology will start immunotherapy after surgery, slowly growing tumor  -  Neuro sx planning Kyphoplasty cancelled today as was hypotensive from too much oxycodone-- HR was normal    #Perioperative Risk Stratification   - Agree with Cardiac Risk Assessment:   RCRI 1-2 - Class III risk 10.1% risk of 30 day MACE   METS <4 - including valvular abn + moderate PHTN   high risk patient for moderate risk surgery   -- Agree with Cardiology, pt is high risk for a moderate risk surgery, but no further workup indicated at this time prior to planned intervention     # On 2/10 had episode of unresponsives and bradycardia/hypotension. Likely  overmedication.     #CHF, diastolic - acute -- now resolved  - TTE (May 2013) demonstrated LVEF 55-65% w/ normal global LVSF and no significant valvulopathy.  - TTE 1/28- EF 60-65% mod pulm HTN   - elevated BNP  - Tele monitored for 24 hrs for now   - Currently euvolemic on exam,     #Chronic Atrial Fibrillation  CHADS-VASC 3+ (Age x2, Female)  - Cont metoprolol   - Eliquis held for surgery     #GERD  - c/w pantoprazole 40mg PO QD (therapeutic interchange for omeprazole)    #CKD 3b   per PCP baseline is 1.2    #Urinary retention- sec to cancer-hanson placed 1/29  -flomax increased to 0.8mg   - completed RT, will try TOV after surgery, and after walking with PT    #Constipation  -senna, miralax, lactulose    # HTN-- on metoprolol and started on lisinopril 10mg 2/4  patient had stable BP and is off oxycodone today-- heart rate on telemetry was 70s -- can go for kyphoplasty will wean off opioids  also causing severe constipation.  PENDING: neurosx for kyphoplasty  friday?, holding [employer] f/u heme onc post surgery regarding starting immunotherapy

## 2024-02-14 NOTE — PROGRESS NOTE ADULT - ASSESSMENT
89yFemale with history of afib on eliquis, GERD, chronic low back pain presents after fall. Patient found to have lumbar pathological fracture on admission. Palliative care consulted for GOC and symptom management.    Patient seen at bedside. Patient's daughter stated that the patient has been slightly more somnolent and confused on occasion with her being slightly hypotensive occasionally as well. She attributed it to the oxycodone ER 10mg since she feels like that is when the symptoms started presenting themselves. Stopped oxycodone ER 10mg for now to see if mental status and blood pressures improve, kept PRN oxycodone 5mg for breakthrough pain.    Education about palliative care provided to patient/family.  See Recs below.    Please call [phone] with questions or concerns 24/7.   We will continue to follow.

## 2024-02-14 NOTE — CHART NOTE - NSCHARTNOTEFT_GEN_A_CORE
Pt was scheduled for L1-2 laminectomy, L1 kyphoplasty, T12-L2 instrumentation today but it was cancelled since the patient was hypotensive and in acute a-fib. Pt received IV fluids and was back on metoprolol.  Subsequently she was taken back to .  Upon clearance from medicine pt will be scheduled for OR tomorrow. Pt brought to pre-op found to be hypotensive and in Afib RVR. She was evaluated by medicine and Dr. Nair.  Case was cancelled. Pt awaiting optimization and will be rescheduled for tomorrow.

## 2024-02-14 NOTE — PROGRESS NOTE ADULT - ASSESSMENT
89F w/ h/o chronic afib (on Eliquis), GERD, and chronic low back pain presenting s/p fall. 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)  - Radiation Oncology consulted, and plan for 5 sessions of RT, completed tuesday 2/6/24  - Pain control oxycodone ER 10 BID and lidocaine patch, patient was comfortable this morning  - Oncology will start immunotherapy after surgery, slowly growing tumor  - Was planned for Kyphoplasty today, was cancelled due to hypotension with a concern for Atrial fibrillation with RVR, received 500 ml fluid bolus and 5 mg push of metoprolol, monitor showing erratic reading, the ventricular rate was well controlled within the range of 70-80, patient not symptomatic  - Patient was a rapid response 4 days ago due to AMS and hypotension with BP of 75/50, received IV fluids and narcan, progressively regained consciousness and BP improved. Was called for low blood pressure 2 days ago too which improved on repeat readings, patient's blood pressure runs low, d/aleksandar the oxycodone today.   - Patient was vitally stable throughout the day, asymptomatic with well controlled ventricular rate. Patient stable to undergo surgery, following with neurosurgery    #CHF, diastolic - acute -- now resolved  # On 2/10 had episode of unresponsives and bradycardia/hypotension. Likely vasovagal. CT head negative  - TTE (May 2013) demonstrated LVEF 55-65% w/ normal global LVSF and no significant valvulopathy.  - TTE 1/28- EF 60-65% mod pulm HTN   - elevated BNP  - Tele monitored for 24 hrs with no dysrhythmia   - Currently euvolemic on exam, transition back to home oral lasix 20 daily    #Chronic Atrial Fibrillation  CHADS-VASC 3+ (Age x2, Female). Only on AC. Previously on Lopressor, but only takes intermittently as HR currently rate-controlled off meds.  - Eliquis held for surgery     #GERD  - c/w pantoprazole 40mg PO QD (therapeutic interchange for omeprazole)    #CKD 3b   per PCP baseline is 1.2    #Urinary retention-likely neurogenic   -hanson placed 1/29  -flomax increased to 0.8mg   - completed RT, will try TOV after walking with PT    #Constipation  -senna, miralax, lactulose    # HTN-- on metoprolol and started on lisinopril 10mg 2/4    PENDING: neurosx for kyphoplasty tomorrow, holding eliquis

## 2024-02-15 PROCEDURE — 99232 SBSQ HOSP IP/OBS MODERATE 35: CPT

## 2024-02-15 RX ORDER — SENNA PLUS 8.6 MG/1
2 TABLET ORAL AT BEDTIME
Refills: 0 | Status: DISCONTINUED | OUTPATIENT
Start: 2024-02-15 | End: 2024-03-03

## 2024-02-15 RX ORDER — MAGNESIUM SULFATE 500 MG/ML
2 VIAL (ML) INJECTION ONCE
Refills: 0 | Status: DISCONTINUED | OUTPATIENT
Start: 2024-02-15 | End: 2024-02-23

## 2024-02-15 RX ORDER — ROPINIROLE 8 MG/1
0.25 TABLET, FILM COATED, EXTENDED RELEASE ORAL
Refills: 0 | Status: DISCONTINUED | OUTPATIENT
Start: 2024-02-15 | End: 2024-03-03

## 2024-02-15 RX ORDER — POLYETHYLENE GLYCOL 3350 17 G/17G
17 POWDER, FOR SOLUTION ORAL
Refills: 0 | Status: DISCONTINUED | OUTPATIENT
Start: 2024-02-15 | End: 2024-03-03

## 2024-02-15 RX ORDER — CHLORHEXIDINE GLUCONATE 213 G/1000ML
1 SOLUTION TOPICAL DAILY
Refills: 0 | Status: DISCONTINUED | OUTPATIENT
Start: 2024-02-15 | End: 2024-03-03

## 2024-02-15 RX ORDER — METHOCARBAMOL 500 MG/1
750 TABLET, FILM COATED ORAL EVERY 8 HOURS
Refills: 0 | Status: DISCONTINUED | OUTPATIENT
Start: 2024-02-15 | End: 2024-02-20

## 2024-02-15 RX ORDER — METOPROLOL TARTRATE 50 MG
25 TABLET ORAL
Refills: 0 | Status: DISCONTINUED | OUTPATIENT
Start: 2024-02-15 | End: 2024-02-16

## 2024-02-15 RX ORDER — HYDROMORPHONE HYDROCHLORIDE 2 MG/ML
0.5 INJECTION INTRAMUSCULAR; INTRAVENOUS; SUBCUTANEOUS
Refills: 0 | Status: DISCONTINUED | OUTPATIENT
Start: 2024-02-15 | End: 2024-02-16

## 2024-02-15 RX ORDER — LACTULOSE 10 G/15ML
10 SOLUTION ORAL
Refills: 0 | Status: DISCONTINUED | OUTPATIENT
Start: 2024-02-15 | End: 2024-02-20

## 2024-02-15 RX ORDER — ACETAMINOPHEN 500 MG
975 TABLET ORAL EVERY 8 HOURS
Refills: 0 | Status: DISCONTINUED | OUTPATIENT
Start: 2024-02-15 | End: 2024-02-20

## 2024-02-15 RX ORDER — FLUTICASONE PROPIONATE 50 MCG
1 SPRAY, SUSPENSION NASAL
Refills: 0 | Status: DISCONTINUED | OUTPATIENT
Start: 2024-02-15 | End: 2024-03-03

## 2024-02-15 RX ORDER — DILTIAZEM HCL 120 MG
5 CAPSULE, EXT RELEASE 24 HR ORAL
Qty: 125 | Refills: 0 | Status: DISCONTINUED | OUTPATIENT
Start: 2024-02-15 | End: 2024-02-16

## 2024-02-15 RX ORDER — SODIUM CHLORIDE 9 MG/ML
1000 INJECTION, SOLUTION INTRAVENOUS
Refills: 0 | Status: DISCONTINUED | OUTPATIENT
Start: 2024-02-15 | End: 2024-02-16

## 2024-02-15 RX ORDER — TAMSULOSIN HYDROCHLORIDE 0.4 MG/1
0.8 CAPSULE ORAL AT BEDTIME
Refills: 0 | Status: DISCONTINUED | OUTPATIENT
Start: 2024-02-15 | End: 2024-03-03

## 2024-02-15 RX ORDER — CALCIUM CARBONATE 500(1250)
1 TABLET ORAL DAILY
Refills: 0 | Status: DISCONTINUED | OUTPATIENT
Start: 2024-02-15 | End: 2024-03-03

## 2024-02-15 RX ORDER — LISINOPRIL 2.5 MG/1
10 TABLET ORAL DAILY
Refills: 0 | Status: DISCONTINUED | OUTPATIENT
Start: 2024-02-15 | End: 2024-02-16

## 2024-02-15 RX ORDER — CHOLECALCIFEROL (VITAMIN D3) 125 MCG
5000 CAPSULE ORAL DAILY
Refills: 0 | Status: DISCONTINUED | OUTPATIENT
Start: 2024-02-15 | End: 2024-03-03

## 2024-02-15 RX ORDER — LIDOCAINE 4 G/100G
1 CREAM TOPICAL EVERY 24 HOURS
Refills: 0 | Status: DISCONTINUED | OUTPATIENT
Start: 2024-02-15 | End: 2024-03-03

## 2024-02-15 RX ORDER — PANTOPRAZOLE SODIUM 20 MG/1
40 TABLET, DELAYED RELEASE ORAL
Refills: 0 | Status: DISCONTINUED | OUTPATIENT
Start: 2024-02-15 | End: 2024-03-03

## 2024-02-15 RX ADMIN — Medication 975 MILLIGRAM(S): at 05:35

## 2024-02-15 RX ADMIN — Medication 975 MILLIGRAM(S): at 15:30

## 2024-02-15 RX ADMIN — CHLORHEXIDINE GLUCONATE 1 APPLICATION(S): 213 SOLUTION TOPICAL at 12:29

## 2024-02-15 RX ADMIN — LISINOPRIL 10 MILLIGRAM(S): 2.5 TABLET ORAL at 05:35

## 2024-02-15 RX ADMIN — Medication 1 SPRAY(S): at 05:36

## 2024-02-15 RX ADMIN — Medication 5 MG/HR: at 20:00

## 2024-02-15 RX ADMIN — LACTULOSE 10 GRAM(S): 10 SOLUTION ORAL at 05:35

## 2024-02-15 RX ADMIN — TAMSULOSIN HYDROCHLORIDE 0.8 MILLIGRAM(S): 0.4 CAPSULE ORAL at 23:08

## 2024-02-15 RX ADMIN — POLYETHYLENE GLYCOL 3350 17 GRAM(S): 17 POWDER, FOR SOLUTION ORAL at 05:35

## 2024-02-15 RX ADMIN — Medication 25 MILLIGRAM(S): at 05:35

## 2024-02-15 RX ADMIN — PANTOPRAZOLE SODIUM 40 MILLIGRAM(S): 20 TABLET, DELAYED RELEASE ORAL at 05:35

## 2024-02-15 RX ADMIN — Medication 975 MILLIGRAM(S): at 14:40

## 2024-02-15 RX ADMIN — Medication 975 MILLIGRAM(S): at 06:12

## 2024-02-15 RX ADMIN — Medication 975 MILLIGRAM(S): at 23:08

## 2024-02-15 RX ADMIN — ROPINIROLE 0.25 MILLIGRAM(S): 8 TABLET, FILM COATED, EXTENDED RELEASE ORAL at 05:35

## 2024-02-15 NOTE — PROGRESS NOTE ADULT - ASSESSMENT
89F w/ h/o chronic afib (on Eliquis), GERD, and chronic low back pain presenting s/p fall. 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)  - Radiation Oncology consulted, and s/p 5 sessions of RT, completed tuesday 2/6/24  - Pain control oxycodone ER 10 BID and lidocaine patch, will restart Oxy 5 IR for breakthrough pain -- patient obtunded from oxycodone  - Oncology will start immunotherapy after surgery, slowly growing tumor  -  Neuro sx planning Kyphoplasty planned today    #Perioperative Risk Stratification   - Agree with Cardiac Risk Assessment:   RCRI 1-2 - Class III risk 10.1% risk of 30 day MACE   METS <4 - including valvular abn + moderate PHTN   high risk patient for moderate risk surgery   -- Agree with Cardiology, pt is high risk for a moderate risk surgery, but no further workup indicated at this time prior to planned intervention     # On 2/10 and 2/14- had episode of unresponsives and bradycardia/hypotension. Likely  overmedication.  off lasix    #CHF, diastolic - acute -- now resolved  - TTE (May 2013) demonstrated LVEF 55-65% w/ normal global LVSF and no significant valvulopathy.  - TTE 1/28- EF 60-65% mod pulm HTN   - elevated BNP  - Tele monitored for 24 hrs for now   - Currently euvolemic on exam,     #Chronic Atrial Fibrillation  CHADS-VASC 3+ (Age x2, Female)  - Cont metoprolol   - Eliquis held for surgery     #GERD  - c/w pantoprazole 40mg PO QD (therapeutic interchange for omeprazole)    #CKD 3b-- 1.5   per PCP baseline is 1.2    #Urinary retention- sec to cancer-hanson placed 1/29  -flomax increased to 0.8mg   - completed RT, will try TOV after surgery, and after walking with PT    #Constipation  -senna, miralax, lactulose    # HTN-- on metoprolol and started on lisinopril 10mg 2/4  patient had stable BP and is off oxycodone today-- heart rate on telemetry was 70s -- can go for kyphoplasty will wean off opioids  also causing severe constipation.  PENDING: neurosx for kyphoplasty  thursday?, holding eliquis, f/u heme onc post surgery regarding starting immunotherapy

## 2024-02-15 NOTE — PACU DISCHARGE NOTE - PAIN:
Accepted in sign-out from Dr Michael Corbin  At 1705  Labs are pending in an individual who was recently admitted for rhabdomyolysis he presents from the snf due to continued tachycardia with heart rates in the 150s he is a sinus tachycardia  Patient was re-evaluated at 1928 he has no complaints he maintains a sinus tachycardia of 129 there is no evidence of recurrent rhabdo troponin is negative chest x-ray is unremarkable he has no complaints on reexamination is lungs are clear CV S1-S2 tachy abdomen is soft and nontender  Secondary to elevated D-dimer will send patient to for a CTA of the chest to evaluate for possibility of pulmonary embolism as a cause of his tachycardia  CTA of the chest negative  On recheck patient's heart rate is 112      Will return to his correctional facility      Patient's discharge summary from his [address] stay was reviewed    Impression is sinus tachycardia      Siri Wilson MD  11/20/22 7590 Controlled with current regime

## 2024-02-15 NOTE — PROGRESS NOTE ADULT - SUBJECTIVE AND OBJECTIVE BOX
24H events:    Patient is a 89y old Female who presents with a chief complaint of Back Pain (14 Feb 2024 17:22)    Primary diagnosis of Inability to ambulate due to multiple joints        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 No reported tobacco, alcohol, or illicit/recreational drug use. ADL's limited by progressively worsening ability to ambulate. Ambulates w/ walker. (24 Jan 2024 15:07)      ALLERGIES:  No Known Allergies      VITALS:   T(F): 96.7  HR: 90  BP: 134/59  RR: 18  SpO2: --    LABS:                        11.6   5.88  )-----------( 85       ( 14 Feb 2024 20:00 )             35.0     02-14    132<L>  |  97<L>  |  43<H>  ----------------------------<  107<H>  4.3   |  24  |  1.5    Ca    8.4      14 Feb 2024 20:00  Mg     2.0     02-14      PT/INR - ( 13 Feb 2024 21:02 )   PT: 12.10 sec;   INR: 1.06 ratio         PTT - ( 13 Feb 2024 21:02 )  PTT:27.4 sec  Urinalysis Basic - ( 14 Feb 2024 20:00 )    Color: x / Appearance: x / SG: x / pH: x  Gluc: 107 mg/dL / Ketone: x  / Bili: x / Urobili: x   Blood: x / Protein: x / Nitrite: x   Leuk Esterase: x / RBC: x / WBC x   Sq Epi: x / Non Sq Epi: x / Bacteria: x                    HOME MEDICATIONS:  apixaban 2.5 mg oral tablet: 1 tab(s) orally 2 times a day  furosemide 20 mg oral tablet: 1 tab(s) orally once a day  gabapentin 100 mg oral capsule: 2 cap(s) orally 3 times a day  omeprazole 20 mg oral delayed release capsule: 1 cap(s) orally once a day  senna (sennosides) 8.6 mg oral tablet: 2 tab(s) orally once a day (at bedtime)  Vitamin C 500 mg oral tablet: 1 tab(s) orally once a day      MEDICATIONS:  STANDING MEDICATIONS  acetaminophen     Tablet .. 975 milliGRAM(s) Oral every 8 hours  calcium carbonate   1250 mG (OsCal) 1 Tablet(s) Oral daily  chlorhexidine 2% Cloths 1 Application(s) Topical daily  cholecalciferol 5000 Unit(s) Oral daily  fluticasone propionate 50 MICROgram(s)/spray Nasal Spray 1 Spray(s) Both Nostrils two times a day  lactulose Syrup 10 Gram(s) Oral two times a day  lidocaine   4% Patch 1 Patch Transdermal every 24 hours  lisinopril 10 milliGRAM(s) Oral daily  magnesium sulfate  IVPB 2 Gram(s) IV Intermittent once  metoprolol tartrate 25 milliGRAM(s) Oral two times a day  multivitamin 1 Tablet(s) Oral daily  pantoprazole    Tablet 40 milliGRAM(s) Oral before breakfast  polyethylene glycol 3350 17 Gram(s) Oral two times a day  rOPINIRole 0.25 milliGRAM(s) Oral two times a day  senna 2 Tablet(s) Oral at bedtime  sodium chloride 0.9%. 1000 milliLiter(s) IV Continuous <Continuous>  tamsulosin 0.8 milliGRAM(s) Oral at bedtime    PRN MEDICATIONS  methocarbamol 750 milliGRAM(s) Oral every 8 hours PRN

## 2024-02-15 NOTE — PROGRESS NOTE ADULT - SUBJECTIVE AND OBJECTIVE BOX
SUBJECTIVE:    Patient is a 89y old Female who presents with a chief complaint of Back Pain (15 Feb 2024 12:14)    Currently admitted to medicine with the primary diagnosis of Inability to ambulate due to multiple joints       Today is hospital day 22d.     PAST MEDICAL & SURGICAL HISTORY  Chronic atrial fibrillation    GERD (gastroesophageal reflux disease)    Chronic lower back pain    No significant past surgical history      ALLERGIES:  No Known Allergies    MEDICATIONS:  STANDING MEDICATIONS  acetaminophen     Tablet .. 975 milliGRAM(s) Oral every 8 hours  calcium carbonate   1250 mG (OsCal) 1 Tablet(s) Oral daily  chlorhexidine 2% Cloths 1 Application(s) Topical daily  cholecalciferol 5000 Unit(s) Oral daily  fluticasone propionate 50 MICROgram(s)/spray Nasal Spray 1 Spray(s) Both Nostrils two times a day  lactulose Syrup 10 Gram(s) Oral two times a day  lidocaine   4% Patch 1 Patch Transdermal every 24 hours  lisinopril 10 milliGRAM(s) Oral daily  magnesium sulfate  IVPB 2 Gram(s) IV Intermittent once  metoprolol tartrate 25 milliGRAM(s) Oral two times a day  multivitamin 1 Tablet(s) Oral daily  pantoprazole    Tablet 40 milliGRAM(s) Oral before breakfast  polyethylene glycol 3350 17 Gram(s) Oral two times a day  rOPINIRole 0.25 milliGRAM(s) Oral two times a day  senna 2 Tablet(s) Oral at bedtime  sodium chloride 0.9%. 1000 milliLiter(s) IV Continuous <Continuous>  tamsulosin 0.8 milliGRAM(s) Oral at bedtime    PRN MEDICATIONS  methocarbamol 750 milliGRAM(s) Oral every 8 hours PRN    VITALS:   T(F): 98.6  HR: 99  BP: 112/56  RR: 18  SpO2: 97%    LABS:                        11.6   5.88  )-----------( 85       ( 14 Feb 2024 20:00 )             35.0     02-14    132<L>  |  97<L>  |  43<H>  ----------------------------<  107<H>  4.3   |  24  |  1.5    Ca    8.4      14 Feb 2024 20:00  Mg     2.0     02-14      PT/INR - ( 13 Feb 2024 21:02 )   PT: 12.10 sec;   INR: 1.06 ratio         PTT - ( 13 Feb 2024 21:02 )  PTT:27.4 sec  Urinalysis Basic - ( 14 Feb 2024 20:00 )    Color: x / Appearance: x / SG: x / pH: x  Gluc: 107 mg/dL / Ketone: x  / Bili: x / Urobili: x   Blood: x / Protein: x / Nitrite: x   Leuk Esterase: x / RBC: x / WBC x   Sq Epi: x / Non Sq Epi: x / Bacteria: x                RADIOLOGY:    PHYSICAL EXAM:  GEN: No acute distress  LUNGS: Clear to auscultation bilaterally   HEART: S1/S2 present. RRR.   ABD/ GI: Soft, non-tender, non-distended. Bowel sounds present  EXT: NC/NC/NE/2+PP/DIEHL  NEURO: AAOX3

## 2024-02-15 NOTE — CHART NOTE - NSCHARTNOTEFT_GEN_A_CORE
Transfer from: 4B    Transfer to: [facility]    Accepting Physician: Dr. Kali Hwang    Signout given to: Zach Nava    HPI: 89F w/ h/o chronic afib (on Eliquis), GERD, and chronic low back pain presenting s/p fall. Patient known to have chronic low back pain that has progressively worsened over the years. Pain acutely worsened two weeks ago and patient subsequently fell due to inability to support own weight. Evaluated by PCP after initial fall due to worsening pain. Prescribed Percocet 5-325 bid w/ mild relief. However, pt once again fell earlier today after pt unable to support her own weight while trying to ambulate. Now unable to ambulate or bear weight, even w/ assistance. No reported HT, LOC, lightheadedness, confusion, loss of continence, or focal weakness a/w either fall. No reported fevers, chills, CP, palpitations, SOB, abdominal pain, n/v/d, or dysuria (although daughter did note malodorous urine recently). Of note, pt also reported new onset LE swelling over past 1-2 weeks. Recently started on furosemide 20 qd by PCP for swelling. No reported CP or dyspnea at rest or exertion (but functional status limited by lower back pain). No known prior h/o CHF. In ED, pt HD stable on vitals. Labs demonstrated K 5.6 (hemolyzed). XR Bilateral Hips, Pelvis, and L-Spine overall unremarkable (wet read). S/P morphine 4mg IV and methocarbamol 1000mg PO. Admitted to medicine for inability to ambulate s/p fall.                  Vital Signs Last 24 Hrs  T(C): 36.8 (15 Feb 2024 18:24), Max: 37 (15 Feb 2024 15:33)  T(F): 98.3 (15 Feb 2024 17:05), Max: 98.6 (15 Feb 2024 15:33)  HR: 102 (15 Feb 2024 21:30) (78 - 124)  BP: 118/70 (15 Feb 2024 21:30) (110/60 - 134/59)  BP(mean): 88 (15 Feb 2024 21:30) (75 - 93)  RR: 20 (15 Feb 2024 21:30) (15 - 22)  SpO2: 98% (15 Feb 2024 21:30) (96% - 98%)    Parameters below as of 15 2024 21:30  Patient On (Oxygen Delivery Method): room air        I&O's Summary    2024 07:01  -  15 2024 07:00  --------------------------------------------------------  IN: 100 mL / OUT: 400 mL / NET: -300 mL        Physical Exam:       LABS:                               11.6   5.88  )-----------( 85       ( 2024 20:00 )             35.0       02-14    132<L>  |  97<L>  |  43<H>  ----------------------------<  107<H>  4.3   |  24  |  1.5    Ca    8.4      2024 20:00  Mg     2.0     02-14                  EC Lead ECG:   Systolic  mmHg    Diastolic BP 58 mmHg    Ventricular Rate 70 BPM    Atrial Rate 147 BPM    QRS Duration 86 ms    Q-T Interval 398 ms    QTC Calculation(Bazett) 429 ms    R Axis -82 degrees    T Axis 63 degrees    Diagnosis Line Atrial fibrillation  Left axis deviation  Nonspecific T wave abnormality  Abnormal ECG    Confirmed by ONEIDA ARIAS, YEMI ([id number]) on 2024 10:16:13 PM (24 @ 08:28)    Telemetry:    Imaging:      ASSESSMENT & PLAN: Transfer from: 4B    Transfer to: Telemetry    HPI: 89F w/ h/o chronic afib (on Eliquis), GERD, and chronic low back pain presenting s/p fall. Patient known to have chronic low back pain that has progressively worsened over the years. Pain acutely worsened two weeks ago and patient subsequently fell due to inability to support own weight. Evaluated by PCP after initial fall due to worsening pain. Prescribed Percocet 5-325 bid w/ mild relief. However, pt once again fell earlier today after pt unable to support her own weight while trying to ambulate. Now unable to ambulate or bear weight, even w/ assistance. No reported HT, LOC, lightheadedness, confusion, loss of continence, or focal weakness a/w either fall. No reported fevers, chills, CP, palpitations, SOB, abdominal pain, n/v/d, or dysuria (although daughter did note malodorous urine recently). Of note, pt also reported new onset LE swelling over past 1-2 weeks. Recently started on furosemide 20 qd by PCP for swelling. No reported CP or dyspnea at rest or exertion (but functional status limited by lower back pain). No known prior h/o CHF. In ED, pt HD stable on vitals. Labs demonstrated K 5.6 (hemolyzed). XR Bilateral Hips, Pelvis, and L-Spine overall unremarkable (wet read). S/P morphine 4mg IV and methocarbamol 1000mg PO. Admitted to medicine for inability to ambulate s/p fall.    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 On 2/10, RR called for AMS and hypotension after starting pt on lasix for diuresis, after which IV lasix was transitioned to PO. Pt was planned for kyphoplasty by nsgy on 2/15/2024 however she went into afib RVR and procedure was cancelled. Pt was placed on cardizem drip and transferred to telemetry. Pt takes metoprolol 25 TID at home.            Vital Signs Last 24 Hrs  T(C): 36.8 (15 Feb 2024 18:24), Max: 37 (15 Feb 2024 15:33)  T(F): 98.3 (15 Feb 2024 17:05), Max: 98.6 (15 Feb 2024 15:33)  HR: 102 (15 Feb 2024 21:30) (78 - 124)  BP: 118/70 (15 Feb 2024 21:30) (110/60 - 134/59)  BP(mean): 88 (15 Feb 2024 21:30) (75 - 93)  RR: 20 (15 Feb 2024 21:30) (15 - 22)  SpO2: 98% (15 Feb 2024 21:30) (96% - 98%)    Parameters below as of 15 Feb 2024 21:30  Patient On (Oxygen Delivery Method): room air        I&O's Summary    2024 07:01  -  15 Feb 2024 07:00  --------------------------------------------------------  IN: 100 mL / OUT: 400 mL / NET: -300 mL        Physical Exam:       LABS:                               11.6   5.88  )-----------( 85       ( 2024 20:00 )             35.0       02-14    132<L>  |  97<L>  |  43<H>  ----------------------------<  107<H>  4.3   |  24  |  1.5    Ca    8.4      2024 20:00  Mg     2.0     02-14                  EC Lead ECG:   Systolic  mmHg    Diastolic BP 58 mmHg    Ventricular Rate 70 BPM    Atrial Rate 147 BPM    QRS Duration 86 ms    Q-T Interval 398 ms    QTC Calculation(Bazett) 429 ms    R Axis -82 degrees    T Axis 63 degrees    Diagnosis Line Atrial fibrillation  Left axis deviation  Nonspecific T wave abnormality  Abnormal ECG    Confirmed by ONEIDA ARIAS, YEMI ([id number]) on 2024 10:16:13 PM (24 @ 08:28)    Telemetry:    Imaging:      ASSESSMENT & PLAN:    89F w/ h/o chronic afib (on Eliquis), GERD, and chronic low back pain presenting s/p fall. 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)  - Radiation Oncology consulted, and plan for 5 sessions of RT, completed 24  - Pain control oxycodone ER 10 BID and lidocaine patch, patient was comfortable this morning  - Oncology will start immunotherapy after surgery, slowly growing tumor  - Was planned for Kyphoplasty today, was cancelled due to hypotension with a concern for Atrial fibrillation with RVR, received 500 ml fluid bolus and 5 mg push of metoprolol, monitor showing erratic reading, the ventricular rate was well controlled within the range of 70-80, patient not symptomatic  - Patient was a rapid response 4 days ago due to AMS and hypotension with BP of 75/50, received IV fluids and narcan, progressively regained consciousness and BP improved. Was called for low blood pressure 2 days ago too which improved on repeat readings, patient's blood pressure runs low, d/aleksandar the oxycodone today.   - Patient was vitally stable throughout the day, asymptomatic with well controlled ventricular rate. Patient stable to undergo surgery, following with neurosurgery  - planned for kyphoplasty on 2/15 however pt went into Afib RVR in PACU -- procedure aborted and pt was placed on cardizem ggt (currently rate at 5) HR in 110s  - pt takes metoprolol 25 TID at home - resume when off cardizem drip    #CHF, diastolic - acute -- now resolved  # On 2/10 had episode of unresponsives and bradycardia/hypotension. Likely vasovagal. CT head negative  - TTE (May 2013) demonstrated LVEF 55-65% w/ normal global LVSF and no significant valvulopathy.  - TTE - EF 60-65% mod pulm HTN   - elevated BNP  - Tele monitored for 24 hrs with no dysrhythmia   - Currently euvolemic on exam, transition back to home oral lasix 20 daily    #Chronic Atrial Fibrillation  CHADS-VASC 3+ (Age x2, Female). Only on AC. Previously on Lopressor, but only takes intermittently as HR currently rate-controlled off meds.  - Eliquis held for surgery     #GERD  - c/w pantoprazole 40mg PO QD (therapeutic interchange for omeprazole)    #CKD 3b   per PCP baseline is 1.2    #Urinary retention-likely neurogenic   -hanson placed   -flomax increased to 0.8mg   - completed RT, will try TOV after walking with PT    #Constipation  -senna, miralax, lactulose    # HTN-- on metoprolol and started on lisinopril 10mg

## 2024-02-15 NOTE — PROGRESS NOTE ADULT - SUBJECTIVE AND OBJECTIVE BOX
HPI:  89F w/ h/o chronic afib (on Eliquis), GERD, and chronic low back pain presenting s/p fall. Patient known to have chronic low back pain that has progressively worsened over the years. Pain acutely worsened two weeks ago and patient subsequently fell due to inability to support own weight. Evaluated by PCP after initial fall due to worsening pain. Prescribed Percocet 5-325 bid w/ mild relief. However, pt once again fell earlier today after pt unable to support her own weight while trying to ambulate. Now unable to ambulate or bear weight, even w/ assistance. No reported HT, LOC, lightheadedness, confusion, loss of continence, or focal weakness a/w either fall. No reported fevers, chills, CP, palpitations, SOB, abdominal pain, n/v/d, or dysuria (although daughter did note malodorous urine recently).    Interval history  -Patient seen at bedside with alyssa Garcia  -Patient notes leg pain with movement, but only mild pain at rest  2/7: Patient now DNR/DNI, added 10mg oxycontin Q12H for consistent pain  2/12: added PRN oxycodone 5mg Q6H for pain  2/14: stopped oxycodone ER 10mg since patient has been more somnolent and hypotensive occasionally      ADVANCE DIRECTIVES:     [ ] Full Code [x ] DNR  MOLST  [ ]  Living Will  [ ]   DECISION MAKER(s):  [ ] Health Care Proxy(s)  [x ] Surrogate(s)  [ ] Guardian           Name(s): Phone Number(s):  Daughter      BASELINE (I)ADL(s) (prior to admission):    [place]: [ ]Total  [ ] Moderate [ ]Dependent  Palliative Performance Status Version 2:         %    http://npcrc.org/files/news/palliative_performance_scale_ppsv2.pdf    Allergies    No Known Allergies    Intolerances    MEDICATIONS  (STANDING):  acetaminophen     Tablet .. 975 milliGRAM(s) Oral every 8 hours  calcium carbonate   1250 mG (OsCal) 1 Tablet(s) Oral daily  chlorhexidine 2% Cloths 1 Application(s) Topical daily  cholecalciferol 5000 Unit(s) Oral daily  fluticasone propionate 50 MICROgram(s)/spray Nasal Spray 1 Spray(s) Both Nostrils two times a day  lactulose Syrup 10 Gram(s) Oral two times a day  lidocaine   4% Patch 1 Patch Transdermal every 24 hours  lisinopril 10 milliGRAM(s) Oral daily  magnesium sulfate  IVPB 2 Gram(s) IV Intermittent once  metoprolol tartrate 25 milliGRAM(s) Oral two times a day  multivitamin 1 Tablet(s) Oral daily  pantoprazole    Tablet 40 milliGRAM(s) Oral before breakfast  polyethylene glycol 3350 17 Gram(s) Oral two times a day  rOPINIRole 0.25 milliGRAM(s) Oral two times a day  senna 2 Tablet(s) Oral at bedtime  sodium chloride 0.9%. 1000 milliLiter(s) (50 mL/Hr) IV Continuous <Continuous>  tamsulosin 0.8 milliGRAM(s) Oral at bedtime    MEDICATIONS  (PRN):  methocarbamol 750 milliGRAM(s) Oral every 8 hours PRN Muscle Spasm          PRESENT SYMPTOMS: [ ]Unable to obtain due to poor mentation   Source if other than patient:  [ ]Family   [ ]Team     Pain: [ ]yes [x ]no  QOL impact -    Location -   Aggravating factors -   Quality -    Radiation -   Timing-    Severity (0-10 scale):   Minimal acceptable level (0-10 scale):     CPOT:    https://[email or web address]/getattachment/[email or web address]/Critical-Care-Pain-Observation-Tool-(CPOT)    PAIN AD Score:   http://geriatrictoolkit.[email or web address]/cog/painad.pdf (press ctrl +  left click to view)    Dyspnea:                           [x ]None[ ]Mild [ ]Moderate [ ]Severe     Respiratory Distress Observation Scale (RDOS):   A score of 0 to 2 signifies little or no respiratory distress, 3 signifies mild distress, scores 4 to 6 indicate moderate distress, and scores greater than 7 signify severe distress  [email or web address]://[email or web address]/sites/default/files/PDFS/324390-vuzpolozhww-uhhndepx-cnnmjjrrtld-vzkpf.pdf    Anxiety:                             [ x]None[ ]Mild [ ]Moderate [ ]Severe   Fatigue:                             [x ]None[ ]Mild [ ]Moderate [ ]Severe   Nausea:                             [ x]None[ ]Mild [ ]Moderate [ ]Severe   Loss of appetite:              [x ]None[ ]Mild [ ]Moderate [ ]Severe   Constipation:                    [x ]None[ ]Mild [ ]Moderate [ ]Severe    Other Symptoms:  [ x]All other review of systems negative     Palliative Performance Status Version 2:         30%    http://npcrc.org/files/news/palliative_performance_scale_ppsv2.pdf    PHYSICAL EXAM:    Vital Signs Last 24 Hrs  T(C): 35.9 (15 Feb 2024 07:00), Max: 36.4 (15 Feb 2024 01:30)  T(F): 96.7 (15 Feb 2024 07:00), Max: 97.5 (15 Feb 2024 01:30)  HR: 90 (15 Feb 2024 07:00) (73 - 99)  BP: 134/59 (15 Feb 2024 07:00) (89/53 - 134/59)  BP(mean): --  RR: 18 (15 Feb 2024 07:00) (18 - 18)  SpO2: --          GENERAL:  [ ] No acute distress [ ]Lethargic  [ ]Unarousable  [ x]Verbal  [ ]Non-Verbal [ ]Cachexia    BEHAVIORAL/PSYCH:  [x ]Alert and Oriented x2  [ ] Anxiety [ ] Delirium [ ] Agitation [ x] Calm   EYES: [ x] No scleral icterus [ ] Scleral icterus [ ] Closed  ENMT:  [ ]Dry mouth  [x ]No external oral lesions [ ] No external ear or nose lesions  CARDIOVASCULAR:  [ ]Regular [ ]Irregular [ ]Tachy [x ]Not Tachy  [ ]Sung [ ] Edema [ ] No edema  PULMONARY:  [ ]Tachypnea  [ ]Audible excessive secretions [ x] No labored breathing [ ] labored breathing  GASTROINTESTINAL: [ ]Soft  [ ]Distended  [x ]Not distended [ ]Non tender [ ]Tender  MUSCULOSKELETAL: [ ]No clubbing [ ] clubbing  [x ] No cyanosis [ ] cyanosis  NEUROLOGIC: [ ]No focal deficits  [x ]Follows commands  [ ]Does not follow commands  [ x]Cognitive impairment  [ ]Dysphagia  [ ]Dysarthria  [ ]Paresis   SKIN: [ ] Jaundiced [ x] Non-jaundiced [ ]Rash [ ]No Rash [ ] Warm [ ] Dry  MISC/LINES: [ ] ET tube [ ] Trach [ ]NGT/OGT [ ]PEG [ ]David    LABS: reviewed by me                                     11.6   5.88  )-----------( 85       ( 14 Feb 2024 20:00 )             35.0     02-14    132<L>  |  97<L>  |  43<H>  ----------------------------<  107<H>  4.3   |  24  |  1.5    Ca    8.4      14 Feb 2024 20:00  Mg     2.0     02-14      PT/INR - ( 13 Feb 2024 21:02 )   PT: 12.10 sec;   INR: 1.06 ratio         PTT - ( 13 Feb 2024 21:02 )  PTT:27.4 sec  Urinalysis Basic - ( 14 Feb 2024 20:00 )    Color: x / Appearance: x / SG: x / pH: x  Gluc: 107 mg/dL / Ketone: x  / Bili: x / Urobili: x   Blood: x / Protein: x / Nitrite: x   Leuk Esterase: x / RBC: x / WBC x   Sq Epi: x / Non Sq Epi: x / Bacteria: x                RADIOLOGY & ADDITIONAL STUDIES: reviewed by me    < from: NM SPECT Bone Scan, Single Area Single Day (02.01.24 @ 16:43) >  IMPRESSION:    Abnormal uptake in the L1 vertebral body, corresponding to known   compression fracture deformity secondary to vertebral body mass.    No other evidence for metastatic bone disease.    < end of copied text >        EKG: reviewed by me    < from: 12 Lead ECG (01.25.24 @ 11:34) >  Ventricular Rate 94 BPM    Atrial Rate 81 BPM    QRS Duration 90 ms    Q-T Interval 384 ms    QTC Calculation(Bazett) 480 ms    R Axis 166 degrees    T Axis -48 degrees    Diagnosis Line Atrial fibrillation  Right ventricular hypertrophy  Anterior infarct , age undetermined  ST & T wave abnormality, consider inferior ischemia  Abnormal ECG    < end of copied text >        PROTEIN CALORIE MALNUTRITION PRESENT: [ ]mild [ ]moderate [ ]severe [ ]underweight [ ]morbid obesity  https://www.andeal.org/vault/0350/web/files/ONC/Table_Clinical%20Characteristics%20to%20Document%20Malnutrition-White%20JV%20et%20al%202012.pdf    Height (cm): 152.4 (01-30-24 @ 11:24)  Weight (kg): 70.3 (01-30-24 @ 11:24)  BMI (kg/m2): 30.3 (01-30-24 @ 11:24)  [ ]PPSV2 < or = to 30% [ ]significant weight loss  [ ]poor nutritional intake  [ ]anasarca      [ ]Artificial Nutrition      Palliative Care Spiritual/Emotional Screening Tool Question  Severity (0-4):                    OR                    [ x] Unable to determine. Will assess at later time if appropriate.  Score of 2 or greater indicates recommendation of Chaplaincy and/or SW referral  Chaplaincy Referral: [ ] Yes [ ] Refused [ ] Following     Caregiver [place]:  [ ] Yes [ ] No    OR    [x ] Unable to determine. Will assess at later time if appropriate.  Social Work Referral [ ]  Patient and Family Centered Care Referral [ ]    Anticipatory Grief Present: [ ] Yes [ ] No    OR     [ x] Unable to determine. Will assess at later time if appropriate.  Social Work Referral [ ]  Patient and Family Centered Care Referral [ ]    Patient discussed with primary medical team MD  Palliative care education provided to patient and/or family

## 2024-02-15 NOTE — PROGRESS NOTE ADULT - ASSESSMENT
89F w/ h/o chronic afib (on Eliquis), GERD, and chronic low back pain presenting s/p fall. 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)  - Radiation Oncology consulted, and plan for 5 sessions of RT, completed tuesday 2/6/24  - Pain control oxycodone ER 10 BID and lidocaine patch, patient was comfortable this morning  - Oncology will start immunotherapy after surgery, slowly growing tumor  - Was planned for Kyphoplasty today, was cancelled due to hypotension with a concern for Atrial fibrillation with RVR, received 500 ml fluid bolus and 5 mg push of metoprolol, monitor showing erratic reading, the ventricular rate was well controlled within the range of 70-80, patient not symptomatic  - Patient was a rapid response 4 days ago due to AMS and hypotension with BP of 75/50, received IV fluids and narcan, progressively regained consciousness and BP improved. Was called for low blood pressure 2 days ago too which improved on repeat readings, patient's blood pressure runs low, d/aleksandar the oxycodone today.   - Patient was vitally stable throughout the day, asymptomatic with well controlled ventricular rate. Patient stable to undergo surgery, following with neurosurgery  - plan for kyphoplasty today    #CHF, diastolic - acute -- now resolved  # On 2/10 had episode of unresponsives and bradycardia/hypotension. Likely vasovagal. CT head negative  - TTE (May 2013) demonstrated LVEF 55-65% w/ normal global LVSF and no significant valvulopathy.  - TTE 1/28- EF 60-65% mod pulm HTN   - elevated BNP  - Tele monitored for 24 hrs with no dysrhythmia   - Currently euvolemic on exam, transition back to home oral lasix 20 daily    #Chronic Atrial Fibrillation  CHADS-VASC 3+ (Age x2, Female). Only on AC. Previously on Lopressor, but only takes intermittently as HR currently rate-controlled off meds.  - Eliquis held for surgery     #GERD  - c/w pantoprazole 40mg PO QD (therapeutic interchange for omeprazole)    #CKD 3b   per PCP baseline is 1.2    #Urinary retention-likely neurogenic   -hanson placed 1/29  -flomax increased to 0.8mg   - completed RT, will try TOV after walking with PT    #Constipation  -senna, miralax, lactulose    # HTN-- on metoprolol and started on lisinopril 10mg 2/4

## 2024-02-15 NOTE — PROGRESS NOTE ADULT - ASSESSMENT
89yFemale with history of afib on eliquis, GERD, chronic low back pain presents after fall. Patient found to have lumbar pathological fracture on admission. Palliative care consulted for GOC and symptom management.    Patient seen at bedside. Patient denied any pain or discomfort at this time, planned for OR later today.    Education about palliative care provided to patient/family.  See Recs below.    Please call [phone] with questions or concerns 24/7.   We will continue to follow.

## 2024-02-15 NOTE — PACU DISCHARGE NOTE - COMMENTS
upon proning the patient, her heart rate went up to 160s with minimal improvement from [employer]! case cancelled!

## 2024-02-16 LAB
ANION GAP SERPL CALC-SCNC: 17 MMOL/L — HIGH (ref 7–14)
BUN SERPL-MCNC: 34 MG/DL — HIGH (ref 10–20)
CALCIUM SERPL-MCNC: 8.6 MG/DL — SIGNIFICANT CHANGE UP (ref 8.4–10.4)
CHLORIDE SERPL-SCNC: 100 MMOL/L — SIGNIFICANT CHANGE UP (ref 98–110)
CO2 SERPL-SCNC: 18 MMOL/L — SIGNIFICANT CHANGE UP (ref 17–32)
CREAT SERPL-MCNC: 1.2 MG/DL — SIGNIFICANT CHANGE UP (ref 0.7–1.5)
CULTURE RESULTS: SIGNIFICANT CHANGE UP
EGFR: 43 ML/MIN/1.73M2 — LOW
GLUCOSE SERPL-MCNC: 109 MG/DL — HIGH (ref 70–99)
HCT VFR BLD CALC: 35.8 % — LOW (ref 37–47)
HGB BLD-MCNC: 11.9 G/DL — LOW (ref 12–16)
MCHC RBC-ENTMCNC: 33.1 PG — HIGH (ref 27–31)
MCHC RBC-ENTMCNC: 33.2 G/DL — SIGNIFICANT CHANGE UP (ref 32–37)
MCV RBC AUTO: 99.7 FL — HIGH (ref 81–99)
NRBC # BLD: 0 /100 WBCS — SIGNIFICANT CHANGE UP (ref 0–0)
PLATELET # BLD AUTO: 71 K/UL — LOW (ref 130–400)
PMV BLD: 10.4 FL — SIGNIFICANT CHANGE UP (ref 7.4–10.4)
POTASSIUM SERPL-MCNC: 4.8 MMOL/L — SIGNIFICANT CHANGE UP (ref 3.5–5)
POTASSIUM SERPL-SCNC: 4.8 MMOL/L — SIGNIFICANT CHANGE UP (ref 3.5–5)
RBC # BLD: 3.59 M/UL — LOW (ref 4.2–5.4)
RBC # FLD: 14.1 % — SIGNIFICANT CHANGE UP (ref 11.5–14.5)
SODIUM SERPL-SCNC: 135 MMOL/L — SIGNIFICANT CHANGE UP (ref 135–146)
SPECIMEN SOURCE: SIGNIFICANT CHANGE UP
WBC # BLD: 4.67 K/UL — LOW (ref 4.8–10.8)
WBC # FLD AUTO: 4.67 K/UL — LOW (ref 4.8–10.8)

## 2024-02-16 PROCEDURE — 99232 SBSQ HOSP IP/OBS MODERATE 35: CPT

## 2024-02-16 PROCEDURE — 99222 1ST HOSP IP/OBS MODERATE 55: CPT

## 2024-02-16 PROCEDURE — 99221 1ST HOSP IP/OBS SF/LOW 40: CPT

## 2024-02-16 RX ORDER — GABAPENTIN 400 MG/1
300 CAPSULE ORAL THREE TIMES A DAY
Refills: 0 | Status: DISCONTINUED | OUTPATIENT
Start: 2024-02-16 | End: 2024-02-16

## 2024-02-16 RX ORDER — APIXABAN 2.5 MG/1
5 TABLET, FILM COATED ORAL
Refills: 0 | Status: DISCONTINUED | OUTPATIENT
Start: 2024-02-16 | End: 2024-02-17

## 2024-02-16 RX ORDER — DILTIAZEM HCL 120 MG
30 CAPSULE, EXT RELEASE 24 HR ORAL EVERY 12 HOURS
Refills: 0 | Status: DISCONTINUED | OUTPATIENT
Start: 2024-02-16 | End: 2024-02-20

## 2024-02-16 RX ORDER — DILTIAZEM HCL 120 MG
30 CAPSULE, EXT RELEASE 24 HR ORAL EVERY 8 HOURS
Refills: 0 | Status: DISCONTINUED | OUTPATIENT
Start: 2024-02-16 | End: 2024-02-16

## 2024-02-16 RX ORDER — METOPROLOL TARTRATE 50 MG
25 TABLET ORAL THREE TIMES A DAY
Refills: 0 | Status: DISCONTINUED | OUTPATIENT
Start: 2024-02-16 | End: 2024-03-03

## 2024-02-16 RX ORDER — OXYCODONE HYDROCHLORIDE 5 MG/1
5 TABLET ORAL EVERY 6 HOURS
Refills: 0 | Status: DISCONTINUED | OUTPATIENT
Start: 2024-02-16 | End: 2024-02-16

## 2024-02-16 RX ORDER — GABAPENTIN 400 MG/1
400 CAPSULE ORAL EVERY 8 HOURS
Refills: 0 | Status: DISCONTINUED | OUTPATIENT
Start: 2024-02-16 | End: 2024-02-23

## 2024-02-16 RX ADMIN — Medication 1 SPRAY(S): at 06:49

## 2024-02-16 RX ADMIN — LIDOCAINE 1 PATCH: 4 CREAM TOPICAL at 14:27

## 2024-02-16 RX ADMIN — METHOCARBAMOL 750 MILLIGRAM(S): 500 TABLET, FILM COATED ORAL at 00:00

## 2024-02-16 RX ADMIN — METHOCARBAMOL 750 MILLIGRAM(S): 500 TABLET, FILM COATED ORAL at 21:55

## 2024-02-16 RX ADMIN — Medication 30 MILLIGRAM(S): at 17:08

## 2024-02-16 RX ADMIN — Medication 25 MILLIGRAM(S): at 07:44

## 2024-02-16 RX ADMIN — GABAPENTIN 300 MILLIGRAM(S): 400 CAPSULE ORAL at 14:24

## 2024-02-16 RX ADMIN — Medication 1 SPRAY(S): at 17:07

## 2024-02-16 RX ADMIN — SENNA PLUS 2 TABLET(S): 8.6 TABLET ORAL at 21:53

## 2024-02-16 RX ADMIN — Medication 5000 UNIT(S): at 11:09

## 2024-02-16 RX ADMIN — METHOCARBAMOL 750 MILLIGRAM(S): 500 TABLET, FILM COATED ORAL at 12:46

## 2024-02-16 RX ADMIN — PANTOPRAZOLE SODIUM 40 MILLIGRAM(S): 20 TABLET, DELAYED RELEASE ORAL at 06:33

## 2024-02-16 RX ADMIN — APIXABAN 5 MILLIGRAM(S): 2.5 TABLET, FILM COATED ORAL at 09:44

## 2024-02-16 RX ADMIN — GABAPENTIN 400 MILLIGRAM(S): 400 CAPSULE ORAL at 21:53

## 2024-02-16 RX ADMIN — Medication 1 TABLET(S): at 11:09

## 2024-02-16 RX ADMIN — LIDOCAINE 1 PATCH: 4 CREAM TOPICAL at 03:16

## 2024-02-16 RX ADMIN — ROPINIROLE 0.25 MILLIGRAM(S): 8 TABLET, FILM COATED, EXTENDED RELEASE ORAL at 06:54

## 2024-02-16 RX ADMIN — Medication 975 MILLIGRAM(S): at 15:51

## 2024-02-16 RX ADMIN — Medication 975 MILLIGRAM(S): at 22:23

## 2024-02-16 RX ADMIN — Medication 1 TABLET(S): at 11:10

## 2024-02-16 RX ADMIN — Medication 975 MILLIGRAM(S): at 14:25

## 2024-02-16 RX ADMIN — TAMSULOSIN HYDROCHLORIDE 0.8 MILLIGRAM(S): 0.4 CAPSULE ORAL at 21:53

## 2024-02-16 RX ADMIN — Medication 975 MILLIGRAM(S): at 06:33

## 2024-02-16 RX ADMIN — Medication 975 MILLIGRAM(S): at 00:00

## 2024-02-16 RX ADMIN — LIDOCAINE 1 PATCH: 4 CREAM TOPICAL at 07:29

## 2024-02-16 RX ADMIN — CHLORHEXIDINE GLUCONATE 1 APPLICATION(S): 213 SOLUTION TOPICAL at 11:13

## 2024-02-16 RX ADMIN — Medication 975 MILLIGRAM(S): at 06:59

## 2024-02-16 RX ADMIN — Medication 25 MILLIGRAM(S): at 14:25

## 2024-02-16 RX ADMIN — Medication 975 MILLIGRAM(S): at 21:53

## 2024-02-16 RX ADMIN — ROPINIROLE 0.25 MILLIGRAM(S): 8 TABLET, FILM COATED, EXTENDED RELEASE ORAL at 17:07

## 2024-02-16 RX ADMIN — Medication 25 MILLIGRAM(S): at 21:53

## 2024-02-16 NOTE — PROGRESS NOTE ADULT - ASSESSMENT
89yFemale with history of afib on eliquis, GERD, chronic low back pain presents after fall. Patient found to have lumbar pathological fracture on admission. Palliative care consulted for GOC and symptom management.    Patient seen at bedside. Patient stated she was experiencing intermittent shooting pains from her hips to her calves, asked RN to administer PRN methocarbamol.    Education about palliative care provided to patient/family.  See Recs below.    Please call [phone] with questions or concerns 24/7.   We will continue to follow.

## 2024-02-16 NOTE — CONSULT NOTE ADULT - SUBJECTIVE AND OBJECTIVE BOX
History and Physical    Subjective: Damian Gao is an 80 y.o. white male with a pmh of COPD, atrial fibrillation and CAD, who presents to the ED today after a fall at home and is found to be in acute hypoxic respiratory failure and is currently requiring 5 liters NC to maintain saturation of 90%. Doesn't wear oxygen at baseline, although I feel he likely needs to. He is on cpap qhs and while sleeping for OHS and is morbidly obese. He endorses CESAR, orthopnea, and cannot lie flat. Echo in August with mild pulmonary HTN and ef 55-60%. His echos are not able to evaluate diastolic function due to body habitus and I suspect he has an element of diastolic dysfunction. Legs with evidence of bilateral venous stasis. ABG shows o2 sat of 90% on two liters earlier, now up to 5 liters. Didn't tolerate VQ scan. Denies chest pain but does have left shoulder pain from a fall and has been taking narcotics at home. Cardiologist: Nessmith  Pulm: Jonathan    Past Medical History:   Diagnosis Date    Atopic dermatitis 11/21/2012    Atrial fibrillation (HCC)     CAD (coronary artery disease)     Carotid stenosis, bilateral 11/21/2012    50-79%  3-2010    1. Carotid Doppler (6/1/07): Greater than 70% stenosis in proximal LICA. 50% stenosis in right ICA. 2.  CTA of neck (3/15/10): Occluded distal segments of the vertebral arteries bilaterally. Atherosclerosis of the carotid bulbs bilaterally with a 50% stenosis on the left and a stenosis of less than 30% on the right. Small nodule in the right upper lobe near the apex. 3. CTA (8/29/13):  Less than 30% diameter stenosis of the cervical internal carotid arteries bilaterally.  CHF (congestive heart failure) ([address] 11/21/2012    Chronic kidney disease     hx elevated labs    Chronic obstructive pulmonary disease (HCC)     Diabetes (HCC)     Diastolic CHF, acute on chronic ([address] 9/12/2015    1. Echo (9/11/15) : EF 55-60%. Mild LVH.   Moderate biatrial enlargement. Moderate mitral/tricuspid regurgitation.  Failed CABG (coronary artery bypass graft) 2012    GI bleed 2015    Hospitalized [facility]    Gout 2012    History of tobacco use     [place] (hard of hearing)     Hypercholesterolemia     Hypertension     Hyperuricemia 2012    Morbid obesity ([address]     PAD (peripheral artery disease) ([facility] 2012    1. Bilateral proximal common iliac PCI (08):  8.0 X 100 mm Cordis smart stent on right and 10 X 40 mm cordis smart stent on right. Both inflated to 7.0 mm.  Poor historian     Radiologic findings of lung field, abnormal 10/31/2016    1. CT of chest  (11/24/10): Multiple small nodules in the right lobe and stable interstitial prominence. Consistent with chronic lung disease. No evidence of malignancy.     Seizure disorder ([facility] 2013    Shortness of breath dyspnea 2013    Thyroid disease     Unspecified sleep apnea       Past Surgical History:   Procedure Laterality Date    CARDIAC SURG PROCEDURE UNLIST      cath ,cabg ,lexiscan cardiolite 11/10    COLONOSCOPY N/A 2017    COLONOSCOPY  BMI 36 TO BE ADMITTED 17 performed by Ace Goldberg [facility] at [facility] ENDOSCOPY    HX CATARACT REMOVAL Left     os    HX COLONOSCOPY      HX CORONARY ARTERY BYPASS GRAFT  2007    HX CORONARY STENT PLACEMENT  2008    bilateral iliac artery PCI and stents    HX HEART CATHETERIZATION      left-2007, 2011    HX HEENT  1970s    neck lipoma     Family History   Problem Relation Age of Onset    Heart Attack Mother    [facility] Father      old age   [facility] Brother      brain aneurysm    Heart Disease Other      2 children  with heart concerns, 36 & 47 yo    Heart Disease Son       Social History   Substance Use Topics    Smoking status: Former Smoker     Packs/day: 1.00     Years: 45.00     Types: Cigarettes     Quit date: 1984    Smokeless tobacco: Never Used      Comment: (stopped smoking in 1980s)    Alcohol use No       Prior to Admission medications    Medication Sig Start Date End Date Taking? Authorizing Provider   gabapentin (NEURONTIN) 100 mg capsule Take 1 Cap by mouth three (3) times daily. 9/25/17   Fantasma Echavarria MD   albuterol (PROVENTIL VENTOLIN) 2.5 mg /3 mL (0.083 %) nebulizer solution 2.5 mg by Nebulization route every four (4) hours as needed for Wheezing. Historical Provider   budesonide-formoterol (SYMBICORT) 160-4.5 mcg/actuation HFA inhaler Take 2 Puffs by inhalation two (2) times a day. 9/21/17   Maninder CurATNGELA gaines   ipratropium-albuterol (COMBIVENT RESPIMAT)  mcg/actuation inhaler Take 1 Puff by inhalation every six (6) hours. 9/21/17   Maninder CurTANGELA gaines   hydrALAZINE (APRESOLINE) 10 mg tablet Take 1 Tab by mouth two (2) times a day. 9/19/17   John Ellis MD   furosemide (LASIX) 80 mg tablet Take 1 Tab by mouth two (2) times a day. 9/15/17   Yenny Carson MD   triamcinolone (ARISTOCORT) 0.5 % topical cream Apply  to affected area two (2) times a day. use thin layer 9/8/17   GUILLERMO Jerez   mupirocin calcium (BACTROBAN) 2 % topical cream Apply  to affected area two (2) times a day. 9/8/17   GUILLERMO Jerez   metoprolol succinate (TOPROL-XL) 25 mg XL tablet Pt takes 1/2 tab po daily. 8/10/17   Fantasma Echavarria MD   magnesium oxide (MAG-OX) 400 mg tablet Take 1 Tab by mouth daily. 8/10/17   Fantasma Echavarria MD   pantoprazole (PROTONIX) 40 mg tablet Take 1 Tab by mouth Before breakfast and dinner. Patient taking differently: Take 40 mg by mouth as needed. 8/10/17   Rossi Ramos MD   HYDROcodone-acetaminophen (NORCO)  mg tablet . Patient taking differently: Take 1 Tab by mouth every six (6) hours as needed. . 10/15/17   Rossi Ramos MD   oxyCODONE IR (OXY-IR) 15 mg immediate release tablet Take 1 Tab by mouth every eight (8) hours as needed for Pain.  Max Daily Amount: 45 mg. 10/15/17   Rossi Ramos MD   aspirin 81 mg chewable tablet Take 1 Tab by mouth daily. 5/26/17   Kristie Duverney, MD   levothyroxine (SYNTHROID) 50 mcg tablet Take 1 Tab by mouth Daily (before breakfast). 5/25/17   Aggie Villavicencio MD   ferrous sulfate 324 mg (65 mg iron) tablet Take  by mouth Daily (before breakfast). Historical Provider   SITagliptin (JANUVIA) 50 mg tablet Take 1 Tab by mouth daily. 4/4/17   Fantasma Echavarria MD   fluticasone (FLONASE) 50 mcg/actuation nasal spray 2 Sprays by Both Nostrils route daily. 2/1/17   Aggie Villavicencio MD   sertraline (ZOLOFT) 50 mg tablet Take one tablet each evening for anxiety  Indications: GENERALIZED ANXIETY DISORDER  Patient taking differently: as needed. Take one tablet each evening for anxiety  Indications: GENERALIZED ANXIETY DISORDER 2/1/17   Aggie Villavicencio MD   allopurinol (ZYLOPRIM) 300 mg tablet Take  by mouth daily. Historical Provider   docusate sodium (STOOL SOFTENER) 100 mg capsule Take 100 mg by mouth as needed. Historical Provider   cpap machine kit by Does Not Apply route. Bilevel 12/8    Historical Provider   isosorbide mononitrate ER (IMDUR) 30 mg tablet Take 1 Tab by mouth daily. 8/11/14   Benito Fung MD   travoprost (TRAVATAN Z) 0.004 % ophthalmic solution Administer 1 Drop to both eyes two (2) times a day. Historical Provider   glipiZIDE (GLUCOTROL) 5 mg tablet Take 5 mg by mouth two (2) times a day. Historical Provider   K-DUR 20 mEq tablet Take 20 mEq by mouth daily. 1/13/11   Montez Naik MD     No Known Allergies     Review of Systems:  A comprehensive review of systems was negative except for that written in the History of Present Illness. Objective:      Intake and Output:            Physical Exam:     General: morbidly obese, awake, alert, working to breathe and somewhat distressed and anxious appearing  Eyes; non icteric  Neck; supple  CV: IRIR, normal rate  PULM: crackles in bases, expiratory wheezing present, labored  Abd; soft, non tender, active bowel HPI:   Patient is a 89F w/ h/o chronic afib (on Eliquis), GERD, and chronic low back pain presenting s/p fall. Patient known to have chronic low back pain that has progressively worsened over the years. Pain acutely worsened two weeks ago and patient subsequently fell due to inability to support own weight. Evaluated by PCP after initial fall due to worsening pain. Prescribed Percocet 5-325 bid w/ mild relief. However, pt once again fell earlier today after pt unable to support her own weight while trying to ambulate. Now unable to ambulate or bear weight, even w/ assistance. No reported HT, LOC, lightheadedness, confusion, loss of continence, or focal weakness a/w either fall. No reported fevers, chills, CP, palpitations, SOB, abdominal pain, n/v/d, or dysuria (although daughter did note malodorous urine recently).  Of note, pt also reports new onset LE swelling over past 1-2 weeks. Recently started on furosemide 20 qd by PCP for swelling. No reported CP or dyspnea at rest or exertion (but functional status limited by lower back pain). No known prior h/o CHF.   In ED, pt HD stable on vitals. Labs demonstrated K 5.6 (hemolyzed). XR Bilateral Hips, Pelvis, and L-Spine overall unremarkable (wet read). S/P morphine 4mg IV and methocarbamol 1000mg PO. Admitted to medicine for inability to ambulate s/p fall. (24 Jan 2024 15:07)    PAST MEDICAL & SURGICAL HISTORY:  Chronic atrial fibrillation  GERD (gastroesophageal reflux disease)  Chronic lower back pain  No significant past surgical history    REVIEW OF SYSTEMS: Pt unable to offer    MEDICATIONS  (STANDING):  acetaminophen     Tablet .. 975 milliGRAM(s) Oral every 8 hours  apixaban 5 milliGRAM(s) Oral two times a day  calcium carbonate   1250 mG (OsCal) 1 Tablet(s) Oral daily  chlorhexidine 2% Cloths 1 Application(s) Topical daily  cholecalciferol 5000 Unit(s) Oral daily  diltiazem    Tablet 30 milliGRAM(s) Oral every 12 hours  fluticasone propionate 50 MICROgram(s)/spray Nasal Spray 1 Spray(s) Both Nostrils two times a day  gabapentin 300 milliGRAM(s) Oral three times a day  lactulose Syrup 10 Gram(s) Oral two times a day  lidocaine   4% Patch 1 Patch Transdermal every 24 hours  magnesium sulfate  IVPB 2 Gram(s) IV Intermittent once  metoprolol tartrate 25 milliGRAM(s) Oral three times a day  multivitamin 1 Tablet(s) Oral daily  pantoprazole    Tablet 40 milliGRAM(s) Oral before breakfast  polyethylene glycol 3350 17 Gram(s) Oral two times a day  rOPINIRole 0.25 milliGRAM(s) Oral two times a day  senna 2 Tablet(s) Oral at bedtime  sodium chloride 0.9%. 1000 milliLiter(s) (50 mL/Hr) IV Continuous <Continuous>  tamsulosin 0.8 milliGRAM(s) Oral at bedtime    MEDICATIONS  (PRN):  methocarbamol 750 milliGRAM(s) Oral every 8 hours PRN Muscle Spasm      Allergies  No Known Allergies  Intolerances        SOCIAL HISTORY:   lives with family     FAMILY HISTORY:  Family history of stroke (Father)         PHYSICAL EXAM:  Vital Signs Last 24 Hrs  T(C): 36.2 (16 Feb 2024 12:23), Max: 37 (15 Feb 2024 15:33)  T(F): 97.2 (16 Feb 2024 12:23), Max: 98.6 (15 Feb 2024 15:33)  HR: 68 (16 Feb 2024 12:23) (68 - 124)  BP: 106/54 (16 Feb 2024 12:23) (84/53 - 125/62)  BP(mean): 83 (15 Feb 2024 22:00) (75 - 93)  RR: 18 (16 Feb 2024 12:23) (15 - 22)  SpO2: 96% (16 Feb 2024 00:00) (96% - 98%)    Parameters below as of 16 Feb 2024 00:00  Patient On (Oxygen Delivery Method): room air    GEN: No acute distress  LUNGS: Clear to auscultation bilaterally   HEART: S1/S2 present. RRR.   ABD/ GI: Soft, non-tender, non-distended. Bowel sounds present  EXT: NC/NC/NE/2+PP/DIEHL  NEURO: AAOX3  Skin : diffused ecchymosis , ulcerations       LABS/ CULTURES/ RADIOLOGY:                        11.9   4.67  )-----------( 71       ( 16 Feb 2024 06:38 )             35.8       135  |  100  |  34  ----------------------------<  109      [02-16-24 @ 06:38]  4.8   |  18  |  1.2        Ca     8.6     [02-16-24 @ 06:38]      Mg     2.0     [02-14-24 @ 20:00]                Culture - Blood (collected 02-10-24 @ 18:30)  Source: .Blood Blood-Peripheral  Final Report (02-16-24 @ 02:00):    No growth at 5 days                       sounds  Neuro: cranial nerves II-XII grossly intact, moves all extremities without focal deficits  Ext: evidence of bilateral venous stasis and lower ext edema; palpable pedal pulses, no evidence of cellulitis      ECG:  Afib. Artifact present. Reviewed today by me. Data Review:   Recent Results (from the past 24 hour(s))   CBC WITH AUTOMATED DIFF    Collection Time: 11/01/17  6:47 AM   Result Value Ref Range    WBC 7.4 4.3 - 11.1 K/uL    RBC 4.97 4.23 - 5.67 M/uL    HGB 13.2 (L) 13.6 - 17.2 g/dL    HCT 40.1 (L) 41.1 - 50.3 %    MCV 80.7 79.6 - 97.8 FL    MCH 26.6 26.1 - 32.9 PG    MCHC 32.9 31.4 - 35.0 g/dL    RDW 16.9 (H) 11.9 - 14.6 %    PLATELET 104 532 - 729 K/uL    MPV 10.2 (L) 10.8 - 14.1 FL    DF AUTOMATED      NEUTROPHILS 77 43 - 78 %    LYMPHOCYTES 14 13 - 44 %    MONOCYTES 5 4.0 - 12.0 %    EOSINOPHILS 4 0.5 - 7.8 %    BASOPHILS 0 0.0 - 2.0 %    IMMATURE GRANULOCYTES 0 0.0 - 5.0 %    ABS. NEUTROPHILS 5.8 1.7 - 8.2 K/UL    ABS. LYMPHOCYTES 1.0 0.5 - 4.6 K/UL    ABS. MONOCYTES 0.3 0.1 - 1.3 K/UL    ABS. EOSINOPHILS 0.3 0.0 - 0.8 K/UL    ABS. BASOPHILS 0.0 0.0 - 0.2 K/UL    ABS. IMM. GRANS. 0.0 0.0 - 0.5 K/UL   METABOLIC PANEL, COMPREHENSIVE    Collection Time: 11/01/17  6:47 AM   Result Value Ref Range    Sodium 137 136 - 145 mmol/L    Potassium 4.0 3.5 - 5.1 mmol/L    Chloride 97 (L) 98 - 107 mmol/L    CO2 32 21 - 32 mmol/L    Anion gap 8 7 - 16 mmol/L    Glucose 147 (H) 65 - 100 mg/dL    BUN 31 (H) 8 - 23 MG/DL    Creatinine 1.97 (H) 0.8 - 1.5 MG/DL    GFR est AA 42 (L) >60 ml/min/1.73m2    GFR est non-AA 35 (L) >60 ml/min/1.73m2    Calcium 8.9 8.3 - 10.4 MG/DL    Bilirubin, total 0.8 0.2 - 1.1 MG/DL    ALT (SGPT) 13 12 - 65 U/L    AST (SGOT) 12 (L) 15 - 37 U/L    Alk.  phosphatase 93 50 - 136 U/L    Protein, total 8.0 6.3 - 8.2 g/dL    Albumin 3.1 (L) 3.2 - 4.6 g/dL    Globulin 4.9 (H) 2.3 - 3.5 g/dL    A-G Ratio 0.6 (L) 1.2 - 3.5     BNP    Collection Time: 11/01/17  6:47 AM   Result Value Ref Range     pg/mL   EKG, 12 LEAD, INITIAL    Collection Time: 11/01/17  7:15 AM   Result Value Ref Range    Ventricular Rate 70 BPM    Atrial Rate 375 BPM    QRS Duration 100 ms    Q-T Interval 410 ms    QTC Calculation (Bezet) 442 ms    Calculated R Axis 36 degrees    Calculated T Axis -148 degrees    Diagnosis       !! AGE AND GENDER SPECIFIC ECG ANALYSIS !! Atrial fibrillation with artefact   Cannot rule out Inferior infarct (cited on or before 11-SEP-2017)  Anteroseptal infarct (cited on or before 21-AUG-2017)  ST & T wave abnormality, consider lateral ischemia or digitalis effect  Abnormal ECG  When compared with ECG of 11-SEP-2017 15:15,  No significant change was found    Confirmed by KIKE FRENCH (), Meggan Mauro ([id number]) on 11/1/2017 10:06:27 AM     BLOOD GAS, ARTERIAL    Collection Time: 11/01/17  7:46 AM   Result Value Ref Range    pH 7.41 7.35 - 7.45      PCO2 46 (H) 35 - 45 mmHg    PO2 59 (L) 80 - 105 mmHg    BICARBONATE 28 (H) 22 - 26 mmol/L    BASE EXCESS 3.2 (H) 0 - 3 mmol/L    TOTAL HEMOGLOBIN 14.0 11.7 - 15.0 GM/DL    O2 SAT 90 (L) 92 - 98.5 %    Arterial O2 Hgb 89.3 (L) 94 - 97 %    CARBOXYHEMOGLOBIN 0.8 0.5 - 1.5 %    METHEMOGLOBIN 0.3 0.0 - 1.5 %    DEOXYHEMOGLOBIN 10 (H) 0.0 - 5.0 %    SITE RB     ALLENS TEST NA        Chest x-ray was negative for infiltrate, effusion, pneumothorax, or wide mediastinum. Assessment:     Principal Problem:    Fall in home (11/1/2017)    Active Problems:    Debility (8/5/2013)      CKD (chronic kidney disease) stage 3, GFR 30-59 ml/min (9/18/2013)      Morbid obesity ([address] (1/21/2015)      ROBERTO on CPAP (2/20/2015)      Overview: Patient is on BiPAP, no supplementary oxygen      Type 2 diabetes mellitus with peripheral neuropathy ([address] (11/5/2015)      Acute respiratory failure with hypoxia ([address] (8/4/2017)      Hypoxia (8/21/2017)      Restrictive lung disease (11/1/2017)      Hypoxemia (11/1/2017)        Plan:     Admit to medical bed.   I believe his SOB and hypoxia are multifactorial as he has orthopnea/PND and CESRA along with wheezing while morbidly obese. Needs to lose weight. Will give lasix 40 iv x one in addition to his usual 80 mg BID. Decrease narcotics- may be overly sedating himself but feel he would also be hypercarbic if this were the only issue. Schedule nebulizers q4, continue pulmicort and start prednisone 20 mg daily as he is wheezing pretty significantly when I saw him. Check D dimer- if negative it rules out DVT/PE. Hold glipizide. Start SSI. Place ppd. Consult SW and PT.    Ppx: subq heparin  Anticipated date of dc: 3 days    Signed By: Miguel Ángel Hicks MD     November 1, 2017

## 2024-02-16 NOTE — CONSULT NOTE ADULT - ASSESSMENT
Skin assessed- Perineum ,  sacrum and B/l  buttock  severe erythema with patient thickness ulcerations - possibly due to moisture and friction combination                                             Generalized bruising  noted all over body                                               High risk for pressure injury development or progression                                              No pressure injury noted  at time of assessment     Plan:  Wound and skin care recs.     Clean perineum, sacrum  and buttock with soap and water, pat dry then apply  triad hydrophilic dressing   Pressure  injury preventive  measures  skin  and incontinence care    Assess wound and inform primary provider of any changes   Case discussed with primary Rn  Wound/ ostomy specialist  to f/u as needed     Offloading: [x ] Frequent position changes [ ] Devices/Equipment  Cleansing: [ ] Saline [ x] Soap/Water [ ] Other: ______  Topicals: [x ] Barrier Cream [ ] Antimicrobial [ ] Enzymatic Wound Debridement  Dressings: [ ] Dry, sterile [ ] Allevyn  Foam [ ] Absorbant Pads [ ] Collagenase    Other Recs.   Per primary team      Total time for bedside assessment , review of medical records  and  discussion of plan of care with primary team greater than 35 min

## 2024-02-16 NOTE — CONSULT NOTE ADULT - SUBJECTIVE AND OBJECTIVE BOX
PAIN MANAGEMENT CONSULT NOTE    Chief Complaint:    HPI:  89F w/ h/o chronic afib (on Eliquis), GERD, and chronic low back pain presenting s/p fall. Patient known to have chronic low back pain that has progressively worsened over the years. Pain acutely worsened two weeks ago and patient subsequently fell due to inability to support own weight. Evaluated by PCP after initial fall due to worsening pain. Prescribed Percocet 5-325 bid w/ mild relief. However, pt once again fell earlier today after pt unable to support her own weight while trying to ambulate. Now unable to ambulate or bear weight, even w/ assistance. No reported HT, LOC, lightheadedness, confusion, loss of continence, or focal weakness a/w either fall. No reported fevers, chills, CP, palpitations, SOB, abdominal pain, n/v/d, or dysuria (although daughter did note malodorous urine recently).    Of note, pt also reports new onset LE swelling over past 1-2 weeks. Recently started on furosemide 20 qd by PCP for swelling. No reported CP or dyspnea at rest or exertion (but functional status limited by lower back pain). No known prior h/o CHF.     In ED, pt HD stable on vitals. Labs demonstrated K 5.6 (hemolyzed). XR Bilateral Hips, Pelvis, and L-Spine overall unremarkable (wet read). S/P morphine 4mg IV and methocarbamol 1000mg PO. Admitted to medicine for inability to ambulate s/p fall. (24 Jan 2024 15:07)      PAST MEDICAL & SURGICAL HISTORY:  Chronic atrial fibrillation      GERD (gastroesophageal reflux disease)      Chronic lower back pain      No significant past surgical history          FAMILY HISTORY:  Family history of stroke (Father)        SOCIAL HISTORY:  [ ] Denies Smoking, Alcohol, or Drug Use    HOME MEDICATIONS:   Please refer to initial HNP    PAIN HOME MEDICATIONS:    Allergies    No Known Allergies    Intolerances        PAIN MEDICATIONS:  acetaminophen     Tablet .. 975 milliGRAM(s) Oral every 8 hours  gabapentin 400 milliGRAM(s) Oral every 8 hours  methocarbamol 750 milliGRAM(s) Oral every 8 hours PRN  rOPINIRole 0.25 milliGRAM(s) Oral two times a day    Heme:  apixaban 5 milliGRAM(s) Oral two times a day    Antibiotics:    Cardiovascular:  diltiazem    Tablet 30 milliGRAM(s) Oral every 12 hours  metoprolol tartrate 25 milliGRAM(s) Oral three times a day    GI:  lactulose Syrup 10 Gram(s) Oral two times a day  pantoprazole    Tablet 40 milliGRAM(s) Oral before breakfast  polyethylene glycol 3350 17 Gram(s) Oral two times a day  senna 2 Tablet(s) Oral at bedtime    Endocrine:    All Other Medications:  calcium carbonate   1250 mG (OsCal) 1 Tablet(s) Oral daily  chlorhexidine 2% Cloths 1 Application(s) Topical daily  cholecalciferol 5000 Unit(s) Oral daily  fluticasone propionate 50 MICROgram(s)/spray Nasal Spray 1 Spray(s) Both Nostrils two times a day  lidocaine   4% Patch 1 Patch Transdermal every 24 hours  magnesium sulfate  IVPB 2 Gram(s) IV Intermittent once  multivitamin 1 Tablet(s) Oral daily  sodium chloride 0.9%. 1000 milliLiter(s) IV Continuous <Continuous>  tamsulosin 0.8 milliGRAM(s) Oral at bedtime      Vital Signs Last 24 Hrs  T(C): 35.7 (16 Feb 2024 20:53), Max: 36.7 (16 Feb 2024 00:00)  T(F): 96.3 (16 Feb 2024 20:53), Max: 98.1 (16 Feb 2024 00:00)  HR: 80 (16 Feb 2024 20:53) (68 - 106)  BP: 123/57 (16 Feb 2024 20:53) (84/53 - 123/57)  BP(mean): 82 (16 Feb 2024 20:53) (82 - 82)  RR: 18 (16 Feb 2024 12:23) (18 - 20)  SpO2: 96% (16 Feb 2024 00:00) (96% - 96%)    Parameters below as of 16 Feb 2024 00:00  Patient On (Oxygen Delivery Method): room air        LABS:                        11.9   4.67  )-----------( 71       ( 16 Feb 2024 06:38 )             35.8     02-16    135  |  100  |  34<H>  ----------------------------<  109<H>  4.8   |  18  |  1.2    Ca    8.6      16 Feb 2024 06:38        Urinalysis Basic - ( 16 Feb 2024 06:38 )    Color: x / Appearance: x / SG: x / pH: x  Gluc: 109 mg/dL / Ketone: x  / Bili: x / Urobili: x   Blood: x / Protein: x / Nitrite: x   Leuk Esterase: x / RBC: x / WBC x   Sq Epi: x / Non Sq Epi: x / Bacteria: x        RADIOLOGY:  CT A&P  IMPRESSION:  1.  No suspicious renal mass or urothelial lesion.  2.  Lytic L1 vertebral body lesion again noted.    xray hips  impression:    No acute fracture or dislocation. Moderate osteoarthritis of the left hip   and mild/moderate osteoarthritis of the left knee with chondrocalcinosis.   Diffuse osteopenia. There are vascular calcifications.      MR L Spine   IMPRESSION:  Malignant compression deformity of L1 with significant bowing of the   posterior cortex contributing to mass effect upon the conus. Of note   there is significant dilation of the urinary bladder. Correlate   clinically for neurogenic bladder.    Additional degenerative changes in combination with lumbar levoscoliosis   contributes to neuroforaminal narrowing most significantly involving the   left L4-L5 and L5-S1 levels.    These findings were discussed with Dr. Deng at 1/27/2024 3:52 PM by Dr. Cr with read back confirmation.      PHYSICAL EXAM  GENERAL: Laying in bed  Motor exam: 5/5 b/l UE. 5/5 b/l LE  Sensation intact to LT in UE/LE   CHEST/LUNG: Clear to auscultation bilaterally  ABDOMEN: Soft, Nontender, Nondistended  EXTREMITIES:  2+ Peripheral Pulses      ASSESSMENT:   HPI:  89F w/ h/o chronic afib (on Eliquis), GERD, and chronic low back pain presenting s/p fall. Patient known to have chronic low back pain that has progressively worsened over the years. Pain acutely worsened two weeks ago and patient subsequently fell due to inability to support own weight. Evaluated by PCP after initial fall due to worsening pain. Prescribed Percocet 5-325 bid w/ mild relief. However, pt once again fell earlier today after pt unable to support her own weight while trying to ambulate. Now unable to ambulate or bear weight, even w/ assistance. No reported HT, LOC, lightheadedness, confusion, loss of continence, or focal weakness a/w either fall. No reported fevers, chills, CP, palpitations, SOB, abdominal pain, n/v/d, or dysuria (although daughter did note malodorous urine recently).    Of note, pt also reports new onset LE swelling over past 1-2 weeks. Recently started on furosemide 20 qd by PCP for swelling. No reported CP or dyspnea at rest or exertion (but functional status limited by lower back pain). No known prior h/o CHF.     In ED, pt HD stable on vitals. Labs demonstrated K 5.6 (hemolyzed). XR Bilateral Hips, Pelvis, and L-Spine overall unremarkable (wet read). S/P morphine 4mg IV and methocarbamol 1000mg PO. Admitted to medicine for inability to ambulate s/p fall. (24 Jan 2024 15:07)    L1 compression deformity w/ cord compression    PLAN:   1. acetaminophen 1000mg q8h standing  2. Gabapentin 400mg q8h standing, if tolerated (no sedation/confusion)  3. Methocarbamol 500mg q8h standing  4. oxycodone 5mg q4h PRN for breakthrough pain. can increase to 7.5mg if need be  5. pt planned for nsurg intervention     Bowel regimen: miralax / colace

## 2024-02-16 NOTE — PROGRESS NOTE ADULT - SUBJECTIVE AND OBJECTIVE BOX
SUBJECTIVE:    Patient is a 89y old Female who presents with a chief complaint of Back Pain (16 Feb 2024 10:57)    Currently admitted to medicine with the primary diagnosis of Inability to ambulate due to multiple joints       Today is hospital day 23d.     PAST MEDICAL & SURGICAL HISTORY  Chronic atrial fibrillation    GERD (gastroesophageal reflux disease)    Chronic lower back pain    No significant past surgical history      ALLERGIES:  No Known Allergies    MEDICATIONS:  STANDING MEDICATIONS  acetaminophen     Tablet .. 975 milliGRAM(s) Oral every 8 hours  apixaban 5 milliGRAM(s) Oral two times a day  calcium carbonate   1250 mG (OsCal) 1 Tablet(s) Oral daily  chlorhexidine 2% Cloths 1 Application(s) Topical daily  cholecalciferol 5000 Unit(s) Oral daily  diltiazem    Tablet 30 milliGRAM(s) Oral every 12 hours  fluticasone propionate 50 MICROgram(s)/spray Nasal Spray 1 Spray(s) Both Nostrils two times a day  lactulose Syrup 10 Gram(s) Oral two times a day  lidocaine   4% Patch 1 Patch Transdermal every 24 hours  magnesium sulfate  IVPB 2 Gram(s) IV Intermittent once  metoprolol tartrate 25 milliGRAM(s) Oral three times a day  multivitamin 1 Tablet(s) Oral daily  pantoprazole    Tablet 40 milliGRAM(s) Oral before breakfast  polyethylene glycol 3350 17 Gram(s) Oral two times a day  rOPINIRole 0.25 milliGRAM(s) Oral two times a day  senna 2 Tablet(s) Oral at bedtime  sodium chloride 0.9%. 1000 milliLiter(s) IV Continuous <Continuous>  tamsulosin 0.8 milliGRAM(s) Oral at bedtime    PRN MEDICATIONS  methocarbamol 750 milliGRAM(s) Oral every 8 hours PRN  oxyCODONE    IR 5 milliGRAM(s) Oral every 6 hours PRN    VITALS:   T(F): 97.2  HR: 106  BP: 98/53  RR: 18  SpO2: 96%    LABS:                        11.9   4.67  )-----------( 71       ( 16 Feb 2024 06:38 )             35.8     02-16    135  |  100  |  34<H>  ----------------------------<  109<H>  4.8   |  18  |  1.2    Ca    8.6      16 Feb 2024 06:38  Mg     2.0     02-14        Urinalysis Basic - ( 16 Feb 2024 06:38 )    Color: x / Appearance: x / SG: x / pH: x  Gluc: 109 mg/dL / Ketone: x  / Bili: x / Urobili: x   Blood: x / Protein: x / Nitrite: x   Leuk Esterase: x / RBC: x / WBC x   Sq Epi: x / Non Sq Epi: x / Bacteria: x                RADIOLOGY:    PHYSICAL EXAM:  GEN: No acute distress  LUNGS: Clear to auscultation bilaterally   HEART: S1/S2 present. RRR.   ABD/ GI: Soft, non-tender, non-distended. Bowel sounds present  EXT: NC/NC/NE/2+PP/DIEHL  NEURO: AAOX3

## 2024-02-16 NOTE — PROGRESS NOTE ADULT - SUBJECTIVE AND OBJECTIVE BOX
24H events:    Patient is a 89y old Female who presents with a chief complaint of Back Pain (15 Feb 2024 16:56)    Primary diagnosis of Inability to ambulate due to multiple joints      Day 1:  Day 2:  Day 3:     Today is hospital day 23d. This morning patient was seen and examined at bedside, resting comfortably in bed.    No acute or major events overnight.    Code Status:    Family communication:  Contact date:  Name of person contacted:  Relationship to patient:  Communication details:  What matters most:    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 No reported tobacco, alcohol, or illicit/recreational drug use. ADL's limited by progressively worsening ability to ambulate. Ambulates w/ walker. (24 Jan 2024 15:07)      ALLERGIES:  No Known Allergies    MEDICATIONS:  STANDING MEDICATIONS  acetaminophen     Tablet .. 975 milliGRAM(s) Oral every 8 hours  apixaban 5 milliGRAM(s) Oral two times a day  calcium carbonate   1250 mG (OsCal) 1 Tablet(s) Oral daily  chlorhexidine 2% Cloths 1 Application(s) Topical daily  cholecalciferol 5000 Unit(s) Oral daily  diltiazem    Tablet 30 milliGRAM(s) Oral every 8 hours  fluticasone propionate 50 MICROgram(s)/spray Nasal Spray 1 Spray(s) Both Nostrils two times a day  lactulose Syrup 10 Gram(s) Oral two times a day  lidocaine   4% Patch 1 Patch Transdermal every 24 hours  lisinopril 10 milliGRAM(s) Oral daily  magnesium sulfate  IVPB 2 Gram(s) IV Intermittent once  metoprolol tartrate 25 milliGRAM(s) Oral three times a day  multivitamin 1 Tablet(s) Oral daily  pantoprazole    Tablet 40 milliGRAM(s) Oral before breakfast  polyethylene glycol 3350 17 Gram(s) Oral two times a day  rOPINIRole 0.25 milliGRAM(s) Oral two times a day  senna 2 Tablet(s) Oral at bedtime  sodium chloride 0.9%. 1000 milliLiter(s) IV Continuous <Continuous>  tamsulosin 0.8 milliGRAM(s) Oral at bedtime    PRN MEDICATIONS  methocarbamol 750 milliGRAM(s) Oral every 8 hours PRN    VITALS:   T(F): 97.2  HR: 106  BP: 98/53  RR: 18  SpO2: 96%    PHYSICAL EXAM:   GENERAL: NAD, lying in bed comfortably  HEAD:  Atraumatic, Normocephalic  EYES: EOMI, sclera clear  ENT: Moist mucous membranes  NECK: Supple, trachea midline  CHEST/LUNG: Clear to auscultation anteriorly bilaterally; No rales, rhonchi, wheezing, or rubs. Unlabored respirations  HEART: Regular rate and rhythm; No appreciable murmurs, rubs, or gallops  ABDOMEN:  Soft, nontender, nondistended  EXTREMITIES:  No clubbing, cyanosis, or edema  NERVOUS SYSTEM:  A&Ox3, no noted facial droop. Moving all extremities w/o difficulty.   SKIN: No rashes or lesions to b/l forearm, face.     ( + ) Indwelling David Catheter:   Date insterted:    Reason (  ) Critical illness     (  ) urinary retention    (  ) Accurate Ins/Outs Monitoring     (  ) CMO patient    (  ) Central Line:   Date inserted:  Location: (  ) Right IJ     (  ) Left IJ     (  ) Right Fem     (  ) Left Fem    (  ) SPC        (  ) pigtail       (  ) PEG tube       (  ) colostomy       (  ) jejunostomy  (  ) U-Dall    LABS:                        11.9   4.67  )-----------( 71       ( 16 Feb 2024 06:38 )             35.8     02-16    135  |  100  |  34<H>  ----------------------------<  109<H>  4.8   |  18  |  1.2    Ca    8.6      16 Feb 2024 06:38  Mg     2.0     02-14        Urinalysis Basic - ( 16 Feb 2024 06:38 )    Color: x / Appearance: x / SG: x / pH: x  Gluc: 109 mg/dL / Ketone: x  / Bili: x / Urobili: x   Blood: x / Protein: x / Nitrite: x   Leuk Esterase: x / RBC: x / WBC x   Sq Epi: x / Non Sq Epi: x / Bacteria: x

## 2024-02-16 NOTE — PROGRESS NOTE ADULT - ASSESSMENT
89F w/ h/o chronic afib (on Eliquis), GERD, and chronic low back pain presenting s/p fall. 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)  - Radiation Oncology consulted, and plan for 5 sessions of RT, completed tuesday 2/6/24  - Pain control oxycodone ER 10 BID and lidocaine patch, patient was comfortable this morning  - Oncology will start immunotherapy after surgery, slowly growing tumor  - Was planned for Kyphoplasty today, was cancelled due to hypotension with a concern for Atrial fibrillation with RVR, received 500 ml fluid bolus and 5 mg push of metoprolol, monitor showing erratic reading, the ventricular rate was well controlled within the range of 70-80, patient not symptomatic  - Patient was a rapid response 4 days ago due to AMS and hypotension with BP of 75/50, received IV fluids and narcan, progressively regained consciousness and BP improved. Was called for low blood pressure 2 days ago too which improved on repeat readings, patient's blood pressure runs low, d/aleksandar the oxycodone today.   - Patient was vitally stable throughout the day, asymptomatic with well controlled ventricular rate. Patient stable to undergo surgery, following with neurosurgery  - plan for kyphoplasty 2/15 > as patient developed Afib with RVR, now on cardizem gtt  - transition to po cardizem and increase metoprolol to 25mg TID    #CHF, diastolic - acute -- now resolved  # On 2/10 had episode of unresponsives and bradycardia/hypotension. Likely vasovagal. CT head negative  - TTE (May 2013) demonstrated LVEF 55-65% w/ normal global LVSF and no significant valvulopathy.  - TTE 1/28- EF 60-65% mod pulm HTN   - elevated BNP  - Tele monitored for 24 hrs with no dysrhythmia   - Currently euvolemic on exam, transition back to home oral lasix 20 daily    #Chronic Atrial Fibrillation  CHADS-VASC 3+ (Age x2, Female). Only on AC. Previously on Lopressor, but only takes intermittently as HR currently rate-controlled off meds.  - Eliquis held for surgery  - Restarted eliquis 2/16    #GERD  - c/w pantoprazole 40mg PO QD (therapeutic interchange for omeprazole)    #CKD 3b   per PCP baseline is 1.2    #Urinary retention-likely neurogenic   -hanson placed 1/29  -flomax increased to 0.8mg   - completed RT, will try TOV after walking with PT    #Constipation  -senna, miralax, lactulose    # HTN-- on metoprolol and started on lisinopril 10mg 2/4

## 2024-02-16 NOTE — PROGRESS NOTE ADULT - ASSESSMENT
89F w/ h/o chronic afib (on Eliquis), GERD, and chronic low back pain presenting s/p fall. 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   daughter asking for epidural shot-- patient gets AMS with oxycodone. also got hypotensive and all opioids were stopped.    #Perioperative Risk Stratification   - Agree with Cardiac Risk Assessment:   RCRI 1-2 - Class III risk 10.1% risk of 30 day MACE   METS <4 - including valvular abn + moderate PHTN   high risk patient for moderate risk surgery   -- Agree with Cardiology, pt is high risk for a moderate risk surgery, but no further workup indicated at this time prior to planned intervention     # On 2/10 and 2/14- had episode of unresponsives and bradycardia/hypotension. Likely overmedication.  off lasix    #CHF, diastolic - acute -- now resolved  - TTE (May 2013) demonstrated LVEF 55-65% w/ normal global LVSF and no significant valvulopathy.  - TTE 1/28- EF 60-65% mod pulm HTN   - elevated BNP  - Tele monitored for 24 hrs for now   - Currently euvolemic on exam,     #Chronic Atrial Fibrillation  CHADS-VASC 3+ (Age x2, Female)  - Cont metoprolol   - Eliquis held for surgery     #GERD  - c/w pantoprazole 40mg PO QD (therapeutic interchange for omeprazole)    #CKD 3b-- 1.5   per PCP baseline is 1.2    #Urinary retention- sec to cancer-hanson placed 1/29  -flomax increased to 0.8mg   - completed RT, will try TOV after  walking with PT    #Constipation  -senna, miralax, lactulose    # HTN-- on metoprolol and started on lisinopril 10mg 2/4-- hold now.  patient had stable BP and is off oxycodone today-- heart rate on telemetry was 70s -- can go for kyphoplasty will wean off opioids  also causing severe constipation.  PENDING: pain management for epidural shots, f/u heme onc post surgery regarding starting immunotherapy

## 2024-02-17 LAB
ALBUMIN SERPL ELPH-MCNC: 2.9 G/DL — LOW (ref 3.5–5.2)
ALP SERPL-CCNC: 64 U/L — SIGNIFICANT CHANGE UP (ref 30–115)
ALT FLD-CCNC: 14 U/L — SIGNIFICANT CHANGE UP (ref 0–41)
ANION GAP SERPL CALC-SCNC: 11 MMOL/L — SIGNIFICANT CHANGE UP (ref 7–14)
APPEARANCE UR: ABNORMAL
AST SERPL-CCNC: 23 U/L — SIGNIFICANT CHANGE UP (ref 0–41)
BILIRUB SERPL-MCNC: 0.5 MG/DL — SIGNIFICANT CHANGE UP (ref 0.2–1.2)
BILIRUB UR-MCNC: NEGATIVE — SIGNIFICANT CHANGE UP
BUN SERPL-MCNC: 27 MG/DL — HIGH (ref 10–20)
CALCIUM SERPL-MCNC: 8.4 MG/DL — SIGNIFICANT CHANGE UP (ref 8.4–10.4)
CHLORIDE SERPL-SCNC: 101 MMOL/L — SIGNIFICANT CHANGE UP (ref 98–110)
CO2 SERPL-SCNC: 23 MMOL/L — SIGNIFICANT CHANGE UP (ref 17–32)
COLOR SPEC: YELLOW — SIGNIFICANT CHANGE UP
CREAT SERPL-MCNC: 0.9 MG/DL — SIGNIFICANT CHANGE UP (ref 0.7–1.5)
DIFF PNL FLD: ABNORMAL
EGFR: 61 ML/MIN/1.73M2 — SIGNIFICANT CHANGE UP
GLUCOSE SERPL-MCNC: 93 MG/DL — SIGNIFICANT CHANGE UP (ref 70–99)
GLUCOSE UR QL: NEGATIVE MG/DL — SIGNIFICANT CHANGE UP
HCT VFR BLD CALC: 30.8 % — LOW (ref 37–47)
HCT VFR BLD CALC: 31 % — LOW (ref 37–47)
HGB BLD-MCNC: 10.6 G/DL — LOW (ref 12–16)
HGB BLD-MCNC: 10.6 G/DL — LOW (ref 12–16)
KETONES UR-MCNC: NEGATIVE MG/DL — SIGNIFICANT CHANGE UP
LEUKOCYTE ESTERASE UR-ACNC: ABNORMAL
MAGNESIUM SERPL-MCNC: 1.8 MG/DL — SIGNIFICANT CHANGE UP (ref 1.8–2.4)
MCHC RBC-ENTMCNC: 32.8 PG — HIGH (ref 27–31)
MCHC RBC-ENTMCNC: 33 PG — HIGH (ref 27–31)
MCHC RBC-ENTMCNC: 34.2 G/DL — SIGNIFICANT CHANGE UP (ref 32–37)
MCHC RBC-ENTMCNC: 34.4 G/DL — SIGNIFICANT CHANGE UP (ref 32–37)
MCV RBC AUTO: 96 FL — SIGNIFICANT CHANGE UP (ref 81–99)
MCV RBC AUTO: 96 FL — SIGNIFICANT CHANGE UP (ref 81–99)
NITRITE UR-MCNC: POSITIVE
NRBC # BLD: 0 /100 WBCS — SIGNIFICANT CHANGE UP (ref 0–0)
NRBC # BLD: 0 /100 WBCS — SIGNIFICANT CHANGE UP (ref 0–0)
PH UR: 8 — SIGNIFICANT CHANGE UP (ref 5–8)
PLATELET # BLD AUTO: 70 K/UL — LOW (ref 130–400)
PLATELET # BLD AUTO: 76 K/UL — LOW (ref 130–400)
PMV BLD: 10.2 FL — SIGNIFICANT CHANGE UP (ref 7.4–10.4)
PMV BLD: 10.4 FL — SIGNIFICANT CHANGE UP (ref 7.4–10.4)
POTASSIUM SERPL-MCNC: 4.4 MMOL/L — SIGNIFICANT CHANGE UP (ref 3.5–5)
POTASSIUM SERPL-SCNC: 4.4 MMOL/L — SIGNIFICANT CHANGE UP (ref 3.5–5)
PROT SERPL-MCNC: 4.4 G/DL — LOW (ref 6–8)
PROT UR-MCNC: 30 MG/DL
RBC # BLD: 3.21 M/UL — LOW (ref 4.2–5.4)
RBC # BLD: 3.23 M/UL — LOW (ref 4.2–5.4)
RBC # FLD: 14 % — SIGNIFICANT CHANGE UP (ref 11.5–14.5)
RBC # FLD: 14 % — SIGNIFICANT CHANGE UP (ref 11.5–14.5)
SODIUM SERPL-SCNC: 135 MMOL/L — SIGNIFICANT CHANGE UP (ref 135–146)
SP GR SPEC: 1.02 — SIGNIFICANT CHANGE UP (ref 1–1.03)
UROBILINOGEN FLD QL: 1 MG/DL — SIGNIFICANT CHANGE UP (ref 0.2–1)
WBC # BLD: 3.56 K/UL — LOW (ref 4.8–10.8)
WBC # BLD: 3.94 K/UL — LOW (ref 4.8–10.8)
WBC # FLD AUTO: 3.56 K/UL — LOW (ref 4.8–10.8)
WBC # FLD AUTO: 3.94 K/UL — LOW (ref 4.8–10.8)

## 2024-02-17 PROCEDURE — 99232 SBSQ HOSP IP/OBS MODERATE 35: CPT

## 2024-02-17 RX ORDER — MAGNESIUM SULFATE 500 MG/ML
2 VIAL (ML) INJECTION ONCE
Refills: 0 | Status: COMPLETED | OUTPATIENT
Start: 2024-02-17 | End: 2024-02-17

## 2024-02-17 RX ADMIN — Medication 25 MILLIGRAM(S): at 21:49

## 2024-02-17 RX ADMIN — CHLORHEXIDINE GLUCONATE 1 APPLICATION(S): 213 SOLUTION TOPICAL at 13:19

## 2024-02-17 RX ADMIN — GABAPENTIN 400 MILLIGRAM(S): 400 CAPSULE ORAL at 13:17

## 2024-02-17 RX ADMIN — POLYETHYLENE GLYCOL 3350 17 GRAM(S): 17 POWDER, FOR SOLUTION ORAL at 17:23

## 2024-02-17 RX ADMIN — ROPINIROLE 0.25 MILLIGRAM(S): 8 TABLET, FILM COATED, EXTENDED RELEASE ORAL at 17:23

## 2024-02-17 RX ADMIN — Medication 25 MILLIGRAM(S): at 13:17

## 2024-02-17 RX ADMIN — Medication 1 TABLET(S): at 13:20

## 2024-02-17 RX ADMIN — GABAPENTIN 400 MILLIGRAM(S): 400 CAPSULE ORAL at 05:46

## 2024-02-17 RX ADMIN — Medication 5000 UNIT(S): at 13:16

## 2024-02-17 RX ADMIN — ROPINIROLE 0.25 MILLIGRAM(S): 8 TABLET, FILM COATED, EXTENDED RELEASE ORAL at 05:46

## 2024-02-17 RX ADMIN — Medication 975 MILLIGRAM(S): at 21:49

## 2024-02-17 RX ADMIN — Medication 1 SPRAY(S): at 17:25

## 2024-02-17 RX ADMIN — Medication 1 TABLET(S): at 13:15

## 2024-02-17 RX ADMIN — Medication 975 MILLIGRAM(S): at 14:37

## 2024-02-17 RX ADMIN — Medication 975 MILLIGRAM(S): at 06:16

## 2024-02-17 RX ADMIN — Medication 25 MILLIGRAM(S): at 05:45

## 2024-02-17 RX ADMIN — SENNA PLUS 2 TABLET(S): 8.6 TABLET ORAL at 21:49

## 2024-02-17 RX ADMIN — Medication 975 MILLIGRAM(S): at 22:19

## 2024-02-17 RX ADMIN — TAMSULOSIN HYDROCHLORIDE 0.8 MILLIGRAM(S): 0.4 CAPSULE ORAL at 21:49

## 2024-02-17 RX ADMIN — PANTOPRAZOLE SODIUM 40 MILLIGRAM(S): 20 TABLET, DELAYED RELEASE ORAL at 05:50

## 2024-02-17 RX ADMIN — LIDOCAINE 1 PATCH: 4 CREAM TOPICAL at 03:44

## 2024-02-17 RX ADMIN — GABAPENTIN 400 MILLIGRAM(S): 400 CAPSULE ORAL at 21:49

## 2024-02-17 RX ADMIN — LACTULOSE 10 GRAM(S): 10 SOLUTION ORAL at 17:24

## 2024-02-17 RX ADMIN — Medication 30 MILLIGRAM(S): at 05:45

## 2024-02-17 RX ADMIN — POLYETHYLENE GLYCOL 3350 17 GRAM(S): 17 POWDER, FOR SOLUTION ORAL at 05:46

## 2024-02-17 RX ADMIN — Medication 975 MILLIGRAM(S): at 05:46

## 2024-02-17 RX ADMIN — Medication 25 GRAM(S): at 13:18

## 2024-02-17 RX ADMIN — Medication 975 MILLIGRAM(S): at 13:17

## 2024-02-17 RX ADMIN — Medication 30 MILLIGRAM(S): at 17:24

## 2024-02-17 NOTE — PROGRESS NOTE ADULT - ASSESSMENT
89F w/ h/o chronic afib (on Eliquis), GERD, and chronic low back pain presenting s/p fall. 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)  - Radiation Oncology consulted, and plan for 5 sessions of RT, completed tuesday 2/6/24  - Pain control oxycodone ER 10 BID and lidocaine patch, patient was comfortable this morning  - Oncology will start immunotherapy after surgery, slowly growing tumor  - Was planned for Kyphoplasty today, was cancelled due to hypotension with a concern for Atrial fibrillation with RVR, received 500 ml fluid bolus and 5 mg push of metoprolol, monitor showing erratic reading, the ventricular rate was well controlled within the range of 70-80, patient not symptomatic  - Patient was a rapid response 4 days ago due to AMS and hypotension with BP of 75/50, received IV fluids and narcan, progressively regained consciousness and BP improved. Was called for low blood pressure 2 days ago too which improved on repeat readings, patient's blood pressure runs low, d/aleksandar the oxycodone today.   - Patient was vitally stable throughout the day, asymptomatic with well controlled ventricular rate. Patient stable to undergo surgery, following with neurosurgery  - plan for kyphoplasty 2/15 > as patient developed Afib with RVR, now on cardizem gtt  - transition to po cardizem and increase metoprolol to 25mg TID  - Pain management recs appreciated : daughter refusing oxy as it causes sedation in her mother, even prn dose    #CHF, diastolic - acute -- now resolved  # On 2/10 had episode of unresponsives and bradycardia/hypotension. Likely vasovagal. CT head negative  - TTE (May 2013) demonstrated LVEF 55-65% w/ normal global LVSF and no significant valvulopathy.  - TTE 1/28- EF 60-65% mod pulm HTN   - elevated BNP  - Tele monitored for 24 hrs with no dysrhythmia   - Currently euvolemic on exam, transition back to home oral lasix 20 daily    #Chronic Atrial Fibrillation  CHADS-VASC 3+ (Age x2, Female). Only on AC. Previously on Lopressor, but only takes intermittently as HR currently rate-controlled off meds.  - Eliquis held for surgery  - Restarted eliquis 2/16    #GERD  - c/w pantoprazole 40mg PO QD (therapeutic interchange for omeprazole)    #CKD 3b   per PCP baseline is 1.2    #Urinary retention-likely neurogenic   -hanson placed 1/29  -flomax increased to 0.8mg   - completed RT, will try TOV after walking with PT    #Constipation  -senna, miralax, lactulose    # HTN-- on metoprolol and started on lisinopril 10mg 2/4     89F w/ h/o chronic afib (on Eliquis), GERD, and chronic low back pain presenting s/p fall. 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)  - Radiation Oncology consulted, and plan for 5 sessions of RT, completed tuesday 2/6/24  - Pain control oxycodone ER 10 BID and lidocaine patch, patient was comfortable this morning  - Oncology will start immunotherapy after surgery, slowly growing tumor  - Was planned for Kyphoplasty today, was cancelled due to hypotension with a concern for Atrial fibrillation with RVR, received 500 ml fluid bolus and 5 mg push of metoprolol, monitor showing erratic reading, the ventricular rate was well controlled within the range of 70-80, patient not symptomatic  - Patient was a rapid response 4 days ago due to AMS and hypotension with BP of 75/50, received IV fluids and narcan, progressively regained consciousness and BP improved. Was called for low blood pressure 2 days ago too which improved on repeat readings, patient's blood pressure runs low, d/aleksandar the oxycodone today.   - Patient was vitally stable throughout the day, asymptomatic with well controlled ventricular rate. Patient stable to undergo surgery, following with neurosurgery  - plan for kyphoplasty 2/15 > as patient developed Afib with RVR, now on cardizem gtt  - transition to po cardizem and increase metoprolol to 25mg TID  - Pain management recs appreciated : daughter refusing oxy as it causes sedation in her mother, even prn dose    #CHF, diastolic - acute -- now resolved  # On 2/10 had episode of unresponsives and bradycardia/hypotension. Likely vasovagal. CT head negative  - TTE (May 2013) demonstrated LVEF 55-65% w/ normal global LVSF and no significant valvulopathy.  - TTE 1/28- EF 60-65% mod pulm HTN   - elevated BNP  - Tele monitored for 24 hrs with no dysrhythmia   - Currently euvolemic on exam, transition back to home oral lasix 20 daily    #Chronic Atrial Fibrillation  CHADS-VASC 3+ (Age x2, Female). Only on AC. Previously on Lopressor, but only takes intermittently as HR currently rate-controlled off meds.  - Eliquis held for surgery  - Restarted eliquis 2/16  - will hold for now given Hgb 10.6   - repeat CBC and decide AC    #GERD  - c/w pantoprazole 40mg PO QD (therapeutic interchange for omeprazole)    #CKD 3b   per PCP baseline is 1.2    #Urinary retention-likely neurogenic   -hanson placed 1/29  -flomax increased to 0.8mg   - completed RT, will try TOV after walking with PT    #Constipation  -senna, miralax, lactulose    # HTN-- on metoprolol and started on lisinopril 10mg 2/4

## 2024-02-17 NOTE — PROGRESS NOTE ADULT - ATTENDING COMMENTS
89F w/ h/o chronic afib (on Eliquis), GERD, and chronic low back pain presenting s/p fall. Admitted to medicine for back pain 2/2 Lumbar compression fracture          Afib with RVR-off Cardizem drip-- changed to po Cardizem 30mg q12h, continue metoprolol 25mg q8h- Eliquis switched to Lovenox  Lumbar Compression Fracture. Pathologic L1 vertebrae fracture.s/p 5 sessions of RT, completed Tuesday 2/6/24  Surgery for Kypho cancelled due to RVR. Follow up next week with NeuroSx for procedure. High risk patient for moderate risk surgery   Oncology will start immunotherapy after surgery, slowly growing tumor  Family refusing Oxycodone. Follow pain management  CHF, diastolic - acute - now euvolemic  CKD 3b-- 1.5 per PCP baseline is 1.2  Urinary retention- sec to cancer- hanson placed 1/29-flomax increased to 0.8mg   TOV after  walking with PT  Handoff : pain management for epidural shots, f/u heme onc post surgery regarding starting immunotherapy ,kyphoplasty w possibly early next week

## 2024-02-17 NOTE — PROGRESS NOTE ADULT - SUBJECTIVE AND OBJECTIVE BOX
24H events:    Patient is a 89y old Female who presents with a chief complaint of Back Pain (16 Feb 2024 22:06)    Primary diagnosis of Inability to ambulate due to multiple joints    Today is hospital day 24d. This morning patient was seen and examined at bedside, resting comfortably in bed.    No acute or major events overnight.    Code Status: DNR/DNI    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 No reported tobacco, alcohol, or illicit/recreational drug use. ADL's limited by progressively worsening ability to ambulate. Ambulates w/ walker. (24 Jan 2024 15:07)      ALLERGIES:  No Known Allergies    MEDICATIONS:  STANDING MEDICATIONS  acetaminophen     Tablet .. 975 milliGRAM(s) Oral every 8 hours  apixaban 5 milliGRAM(s) Oral two times a day  calcium carbonate   1250 mG (OsCal) 1 Tablet(s) Oral daily  chlorhexidine 2% Cloths 1 Application(s) Topical daily  cholecalciferol 5000 Unit(s) Oral daily  diltiazem    Tablet 30 milliGRAM(s) Oral every 12 hours  fluticasone propionate 50 MICROgram(s)/spray Nasal Spray 1 Spray(s) Both Nostrils two times a day  gabapentin 400 milliGRAM(s) Oral every 8 hours  lactulose Syrup 10 Gram(s) Oral two times a day  lidocaine   4% Patch 1 Patch Transdermal every 24 hours  magnesium sulfate  IVPB 2 Gram(s) IV Intermittent once  metoprolol tartrate 25 milliGRAM(s) Oral three times a day  multivitamin 1 Tablet(s) Oral daily  pantoprazole    Tablet 40 milliGRAM(s) Oral before breakfast  polyethylene glycol 3350 17 Gram(s) Oral two times a day  rOPINIRole 0.25 milliGRAM(s) Oral two times a day  senna 2 Tablet(s) Oral at bedtime  sodium chloride 0.9%. 1000 milliLiter(s) IV Continuous <Continuous>  tamsulosin 0.8 milliGRAM(s) Oral at bedtime    PRN MEDICATIONS  methocarbamol 750 milliGRAM(s) Oral every 8 hours PRN    VITALS:   T(F): 96.2  HR: 85  BP: 131/62  RR: 18  SpO2: --    PHYSICAL EXAM:   GENERAL: NAD, lying in bed comfortably  HEAD:  Atraumatic, Normocephalic  EYES: EOMI, sclera clear  ENT: Moist mucous membranes  NECK: Supple, trachea midline  CHEST/LUNG: Clear to auscultation anteriorly bilaterally; No rales, rhonchi, wheezing, or rubs. Unlabored respirations  HEART: Regular rate and rhythm; No appreciable murmurs, rubs, or gallops  ABDOMEN:  Soft, nontender, nondistended  EXTREMITIES:  No clubbing, cyanosis, or edema  NERVOUS SYSTEM:  A&Ox3, no noted facial droop. Moving all extremities w/o difficulty.   SKIN: No rashes or lesions to b/l forearm, face.     ( + ) Indwelling David Catheter:   Date insterted:    Reason (  ) Critical illness     (  ) urinary retention    (  ) Accurate Ins/Outs Monitoring     (  ) CMO patient    (  ) Central Line:   Date inserted:  Location: (  ) Right IJ     (  ) Left IJ     (  ) Right Fem     (  ) Left Fem    (  ) SPC        (  ) pigtail       (  ) PEG tube       (  ) colostomy       (  ) jejunostomy  (  ) U-Dall    LABS:                        11.9   4.67  )-----------( 71       ( 16 Feb 2024 06:38 )             35.8     02-16    135  |  100  |  34<H>  ----------------------------<  109<H>  4.8   |  18  |  1.2    Ca    8.6      16 Feb 2024 06:38        Urinalysis Basic - ( 16 Feb 2024 06:38 )    Color: x / Appearance: x / SG: x / pH: x  Gluc: 109 mg/dL / Ketone: x  / Bili: x / Urobili: x   Blood: x / Protein: x / Nitrite: x   Leuk Esterase: x / RBC: x / WBC x   Sq Epi: x / Non Sq Epi: x / Bacteria: x

## 2024-02-18 LAB
ALBUMIN SERPL ELPH-MCNC: 2.7 G/DL — LOW (ref 3.5–5.2)
ALP SERPL-CCNC: 65 U/L — SIGNIFICANT CHANGE UP (ref 30–115)
ALT FLD-CCNC: 14 U/L — SIGNIFICANT CHANGE UP (ref 0–41)
ANION GAP SERPL CALC-SCNC: 9 MMOL/L — SIGNIFICANT CHANGE UP (ref 7–14)
AST SERPL-CCNC: 20 U/L — SIGNIFICANT CHANGE UP (ref 0–41)
BILIRUB SERPL-MCNC: 0.5 MG/DL — SIGNIFICANT CHANGE UP (ref 0.2–1.2)
BUN SERPL-MCNC: 24 MG/DL — HIGH (ref 10–20)
CALCIUM SERPL-MCNC: 8.2 MG/DL — LOW (ref 8.4–10.5)
CHLORIDE SERPL-SCNC: 99 MMOL/L — SIGNIFICANT CHANGE UP (ref 98–110)
CO2 SERPL-SCNC: 24 MMOL/L — SIGNIFICANT CHANGE UP (ref 17–32)
CREAT SERPL-MCNC: 0.9 MG/DL — SIGNIFICANT CHANGE UP (ref 0.7–1.5)
EGFR: 61 ML/MIN/1.73M2 — SIGNIFICANT CHANGE UP
GLUCOSE SERPL-MCNC: 90 MG/DL — SIGNIFICANT CHANGE UP (ref 70–99)
HCT VFR BLD CALC: 30.6 % — LOW (ref 37–47)
HGB BLD-MCNC: 10.3 G/DL — LOW (ref 12–16)
MAGNESIUM SERPL-MCNC: 2 MG/DL — SIGNIFICANT CHANGE UP (ref 1.8–2.4)
MCHC RBC-ENTMCNC: 32.4 PG — HIGH (ref 27–31)
MCHC RBC-ENTMCNC: 33.7 G/DL — SIGNIFICANT CHANGE UP (ref 32–37)
MCV RBC AUTO: 96.2 FL — SIGNIFICANT CHANGE UP (ref 81–99)
NRBC # BLD: 0 /100 WBCS — SIGNIFICANT CHANGE UP (ref 0–0)
PLATELET # BLD AUTO: 72 K/UL — LOW (ref 130–400)
PMV BLD: 10.6 FL — HIGH (ref 7.4–10.4)
POTASSIUM SERPL-MCNC: 4 MMOL/L — SIGNIFICANT CHANGE UP (ref 3.5–5)
POTASSIUM SERPL-SCNC: 4 MMOL/L — SIGNIFICANT CHANGE UP (ref 3.5–5)
PROT SERPL-MCNC: 4.3 G/DL — LOW (ref 6–8)
RBC # BLD: 3.18 M/UL — LOW (ref 4.2–5.4)
RBC # FLD: 13.8 % — SIGNIFICANT CHANGE UP (ref 11.5–14.5)
SODIUM SERPL-SCNC: 132 MMOL/L — LOW (ref 135–146)
WBC # BLD: 2.96 K/UL — LOW (ref 4.8–10.8)
WBC # FLD AUTO: 2.96 K/UL — LOW (ref 4.8–10.8)

## 2024-02-18 PROCEDURE — 99232 SBSQ HOSP IP/OBS MODERATE 35: CPT

## 2024-02-18 RX ORDER — FUROSEMIDE 40 MG
20 TABLET ORAL DAILY
Refills: 0 | Status: DISCONTINUED | OUTPATIENT
Start: 2024-02-18 | End: 2024-02-19

## 2024-02-18 RX ORDER — ENOXAPARIN SODIUM 100 MG/ML
70 INJECTION SUBCUTANEOUS EVERY 12 HOURS
Refills: 0 | Status: DISCONTINUED | OUTPATIENT
Start: 2024-02-18 | End: 2024-02-21

## 2024-02-18 RX ADMIN — GABAPENTIN 400 MILLIGRAM(S): 400 CAPSULE ORAL at 22:06

## 2024-02-18 RX ADMIN — PANTOPRAZOLE SODIUM 40 MILLIGRAM(S): 20 TABLET, DELAYED RELEASE ORAL at 05:54

## 2024-02-18 RX ADMIN — Medication 975 MILLIGRAM(S): at 22:01

## 2024-02-18 RX ADMIN — ROPINIROLE 0.25 MILLIGRAM(S): 8 TABLET, FILM COATED, EXTENDED RELEASE ORAL at 17:12

## 2024-02-18 RX ADMIN — Medication 975 MILLIGRAM(S): at 05:46

## 2024-02-18 RX ADMIN — ROPINIROLE 0.25 MILLIGRAM(S): 8 TABLET, FILM COATED, EXTENDED RELEASE ORAL at 05:46

## 2024-02-18 RX ADMIN — LIDOCAINE 1 PATCH: 4 CREAM TOPICAL at 03:45

## 2024-02-18 RX ADMIN — CHLORHEXIDINE GLUCONATE 1 APPLICATION(S): 213 SOLUTION TOPICAL at 11:14

## 2024-02-18 RX ADMIN — Medication 1 TABLET(S): at 11:08

## 2024-02-18 RX ADMIN — Medication 25 MILLIGRAM(S): at 22:01

## 2024-02-18 RX ADMIN — POLYETHYLENE GLYCOL 3350 17 GRAM(S): 17 POWDER, FOR SOLUTION ORAL at 05:46

## 2024-02-18 RX ADMIN — Medication 975 MILLIGRAM(S): at 14:27

## 2024-02-18 RX ADMIN — Medication 25 MILLIGRAM(S): at 05:45

## 2024-02-18 RX ADMIN — Medication 5000 UNIT(S): at 11:09

## 2024-02-18 RX ADMIN — GABAPENTIN 400 MILLIGRAM(S): 400 CAPSULE ORAL at 05:46

## 2024-02-18 RX ADMIN — TAMSULOSIN HYDROCHLORIDE 0.8 MILLIGRAM(S): 0.4 CAPSULE ORAL at 22:05

## 2024-02-18 RX ADMIN — LIDOCAINE 1 PATCH: 4 CREAM TOPICAL at 07:00

## 2024-02-18 RX ADMIN — GABAPENTIN 400 MILLIGRAM(S): 400 CAPSULE ORAL at 14:27

## 2024-02-18 RX ADMIN — Medication 30 MILLIGRAM(S): at 05:46

## 2024-02-18 RX ADMIN — Medication 20 MILLIGRAM(S): at 14:27

## 2024-02-18 RX ADMIN — Medication 975 MILLIGRAM(S): at 15:06

## 2024-02-18 RX ADMIN — LIDOCAINE 1 PATCH: 4 CREAM TOPICAL at 14:29

## 2024-02-18 RX ADMIN — ENOXAPARIN SODIUM 70 MILLIGRAM(S): 100 INJECTION SUBCUTANEOUS at 17:10

## 2024-02-18 RX ADMIN — Medication 25 MILLIGRAM(S): at 14:28

## 2024-02-18 RX ADMIN — Medication 30 MILLIGRAM(S): at 17:10

## 2024-02-18 NOTE — PROGRESS NOTE ADULT - SUBJECTIVE AND OBJECTIVE BOX
Progress Note:  Provider Speciality                            Hospitalist      SUNNY COLBERT MRN-[id number] 89y Female     CHIEF PRESENTING COMPLAINT:  Patient is a 89y old  Female who presents with a chief complaint of Back Pain (17 Feb 2024 07:20)        SUBJECTIVE:  Patient was seen and examined at bedside.  No new complaints described by patient in morning rounds.   No significant overnight events reported.     HISTORY OF PRESENTING ILLNESS:  HPI:  89F w/ h/o chronic afib (on Eliquis), GERD, and chronic low back pain presenting s/p fall. Patient known to have chronic low back pain that has progressively worsened over the years. Pain acutely worsened two weeks ago and patient subsequently fell due to inability to support own weight. Evaluated by PCP after initial fall due to worsening pain. Prescribed Percocet 5-325 bid w/ mild relief. However, pt once again fell earlier today after pt unable to support her own weight while trying to ambulate. Now unable to ambulate or bear weight, even w/ assistance. No reported HT, LOC, lightheadedness, confusion, loss of continence, or focal weakness a/w either fall. No reported fevers, chills, CP, palpitations, SOB, abdominal pain, n/v/d, or dysuria (although daughter did note malodorous urine recently).    Of note, pt also reports new onset LE swelling over past 1-2 weeks. Recently started on furosemide 20 qd by PCP for swelling. No reported CP or dyspnea at rest or exertion (but functional status limited by lower back pain). No known prior h/o CHF.     In ED, pt HD stable on vitals. Labs demonstrated K 5.6 (hemolyzed). XR Bilateral Hips, Pelvis, and L-Spine overall unremarkable (wet read). S/P morphine 4mg IV and methocarbamol 1000mg PO. Admitted to medicine for inability to ambulate s/p fall. (24 Jan 2024 15:07)        REVIEW OF SYSTEMS:  Patient denies  headache, fever, chills. Denies chest pain, shortness of breath, palpitation. Denies nausea, vomiting, abdominal pain or diarrhoea, Denies dysuria.   At least 10 systems were reviewed in ROS. All systems reviewed  are within normal limits except for the complaints as described in Subjective.    PAST MEDICAL & SURGICAL HISTORY:  PAST MEDICAL & SURGICAL HISTORY:  Chronic atrial fibrillation      GERD (gastroesophageal reflux disease)      Chronic lower back pain      No significant past surgical history              VITAL SIGNS:  Vital Signs Last 24 Hrs  T(C): 35.9 (18 Feb 2024 05:13), Max: 36.1 (17 Feb 2024 13:16)  T(F): 96.7 (18 Feb 2024 05:13), Max: 96.9 (17 Feb 2024 13:16)  HR: 66 (18 Feb 2024 05:13) (66 - 72)  BP: 127/59 (18 Feb 2024 05:13) (90/50 - 127/59)  BP(mean): 73 (17 Feb 2024 21:28) (73 - 73)  RR: 18 (18 Feb 2024 05:13) (18 - 18)  SpO2: --              PHYSICAL EXAMINATION:  Not in acute distress  General: No icterus  HEENT:   no JVD.  Heart: S1+S2 audible  Lungs: bilateral  moderate air entry, no wheezing, no crepitations.  Abdomen: Soft, non-tender, non-distended , no  rigidity or guarding.  CNS: Awake alert, CN  grossly intact.  Extremities:  No edema            CONSULTS:  Consultant(s) Notes Reviewed by me.   Care Discussed with Consultants/Other Providers where required.    All the images and labs were reviewed today        MEDICATIONS:  MEDICATIONS  (STANDING):  acetaminophen     Tablet .. 975 milliGRAM(s) Oral every 8 hours  calcium carbonate   1250 mG (OsCal) 1 Tablet(s) Oral daily  chlorhexidine 2% Cloths 1 Application(s) Topical daily  cholecalciferol 5000 Unit(s) Oral daily  diltiazem    Tablet 30 milliGRAM(s) Oral every 12 hours  enoxaparin Injectable 70 milliGRAM(s) SubCutaneous every 12 hours  fluticasone propionate 50 MICROgram(s)/spray Nasal Spray 1 Spray(s) Both Nostrils two times a day  furosemide    Tablet 20 milliGRAM(s) Oral daily  gabapentin 400 milliGRAM(s) Oral every 8 hours  lactulose Syrup 10 Gram(s) Oral two times a day  lidocaine   4% Patch 1 Patch Transdermal every 24 hours  magnesium sulfate  IVPB 2 Gram(s) IV Intermittent once  metoprolol tartrate 25 milliGRAM(s) Oral three times a day  multivitamin 1 Tablet(s) Oral daily  pantoprazole    Tablet 40 milliGRAM(s) Oral before breakfast  polyethylene glycol 3350 17 Gram(s) Oral two times a day  rOPINIRole 0.25 milliGRAM(s) Oral two times a day  senna 2 Tablet(s) Oral at bedtime  tamsulosin 0.8 milliGRAM(s) Oral at bedtime    MEDICATIONS  (PRN):  methocarbamol 750 milliGRAM(s) Oral every 8 hours PRN Muscle Spasm

## 2024-02-18 NOTE — PROGRESS NOTE ADULT - ASSESSMENT
89F w/ h/o chronic afib (on Eliquis), GERD, and chronic low back pain presenting s/p fall. 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 Pathologic L1 vertebrae fracture.s/p 5 sessions of RT, completed Tuesday 2/6/24  Surgery for Kypho cancelled twice due to RVR. Follow up next week with NeuroSx for procedure. High risk patient for moderate risk surgery   Oncology will start immunotherapy after surgery, slowly growing tumor  Family refusing Oxycodone. Follow pain management  CHF, diastolic - acute - now euvolemic. Lasix 20 mg po daily  CKD 3b-- at baseline   Urinary retention- sec to cancer- hanson placed 1/29-flomax increased to 0.8mg   TOV after  walking with PT  Handoff : F/u heme onc post surgery regarding starting immunotherapy ,kyphoplasty  possibly early next week.

## 2024-02-19 LAB
ALBUMIN SERPL ELPH-MCNC: 2.9 G/DL — LOW (ref 3.5–5.2)
ALP SERPL-CCNC: 67 U/L — SIGNIFICANT CHANGE UP (ref 30–115)
ALT FLD-CCNC: 15 U/L — SIGNIFICANT CHANGE UP (ref 0–41)
ANION GAP SERPL CALC-SCNC: 11 MMOL/L — SIGNIFICANT CHANGE UP (ref 7–14)
AST SERPL-CCNC: 20 U/L — SIGNIFICANT CHANGE UP (ref 0–41)
BILIRUB SERPL-MCNC: 0.3 MG/DL — SIGNIFICANT CHANGE UP (ref 0.2–1.2)
BUN SERPL-MCNC: 21 MG/DL — HIGH (ref 10–20)
CALCIUM SERPL-MCNC: 8.1 MG/DL — LOW (ref 8.4–10.5)
CHLORIDE SERPL-SCNC: 99 MMOL/L — SIGNIFICANT CHANGE UP (ref 98–110)
CO2 SERPL-SCNC: 24 MMOL/L — SIGNIFICANT CHANGE UP (ref 17–32)
CREAT SERPL-MCNC: 0.9 MG/DL — SIGNIFICANT CHANGE UP (ref 0.7–1.5)
EGFR: 61 ML/MIN/1.73M2 — SIGNIFICANT CHANGE UP
GLUCOSE SERPL-MCNC: 95 MG/DL — SIGNIFICANT CHANGE UP (ref 70–99)
HCT VFR BLD CALC: 30.6 % — LOW (ref 37–47)
HGB BLD-MCNC: 10.6 G/DL — LOW (ref 12–16)
MAGNESIUM SERPL-MCNC: 1.7 MG/DL — LOW (ref 1.8–2.4)
MCHC RBC-ENTMCNC: 33.1 PG — HIGH (ref 27–31)
MCHC RBC-ENTMCNC: 34.6 G/DL — SIGNIFICANT CHANGE UP (ref 32–37)
MCV RBC AUTO: 95.6 FL — SIGNIFICANT CHANGE UP (ref 81–99)
NRBC # BLD: 0 /100 WBCS — SIGNIFICANT CHANGE UP (ref 0–0)
PLATELET # BLD AUTO: 68 K/UL — LOW (ref 130–400)
PMV BLD: 10.6 FL — HIGH (ref 7.4–10.4)
POTASSIUM SERPL-MCNC: 4.1 MMOL/L — SIGNIFICANT CHANGE UP (ref 3.5–5)
POTASSIUM SERPL-SCNC: 4.1 MMOL/L — SIGNIFICANT CHANGE UP (ref 3.5–5)
PROT SERPL-MCNC: 4.5 G/DL — LOW (ref 6–8)
RBC # BLD: 3.2 M/UL — LOW (ref 4.2–5.4)
RBC # FLD: 13.9 % — SIGNIFICANT CHANGE UP (ref 11.5–14.5)
SODIUM SERPL-SCNC: 134 MMOL/L — LOW (ref 135–146)
WBC # BLD: 3.18 K/UL — LOW (ref 4.8–10.8)
WBC # FLD AUTO: 3.18 K/UL — LOW (ref 4.8–10.8)

## 2024-02-19 PROCEDURE — 99232 SBSQ HOSP IP/OBS MODERATE 35: CPT

## 2024-02-19 RX ORDER — MAGNESIUM SULFATE 500 MG/ML
2 VIAL (ML) INJECTION ONCE
Refills: 0 | Status: COMPLETED | OUTPATIENT
Start: 2024-02-19 | End: 2024-02-19

## 2024-02-19 RX ORDER — CEFTRIAXONE 500 MG/1
1000 INJECTION, POWDER, FOR SOLUTION INTRAMUSCULAR; INTRAVENOUS EVERY 24 HOURS
Refills: 0 | Status: COMPLETED | OUTPATIENT
Start: 2024-02-19 | End: 2024-02-21

## 2024-02-19 RX ADMIN — Medication 30 MILLIGRAM(S): at 17:24

## 2024-02-19 RX ADMIN — GABAPENTIN 400 MILLIGRAM(S): 400 CAPSULE ORAL at 05:09

## 2024-02-19 RX ADMIN — ROPINIROLE 0.25 MILLIGRAM(S): 8 TABLET, FILM COATED, EXTENDED RELEASE ORAL at 17:24

## 2024-02-19 RX ADMIN — GABAPENTIN 400 MILLIGRAM(S): 400 CAPSULE ORAL at 13:10

## 2024-02-19 RX ADMIN — Medication 25 MILLIGRAM(S): at 05:11

## 2024-02-19 RX ADMIN — Medication 1 TABLET(S): at 11:12

## 2024-02-19 RX ADMIN — Medication 30 MILLIGRAM(S): at 05:09

## 2024-02-19 RX ADMIN — CEFTRIAXONE 100 MILLIGRAM(S): 500 INJECTION, POWDER, FOR SOLUTION INTRAMUSCULAR; INTRAVENOUS at 06:22

## 2024-02-19 RX ADMIN — Medication 975 MILLIGRAM(S): at 21:59

## 2024-02-19 RX ADMIN — ROPINIROLE 0.25 MILLIGRAM(S): 8 TABLET, FILM COATED, EXTENDED RELEASE ORAL at 05:09

## 2024-02-19 RX ADMIN — METHOCARBAMOL 750 MILLIGRAM(S): 500 TABLET, FILM COATED ORAL at 21:59

## 2024-02-19 RX ADMIN — Medication 25 GRAM(S): at 11:36

## 2024-02-19 RX ADMIN — Medication 5000 UNIT(S): at 11:12

## 2024-02-19 RX ADMIN — PANTOPRAZOLE SODIUM 40 MILLIGRAM(S): 20 TABLET, DELAYED RELEASE ORAL at 05:12

## 2024-02-19 RX ADMIN — METHOCARBAMOL 750 MILLIGRAM(S): 500 TABLET, FILM COATED ORAL at 11:12

## 2024-02-19 RX ADMIN — Medication 975 MILLIGRAM(S): at 13:10

## 2024-02-19 RX ADMIN — TAMSULOSIN HYDROCHLORIDE 0.8 MILLIGRAM(S): 0.4 CAPSULE ORAL at 21:59

## 2024-02-19 RX ADMIN — Medication 20 MILLIGRAM(S): at 05:09

## 2024-02-19 RX ADMIN — Medication 25 MILLIGRAM(S): at 21:59

## 2024-02-19 RX ADMIN — GABAPENTIN 400 MILLIGRAM(S): 400 CAPSULE ORAL at 21:59

## 2024-02-19 RX ADMIN — CHLORHEXIDINE GLUCONATE 1 APPLICATION(S): 213 SOLUTION TOPICAL at 11:12

## 2024-02-19 RX ADMIN — Medication 975 MILLIGRAM(S): at 14:00

## 2024-02-19 RX ADMIN — Medication 975 MILLIGRAM(S): at 05:09

## 2024-02-19 RX ADMIN — Medication 25 MILLIGRAM(S): at 13:10

## 2024-02-19 RX ADMIN — ENOXAPARIN SODIUM 70 MILLIGRAM(S): 100 INJECTION SUBCUTANEOUS at 17:24

## 2024-02-19 NOTE — PROGRESS NOTE ADULT - SUBJECTIVE AND OBJECTIVE BOX
24H events:    Patient is a 89y old Female who presents with a chief complaint of Back Pain (18 Feb 2024 12:48)    Primary diagnosis of Inability to ambulate due to multiple joints.      Today is 26d of hospitalization. This morning patient was seen and examined at bedside, resting comfortably in bed.    No acute or major events overnight.      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 No reported tobacco, alcohol, or illicit/recreational drug use. ADL's limited by progressively worsening ability to ambulate. Ambulates w/ walker. (24 Jan 2024 15:07)      ALLERGIES:  No Known Allergies    MEDICATIONS:  STANDING MEDICATIONS  acetaminophen     Tablet .. 975 milliGRAM(s) Oral every 8 hours  calcium carbonate   1250 mG (OsCal) 1 Tablet(s) Oral daily  cefTRIAXone   IVPB 1000 milliGRAM(s) IV Intermittent every 24 hours  chlorhexidine 2% Cloths 1 Application(s) Topical daily  cholecalciferol 5000 Unit(s) Oral daily  diltiazem    Tablet 30 milliGRAM(s) Oral every 12 hours  enoxaparin Injectable 70 milliGRAM(s) SubCutaneous every 12 hours  fluticasone propionate 50 MICROgram(s)/spray Nasal Spray 1 Spray(s) Both Nostrils two times a day  furosemide    Tablet 20 milliGRAM(s) Oral daily  gabapentin 400 milliGRAM(s) Oral every 8 hours  lactulose Syrup 10 Gram(s) Oral two times a day  lidocaine   4% Patch 1 Patch Transdermal every 24 hours  magnesium sulfate  IVPB 2 Gram(s) IV Intermittent once  magnesium sulfate  IVPB 2 Gram(s) IV Intermittent once  metoprolol tartrate 25 milliGRAM(s) Oral three times a day  multivitamin 1 Tablet(s) Oral daily  pantoprazole    Tablet 40 milliGRAM(s) Oral before breakfast  polyethylene glycol 3350 17 Gram(s) Oral two times a day  rOPINIRole 0.25 milliGRAM(s) Oral two times a day  senna 2 Tablet(s) Oral at bedtime  tamsulosin 0.8 milliGRAM(s) Oral at bedtime    PRN MEDICATIONS  methocarbamol 750 milliGRAM(s) Oral every 8 hours PRN    VITALS:   T(F): 96.8  HR: 68  BP: 124/63  RR: 18  SpO2: --    PHYSICAL EXAM:  GENERAL:   ( x) NAD, lying in bed comfortably     (  ) obtunded     (  ) lethargic     (  ) somnolent    HEAD:   ( x) Atraumatic     (  ) hematoma     (  ) laceration (specify location:       )     NECK:  (x) Supple     (  ) neck stiffness     (  ) nuchal rigidity     (  )  no JVD     (  ) JVD present ( -- cm)    HEART:  Rate -->     (x) normal rate     (  ) bradycardic     (  ) tachycardic  Rhythm -->     (x) regular     (  ) regularly irregular     (  ) irregularly irregular  Murmurs -->     (x) normal s1s2     (  ) systolic murmur     (  ) diastolic murmur     (  ) continuous murmur      (  ) S3 present     (  ) S4 present    LUNGS:   ( x)Unlabored respirations     (  ) tachypnea  ( x) B/L air entry     (  ) decreased breath sounds in:  (location     )    ( x) no adventitious sound     (  ) crackles     (  ) wheezing      (  ) rhonchi      (specify location:       )  (  ) chest wall tenderness (specify location:       )    ABDOMEN:   ( x) Soft     (  ) tense   |   (  ) nondistended     (  ) distended   |   (  ) +BS     (  ) hypoactive bowel sounds     (  ) hyperactive bowel sounds  ( x) nontender     (  ) RUQ tenderness     (  ) RLQ tenderness     (  ) LLQ tenderness     (  ) epigastric tenderness     (  ) diffuse tenderness  (  ) Splenomegaly      (  ) Hepatomegaly      (  ) Jaundice     (  ) ecchymosis     EXTREMITIES:  ( x) Normal     (  ) Rash     (  ) ecchymosis     (  ) varicose veins      (  ) pitting edema     (  ) non-pitting edema   (  ) ulceration     (  ) gangrene:     (location:     )    NERVOUS SYSTEM:    ( x) A&Ox3     (  ) confused     (  ) lethargic  CN II-XII:     ( x) Intact     (  ) deficits found     (Specify:     )       LABS:                        10.6   3.18  )-----------( 68       ( 19 Feb 2024 08:19 )             30.6     02-19    134<L>  |  99  |  21<H>  ----------------------------<  95  4.1   |  24  |  0.9    Ca    8.1<L>      19 Feb 2024 08:19  Mg     1.7     02-19    TPro  4.5<L>  /  Alb  2.9<L>  /  TBili  0.3  /  DBili  x   /  AST  20  /  ALT  15  /  AlkPhos  67  02-19      Urinalysis Basic - ( 19 Feb 2024 08:19 )    Color: x / Appearance: x / SG: x / pH: x  Gluc: 95 mg/dL / Ketone: x  / Bili: x / Urobili: x   Blood: x / Protein: x / Nitrite: x   Leuk Esterase: x / RBC: x / WBC x   Sq Epi: x / Non Sq Epi: x / Bacteria: x

## 2024-02-19 NOTE — PROGRESS NOTE ADULT - SUBJECTIVE AND OBJECTIVE BOX
SUBJECTIVE:    Patient is a 89y old Female who presents with a chief complaint of Back Pain (19 Feb 2024 11:31)    Currently admitted to medicine with the primary diagnosis of Inability to ambulate due to multiple joints       Today is hospital day 26d.     PAST MEDICAL & SURGICAL HISTORY  Chronic atrial fibrillation    GERD (gastroesophageal reflux disease)    Chronic lower back pain    No significant past surgical history      ALLERGIES:  No Known Allergies    MEDICATIONS:  STANDING MEDICATIONS  acetaminophen     Tablet .. 975 milliGRAM(s) Oral every 8 hours  calcium carbonate   1250 mG (OsCal) 1 Tablet(s) Oral daily  cefTRIAXone   IVPB 1000 milliGRAM(s) IV Intermittent every 24 hours  chlorhexidine 2% Cloths 1 Application(s) Topical daily  cholecalciferol 5000 Unit(s) Oral daily  diltiazem    Tablet 30 milliGRAM(s) Oral every 12 hours  enoxaparin Injectable 70 milliGRAM(s) SubCutaneous every 12 hours  fluticasone propionate 50 MICROgram(s)/spray Nasal Spray 1 Spray(s) Both Nostrils two times a day  furosemide    Tablet 20 milliGRAM(s) Oral daily  gabapentin 400 milliGRAM(s) Oral every 8 hours  lactulose Syrup 10 Gram(s) Oral two times a day  lidocaine   4% Patch 1 Patch Transdermal every 24 hours  magnesium sulfate  IVPB 2 Gram(s) IV Intermittent once  metoprolol tartrate 25 milliGRAM(s) Oral three times a day  multivitamin 1 Tablet(s) Oral daily  pantoprazole    Tablet 40 milliGRAM(s) Oral before breakfast  polyethylene glycol 3350 17 Gram(s) Oral two times a day  rOPINIRole 0.25 milliGRAM(s) Oral two times a day  senna 2 Tablet(s) Oral at bedtime  tamsulosin 0.8 milliGRAM(s) Oral at bedtime    PRN MEDICATIONS  methocarbamol 750 milliGRAM(s) Oral every 8 hours PRN    VITALS:   T(F): 96.8  HR: 77  BP: 113/56  RR: 18  SpO2: --    LABS:                        10.6   3.18  )-----------( 68       ( 19 Feb 2024 08:19 )             30.6     02-19    134<L>  |  99  |  21<H>  ----------------------------<  95  4.1   |  24  |  0.9    Ca    8.1<L>      19 Feb 2024 08:19  Mg     1.7     02-19    TPro  4.5<L>  /  Alb  2.9<L>  /  TBili  0.3  /  DBili  x   /  AST  20  /  ALT  15  /  AlkPhos  67  02-19      Urinalysis Basic - ( 19 Feb 2024 08:19 )    Color: x / Appearance: x / SG: x / pH: x  Gluc: 95 mg/dL / Ketone: x  / Bili: x / Urobili: x   Blood: x / Protein: x / Nitrite: x   Leuk Esterase: x / RBC: x / WBC x   Sq Epi: x / Non Sq Epi: x / Bacteria: x                RADIOLOGY:    PHYSICAL EXAM:  GEN: No acute distress  LUNGS: Clear to auscultation bilaterally   HEART: S1/S2 present. RRR.   ABD/ GI: Soft, non-tender, non-distended. Bowel sounds present  EXT: NC/NC/NE/2+PP/DIEHL  NEURO: AAOX3

## 2024-02-19 NOTE — PROGRESS NOTE ADULT - ASSESSMENT
89F w/ h/o chronic afib (on Eliquis), GERD, and chronic low back pain presenting s/p fall. 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   daughter asking for epidural shot-- patient gets AMS with oxycodone. also got hypotensive and all opioids were stopped.      # On 2/10 and 2/14- had episode of unresponsives and bradycardia/hypotension. Likely overmedication.  off lasix    #CHF, diastolic - acute -- now resolved  - TTE (May 2013) demonstrated LVEF 55-65% w/ normal global LVSF and no significant valvulopathy.  - TTE 1/28- EF 60-65% mod pulm HTN   - elevated BNP  - Tele heart rate is controlled  - Currently euvolemic on exam,     #Chronic Atrial Fibrillation  CHADS-VASC 3+ (Age x2, Female)  - Cont metoprolol and cardizem  - lovenox for AC    #GERD  - c/w pantoprazole 40mg PO QD (therapeutic interchange for omeprazole)    #CKD 3b--    per PCP baseline is 1.2    #Urinary retention- sec to cancer-hanson placed 1/29  -flomax increased to 0.8mg   - completed RT, will try TOV after  walking with PT  started on ABX for UTI    #Constipation  -senna, miralax, lactulose    # HTN-- on metoprolol and cardizem.  patient had stable BP and is off oxycodone -- heart rate on telemetry was 60s --     PENDING: pain management for epidural shots vs neurosurgery f/u, f/u heme onc post surgery regarding starting immunotherapy   spoke with daughter at bedside

## 2024-02-19 NOTE — PROGRESS NOTE ADULT - ASSESSMENT
89F w/ h/o chronic afib (on Eliquis), GERD, and chronic low back pain presenting s/p fall. 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)  - Radiation Oncology consulted, and plan for 5 sessions of RT, completed tuesday 2/6/24  - Pain control oxycodone ER 10 BID and lidocaine patch, patient was comfortable this morning  - Oncology will start immunotherapy after surgery, slowly growing tumor  - Was planned for Kyphoplasty today, was cancelled due to hypotension with a concern for Atrial fibrillation with RVR, received 500 ml fluid bolus and 5 mg push of metoprolol, monitor showing erratic reading, the ventricular rate was well controlled within the range of 70-80, patient not symptomatic  - Patient was a rapid response 4 days ago due to AMS and hypotension with BP of 75/50, received IV fluids and narcan, progressively regained consciousness and BP improved. Was called for low blood pressure 2 days ago too which improved on repeat readings, patient's blood pressure runs low, d/aleksandar the oxycodone today.   - Patient was vitally stable throughout the day, asymptomatic with well controlled ventricular rate. Patient stable to undergo surgery, following with neurosurgery  - plan for kyphoplasty 2/15 > as patient developed Afib with RVR, now on cardizem gtt  - transition to po cardizem and increase metoprolol to 25mg TID  - Pain management recs appreciated : daughter refusing oxy as it causes sedation in her mother, even prn dose    #CHF, diastolic - acute -- now resolved  # On 2/10 had episode of unresponsives and bradycardia/hypotension. Likely vasovagal. CT head negative  - TTE (May 2013) demonstrated LVEF 55-65% w/ normal global LVSF and no significant valvulopathy.  - TTE 1/28- EF 60-65% mod pulm HTN   - elevated BNP  - Tele monitored for 24 hrs with no dysrhythmia   - Currently euvolemic on exam, transition back to home oral lasix 20 daily    #Chronic Atrial Fibrillation  CHADS-VASC 3+ (Age x2, Female). Only on AC. Previously on Lopressor, but only takes intermittently as HR currently rate-controlled off meds.  - Eliquis held for surgery  - Restarted eliquis 2/16  - will hold for now given Hgb 10.6   - repeat CBC and decide AC    #GERD  - c/w pantoprazole 40mg PO QD (therapeutic interchange for omeprazole)    #CKD 3b   per PCP baseline is 1.2    #Urinary retention-likely neurogenic   -hanson placed 1/29  -flomax increased to 0.8mg   - completed RT, will try TOV after walking with PT    #Constipation  -senna, miralax, lactulose    # HTN-- on metoprolol and started on lisinopril 10mg 2/4    #DVT ppx: on Lovenox 90 BID

## 2024-02-20 LAB
ALBUMIN SERPL ELPH-MCNC: 2.9 G/DL — LOW (ref 3.5–5.2)
ALP SERPL-CCNC: 71 U/L — SIGNIFICANT CHANGE UP (ref 30–115)
ALT FLD-CCNC: 15 U/L — SIGNIFICANT CHANGE UP (ref 0–41)
ANION GAP SERPL CALC-SCNC: 9 MMOL/L — SIGNIFICANT CHANGE UP (ref 7–14)
AST SERPL-CCNC: 21 U/L — SIGNIFICANT CHANGE UP (ref 0–41)
BILIRUB SERPL-MCNC: 0.3 MG/DL — SIGNIFICANT CHANGE UP (ref 0.2–1.2)
BUN SERPL-MCNC: 17 MG/DL — SIGNIFICANT CHANGE UP (ref 10–20)
CALCIUM SERPL-MCNC: 8.2 MG/DL — LOW (ref 8.4–10.5)
CHLORIDE SERPL-SCNC: 95 MMOL/L — LOW (ref 98–110)
CO2 SERPL-SCNC: 26 MMOL/L — SIGNIFICANT CHANGE UP (ref 17–32)
CREAT SERPL-MCNC: 0.8 MG/DL — SIGNIFICANT CHANGE UP (ref 0.7–1.5)
EGFR: 70 ML/MIN/1.73M2 — SIGNIFICANT CHANGE UP
GLUCOSE SERPL-MCNC: 96 MG/DL — SIGNIFICANT CHANGE UP (ref 70–99)
HCT VFR BLD CALC: 30.3 % — LOW (ref 37–47)
HGB BLD-MCNC: 10.4 G/DL — LOW (ref 12–16)
MAGNESIUM SERPL-MCNC: 1.8 MG/DL — SIGNIFICANT CHANGE UP (ref 1.8–2.4)
MCHC RBC-ENTMCNC: 32.4 PG — HIGH (ref 27–31)
MCHC RBC-ENTMCNC: 34.3 G/DL — SIGNIFICANT CHANGE UP (ref 32–37)
MCV RBC AUTO: 94.4 FL — SIGNIFICANT CHANGE UP (ref 81–99)
NRBC # BLD: 0 /100 WBCS — SIGNIFICANT CHANGE UP (ref 0–0)
PLATELET # BLD AUTO: 81 K/UL — LOW (ref 130–400)
PMV BLD: 10.4 FL — SIGNIFICANT CHANGE UP (ref 7.4–10.4)
POTASSIUM SERPL-MCNC: 3.5 MMOL/L — SIGNIFICANT CHANGE UP (ref 3.5–5)
POTASSIUM SERPL-SCNC: 3.5 MMOL/L — SIGNIFICANT CHANGE UP (ref 3.5–5)
PROT SERPL-MCNC: 4.3 G/DL — LOW (ref 6–8)
RBC # BLD: 3.21 M/UL — LOW (ref 4.2–5.4)
RBC # FLD: 13.8 % — SIGNIFICANT CHANGE UP (ref 11.5–14.5)
SODIUM SERPL-SCNC: 130 MMOL/L — LOW (ref 135–146)
WBC # BLD: 3.04 K/UL — LOW (ref 4.8–10.8)
WBC # FLD AUTO: 3.04 K/UL — LOW (ref 4.8–10.8)

## 2024-02-20 PROCEDURE — 99232 SBSQ HOSP IP/OBS MODERATE 35: CPT

## 2024-02-20 RX ORDER — METHOCARBAMOL 500 MG/1
500 TABLET, FILM COATED ORAL EVERY 8 HOURS
Refills: 0 | Status: DISCONTINUED | OUTPATIENT
Start: 2024-02-20 | End: 2024-02-23

## 2024-02-20 RX ORDER — ACETAMINOPHEN 500 MG
1000 TABLET ORAL EVERY 8 HOURS
Refills: 0 | Status: DISCONTINUED | OUTPATIENT
Start: 2024-02-20 | End: 2024-03-03

## 2024-02-20 RX ORDER — CHLORHEXIDINE GLUCONATE 213 G/1000ML
1 SOLUTION TOPICAL
Refills: 0 | Status: DISCONTINUED | OUTPATIENT
Start: 2024-02-20 | End: 2024-03-03

## 2024-02-20 RX ORDER — KETOROLAC TROMETHAMINE 30 MG/ML
15 SYRINGE (ML) INJECTION ONCE
Refills: 0 | Status: DISCONTINUED | OUTPATIENT
Start: 2024-02-20 | End: 2024-02-20

## 2024-02-20 RX ADMIN — CHLORHEXIDINE GLUCONATE 1 APPLICATION(S): 213 SOLUTION TOPICAL at 12:03

## 2024-02-20 RX ADMIN — CHLORHEXIDINE GLUCONATE 1 APPLICATION(S): 213 SOLUTION TOPICAL at 06:36

## 2024-02-20 RX ADMIN — Medication 30 MILLIGRAM(S): at 06:15

## 2024-02-20 RX ADMIN — ENOXAPARIN SODIUM 70 MILLIGRAM(S): 100 INJECTION SUBCUTANEOUS at 17:39

## 2024-02-20 RX ADMIN — PANTOPRAZOLE SODIUM 40 MILLIGRAM(S): 20 TABLET, DELAYED RELEASE ORAL at 06:14

## 2024-02-20 RX ADMIN — Medication 1 TABLET(S): at 11:50

## 2024-02-20 RX ADMIN — GABAPENTIN 400 MILLIGRAM(S): 400 CAPSULE ORAL at 22:01

## 2024-02-20 RX ADMIN — GABAPENTIN 400 MILLIGRAM(S): 400 CAPSULE ORAL at 06:14

## 2024-02-20 RX ADMIN — Medication 15 MILLIGRAM(S): at 11:49

## 2024-02-20 RX ADMIN — Medication 1000 MILLIGRAM(S): at 22:01

## 2024-02-20 RX ADMIN — Medication 1 SPRAY(S): at 17:39

## 2024-02-20 RX ADMIN — SENNA PLUS 2 TABLET(S): 8.6 TABLET ORAL at 22:02

## 2024-02-20 RX ADMIN — GABAPENTIN 400 MILLIGRAM(S): 400 CAPSULE ORAL at 15:50

## 2024-02-20 RX ADMIN — METHOCARBAMOL 750 MILLIGRAM(S): 500 TABLET, FILM COATED ORAL at 06:14

## 2024-02-20 RX ADMIN — ROPINIROLE 0.25 MILLIGRAM(S): 8 TABLET, FILM COATED, EXTENDED RELEASE ORAL at 17:39

## 2024-02-20 RX ADMIN — CEFTRIAXONE 100 MILLIGRAM(S): 500 INJECTION, POWDER, FOR SOLUTION INTRAMUSCULAR; INTRAVENOUS at 06:11

## 2024-02-20 RX ADMIN — Medication 25 MILLIGRAM(S): at 15:48

## 2024-02-20 RX ADMIN — Medication 15 MILLIGRAM(S): at 12:16

## 2024-02-20 RX ADMIN — Medication 1 SPRAY(S): at 06:15

## 2024-02-20 RX ADMIN — TAMSULOSIN HYDROCHLORIDE 0.8 MILLIGRAM(S): 0.4 CAPSULE ORAL at 22:02

## 2024-02-20 RX ADMIN — ROPINIROLE 0.25 MILLIGRAM(S): 8 TABLET, FILM COATED, EXTENDED RELEASE ORAL at 06:14

## 2024-02-20 RX ADMIN — METHOCARBAMOL 500 MILLIGRAM(S): 500 TABLET, FILM COATED ORAL at 22:01

## 2024-02-20 RX ADMIN — ENOXAPARIN SODIUM 70 MILLIGRAM(S): 100 INJECTION SUBCUTANEOUS at 06:23

## 2024-02-20 RX ADMIN — Medication 975 MILLIGRAM(S): at 06:15

## 2024-02-20 RX ADMIN — Medication 1000 MILLIGRAM(S): at 15:49

## 2024-02-20 RX ADMIN — Medication 25 MILLIGRAM(S): at 06:14

## 2024-02-20 RX ADMIN — Medication 5000 UNIT(S): at 11:50

## 2024-02-20 RX ADMIN — METHOCARBAMOL 500 MILLIGRAM(S): 500 TABLET, FILM COATED ORAL at 15:57

## 2024-02-20 RX ADMIN — Medication 25 MILLIGRAM(S): at 22:01

## 2024-02-20 RX ADMIN — Medication 1000 MILLIGRAM(S): at 16:33

## 2024-02-20 NOTE — PROGRESS NOTE ADULT - ASSESSMENT
89yFemale with history of afib on eliquis, GERD, chronic low back pain presents after fall. Patient found to have lumbar pathological fracture on admission. Palliative care consulted for GOC and symptom management.    Patient seen at bedside. Patient sleeping comfortably. SPoke with daughter at bedside.     Education about palliative care provided to patient/family.  See Recs below.    Please call [phone] with questions or concerns 24/7.   We will continue to follow.

## 2024-02-20 NOTE — PROGRESS NOTE ADULT - SUBJECTIVE AND OBJECTIVE BOX
HPI:    89F w/ h/o chronic afib (on Eliquis), GERD, and chronic low back pain presenting s/p fall. Patient known to have chronic low back pain that has progressively worsened over the years. Pain acutely worsened two weeks ago and patient subsequently fell due to inability to support own weight. Evaluated by PCP after initial fall due to worsening pain. Prescribed Percocet 5-325 bid w/ mild relief. However, pt once again fell earlier today after pt unable to support her own weight while trying to ambulate. Now unable to ambulate or bear weight, even w/ assistance. No reported HT, LOC, lightheadedness, confusion, loss of continence, or focal weakness a/w either fall. No reported fevers, chills, CP, palpitations, SOB, abdominal pain, n/v/d, or dysuria (although daughter did note malodorous urine recently).    Interval history  -Patient seen at bedside with alyssa Garcia  -Patient notes leg pain with movement, but only mild pain at rest  2/7: Patient now DNR/DNI, added 10mg oxycontin Q12H for consistent pain  2/12: added PRN oxycodone 5mg Q6H for pain  2/14: stopped oxycodone ER 10mg since patient has been more somnolent and hypotensive occasionally   2/16: Patient was upgraded to telemetry, placed on Cardizem infusion after OR  2/20: Patient sleeping comfortably, spoke with daughter at bedside who reports that patient's pain has been OK but she worked with PT which did exacerbate it a bit.      ADVANCE DIRECTIVES:     [ ] Full Code [x ] DNR  MOLST  [ ]  Living Will  [ ]   DECISION MAKER(s):  [ ] Health Care Proxy(s)  [x ] Surrogate(s)  [ ] Guardian           Name(s): Phone Number(s):  Daughter      BASELINE (I)ADL(s) (prior to admission):    [place]: [ ]Total  [ ] Moderate [ ]Dependent  Palliative Performance Status Version 2:         %    http://npcrc.org/files/news/palliative_performance_scale_[email or web address].pdf    Allergies    No Known Allergies    Intolerances    MEDICATIONS  (STANDING):  acetaminophen     Tablet .. 975 milliGRAM(s) Oral every 8 hours  apixaban 5 milliGRAM(s) Oral two times a day  calcium carbonate   1250 mG (OsCal) 1 Tablet(s) Oral daily  chlorhexidine 2% Cloths 1 Application(s) Topical daily  cholecalciferol 5000 Unit(s) Oral daily  diltiazem    Tablet 30 milliGRAM(s) Oral every 12 hours  fluticasone propionate 50 MICROgram(s)/spray Nasal Spray 1 Spray(s) Both Nostrils two times a day  lactulose Syrup 10 Gram(s) Oral two times a day  lidocaine   4% Patch 1 Patch Transdermal every 24 hours  magnesium sulfate  IVPB 2 Gram(s) IV Intermittent once  metoprolol tartrate 25 milliGRAM(s) Oral three times a day  multivitamin 1 Tablet(s) Oral daily  pantoprazole    Tablet 40 milliGRAM(s) Oral before breakfast  polyethylene glycol 3350 17 Gram(s) Oral two times a day  rOPINIRole 0.25 milliGRAM(s) Oral two times a day  senna 2 Tablet(s) Oral at bedtime  sodium chloride 0.9%. 1000 milliLiter(s) (50 mL/Hr) IV Continuous <Continuous>  tamsulosin 0.8 milliGRAM(s) Oral at bedtime    MEDICATIONS  (PRN):  methocarbamol 750 milliGRAM(s) Oral every 8 hours PRN Muscle Spasm            PRESENT SYMPTOMS: [  ]Unable to obtain due to poor mentation   Source if other than patient:  [ ]Family   [ ]Team     Pain: [x ]yes [ ]no  QOL impact -  moderate  Location - hips  Aggravating factors - touch  Quality - shooting, sharp  Radiation - hips to calves  Timing-  intermittent  Severity (0-10 scale): 5/10  Minimal acceptable level (0-10 scale): 2/10    CPOT:    https://www.sccm.org/getattachment/[email or web address]-2194s4376o8k/Critical-Care-Pain-Observation-Tool-(CPOT)    PAIN AD Score:   [email or web address]/painad.[email or web address] (press ctrl +  left click to view)    Dyspnea:                           [x ]None[ ]Mild [ ]Moderate [ ]Severe     Respiratory Distress Observation Scale (RDOS):   A score of 0 to 2 signifies little or no respiratory distress, 3 signifies mild distress, scores 4 to 6 indicate moderate distress, and scores greater than 7 signify severe distress  [email or web address]/[email or web address]-ghzsugrvyzz-[email or web address]-[email or web address]    Anxiety:                             [ x]None[ ]Mild [ ]Moderate [ ]Severe   Fatigue:                             [x ]None[ ]Mild [ ]Moderate [ ]Severe   Nausea:                             [ x]None[ ]Mild [ ]Moderate [ ]Severe   Loss of appetite:              [x ]None[ ]Mild [ ]Moderate [ ]Severe   Constipation:                    [x ]None[ ]Mild [ ]Moderate [ ]Severe    Other Symptoms:  [ x]All other review of systems negative     Palliative Performance Status Version 2:         30%    http://[facility].org/files/news/palliative_performance_scale_[email or web address].pdf    PHYSICAL EXAM:  ICU Vital Signs Last 24 Hrs  T(C): 35.9 (20 Feb 2024 12:52), Max: 36.5 (19 Feb 2024 20:16)  T(F): 96.6 (20 Feb 2024 12:52), Max: 97.7 (19 Feb 2024 20:16)  HR: 65 (20 Feb 2024 12:52) (65 - 78)  BP: 119/59 (20 Feb 2024 12:52) (119/59 - 162/74)  BP(mean): --  ABP: --  ABP(mean): --  RR: 18 (20 Feb 2024 12:52) (18 - 18)  SpO2: --      GENERAL:  [ ] No acute distress [ ]Lethargic  [ ]Unarousable  [ x]Verbal  [ ]Non-Verbal [ ]Cachexia    BEHAVIORAL/PSYCH:  [x ]Alert and Oriented x2  [ ] Anxiety [ ] Delirium [ ] Agitation [ x] Calm   EYES: [ x] No scleral icterus [ ] Scleral icterus [ ] Closed  ENMT:  [ ]Dry mouth  [x ]No external oral lesions [ ] No external ear or nose lesions  CARDIOVASCULAR:  [ ]Regular [ ]Irregular [x ]Tachy [ ]Not Tachy  [ ]Sung [ x] Edema [ ] No edema  PULMONARY:  [ ]Tachypnea  [ ]Audible excessive secretions [ x] No labored breathing [ ] labored breathing  GASTROINTESTINAL: [ ]Soft  [ ]Distended  [x ]Not distended [ ]Non tender [ ]Tender  MUSCULOSKELETAL: [ ]No clubbing [ ] clubbing  [x ] No cyanosis [ ] cyanosis  NEUROLOGIC: [ ]No focal deficits  [x ]Follows commands  [ ]Does not follow commands  [ x]Cognitive impairment  [ ]Dysphagia  [ ]Dysarthria  [ ]Paresis   SKIN: [ ] Jaundiced [ x] Non-jaundiced [ ]Rash [ ]No Rash [ ] Warm [ ] Dry  MISC/LINES: [ ] ET tube [ ] Trach [ ]NGT/OGT [ ]PEG [ ]David    LABS: reviewed by me             02-20    130<L>  |  95<L>  |  17  ----------------------------<  96  3.5   |  26  |  0.8    Ca    8.2<L>      20 Feb 2024 07:57  Mg     1.8     02-20    TPro  4.3<L>  /  Alb  2.9<L>  /  TBili  0.3  /  DBili  x   /  AST  21  /  ALT  15  /  AlkPhos  71  02-20                            10.4   3.04  )-----------( 81       ( 20 Feb 2024 07:57 )             30.3     RADIOLOGY & ADDITIONAL STUDIES: reviewed by me    < from: NM SPECT Bone Scan, Single Area Single Day (02.01.24 @ 16:43) >  IMPRESSION:    Abnormal uptake in the L1 vertebral body, corresponding to known   compression fracture deformity secondary to vertebral body mass.    No other evidence for metastatic bone disease.    < end of copied text >        EKG: reviewed by me    < from: 12 Lead ECG (01.25.24 @ 11:34) >  Ventricular Rate 94 BPM    Atrial Rate 81 BPM    QRS Duration 90 ms    Q-T Interval 384 ms    QTC Calculation(Bazett) 480 ms    R Axis 166 degrees    T Axis -48 degrees    Diagnosis Line Atrial fibrillation  Right ventricular hypertrophy  Anterior infarct , age undetermined  ST & T wave abnormality, consider inferior ischemia  Abnormal ECG    < end of copied text >      Patient discussed with primary medical team MD  Palliative care education provided to patient and/or family

## 2024-02-20 NOTE — CONSULT NOTE ADULT - SUBJECTIVE AND OBJECTIVE BOX
HPI:  89F w/ h/o chronic afib (on Eliquis), GERD, and chronic low back pain presenting s/p fall. Patient known to have chronic low back pain that has progressively worsened over the years. Pain acutely worsened two weeks ago and patient subsequently fell due to inability to support own weight. Evaluated by PCP after initial fall due to worsening pain. Prescribed Percocet 5-325 bid w/ mild relief. However, pt once again fell earlier today after pt unable to support her own weight while trying to ambulate. Now unable to ambulate or bear weight, even w/ assistance. No reported HT, LOC, lightheadedness, confusion, loss of continence, or focal weakness a/w either fall. No reported fevers, chills, CP, palpitations, SOB, abdominal pain, n/v/d, or dysuria (although daughter did note malodorous urine recently).    Of note, pt also reports new onset LE swelling over past 1-2 weeks. Recently started on furosemide 20 qd by PCP for swelling. No reported CP or dyspnea at rest or exertion (but functional status limited by lower back pain). No known prior h/o CHF.     In ED, pt HD stable on vitals. Labs demonstrated K 5.6 (hemolyzed). XR Bilateral Hips, Pelvis, and L-Spine overall unremarkable (wet read). S/P morphine 4mg IV and methocarbamol 1000mg PO. Admitted to medicine for inability to ambulate s/p fall.     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      PAST MEDICAL & SURGICAL HISTORY:  Chronic atrial fibrillation      GERD (gastroesophageal reflux disease)      Chronic lower back pain      No significant past surgical history          Hospital Course:    TODAY'S SUBJECTIVE & REVIEW OF SYMPTOMS:     Constitutional WNL   Cardio WNL   Resp WNL   GI WNL  Heme WNL  Endo WNL  Skin WNL  MSK back pain   Neuro WNL  Cognitive WNL  Psych WNL      MEDICATIONS  (STANDING):  acetaminophen     Tablet .. 1000 milliGRAM(s) Oral every 8 hours  calcium carbonate   1250 mG (OsCal) 1 Tablet(s) Oral daily  cefTRIAXone   IVPB 1000 milliGRAM(s) IV Intermittent every 24 hours  chlorhexidine 2% Cloths 1 Application(s) Topical <User Schedule>  chlorhexidine 2% Cloths 1 Application(s) Topical daily  cholecalciferol 5000 Unit(s) Oral daily  enoxaparin Injectable 70 milliGRAM(s) SubCutaneous every 12 hours  fluticasone propionate 50 MICROgram(s)/spray Nasal Spray 1 Spray(s) Both Nostrils two times a day  gabapentin 400 milliGRAM(s) Oral every 8 hours  lidocaine   4% Patch 1 Patch Transdermal every 24 hours  magnesium sulfate  IVPB 2 Gram(s) IV Intermittent once  methocarbamol 500 milliGRAM(s) Oral every 8 hours  metoprolol tartrate 25 milliGRAM(s) Oral three times a day  multivitamin 1 Tablet(s) Oral daily  pantoprazole    Tablet 40 milliGRAM(s) Oral before breakfast  polyethylene glycol 3350 17 Gram(s) Oral two times a day  rOPINIRole 0.25 milliGRAM(s) Oral two times a day  senna 2 Tablet(s) Oral at bedtime  tamsulosin 0.8 milliGRAM(s) Oral at bedtime    MEDICATIONS  (PRN):      FAMILY HISTORY:  Family history of stroke (Father)        Allergies    No Known Allergies    Intolerances        SOCIAL HISTORY:    [  ] Etoh  [  ] Smoking  [  ] Substance abuse     Home Environment:  [   ] Home Alone  [ x  ] Lives with Family  [   ] Home Health Aid    Dwelling:  [   ] Apartment  [ x  ] Private House  [   ] Adult Home  [   ] Skilled Nursing Facility      [   ] Short Term  [   ] Long Term  [ x  ] Stairs       Elevator [   ]    FUNCTIONAL STATUS PTA: (Check all that apply)  Ambulation: [  x  ]Independent    [   ] Dependent     [   ] Non-Ambulatory  Assistive Device: [   ] SA Cane  [   ]  Q Cane  [ x  ] Walker  [   ]  Wheelchair  ADL : [x   ] Independent  [    ]  Dependent       Vital Signs Last 24 Hrs  T(C): 35.9 (20 Feb 2024 12:52), Max: 36.5 (19 Feb 2024 20:16)  T(F): 96.6 (20 Feb 2024 12:52), Max: 97.7 (19 Feb 2024 20:16)  HR: 65 (20 Feb 2024 12:52) (65 - 78)  BP: 119/59 (20 Feb 2024 12:52) (119/59 - 162/74)  BP(mean): --  RR: 18 (20 Feb 2024 12:52) (18 - 18)  SpO2: --          PHYSICAL EXAM: Awake & Alert  GENERAL: NAD  HEAD:  Normocephalic  CHEST/LUNG: Clear   HEART: S1S2+  ABDOMEN: Soft, Nontender  EXTREMITIES:  no calf tenderness    NERVOUS SYSTEM:  Cranial Nerves 2-12 intact [   ] Abnormal  [   ]  ROM: WFL all extremities [   ]  Abnormal [   ]able to move all ext - limited participation   Motor Strength: WFL all extremities  [   ]  Abnormal [   ]  Sensation: intact to light touch [ x  ] Abnormal [   ]    FUNCTIONAL STATUS:  Bed Mobility: Independent [   ]  Supervision [   ]  Needs Assistance [ x  ]  N/A [   ]  Transfers: Independent [   ]  Supervision [   ]  Needs Assistance [   ]  N/A [   ]   Ambulation: Independent [   ]  Supervision [   ]  Needs Assistance [   ]  N/A [   ]  ADL: Independent [   ] Requires Assistance [   ] N/A [   ]      LABS:                        10.4   3.04  )-----------( 81       ( 20 Feb 2024 07:57 )             30.3     02-20    130<L>  |  95<L>  |  17  ----------------------------<  96  3.5   |  26  |  0.8    Ca    8.2<L>      20 Feb 2024 07:57  Mg     1.8     02-20    TPro  4.3<L>  /  Alb  2.9<L>  /  TBili  0.3  /  DBili  x   /  AST  21  /  ALT  15  /  AlkPhos  71  02-20      Urinalysis Basic - ( 20 Feb 2024 07:57 )    Color: x / Appearance: x / SG: x / pH: x  Gluc: 96 mg/dL / Ketone: x  / Bili: x / Urobili: x   Blood: x / Protein: x / Nitrite: x   Leuk Esterase: x / RBC: x / WBC x   Sq Epi: x / Non Sq Epi: x / Bacteria: x        RADIOLOGY & ADDITIONAL STUDIES:

## 2024-02-20 NOTE — PROGRESS NOTE ADULT - ASSESSMENT
89F w/ h/o chronic afib (on Eliquis), GERD, and chronic low back pain presenting s/p fall. 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   daughter asking for epidural shot-- patient gets AMS with oxycodone. also got hypotensive and all opioids were stopped.      # On 2/10 and 2/14- had episode of unresponsives and bradycardia/hypotension. Likely overmedication.  off lasix    #CHF, diastolic - acute -- now resolved-- lasix only PRN  - TTE (May 2013) demonstrated LVEF 55-65% w/ normal global LVSF and no significant valvulopathy.  - TTE 1/28- EF 60-65% mod pulm HTN   - elevated BNP  - Tele heart rate is controlled  - Currently euvolemic on exam,     #Chronic Atrial Fibrillation  CHADS-VASC 3+ (Age x2, Female)  - Cont metoprolol and cardizem-- will try to DC cardizem as hypotensive with PT  - lovenox for AC-- can change to eliquis later if OK with PT and surgery ruled out completely( So far NS does not want surgery-- afib with RVR in preop area X2)    #GERD  - c/w pantoprazole 40mg PO QD (therapeutic interchange for omeprazole)    #CKD 3b--    per PCP baseline is 1.2    #Urinary retention- sec to cancer-hanson placed 1/29-- change hanson tube only today and if still dierty change cath completely  -flomax increased to 0.8mg   - completed RT, will try TOV after  walking with PT  started on ABX for UTI    #Constipation  -senna, miralax, lactulose    # HTN-- on metoprolol and cardizem.  patient had stable BP and is off oxycodone -- heart rate on telemetry was 60s -- DC cardizem    PENDING: spoke with rehab-- Plan for SNF-- spoke with rehab-- TLSO brace to prepare for PT, f/u heme onc post surgery regarding starting immunotherapy   spoke with daughter at bedside-- eager for DC --if can tolerate PT.

## 2024-02-20 NOTE — PROGRESS NOTE ADULT - SUBJECTIVE AND OBJECTIVE BOX
SUBJECTIVE:    Patient is a 89y old Female who presents with a chief complaint of Back Pain (20 Feb 2024 14:44)    Currently admitted to medicine with the primary diagnosis of Inability to ambulate due to multiple joints       Today is hospital day 27d.     PAST MEDICAL & SURGICAL HISTORY  Chronic atrial fibrillation    GERD (gastroesophageal reflux disease)    Chronic lower back pain    No significant past surgical history      ALLERGIES:  No Known Allergies    MEDICATIONS:  STANDING MEDICATIONS  acetaminophen     Tablet .. 1000 milliGRAM(s) Oral every 8 hours  calcium carbonate   1250 mG (OsCal) 1 Tablet(s) Oral daily  cefTRIAXone   IVPB 1000 milliGRAM(s) IV Intermittent every 24 hours  chlorhexidine 2% Cloths 1 Application(s) Topical <User Schedule>  chlorhexidine 2% Cloths 1 Application(s) Topical daily  cholecalciferol 5000 Unit(s) Oral daily  enoxaparin Injectable 70 milliGRAM(s) SubCutaneous every 12 hours  fluticasone propionate 50 MICROgram(s)/spray Nasal Spray 1 Spray(s) Both Nostrils two times a day  gabapentin 400 milliGRAM(s) Oral every 8 hours  lactulose Syrup 10 Gram(s) Oral two times a day  lidocaine   4% Patch 1 Patch Transdermal every 24 hours  magnesium sulfate  IVPB 2 Gram(s) IV Intermittent once  methocarbamol 500 milliGRAM(s) Oral every 8 hours  metoprolol tartrate 25 milliGRAM(s) Oral three times a day  multivitamin 1 Tablet(s) Oral daily  pantoprazole    Tablet 40 milliGRAM(s) Oral before breakfast  polyethylene glycol 3350 17 Gram(s) Oral two times a day  rOPINIRole 0.25 milliGRAM(s) Oral two times a day  senna 2 Tablet(s) Oral at bedtime  tamsulosin 0.8 milliGRAM(s) Oral at bedtime    PRN MEDICATIONS    VITALS:   T(F): 96.6  HR: 65  BP: 119/59  RR: 18  SpO2: --    LABS:                        10.4   3.04  )-----------( 81       ( 20 Feb 2024 07:57 )             30.3     02-20    130<L>  |  95<L>  |  17  ----------------------------<  96  3.5   |  26  |  0.8    Ca    8.2<L>      20 Feb 2024 07:57  Mg     1.8     02-20    TPro  4.3<L>  /  Alb  2.9<L>  /  TBili  0.3  /  DBili  x   /  AST  21  /  ALT  15  /  AlkPhos  71  02-20      Urinalysis Basic - ( 20 Feb 2024 07:57 )    Color: x / Appearance: x / SG: x / pH: x  Gluc: 96 mg/dL / Ketone: x  / Bili: x / Urobili: x   Blood: x / Protein: x / Nitrite: x   Leuk Esterase: x / RBC: x / WBC x   Sq Epi: x / Non Sq Epi: x / Bacteria: x                RADIOLOGY:    PHYSICAL EXAM:  GEN: No acute distress  LUNGS: Clear to auscultation bilaterally   HEART: S1/S2 present. RRR.   ABD/ GI: Soft, non-tender, non-distended. Bowel sounds present  EXT: NC/NC/NE/2+PP/DIEHL  NEURO: AAOX3

## 2024-02-20 NOTE — PROGRESS NOTE ADULT - SUBJECTIVE AND OBJECTIVE BOX
24H events:    Patient is a 89y old Female who presents with a chief complaint of Back Pain (19 Feb 2024 16:42)    Primary diagnosis of Inability to ambulate due to multiple joints      Today is hospital day 27d. This morning patient was seen and examined at bedside, resting comfortably in bed.    No acute or major events overnight. Hemodynamically stable, tolerating oral diet, voiding appropriately with appropriate bowel movements.     Patient transferred from  due to afib with RVR. States is doing okay today, experiencing back pain.    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 No reported tobacco, alcohol, or illicit/recreational drug use. ADL's limited by progressively worsening ability to ambulate. Ambulates w/ walker. (24 Jan 2024 15:07)      ALLERGIES:  No Known Allergies    MEDICATIONS:  STANDING MEDICATIONS  acetaminophen     Tablet .. 975 milliGRAM(s) Oral every 8 hours  calcium carbonate   1250 mG (OsCal) 1 Tablet(s) Oral daily  cefTRIAXone   IVPB 1000 milliGRAM(s) IV Intermittent every 24 hours  chlorhexidine 2% Cloths 1 Application(s) Topical <User Schedule>  chlorhexidine 2% Cloths 1 Application(s) Topical daily  cholecalciferol 5000 Unit(s) Oral daily  diltiazem    Tablet 30 milliGRAM(s) Oral every 12 hours  enoxaparin Injectable 70 milliGRAM(s) SubCutaneous every 12 hours  fluticasone propionate 50 MICROgram(s)/spray Nasal Spray 1 Spray(s) Both Nostrils two times a day  gabapentin 400 milliGRAM(s) Oral every 8 hours  lactulose Syrup 10 Gram(s) Oral two times a day  lidocaine   4% Patch 1 Patch Transdermal every 24 hours  magnesium sulfate  IVPB 2 Gram(s) IV Intermittent once  metoprolol tartrate 25 milliGRAM(s) Oral three times a day  multivitamin 1 Tablet(s) Oral daily  pantoprazole    Tablet 40 milliGRAM(s) Oral before breakfast  polyethylene glycol 3350 17 Gram(s) Oral two times a day  rOPINIRole 0.25 milliGRAM(s) Oral two times a day  senna 2 Tablet(s) Oral at bedtime  tamsulosin 0.8 milliGRAM(s) Oral at bedtime    PRN MEDICATIONS  methocarbamol 750 milliGRAM(s) Oral every 8 hours PRN    VITALS:   T(F): 96.7  HR: 78  BP: 162/74  RR: 18  SpO2: --    PHYSICAL EXAM:  GENERAL: NAD, lying in bed comfortably, elderly  HEAD: NCAD, no hematoma or laceration   NECK: Supple, no neck stiffness/nuchal rigidity  HEART: Regular rate and rhythm, normal S1/S2, no murmurs, heaves, thrills  LUNGS: No acute respiratory distress, clear b/l breath sounds, no wheezing  ABDOMEN:  soft, nontender, nondistended  EXTREMITIES: no rashes, extremities warm/dry, no edema, ecchymosis on b/l LE  NERVOUS SYSTEM:  A&Ox3      (X  ) Indwelling David Catheter:  Reason (  ) Critical illness     ( X ) urinary retention    (  ) Accurate Ins/Outs Monitoring     (  ) CMO patient    LABS:                        10.6   3.18  )-----------( 68       ( 19 Feb 2024 08:19 )             30.6     02-19    134<L>  |  99  |  21<H>  ----------------------------<  95  4.1   |  24  |  0.9    Ca    8.1<L>      19 Feb 2024 08:19  Mg     1.7     02-19    TPro  4.5<L>  /  Alb  2.9<L>  /  TBili  0.3  /  DBili  x   /  AST  20  /  ALT  15  /  AlkPhos  67  02-19      Urinalysis Basic - ( 19 Feb 2024 08:19 )    Color: x / Appearance: x / SG: x / pH: x  Gluc: 95 mg/dL / Ketone: x  / Bili: x / Urobili: x   Blood: x / Protein: x / Nitrite: x   Leuk Esterase: x / RBC: x / WBC x   Sq Epi: x / Non Sq Epi: x / Bacteria: x                RADIOLOGY:  No radiographic images in the past 24 hours.

## 2024-02-20 NOTE — CONSULT NOTE ADULT - ASSESSMENT
IMPRESSION: Rehab of L1 comp fx, s/p fall / chronic afib (on Eliquis), GERD, and chronic low back pain    PRECAUTIONS: [   ] Cardiac  [   ] Respiratory  [   ] Seizures [   ] Contact Isolation  [   ] Droplet Isolation  [   ] Other    Weight Bearing Status:     RECOMMENDATION:    Out of Bed to Chair     DVT/Decubiti Prophylaxis    REHAB PLAN:     [  x  ] Bedside P/T 3-5 times a week   [    ]   Bedside O/T  2-3 times a week             [    ] Speech Therapy               [    ]  No Rehab Therapy Indicated   Conditioning/ROM                                    ADL  Bed Mobility                                               Conditioning/ROM  Transfers                                                     Bed Mobility  Sitting /Standing Balance                         Transfers                                        Gait Training                                               Sitting/Standing Balance  Stair Training [   ]Applicable                    Home equipment Eval                                                                        Splinting  [   ] Only      GOALS:   ADL   [    ]   Independent                    Transfers  [  x ] Independent                          Ambulation  [  x  ] Independent     [  x   ] With device                            [    ]  CG                                                         [    ]  CG                                                                  [    ] CG                            [    ] Min A                                                   [    ] Min A                                                              [    ] Min  A          DISCHARGE PLAN:   [    ]  Good candidate for Intensive Rehabilitation/Hospital based                                             Will tolerate 3hrs Intensive Rehab Daily                                       [   x  ]  Short Term Rehab in Skilled Nursing Facility                                       [     ]  Home with Outpatient or  services                                         [     ]  Possible Candidate for Intensive Hospital based Rehab

## 2024-02-20 NOTE — PROGRESS NOTE ADULT - ASSESSMENT
89F w/ h/o chronic afib (on Eliquis), GERD, and chronic low back pain presenting s/p fall. Admitted to medicine for back pain 2/2 Lumbar compression fracture. Planned for kyphoplasty with neurosurgery, but surgery postponed as patient went into afib with RVR and transferred to telemetry for further monitoring.    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 Was called for low blood pressure, which improved on repeat readings, patient's blood pressure runs low, d/aleksandar the oxycodon  - plan for kyphoplasty 2/15 > as patient developed Afib with RVR, s/p cardizem gtt  - transitioned to PO cardizem and increased metoprolol to 25mg TID  - Pain management recs appreciated : daughter refusing oxy as it causes sedation in her mother, even prn dose  1. acetaminophen 1000mg q8h standing  2. Gabapentin 400mg q8h standing, if tolerated (no sedation/confusion)  3. Methocarbamol 500mg q8h standing  4. oxycodone 5mg q4h PRN for breakthrough pain. can increase to 7.5mg if need be  5. pt planned for nsurg intervention     #CHF, diastolic - acute -- now resolved  - On 2/10 had episode of unresponsives and bradycardia/hypotension. Likely vasovagal. CT head negative  - TTE (May 2013) demonstrated LVEF 55-65% w/ normal global LVSF and no significant valvulopathy.  - TTE 1/28- EF 60-65% mod pulm HTN   - elevated BNP  - Tele monitored for 24 hrs with no dysrhythmia   - Currently euvolemic on exam, transition back to home oral lasix 20 daily - dced on 2/19    #Chronic Atrial Fibrillation  CHADS-VASC 3+ (Age x2, Female). Only on AC. Previously on Lopressor, but only takes intermittently as HR currently rate-controlled off meds.  - Eliquis initially held for surgery, restarted on 2/16, then held due to low Hb  - now on lovenox 70 BID    #GERD  - c/w pantoprazole 40mg PO QD (therapeutic interchange for omeprazole)    #CKD 3b   - per PCP baseline is 1.2    #Urinary retention - likely neurogenic   - hanson placed 1/29  - c/w flomax 0.8mg   - completed RT, will try TOV after walking with PT    #Constipation  - c/w aggressive bowel regimen: senna, miralax, lactulose    # HTN  - c/w metoprolol 25mg TID  - started on lisinopril 10mg 2/4, which was dced 2/16                                                                              ----------------------------------------------------  # DVT prophylaxis: Lovenox BID  # GI prophylaxis: Protonix  # Diet: DASH/TLC  # Activity: IAT  # Code status: DNR/DNI  # Disposition: [facility] vs Home with home PT                                                                           --------------------------------------------------------    # Handoff:    89F w/ h/o chronic afib (on Eliquis), GERD, and chronic low back pain presenting s/p fall. Admitted to medicine for back pain 2/2 Lumbar compression fracture. Planned for kyphoplasty with neurosurgery, but surgery postponed as patient went into afib with RVR and transferred to telemetry for further monitoring.    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 Was called for low blood pressure, which improved on repeat readings, patient's blood pressure runs low, d/aleksandar the oxycodone  - plan for kyphoplasty 2/15 cancelled > as patient developed Afib with RVR, s/p cardizem gtt  - per neurosurgery, no further plans for kyphoplasty, c/w conservative management  - transitioned to PO cardizem and increased metoprolol to 25mg TID  - Pain management recs appreciated : daughter refusing oxy as it causes sedation in her mother, even prn dose  1. acetaminophen 1000mg q8h standing  2. Gabapentin 400mg q8h standing, if tolerated (no sedation/confusion)  3. Methocarbamol 500mg q8h standing  4. oxycodone 5mg q4h PRN for breakthrough pain. can increase to 7.5mg if need be  5. pt planned for nsurg intervention     #CHF, diastolic - acute -- now resolved  - On 2/10 had episode of unresponsives and bradycardia/hypotension. Likely vasovagal. CT head negative  - TTE (May 2013) demonstrated LVEF 55-65% w/ normal global LVSF and no significant valvulopathy.  - TTE 1/28- EF 60-65% mod pulm HTN   - elevated BNP  - Tele monitored for 24 hrs with no dysrhythmia   - Currently euvolemic on exam, transition back to home oral lasix 20 daily - dced on 2/19, c/w PRN as patient becomes hypotensive on lasix    #Chronic Atrial Fibrillation  CHADS-VASC 3+ (Age x2, Female). Only on AC. Previously on Lopressor, but only takes intermittently as HR currently rate-controlled off meds.  - Eliquis initially held for surgery, restarted on 2/16, then held due to low Hb  - now on lovenox 70 BID    #GERD  - c/w pantoprazole 40mg PO QD (therapeutic interchange for omeprazole)    #CKD 3b   - per PCP baseline is 1.2    #Urinary retention - likely neurogenic   - hanson placed 1/29  - c/w flomax 0.8mg   - completed RT, will try TOV after walking with PT    #Constipation  - c/w aggressive bowel regimen: senna, miralax, lactulose    # HTN  - c/w metoprolol 25mg TID  - started on lisinopril 10mg 2/4, which was dced 2/16                                                                              ----------------------------------------------------  # DVT prophylaxis: Lovenox BID  # GI prophylaxis: Protonix  # Diet: DASH/TLC  # Activity: IAT  # Code status: DNR/DNI  # Disposition: [facility] vs Home with home PT                                                                           --------------------------------------------------------    # Handoff:    89F w/ h/o chronic afib (on Eliquis), GERD, and chronic low back pain presenting s/p fall. Admitted to medicine for back pain 2/2 Lumbar compression fracture. Planned for kyphoplasty with neurosurgery, but surgery postponed as patient went into afib with RVR and transferred to telemetry for further monitoring.    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 Was called for low blood pressure, which improved on repeat readings, patient's blood pressure runs low, d/aleksandar the oxycodone  - plan for kyphoplasty 2/15 cancelled > as patient developed Afib with RVR, s/p cardizem gtt  - per neurosurgery, no further plans for kyphoplasty, c/w conservative management  - transitioned to PO cardizem and increased metoprolol to 25mg TID  - Pain management recs appreciated: daughter refusing oxy as it causes sedation in her mother, even prn dose - requesting epidural  1. acetaminophen 1000mg q8h standing  2. Gabapentin 400mg q8h standing, if tolerated (no sedation/confusion)  3. Methocarbamol 500mg q8h standing  4. oxycodone 5mg q4h PRN for breakthrough pain. can increase to 7.5mg if need be  5. pt planned for nsurg intervention     #CHF, diastolic - acute -- now resolved  - On 2/10 had episode of unresponsives and bradycardia/hypotension. Likely vasovagal. CT head negative  - TTE (May 2013) demonstrated LVEF 55-65% w/ normal global LVSF and no significant valvulopathy.  - TTE 1/28- EF 60-65% mod pulm HTN   - elevated BNP  - Tele monitored for 24 hrs with no dysrhythmia   - Currently euvolemic on exam, transition back to home oral lasix 20 daily - dced on 2/19, c/w PRN as patient becomes hypotensive on lasix    #Chronic Atrial Fibrillation  CHADS-VASC 3+ (Age x2, Female). Only on AC. Previously on Lopressor, but only takes intermittently as HR currently rate-controlled off meds.  - Eliquis initially held for surgery, restarted on 2/16, then held due to low Hb  - now on lovenox 70 BID    #GERD  - c/w pantoprazole 40mg PO QD (therapeutic interchange for omeprazole)    #CKD 3b   - per PCP baseline is 1.2    #Urinary retention - likely neurogenic   - hanson placed 1/29  - c/w flomax 0.8mg   - completed RT, will try TOV after walking with PT    #Constipation  - c/w aggressive bowel regimen: senna, miralax, lactulose    # HTN  - c/w metoprolol 25mg TID  - started on lisinopril 10mg 2/4, which was dced 2/16                                                                              ----------------------------------------------------  # DVT prophylaxis: Lovenox BID  # GI prophylaxis: Protonix  # Diet: DASH/TLC  # Activity: IAT  # Code status: DNR/DNI  # Disposition: [facility] vs Home with home PT                                                                           --------------------------------------------------------    # Handoff:   - f/u pain management regarding epidural  - neurosurgery stated conservative management, Dr. Ortiz to f/u 89F w/ h/o chronic afib (on Eliquis), GERD, and chronic low back pain presenting s/p fall. Admitted to medicine for back pain 2/2 Lumbar compression fracture. Planned for kyphoplasty with neurosurgery, but surgery postponed as patient went into afib with RVR and transferred to telemetry for further monitoring.    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 Was called for low blood pressure, which improved on repeat readings, patient's blood pressure runs low, d/aleksandar the oxycodone  - plan for kyphoplasty 2/15 cancelled > as patient developed Afib with RVR, s/p cardizem gtt  - per neurosurgery, no further plans for kyphoplasty, c/w conservative management  - transitioned to PO cardizem and increased metoprolol to 25mg TID  - Pain management recs appreciated: daughter refusing oxy as it causes sedation in her mother, even prn dose   1. acetaminophen 1000mg q8h standing  2. Gabapentin 400mg q8h standing, if tolerated (no sedation/confusion)  3. Methocarbamol 500mg q8h standing  4. oxycodone 5mg q4h PRN for breakthrough pain. can increase to 7.5mg if need be  5. pt planned for nsurg intervention   - can consider toradol/tramadol for pain control  - TSLO brace for PT  - f/u rehab consult    #CHF, diastolic - acute -- now resolved  - On 2/10 had episode of unresponsives and bradycardia/hypotension. Likely vasovagal. CT head negative  - TTE (May 2013) demonstrated LVEF 55-65% w/ normal global LVSF and no significant valvulopathy.  - TTE 1/28- EF 60-65% mod pulm HTN   - elevated BNP  - Tele monitored for 24 hrs with no dysrhythmia   - Currently euvolemic on exam, transition back to home oral lasix 20 daily - dced on 2/19, c/w PRN as patient becomes hypotensive on lasix    #Chronic Atrial Fibrillation  CHADS-VASC 3+ (Age x2, Female). Only on AC. Previously on Lopressor, but only takes intermittently as HR currently rate-controlled off meds.  - Eliquis initially held for surgery, restarted on 2/16, then held due to low Hb  - now on lovenox 70 BID  - c/w metoprolol 25mg TID, cardizem 30mg dced today, if HR increases, can consider starting again    #GERD  - c/w pantoprazole 40mg PO QD (therapeutic interchange for omeprazole)    #CKD 3b   - per PCP baseline is 1.2    #Urinary retention - likely neurogenic   - hanson placed 1/29  - c/w flomax 0.8mg   - completed RT, will try TOV after walking with PT    #Constipation  - c/w aggressive bowel regimen: senna, miralax, lactulose    # HTN  - c/w metoprolol 25mg TID  - started on lisinopril 10mg 2/4, which was dced 2/16 due to drop in BP                                                                              ----------------------------------------------------  # DVT prophylaxis: Lovenox BID  # GI prophylaxis: Protonix  # Diet: DASH/TLC  # Activity: IAT  # Code status: DNR/DNI  # Disposition: [facility] vs Home with home PT                                                                           --------------------------------------------------------    # Handoff:   - cardizem dced today, if HR increases, can consider restarting  - f/u rehab consult  - TSLO brace when working with PT

## 2024-02-20 NOTE — CHART NOTE - NSCHARTNOTEFT_GEN_A_CORE
Registered Dietitian Follow-Up     Patient Profile Reviewed                           Yes [x]   No []     Nutrition History Previously Obtained        Yes [x]  No []       Pertinent Subjective Information: Pt unable to participate in questions. Daughter in room able to assist and requested several menu item changes. Per daughter, pt has poor po intake and typically consumes < 25% of all meals with some refusals recorded. Pt has had ongoing diarrhea as well. Pt has been consuming Ensures per daughter and she would like to continue to have them sent with meals.     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 (FNA)  - Radiation Oncology consulted, and plan for 5 sessions of RT, completed 24  #CHF, diastolic - acute -- now resolved  #GERD  - c/w pantoprazole 40mg PO QD (therapeutic interchange for omeprazole)  #CKD 3b   per PCP baseline is 1.2  #Constipation     Diet order: Diet, DASH/TLC:   Sodium & Cholesterol Restricted  Minced and Moist (MINCEDMOIST)  1500mL Fluid Restriction ([id number])  Supplement Feeding Modality:  Oral  Ensure Compact Cans or Servings Per Day:  1       Frequency:  Three Times a day (02-15-24 @ 22:39) [Active]    Anthropometrics:  Height (cm): 152.4 (02-15-24 @ 18:24)  Weight (kg): 70.3 (02-15-24 @ 18:24)  BMI (kg/m2): 30.3 (02-15-24 @ 18:24)  IBW: 100#  UBW: Unknown    Daily Weight in k (-20), Weight in k.1 (-19), Weight in k.5 (-18), Weight in k (-17), Weight in k.2 (-16), Weight in k.4 (-16)    MEDICATIONS  (STANDING):  acetaminophen     Tablet .. 1000 milliGRAM(s) Oral every 8 hours  calcium carbonate   1250 mG (OsCal) 1 Tablet(s) Oral daily  cefTRIAXone   IVPB 1000 milliGRAM(s) IV Intermittent every 24 hours  cholecalciferol 5000 Unit(s) Oral daily  enoxaparin Injectable 70 milliGRAM(s) SubCutaneous every 12 hours  fluticasone propionate 50 MICROgram(s)/spray Nasal Spray 1 Spray(s) Both Nostrils two times a day  gabapentin 400 milliGRAM(s) Oral every 8 hours  lactulose Syrup 10 Gram(s) Oral two times a day  magnesium sulfate  IVPB 2 Gram(s) IV Intermittent once  methocarbamol 500 milliGRAM(s) Oral every 8 hours  metoprolol tartrate 25 milliGRAM(s) Oral three times a day  multivitamin 1 Tablet(s) Oral daily  pantoprazole    Tablet 40 milliGRAM(s) Oral before breakfast  polyethylene glycol 3350 17 Gram(s) Oral two times a day  rOPINIRole 0.25 milliGRAM(s) Oral two times a day  senna 2 Tablet(s) Oral at bedtime  tamsulosin 0.8 milliGRAM(s) Oral at bedtime    Pertinent Labs:  @ 07:57: Na 130<L>, BUN 17, Cr 0.8, BG 96, K+ 3.5, Phos --, Mg 1.8, Alk Phos 71, ALT/SGPT 15, AST/SGOT 21, HbA1c --    Physical Findings:  - Appearance: elderly, overwt female  - GI function: diarrhea, no other c/o GI issues  - Tubes: no feeding tubes  - Oral/Mouth cavity: No issues noted  - Skin: several noted skin tears  - Edema: 2+ generalized     Nutrition Requirements:  Weight Used: #     Estimated Energy Needs    Continue [x]  Adjust []   Energy Recommendations: 4942-1765 kcal/day ( Kcal: 28-32 kcal/kg)         Estimated Protein Needs    Continue []  Adjust [x]  Adjusted Protein Recommendations:  70-77 g/day (1.0-1.1 g/kg        Estimated Fluid Needs        Continue [x]  Adjust []   Fluid Recommendations:  7848-5025 mL/d ( 1 ml/kcal)     Nutrient Intake: <25% of all meals. >/= 50% of supplement    [] Previous Nutrition Diagnosis:            [x] Ongoing          [] Resolved   Moderate malnutrition in the context of acute illness Conus Medullaris Syndrome as evidenced by Pt meeting <75% estimated energy needs in past 7 days, moderate muscle mass loss (temples)    Nutrition Education: Discussed with daughter poor po intake and importance of kcal and protein for optimal healing. Daughter voiced understanding and agreeable to try a second supplement and encourage po intake when she is in pt room.     Goal/Expected Outcome: Pt to consume >/= 50% of all meals, snacks, supplements for 3-5 days     Indicator/Monitoring: Energy/protein intake, wts, labs, skin status, NFPE    Recommendation:   1. Add Magic Cup (vanilla only) twice daily to encourage po intake.

## 2024-02-21 LAB
ALBUMIN SERPL ELPH-MCNC: 2.9 G/DL — LOW (ref 3.5–5.2)
ALP SERPL-CCNC: 72 U/L — SIGNIFICANT CHANGE UP (ref 30–115)
ALT FLD-CCNC: 20 U/L — SIGNIFICANT CHANGE UP (ref 0–41)
ANION GAP SERPL CALC-SCNC: 11 MMOL/L — SIGNIFICANT CHANGE UP (ref 7–14)
AST SERPL-CCNC: 29 U/L — SIGNIFICANT CHANGE UP (ref 0–41)
BASOPHILS # BLD AUTO: 0.01 K/UL — SIGNIFICANT CHANGE UP (ref 0–0.2)
BASOPHILS NFR BLD AUTO: 0.4 % — SIGNIFICANT CHANGE UP (ref 0–1)
BILIRUB SERPL-MCNC: 0.3 MG/DL — SIGNIFICANT CHANGE UP (ref 0.2–1.2)
BUN SERPL-MCNC: 18 MG/DL — SIGNIFICANT CHANGE UP (ref 10–20)
CALCIUM SERPL-MCNC: 8.3 MG/DL — LOW (ref 8.4–10.5)
CHLORIDE SERPL-SCNC: 95 MMOL/L — LOW (ref 98–110)
CO2 SERPL-SCNC: 25 MMOL/L — SIGNIFICANT CHANGE UP (ref 17–32)
CREAT SERPL-MCNC: 0.9 MG/DL — SIGNIFICANT CHANGE UP (ref 0.7–1.5)
EGFR: 61 ML/MIN/1.73M2 — SIGNIFICANT CHANGE UP
EOSINOPHIL # BLD AUTO: 0.21 K/UL — SIGNIFICANT CHANGE UP (ref 0–0.7)
EOSINOPHIL NFR BLD AUTO: 7.4 % — SIGNIFICANT CHANGE UP (ref 0–8)
GLUCOSE SERPL-MCNC: 106 MG/DL — HIGH (ref 70–99)
HCT VFR BLD CALC: 30.5 % — LOW (ref 37–47)
HGB BLD-MCNC: 10.3 G/DL — LOW (ref 12–16)
IMM GRANULOCYTES NFR BLD AUTO: 0.4 % — HIGH (ref 0.1–0.3)
LYMPHOCYTES # BLD AUTO: 0.76 K/UL — LOW (ref 1.2–3.4)
LYMPHOCYTES # BLD AUTO: 26.8 % — SIGNIFICANT CHANGE UP (ref 20.5–51.1)
MAGNESIUM SERPL-MCNC: 1.6 MG/DL — LOW (ref 1.8–2.4)
MCHC RBC-ENTMCNC: 32.7 PG — HIGH (ref 27–31)
MCHC RBC-ENTMCNC: 33.8 G/DL — SIGNIFICANT CHANGE UP (ref 32–37)
MCV RBC AUTO: 96.8 FL — SIGNIFICANT CHANGE UP (ref 81–99)
MONOCYTES # BLD AUTO: 0.19 K/UL — SIGNIFICANT CHANGE UP (ref 0.1–0.6)
MONOCYTES NFR BLD AUTO: 6.7 % — SIGNIFICANT CHANGE UP (ref 1.7–9.3)
NEUTROPHILS # BLD AUTO: 1.66 K/UL — SIGNIFICANT CHANGE UP (ref 1.4–6.5)
NEUTROPHILS NFR BLD AUTO: 58.3 % — SIGNIFICANT CHANGE UP (ref 42.2–75.2)
NRBC # BLD: 0 /100 WBCS — SIGNIFICANT CHANGE UP (ref 0–0)
PLATELET # BLD AUTO: 76 K/UL — LOW (ref 130–400)
PMV BLD: 9.9 FL — SIGNIFICANT CHANGE UP (ref 7.4–10.4)
POTASSIUM SERPL-MCNC: 3.7 MMOL/L — SIGNIFICANT CHANGE UP (ref 3.5–5)
POTASSIUM SERPL-SCNC: 3.7 MMOL/L — SIGNIFICANT CHANGE UP (ref 3.5–5)
PROT SERPL-MCNC: 4.3 G/DL — LOW (ref 6–8)
RBC # BLD: 3.15 M/UL — LOW (ref 4.2–5.4)
RBC # FLD: 14.2 % — SIGNIFICANT CHANGE UP (ref 11.5–14.5)
SODIUM SERPL-SCNC: 131 MMOL/L — LOW (ref 135–146)
WBC # BLD: 2.84 K/UL — LOW (ref 4.8–10.8)
WBC # FLD AUTO: 2.84 K/UL — LOW (ref 4.8–10.8)

## 2024-02-21 PROCEDURE — 99232 SBSQ HOSP IP/OBS MODERATE 35: CPT

## 2024-02-21 RX ORDER — MAGNESIUM SULFATE 500 MG/ML
2 VIAL (ML) INJECTION ONCE
Refills: 0 | Status: COMPLETED | OUTPATIENT
Start: 2024-02-21 | End: 2024-02-21

## 2024-02-21 RX ORDER — APIXABAN 2.5 MG/1
5 TABLET, FILM COATED ORAL EVERY 12 HOURS
Refills: 0 | Status: DISCONTINUED | OUTPATIENT
Start: 2024-02-21 | End: 2024-02-22

## 2024-02-21 RX ORDER — FUROSEMIDE 40 MG
20 TABLET ORAL ONCE
Refills: 0 | Status: COMPLETED | OUTPATIENT
Start: 2024-02-21 | End: 2024-02-21

## 2024-02-21 RX ORDER — TRAMADOL HYDROCHLORIDE 50 MG/1
50 TABLET ORAL ONCE
Refills: 0 | Status: DISCONTINUED | OUTPATIENT
Start: 2024-02-21 | End: 2024-02-22

## 2024-02-21 RX ADMIN — Medication 1 SPRAY(S): at 06:05

## 2024-02-21 RX ADMIN — Medication 25 GRAM(S): at 11:47

## 2024-02-21 RX ADMIN — CHLORHEXIDINE GLUCONATE 1 APPLICATION(S): 213 SOLUTION TOPICAL at 13:40

## 2024-02-21 RX ADMIN — Medication 25 MILLIGRAM(S): at 06:04

## 2024-02-21 RX ADMIN — Medication 1000 MILLIGRAM(S): at 06:04

## 2024-02-21 RX ADMIN — PANTOPRAZOLE SODIUM 40 MILLIGRAM(S): 20 TABLET, DELAYED RELEASE ORAL at 06:04

## 2024-02-21 RX ADMIN — SENNA PLUS 2 TABLET(S): 8.6 TABLET ORAL at 20:33

## 2024-02-21 RX ADMIN — POLYETHYLENE GLYCOL 3350 17 GRAM(S): 17 POWDER, FOR SOLUTION ORAL at 18:02

## 2024-02-21 RX ADMIN — ENOXAPARIN SODIUM 70 MILLIGRAM(S): 100 INJECTION SUBCUTANEOUS at 06:04

## 2024-02-21 RX ADMIN — Medication 20 MILLIGRAM(S): at 11:41

## 2024-02-21 RX ADMIN — ROPINIROLE 0.25 MILLIGRAM(S): 8 TABLET, FILM COATED, EXTENDED RELEASE ORAL at 18:02

## 2024-02-21 RX ADMIN — METHOCARBAMOL 500 MILLIGRAM(S): 500 TABLET, FILM COATED ORAL at 20:37

## 2024-02-21 RX ADMIN — Medication 1000 MILLIGRAM(S): at 21:41

## 2024-02-21 RX ADMIN — Medication 1000 MILLIGRAM(S): at 20:34

## 2024-02-21 RX ADMIN — GABAPENTIN 400 MILLIGRAM(S): 400 CAPSULE ORAL at 20:43

## 2024-02-21 RX ADMIN — Medication 5000 UNIT(S): at 11:41

## 2024-02-21 RX ADMIN — Medication 1 SPRAY(S): at 18:04

## 2024-02-21 RX ADMIN — Medication 1000 MILLIGRAM(S): at 13:38

## 2024-02-21 RX ADMIN — GABAPENTIN 400 MILLIGRAM(S): 400 CAPSULE ORAL at 13:37

## 2024-02-21 RX ADMIN — METHOCARBAMOL 500 MILLIGRAM(S): 500 TABLET, FILM COATED ORAL at 06:07

## 2024-02-21 RX ADMIN — CHLORHEXIDINE GLUCONATE 1 APPLICATION(S): 213 SOLUTION TOPICAL at 06:05

## 2024-02-21 RX ADMIN — GABAPENTIN 400 MILLIGRAM(S): 400 CAPSULE ORAL at 06:03

## 2024-02-21 RX ADMIN — ROPINIROLE 0.25 MILLIGRAM(S): 8 TABLET, FILM COATED, EXTENDED RELEASE ORAL at 06:03

## 2024-02-21 RX ADMIN — Medication 1 TABLET(S): at 11:41

## 2024-02-21 RX ADMIN — METHOCARBAMOL 500 MILLIGRAM(S): 500 TABLET, FILM COATED ORAL at 13:37

## 2024-02-21 RX ADMIN — Medication 25 MILLIGRAM(S): at 20:33

## 2024-02-21 RX ADMIN — CEFTRIAXONE 100 MILLIGRAM(S): 500 INJECTION, POWDER, FOR SOLUTION INTRAMUSCULAR; INTRAVENOUS at 06:05

## 2024-02-21 RX ADMIN — TAMSULOSIN HYDROCHLORIDE 0.8 MILLIGRAM(S): 0.4 CAPSULE ORAL at 20:37

## 2024-02-21 RX ADMIN — Medication 25 MILLIGRAM(S): at 13:37

## 2024-02-21 RX ADMIN — APIXABAN 5 MILLIGRAM(S): 2.5 TABLET, FILM COATED ORAL at 18:02

## 2024-02-21 RX ADMIN — Medication 1000 MILLIGRAM(S): at 14:37

## 2024-02-21 NOTE — PROGRESS NOTE ADULT - ASSESSMENT
89yFemale with history of afib on eliquis, GERD, chronic low back pain presents after fall. Patient found to have lumbar pathological fracture on admission. Palliative care consulted for GOC and symptom management.    Patient seen at bedside. Patient awake and resting comfortably. Spoke with daughter at bedside.     Education about palliative care provided to patient/family.  See Recs below.    Please call [phone] with questions or concerns 24/7.   We will continue to follow.

## 2024-02-21 NOTE — PHARMACOTHERAPY INTERVENTION NOTE - COMMENTS
Patient originally planned for surgery, however procedure cancelled due to rapid A fib. Anticoagulation has been on hold if no procedure planned and no other contraindications recommend restarting apixaban, can give 5 mg po BID. (wt= 70 kg. SCr=1.2).
Patient with a fib taking apixaban prior to admission. Patient was started on enoxaparin therapeutic dose during admission, no further interventions planned recommended changing enoxaparin to apixaban 5 mg po BID. Can start with evening dose tonight.

## 2024-02-21 NOTE — PROGRESS NOTE ADULT - SUBJECTIVE AND OBJECTIVE BOX
HPI:    89F w/ h/o chronic afib (on Eliquis), GERD, and chronic low back pain presenting s/p fall. Patient known to have chronic low back pain that has progressively worsened over the years. Pain acutely worsened two weeks ago and patient subsequently fell due to inability to support own weight. Evaluated by PCP after initial fall due to worsening pain. Prescribed Percocet 5-325 bid w/ mild relief. However, pt once again fell earlier today after pt unable to support her own weight while trying to ambulate. Now unable to ambulate or bear weight, even w/ assistance. No reported HT, LOC, lightheadedness, confusion, loss of continence, or focal weakness a/w either fall. No reported fevers, chills, CP, palpitations, SOB, abdominal pain, n/v/d, or dysuria (although daughter did note malodorous urine recently).    Interval history  -Patient seen at bedside with alyssa Garcia  -Patient notes leg pain with movement, but only mild pain at rest  2/7: Patient now DNR/DNI, added 10mg oxycontin Q12H for consistent pain  2/12: added PRN oxycodone 5mg Q6H for pain  2/14: stopped oxycodone ER 10mg since patient has been more somnolent and hypotensive occasionally   2/16: Patient was upgraded to telemetry, placed on Cardizem infusion after OR  2/20: Patient sleeping comfortably, spoke with daughter at bedside who reports that patient's pain has been OK but she worked with PT which did exacerbate it a bit.      ADVANCE DIRECTIVES:     [ ] Full Code [x ] DNR  MOLST  [ ]  Living Will  [ ]   DECISION MAKER(s):  [ ] Health Care Proxy(s)  [x ] Surrogate(s)  [ ] Guardian           Name(s): Phone Number(s):  Daughter      BASELINE (I)ADL(s) (prior to admission):    [place]: [ ]Total  [ ] Moderate [ ]Dependent  Palliative Performance Status Version 2:         %    http://npcrc.org/files/news/palliative_performance_scale_[email or web address].pdf    Allergies    No Known Allergies    Intolerances    MEDICATIONS  (STANDING):  acetaminophen     Tablet .. 1000 milliGRAM(s) Oral every 8 hours  apixaban 5 milliGRAM(s) Oral every 12 hours  calcium carbonate   1250 mG (OsCal) 1 Tablet(s) Oral daily  chlorhexidine 2% Cloths 1 Application(s) Topical daily  chlorhexidine 2% Cloths 1 Application(s) Topical <User Schedule>  cholecalciferol 5000 Unit(s) Oral daily  fluticasone propionate 50 MICROgram(s)/spray Nasal Spray 1 Spray(s) Both Nostrils two times a day  gabapentin 400 milliGRAM(s) Oral every 8 hours  lidocaine   4% Patch 1 Patch Transdermal every 24 hours  magnesium sulfate  IVPB 2 Gram(s) IV Intermittent once  methocarbamol 500 milliGRAM(s) Oral every 8 hours  metoprolol tartrate 25 milliGRAM(s) Oral three times a day  multivitamin 1 Tablet(s) Oral daily  pantoprazole    Tablet 40 milliGRAM(s) Oral before breakfast  polyethylene glycol 3350 17 Gram(s) Oral two times a day  rOPINIRole 0.25 milliGRAM(s) Oral two times a day  senna 2 Tablet(s) Oral at bedtime  tamsulosin 0.8 milliGRAM(s) Oral at bedtime    MEDICATIONS  (PRN):  traMADol 50 milliGRAM(s) Oral once PRN Moderate Pain (4 - 6)        PRESENT SYMPTOMS: [  ]Unable to obtain due to poor mentation   Source if other than patient:  [ ]Family   [ ]Team     Pain: [ ]yes [x ]no  QOL impact -    Location -   Aggravating factors -   Quality -   Radiation -   Timing-  Severity (0-10 scale):   Minimal acceptable level (0-10 scale):     CPOT:    https://[email or web address]/[email or web address]/Critical-Care-Pain-Observation-Tool-(CPOT)    PAIN AD Score:   [email or web address].pdf (press ctrl +  left click to view)    Dyspnea:                           [x ]None[ ]Mild [ ]Moderate [ ]Severe     Respiratory Distress Observation Scale (RDOS):   A score of 0 to 2 signifies little or no respiratory distress, 3 signifies mild distress, scores 4 to 6 indicate moderate distress, and scores greater than 7 signify severe distress  [email or web address]    Anxiety:                             [ x]None[ ]Mild [ ]Moderate [ ]Severe   Fatigue:                             [x ]None[ ]Mild [ ]Moderate [ ]Severe   Nausea:                             [ x]None[ ]Mild [ ]Moderate [ ]Severe   Loss of appetite:              [x ]None[ ]Mild [ ]Moderate [ ]Severe   Constipation:                    [x ]None[ ]Mild [ ]Moderate [ ]Severe    Other Symptoms:  [ x]All other review of systems negative     Palliative Performance Status Version 2:         30%    http://[email or web address].org/files/news/palliative_performance_scale_[email or web address].pdf    PHYSICAL EXAM:    Vital Signs Last 24 Hrs  T(C): 36.6 (21 Feb 2024 12:53), Max: 36.6 (21 Feb 2024 12:53)  T(F): 97.9 (21 Feb 2024 12:53), Max: 97.9 (21 Feb 2024 12:53)  HR: 75 (21 Feb 2024 12:53) (57 - 75)  BP: 109/61 (21 Feb 2024 12:53) (109/61 - 142/64)  BP(mean): --  RR: 18 (21 Feb 2024 12:53) (18 - 18)  SpO2: --        GENERAL:  [ ] No acute distress [ ]Lethargic  [ ]Unarousable  [ x]Verbal  [ ]Non-Verbal [ ]Cachexia    BEHAVIORAL/PSYCH:  [x ]Alert and Oriented x2  [ ] Anxiety [ ] Delirium [ ] Agitation [ x] Calm   EYES: [ x] No scleral icterus [ ] Scleral icterus [ ] Closed  ENMT:  [ ]Dry mouth  [x ]No external oral lesions [ ] No external ear or nose lesions  CARDIOVASCULAR:  [ ]Regular [ ]Irregular [x ]Tachy [ ]Not Tachy  [ ]Sung [ x] Edema [ ] No edema  PULMONARY:  [ ]Tachypnea  [ ]Audible excessive secretions [ x] No labored breathing [ ] labored breathing  GASTROINTESTINAL: [ ]Soft  [ ]Distended  [x ]Not distended [ ]Non tender [ ]Tender  MUSCULOSKELETAL: [ ]No clubbing [ ] clubbing  [x ] No cyanosis [ ] cyanosis  NEUROLOGIC: [ ]No focal deficits  [x ]Follows commands  [ ]Does not follow commands  [ x]Cognitive impairment  [ ]Dysphagia  [ ]Dysarthria  [ ]Paresis   SKIN: [ ] Jaundiced [ x] Non-jaundiced [ ]Rash [ ]No Rash [ ] Warm [ ] Dry  MISC/LINES: [ ] ET tube [ ] Trach [ ]NGT/OGT [ ]PEG [ ]David    LABS: reviewed by me                                   10.3   2.84  )-----------( 76       ( 21 Feb 2024 06:59 )             30.5   02-21    131<L>  |  95<L>  |  18  ----------------------------<  106<H>  3.7   |  25  |  0.9    Ca    8.3<L>      21 Feb 2024 06:59  Mg     1.6     02-21    TPro  4.3<L>  /  Alb  2.9<L>  /  TBili  0.3  /  DBili  x   /  AST  29  /  ALT  20  /  AlkPhos  72  02-21      Urinalysis Basic - ( 21 Feb 2024 06:59 )    Color: x / Appearance: x / SG: x / pH: x  Gluc: 106 mg/dL / Ketone: x  / Bili: x / Urobili: x   Blood: x / Protein: x / Nitrite: x   Leuk Esterase: x / RBC: x / WBC x   Sq Epi: x / Non Sq Epi: x / Bacteria: x        RADIOLOGY & ADDITIONAL STUDIES: reviewed by me    < from: NM SPECT Bone Scan, Single Area Single Day (02.01.24 @ 16:43) >  IMPRESSION:    Abnormal uptake in the L1 vertebral body, corresponding to known   compression fracture deformity secondary to vertebral body mass.    No other evidence for metastatic bone disease.    < end of copied text >        EKG: reviewed by me    < from: 12 Lead ECG (01.25.24 @ 11:34) >  Ventricular Rate 94 BPM    Atrial Rate 81 BPM    QRS Duration 90 ms    Q-T Interval 384 ms    QTC Calculation(Bazett) 480 ms    R Axis 166 degrees    T Axis -48 degrees    Diagnosis Line Atrial fibrillation  Right ventricular hypertrophy  Anterior infarct , age undetermined  ST & T wave abnormality, consider inferior ischemia  Abnormal ECG    < end of copied text >      Patient discussed with primary medical team MD  Palliative care education provided to patient and/or family

## 2024-02-21 NOTE — PROGRESS NOTE ADULT - ATTENDING COMMENTS
89F w/ h/o chronic afib (on Eliquis), GERD, and chronic low back pain presenting s/p fall. Admitted to medicine for back pain 2/2 Lumbar compression fracture, found to have epithelial carcinoma. S/p RT. Planned for kyphoplasty with neurosurgery, but surgery postponed as patient went into afib with RVR and transferred to telemetry for further monitoring.    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)  - Radiation Oncology consulted: s/p 5 sessions of RT, completed 2/6/24  - Oncology will start immunotherapy after surgery, slowly growing tumor  - plan for kyphoplasty cancelled > as patient developed Afib with RVR, s/p cardizem gtt  - per neurosurgery, no further plans for kyphoplasty, c/w conservative management  - transitioned to PO cardizem and increased metoprolol to 25mg TID  - Patient was a rapid response on 2/10 due to AMS and hypotension with BP of 75/50, suspect 2/2 narcotics  - Pain management recs appreciated: daughter refusing oxy as it causes sedation in her mother, even prn dose   1. acetaminophen 1000mg q8h standing  2. Gabapentin 400mg q8h standing, if tolerated (no sedation/confusion)  3. Methocarbamol 500mg q8h standing  4. oxycodone 5mg q4h PRN for breakthrough pain. can increase to 7.5mg if need be  - can consider toradol/tramadol for pain control  - TSLO brace for PT  - physiatry recommending STR in SNF    #CHF, diastolic - acute -- now resolved  - TTE (May 2013) demonstrated LVEF 55-65% w/ normal global LVSF and no significant valvulopathy.  - TTE 1/28- EF 60-65% mod pulm HTN   - elevated BNP  - Currently euvolemic on exam, transition back to home oral lasix 20 daily - dced on 2/19, c/w PRN as patient becomes hypotensive on lasix    #Chronic Atrial Fibrillation  CHADS-VASC 3+ (Age x2, Female)  - eliquis restarted on 2/21  - c/w metoprolol 25mg TID, cardizem 30mg dced , if HR increases, can consider starting again    #GERD  - c/w pantoprazole 40mg PO QD (therapeutic interchange for omeprazole)    #CKD 3b   - per PCP baseline is 1.2    #Urinary retention - likely neurogenic   - hanson placed 1/29  - c/w flomax 0.8mg   - completed RT, will try TOV after walking with PT    #Constipation  - c/w aggressive bowel regimen: senna, miralax, lactulose    # HTN  - c/w metoprolol 25mg TID  - started on lisinopril 10mg 2/4, which was dced 2/16 due to drop in BP    # DVT prophylaxis: Eliquis  # Code status: DNR/DNI    Pending: pain control, appreciate pain follow up regarding epidural steroid injection. f/u heme onc regarding starting immunotherapy     Total time spent to complete patient's bedside assessment, review medical chart, discuss medical plan of care with covering medical team was more than 35 minutes  with >50% of time spent face to face with patient, discussion with patient/family and/or coordination of care. My note supersedes them medical resident note in case of discrepancy.      Azra Oneil, DO

## 2024-02-21 NOTE — PROGRESS NOTE ADULT - SUBJECTIVE AND OBJECTIVE BOX
24H events:    Patient is a 89y old Female who presents with a chief complaint of Back Pain (20 Feb 2024 18:43)    Primary diagnosis of Inability to ambulate due to multiple joints    Today is hospital day 28d. This morning patient was seen and examined at bedside, resting comfortably in bed.    No acute or major events overnight. Hemodynamically stable, tolerating oral diet, voiding appropriately with appropriate bowel movements.     Patient still endorsing lower back pain.    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 No reported tobacco, alcohol, or illicit/recreational drug use. ADL's limited by progressively worsening ability to ambulate. Ambulates w/ walker. (24 Jan 2024 15:07)      ALLERGIES:  No Known Allergies    MEDICATIONS:  STANDING MEDICATIONS  acetaminophen     Tablet .. 1000 milliGRAM(s) Oral every 8 hours  calcium carbonate   1250 mG (OsCal) 1 Tablet(s) Oral daily  chlorhexidine 2% Cloths 1 Application(s) Topical <User Schedule>  chlorhexidine 2% Cloths 1 Application(s) Topical daily  cholecalciferol 5000 Unit(s) Oral daily  enoxaparin Injectable 70 milliGRAM(s) SubCutaneous every 12 hours  fluticasone propionate 50 MICROgram(s)/spray Nasal Spray 1 Spray(s) Both Nostrils two times a day  gabapentin 400 milliGRAM(s) Oral every 8 hours  lidocaine   4% Patch 1 Patch Transdermal every 24 hours  magnesium sulfate  IVPB 2 Gram(s) IV Intermittent once  methocarbamol 500 milliGRAM(s) Oral every 8 hours  metoprolol tartrate 25 milliGRAM(s) Oral three times a day  multivitamin 1 Tablet(s) Oral daily  pantoprazole    Tablet 40 milliGRAM(s) Oral before breakfast  polyethylene glycol 3350 17 Gram(s) Oral two times a day  rOPINIRole 0.25 milliGRAM(s) Oral two times a day  senna 2 Tablet(s) Oral at bedtime  tamsulosin 0.8 milliGRAM(s) Oral at bedtime    PRN MEDICATIONS    VITALS:   T(F): 97.4  HR: 57  BP: 128/67  RR: 18  SpO2: --    PHYSICAL EXAM:  GENERAL: NAD, lying in bed comfortably, elderly  HEAD: NCAD, no hematoma or laceration   NECK: Supple, no neck stiffness/nuchal rigidity  HEART: Regular rate and rhythm, normal S1/S2, no murmurs, heaves, thrills  LUNGS: No acute respiratory distress, clear b/l breath sounds, no wheezing  ABDOMEN:  soft, nontender, nondistended  EXTREMITIES: no rashes, extremities warm/dry, no edema, ecchymosis on b/l LE  NERVOUS SYSTEM:  A&Ox3       (X  ) Indwelling David Catheter:  Reason (  ) Critical illness     (X  ) urinary retention    (  ) Accurate Ins/Outs Monitoring     (  ) CMO patient      LABS:                        10.4   3.04  )-----------( 81       ( 20 Feb 2024 07:57 )             30.3     02-20    130<L>  |  95<L>  |  17  ----------------------------<  96  3.5   |  26  |  0.8    Ca    8.2<L>      20 Feb 2024 07:57  Mg     1.8     02-20    TPro  4.3<L>  /  Alb  2.9<L>  /  TBili  0.3  /  DBili  x   /  AST  21  /  ALT  15  /  AlkPhos  71  02-20      Urinalysis Basic - ( 20 Feb 2024 07:57 )    Color: x / Appearance: x / SG: x / pH: x  Gluc: 96 mg/dL / Ketone: x  / Bili: x / Urobili: x   Blood: x / Protein: x / Nitrite: x   Leuk Esterase: x / RBC: x / WBC x   Sq Epi: x / Non Sq Epi: x / Bacteria: x                RADIOLOGY:

## 2024-02-21 NOTE — PROGRESS NOTE ADULT - ASSESSMENT
89F w/ h/o chronic afib (on Eliquis), GERD, and chronic low back pain presenting s/p fall. Admitted to medicine for back pain 2/2 Lumbar compression fracture. Planned for kyphoplasty with neurosurgery, but surgery postponed as patient went into afib with RVR and transferred to telemetry for further monitoring.    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 Was called for low blood pressure, which improved on repeat readings, patient's blood pressure runs low, d/aleksandar the oxycodone  - plan for kyphoplasty 2/15 cancelled > as patient developed Afib with RVR, s/p cardizem gtt  - per neurosurgery, no further plans for kyphoplasty, c/w conservative management  - transitioned to PO cardizem and increased metoprolol to 25mg TID  - Pain management recs appreciated: daughter refusing oxy as it causes sedation in her mother, even prn dose   1. acetaminophen 1000mg q8h standing  2. Gabapentin 400mg q8h standing, if tolerated (no sedation/confusion)  3. Methocarbamol 500mg q8h standing  4. oxycodone 5mg q4h PRN for breakthrough pain. can increase to 7.5mg if need be  5. pt planned for nsurg intervention   - can consider toradol/tramadol for pain control  - TSLO brace for PT  - physiatry recommending STR in SNF    #CHF, diastolic - acute -- now resolved  - On 2/10 had episode of unresponsives and bradycardia/hypotension. Likely vasovagal. CT head negative  - TTE (May 2013) demonstrated LVEF 55-65% w/ normal global LVSF and no significant valvulopathy.  - TTE 1/28- EF 60-65% mod pulm HTN   - elevated BNP  - Tele monitored for 24 hrs with no dysrhythmia   - Currently euvolemic on exam, transition back to home oral lasix 20 daily - dced on 2/19, c/w PRN as patient becomes hypotensive on lasix    #Chronic Atrial Fibrillation  CHADS-VASC 3+ (Age x2, Female). Only on AC. Previously on Lopressor, but only takes intermittently as HR currently rate-controlled off meds.  - Eliquis initially held for surgery, restarted on 2/16, then held due to low Hb  - now on lovenox 70 BID  - c/w metoprolol 25mg TID, cardizem 30mg dced today, if HR increases, can consider starting again    #GERD  - c/w pantoprazole 40mg PO QD (therapeutic interchange for omeprazole)    #CKD 3b   - per PCP baseline is 1.2    #Urinary retention - likely neurogenic   - hanson placed 1/29  - c/w flomax 0.8mg   - completed RT, will try TOV after walking with PT    #Constipation  - c/w aggressive bowel regimen: senna, miralax, lactulose    # HTN  - c/w metoprolol 25mg TID  - started on lisinopril 10mg 2/4, which was dced 2/16 due to drop in BP                                                                              ----------------------------------------------------  # DVT prophylaxis: Lovenox BID  # GI prophylaxis: Protonix  # Diet: DASH/TLC  # Activity: IAT  # Code status: DNR/DNI  # Disposition: [facility] vs Home with home PT                                                                           --------------------------------------------------------    # Handoff:   - cardizem dced 2/20, if HR increases, can consider restarting   89F w/ h/o chronic afib (on Eliquis), GERD, and chronic low back pain presenting s/p fall. Admitted to medicine for back pain 2/2 Lumbar compression fracture. Planned for kyphoplasty with neurosurgery, but surgery postponed as patient went into afib with RVR and transferred to telemetry for further monitoring.    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 Was called for low blood pressure, which improved on repeat readings, patient's blood pressure runs low, d/aleksandar the oxycodone  - plan for kyphoplasty 2/15 cancelled > as patient developed Afib with RVR, s/p cardizem gtt  - per neurosurgery, no further plans for kyphoplasty, c/w conservative management  - transitioned to PO cardizem and increased metoprolol to 25mg TID  - Pain management recs appreciated: daughter refusing oxy as it causes sedation in her mother, even prn dose   1. acetaminophen 1000mg q8h standing  2. Gabapentin 400mg q8h standing, if tolerated (no sedation/confusion)  3. Methocarbamol 500mg q8h standing  4. oxycodone 5mg q4h PRN for breakthrough pain. can increase to 7.5mg if need be  5. pt planned for nsurg intervention   - can consider toradol/tramadol for pain control  - TSLO brace for PT  - physiatry recommending STR in SNF    #CHF, diastolic - acute -- now resolved  - On 2/10 had episode of unresponsives and bradycardia/hypotension. Likely vasovagal. CT head negative  - TTE (May 2013) demonstrated LVEF 55-65% w/ normal global LVSF and no significant valvulopathy.  - TTE 1/28- EF 60-65% mod pulm HTN   - elevated BNP  - Tele monitored for 24 hrs with no dysrhythmia   - Currently euvolemic on exam, transition back to home oral lasix 20 daily - dced on 2/19, c/w PRN as patient becomes hypotensive on lasix    #Chronic Atrial Fibrillation  CHADS-VASC 3+ (Age x2, Female). Only on AC. Previously on Lopressor, but only takes intermittently as HR currently rate-controlled off meds.  - Eliquis initially held for surgery, restarted on 2/16, then held due to low Hb  - now on lovenox 70 BID pending OR --> no plans for OR, eliquis restarted on 2/21  - c/w metoprolol 25mg TID, cardizem 30mg dced today, if HR increases, can consider starting again    #GERD  - c/w pantoprazole 40mg PO QD (therapeutic interchange for omeprazole)    #CKD 3b   - per PCP baseline is 1.2    #Urinary retention - likely neurogenic   - hanson placed 1/29  - c/w flomax 0.8mg   - completed RT, will try TOV after walking with PT    #Constipation  - c/w aggressive bowel regimen: senna, miralax, lactulose    # HTN  - c/w metoprolol 25mg TID  - started on lisinopril 10mg 2/4, which was dced 2/16 due to drop in BP                                                                              ----------------------------------------------------  # DVT prophylaxis: Eliquis  # GI prophylaxis: Protonix  # Diet: DASH/TLC  # Activity: IAT  # Code status: DNR/DNI  # Disposition: [facility] vs Home with home PT                                                                           --------------------------------------------------------    # Handoff:   - cardizem dced 2/20, if HR increases, can consider restarting  - gave 1 dose of lasix today  - pain control with tramadol PRN  - f/u heme onc regarding plans for immunotherapy  - f/u pain management regarding further pain control recs

## 2024-02-22 LAB
ALBUMIN SERPL ELPH-MCNC: 3 G/DL — LOW (ref 3.5–5.2)
ALP SERPL-CCNC: 86 U/L — SIGNIFICANT CHANGE UP (ref 30–115)
ALT FLD-CCNC: 33 U/L — SIGNIFICANT CHANGE UP (ref 0–41)
ANION GAP SERPL CALC-SCNC: 11 MMOL/L — SIGNIFICANT CHANGE UP (ref 7–14)
AST SERPL-CCNC: 46 U/L — HIGH (ref 0–41)
BASOPHILS # BLD AUTO: 0.02 K/UL — SIGNIFICANT CHANGE UP (ref 0–0.2)
BASOPHILS NFR BLD AUTO: 0.7 % — SIGNIFICANT CHANGE UP (ref 0–1)
BILIRUB SERPL-MCNC: 0.4 MG/DL — SIGNIFICANT CHANGE UP (ref 0.2–1.2)
BUN SERPL-MCNC: 17 MG/DL — SIGNIFICANT CHANGE UP (ref 10–20)
CALCIUM SERPL-MCNC: 8.4 MG/DL — SIGNIFICANT CHANGE UP (ref 8.4–10.5)
CHLORIDE SERPL-SCNC: 92 MMOL/L — LOW (ref 98–110)
CO2 SERPL-SCNC: 27 MMOL/L — SIGNIFICANT CHANGE UP (ref 17–32)
CREAT SERPL-MCNC: 0.8 MG/DL — SIGNIFICANT CHANGE UP (ref 0.7–1.5)
EGFR: 70 ML/MIN/1.73M2 — SIGNIFICANT CHANGE UP
EOSINOPHIL # BLD AUTO: 0.16 K/UL — SIGNIFICANT CHANGE UP (ref 0–0.7)
EOSINOPHIL NFR BLD AUTO: 5.6 % — SIGNIFICANT CHANGE UP (ref 0–8)
GLUCOSE SERPL-MCNC: 88 MG/DL — SIGNIFICANT CHANGE UP (ref 70–99)
HCT VFR BLD CALC: 33.1 % — LOW (ref 37–47)
HGB BLD-MCNC: 11.4 G/DL — LOW (ref 12–16)
IMM GRANULOCYTES NFR BLD AUTO: 0.4 % — HIGH (ref 0.1–0.3)
LYMPHOCYTES # BLD AUTO: 0.91 K/UL — LOW (ref 1.2–3.4)
LYMPHOCYTES # BLD AUTO: 32 % — SIGNIFICANT CHANGE UP (ref 20.5–51.1)
MAGNESIUM SERPL-MCNC: 1.8 MG/DL — SIGNIFICANT CHANGE UP (ref 1.8–2.4)
MCHC RBC-ENTMCNC: 33 PG — HIGH (ref 27–31)
MCHC RBC-ENTMCNC: 34.4 G/DL — SIGNIFICANT CHANGE UP (ref 32–37)
MCV RBC AUTO: 95.9 FL — SIGNIFICANT CHANGE UP (ref 81–99)
MONOCYTES # BLD AUTO: 0.22 K/UL — SIGNIFICANT CHANGE UP (ref 0.1–0.6)
MONOCYTES NFR BLD AUTO: 7.7 % — SIGNIFICANT CHANGE UP (ref 1.7–9.3)
NEUTROPHILS # BLD AUTO: 1.52 K/UL — SIGNIFICANT CHANGE UP (ref 1.4–6.5)
NEUTROPHILS NFR BLD AUTO: 53.6 % — SIGNIFICANT CHANGE UP (ref 42.2–75.2)
NRBC # BLD: 0 /100 WBCS — SIGNIFICANT CHANGE UP (ref 0–0)
PLATELET # BLD AUTO: 86 K/UL — LOW (ref 130–400)
PMV BLD: 9.8 FL — SIGNIFICANT CHANGE UP (ref 7.4–10.4)
POTASSIUM SERPL-MCNC: 4 MMOL/L — SIGNIFICANT CHANGE UP (ref 3.5–5)
POTASSIUM SERPL-SCNC: 4 MMOL/L — SIGNIFICANT CHANGE UP (ref 3.5–5)
PROT SERPL-MCNC: 4.6 G/DL — LOW (ref 6–8)
RBC # BLD: 3.45 M/UL — LOW (ref 4.2–5.4)
RBC # FLD: 14.3 % — SIGNIFICANT CHANGE UP (ref 11.5–14.5)
SODIUM SERPL-SCNC: 130 MMOL/L — LOW (ref 135–146)
WBC # BLD: 2.84 K/UL — LOW (ref 4.8–10.8)
WBC # FLD AUTO: 2.84 K/UL — LOW (ref 4.8–10.8)

## 2024-02-22 PROCEDURE — 99232 SBSQ HOSP IP/OBS MODERATE 35: CPT

## 2024-02-22 RX ADMIN — METHOCARBAMOL 500 MILLIGRAM(S): 500 TABLET, FILM COATED ORAL at 06:08

## 2024-02-22 RX ADMIN — Medication 1000 MILLIGRAM(S): at 14:16

## 2024-02-22 RX ADMIN — METHOCARBAMOL 500 MILLIGRAM(S): 500 TABLET, FILM COATED ORAL at 13:56

## 2024-02-22 RX ADMIN — POLYETHYLENE GLYCOL 3350 17 GRAM(S): 17 POWDER, FOR SOLUTION ORAL at 06:15

## 2024-02-22 RX ADMIN — APIXABAN 5 MILLIGRAM(S): 2.5 TABLET, FILM COATED ORAL at 06:06

## 2024-02-22 RX ADMIN — SENNA PLUS 2 TABLET(S): 8.6 TABLET ORAL at 21:20

## 2024-02-22 RX ADMIN — Medication 1 TABLET(S): at 13:53

## 2024-02-22 RX ADMIN — GABAPENTIN 400 MILLIGRAM(S): 400 CAPSULE ORAL at 21:21

## 2024-02-22 RX ADMIN — METHOCARBAMOL 500 MILLIGRAM(S): 500 TABLET, FILM COATED ORAL at 21:21

## 2024-02-22 RX ADMIN — APIXABAN 5 MILLIGRAM(S): 2.5 TABLET, FILM COATED ORAL at 17:18

## 2024-02-22 RX ADMIN — Medication 25 MILLIGRAM(S): at 06:06

## 2024-02-22 RX ADMIN — POLYETHYLENE GLYCOL 3350 17 GRAM(S): 17 POWDER, FOR SOLUTION ORAL at 17:17

## 2024-02-22 RX ADMIN — GABAPENTIN 400 MILLIGRAM(S): 400 CAPSULE ORAL at 13:56

## 2024-02-22 RX ADMIN — Medication 1 SPRAY(S): at 06:16

## 2024-02-22 RX ADMIN — Medication 1 TABLET(S): at 13:52

## 2024-02-22 RX ADMIN — TRAMADOL HYDROCHLORIDE 50 MILLIGRAM(S): 50 TABLET ORAL at 20:01

## 2024-02-22 RX ADMIN — PANTOPRAZOLE SODIUM 40 MILLIGRAM(S): 20 TABLET, DELAYED RELEASE ORAL at 06:06

## 2024-02-22 RX ADMIN — Medication 1000 MILLIGRAM(S): at 06:06

## 2024-02-22 RX ADMIN — ROPINIROLE 0.25 MILLIGRAM(S): 8 TABLET, FILM COATED, EXTENDED RELEASE ORAL at 06:06

## 2024-02-22 RX ADMIN — Medication 25 MILLIGRAM(S): at 13:55

## 2024-02-22 RX ADMIN — TAMSULOSIN HYDROCHLORIDE 0.8 MILLIGRAM(S): 0.4 CAPSULE ORAL at 21:21

## 2024-02-22 RX ADMIN — Medication 1000 MILLIGRAM(S): at 13:55

## 2024-02-22 RX ADMIN — Medication 1000 MILLIGRAM(S): at 06:17

## 2024-02-22 RX ADMIN — GABAPENTIN 400 MILLIGRAM(S): 400 CAPSULE ORAL at 06:06

## 2024-02-22 RX ADMIN — CHLORHEXIDINE GLUCONATE 1 APPLICATION(S): 213 SOLUTION TOPICAL at 12:52

## 2024-02-22 RX ADMIN — Medication 25 MILLIGRAM(S): at 21:21

## 2024-02-22 RX ADMIN — ROPINIROLE 0.25 MILLIGRAM(S): 8 TABLET, FILM COATED, EXTENDED RELEASE ORAL at 17:18

## 2024-02-22 RX ADMIN — Medication 1000 MILLIGRAM(S): at 21:21

## 2024-02-22 RX ADMIN — Medication 5000 UNIT(S): at 13:52

## 2024-02-22 RX ADMIN — Medication 1 SPRAY(S): at 17:18

## 2024-02-22 NOTE — PROGRESS NOTE ADULT - ATTENDING COMMENTS
89F w/ h/o chronic afib (on Eliquis), GERD, and chronic low back pain presenting s/p fall. Admitted to medicine for back pain 2/2 Lumbar compression fracture, found to have epithelial carcinoma. S/p RT. Planned for kyphoplasty with neurosurgery, but surgery postponed as patient went into afib with RVR and transferred to telemetry for further monitoring.    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)  - Radiation Oncology consulted: s/p 5 sessions of RT, completed 2/6/24  - Oncology not planning on immunotherapy at this time, but did recall NSGY for possible kyphoplasty   - initial plan for kyphoplasty cancelled > as patient developed Afib with RVR, s/p cardizem gtt  - pending neurosurgery reccs regarding kypho  - transitioned to PO cardizem and increased metoprolol to 25mg TID  - Patient was a rapid response on 2/10 due to AMS and hypotension with BP of 75/50, suspect 2/2 narcotics  - Pain management recs appreciated: daughter refusing oxy as it causes sedation in her mother, even prn dose   1. acetaminophen 1000mg q8h standing  2. Gabapentin 400mg q8h standing, if tolerated (no sedation/confusion)  3. Methocarbamol 500mg q8h standing  4. oxycodone 5mg q4h PRN for breakthrough pain. can increase to 7.5mg if need be  - can consider toradol/tramadol for pain control  - TSLO brace for PT  - physiatry recommending STR in SNF    #CHF, diastolic - acute -- now resolved  - TTE (May 2013) demonstrated LVEF 55-65% w/ normal global LVSF and no significant valvulopathy.  - TTE 1/28- EF 60-65% mod pulm HTN   - elevated BNP  - Currently euvolemic on exam, transition back to home oral lasix 20 daily - dced on 2/19, c/w PRN as patient becomes hypotensive on lasix    #Chronic Atrial Fibrillation  CHADS-VASC 3+ (Age x2, Female)  - eliquis restarted on 2/21  - c/w metoprolol 25mg TID, cardizem 30mg dced , if HR increases, can consider starting again    #GERD  - c/w pantoprazole 40mg PO QD (therapeutic interchange for omeprazole)    #CKD 3b   - per PCP baseline is 1.2    #Urinary retention - likely neurogenic   - hanson placed 1/29  - c/w flomax 0.8mg   - completed RT, will try TOV after walking with PT    #Constipation  - c/w aggressive bowel regimen: senna, miralax, lactulose    # HTN  - c/w metoprolol 25mg TID  - started on lisinopril 10mg 2/4, which was dced 2/16 due to drop in BP    # DVT prophylaxis: Eliquis  # Code status: DNR/DNI    Pending: pain control, appreciate pain follow up regarding epidural steroid injection, appreciate nsgy follow up regarding kyphoplasty     Total time spent to complete patient's bedside assessment, review medical chart, discuss medical plan of care with covering medical team was more than 35 minutes  with >50% of time spent face to face with patient, discussion with patient/family and/or coordination of care. My note supersedes them medical resident note in case of discrepancy.      Azra Oneil DO .

## 2024-02-22 NOTE — PROGRESS NOTE ADULT - SUBJECTIVE AND OBJECTIVE BOX
24H events:    Patient is a 89y old Female who presents with a chief complaint of Back Pain (21 Feb 2024 15:19)    Primary diagnosis of Inability to ambulate due to multiple joints    Today is hospital day 29d. This morning patient was seen and examined at bedside, resting comfortably in bed.    No acute or major events overnight. Hemodynamically stable, tolerating oral diet, voiding appropriately with appropriate bowel movements.     Patient was comfortable overnight, pain was better well controlled today with the tramadol.     Family communication:  Contact date: 2/21/24  Relationship to patient: Daughter at bedside    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 No reported tobacco, alcohol, or illicit/recreational drug use. ADL's limited by progressively worsening ability to ambulate. Ambulates w/ walker. (24 Jan 2024 15:07)      ALLERGIES:  No Known Allergies    MEDICATIONS:  STANDING MEDICATIONS  acetaminophen     Tablet .. 1000 milliGRAM(s) Oral every 8 hours  apixaban 5 milliGRAM(s) Oral every 12 hours  calcium carbonate   1250 mG (OsCal) 1 Tablet(s) Oral daily  chlorhexidine 2% Cloths 1 Application(s) Topical <User Schedule>  chlorhexidine 2% Cloths 1 Application(s) Topical daily  cholecalciferol 5000 Unit(s) Oral daily  fluticasone propionate 50 MICROgram(s)/spray Nasal Spray 1 Spray(s) Both Nostrils two times a day  gabapentin 400 milliGRAM(s) Oral every 8 hours  lidocaine   4% Patch 1 Patch Transdermal every 24 hours  magnesium sulfate  IVPB 2 Gram(s) IV Intermittent once  methocarbamol 500 milliGRAM(s) Oral every 8 hours  metoprolol tartrate 25 milliGRAM(s) Oral three times a day  multivitamin 1 Tablet(s) Oral daily  pantoprazole    Tablet 40 milliGRAM(s) Oral before breakfast  polyethylene glycol 3350 17 Gram(s) Oral two times a day  rOPINIRole 0.25 milliGRAM(s) Oral two times a day  senna 2 Tablet(s) Oral at bedtime  tamsulosin 0.8 milliGRAM(s) Oral at bedtime    PRN MEDICATIONS  traMADol 50 milliGRAM(s) Oral once PRN    VITALS:   T(F): 97.4  HR: 82  BP: 128/60  RR: 18  SpO2: --    PHYSICAL EXAM:  GENERAL: NAD, lying in bed comfortably, elderly  HEAD: NCAD, no hematoma or laceration   NECK: Supple, no neck stiffness/nuchal rigidity  HEART: Regular rate and rhythm, normal S1/S2, no murmurs, heaves, thrills  LUNGS: No acute respiratory distress, clear b/l breath sounds, no wheezing  ABDOMEN:  soft, nontender, nondistended  EXTREMITIES: no rashes, extremities warm/dry, LE edema, presence of ecchymosis on b/l LE  NERVOUS SYSTEM:  A&Ox3     ( X ) Indwelling David Catheter:  Reason (  ) Critical illness     ( X ) urinary retention    (  ) Accurate Ins/Outs Monitoring     (  ) CMO patient      LABS:                        10.3   2.84  )-----------( 76       ( 21 Feb 2024 06:59 )             30.5     02-21    131<L>  |  95<L>  |  18  ----------------------------<  106<H>  3.7   |  25  |  0.9    Ca    8.3<L>      21 Feb 2024 06:59  Mg     1.6     02-21    TPro  4.3<L>  /  Alb  2.9<L>  /  TBili  0.3  /  DBili  x   /  AST  29  /  ALT  20  /  AlkPhos  72  02-21      Urinalysis Basic - ( 21 Feb 2024 06:59 )    Color: x / Appearance: x / SG: x / pH: x  Gluc: 106 mg/dL / Ketone: x  / Bili: x / Urobili: x   Blood: x / Protein: x / Nitrite: x   Leuk Esterase: x / RBC: x / WBC x   Sq Epi: x / Non Sq Epi: x / Bacteria: x                RADIOLOGY:  No radiographic images in the past 24 hours.

## 2024-02-22 NOTE — CHART NOTE - NSCHARTNOTEFT_GEN_A_CORE
89F w/ h/o chronic afib (on Eliquis), GERD, and chronic low back pain presenting s/p fall. Patient known to have chronic low back pain that has progressively worsened over the years. Pain acutely worsened two weeks ago and patient subsequently fell due to inability to support own weight. Evaluated by PCP after initial fall due to worsening pain. Prescribed Percocet 5-325 bid w/ mild relief. However, pt once again fell earlier today after pt unable to support her own weight while trying to ambulate. Now unable to ambulate or bear weight, even w/ assistance. No reported HT, LOC, lightheadedness, confusion, loss of continence, or focal weakness a/w either fall. No reported fevers, chills, CP, palpitations, SOB, abdominal pain, n/v/d, or dysuria (although daughter did note malodorous urine recently).    < from: CT Abdomen and Pelvis Urogram w/wo IV Cont (02.06.24 @ 11:30) >  IMPRESSION:  1.  No suspicious renal mass or urothelial lesion.  2.  Lytic L1 vertebral body lesion again noted.    PLAN   - OR Tuesday 2.27 for L1 Lami and Kypho/Spinejack   - Needs medical optimization   - Will contact Neuro ICU for recommendations, additionally will go to Neuro ICU postop   - Will hold eliquis now 2/22  - Discussed case with attending

## 2024-02-22 NOTE — PROGRESS NOTE ADULT - SUBJECTIVE AND OBJECTIVE BOX
HPI:    89F w/ h/o chronic afib (on Eliquis), GERD, and chronic low back pain presenting s/p fall. Patient known to have chronic low back pain that has progressively worsened over the years. Pain acutely worsened two weeks ago and patient subsequently fell due to inability to support own weight. Evaluated by PCP after initial fall due to worsening pain. Prescribed Percocet 5-325 bid w/ mild relief. However, pt once again fell earlier today after pt unable to support her own weight while trying to ambulate. Now unable to ambulate or bear weight, even w/ assistance. No reported HT, LOC, lightheadedness, confusion, loss of continence, or focal weakness a/w either fall. No reported fevers, chills, CP, palpitations, SOB, abdominal pain, n/v/d, or dysuria (although daughter did note malodorous urine recently).    Interval history  -Patient seen at bedside with alyssa Garcia  -Patient notes leg pain with movement, but only mild pain at rest  2/7: Patient now DNR/DNI, added 10mg oxycontin Q12H for consistent pain  2/12: added PRN oxycodone 5mg Q6H for pain  2/14: stopped oxycodone ER 10mg since patient has been more somnolent and hypotensive occasionally   2/16: Patient was upgraded to telemetry, placed on Cardizem infusion after OR  2/20: Patient sleeping comfortably, spoke with daughter at bedside who reports that patient's pain has been OK but she worked with PT which did exacerbate it a bit.      ADVANCE DIRECTIVES:     [ ] Full Code [x ] DNR  MOLST  [ ]  Living Will  [ ]   DECISION MAKER(s):  [ ] Health Care Proxy(s)  [x ] Surrogate(s)  [ ] Guardian           Name(s): Phone Number(s):  Daughter      BASELINE (I)ADL(s) (prior to admission):    [place]: [ ]Total  [ ] Moderate [ ]Dependent  Palliative Performance Status Version 2:         %    http://npcrc.org/files/news/palliative_performance_scale_[email or web address].pdf    Allergies    No Known Allergies    Intolerances    MEDICATIONS  (STANDING):  acetaminophen     Tablet .. 1000 milliGRAM(s) Oral every 8 hours  apixaban 5 milliGRAM(s) Oral every 12 hours  calcium carbonate   1250 mG (OsCal) 1 Tablet(s) Oral daily  chlorhexidine 2% Cloths 1 Application(s) Topical <User Schedule>  chlorhexidine 2% Cloths 1 Application(s) Topical daily  cholecalciferol 5000 Unit(s) Oral daily  fluticasone propionate 50 MICROgram(s)/spray Nasal Spray 1 Spray(s) Both Nostrils two times a day  gabapentin 400 milliGRAM(s) Oral every 8 hours  lidocaine   4% Patch 1 Patch Transdermal every 24 hours  magnesium sulfate  IVPB 2 Gram(s) IV Intermittent once  methocarbamol 500 milliGRAM(s) Oral every 8 hours  metoprolol tartrate 25 milliGRAM(s) Oral three times a day  multivitamin 1 Tablet(s) Oral daily  pantoprazole    Tablet 40 milliGRAM(s) Oral before breakfast  polyethylene glycol 3350 17 Gram(s) Oral two times a day  rOPINIRole 0.25 milliGRAM(s) Oral two times a day  senna 2 Tablet(s) Oral at bedtime  tamsulosin 0.8 milliGRAM(s) Oral at bedtime    MEDICATIONS  (PRN):  traMADol 50 milliGRAM(s) Oral once PRN Moderate Pain (4 - 6)          PRESENT SYMPTOMS: [  ]Unable to obtain due to poor mentation   Source if other than patient:  [ ]Family   [ ]Team     Pain: [ ]yes [x ]no  QOL impact -    Location -   Aggravating factors -   Quality -   Radiation -   Timing-  Severity (0-10 scale):   Minimal acceptable level (0-10 scale):     CPOT:    https://www.[email or web address]/getattachment/[email or web address]/Critical-Care-Pain-Observation-Tool-(CPOT)    PAIN AD Score:   [email or web address].[place].[email or web address]/cog/painad.pdf (press ctrl +  left click to view)    Dyspnea:                           [x ]None[ ]Mild [ ]Moderate [ ]Severe     Respiratory Distress Observation Scale (RDOS):   A score of 0 to 2 signifies little or no respiratory distress, 3 signifies mild distress, scores 4 to 6 indicate moderate distress, and scores greater than 7 signify severe distress  [email or web address]://[email or web address]/sites/default/files/PDFS/870405-pckeookkqkj-cnmtkwdb-leexyuevaqw-xoiap.pdf    Anxiety:                             [ x]None[ ]Mild [ ]Moderate [ ]Severe   Fatigue:                             [x ]None[ ]Mild [ ]Moderate [ ]Severe   Nausea:                             [ x]None[ ]Mild [ ]Moderate [ ]Severe   Loss of appetite:              [x ]None[ ]Mild [ ]Moderate [ ]Severe   Constipation:                    [x ]None[ ]Mild [ ]Moderate [ ]Severe    Other Symptoms:  [ x]All other review of systems negative     Palliative Performance Status Version 2:         30%    http://[facility].org/files/news/palliative_performance_scale_[email or web address].pdf    PHYSICAL EXAM:    Vital Signs Last 24 Hrs  T(C): 36.3 (22 Feb 2024 04:47), Max: 36.6 (21 Feb 2024 12:53)  T(F): 97.4 (22 Feb 2024 04:47), Max: 97.9 (21 Feb 2024 12:53)  HR: 82 (22 Feb 2024 04:47) (75 - 103)  BP: 128/60 (22 Feb 2024 04:47) (109/61 - 161/74)  BP(mean): --  RR: 18 (22 Feb 2024 04:47) (18 - 18)  SpO2: --        GENERAL:  [ ] No acute distress [ ]Lethargic  [ ]Unarousable  [ x]Verbal  [ ]Non-Verbal [ ]Cachexia    BEHAVIORAL/PSYCH:  [x ]Alert and Oriented x2  [ ] Anxiety [ ] Delirium [ ] Agitation [ x] Calm   EYES: [ x] No scleral icterus [ ] Scleral icterus [ ] Closed  ENMT:  [ ]Dry mouth  [x ]No external oral lesions [ ] No external ear or nose lesions  CARDIOVASCULAR:  [ ]Regular [ ]Irregular [x ]Tachy [ ]Not Tachy  [ ]Sung [ x] Edema [ ] No edema  PULMONARY:  [ ]Tachypnea  [ ]Audible excessive secretions [ x] No labored breathing [ ] labored breathing  GASTROINTESTINAL: [ ]Soft  [ ]Distended  [x ]Not distended [ ]Non tender [ ]Tender  MUSCULOSKELETAL: [ ]No clubbing [ ] clubbing  [x ] No cyanosis [ ] cyanosis  NEUROLOGIC: [ ]No focal deficits  [x ]Follows commands  [ ]Does not follow commands  [ x]Cognitive impairment  [ ]Dysphagia  [ ]Dysarthria  [ ]Paresis   SKIN: [ ] Jaundiced [ x] Non-jaundiced [ ]Rash [ ]No Rash [ ] Warm [ ] Dry  MISC/LINES: [ ] ET tube [ ] Trach [ ]NGT/OGT [ ]PEG [ ]David    LABS: reviewed by me                                     11.4   2.84  )-----------( 86       ( 22 Feb 2024 06:52 )             33.1     02-22    130<L>  |  92<L>  |  17  ----------------------------<  88  4.0   |  27  |  0.8    Ca    8.4      22 Feb 2024 06:52  Mg     1.8     02-22    TPro  4.6<L>  /  Alb  3.0<L>  /  TBili  0.4  /  DBili  x   /  AST  46<H>  /  ALT  33  /  AlkPhos  86  02-22      Urinalysis Basic - ( 22 Feb 2024 06:52 )    Color: x / Appearance: x / SG: x / pH: x  Gluc: 88 mg/dL / Ketone: x  / Bili: x / Urobili: x   Blood: x / Protein: x / Nitrite: x   Leuk Esterase: x / RBC: x / WBC x   Sq Epi: x / Non Sq Epi: x / Bacteria: x                RADIOLOGY & ADDITIONAL STUDIES: reviewed by me    < from: NM SPECT Bone Scan, Single Area Single Day (02.01.24 @ 16:43) >  IMPRESSION:    Abnormal uptake in the L1 vertebral body, corresponding to known   compression fracture deformity secondary to vertebral body mass.    No other evidence for metastatic bone disease.    < end of copied text >        EKG: reviewed by me    < from: 12 Lead ECG (01.25.24 @ 11:34) >  Ventricular Rate 94 BPM    Atrial Rate 81 BPM    QRS Duration 90 ms    Q-T Interval 384 ms    QTC Calculation(Bazett) 480 ms    R Axis 166 degrees    T Axis -48 degrees    Diagnosis Line Atrial fibrillation  Right ventricular hypertrophy  Anterior infarct , age undetermined  ST & T wave abnormality, consider inferior ischemia  Abnormal ECG    < end of copied text >      Patient discussed with primary medical team MD  Palliative care education provided to patient and/or family

## 2024-02-22 NOTE — PROGRESS NOTE ADULT - ASSESSMENT
89F w/ h/o chronic afib (on Eliquis), GERD, and chronic low back pain presenting s/p fall. Admitted to medicine for back pain 2/2 Lumbar compression fracture. Planned for kyphoplasty with neurosurgery, but surgery postponed as patient went into afib with RVR and transferred to telemetry for further monitoring.    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 Was called for low blood pressure, which improved on repeat readings, patient's blood pressure runs low, d/aleksandar the oxycodone  - plan for kyphoplasty 2/15 cancelled > as patient developed Afib with RVR, s/p cardizem gtt  - per neurosurgery, no further plans for kyphoplasty, c/w conservative management  - transitioned to PO cardizem and increased metoprolol to 25mg TID - cardizem dced on 2/20  - Pain management recs appreciated: daughter refusing oxy as it causes sedation in her mother, even prn dose   1. acetaminophen 1000mg q8h standing  2. Gabapentin 400mg q8h standing, if tolerated (no sedation/confusion)  3. Methocarbamol 500mg q8h standing  4. oxycodone 5mg q4h PRN for breakthrough pain. can increase to 7.5mg if need be  5. pt planned for nsurg intervention   - pain states can do epidural outpatient, but unlikely that patient will be able to present to clinic for epidural, f/u with pain to see if this can be done inpatient  - can consider tramadol for pain control  - TSLO brace for PT  - physiatry recommending STR in SNF    #CHF, diastolic - acute -- now resolved  - On 2/10 had episode of unresponsives and bradycardia/hypotension. Likely vasovagal. CT head negative  - TTE (May 2013) demonstrated LVEF 55-65% w/ normal global LVSF and no significant valvulopathy.  - TTE 1/28- EF 60-65% mod pulm HTN   - elevated BNP  - Tele monitored for 24 hrs with no dysrhythmia   - Currently euvolemic on exam, transition back to home oral lasix 20 daily - dced on 2/19, c/w PRN as patient becomes hypotensive on lasix  - giving lasix PRN - last dose given 2/21/24    #Chronic Atrial Fibrillation  CHADS-VASC 3+ (Age x2, Female). Only on AC. Previously on Lopressor, but only takes intermittently as HR currently rate-controlled off meds.  - Eliquis initially held for surgery, restarted on 2/16, then held due to low Hb  - now on lovenox 70 BID pending OR --> no plans for OR, eliquis restarted on 2/21  - c/w metoprolol 25mg TID, cardizem 30mg dced today, if HR increases, can consider starting again    #GERD  - c/w pantoprazole 40mg PO QD (therapeutic interchange for omeprazole)    #CKD 3b   - per PCP baseline is 1.2    #Urinary retention - likely neurogenic   - hanson placed 1/29  - c/w flomax 0.8mg   - completed RT, will try TOV after walking with PT    #Constipation  - c/w aggressive bowel regimen: senna, miralax, lactulose    # HTN  - c/w metoprolol 25mg TID  - started on lisinopril 10mg 2/4, which was dced 2/16 due to drop in BP                                                                              ----------------------------------------------------  # DVT prophylaxis: Eliquis  # GI prophylaxis: Protonix  # Diet: DASH/TLC  # Activity: IAT  # Code status: DNR/DNI  # Disposition: [facility] vs Home with home PT                                                                           --------------------------------------------------------    # Handoff:   - cardizem dced 2/20, if HR increases, can consider restarting  - gave 1 dose of lasix 2/21  - pain control with tramadol 50 PRN  - f/u heme onc regarding plans for immunotherapy  - f/u pain management regarding inpatient epidural 89F w/ h/o chronic afib (on Eliquis), GERD, and chronic low back pain presenting s/p fall. Admitted to medicine for back pain 2/2 Lumbar compression fracture. Planned for kyphoplasty with neurosurgery, but surgery postponed as patient went into afib with RVR and transferred to telemetry for further monitoring.    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 Was called for low blood pressure, which improved on repeat readings, patient's blood pressure runs low, d/aleksandar the oxycodone  - plan for kyphoplasty 2/15 cancelled > as patient developed Afib with RVR, s/p cardizem gtt  - per neurosurgery, no further plans for kyphoplasty, c/w conservative management. 2/22 update: may consider taking to OR per heme onc  - transitioned to PO cardizem and increased metoprolol to 25mg TID - cardizem dced on 2/20  - Pain management recs appreciated: daughter refusing oxy as it causes sedation in her mother, even prn dose   1. acetaminophen 1000mg q8h standing  2. Gabapentin 400mg q8h standing, if tolerated (no sedation/confusion)  3. Methocarbamol 500mg q8h standing  4. oxycodone 5mg q4h PRN for breakthrough pain. can increase to 7.5mg if need be  5. pt planned for nsurg intervention   - pain states can do epidural outpatient, but unlikely that patient will be able to present to clinic for epidural, f/u with pain to see if this can be done inpatient  - can consider tramadol for pain control  - TSLO brace for PT  - physiatry recommending STR in SNF    #CHF, diastolic - acute -- now resolved  - On 2/10 had episode of unresponsives and bradycardia/hypotension. Likely vasovagal. CT head negative  - TTE (May 2013) demonstrated LVEF 55-65% w/ normal global LVSF and no significant valvulopathy.  - TTE 1/28- EF 60-65% mod pulm HTN   - elevated BNP  - Tele monitored for 24 hrs with no dysrhythmia   - Currently euvolemic on exam, transition back to home oral lasix 20 daily - dced on 2/19, c/w PRN as patient becomes hypotensive on lasix  - giving lasix PRN - last dose given 2/21/24    #Chronic Atrial Fibrillation  CHADS-VASC 3+ (Age x2, Female). Only on AC. Previously on Lopressor, but only takes intermittently as HR currently rate-controlled off meds.  - Eliquis initially held for surgery, restarted on 2/16, then held due to low Hb  - now on lovenox 70 BID pending OR --> no plans for OR, eliquis restarted on 2/21  - c/w metoprolol 25mg TID, cardizem 30mg dced today, if HR increases, can consider starting again    #GERD  - c/w pantoprazole 40mg PO QD (therapeutic interchange for omeprazole)    #CKD 3b   - per PCP baseline is 1.2    #Urinary retention - likely neurogenic   - hanson placed 1/29  - c/w flomax 0.8mg   - completed RT, will try TOV after walking with PT    #Constipation  - c/w aggressive bowel regimen: senna, miralax, lactulose    # HTN  - c/w metoprolol 25mg TID  - started on lisinopril 10mg 2/4, which was dced 2/16 due to drop in BP                                                                              ----------------------------------------------------  # DVT prophylaxis: Eliquis  # GI prophylaxis: Protonix  # Diet: DASH/TLC  # Activity: IAT  # Code status: DNR/DNI  # Disposition: [facility] vs Home with home PT                                                                           --------------------------------------------------------    # Handoff:   - cardizem dced 2/20, if HR increases, can consider restarting  - gave 1 dose of lasix 2/21  - pain control with tramadol 50 PRN  - poss OR with nsgy, f/u  - f/u pain management regarding inpatient epidural

## 2024-02-23 ENCOUNTER — NON-APPOINTMENT (OUTPATIENT)
Age: 89
End: 2024-02-23

## 2024-02-23 LAB
ALBUMIN SERPL ELPH-MCNC: 2.9 G/DL — LOW (ref 3.5–5.2)
ALP SERPL-CCNC: 84 U/L — SIGNIFICANT CHANGE UP (ref 30–115)
ALT FLD-CCNC: 36 U/L — SIGNIFICANT CHANGE UP (ref 0–41)
ANION GAP SERPL CALC-SCNC: 11 MMOL/L — SIGNIFICANT CHANGE UP (ref 7–14)
AST SERPL-CCNC: 43 U/L — HIGH (ref 0–41)
BASOPHILS # BLD AUTO: 0.02 K/UL — SIGNIFICANT CHANGE UP (ref 0–0.2)
BASOPHILS NFR BLD AUTO: 0.5 % — SIGNIFICANT CHANGE UP (ref 0–1)
BILIRUB SERPL-MCNC: 0.3 MG/DL — SIGNIFICANT CHANGE UP (ref 0.2–1.2)
BUN SERPL-MCNC: 18 MG/DL — SIGNIFICANT CHANGE UP (ref 10–20)
CALCIUM SERPL-MCNC: 8.5 MG/DL — SIGNIFICANT CHANGE UP (ref 8.4–10.5)
CHLORIDE SERPL-SCNC: 90 MMOL/L — LOW (ref 98–110)
CO2 SERPL-SCNC: 25 MMOL/L — SIGNIFICANT CHANGE UP (ref 17–32)
CREAT SERPL-MCNC: 0.8 MG/DL — SIGNIFICANT CHANGE UP (ref 0.7–1.5)
EGFR: 70 ML/MIN/1.73M2 — SIGNIFICANT CHANGE UP
EOSINOPHIL # BLD AUTO: 0.2 K/UL — SIGNIFICANT CHANGE UP (ref 0–0.7)
EOSINOPHIL NFR BLD AUTO: 4.8 % — SIGNIFICANT CHANGE UP (ref 0–8)
GLUCOSE SERPL-MCNC: 94 MG/DL — SIGNIFICANT CHANGE UP (ref 70–99)
HCT VFR BLD CALC: 31.9 % — LOW (ref 37–47)
HGB BLD-MCNC: 11 G/DL — LOW (ref 12–16)
IMM GRANULOCYTES NFR BLD AUTO: 0.5 % — HIGH (ref 0.1–0.3)
LYMPHOCYTES # BLD AUTO: 1.41 K/UL — SIGNIFICANT CHANGE UP (ref 1.2–3.4)
LYMPHOCYTES # BLD AUTO: 34.1 % — SIGNIFICANT CHANGE UP (ref 20.5–51.1)
MAGNESIUM SERPL-MCNC: 1.7 MG/DL — LOW (ref 1.8–2.4)
MCHC RBC-ENTMCNC: 32.9 PG — HIGH (ref 27–31)
MCHC RBC-ENTMCNC: 34.5 G/DL — SIGNIFICANT CHANGE UP (ref 32–37)
MCV RBC AUTO: 95.5 FL — SIGNIFICANT CHANGE UP (ref 81–99)
MONOCYTES # BLD AUTO: 0.36 K/UL — SIGNIFICANT CHANGE UP (ref 0.1–0.6)
MONOCYTES NFR BLD AUTO: 8.7 % — SIGNIFICANT CHANGE UP (ref 1.7–9.3)
NEUTROPHILS # BLD AUTO: 2.13 K/UL — SIGNIFICANT CHANGE UP (ref 1.4–6.5)
NEUTROPHILS NFR BLD AUTO: 51.4 % — SIGNIFICANT CHANGE UP (ref 42.2–75.2)
NRBC # BLD: 0 /100 WBCS — SIGNIFICANT CHANGE UP (ref 0–0)
PLATELET # BLD AUTO: 99 K/UL — LOW (ref 130–400)
PMV BLD: 9.8 FL — SIGNIFICANT CHANGE UP (ref 7.4–10.4)
POTASSIUM SERPL-MCNC: 4.3 MMOL/L — SIGNIFICANT CHANGE UP (ref 3.5–5)
POTASSIUM SERPL-SCNC: 4.3 MMOL/L — SIGNIFICANT CHANGE UP (ref 3.5–5)
PROT SERPL-MCNC: 4.5 G/DL — LOW (ref 6–8)
RBC # BLD: 3.34 M/UL — LOW (ref 4.2–5.4)
RBC # FLD: 14.2 % — SIGNIFICANT CHANGE UP (ref 11.5–14.5)
SODIUM SERPL-SCNC: 126 MMOL/L — LOW (ref 135–146)
WBC # BLD: 4.14 K/UL — LOW (ref 4.8–10.8)
WBC # FLD AUTO: 4.14 K/UL — LOW (ref 4.8–10.8)

## 2024-02-23 PROCEDURE — 99232 SBSQ HOSP IP/OBS MODERATE 35: CPT

## 2024-02-23 RX ORDER — TRAMADOL HYDROCHLORIDE 50 MG/1
50 TABLET ORAL DAILY
Refills: 0 | Status: DISCONTINUED | OUTPATIENT
Start: 2024-02-23 | End: 2024-02-29

## 2024-02-23 RX ORDER — MAGNESIUM SULFATE 500 MG/ML
2 VIAL (ML) INJECTION ONCE
Refills: 0 | Status: COMPLETED | OUTPATIENT
Start: 2024-02-23 | End: 2024-02-23

## 2024-02-23 RX ORDER — FUROSEMIDE 40 MG
20 TABLET ORAL DAILY
Refills: 0 | Status: DISCONTINUED | OUTPATIENT
Start: 2024-02-23 | End: 2024-02-24

## 2024-02-23 RX ORDER — GABAPENTIN 400 MG/1
600 CAPSULE ORAL EVERY 8 HOURS
Refills: 0 | Status: DISCONTINUED | OUTPATIENT
Start: 2024-02-23 | End: 2024-02-26

## 2024-02-23 RX ORDER — METHOCARBAMOL 500 MG/1
750 TABLET, FILM COATED ORAL EVERY 8 HOURS
Refills: 0 | Status: DISCONTINUED | OUTPATIENT
Start: 2024-02-23 | End: 2024-02-26

## 2024-02-23 RX ADMIN — Medication 20 MILLIGRAM(S): at 12:50

## 2024-02-23 RX ADMIN — Medication 1000 MILLIGRAM(S): at 22:19

## 2024-02-23 RX ADMIN — PANTOPRAZOLE SODIUM 40 MILLIGRAM(S): 20 TABLET, DELAYED RELEASE ORAL at 05:04

## 2024-02-23 RX ADMIN — Medication 25 MILLIGRAM(S): at 16:29

## 2024-02-23 RX ADMIN — CHLORHEXIDINE GLUCONATE 1 APPLICATION(S): 213 SOLUTION TOPICAL at 05:06

## 2024-02-23 RX ADMIN — GABAPENTIN 400 MILLIGRAM(S): 400 CAPSULE ORAL at 05:03

## 2024-02-23 RX ADMIN — GABAPENTIN 600 MILLIGRAM(S): 400 CAPSULE ORAL at 22:19

## 2024-02-23 RX ADMIN — ROPINIROLE 0.25 MILLIGRAM(S): 8 TABLET, FILM COATED, EXTENDED RELEASE ORAL at 05:03

## 2024-02-23 RX ADMIN — METHOCARBAMOL 500 MILLIGRAM(S): 500 TABLET, FILM COATED ORAL at 05:06

## 2024-02-23 RX ADMIN — Medication 5000 UNIT(S): at 12:38

## 2024-02-23 RX ADMIN — Medication 1 TABLET(S): at 12:42

## 2024-02-23 RX ADMIN — Medication 1000 MILLIGRAM(S): at 16:29

## 2024-02-23 RX ADMIN — TRAMADOL HYDROCHLORIDE 50 MILLIGRAM(S): 50 TABLET ORAL at 11:15

## 2024-02-23 RX ADMIN — SENNA PLUS 2 TABLET(S): 8.6 TABLET ORAL at 22:19

## 2024-02-23 RX ADMIN — METHOCARBAMOL 750 MILLIGRAM(S): 500 TABLET, FILM COATED ORAL at 12:36

## 2024-02-23 RX ADMIN — Medication 25 MILLIGRAM(S): at 05:03

## 2024-02-23 RX ADMIN — METHOCARBAMOL 750 MILLIGRAM(S): 500 TABLET, FILM COATED ORAL at 22:19

## 2024-02-23 RX ADMIN — Medication 1 TABLET(S): at 12:39

## 2024-02-23 RX ADMIN — POLYETHYLENE GLYCOL 3350 17 GRAM(S): 17 POWDER, FOR SOLUTION ORAL at 18:53

## 2024-02-23 RX ADMIN — TRAMADOL HYDROCHLORIDE 50 MILLIGRAM(S): 50 TABLET ORAL at 09:55

## 2024-02-23 RX ADMIN — CHLORHEXIDINE GLUCONATE 1 APPLICATION(S): 213 SOLUTION TOPICAL at 18:51

## 2024-02-23 RX ADMIN — Medication 25 MILLIGRAM(S): at 22:19

## 2024-02-23 RX ADMIN — POLYETHYLENE GLYCOL 3350 17 GRAM(S): 17 POWDER, FOR SOLUTION ORAL at 05:09

## 2024-02-23 RX ADMIN — GABAPENTIN 600 MILLIGRAM(S): 400 CAPSULE ORAL at 12:36

## 2024-02-23 RX ADMIN — Medication 1 SPRAY(S): at 18:50

## 2024-02-23 RX ADMIN — Medication 1000 MILLIGRAM(S): at 05:03

## 2024-02-23 RX ADMIN — ROPINIROLE 0.25 MILLIGRAM(S): 8 TABLET, FILM COATED, EXTENDED RELEASE ORAL at 18:50

## 2024-02-23 RX ADMIN — TAMSULOSIN HYDROCHLORIDE 0.8 MILLIGRAM(S): 0.4 CAPSULE ORAL at 22:19

## 2024-02-23 RX ADMIN — Medication 1 SPRAY(S): at 05:09

## 2024-02-23 NOTE — PROGRESS NOTE ADULT - SUBJECTIVE AND OBJECTIVE BOX
HPI:    89F w/ h/o chronic afib (on Eliquis), GERD, and chronic low back pain presenting s/p fall. Patient known to have chronic low back pain that has progressively worsened over the years. Pain acutely worsened two weeks ago and patient subsequently fell due to inability to support own weight. Evaluated by PCP after initial fall due to worsening pain. Prescribed Percocet 5-325 bid w/ mild relief. However, pt once again fell earlier today after pt unable to support her own weight while trying to ambulate. Now unable to ambulate or bear weight, even w/ assistance. No reported HT, LOC, lightheadedness, confusion, loss of continence, or focal weakness a/w either fall. No reported fevers, chills, CP, palpitations, SOB, abdominal pain, n/v/d, or dysuria (although daughter did note malodorous urine recently).    Interval history  -Patient seen at bedside with alyssa Garcia  -Patient notes leg pain with movement, but only mild pain at rest  2/7: Patient now DNR/DNI, added 10mg oxycontin Q12H for consistent pain  2/12: added PRN oxycodone 5mg Q6H for pain  2/14: stopped oxycodone ER 10mg since patient has been more somnolent and hypotensive occasionally   2/16: Patient was upgraded to telemetry, placed on Cardizem infusion after OR  2/20: Patient sleeping comfortably, spoke with daughter at bedside who reports that patient's pain has been OK but she worked with PT which did exacerbate it a bit.      ADVANCE DIRECTIVES:     [ ] Full Code [x ] DNR  MOLST  [ ]  Living Will  [ ]   DECISION MAKER(s):  [ ] Health Care Proxy(s)  [x ] Surrogate(s)  [ ] Guardian           Name(s): Phone Number(s):  Daughter      BASELINE (I)ADL(s) (prior to admission):    Parker: [ ]Total  [ ] Moderate [ ]Dependent  Palliative Performance Status Version 2:         %    http://npcrc.org/files/news/palliative_performance_scale_[email or web address].pdf    Allergies    No Known Allergies    Intolerances    MEDICATIONS  (STANDING):  acetaminophen     Tablet .. 1000 milliGRAM(s) Oral every 8 hours  calcium carbonate   1250 mG (OsCal) 1 Tablet(s) Oral daily  chlorhexidine 2% Cloths 1 Application(s) Topical <User Schedule>  chlorhexidine 2% Cloths 1 Application(s) Topical daily  cholecalciferol 5000 Unit(s) Oral daily  fluticasone propionate 50 MICROgram(s)/spray Nasal Spray 1 Spray(s) Both Nostrils two times a day  furosemide    Tablet 20 milliGRAM(s) Oral daily  gabapentin 600 milliGRAM(s) Oral every 8 hours  lidocaine   4% Patch 1 Patch Transdermal every 24 hours  magnesium sulfate  IVPB 2 Gram(s) IV Intermittent once  methocarbamol 750 milliGRAM(s) Oral every 8 hours  metoprolol tartrate 25 milliGRAM(s) Oral three times a day  multivitamin 1 Tablet(s) Oral daily  pantoprazole    Tablet 40 milliGRAM(s) Oral before breakfast  polyethylene glycol 3350 17 Gram(s) Oral two times a day  rOPINIRole 0.25 milliGRAM(s) Oral two times a day  senna 2 Tablet(s) Oral at bedtime  tamsulosin 0.8 milliGRAM(s) Oral at bedtime    MEDICATIONS  (PRN):  traMADol 50 milliGRAM(s) Oral daily PRN Severe Pain (7 - 10)            PRESENT SYMPTOMS: [  ]Unable to obtain due to poor mentation   Source if other than patient:  [ ]Family   [ ]Team     Pain: [ ]yes [x ]no  QOL impact -    Location -   Aggravating factors -   Quality -   Radiation -   Timing-  Severity (0-10 scale):   Minimal acceptable level (0-10 scale):     CPOT:    https://[email or web address]/Critical-Care-Pain-Observation-Tool-(CPOT)    PAIN AD Score:   [email or web address]/painad.pdf (press ctrl +  left click to view)    Dyspnea:                           [x ]None[ ]Mild [ ]Moderate [ ]Severe     Respiratory Distress Observation Scale (RDOS):   A score of 0 to 2 signifies little or no respiratory distress, 3 signifies mild distress, scores 4 to 6 indicate moderate distress, and scores greater than 7 signify severe distress  [email or web address]/sites/default/files/[email or web address]-fmyjkdoevin-[email or web address]-sgnnr.[email or web address]    Anxiety:                             [ x]None[ ]Mild [ ]Moderate [ ]Severe   Fatigue:                             [x ]None[ ]Mild [ ]Moderate [ ]Severe   Nausea:                             [ x]None[ ]Mild [ ]Moderate [ ]Severe   Loss of appetite:              [x ]None[ ]Mild [ ]Moderate [ ]Severe   Constipation:                    [x ]None[ ]Mild [ ]Moderate [ ]Severe    Other Symptoms:  [ x]All other review of systems negative     Palliative Performance Status Version 2:         30%    http://[email or web address].org/files/news/palliative_performance_scale_[email or web address].pdf    PHYSICAL EXAM:    Vital Signs Last 24 Hrs  T(C): 36.2 (23 Feb 2024 12:28), Max: 36.3 (22 Feb 2024 20:10)  T(F): 97.2 (23 Feb 2024 12:28), Max: 97.4 (22 Feb 2024 20:10)  HR: 87 (23 Feb 2024 12:28) (77 - 87)  BP: 100/53 (23 Feb 2024 12:28) (100/53 - 110/55)  BP(mean): --  RR: 18 (23 Feb 2024 12:28) (18 - 18)  SpO2: --            GENERAL:  [ ] No acute distress [ ]Lethargic  [ ]Unarousable  [ x]Verbal  [ ]Non-Verbal [ ]Cachexia    BEHAVIORAL/PSYCH:  [x ]Alert and Oriented x2  [ ] Anxiety [ ] Delirium [ ] Agitation [ x] Calm   EYES: [ x] No scleral icterus [ ] Scleral icterus [ ] Closed  ENMT:  [ ]Dry mouth  [x ]No external oral lesions [ ] No external ear or nose lesions  CARDIOVASCULAR:  [ ]Regular [ ]Irregular [x ]Tachy [ ]Not Tachy  [ ]Sung [ x] Edema [ ] No edema  PULMONARY:  [ ]Tachypnea  [ ]Audible excessive secretions [ x] No labored breathing [ ] labored breathing  GASTROINTESTINAL: [ ]Soft  [ ]Distended  [x ]Not distended [ ]Non tender [ ]Tender  MUSCULOSKELETAL: [ ]No clubbing [ ] clubbing  [x ] No cyanosis [ ] cyanosis  NEUROLOGIC: [ ]No focal deficits  [x ]Follows commands  [ ]Does not follow commands  [ x]Cognitive impairment  [ ]Dysphagia  [ ]Dysarthria  [ ]Paresis   SKIN: [ ] Jaundiced [ x] Non-jaundiced [ ]Rash [ ]No Rash [ ] Warm [ ] Dry  MISC/LINES: [ ] ET tube [ ] Trach [ ]NGT/OGT [ ]PEG [ ]David    LABS: reviewed by me                            11.0   4.14  )-----------( 99       ( 23 Feb 2024 08:09 )             31.9     02-23    126<L>  |  90<L>  |  18  ----------------------------<  94  4.3   |  25  |  0.8    Ca    8.5      23 Feb 2024 08:09  Mg     1.7     02-23    TPro  4.5<L>  /  Alb  2.9<L>  /  TBili  0.3  /  DBili  x   /  AST  43<H>  /  ALT  36  /  AlkPhos  84  02-23      Urinalysis Basic - ( 23 Feb 2024 08:09 )    Color: x / Appearance: x / SG: x / pH: x  Gluc: 94 mg/dL / Ketone: x  / Bili: x / Urobili: x   Blood: x / Protein: x / Nitrite: x   Leuk Esterase: x / RBC: x / WBC x   Sq Epi: x / Non Sq Epi: x / Bacteria: x          RADIOLOGY & ADDITIONAL STUDIES: reviewed by me    < from: NM SPECT Bone Scan, Single Area Single Day (02.01.24 @ 16:43) >  IMPRESSION:    Abnormal uptake in the L1 vertebral body, corresponding to known   compression fracture deformity secondary to vertebral body mass.    No other evidence for metastatic bone disease.    < end of copied text >        EKG: reviewed by me    < from: 12 Lead ECG (01.25.24 @ 11:34) >  Ventricular Rate 94 BPM    Atrial Rate 81 BPM    QRS Duration 90 ms    Q-T Interval 384 ms    QTC Calculation(Bazett) 480 ms    R Axis 166 degrees    T Axis -48 degrees    Diagnosis Line Atrial fibrillation  Right ventricular hypertrophy  Anterior infarct , age undetermined  ST & T wave abnormality, consider inferior ischemia  Abnormal ECG    < end of copied text >      Patient discussed with primary medical team MD  Palliative care education provided to patient and/or family

## 2024-02-23 NOTE — PROGRESS NOTE ADULT - SUBJECTIVE AND OBJECTIVE BOX
24H events:    Patient is a 89y old Female who presents with a chief complaint of Back Pain (22 Feb 2024 12:31)    Primary diagnosis of Inability to ambulate due to multiple joints    Today is hospital day 30d. This morning patient was seen and examined at bedside, resting comfortably in bed.    No acute or major events overnight. Hemodynamically stable, tolerating oral diet, voiding appropriately with appropriate bowel movements.     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 No reported tobacco, alcohol, or illicit/recreational drug use. ADL's limited by progressively worsening ability to ambulate. Ambulates w/ walker. (24 Jan 2024 15:07)      ALLERGIES:  No Known Allergies    MEDICATIONS:  STANDING MEDICATIONS  acetaminophen     Tablet .. 1000 milliGRAM(s) Oral every 8 hours  calcium carbonate   1250 mG (OsCal) 1 Tablet(s) Oral daily  chlorhexidine 2% Cloths 1 Application(s) Topical <User Schedule>  chlorhexidine 2% Cloths 1 Application(s) Topical daily  cholecalciferol 5000 Unit(s) Oral daily  fluticasone propionate 50 MICROgram(s)/spray Nasal Spray 1 Spray(s) Both Nostrils two times a day  furosemide    Tablet 20 milliGRAM(s) Oral daily  gabapentin 600 milliGRAM(s) Oral every 8 hours  lidocaine   4% Patch 1 Patch Transdermal every 24 hours  magnesium sulfate  IVPB 2 Gram(s) IV Intermittent once  methocarbamol 750 milliGRAM(s) Oral every 8 hours  metoprolol tartrate 25 milliGRAM(s) Oral three times a day  multivitamin 1 Tablet(s) Oral daily  pantoprazole    Tablet 40 milliGRAM(s) Oral before breakfast  polyethylene glycol 3350 17 Gram(s) Oral two times a day  rOPINIRole 0.25 milliGRAM(s) Oral two times a day  senna 2 Tablet(s) Oral at bedtime  tamsulosin 0.8 milliGRAM(s) Oral at bedtime    PRN MEDICATIONS  traMADol 50 milliGRAM(s) Oral daily PRN    VITALS:   T(F): 96.9  HR: 84  BP: 110/55  RR: 18  SpO2: --    PHYSICAL EXAM:  GENERAL: NAD, lying in bed comfortably, elderly  HEAD: NCAD, no hematoma or laceration   NECK: Supple, no neck stiffness/nuchal rigidity  HEART: Regular rate and rhythm, normal S1/S2, no murmurs, heaves, thrills  LUNGS: No acute respiratory distress, clear b/l breath sounds, no wheezing  ABDOMEN:  soft, nontender, nondistended  EXTREMITIES: no rashes, extremities warm/dry, LE edema, presence of ecchymosis on b/l LE  NERVOUS SYSTEM:  A&Ox3     ( X ) Indwelling David Catheter:  Reason (  ) Critical illness     ( X ) urinary retention    ( X ) Accurate Ins/Outs Monitoring     (  ) CMO patient      LABS:                        11.0   4.14  )-----------( 99       ( 23 Feb 2024 08:09 )             31.9     02-23    126<L>  |  90<L>  |  18  ----------------------------<  94  4.3   |  25  |  0.8    Ca    8.5      23 Feb 2024 08:09  Mg     1.7     02-23    TPro  4.5<L>  /  Alb  2.9<L>  /  TBili  0.3  /  DBili  x   /  AST  43<H>  /  ALT  36  /  AlkPhos  84  02-23      Urinalysis Basic - ( 23 Feb 2024 08:09 )    Color: x / Appearance: x / SG: x / pH: x  Gluc: 94 mg/dL / Ketone: x  / Bili: x / Urobili: x   Blood: x / Protein: x / Nitrite: x   Leuk Esterase: x / RBC: x / WBC x   Sq Epi: x / Non Sq Epi: x / Bacteria: x                RADIOLOGY:  No radiographic images in the past 24 hours.

## 2024-02-23 NOTE — PROGRESS NOTE ADULT - ASSESSMENT
89F w/ h/o chronic afib (on Eliquis), GERD, and chronic low back pain presenting s/p fall. Admitted to medicine for back pain 2/2 Lumbar compression fracture. Planned for kyphoplasty with neurosurgery, but surgery postponed as patient went into afib with RVR and transferred to telemetry for further monitoring.    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 Was called for low blood pressure, which improved on repeat readings, patient's blood pressure runs low, d/aleksandar the oxycodone  - plan for kyphoplasty 2/15 cancelled > as patient developed Afib with RVR, s/p cardizem gtt  - per neurosurgery, no further plans for kyphoplasty, c/w conservative management  - neurosurgery update: planned for OR tuesday 2/27 for L1 lami and kypho/spinejack, pending medical optimization  - transitioned to PO cardizem and increased metoprolol to 25mg TID - cardizem dced on 2/20  - Pain management recs appreciated: daughter refusing oxy as it causes sedation in her mother, even prn dose   1. acetaminophen 1000mg q8h standing  2. Gabapentin 400mg q8h standing, if tolerated (no sedation/confusion)  3. Methocarbamol 500mg q8h standing  4. oxycodone 5mg q4h PRN for breakthrough pain. can increase to 7.5mg if need be  5. pt planned for nsurg intervention   - pain states can do epidural outpatient, but unlikely that patient will be able to present to clinic for epidural, f/u with pain to see if this can be done inpatient  - can consider tramadol for pain control  - TSLO brace for PT  - physiatry recommending STR in SNF    #CHF, diastolic - acute -- now resolved  - On 2/10 had episode of unresponsives and bradycardia/hypotension. Likely vasovagal. CT head negative  - TTE (May 2013) demonstrated LVEF 55-65% w/ normal global LVSF and no significant valvulopathy.  - TTE 1/28- EF 60-65% mod pulm HTN   - elevated BNP  - Tele monitored for 24 hrs with no dysrhythmia   - Currently euvolemic on exam, transition back to home oral lasix 20 daily - dced on 2/19, c/w PRN as patient becomes hypotensive on lasix  - giving lasix PRN - last dose given 2/23/24    #Chronic Atrial Fibrillation  CHADS-VASC 3+ (Age x2, Female). Only on AC. Previously on Lopressor, but only takes intermittently as HR currently rate-controlled off meds.  - Eliquis initially held for surgery, restarted on 2/16, then held due to low Hb  - now on lovenox 70 BID pending OR --> no plans for OR, eliquis restarted on 2/21 - held on 2/22 pending OR  - c/w metoprolol 25mg TID, cardizem 30mg dced today, if HR increases, can consider starting again    #GERD  - c/w pantoprazole 40mg PO QD (therapeutic interchange for omeprazole)    #CKD 3b   - per PCP baseline is 1.2    #Urinary retention - likely neurogenic   - hanson placed 1/29  - c/w flomax 0.8mg   - completed RT, will try TOV after walking with PT    #Constipation  - c/w aggressive bowel regimen: senna, miralax, lactulose    # HTN  - c/w metoprolol 25mg TID  - started on lisinopril 10mg 2/4, which was dced 2/16 due to drop in BP                                                                              ----------------------------------------------------  # DVT prophylaxis: Eliquis - currently holding pending OR with nsgy  # GI prophylaxis: Protonix  # Diet: DASH/TLC  # Activity: IAT  # Code status: DNR/DNI  # Disposition: [facility] vs Home with home PT                                                                           --------------------------------------------------------    # Handoff:   - cardizem dced 2/20, if HR increases, can consider restarting  - gave 1 dose of lasix 2/23  - pain control with tramadol 50 PRN  - OR with nsgy on 2/27, pending medical clearance

## 2024-02-23 NOTE — PROGRESS NOTE ADULT - ATTENDING COMMENTS
89F w/ h/o chronic afib (on Eliquis), GERD, and chronic low back pain presenting s/p fall. Admitted to medicine for back pain 2/2 Lumbar compression fracture, found to have epithelial carcinoma. S/p RT. Planned for kyphoplasty with neurosurgery, but surgery postponed as patient went into afib with RVR and transferred to telemetry for further monitoring.    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)  - Radiation Oncology consulted: s/p 5 sessions of RT, completed 2/6/24  - Oncology not planning on immunotherapy at this time, but did recall NSGY for possible kyphoplasty   - initial plan for kyphoplasty cancelled > as patient developed Afib with RVR, s/p cardizem gtt  - NSGY now planning for Kyphoplasty on 2/27  - Patient was a rapid response on 2/10 due to AMS and hypotension with BP of 75/50, suspect 2/2 narcotics  - Pain management recs appreciated: daughter refusing oxy as it causes sedation in her mother, even prn dose   - c/w acetaminophen 1000mg q8h standing  - increase gabapentin to 600 tid   - increase robaxin to 750 tid   - pt tolerates tramadol prn for pain   - TSLO brace for PT  - physiatry recommending STR in SNF    #CHF, diastolic - acute -- now resolved  - TTE (May 2013) demonstrated LVEF 55-65% w/ normal global LVSF and no significant valvulopathy.  - TTE 1/28- EF 60-65% mod pulm HTN   - Continue Lasix 20mg prn for leg swelling, pt sometimes hypotensive on medication , monitor closely     #Chronic Atrial Fibrillation w/ RVR  CHADS-VASC 3+ (Age x2, Female)  - eliquis restarted on 2/21  - c/w metoprolol 25mg TID, cardizem 30mg dced , if HR increases, can consider starting again    #GERD  - c/w pantoprazole 40mg PO QD (therapeutic interchange for omeprazole)    #CKD 3b   - per PCP baseline is 1.2    #Urinary retention - likely neurogenic   - hanson placed 1/29  - c/w flomax 0.8mg   - poor physical conditioning, unlikely to pass TOV    #Constipation  - c/w aggressive bowel regimen: senna, miralax, lactulose    # HTN  - c/w metoprolol 25mg TID    # DVT prophylaxis: Eliquis  # Code status: DNR/DNI    Pending: kyphoplasty on 2/27     Total time spent to complete patient's bedside assessment, review medical chart, discuss medical plan of care with covering medical team was more than 35 minutes  with >50% of time spent face to face with patient, discussion with patient/family and/or coordination of care. My note supersedes them medical resident note in case of discrepancy.      Azra Oneil DO . 89F w/ h/o chronic afib (on Eliquis), GERD, and chronic low back pain presenting s/p fall. Admitted to medicine for back pain 2/2 Lumbar compression fracture, found to have epithelial carcinoma. S/p RT. Planned for kyphoplasty with neurosurgery, but surgery postponed as patient went into afib with RVR and transferred to telemetry for further monitoring.    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)  - Radiation Oncology consulted: s/p 5 sessions of RT, completed 2/6/24  - Oncology not planning on immunotherapy at this time, but did recall NSGY for possible kyphoplasty   - initial plan for kyphoplasty cancelled > as patient developed Afib with RVR, s/p cardizem gtt  - NSGY now planning for Kyphoplasty on 2/27  - Patient was a rapid response on 2/10 due to AMS and hypotension with BP of 75/50, suspect 2/2 narcotics  - Pain management recs appreciated: daughter refusing oxy as it causes sedation in her mother, even prn dose   - c/w acetaminophen 1000mg q8h standing  - increase gabapentin to 600 tid   - increase robaxin to 750 tid   - pt tolerates tramadol prn for pain   - TSLO brace for PT  - physiatry recommending STR in SNF    #CHF, diastolic - acute -- now resolved  - TTE (May 2013) demonstrated LVEF 55-65% w/ normal global LVSF and no significant valvulopathy.  - TTE 1/28- EF 60-65% mod pulm HTN   - Continue Lasix 20mg for leg swelling, pt sometimes hypotensive on medication , monitor closely     #Chronic Atrial Fibrillation w/ RVR  CHADS-VASC 3+ (Age x2, Female)  - eliquis restarted on 2/21  - c/w metoprolol 25mg TID, cardizem 30mg dced , if HR increases, can consider starting again    #GERD  - c/w pantoprazole 40mg PO QD (therapeutic interchange for omeprazole)    #CKD 3b   - per PCP baseline is 1.2    #Urinary retention - likely neurogenic   - hanson placed 1/29  - c/w flomax 0.8mg   - poor physical conditioning, unlikely to pass TOV    #Constipation  - c/w aggressive bowel regimen: senna, miralax, lactulose    # HTN  - c/w metoprolol 25mg TID    # DVT prophylaxis: Eliquis  # Code status: DNR/DNI    Pending: kyphoplasty on 2/27     Total time spent to complete patient's bedside assessment, review medical chart, discuss medical plan of care with covering medical team was more than 35 minutes  with >50% of time spent face to face with patient, discussion with patient/family and/or coordination of care. My note supersedes them medical resident note in case of discrepancy.      Azra Oneil DO .

## 2024-02-24 LAB
ALBUMIN SERPL ELPH-MCNC: 3 G/DL — LOW (ref 3.5–5.2)
ALP SERPL-CCNC: 100 U/L — SIGNIFICANT CHANGE UP (ref 30–115)
ALT FLD-CCNC: 33 U/L — SIGNIFICANT CHANGE UP (ref 0–41)
ANION GAP SERPL CALC-SCNC: 11 MMOL/L — SIGNIFICANT CHANGE UP (ref 7–14)
APTT BLD: 35.9 SEC — SIGNIFICANT CHANGE UP (ref 27–39.2)
AST SERPL-CCNC: 38 U/L — SIGNIFICANT CHANGE UP (ref 0–41)
BILIRUB SERPL-MCNC: 0.3 MG/DL — SIGNIFICANT CHANGE UP (ref 0.2–1.2)
BUN SERPL-MCNC: 18 MG/DL — SIGNIFICANT CHANGE UP (ref 10–20)
CALCIUM SERPL-MCNC: 8.3 MG/DL — LOW (ref 8.4–10.5)
CHLORIDE SERPL-SCNC: 88 MMOL/L — LOW (ref 98–110)
CO2 SERPL-SCNC: 25 MMOL/L — SIGNIFICANT CHANGE UP (ref 17–32)
CREAT SERPL-MCNC: 0.9 MG/DL — SIGNIFICANT CHANGE UP (ref 0.7–1.5)
EGFR: 61 ML/MIN/1.73M2 — SIGNIFICANT CHANGE UP
GLUCOSE SERPL-MCNC: 105 MG/DL — HIGH (ref 70–99)
HCT VFR BLD CALC: 34.1 % — LOW (ref 37–47)
HGB BLD-MCNC: 11.7 G/DL — LOW (ref 12–16)
MAGNESIUM SERPL-MCNC: 1.5 MG/DL — LOW (ref 1.8–2.4)
MCHC RBC-ENTMCNC: 32.7 PG — HIGH (ref 27–31)
MCHC RBC-ENTMCNC: 34.3 G/DL — SIGNIFICANT CHANGE UP (ref 32–37)
MCV RBC AUTO: 95.3 FL — SIGNIFICANT CHANGE UP (ref 81–99)
NRBC # BLD: 0 /100 WBCS — SIGNIFICANT CHANGE UP (ref 0–0)
OSMOLALITY SERPL: 265 MOS/KG — LOW (ref 280–301)
PLATELET # BLD AUTO: 100 K/UL — LOW (ref 130–400)
PMV BLD: 9.8 FL — SIGNIFICANT CHANGE UP (ref 7.4–10.4)
POTASSIUM SERPL-MCNC: 4 MMOL/L — SIGNIFICANT CHANGE UP (ref 3.5–5)
POTASSIUM SERPL-SCNC: 4 MMOL/L — SIGNIFICANT CHANGE UP (ref 3.5–5)
PROT SERPL-MCNC: 4.6 G/DL — LOW (ref 6–8)
RBC # BLD: 3.58 M/UL — LOW (ref 4.2–5.4)
RBC # FLD: 14.1 % — SIGNIFICANT CHANGE UP (ref 11.5–14.5)
SODIUM SERPL-SCNC: 124 MMOL/L — LOW (ref 135–146)
WBC # BLD: 4.23 K/UL — LOW (ref 4.8–10.8)
WBC # FLD AUTO: 4.23 K/UL — LOW (ref 4.8–10.8)

## 2024-02-24 PROCEDURE — 99232 SBSQ HOSP IP/OBS MODERATE 35: CPT

## 2024-02-24 RX ORDER — SODIUM CHLORIDE 9 MG/ML
250 INJECTION INTRAMUSCULAR; INTRAVENOUS; SUBCUTANEOUS ONCE
Refills: 0 | Status: COMPLETED | OUTPATIENT
Start: 2024-02-24 | End: 2024-02-24

## 2024-02-24 RX ORDER — MAGNESIUM SULFATE 500 MG/ML
2 VIAL (ML) INJECTION ONCE
Refills: 0 | Status: COMPLETED | OUTPATIENT
Start: 2024-02-24 | End: 2024-02-24

## 2024-02-24 RX ORDER — HEPARIN SODIUM 5000 [USP'U]/ML
2500 INJECTION INTRAVENOUS; SUBCUTANEOUS EVERY 6 HOURS
Refills: 0 | Status: DISCONTINUED | OUTPATIENT
Start: 2024-02-24 | End: 2024-02-27

## 2024-02-24 RX ORDER — HEPARIN SODIUM 5000 [USP'U]/ML
INJECTION INTRAVENOUS; SUBCUTANEOUS
Qty: 25000 | Refills: 0 | Status: DISCONTINUED | OUTPATIENT
Start: 2024-02-24 | End: 2024-02-27

## 2024-02-24 RX ORDER — MAGNESIUM SULFATE 500 MG/ML
2 VIAL (ML) INJECTION
Refills: 0 | Status: COMPLETED | OUTPATIENT
Start: 2024-02-24 | End: 2024-02-24

## 2024-02-24 RX ORDER — HEPARIN SODIUM 5000 [USP'U]/ML
5500 INJECTION INTRAVENOUS; SUBCUTANEOUS ONCE
Refills: 0 | Status: COMPLETED | OUTPATIENT
Start: 2024-02-24 | End: 2024-02-24

## 2024-02-24 RX ORDER — HEPARIN SODIUM 5000 [USP'U]/ML
5500 INJECTION INTRAVENOUS; SUBCUTANEOUS EVERY 6 HOURS
Refills: 0 | Status: DISCONTINUED | OUTPATIENT
Start: 2024-02-24 | End: 2024-02-27

## 2024-02-24 RX ADMIN — Medication 1000 MILLIGRAM(S): at 13:21

## 2024-02-24 RX ADMIN — Medication 1000 MILLIGRAM(S): at 05:55

## 2024-02-24 RX ADMIN — GABAPENTIN 600 MILLIGRAM(S): 400 CAPSULE ORAL at 13:21

## 2024-02-24 RX ADMIN — Medication 1 TABLET(S): at 13:20

## 2024-02-24 RX ADMIN — Medication 25 GRAM(S): at 19:03

## 2024-02-24 RX ADMIN — Medication 1 SPRAY(S): at 06:00

## 2024-02-24 RX ADMIN — POLYETHYLENE GLYCOL 3350 17 GRAM(S): 17 POWDER, FOR SOLUTION ORAL at 05:56

## 2024-02-24 RX ADMIN — CHLORHEXIDINE GLUCONATE 1 APPLICATION(S): 213 SOLUTION TOPICAL at 05:56

## 2024-02-24 RX ADMIN — ROPINIROLE 0.25 MILLIGRAM(S): 8 TABLET, FILM COATED, EXTENDED RELEASE ORAL at 05:57

## 2024-02-24 RX ADMIN — HEPARIN SODIUM 5500 UNIT(S): 5000 INJECTION INTRAVENOUS; SUBCUTANEOUS at 15:34

## 2024-02-24 RX ADMIN — TAMSULOSIN HYDROCHLORIDE 0.8 MILLIGRAM(S): 0.4 CAPSULE ORAL at 21:31

## 2024-02-24 RX ADMIN — ROPINIROLE 0.25 MILLIGRAM(S): 8 TABLET, FILM COATED, EXTENDED RELEASE ORAL at 17:17

## 2024-02-24 RX ADMIN — GABAPENTIN 600 MILLIGRAM(S): 400 CAPSULE ORAL at 21:31

## 2024-02-24 RX ADMIN — PANTOPRAZOLE SODIUM 40 MILLIGRAM(S): 20 TABLET, DELAYED RELEASE ORAL at 05:55

## 2024-02-24 RX ADMIN — METHOCARBAMOL 750 MILLIGRAM(S): 500 TABLET, FILM COATED ORAL at 21:31

## 2024-02-24 RX ADMIN — Medication 25 GRAM(S): at 15:30

## 2024-02-24 RX ADMIN — CHLORHEXIDINE GLUCONATE 1 APPLICATION(S): 213 SOLUTION TOPICAL at 13:22

## 2024-02-24 RX ADMIN — Medication 1000 MILLIGRAM(S): at 21:31

## 2024-02-24 RX ADMIN — Medication 20 MILLIGRAM(S): at 05:55

## 2024-02-24 RX ADMIN — SODIUM CHLORIDE 750 MILLILITER(S): 9 INJECTION INTRAMUSCULAR; INTRAVENOUS; SUBCUTANEOUS at 17:18

## 2024-02-24 RX ADMIN — GABAPENTIN 600 MILLIGRAM(S): 400 CAPSULE ORAL at 05:55

## 2024-02-24 RX ADMIN — Medication 1 SPRAY(S): at 17:18

## 2024-02-24 RX ADMIN — METHOCARBAMOL 750 MILLIGRAM(S): 500 TABLET, FILM COATED ORAL at 13:21

## 2024-02-24 RX ADMIN — METHOCARBAMOL 750 MILLIGRAM(S): 500 TABLET, FILM COATED ORAL at 05:55

## 2024-02-24 RX ADMIN — Medication 25 MILLIGRAM(S): at 21:31

## 2024-02-24 RX ADMIN — POLYETHYLENE GLYCOL 3350 17 GRAM(S): 17 POWDER, FOR SOLUTION ORAL at 17:17

## 2024-02-24 RX ADMIN — Medication 25 MILLIGRAM(S): at 13:21

## 2024-02-24 RX ADMIN — Medication 5000 UNIT(S): at 13:20

## 2024-02-24 RX ADMIN — Medication 25 MILLIGRAM(S): at 05:56

## 2024-02-24 RX ADMIN — Medication 25 GRAM(S): at 17:18

## 2024-02-24 RX ADMIN — HEPARIN SODIUM 1200 UNIT(S)/HR: 5000 INJECTION INTRAVENOUS; SUBCUTANEOUS at 15:31

## 2024-02-24 RX ADMIN — Medication 1000 MILLIGRAM(S): at 13:55

## 2024-02-24 RX ADMIN — SENNA PLUS 2 TABLET(S): 8.6 TABLET ORAL at 21:31

## 2024-02-24 NOTE — PROGRESS NOTE ADULT - ASSESSMENT
89F w/ h/o chronic afib (on Eliquis), GERD, and chronic low back pain presenting s/p fall. Admitted to medicine for back pain 2/2 Lumbar compression fracture. Planned for kyphoplasty with neurosurgery, but surgery postponed as patient went into afib with RVR and transferred to telemetry for further monitoring.    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 Was called for low blood pressure, which improved on repeat readings, patient's blood pressure runs low, d/aleksandar the oxycodone  - plan for kyphoplasty 2/15 cancelled > as patient developed Afib with RVR, s/p cardizem gtt  - per neurosurgery, no further plans for kyphoplasty, c/w conservative management  - neurosurgery update: planned for OR tuesday 2/27 for L1 lami and kypho/spinejack, pending medical optimization  - transitioned to PO cardizem and increased metoprolol to 25mg TID - cardizem dced on 2/20  - Pain management recs appreciated: daughter refusing oxy as it causes sedation in her mother, even prn dose   1. acetaminophen 1000mg q8h standing  2. Gabapentin 400mg q8h standing, if tolerated (no sedation/confusion)  3. Methocarbamol 500mg q8h standing  4. oxycodone 5mg q4h PRN for breakthrough pain. can increase to 7.5mg if need be  5. pt planned for nsurg intervention   - pain states can do epidural outpatient, but unlikely that patient will be able to present to clinic for epidural, f/u with pain to see if this can be done inpatient  - can consider tramadol for pain control  - TSLO brace for PT  - physiatry recommending STR in SNF  -plan for kyphoplasty on 2/27. Heparin drip started in anticipation of procedure as eliquis has been held since 2/22.     #CHF, diastolic - acute -- now resolved  - On 2/10 had episode of unresponsives and bradycardia/hypotension. Likely vasovagal. CT head negative  - TTE (May 2013) demonstrated LVEF 55-65% w/ normal global LVSF and no significant valvulopathy.  - TTE 1/28- EF 60-65% mod pulm HTN   - elevated BNP  - Tele monitored for 24 hrs with no dysrhythmia   - Currently euvolemic on exam, transition back to home oral lasix 20 daily - dced on 2/19, c/w PRN as patient becomes hypotensive on lasix  - giving lasix PRN - last dose given 2/23/24    #Chronic Atrial Fibrillation  CHADS-VASC 5. Only on AC. Previously on Lopressor, but only takes intermittently as HR currently rate-controlled off meds.  - Eliquis initially held for surgery, restarted on 2/16, then held due to low Hb  - was on lovenox 70 BID pending OR --> no plans for OR, eliquis restarted on 2/21 - eliquis held on 2/22 pending OR  - c/w metoprolol 25mg TID  -was on cardizem 30mg until 2/20, if HR increases, can consider starting again    #hyponatremia  -Na downtrending since Feb 19  -serum osmolality, and urine studies ordered  -will stop lasix 20mg daily as may be causing hyponatremia; may resume once Na corrects     #GERD  - c/w pantoprazole 40mg PO QD (therapeutic interchange for omeprazole)    #CKD 3b   - per PCP baseline is 1.2    #UTI - resolved  -UA+  -completed 3 day course of ceftriaxone    #Urinary retention - likely neurogenic   - hanson placed 1/29  - c/w flomax 0.8mg   - completed RT    #Constipation  - c/w aggressive bowel regimen: senna, miralax   -gave enema on 2/24 as pt did not have BM for 2 days   -monitor BMs daily until resolves     # HTN  - c/w metoprolol 25mg TID  - started on lisinopril 10mg 2/4, which was dced 2/16 due to drop in BP                                                                              ----------------------------------------------------  # DVT prophylaxis: heparin drip  # GI prophylaxis: Protonix  # Diet: DASH/TLC  # Activity: IAT  # Code status: DNR/DNI  # Disposition: [facility] vs Home with home PT                                                                           --------------------------------------------------------    # Handoff:   - cardizem dced 2/20, if HR increases, can consider restarting  - pain control with tramadol 50 PRN  - OR with nsgy on 2/27, pending medical clearance   89F w/ h/o chronic afib (on Eliquis), GERD, and chronic low back pain presenting s/p fall. Admitted to medicine for back pain 2/2 Lumbar compression fracture. Planned for kyphoplasty with neurosurgery, but surgery postponed as patient went into afib with RVR and transferred to telemetry for further monitoring.    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 Was called for low blood pressure, which improved on repeat readings, patient's blood pressure runs low, d/aleksandar the oxycodone  - plan for kyphoplasty postponed to 2/27 as patient developed Afib with RVR, s/p cardizem gtt  - L1 lami and kypho/spinejack  - s/p cardizem drip and now on metoprolol 25mg tid  - Pain management recs appreciated: daughter refusing oxy as it causes sedation in her mother, even prn dose   1. acetaminophen 1000mg q8h standing  2. Gabapentin 400mg q8h standing, if tolerated (no sedation/confusion)  3. Methocarbamol 500mg q8h standing  4. oxycodone 5mg q4h PRN for breakthrough pain. can increase to 7.5mg if need be  5. pt planned for nsurg intervention   - pain states can do epidural outpatient, but unlikely that patient will be able to present to clinic for epidural, f/u with pain to see if this can be done inpatient  - can consider tramadol for pain control  - TSLO brace for PT  - physiatry recommending STR in SNF  - plan for kyphoplasty on 2/27. Heparin drip started in anticipation of procedure as eliquis has been held since 2/22 and CHADSVASC 5    #CHF, diastolic - acute -- now resolved  - On 2/10 had episode of unresponsives and bradycardia/hypotension. Likely vasovagal. CT head negative  - TTE (May 2013) demonstrated LVEF 55-65% w/ normal global LVSF and no significant valvulopathy.  - TTE 1/28- EF 60-65% mod pulm HTN   - elevated BNP  - Tele monitored for 24 hrs with no dysrhythmia   - Currently euvolemic on exam, transition back to home oral lasix 20 daily - dced on 2/19, c/w PRN as patient becomes hypotensive on lasix  - pt was given lasix on 2/23/24 as her last dose    #hyponatremia  - Na has been trending downwards and is 124  - asymptomatic  - Pt was given lasix on 2/23   - her serum osm is 165 which is consistent with thiazide induced hyponatremia  - for now will continue bmp bid and held lasix and wait until urine Na and osm return  - considering she also has CHF was minimal sacral edema, would see if it improves with holding lasix alone    #Chronic Atrial Fibrillation  #acute RVR, resolved  CHADS-VASC 5.   - eliquis held preop  - started on heparin drip which was explained to the daughter  - c/w metoprolol 25mg tid      #GERD  - c/w pantoprazole 40mg PO QD (therapeutic interchange for omeprazole)    #CKD 3b   - per PCP baseline is 1.2    #UTI - resolved  -UA+  -completed 3 day course of ceftriaxone    #Urinary retention - likely neurogenic   - hanson placed 1/29  - c/w flomax 0.8mg   - completed RT    #Constipation  - c/w aggressive bowel regimen: senna, miralax   -gave enema on 2/24 as pt did not have BM for 2 days   -monitor BMs daily until resolves     # HTN  - c/w metoprolol 25mg TID  - started on lisinopril 10mg 2/4, which was dced 2/16 due to drop in BP                                                                               # DVT prophylaxis: heparin drip  # GI prophylaxis: Protonix  # Diet: DASH/TLC  # Activity: IAT  # Code status: DNR/DNI  # Disposition: [facility] vs Home with home PT                                                                           --------------------------------------------------------    # Handoff:   - pain control with tramadol 50 PRN  - OR with nsgy on 2/27 tentatively

## 2024-02-24 NOTE — PROGRESS NOTE ADULT - ATTENDING COMMENTS
Edits made directly above. After speaking to a neurointensivist, its seems unclear if the surgery is to proceed. For now will continue the heparin drip until decided. Pt's Na must be closely monitored. Management as above to hold lasix for now. Edits made directly above. After speaking to a neurointensivist, its seems unclear if the surgery is to proceed. For now will continue the heparin drip until decided. Pt's Na must be closely monitored. Management as above to hold lasix for now. Enema given and pt had a large BM. Can do a TOV tomorrow now that she's had some constipation relieved. Since the kyphoplasty might not occur, the pt's daughter requested if pain could see her for a possible epidural.

## 2024-02-24 NOTE — PROGRESS NOTE ADULT - SUBJECTIVE AND OBJECTIVE BOX
24H events:    Patient is a 89y old Female who presents with a chief complaint of Back Pain (23 Feb 2024 13:29)    Primary diagnosis of Inability to ambulate due to multiple joints    Today is hospital day 31d. This morning patient was seen and examined at bedside, resting comfortably in bed.    No acute or major events overnight.  Daughter at beside. Pt opted to have daughter translate. States pt has not had BM in 2 days. Also endorses swelling in thighs and back as pt has been lying flat. Denies dyspnea, chest pain.      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 No reported tobacco, alcohol, or illicit/recreational drug use. ADL's limited by progressively worsening ability to ambulate. Ambulates w/ walker. (24 Jan 2024 15:07)      ALLERGIES:  No Known Allergies    MEDICATIONS:  STANDING MEDICATIONS  acetaminophen     Tablet .. 1000 milliGRAM(s) Oral every 8 hours  calcium carbonate   1250 mG (OsCal) 1 Tablet(s) Oral daily  chlorhexidine 2% Cloths 1 Application(s) Topical <User Schedule>  chlorhexidine 2% Cloths 1 Application(s) Topical daily  cholecalciferol 5000 Unit(s) Oral daily  fluticasone propionate 50 MICROgram(s)/spray Nasal Spray 1 Spray(s) Both Nostrils two times a day  gabapentin 600 milliGRAM(s) Oral every 8 hours  heparin   Injectable 5500 Unit(s) IV Push once  heparin  Infusion.  Unit(s)/Hr IV Continuous <Continuous>  lidocaine   4% Patch 1 Patch Transdermal every 24 hours  magnesium sulfate  IVPB 2 Gram(s) IV Intermittent once  magnesium sulfate  IVPB 2 Gram(s) IV Intermittent once  methocarbamol 750 milliGRAM(s) Oral every 8 hours  metoprolol tartrate 25 milliGRAM(s) Oral three times a day  multivitamin 1 Tablet(s) Oral daily  pantoprazole    Tablet 40 milliGRAM(s) Oral before breakfast  polyethylene glycol 3350 17 Gram(s) Oral two times a day  rOPINIRole 0.25 milliGRAM(s) Oral two times a day  senna 2 Tablet(s) Oral at bedtime  tamsulosin 0.8 milliGRAM(s) Oral at bedtime    PRN MEDICATIONS  heparin   Injectable 2500 Unit(s) IV Push every 6 hours PRN  heparin   Injectable 5500 Unit(s) IV Push every 6 hours PRN  traMADol 50 milliGRAM(s) Oral daily PRN    VITALS:   T(F): 97.4  HR: 88  BP: 122/60  RR: 18  SpO2: --    PHYSICAL EXAM:  GENERAL:   ( x ) NAD, lying in bed comfortably         HEAD:   ( x ) Atraumatic     (  ) hematoma     (  ) laceration (specify location:       )     NECK:  (  ) Supple     (  ) neck stiffness     (  ) nuchal rigidity     (  )  no JVD     (  ) JVD present ( -- cm)    HEART:  Rate -->     ( x ) normal rate     (  ) bradycardic     (  ) tachycardic  Rhythm -->     ( x ) regular     (  ) regularly irregular     (  ) irregularly irregular  Murmurs -->     (x  ) normal s1s2     (  ) systolic murmur     (  ) diastolic murmur     (  ) continuous murmur      (  ) S3 present     (  ) S4 present    LUNGS:   ( x )Unlabored respirations     (  ) tachypnea  ( x ) B/L air entry     (  ) decreased breath sounds in:  (location     )    ( x ) no adventitious sound     (  ) crackles     (  ) wheezing      (  ) rhonchi      (specify location:       )  (  ) chest wall tenderness (specify location:       )    ABDOMEN:   ( x ) Soft     (  ) tense   |   ( xnondistended     (  ) distended   |   (  ) +BS     (  ) hypoactive bowel sounds     (  ) hyperactive bowel sounds  ( x ) nontender     (  ) RUQ tenderness     (  ) RLQ tenderness     (  ) LLQ tenderness     (  ) epigastric tenderness     (  ) diffuse tenderness  (  ) Splenomegaly      (  ) Hepatomegaly      (  ) Jaundice     (  ) ecchymosis     EXTREMITIES:  1+ B/l edema along LEs     NERVOUS SYSTEM:    ( x ) A&Ox3           LABS:                        11.0   4.14  )-----------( 99       ( 23 Feb 2024 08:09 )             31.9     02-23    126<L>  |  90<L>  |  18  ----------------------------<  94  4.3   |  25  |  0.8    Ca    8.5      23 Feb 2024 08:09  Mg     1.7     02-23    TPro  4.5<L>  /  Alb  2.9<L>  /  TBili  0.3  /  DBili  x   /  AST  43<H>  /  ALT  36  /  AlkPhos  84  02-23      Urinalysis Basic - ( 23 Feb 2024 08:09 )    Color: x / Appearance: x / SG: x / pH: x  Gluc: 94 mg/dL / Ketone: x  / Bili: x / Urobili: x   Blood: x / Protein: x / Nitrite: x   Leuk Esterase: x / RBC: x / WBC x   Sq Epi: x / Non Sq Epi: x / Bacteria: x                RADIOLOGY:

## 2024-02-25 LAB
ALBUMIN SERPL ELPH-MCNC: 2.8 G/DL — LOW (ref 3.5–5.2)
ALP SERPL-CCNC: 84 U/L — SIGNIFICANT CHANGE UP (ref 30–115)
ALT FLD-CCNC: 24 U/L — SIGNIFICANT CHANGE UP (ref 0–41)
ANION GAP SERPL CALC-SCNC: 10 MMOL/L — SIGNIFICANT CHANGE UP (ref 7–14)
ANION GAP SERPL CALC-SCNC: 11 MMOL/L — SIGNIFICANT CHANGE UP (ref 7–14)
ANION GAP SERPL CALC-SCNC: 9 MMOL/L — SIGNIFICANT CHANGE UP (ref 7–14)
APTT BLD: 108.8 SEC — CRITICAL HIGH (ref 27–39.2)
APTT BLD: 200 SEC — CRITICAL HIGH (ref 27–39.2)
APTT BLD: 98.3 SEC — CRITICAL HIGH (ref 27–39.2)
APTT BLD: >200 SEC — CRITICAL HIGH (ref 27–39.2)
AST SERPL-CCNC: 28 U/L — SIGNIFICANT CHANGE UP (ref 0–41)
BILIRUB SERPL-MCNC: 0.4 MG/DL — SIGNIFICANT CHANGE UP (ref 0.2–1.2)
BUN SERPL-MCNC: 15 MG/DL — SIGNIFICANT CHANGE UP (ref 10–20)
BUN SERPL-MCNC: 15 MG/DL — SIGNIFICANT CHANGE UP (ref 10–20)
BUN SERPL-MCNC: 17 MG/DL — SIGNIFICANT CHANGE UP (ref 10–20)
CALCIUM SERPL-MCNC: 8 MG/DL — LOW (ref 8.4–10.4)
CALCIUM SERPL-MCNC: 8.2 MG/DL — LOW (ref 8.4–10.5)
CALCIUM SERPL-MCNC: 8.6 MG/DL — SIGNIFICANT CHANGE UP (ref 8.4–10.5)
CHLORIDE SERPL-SCNC: 88 MMOL/L — LOW (ref 98–110)
CHLORIDE SERPL-SCNC: 89 MMOL/L — LOW (ref 98–110)
CHLORIDE SERPL-SCNC: 89 MMOL/L — LOW (ref 98–110)
CO2 SERPL-SCNC: 24 MMOL/L — SIGNIFICANT CHANGE UP (ref 17–32)
CO2 SERPL-SCNC: 24 MMOL/L — SIGNIFICANT CHANGE UP (ref 17–32)
CO2 SERPL-SCNC: 26 MMOL/L — SIGNIFICANT CHANGE UP (ref 17–32)
CREAT SERPL-MCNC: 0.8 MG/DL — SIGNIFICANT CHANGE UP (ref 0.7–1.5)
EGFR: 70 ML/MIN/1.73M2 — SIGNIFICANT CHANGE UP
GLUCOSE SERPL-MCNC: 131 MG/DL — HIGH (ref 70–99)
GLUCOSE SERPL-MCNC: 96 MG/DL — SIGNIFICANT CHANGE UP (ref 70–99)
GLUCOSE SERPL-MCNC: 99 MG/DL — SIGNIFICANT CHANGE UP (ref 70–99)
HCT VFR BLD CALC: 30 % — LOW (ref 37–47)
HGB BLD-MCNC: 10.4 G/DL — LOW (ref 12–16)
MAGNESIUM SERPL-MCNC: 2.1 MG/DL — SIGNIFICANT CHANGE UP (ref 1.8–2.4)
MCHC RBC-ENTMCNC: 32.5 PG — HIGH (ref 27–31)
MCHC RBC-ENTMCNC: 34.7 G/DL — SIGNIFICANT CHANGE UP (ref 32–37)
MCV RBC AUTO: 93.8 FL — SIGNIFICANT CHANGE UP (ref 81–99)
NRBC # BLD: 0 /100 WBCS — SIGNIFICANT CHANGE UP (ref 0–0)
PLATELET # BLD AUTO: 109 K/UL — LOW (ref 130–400)
PMV BLD: 9.6 FL — SIGNIFICANT CHANGE UP (ref 7.4–10.4)
POTASSIUM SERPL-MCNC: 3.7 MMOL/L — SIGNIFICANT CHANGE UP (ref 3.5–5)
POTASSIUM SERPL-MCNC: 3.8 MMOL/L — SIGNIFICANT CHANGE UP (ref 3.5–5)
POTASSIUM SERPL-MCNC: 4.1 MMOL/L — SIGNIFICANT CHANGE UP (ref 3.5–5)
POTASSIUM SERPL-SCNC: 3.7 MMOL/L — SIGNIFICANT CHANGE UP (ref 3.5–5)
POTASSIUM SERPL-SCNC: 3.8 MMOL/L — SIGNIFICANT CHANGE UP (ref 3.5–5)
POTASSIUM SERPL-SCNC: 4.1 MMOL/L — SIGNIFICANT CHANGE UP (ref 3.5–5)
PROT SERPL-MCNC: 4.2 G/DL — LOW (ref 6–8)
RBC # BLD: 3.2 M/UL — LOW (ref 4.2–5.4)
RBC # FLD: 14.2 % — SIGNIFICANT CHANGE UP (ref 11.5–14.5)
SODIUM SERPL-SCNC: 123 MMOL/L — LOW (ref 135–146)
SODIUM SERPL-SCNC: 123 MMOL/L — LOW (ref 135–146)
SODIUM SERPL-SCNC: 124 MMOL/L — LOW (ref 135–146)
WBC # BLD: 3.71 K/UL — LOW (ref 4.8–10.8)
WBC # FLD AUTO: 3.71 K/UL — LOW (ref 4.8–10.8)

## 2024-02-25 PROCEDURE — 99232 SBSQ HOSP IP/OBS MODERATE 35: CPT

## 2024-02-25 RX ORDER — FUROSEMIDE 40 MG
20 TABLET ORAL DAILY
Refills: 0 | Status: DISCONTINUED | OUTPATIENT
Start: 2024-02-25 | End: 2024-02-27

## 2024-02-25 RX ADMIN — GABAPENTIN 600 MILLIGRAM(S): 400 CAPSULE ORAL at 13:19

## 2024-02-25 RX ADMIN — Medication 1 TABLET(S): at 11:49

## 2024-02-25 RX ADMIN — Medication 1000 MILLIGRAM(S): at 13:28

## 2024-02-25 RX ADMIN — HEPARIN SODIUM 0 UNIT(S)/HR: 5000 INJECTION INTRAVENOUS; SUBCUTANEOUS at 06:13

## 2024-02-25 RX ADMIN — ROPINIROLE 0.25 MILLIGRAM(S): 8 TABLET, FILM COATED, EXTENDED RELEASE ORAL at 17:40

## 2024-02-25 RX ADMIN — METHOCARBAMOL 750 MILLIGRAM(S): 500 TABLET, FILM COATED ORAL at 13:21

## 2024-02-25 RX ADMIN — METHOCARBAMOL 750 MILLIGRAM(S): 500 TABLET, FILM COATED ORAL at 04:58

## 2024-02-25 RX ADMIN — Medication 1000 MILLIGRAM(S): at 13:19

## 2024-02-25 RX ADMIN — GABAPENTIN 600 MILLIGRAM(S): 400 CAPSULE ORAL at 22:27

## 2024-02-25 RX ADMIN — SENNA PLUS 2 TABLET(S): 8.6 TABLET ORAL at 22:27

## 2024-02-25 RX ADMIN — Medication 1000 MILLIGRAM(S): at 04:59

## 2024-02-25 RX ADMIN — Medication 25 MILLIGRAM(S): at 22:28

## 2024-02-25 RX ADMIN — Medication 1 SPRAY(S): at 17:40

## 2024-02-25 RX ADMIN — PANTOPRAZOLE SODIUM 40 MILLIGRAM(S): 20 TABLET, DELAYED RELEASE ORAL at 04:58

## 2024-02-25 RX ADMIN — Medication 1 TABLET(S): at 11:48

## 2024-02-25 RX ADMIN — Medication 5000 UNIT(S): at 11:49

## 2024-02-25 RX ADMIN — METHOCARBAMOL 750 MILLIGRAM(S): 500 TABLET, FILM COATED ORAL at 22:28

## 2024-02-25 RX ADMIN — GABAPENTIN 600 MILLIGRAM(S): 400 CAPSULE ORAL at 04:58

## 2024-02-25 RX ADMIN — Medication 25 MILLIGRAM(S): at 04:58

## 2024-02-25 RX ADMIN — CHLORHEXIDINE GLUCONATE 1 APPLICATION(S): 213 SOLUTION TOPICAL at 04:59

## 2024-02-25 RX ADMIN — POLYETHYLENE GLYCOL 3350 17 GRAM(S): 17 POWDER, FOR SOLUTION ORAL at 04:58

## 2024-02-25 RX ADMIN — TAMSULOSIN HYDROCHLORIDE 0.8 MILLIGRAM(S): 0.4 CAPSULE ORAL at 22:28

## 2024-02-25 RX ADMIN — POLYETHYLENE GLYCOL 3350 17 GRAM(S): 17 POWDER, FOR SOLUTION ORAL at 17:40

## 2024-02-25 RX ADMIN — Medication 1 SPRAY(S): at 05:00

## 2024-02-25 RX ADMIN — HEPARIN SODIUM 0 UNIT(S)/HR: 5000 INJECTION INTRAVENOUS; SUBCUTANEOUS at 11:51

## 2024-02-25 RX ADMIN — CHLORHEXIDINE GLUCONATE 1 APPLICATION(S): 213 SOLUTION TOPICAL at 11:51

## 2024-02-25 RX ADMIN — Medication 1000 MILLIGRAM(S): at 22:27

## 2024-02-25 RX ADMIN — Medication 20 MILLIGRAM(S): at 13:20

## 2024-02-25 RX ADMIN — HEPARIN SODIUM 1000 UNIT(S)/HR: 5000 INJECTION INTRAVENOUS; SUBCUTANEOUS at 16:08

## 2024-02-25 RX ADMIN — Medication 25 MILLIGRAM(S): at 13:19

## 2024-02-25 RX ADMIN — ROPINIROLE 0.25 MILLIGRAM(S): 8 TABLET, FILM COATED, EXTENDED RELEASE ORAL at 04:59

## 2024-02-25 NOTE — PROGRESS NOTE ADULT - SUBJECTIVE AND OBJECTIVE BOX
24H events:    Patient is a 89y old Female who presents with a chief complaint of Back Pain (23 Feb 2024 13:29)    Primary diagnosis of Inability to ambulate due to multiple joints    SUBJECTIVE: Pt is doing well. Spoke to daughter bedside and answered questions. Explained pain management will be seeing pt to assess for an epidural. Will keep on heparin drip until procedure is confirmed. Pt still has hyponatremia and has sacral edema. Will get a bmp q 8hrs and give one dose of lasix and restrict fluid intake as pt has sacral edema. Pain is currently controlled. Given that pt had a large BM, will attempt a TOV.       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 No reported tobacco, alcohol, or illicit/recreational drug use. ADL's limited by progressively worsening ability to ambulate. Ambulates w/ walker. (24 Jan 2024 15:07)      ALLERGIES:  No Known Allergies    MEDICATIONS:  STANDING MEDICATIONS  acetaminophen     Tablet .. 1000 milliGRAM(s) Oral every 8 hours  calcium carbonate   1250 mG (OsCal) 1 Tablet(s) Oral daily  chlorhexidine 2% Cloths 1 Application(s) Topical <User Schedule>  chlorhexidine 2% Cloths 1 Application(s) Topical daily  cholecalciferol 5000 Unit(s) Oral daily  fluticasone propionate 50 MICROgram(s)/spray Nasal Spray 1 Spray(s) Both Nostrils two times a day  gabapentin 600 milliGRAM(s) Oral every 8 hours  heparin   Injectable 5500 Unit(s) IV Push once  heparin  Infusion.  Unit(s)/Hr IV Continuous <Continuous>  lidocaine   4% Patch 1 Patch Transdermal every 24 hours  magnesium sulfate  IVPB 2 Gram(s) IV Intermittent once  magnesium sulfate  IVPB 2 Gram(s) IV Intermittent once  methocarbamol 750 milliGRAM(s) Oral every 8 hours  metoprolol tartrate 25 milliGRAM(s) Oral three times a day  multivitamin 1 Tablet(s) Oral daily  pantoprazole    Tablet 40 milliGRAM(s) Oral before breakfast  polyethylene glycol 3350 17 Gram(s) Oral two times a day  rOPINIRole 0.25 milliGRAM(s) Oral two times a day  senna 2 Tablet(s) Oral at bedtime  tamsulosin 0.8 milliGRAM(s) Oral at bedtime    PRN MEDICATIONS  heparin   Injectable 2500 Unit(s) IV Push every 6 hours PRN  heparin   Injectable 5500 Unit(s) IV Push every 6 hours PRN  traMADol 50 milliGRAM(s) Oral daily PRN    VITALS:   T(F): 97.4  HR: 88  BP: 122/60  RR: 18  SpO2: --    PHYSICAL EXAM:  T(C): 36.8 (02-25-24 @ 12:09), Max: 37 (02-25-24 @ 06:34)  HR: 89 (02-25-24 @ 12:09) (75 - 103)  BP: 138/61 (02-25-24 @ 12:09) (135/58 - 138/61)  RR: 18 (02-25-24 @ 12:09) (18 - 18)  SpO2: --    CONSTITUTIONAL: NAD  EYES: PERRLA and symmetric, EOMI, No conjunctival or scleral injection, non-icteric  ENMT: Oral mucosa with moist membranes. Normal dentition; no pharyngeal injection or exudates             NECK: Supple, symmetric and without tracheal deviation   RESP: No respiratory distress, no use of accessory muscles; CTA b/l, no WRR  CV: RRR, +S1S2, no MRG; no JVD; n1+ b/l LE edema and sacral edema  GI: Soft, NT, ND, no rebound, no guarding; no palpable masses;   MSK: unable to assess gait due to back pain; spinal tenderness on movement and palpation, decreased LE muscle strength due to pain  SKIN: No rashes or ulcers noted; no subcutaneous nodules or induration palpable  : hanson in place  PSYCH: Appropriate insight/judgment; A+O x 3, mood and affect appropriate, recent/remote memory intact      LABS:                        11.0   4.14  )-----------( 99       ( 23 Feb 2024 08:09 )             31.9     02-23    126<L>  |  90<L>  |  18  ----------------------------<  94  4.3   |  25  |  0.8    Ca    8.5      23 Feb 2024 08:09  Mg     1.7     02-23    TPro  4.5<L>  /  Alb  2.9<L>  /  TBili  0.3  /  DBili  x   /  AST  43<H>  /  ALT  36  /  AlkPhos  84  02-23      Urinalysis Basic - ( 23 Feb 2024 08:09 )    Color: x / Appearance: x / SG: x / pH: x  Gluc: 94 mg/dL / Ketone: x  / Bili: x / Urobili: x   Blood: x / Protein: x / Nitrite: x   Leuk Esterase: x / RBC: x / WBC x   Sq Epi: x / Non Sq Epi: x / Bacteria: x                RADIOLOGY:

## 2024-02-25 NOTE — PROGRESS NOTE ADULT - ASSESSMENT
89F w/ h/o chronic afib (on Eliquis), GERD, and chronic low back pain presenting s/p fall. Admitted to medicine for back pain 2/2 Lumbar compression fracture. Planned for kyphoplasty with neurosurgery, but surgery postponed as patient went into afib with RVR and transferred to telemetry for further monitoring.    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)  - Radiation Oncology consulted: s/p 5 sessions of RT, completed 2/6/24  - since NSGY's kyphoplasty was canceled and there was no improvement in pain s/p RT, will follow up with pain for a possible epidural and oncology will need to be re-consulted to see if there is a role for immunotherapy  - pain control  - bowel regimen  - TOV on 2/25  - pt developed afib with rvr during hospital course  - s/p cardizem drip and now on metoprolol 25mg tid  - Pain management recs appreciated: daughter refusing oxy as it causes sedation in her mother, even prn dose   1. acetaminophen 1000mg q8h standing  2. Gabapentin 400mg q8h standing, if tolerated (no sedation/confusion)  3. Methocarbamol 500mg q8h standing  4. oxycodone 5mg q4h PRN for breakthrough pain. can increase to 7.5mg if need be    - pain states can do epidural outpatient, but unlikely that patient will be able to present to clinic for epidural, f/u with pain to see if this can be done inpatient  - can consider tramadol for pain control  - TSLO brace for PT  - physiatry recommending STR in SNF      #CHF, diastolic - acute -- now resolved  - On 2/10 had episode of unresponsives and bradycardia/hypotension. Likely vasovagal. CT head negative  - TTE (May 2013) demonstrated LVEF 55-65% w/ normal global LVSF and no significant valvulopathy.  - TTE 1/28- EF 60-65% mod pulm HTN   - elevated BNP  - Tele monitored for 24 hrs with no dysrhythmia   - lasix 20mg qd resumed for now    #hyponatremia  - Na has been trending downwards and is 124  - asymptomatic  - for now give lasix, restrict fluid intake, resume lasix and obtain bmp q 8hrs    #Chronic Atrial Fibrillation  #acute RVR, resolved  CHADS-VASC 5.   - eliquis held preop  - started on heparin drip which was explained to the daughter  - c/w metoprolol 25mg tid  - will need to switch back to eliquis once pain decides if they will do an epidural      #GERD  - c/w pantoprazole 40mg PO QD (therapeutic interchange for omeprazole)    #CKD 3b   - per PCP baseline is 1.2    #UTI - resolved  -UA+, no UCx sent  -completed 3 day course of ceftriaxone    #Urinary retention - likely neurogenic   - hanson placed 1/29  - c/w flomax 0.8mg   - treated with ceftriaxone for a UTI  - had a large BM on 2/24 s/p enema  - will attempt a TOV on 2/25    #Constipation  - c/w aggressive bowel regimen: senna, miralax     # HTN  - c/w metoprolol 25mg TID                                                                             # DVT prophylaxis: heparin drip  # GI prophylaxis: Protonix  # Diet: DASH/TLC  # Activity: IAT  # Code status: DNR/DNI  # Disposition: [facility] vs Home with home PT                                                                           --------------------------------------------------------

## 2024-02-25 NOTE — CONSULT NOTE ADULT - SUBJECTIVE AND OBJECTIVE BOX
PAIN MANAGEMENT CONSULT follow up NOTE    Chief Complaint: back pain    HPI:  89F w/ h/o chronic afib (on Eliquis), GERD, and chronic low back pain presenting s/p fall. Patient known to have chronic low back pain that has progressively worsened over the years. Pain acutely worsened two weeks ago and patient subsequently fell due to inability to support own weight. Evaluated by PCP after initial fall due to worsening pain. Prescribed Percocet 5-325 bid w/ mild relief. However, pt once again fell earlier today after pt unable to support her own weight while trying to ambulate. Now unable to ambulate or bear weight, even w/ assistance. No reported HT, LOC, lightheadedness, confusion, loss of continence, or focal weakness a/w either fall. No reported fevers, chills, CP, palpitations, SOB, abdominal pain, n/v/d, or dysuria (although daughter did note malodorous urine recently).    Of note, pt also reports new onset LE swelling over past 1-2 weeks. Recently started on furosemide 20 qd by PCP for swelling. No reported CP or dyspnea at rest or exertion (but functional status limited by lower back pain). No known prior h/o CHF.     In ED, pt HD stable on vitals. Labs demonstrated K 5.6 (hemolyzed). XR Bilateral Hips, Pelvis, and L-Spine overall unremarkable (wet read). S/P morphine 4mg IV and methocarbamol 1000mg PO. Admitted to medicine for inability to ambulate s/p fall. (24 Jan 2024 15:07)      PAST MEDICAL & SURGICAL HISTORY:  Chronic atrial fibrillation      GERD (gastroesophageal reflux disease)      Chronic lower back pain      No significant past surgical history          FAMILY HISTORY:  Family history of stroke (Father)        SOCIAL HISTORY:  [x ] Denies Smoking, Alcohol, or Drug Use    HOME MEDICATIONS:   Please refer to initial HNP    PAIN HOME MEDICATIONS:    Allergies    No Known Allergies    Intolerances        PAIN MEDICATIONS:  acetaminophen     Tablet .. 1000 milliGRAM(s) Oral every 8 hours  gabapentin 600 milliGRAM(s) Oral every 8 hours  methocarbamol 750 milliGRAM(s) Oral every 8 hours  rOPINIRole 0.25 milliGRAM(s) Oral two times a day  traMADol 50 milliGRAM(s) Oral daily PRN    Heme:  heparin   Injectable 2500 Unit(s) IV Push every 6 hours PRN  heparin   Injectable 5500 Unit(s) IV Push every 6 hours PRN  heparin  Infusion.  Unit(s)/Hr IV Continuous <Continuous>    Antibiotics:    Cardiovascular:  furosemide    Tablet 20 milliGRAM(s) Oral daily  metoprolol tartrate 25 milliGRAM(s) Oral three times a day    GI:  pantoprazole    Tablet 40 milliGRAM(s) Oral before breakfast  polyethylene glycol 3350 17 Gram(s) Oral two times a day  senna 2 Tablet(s) Oral at bedtime    Endocrine:    All Other Medications:  calcium carbonate   1250 mG (OsCal) 1 Tablet(s) Oral daily  chlorhexidine 2% Cloths 1 Application(s) Topical <User Schedule>  chlorhexidine 2% Cloths 1 Application(s) Topical daily  cholecalciferol 5000 Unit(s) Oral daily  fluticasone propionate 50 MICROgram(s)/spray Nasal Spray 1 Spray(s) Both Nostrils two times a day  lidocaine   4% Patch 1 Patch Transdermal every 24 hours  multivitamin 1 Tablet(s) Oral daily  tamsulosin 0.8 milliGRAM(s) Oral at bedtime      Vital Signs Last 24 Hrs  T(C): 36.4 (25 Feb 2024 20:18), Max: 37 (25 Feb 2024 06:34)  T(F): 97.6 (25 Feb 2024 20:18), Max: 98.6 (25 Feb 2024 06:34)  HR: 87 (25 Feb 2024 20:18) (75 - 103)  BP: 130/53 (25 Feb 2024 20:18) (130/53 - 138/61)  BP(mean): --  RR: 18 (25 Feb 2024 20:18) (18 - 18)  SpO2: --        LABS:                        10.4   3.71  )-----------( 109      ( 25 Feb 2024 08:08 )             30.0     02-25    123<L>  |  89<L>  |  15  ----------------------------<  96  4.1   |  24  |  0.8    Ca    8.0<L>      25 Feb 2024 18:28  Mg     2.1     02-25    TPro  4.2<L>  /  Alb  2.8<L>  /  TBili  0.4  /  DBili  x   /  AST  28  /  ALT  24  /  AlkPhos  84  02-25    PTT - ( 25 Feb 2024 18:28 )  PTT:98.3 sec  Urinalysis Basic - ( 25 Feb 2024 18:28 )    Color: x / Appearance: x / SG: x / pH: x  Gluc: 96 mg/dL / Ketone: x  / Bili: x / Urobili: x   Blood: x / Protein: x / Nitrite: x   Leuk Esterase: x / RBC: x / WBC x   Sq Epi: x / Non Sq Epi: x / Bacteria: x          REVIEW OF SYSTEMS:  see hpi    PHYSICAL EXAM  GENERAL: Laying in bed, NAD  Neuro:  EOMI  Cranial nerves grossly intact  CHEST/LUNG: no audible wheeze, no accessory muscle usage  EXTREMITIES: No clubbing, cyanosis  SKIN: No rashes or lesions      ASSESSMENT:   low back pain    PLAN:   - Pain:    would recommend start a fentanyl patch 12.5 microgram q72hr   c.w gabapentin 600g TID  c/w standing tylenol  c.w methocarbomal 750mg q8hr  would recommend consulting IR to do the epidural since we are given limited time by endoscopy to do epidurals after hours (after 7 pm)  IR has more availability to accomplish the epidural  patient is also very high risk, chadsvasc 5, and would need to hold heparin drip and has to be cleared by cardio

## 2024-02-26 LAB
ALBUMIN SERPL ELPH-MCNC: 3.4 G/DL — LOW (ref 3.5–5.2)
ALP SERPL-CCNC: 106 U/L — SIGNIFICANT CHANGE UP (ref 30–115)
ALT FLD-CCNC: 27 U/L — SIGNIFICANT CHANGE UP (ref 0–41)
ANION GAP SERPL CALC-SCNC: 11 MMOL/L — SIGNIFICANT CHANGE UP (ref 7–14)
ANION GAP SERPL CALC-SCNC: 13 MMOL/L — SIGNIFICANT CHANGE UP (ref 7–14)
ANION GAP SERPL CALC-SCNC: 13 MMOL/L — SIGNIFICANT CHANGE UP (ref 7–14)
APTT BLD: 110.5 SEC — CRITICAL HIGH (ref 27–39.2)
APTT BLD: 61.3 SEC — HIGH (ref 27–39.2)
AST SERPL-CCNC: 33 U/L — SIGNIFICANT CHANGE UP (ref 0–41)
BASOPHILS # BLD AUTO: 0.02 K/UL — SIGNIFICANT CHANGE UP (ref 0–0.2)
BASOPHILS NFR BLD AUTO: 0.5 % — SIGNIFICANT CHANGE UP (ref 0–1)
BILIRUB SERPL-MCNC: 0.5 MG/DL — SIGNIFICANT CHANGE UP (ref 0.2–1.2)
BUN SERPL-MCNC: 13 MG/DL — SIGNIFICANT CHANGE UP (ref 10–20)
BUN SERPL-MCNC: 14 MG/DL — SIGNIFICANT CHANGE UP (ref 10–20)
BUN SERPL-MCNC: 14 MG/DL — SIGNIFICANT CHANGE UP (ref 10–20)
CALCIUM SERPL-MCNC: 7.9 MG/DL — LOW (ref 8.4–10.5)
CALCIUM SERPL-MCNC: 8.1 MG/DL — LOW (ref 8.4–10.4)
CALCIUM SERPL-MCNC: 8.7 MG/DL — SIGNIFICANT CHANGE UP (ref 8.4–10.5)
CHLORIDE SERPL-SCNC: 86 MMOL/L — LOW (ref 98–110)
CHLORIDE SERPL-SCNC: 89 MMOL/L — LOW (ref 98–110)
CHLORIDE SERPL-SCNC: 93 MMOL/L — LOW (ref 98–110)
CO2 SERPL-SCNC: 23 MMOL/L — SIGNIFICANT CHANGE UP (ref 17–32)
CO2 SERPL-SCNC: 23 MMOL/L — SIGNIFICANT CHANGE UP (ref 17–32)
CO2 SERPL-SCNC: 26 MMOL/L — SIGNIFICANT CHANGE UP (ref 17–32)
CREAT SERPL-MCNC: 0.7 MG/DL — SIGNIFICANT CHANGE UP (ref 0.7–1.5)
CREAT SERPL-MCNC: 0.8 MG/DL — SIGNIFICANT CHANGE UP (ref 0.7–1.5)
CREAT SERPL-MCNC: 1 MG/DL — SIGNIFICANT CHANGE UP (ref 0.7–1.5)
EGFR: 54 ML/MIN/1.73M2 — LOW
EGFR: 70 ML/MIN/1.73M2 — SIGNIFICANT CHANGE UP
EGFR: 82 ML/MIN/1.73M2 — SIGNIFICANT CHANGE UP
EOSINOPHIL # BLD AUTO: 0.16 K/UL — SIGNIFICANT CHANGE UP (ref 0–0.7)
EOSINOPHIL NFR BLD AUTO: 4.2 % — SIGNIFICANT CHANGE UP (ref 0–8)
GLUCOSE SERPL-MCNC: 101 MG/DL — HIGH (ref 70–99)
GLUCOSE SERPL-MCNC: 103 MG/DL — HIGH (ref 70–99)
GLUCOSE SERPL-MCNC: 106 MG/DL — HIGH (ref 70–99)
HCT VFR BLD CALC: 35 % — LOW (ref 37–47)
HGB BLD-MCNC: 12.4 G/DL — SIGNIFICANT CHANGE UP (ref 12–16)
IMM GRANULOCYTES NFR BLD AUTO: 0.3 % — SIGNIFICANT CHANGE UP (ref 0.1–0.3)
LYMPHOCYTES # BLD AUTO: 1.28 K/UL — SIGNIFICANT CHANGE UP (ref 1.2–3.4)
LYMPHOCYTES # BLD AUTO: 33.7 % — SIGNIFICANT CHANGE UP (ref 20.5–51.1)
MAGNESIUM SERPL-MCNC: 1.7 MG/DL — LOW (ref 1.8–2.4)
MCHC RBC-ENTMCNC: 32.8 PG — HIGH (ref 27–31)
MCHC RBC-ENTMCNC: 35.4 G/DL — SIGNIFICANT CHANGE UP (ref 32–37)
MCV RBC AUTO: 92.6 FL — SIGNIFICANT CHANGE UP (ref 81–99)
MONOCYTES # BLD AUTO: 0.31 K/UL — SIGNIFICANT CHANGE UP (ref 0.1–0.6)
MONOCYTES NFR BLD AUTO: 8.2 % — SIGNIFICANT CHANGE UP (ref 1.7–9.3)
NEUTROPHILS # BLD AUTO: 2.02 K/UL — SIGNIFICANT CHANGE UP (ref 1.4–6.5)
NEUTROPHILS NFR BLD AUTO: 53.1 % — SIGNIFICANT CHANGE UP (ref 42.2–75.2)
NRBC # BLD: 0 /100 WBCS — SIGNIFICANT CHANGE UP (ref 0–0)
PLATELET # BLD AUTO: 135 K/UL — SIGNIFICANT CHANGE UP (ref 130–400)
PMV BLD: 9.4 FL — SIGNIFICANT CHANGE UP (ref 7.4–10.4)
POTASSIUM SERPL-MCNC: 3.5 MMOL/L — SIGNIFICANT CHANGE UP (ref 3.5–5)
POTASSIUM SERPL-MCNC: 3.9 MMOL/L — SIGNIFICANT CHANGE UP (ref 3.5–5)
POTASSIUM SERPL-MCNC: 4.3 MMOL/L — SIGNIFICANT CHANGE UP (ref 3.5–5)
POTASSIUM SERPL-SCNC: 3.5 MMOL/L — SIGNIFICANT CHANGE UP (ref 3.5–5)
POTASSIUM SERPL-SCNC: 3.9 MMOL/L — SIGNIFICANT CHANGE UP (ref 3.5–5)
POTASSIUM SERPL-SCNC: 4.3 MMOL/L — SIGNIFICANT CHANGE UP (ref 3.5–5)
PROT SERPL-MCNC: 5.4 G/DL — LOW (ref 6–8)
RBC # BLD: 3.78 M/UL — LOW (ref 4.2–5.4)
RBC # FLD: 14.4 % — SIGNIFICANT CHANGE UP (ref 11.5–14.5)
SODIUM SERPL-SCNC: 125 MMOL/L — LOW (ref 135–146)
SODIUM SERPL-SCNC: 125 MMOL/L — LOW (ref 135–146)
SODIUM SERPL-SCNC: 127 MMOL/L — LOW (ref 135–146)
WBC # BLD: 3.8 K/UL — LOW (ref 4.8–10.8)
WBC # FLD AUTO: 3.8 K/UL — LOW (ref 4.8–10.8)

## 2024-02-26 PROCEDURE — 99232 SBSQ HOSP IP/OBS MODERATE 35: CPT

## 2024-02-26 RX ORDER — GABAPENTIN 400 MG/1
600 CAPSULE ORAL AT BEDTIME
Refills: 0 | Status: DISCONTINUED | OUTPATIENT
Start: 2024-02-26 | End: 2024-03-03

## 2024-02-26 RX ORDER — POTASSIUM CHLORIDE 20 MEQ
40 PACKET (EA) ORAL ONCE
Refills: 0 | Status: COMPLETED | OUTPATIENT
Start: 2024-02-26 | End: 2024-02-26

## 2024-02-26 RX ORDER — MAGNESIUM SULFATE 500 MG/ML
1 VIAL (ML) INJECTION ONCE
Refills: 0 | Status: COMPLETED | OUTPATIENT
Start: 2024-02-26 | End: 2024-02-26

## 2024-02-26 RX ORDER — METHOCARBAMOL 500 MG/1
750 TABLET, FILM COATED ORAL EVERY 8 HOURS
Refills: 0 | Status: DISCONTINUED | OUTPATIENT
Start: 2024-02-27 | End: 2024-03-03

## 2024-02-26 RX ORDER — FENTANYL CITRATE 50 UG/ML
1 INJECTION INTRAVENOUS
Refills: 0 | Status: DISCONTINUED | OUTPATIENT
Start: 2024-02-26 | End: 2024-02-26

## 2024-02-26 RX ADMIN — Medication 25 MILLIGRAM(S): at 05:37

## 2024-02-26 RX ADMIN — LIDOCAINE 1 PATCH: 4 CREAM TOPICAL at 19:11

## 2024-02-26 RX ADMIN — HEPARIN SODIUM 800 UNIT(S)/HR: 5000 INJECTION INTRAVENOUS; SUBCUTANEOUS at 10:49

## 2024-02-26 RX ADMIN — HEPARIN SODIUM 900 UNIT(S)/HR: 5000 INJECTION INTRAVENOUS; SUBCUTANEOUS at 00:14

## 2024-02-26 RX ADMIN — ROPINIROLE 0.25 MILLIGRAM(S): 8 TABLET, FILM COATED, EXTENDED RELEASE ORAL at 05:47

## 2024-02-26 RX ADMIN — CHLORHEXIDINE GLUCONATE 1 APPLICATION(S): 213 SOLUTION TOPICAL at 05:45

## 2024-02-26 RX ADMIN — Medication 25 MILLIGRAM(S): at 16:47

## 2024-02-26 RX ADMIN — SENNA PLUS 2 TABLET(S): 8.6 TABLET ORAL at 21:03

## 2024-02-26 RX ADMIN — Medication 1 TABLET(S): at 16:48

## 2024-02-26 RX ADMIN — Medication 1000 MILLIGRAM(S): at 05:47

## 2024-02-26 RX ADMIN — PANTOPRAZOLE SODIUM 40 MILLIGRAM(S): 20 TABLET, DELAYED RELEASE ORAL at 08:54

## 2024-02-26 RX ADMIN — Medication 5000 UNIT(S): at 14:14

## 2024-02-26 RX ADMIN — Medication 100 GRAM(S): at 17:14

## 2024-02-26 RX ADMIN — Medication 1 SPRAY(S): at 05:46

## 2024-02-26 RX ADMIN — ROPINIROLE 0.25 MILLIGRAM(S): 8 TABLET, FILM COATED, EXTENDED RELEASE ORAL at 16:44

## 2024-02-26 RX ADMIN — Medication 1000 MILLIGRAM(S): at 16:49

## 2024-02-26 RX ADMIN — Medication 20 MILLIGRAM(S): at 05:36

## 2024-02-26 RX ADMIN — HEPARIN SODIUM 900 UNIT(S)/HR: 5000 INJECTION INTRAVENOUS; SUBCUTANEOUS at 08:25

## 2024-02-26 RX ADMIN — GABAPENTIN 600 MILLIGRAM(S): 400 CAPSULE ORAL at 21:03

## 2024-02-26 RX ADMIN — TAMSULOSIN HYDROCHLORIDE 0.8 MILLIGRAM(S): 0.4 CAPSULE ORAL at 21:03

## 2024-02-26 RX ADMIN — Medication 25 MILLIGRAM(S): at 21:03

## 2024-02-26 RX ADMIN — Medication 1000 MILLIGRAM(S): at 21:03

## 2024-02-26 RX ADMIN — LIDOCAINE 1 PATCH: 4 CREAM TOPICAL at 03:00

## 2024-02-26 RX ADMIN — METHOCARBAMOL 750 MILLIGRAM(S): 500 TABLET, FILM COATED ORAL at 05:36

## 2024-02-26 RX ADMIN — Medication 1 SPRAY(S): at 16:43

## 2024-02-26 RX ADMIN — CHLORHEXIDINE GLUCONATE 1 APPLICATION(S): 213 SOLUTION TOPICAL at 16:47

## 2024-02-26 RX ADMIN — Medication 40 MILLIEQUIVALENT(S): at 14:19

## 2024-02-26 RX ADMIN — POLYETHYLENE GLYCOL 3350 17 GRAM(S): 17 POWDER, FOR SOLUTION ORAL at 05:36

## 2024-02-26 RX ADMIN — GABAPENTIN 600 MILLIGRAM(S): 400 CAPSULE ORAL at 05:36

## 2024-02-26 RX ADMIN — Medication 1000 MILLIGRAM(S): at 16:47

## 2024-02-26 NOTE — CONSULT NOTE ADULT - PROVIDER SPECIALTY LIST ADULT
Pain Medicine
Physiatry
Urology
Wound Care
Neurosurgery
Pain Medicine
Cardiology
Intervent Radiology
Palliative Care
Rad Onc
Heme/Onc
Neurosurgery
Pain Medicine

## 2024-02-26 NOTE — CHART NOTE - NSCHARTNOTEFT_GEN_A_CORE
Registered Dietitian Follow-Up     Patient Profile Reviewed                           Yes [x]   No []     Nutrition History Previously Obtained        Yes [x]  No []       Pertinent Subjective Information: pt unable to participate in assessment d/t condition. Daughter at bedside provided information. Per dtr, pt is not eating facility food. Dtr brought in foods from home with little success at po intake. Dtr very active in encouraging and assisting pt during meals Per dtr, pt is declining nutritional supplements as well.  Will remove from diet order d/t continued refusals.    Pertinent Medical Interventions: history of afib on eliquis, GERD, ckd-3, chronic low back pain, cancer, frequent falls and inability to ambulate,  presents after fall. Patient found to have lumbar pathological fracture on admission. Palliative care consulted for GOC and symptom management.     Diet order: Diet, DASH/TLC:   Sodium & Cholesterol Restricted  Minced and Moist (MINCEDMOIST)  1500mL Fluid Restriction ([id number])  Free Water Flush Instructions:  Magic Cup twice daily  vanilla only...Ensures: vanilla only  Supplement Feeding Modality:  Oral  Ensure Compact Cans or Servings Per Day:  1       Frequency:  Three Times a day (- @ 13:33) [Active]    Anthropometrics:  Ht: 152.4 cm  Wt: 70.3 kg  BMI: 30.3  IBW: 50 kg  UBW: Unsure    Daily Weight in k (-23), Weight in k (-22), Weight in k (-21), Weight in k (-20)    MEDICATIONS  (STANDING):  acetaminophen     Tablet .. 1000 milliGRAM(s) Oral every 8 hours  calcium carbonate   1250 mG (OsCal) 1 Tablet(s) Oral daily  cholecalciferol 5000 Unit(s) Oral daily  fluticasone propionate 50 MICROgram(s)/spray Nasal Spray 1 Spray(s) Both Nostrils two times a day  furosemide    Tablet 20 milliGRAM(s) Oral daily  gabapentin 600 milliGRAM(s) Oral at bedtime  heparin  Infusion.  Unit(s)/Hr (12 mL/Hr) IV Continuous <Continuous>  magnesium sulfate  IVPB 1 Gram(s) IV Intermittent once  metoprolol tartrate 25 milliGRAM(s) Oral three times a day  multivitamin 1 Tablet(s) Oral daily  pantoprazole    Tablet 40 milliGRAM(s) Oral before breakfast  polyethylene glycol 3350 17 Gram(s) Oral two times a day  rOPINIRole 0.25 milliGRAM(s) Oral two times a day  senna 2 Tablet(s) Oral at bedtime  tamsulosin 0.8 milliGRAM(s) Oral at bedtime    MEDICATIONS  (PRN):  traMADol 50 milliGRAM(s) Oral daily PRN Severe Pain (7 - 10)    Pertinent Labs:  @ 09:12: Na 125<L>, BUN 13, Cr 0.8, <H>, K+ 3.5, Phos --, Mg 1.7<L>, Alk Phos 106, ALT/SGPT 27, AST/SGOT 33, HbA1c --   @ 00:39: Na 127<L>, BUN 14, Cr 0.7, <H>, K+ 3.9, Phos --, Mg --, Alk Phos --, ALT/SGPT --, AST/SGOT --, HbA1c --   @ 18:28: Na 123<L>, BUN 15, Cr 0.8, BG 96, K+ 4.1, Phos --, Mg --, Alk Phos --, ALT/SGPT --, AST/SGOT --, HbA1c --    Physical Findings:   - Appearance: well nourished  - GI function: pt has ongoing constipation. Bowel regiment in place. No other c/o GI issues  - Tubes: no feeding tubes  - Oral/Mouth cavity: no issues noted  - Skin: No P.I. noed. Multiple skin tears noted in chart  - Edema: 1+ BL legs     Nutrition Requirements:  Weight Used: CBW (70.3 kg)     Estimated Energy Needs    Continue [x]  Adjust []   Energy Recommendations: 28-32 kcal/kg = 9045-4921 kcal/d        Estimated Protein Needs    Continue [x]  Adjust []  Adjusted Protein Recommendations:  0.9-1.1 g/kg = 63-77 g/d         Estimated Fluid Needs        Continue [x]  Adjust []  Adjusted Fluid Recommendations:  5896-4729 mL/day (1 ml /kcal)     Nutrient Intake: poor po intake >25% meals and supplements    [] Previous Nutrition Diagnosis:            [x] Ongoing          [] Resolved  Malnutrition...    Moderate malnutrition in the context of acute illness  Conus Medullaris Syndrome  Pt meeting <75% estimated energy needs in past 7 days, moderate muscle mass loss (temples)    Nutrition Education: Encouraged dtr to continue to bring in pt favorite foods and to encourage and assist at mealtimes.     Goal/Expected Outcome: Po intake >/= 50% of all meals, snacks, supplements     Indicator/Monitoring: Energy/protein intake, wts, skin status, edema, labs, GI, NFPE    Recommendation: Registered Dietitian Follow-Up     Patient Profile Reviewed                           Yes [x]   No []     Nutrition History Previously Obtained        Yes [x]  No []       Pertinent Subjective Information: pt unable to participate in assessment d/t condition. Daughter at bedside provided information. Per dtr, pt is not eating facility food. Dtr brought in foods from home with little success at po intake. Dtr very active in encouraging and assisting pt during meals Per dtr, pt is declining nutritional supplements as well.  Will remove from diet order d/t continued refusals.    Pertinent Medical Interventions: history of afib on eliquis, GERD, ckd-3, chronic low back pain, cancer, frequent falls and inability to ambulate,  presents after fall. Patient found to have lumbar pathological fracture on admission. Palliative care consulted for GOC and symptom management.     Diet order: Diet, DASH/TLC:   Sodium & Cholesterol Restricted  Minced and Moist (MINCEDMOIST)  1500mL Fluid Restriction ([id number])  Free Water Flush Instructions:  Magic Cup twice daily  vanilla only...Ensures: vanilla only  Supplement Feeding Modality:  Oral  Ensure Compact Cans or Servings Per Day:  1       Frequency:  Three Times a day (- @ 13:33) [Active]    Anthropometrics:  Ht: 152.4 cm  Wt: 70.3 kg  BMI: 30.3  IBW: 50 kg  UBW: Unsure    Daily Weight in k (-23), Weight in k (-22), Weight in k (-21), Weight in k (-20)    MEDICATIONS  (STANDING):  acetaminophen     Tablet .. 1000 milliGRAM(s) Oral every 8 hours  calcium carbonate   1250 mG (OsCal) 1 Tablet(s) Oral daily  cholecalciferol 5000 Unit(s) Oral daily  fluticasone propionate 50 MICROgram(s)/spray Nasal Spray 1 Spray(s) Both Nostrils two times a day  furosemide    Tablet 20 milliGRAM(s) Oral daily  gabapentin 600 milliGRAM(s) Oral at bedtime  heparin  Infusion.  Unit(s)/Hr (12 mL/Hr) IV Continuous <Continuous>  magnesium sulfate  IVPB 1 Gram(s) IV Intermittent once  metoprolol tartrate 25 milliGRAM(s) Oral three times a day  multivitamin 1 Tablet(s) Oral daily  pantoprazole    Tablet 40 milliGRAM(s) Oral before breakfast  polyethylene glycol 3350 17 Gram(s) Oral two times a day  rOPINIRole 0.25 milliGRAM(s) Oral two times a day  senna 2 Tablet(s) Oral at bedtime  tamsulosin 0.8 milliGRAM(s) Oral at bedtime    MEDICATIONS  (PRN):  traMADol 50 milliGRAM(s) Oral daily PRN Severe Pain (7 - 10)    Pertinent Labs:  @ 09:12: Na 125<L>, BUN 13, Cr 0.8, <H>, K+ 3.5, Phos --, Mg 1.7<L>, Alk Phos 106, ALT/SGPT 27, AST/SGOT 33, HbA1c --   @ 00:39: Na 127<L>, BUN 14, Cr 0.7, <H>, K+ 3.9, Phos --, Mg --, Alk Phos --, ALT/SGPT --, AST/SGOT --, HbA1c --   @ 18:28: Na 123<L>, BUN 15, Cr 0.8, BG 96, K+ 4.1, Phos --, Mg --, Alk Phos --, ALT/SGPT --, AST/SGOT --, HbA1c --    Physical Findings:   - Appearance: well nourished  - GI function: pt has ongoing constipation. Bowel regiment in place. No other c/o GI issues  - Tubes: no feeding tubes  - Oral/Mouth cavity: no issues noted  - Skin: No P.I. noed. Multiple skin tears noted in chart  - Edema: 1+ BL legs     Nutrition Requirements:  Weight Used: CBW (70.3 kg)     Estimated Energy Needs    Continue [x]  Adjust []   Energy Recommendations: 28-32 kcal/kg = 6136-6734 kcal/d        Estimated Protein Needs    Continue [x]  Adjust []  Adjusted Protein Recommendations:  0.9-1.1 g/kg = 63-77 g/d         Estimated Fluid Needs        Continue []  Adjust [x]  Adjusted Fluid Recommendations:  1500 ml fluid restriction     Nutrient Intake: poor po intake >25% meals and supplements    [] Previous Nutrition Diagnosis:            [x] Ongoing          [] Resolved  Malnutrition...    Moderate malnutrition in the context of acute illness  Conus Medullaris Syndrome  Pt meeting <75% estimated energy needs in past 7 days, moderate muscle mass loss (temples)    Nutrition Education: Encouraged dtr to continue to bring in pt favorite foods and to encourage and assist at mealtimes.     Goal/Expected Outcome: Po intake >/= 50% of all meals, snacks, supplements     Indicator/Monitoring: Energy/protein intake, wts, skin status, edema, labs, GI, NFPE    Recommendation:

## 2024-02-26 NOTE — CONSULT NOTE ADULT - CONSULT REASON
89 y/o F with cord compression (original urothelial cancer) for epidural injection
Epidural steroid injection
back pain
back pain
Skin assessment and treatment recommendation
Pathologic compression fx
LBP
LLB pain
Lumbar lesion pathology metastatic urothelial carcinoma
bx with carcinoma
cord compression
Pre-op risk stratification
GOC and symptom management

## 2024-02-26 NOTE — CONSULT NOTE ADULT - CONSULT REQUESTED BY NAME
.
ED
Benny Campos
Benny Viveros
.
ED
Medicine
Primary
Team
medicine
Medicine
Interventional Radiology/Neuroradiology
Primary team

## 2024-02-26 NOTE — CONSULT NOTE ADULT - REASON FOR ADMISSION
Back Pain
Fall
Back Pain

## 2024-02-26 NOTE — CONSULT NOTE ADULT - SUBJECTIVE AND OBJECTIVE BOX
INTERVENTIONAL RADIOLOGY CONSULT:     Procedure Requested: Epidural steroid injection    HPI:  89F w/ h/o chronic afib (on Eliquis), GERD, and chronic low back pain presenting s/p fall. Patient known to have chronic low back pain that has progressively worsened over the years. Pain acutely worsened two weeks ago and patient subsequently fell due to inability to support own weight. Evaluated by PCP after initial fall due to worsening pain. Prescribed Percocet 5-325 bid w/ mild relief. However, pt once again fell earlier today after pt unable to support her own weight while trying to ambulate. Now unable to ambulate or bear weight, even w/ assistance. No reported HT, LOC, lightheadedness, confusion, loss of continence, or focal weakness a/w either fall. No reported fevers, chills, CP, palpitations, SOB, abdominal pain, n/v/d, or dysuria (although daughter did note malodorous urine recently).    Of note, pt also reports new onset LE swelling over past 1-2 weeks. Recently started on furosemide 20 qd by PCP for swelling. No reported CP or dyspnea at rest or exertion (but functional status limited by lower back pain). No known prior h/o CHF.     In ED, pt HD stable on vitals. Labs demonstrated K 5.6 (hemolyzed). XR Bilateral Hips, Pelvis, and L-Spine overall unremarkable (wet read). S/P morphine 4mg IV and methocarbamol 1000mg PO. Admitted to medicine for inability to ambulate s/p fall. (24 Jan 2024 15:07)      PAST MEDICAL & SURGICAL HISTORY:  Chronic atrial fibrillation  GERD (gastroesophageal reflux disease)  Chronic lower back pain    No significant past surgical history      MEDICATIONS  (STANDING):  acetaminophen     Tablet .. 1000 milliGRAM(s) Oral every 8 hours  calcium carbonate   1250 mG (OsCal) 1 Tablet(s) Oral daily  chlorhexidine 2% Cloths 1 Application(s) Topical <User Schedule>  chlorhexidine 2% Cloths 1 Application(s) Topical daily  cholecalciferol 5000 Unit(s) Oral daily  fluticasone propionate 50 MICROgram(s)/spray Nasal Spray 1 Spray(s) Both Nostrils two times a day  furosemide    Tablet 20 milliGRAM(s) Oral daily  gabapentin 600 milliGRAM(s) Oral at bedtime  heparin  Infusion.  Unit(s)/Hr (12 mL/Hr) IV Continuous <Continuous>  lidocaine   4% Patch 1 Patch Transdermal every 24 hours  magnesium sulfate  IVPB 1 Gram(s) IV Intermittent once  metoprolol tartrate 25 milliGRAM(s) Oral three times a day  multivitamin 1 Tablet(s) Oral daily  pantoprazole    Tablet 40 milliGRAM(s) Oral before breakfast  polyethylene glycol 3350 17 Gram(s) Oral two times a day  rOPINIRole 0.25 milliGRAM(s) Oral two times a day  senna 2 Tablet(s) Oral at bedtime  tamsulosin 0.8 milliGRAM(s) Oral at bedtime    MEDICATIONS  (PRN):  heparin   Injectable 5500 Unit(s) IV Push every 6 hours PRN For aPTT less than 40  heparin   Injectable 2500 Unit(s) IV Push every 6 hours PRN For aPTT between 40 - 57  traMADol 50 milliGRAM(s) Oral daily PRN Severe Pain (7 - 10)      Allergies  No Known Allergies    Intolerances      FAMILY HISTORY:  Family history of stroke (Father)      Physical Exam:   Vital Signs Last 24 Hrs  T(C): 36.3 (26 Feb 2024 14:36), Max: 36.8 (26 Feb 2024 06:14)  T(F): 97.4 (26 Feb 2024 14:36), Max: 98.2 (26 Feb 2024 06:14)  HR: 99 (26 Feb 2024 14:36) (86 - 99)  BP: 114/65 (26 Feb 2024 14:36) (114/65 - 130/68)  BP(mean): --  RR: 18 (26 Feb 2024 14:36) (18 - 18)  SpO2: --      Labs:                         12.4   3.80  )-----------( 135      ( 26 Feb 2024 09:12 )             35.0     02-26    125<L>  |  86<L>  |  13  ----------------------------<  103<H>  3.5   |  26  |  0.8    Ca    8.7      26 Feb 2024 09:12  Mg     1.7     02-26    TPro  5.4<L>  /  Alb  3.4<L>  /  TBili  0.5  /  DBili  x   /  AST  33  /  ALT  27  /  AlkPhos  106  02-26    PTT - ( 26 Feb 2024 09:12 )  PTT:110.5 sec    Pertinent labs:                      12.4   3.80  )-----------( 135      ( 26 Feb 2024 09:12 )             35.0       02-26    125<L>  |  86<L>  |  13  ----------------------------<  103<H>  3.5   |  26  |  0.8    Ca    8.7      26 Feb 2024 09:12  Mg     1.7     02-26    TPro  5.4<L>  /  Alb  3.4<L>  /  TBili  0.5  /  DBili  x   /  AST  33  /  ALT  27  /  AlkPhos  106  02-26      PTT - ( 26 Feb 2024 09:12 )  PTT:110.5 sec    Radiology & Additional Studies:   Radiology imaging reviewed.       ASSESSMENT/ PLAN:     89 y/o F w/ h/o chronic afib (on Eliquis), GERD, and chronic low back pain presenting s/p fall. Admitted for back pain 2/2 pathologic lumbar compression fracture.     -Interventional radiology/Neuroradiology consulted for epidural steroid injection  -Imaging reviewed  -Patient has severe spinal stenosis from pathologic fracture at L1 likely significantly contributing to symptoms, and therefore is not amenable to epidural steroid injection for pain management      Thank you for the courtesy of this consult, please call [phone] with any further questions.

## 2024-02-26 NOTE — CONSULT NOTE ADULT - CONSULT REQUESTED DATE/TIME
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 Presentation consistent with CAP and patient showing significant improvement with 5 day course of IV ceftriaxone (end 5/28) and PO azithromycin (discharge 5/26). Low concern for atypical, fungal at this time, but will maintain high index of suspicion given history of immunosuppression. Following blood/sputum cultures, fungal serologies, and PJP prophylaxis. Heme/Onc following for auto-immune hemolytic anemia and CLL work up. Plan for Rituximab infusion tomorrow as patient's anemia has not improved with methylprednisolone trial.    Interval Changes:  -Plan for Rituximab infusion tomorrow  -Discontinue Azithromycin (finished 3 day course)  -PJP prophylaxis: Pentamidine nebulizer, stopped TMP-SMX  -Hepatitis B serologies      # AHRF secondary to possiblebilateral bacterial pneumonia vs AIHA  # Bilateral lower lobe bronchiectasis  # Bilateral lower lobe consolidations  Several days of worsenign fatigue/dyspnea, subjective chills, and pleuritic chest pain with 3-4L oxygen requirement with elevated inflammatory markers and CT imaging findings (bilateral nodules/infiltrates, bronchiectasis, bilateral lower lobe consolidations) concerning for CAP (5/23). Patient clinically improved with decreased O2 requirements on IV ceftriaxone and PO azithromycin treatment which is reassuring (2L O2). RVP, COVID, Flu, RSV, MRSA negative with low concern at this time for atypical/fungal PNA. Restarted PJP prophylaxis as well. Plan to continue 5 day course of antibiotics. Will continue to monitor.   - Supplemental 
16-Feb-2024 13:43
24-Jan-2024
26-Feb-2024 14:11
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 Hgb on admission 9.5 down from baseline (~10-12 past year) and now  ~8. Currently hemodynamically stable with labs concerning for marrow responsive anemia (elevated LDH, bilirubin, reticulocyte count). B12, folate, iron studies are normal and YEN levels slightly elevated.   Patient anemia has not seemed to improve with methylprednisolone dose and suspect that we will need to treat underlying CLL for long-term solution. Plan to give rituximab tomorrow (check Hep B serology prior to infusion). Continue to monitor   - Heme/Onc consulted, appreciate recommendations    - Continue Methylprednisolone 1 mg/kg   - Rituximab infusion planned (5/27)   - Hep B serology labs   - CBC with peripheral blood smear   - Continue PTA Folate, B12   - Transfuse if Hgb < 7 (Type and Screen)     # CLL (dx 2/2007)  History of CLL (2007) managed on Ibrutinib (5/2019) which patient is not taking daily. WBC 24.9 on admission (up from baseline ~6-10) now down to 20.4 (5/24). Some concern for conversion to leukemia in setting of elevated white count with abnormal CT findings, however presentation is likely secondary to infectious etiology in setting of acute illness with improving WBC count. Will further evaluate with FISH and cytogenetics for disease prognostication and continue to monitor with peripheral smear. Continue daily ibrutinib to help control disease (may consider acalabrutinib vs venetoclax + obinutuzumab for less cardiotoxic effects 
05-Feb-2024 17:07
08-Feb-2024 18:56
26-Jan-2024 15:14
if ibrutinib resistance). Lastly, labs notable for low IgG so patient given IVIG infusion (5/25)   - Hematology/Oncology consulted, appreciate recommendations   -Following Flow cytometry, cytogenetics, IgH mutation, TP53 mutation   - CBC trend   - Heme/Onc outpatient follow up on discharge    # CKD Stage 3A  Cr of 1.31 on admission up from baseline and now down to ~1.2 with fluids. Suspect Cr elevation due of decreased fluid intake in setting of acute illness given improvement with fluids. Will continue to monitor.    - Avoid nephrotoxic agents as able   - Routine renal function monitoring      Chronic/Resolved Problems:   # Degenerative disc disease  # Bilateral intermittent hand cramping, spasms suspicious for cervical impingement  Patient with history of degenerative disc disorder with concern for cervical impingement in setting of new bilateral intermittent hand cramping. Patient met with Dr. Melton his sports medicine doctor (AM 5/24) over phone for follow up appointment to discuss results of his MRI. Will defer to outpatient management.     # Hx Herpes Zoster c/b post-herpetic neuralgia: PTA ppx Acyclovir while on prednisone     # Hypothyroid - TSH 1.94 on admission WNL. Continue PTA levothyroxine  # HLD- PTA atorvastatin  # GERD- PTA omeprazole + PRN famotidine (also on Pred)  # Insomnia- has PRN Ambien as OP  # Vitamin supplements- PTA B12, folate, D3          Diet: Combination Diet Regular Diet Adult    DVT Prophylaxis: Pneumatic Compression Devices  Piña Catheter: Not present  Central Lines: None  Cardiac Monitoring: None  Code Status: Full Code      Disposition Plan   Expected Discharge: 2-3 days pending progress  Anticipated discharge location:  Awaiting care coordination huddle       The patient's care was discussed with the Attending Physician, Dr. Zamora .    Jerry Arevalo, MS4  Medicine Service, [address]  Securely message with the [employer] 
"Web Console (learn more here)  Text page via [facility] Paging/Directory   Please see signed in provider for up to date coverage information      Resident/Fellow Attestation   I, Siddhartha Mcgee MD, was present with the medical/MELLY student who participated in the service and in the documentation of the note.  I have verified the history and personally performed the physical exam and medical decision making.  I agree with the assessment and plan of care as documented in the note.      Siddhartha Mcgee MD  PGY3  Date of Service (when I saw the patient): 05/26/22      Clinically Significant Risk Factors Present on Admission              # Overweight: Estimated body mass index is 25.2 kg/m  as calculated from the following:    Height as of this encounter: 1.676 m (5' 6\").    Weight as of this encounter: 70.8 kg (156 lb 1.6 oz).      ______________________________________________________________________    Interval History    No acute overnight events. Patient breathing comfortably on 3L overnight now resting comfortably on 2L. Patient reports that they have not noticed significant changes in their breathing, but do note that their previous pleuritic chest pain has improved and is more akin to chest discomfort now. Patient states that he has a good appetite and slept well. He denies headache, fever/chills, abdominal pain, nausea/vomiting.    Data reviewed today: I reviewed all medications, new labs and imaging results over the last 24 hours.       Physical Exam   Vital Signs: Temp: (!) 96.1  F (35.6  C) Temp src: Oral BP: 107/60 Pulse: 83   Resp: 18 SpO2: 94 % O2 Device: Nasal cannula Oxygen Delivery: 2 LPM  Weight: 156 lbs 1.6 oz  GEN: Resting comfortably in bed, less shallow breathing  HEENT: Normocephalic   CVS: RRR, Normal S1, S2. No murmurs  Pulm: No acute respiratory distress. No chest wall tenderness to palpation. Lung bases with crackles and improved air movement.  Abd: Soft, non-tender to palpation  Ext: No "
09-Feb-2024 11:18
25-Feb-2024
20-Feb-2024 18:43
30-Jan-2024 15:25
ANALY  Skin: no rash, lesion, or bruising noted on exposed surfaces  Neuro: A&Ox4, answering questions appropriately, moving all extremities spontaneously  Psych: Pleasant, engaged in exam    Data   Recent Labs   Lab 05/26/22  0549 05/25/22  0616 05/24/22  1651 05/24/22  0321 05/23/22  1724   WBC 22.0* 20.4* 27.1*   < > 24.9*   HGB 8.0* 8.8* 10.0*   < > 9.5*   * 112* 112*   < > 111*    163 201   < > 171    138  --   --  138   POTASSIUM 3.8 4.1  --   --  4.0   CHLORIDE 106 103  --   --  104   CO2 25 27  --   --  25   BUN 29 28  --   --  25   CR 1.18 1.27*  --   --  1.31*   ANIONGAP 7 8  --   --  9   DAVID 8.7 9.0  --   --  8.8   * 168*  --   --  180*   ALBUMIN 2.5*  --   --   --  3.2*   PROTTOTAL 5.6*  --   --   --  5.9*   BILITOTAL 0.8 1.7*  --   --  2.5*   ALKPHOS 52  --   --   --  56   ALT 19  --   --   --  28   AST 13  --   --   --  19    < > = values in this interval not displayed.     
16-Feb-2024 11:39
01-Feb-2024 16:29
31-Jan-2024 13:48

## 2024-02-26 NOTE — PROGRESS NOTE ADULT - SUBJECTIVE AND OBJECTIVE BOX
HPI:    89F w/ h/o chronic afib (on Eliquis), GERD, and chronic low back pain presenting s/p fall. Patient known to have chronic low back pain that has progressively worsened over the years. Pain acutely worsened two weeks ago and patient subsequently fell due to inability to support own weight. Evaluated by PCP after initial fall due to worsening pain. Prescribed Percocet 5-325 bid w/ mild relief. However, pt once again fell earlier today after pt unable to support her own weight while trying to ambulate. Now unable to ambulate or bear weight, even w/ assistance. No reported HT, LOC, lightheadedness, confusion, loss of continence, or focal weakness a/w either fall. No reported fevers, chills, CP, palpitations, SOB, abdominal pain, n/v/d, or dysuria (although daughter did note malodorous urine recently).    Interval history  -Patient seen at bedside with alyssa Garcia  -Patient notes leg pain with movement, but only mild pain at rest  2/7: Patient now DNR/DNI, added 10mg oxycontin Q12H for consistent pain  2/12: added PRN oxycodone 5mg Q6H for pain  2/14: stopped oxycodone ER 10mg since patient has been more somnolent and hypotensive occasionally   2/16: Patient was upgraded to telemetry, placed on Cardizem infusion after OR  2/20: Patient sleeping comfortably, spoke with daughter at bedside who reports that patient's pain has been OK but she worked with PT which did exacerbate it a bit.   2/26: Patient resting comfortably, plan for potential IR for epidural     ADVANCE DIRECTIVES:     [ ] Full Code [x ] DNR  MOLST  [ ]  Living Will  [ ]   DECISION MAKER(s):  [ ] Health Care Proxy(s)  [x ] Surrogate(s)  [ ] Guardian           Name(s): Phone Number(s):  Daughter      BASELINE (I)ADL(s) (prior to admission):    [place]: [ ]Total  [ ] Moderate [ ]Dependent  Palliative Performance Status Version 2:         %    [email or web address]    Allergies    No Known Allergies    Intolerances    MEDICATIONS  (STANDING):  acetaminophen     Tablet .. 1000 milliGRAM(s) Oral every 8 hours  calcium carbonate   1250 mG (OsCal) 1 Tablet(s) Oral daily  chlorhexidine 2% Cloths 1 Application(s) Topical <User Schedule>  chlorhexidine 2% Cloths 1 Application(s) Topical daily  cholecalciferol 5000 Unit(s) Oral daily  fluticasone propionate 50 MICROgram(s)/spray Nasal Spray 1 Spray(s) Both Nostrils two times a day  furosemide    Tablet 20 milliGRAM(s) Oral daily  gabapentin 600 milliGRAM(s) Oral at bedtime  heparin  Infusion.  Unit(s)/Hr (12 mL/Hr) IV Continuous <Continuous>  lidocaine   4% Patch 1 Patch Transdermal every 24 hours  metoprolol tartrate 25 milliGRAM(s) Oral three times a day  multivitamin 1 Tablet(s) Oral daily  pantoprazole    Tablet 40 milliGRAM(s) Oral before breakfast  polyethylene glycol 3350 17 Gram(s) Oral two times a day  rOPINIRole 0.25 milliGRAM(s) Oral two times a day  senna 2 Tablet(s) Oral at bedtime  tamsulosin 0.8 milliGRAM(s) Oral at bedtime    MEDICATIONS  (PRN):  heparin   Injectable 5500 Unit(s) IV Push every 6 hours PRN For aPTT less than 40  heparin   Injectable 2500 Unit(s) IV Push every 6 hours PRN For aPTT between 40 - 57  traMADol 50 milliGRAM(s) Oral daily PRN Severe Pain (7 - 10)            PRESENT SYMPTOMS: [  ]Unable to obtain due to poor mentation   Source if other than patient:  [ ]Family   [ ]Team     Pain: [ ]yes [x ]no  QOL impact -    Location -   Aggravating factors -   Quality -   Radiation -   Timing-  Severity (0-10 scale):   Minimal acceptable level (0-10 scale):     CPOT:    https://www.[facility].org/getattachment/[email or web address]/Critical-Care-Pain-Observation-Tool-(CPOT)    PAIN AD Score:   [email or web address] (press ctrl +  left click to view)    Dyspnea:                           [x ]None[ ]Mild [ ]Moderate [ ]Severe     Respiratory Distress Observation Scale (RDOS):   A score of 0 to 2 signifies little or no respiratory distress, 3 signifies mild distress, scores 4 to 6 indicate moderate distress, and scores greater than 7 signify severe distress  [email or web address]/[email or web address]/[email or web address]-[email or web address]    Anxiety:                             [ x]None[ ]Mild [ ]Moderate [ ]Severe   Fatigue:                             [x ]None[ ]Mild [ ]Moderate [ ]Severe   Nausea:                             [ x]None[ ]Mild [ ]Moderate [ ]Severe   Loss of appetite:              [x ]None[ ]Mild [ ]Moderate [ ]Severe   Constipation:                    [x ]None[ ]Mild [ ]Moderate [ ]Severe    Other Symptoms:  [ x]All other review of systems negative     Palliative Performance Status Version 2:         30%    [email or web address]    PHYSICAL EXAM:    Vital Signs Last 24 Hrs  T(C): 36.8 (26 Feb 2024 06:14), Max: 36.8 (26 Feb 2024 06:14)  T(F): 98.2 (26 Feb 2024 06:14), Max: 98.2 (26 Feb 2024 06:14)  HR: 86 (26 Feb 2024 06:14) (86 - 87)  BP: 130/68 (26 Feb 2024 06:14) (130/53 - 130/68)  BP(mean): --  RR: 18 (26 Feb 2024 06:14) (18 - 18)  SpO2: --        GENERAL:  [ ] No acute distress [ ]Lethargic  [ ]Unarousable  [ x]Verbal  [ ]Non-Verbal [ ]Cachexia    BEHAVIORAL/PSYCH:  [x ]Alert and Oriented x2  [ ] Anxiety [ ] Delirium [ ] Agitation [ x] Calm   EYES: [ x] No scleral icterus [ ] Scleral icterus [ ] Closed  ENMT:  [ ]Dry mouth  [x ]No external oral lesions [ ] No external ear or nose lesions  CARDIOVASCULAR:  [ ]Regular [ ]Irregular [x ]Tachy [ ]Not Tachy  [ ]Sung [ x] Edema [ ] No edema  PULMONARY:  [ ]Tachypnea  [ ]Audible excessive secretions [ x] No labored breathing [ ] labored breathing  GASTROINTESTINAL: [ ]Soft  [ ]Distended  [x ]Not distended [ ]Non tender [ ]Tender  MUSCULOSKELETAL: [ ]No clubbing [ ] clubbing  [x ] No cyanosis [ ] cyanosis  NEUROLOGIC: [ ]No focal deficits  [x ]Follows commands  [ ]Does not follow commands  [ x]Cognitive impairment  [ ]Dysphagia  [ ]Dysarthria  [ ]Paresis   SKIN: [ ] Jaundiced [ x] Non-jaundiced [ ]Rash [ ]No Rash [ ] Warm [ ] Dry  MISC/LINES: [ ] ET tube [ ] Trach [ ]NGT/OGT [ ]PEG [ ]David    LABS: reviewed by me                            12.4   3.80  )-----------( 135      ( 26 Feb 2024 09:12 )             35.0     02-26    125<L>  |  86<L>  |  13  ----------------------------<  103<H>  3.5   |  26  |  0.8    Ca    8.7      26 Feb 2024 09:12  Mg     1.7     02-26    TPro  5.4<L>  /  Alb  3.4<L>  /  TBili  0.5  /  DBili  x   /  AST  33  /  ALT  27  /  AlkPhos  106  02-26    PTT - ( 26 Feb 2024 09:12 )  PTT:110.5 sec  Urinalysis Basic - ( 26 Feb 2024 09:12 )    Color: x / Appearance: x / SG: x / pH: x  Gluc: 103 mg/dL / Ketone: x  / Bili: x / Urobili: x   Blood: x / Protein: x / Nitrite: x   Leuk Esterase: x / RBC: x / WBC x   Sq Epi: x / Non Sq Epi: x / Bacteria: x          RADIOLOGY & ADDITIONAL STUDIES: reviewed by me    < from: NM SPECT Bone Scan, Single Area Single Day (02.01.24 @ 16:43) >  IMPRESSION:    Abnormal uptake in the L1 vertebral body, corresponding to known   compression fracture deformity secondary to vertebral body mass.    No other evidence for metastatic bone disease.    < end of copied text >        EKG: reviewed by me    < from: 12 Lead ECG (01.25.24 @ 11:34) >  Ventricular Rate 94 BPM    Atrial Rate 81 BPM    QRS Duration 90 ms    Q-T Interval 384 ms    QTC Calculation(Bazett) 480 ms    R Axis 166 degrees    T Axis -48 degrees    Diagnosis Line Atrial fibrillation  Right ventricular hypertrophy  Anterior infarct , age undetermined  ST & T wave abnormality, consider inferior ischemia  Abnormal ECG    < end of copied text >      Patient discussed with primary medical team MD  Palliative care education provided to patient and/or family

## 2024-02-26 NOTE — PROGRESS NOTE ADULT - SUBJECTIVE AND OBJECTIVE BOX
24H events:    Patient is a 89y old Female who presents with a chief complaint of Back Pain (25 Feb 2024 20:35)    Primary diagnosis of Inability to ambulate due to multiple joints       Today is hospital day 33d.      PAST MEDICAL & SURGICAL HISTORY  Chronic atrial fibrillation    GERD (gastroesophageal reflux disease)    Chronic lower back pain    No significant past surgical history      SOCIAL HISTORY:  Negative for smoking/alcohol/drug use.     ALLERGIES:  No Known Allergies    MEDICATIONS:  STANDING MEDICATIONS  acetaminophen     Tablet .. 1000 milliGRAM(s) Oral every 8 hours  calcium carbonate   1250 mG (OsCal) 1 Tablet(s) Oral daily  chlorhexidine 2% Cloths 1 Application(s) Topical <User Schedule>  chlorhexidine 2% Cloths 1 Application(s) Topical daily  cholecalciferol 5000 Unit(s) Oral daily  fluticasone propionate 50 MICROgram(s)/spray Nasal Spray 1 Spray(s) Both Nostrils two times a day  furosemide    Tablet 20 milliGRAM(s) Oral daily  gabapentin 600 milliGRAM(s) Oral at bedtime  heparin  Infusion.  Unit(s)/Hr IV Continuous <Continuous>  lidocaine   4% Patch 1 Patch Transdermal every 24 hours  metoprolol tartrate 25 milliGRAM(s) Oral three times a day  multivitamin 1 Tablet(s) Oral daily  pantoprazole    Tablet 40 milliGRAM(s) Oral before breakfast  polyethylene glycol 3350 17 Gram(s) Oral two times a day  rOPINIRole 0.25 milliGRAM(s) Oral two times a day  senna 2 Tablet(s) Oral at bedtime  tamsulosin 0.8 milliGRAM(s) Oral at bedtime    PRN MEDICATIONS  heparin   Injectable 5500 Unit(s) IV Push every 6 hours PRN  heparin   Injectable 2500 Unit(s) IV Push every 6 hours PRN  traMADol 50 milliGRAM(s) Oral daily PRN    VITALS:   T(F): 98.2  HR: 86  BP: 130/68  RR: 18  SpO2: --    LABS:                        12.4   3.80  )-----------( 135      ( 26 Feb 2024 09:12 )             35.0     02-26    125<L>  |  86<L>  |  13  ----------------------------<  103<H>  3.5   |  26  |  0.8    Ca    8.7      26 Feb 2024 09:12  Mg     1.7     02-26    TPro  5.4<L>  /  Alb  3.4<L>  /  TBili  0.5  /  DBili  x   /  AST  33  /  ALT  27  /  AlkPhos  106  02-26    PTT - ( 26 Feb 2024 09:12 )  PTT:110.5 sec  Urinalysis Basic - ( 26 Feb 2024 09:12 )    Color: x / Appearance: x / SG: x / pH: x  Gluc: 103 mg/dL / Ketone: x  / Bili: x / Urobili: x   Blood: x / Protein: x / Nitrite: x   Leuk Esterase: x / RBC: x / WBC x   Sq Epi: x / Non Sq Epi: x / Bacteria: x                RADIOLOGY:    PHYSICAL EXAM:  GENERAL: NAD  NECK: Supple, No JVD, Normal thyroid  NERVOUS SYSTEM:  Alert & Oriented X1-2  CHEST/LUNG: Clear to auscultation bilaterally  HEART: Regular rate and rhythm  ABDOMEN: Soft, Nontender  EXTREMITIES:  2+ Peripheral pulses

## 2024-02-26 NOTE — PROGRESS NOTE ADULT - ASSESSMENT
89F w/ h/o chronic afib (on Eliquis), GERD, and chronic low back pain presenting s/p fall. Admitted to medicine for back pain 2/2 Lumbar compression fracture.     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 Was called for low blood pressure, which improved on repeat readings, patient's blood pressure runs low, d/aleksandar the oxycodone  - laminoplasty cancelled and pt is not a surgical candidate  - s/p cardizem drip and now on metoprolol 25mg tid  - Pain management recs appreciated: daughter refusing oxy as it causes sedation in her mother, even prn dose   1. acetaminophen 1000mg q8h standing  2. Gabapentin 600mg qhs standing, if tolerated (no sedation/confusion)  3. Methocarbamol 750mg q8h standing  - pain states can do epidural outpatient, but unlikely that patient will be able to present to clinic for epidural, f/u with pain to see if this can be done inpatient  - LATOYA brace for PT  - physiatry recommending STR in SNF   Heparin drip started in anticipation of procedure as eliquis has been held since 2/22 and CHADSVASC 5    #CHF, diastolic - acute -- now resolved  - On 2/10 had episode of unresponsives and bradycardia/hypotension. Likely vasovagal. CT head negative  - TTE (May 2013) demonstrated LVEF 55-65% w/ normal global LVSF and no significant valvulopathy.  - TTE 1/28- EF 60-65% mod pulm HTN   - elevated BNP  - Tele monitored for 24 hrs with no dysrhythmia   - Currently euvolemic on exam, transition back to home oral lasix 20 daily - dced on 2/19  - pt was given lasix on 2/23/24 as her last dose    #hyponatremia  - Na has been trending downwards and is 124  - asymptomatic  - Pt was given lasix on 2/23   - her serum osm is 165 which is consistent with thiazide induced hyponatremia  - for now will continue bmp bid   - considering she also has CHF was minimal sacral edema    #Chronic Atrial Fibrillation  #acute RVR, resolved  CHADS-VASC 5.   - eliquis held preop  - started on heparin drip which was explained to the daughter  - c/w metoprolol 25mg tid    #GERD  - c/w pantoprazole 40mg PO QD    #CKD 3b   - per PCP baseline is 1.2    #UTI - resolved  -UA+  -completed 3 day course of ceftriaxone    #Urinary retention - likely neurogenic   - hanson placed 1/29  - c/w flomax 0.8mg   - completed RT    #Constipation  - c/w aggressive bowel regimen: senna, miralax   -monitor BMs daily until resolves     # HTN  - c/w metoprolol 25mg TID                                                                          # DVT prophylaxis: heparin drip  # GI prophylaxis: Protonix  # Diet: DASH/TLC  # Activity: IAT  # Code status: DNR/DNI  # Disposition: [facility] vs Home with home PT

## 2024-02-27 LAB
ANION GAP SERPL CALC-SCNC: 10 MMOL/L — SIGNIFICANT CHANGE UP (ref 7–14)
ANION GAP SERPL CALC-SCNC: 10 MMOL/L — SIGNIFICANT CHANGE UP (ref 7–14)
APTT BLD: 86.2 SEC — CRITICAL HIGH (ref 27–39.2)
BASOPHILS # BLD AUTO: 0.02 K/UL — SIGNIFICANT CHANGE UP (ref 0–0.2)
BASOPHILS NFR BLD AUTO: 0.5 % — SIGNIFICANT CHANGE UP (ref 0–1)
BUN SERPL-MCNC: 16 MG/DL — SIGNIFICANT CHANGE UP (ref 10–20)
BUN SERPL-MCNC: 17 MG/DL — SIGNIFICANT CHANGE UP (ref 10–20)
CALCIUM SERPL-MCNC: 8.3 MG/DL — LOW (ref 8.4–10.4)
CALCIUM SERPL-MCNC: 8.4 MG/DL — SIGNIFICANT CHANGE UP (ref 8.4–10.5)
CHLORIDE SERPL-SCNC: 87 MMOL/L — LOW (ref 98–110)
CHLORIDE SERPL-SCNC: 88 MMOL/L — LOW (ref 98–110)
CO2 SERPL-SCNC: 27 MMOL/L — SIGNIFICANT CHANGE UP (ref 17–32)
CO2 SERPL-SCNC: 28 MMOL/L — SIGNIFICANT CHANGE UP (ref 17–32)
CREAT SERPL-MCNC: 1 MG/DL — SIGNIFICANT CHANGE UP (ref 0.7–1.5)
CREAT SERPL-MCNC: 1 MG/DL — SIGNIFICANT CHANGE UP (ref 0.7–1.5)
EGFR: 54 ML/MIN/1.73M2 — LOW
EGFR: 54 ML/MIN/1.73M2 — LOW
EOSINOPHIL # BLD AUTO: 0.18 K/UL — SIGNIFICANT CHANGE UP (ref 0–0.7)
EOSINOPHIL NFR BLD AUTO: 4.5 % — SIGNIFICANT CHANGE UP (ref 0–8)
GLUCOSE SERPL-MCNC: 98 MG/DL — SIGNIFICANT CHANGE UP (ref 70–99)
GLUCOSE SERPL-MCNC: 99 MG/DL — SIGNIFICANT CHANGE UP (ref 70–99)
HCT VFR BLD CALC: 29.3 % — LOW (ref 37–47)
HGB BLD-MCNC: 10.2 G/DL — LOW (ref 12–16)
IMM GRANULOCYTES NFR BLD AUTO: 0.2 % — SIGNIFICANT CHANGE UP (ref 0.1–0.3)
LYMPHOCYTES # BLD AUTO: 1.4 K/UL — SIGNIFICANT CHANGE UP (ref 1.2–3.4)
LYMPHOCYTES # BLD AUTO: 34.9 % — SIGNIFICANT CHANGE UP (ref 20.5–51.1)
MCHC RBC-ENTMCNC: 32.3 PG — HIGH (ref 27–31)
MCHC RBC-ENTMCNC: 34.8 G/DL — SIGNIFICANT CHANGE UP (ref 32–37)
MCV RBC AUTO: 92.7 FL — SIGNIFICANT CHANGE UP (ref 81–99)
MONOCYTES # BLD AUTO: 0.48 K/UL — SIGNIFICANT CHANGE UP (ref 0.1–0.6)
MONOCYTES NFR BLD AUTO: 12 % — HIGH (ref 1.7–9.3)
NEUTROPHILS # BLD AUTO: 1.92 K/UL — SIGNIFICANT CHANGE UP (ref 1.4–6.5)
NEUTROPHILS NFR BLD AUTO: 47.9 % — SIGNIFICANT CHANGE UP (ref 42.2–75.2)
NRBC # BLD: 0 /100 WBCS — SIGNIFICANT CHANGE UP (ref 0–0)
OSMOLALITY UR: 224 MOS/KG — SIGNIFICANT CHANGE UP (ref 50–1200)
PLATELET # BLD AUTO: 124 K/UL — LOW (ref 130–400)
PMV BLD: 9.7 FL — SIGNIFICANT CHANGE UP (ref 7.4–10.4)
POTASSIUM SERPL-MCNC: 3.7 MMOL/L — SIGNIFICANT CHANGE UP (ref 3.5–5)
POTASSIUM SERPL-MCNC: 3.8 MMOL/L — SIGNIFICANT CHANGE UP (ref 3.5–5)
POTASSIUM SERPL-SCNC: 3.7 MMOL/L — SIGNIFICANT CHANGE UP (ref 3.5–5)
POTASSIUM SERPL-SCNC: 3.8 MMOL/L — SIGNIFICANT CHANGE UP (ref 3.5–5)
POTASSIUM UR-SCNC: 17 MMOL/L — SIGNIFICANT CHANGE UP
RBC # BLD: 3.16 M/UL — LOW (ref 4.2–5.4)
RBC # FLD: 14.4 % — SIGNIFICANT CHANGE UP (ref 11.5–14.5)
SODIUM SERPL-SCNC: 125 MMOL/L — LOW (ref 135–146)
SODIUM SERPL-SCNC: 125 MMOL/L — LOW (ref 135–146)
SODIUM UR-SCNC: 51 MMOL/L — SIGNIFICANT CHANGE UP
WBC # BLD: 4.01 K/UL — LOW (ref 4.8–10.8)
WBC # FLD AUTO: 4.01 K/UL — LOW (ref 4.8–10.8)

## 2024-02-27 PROCEDURE — 99232 SBSQ HOSP IP/OBS MODERATE 35: CPT

## 2024-02-27 RX ORDER — FUROSEMIDE 40 MG
20 TABLET ORAL ONCE
Refills: 0 | Status: COMPLETED | OUTPATIENT
Start: 2024-02-27 | End: 2024-02-27

## 2024-02-27 RX ORDER — FUROSEMIDE 40 MG
40 TABLET ORAL DAILY
Refills: 0 | Status: DISCONTINUED | OUTPATIENT
Start: 2024-02-28 | End: 2024-03-03

## 2024-02-27 RX ORDER — APIXABAN 2.5 MG/1
2.5 TABLET, FILM COATED ORAL EVERY 12 HOURS
Refills: 0 | Status: DISCONTINUED | OUTPATIENT
Start: 2024-02-27 | End: 2024-02-27

## 2024-02-27 RX ORDER — APIXABAN 2.5 MG/1
5 TABLET, FILM COATED ORAL EVERY 12 HOURS
Refills: 0 | Status: DISCONTINUED | OUTPATIENT
Start: 2024-02-27 | End: 2024-03-03

## 2024-02-27 RX ADMIN — APIXABAN 5 MILLIGRAM(S): 2.5 TABLET, FILM COATED ORAL at 17:28

## 2024-02-27 RX ADMIN — LIDOCAINE 1 PATCH: 4 CREAM TOPICAL at 05:58

## 2024-02-27 RX ADMIN — Medication 5000 UNIT(S): at 12:33

## 2024-02-27 RX ADMIN — HEPARIN SODIUM 800 UNIT(S)/HR: 5000 INJECTION INTRAVENOUS; SUBCUTANEOUS at 05:59

## 2024-02-27 RX ADMIN — Medication 25 MILLIGRAM(S): at 21:24

## 2024-02-27 RX ADMIN — Medication 1000 MILLIGRAM(S): at 13:02

## 2024-02-27 RX ADMIN — CHLORHEXIDINE GLUCONATE 1 APPLICATION(S): 213 SOLUTION TOPICAL at 06:00

## 2024-02-27 RX ADMIN — ROPINIROLE 0.25 MILLIGRAM(S): 8 TABLET, FILM COATED, EXTENDED RELEASE ORAL at 05:59

## 2024-02-27 RX ADMIN — Medication 25 MILLIGRAM(S): at 05:59

## 2024-02-27 RX ADMIN — Medication 1000 MILLIGRAM(S): at 21:24

## 2024-02-27 RX ADMIN — PANTOPRAZOLE SODIUM 40 MILLIGRAM(S): 20 TABLET, DELAYED RELEASE ORAL at 05:59

## 2024-02-27 RX ADMIN — HEPARIN SODIUM 800 UNIT(S)/HR: 5000 INJECTION INTRAVENOUS; SUBCUTANEOUS at 07:41

## 2024-02-27 RX ADMIN — ROPINIROLE 0.25 MILLIGRAM(S): 8 TABLET, FILM COATED, EXTENDED RELEASE ORAL at 17:27

## 2024-02-27 RX ADMIN — Medication 20 MILLIGRAM(S): at 05:59

## 2024-02-27 RX ADMIN — POLYETHYLENE GLYCOL 3350 17 GRAM(S): 17 POWDER, FOR SOLUTION ORAL at 05:58

## 2024-02-27 RX ADMIN — GABAPENTIN 600 MILLIGRAM(S): 400 CAPSULE ORAL at 21:24

## 2024-02-27 RX ADMIN — LIDOCAINE 1 PATCH: 4 CREAM TOPICAL at 07:29

## 2024-02-27 RX ADMIN — Medication 20 MILLIGRAM(S): at 10:39

## 2024-02-27 RX ADMIN — Medication 1 SPRAY(S): at 17:29

## 2024-02-27 RX ADMIN — Medication 25 MILLIGRAM(S): at 13:03

## 2024-02-27 RX ADMIN — Medication 1 SPRAY(S): at 06:00

## 2024-02-27 RX ADMIN — METHOCARBAMOL 750 MILLIGRAM(S): 500 TABLET, FILM COATED ORAL at 21:24

## 2024-02-27 RX ADMIN — METHOCARBAMOL 750 MILLIGRAM(S): 500 TABLET, FILM COATED ORAL at 05:59

## 2024-02-27 RX ADMIN — Medication 1 TABLET(S): at 12:32

## 2024-02-27 RX ADMIN — METHOCARBAMOL 750 MILLIGRAM(S): 500 TABLET, FILM COATED ORAL at 13:02

## 2024-02-27 RX ADMIN — LIDOCAINE 1 PATCH: 4 CREAM TOPICAL at 17:29

## 2024-02-27 RX ADMIN — TAMSULOSIN HYDROCHLORIDE 0.8 MILLIGRAM(S): 0.4 CAPSULE ORAL at 21:24

## 2024-02-27 RX ADMIN — CHLORHEXIDINE GLUCONATE 1 APPLICATION(S): 213 SOLUTION TOPICAL at 12:33

## 2024-02-27 RX ADMIN — Medication 1000 MILLIGRAM(S): at 06:00

## 2024-02-27 NOTE — PROGRESS NOTE ADULT - SUBJECTIVE AND OBJECTIVE BOX
24H events:    Patient is a 89y old Female who presents with a chief complaint of Back Pain (27 Feb 2024 12:20)    Primary diagnosis of Inability to ambulate due to multiple joints       Today is hospital day 34d.      PAST MEDICAL & SURGICAL HISTORY  Chronic atrial fibrillation    GERD (gastroesophageal reflux disease)    Chronic lower back pain    No significant past surgical history      SOCIAL HISTORY:  Negative for smoking/alcohol/drug use.     ALLERGIES:  No Known Allergies    MEDICATIONS:  STANDING MEDICATIONS  acetaminophen     Tablet .. 1000 milliGRAM(s) Oral every 8 hours  apixaban 2.5 milliGRAM(s) Oral every 12 hours  calcium carbonate   1250 mG (OsCal) 1 Tablet(s) Oral daily  chlorhexidine 2% Cloths 1 Application(s) Topical daily  chlorhexidine 2% Cloths 1 Application(s) Topical <User Schedule>  cholecalciferol 5000 Unit(s) Oral daily  fluticasone propionate 50 MICROgram(s)/spray Nasal Spray 1 Spray(s) Both Nostrils two times a day  gabapentin 600 milliGRAM(s) Oral at bedtime  lidocaine   4% Patch 1 Patch Transdermal every 24 hours  methocarbamol 750 milliGRAM(s) Oral every 8 hours  metoprolol tartrate 25 milliGRAM(s) Oral three times a day  multivitamin 1 Tablet(s) Oral daily  pantoprazole    Tablet 40 milliGRAM(s) Oral before breakfast  polyethylene glycol 3350 17 Gram(s) Oral two times a day  rOPINIRole 0.25 milliGRAM(s) Oral two times a day  senna 2 Tablet(s) Oral at bedtime  tamsulosin 0.8 milliGRAM(s) Oral at bedtime    PRN MEDICATIONS  traMADol 50 milliGRAM(s) Oral daily PRN    VITALS:   T(F): 97.5  HR: 71  BP: 119/58  RR: 18  SpO2: --    LABS:                        10.2   4.01  )-----------( 124      ( 27 Feb 2024 07:24 )             29.3     02-27    125<L>  |  88<L>  |  16  ----------------------------<  99  3.8   |  27  |  1.0    Ca    8.3<L>      27 Feb 2024 07:24  Mg     1.7     02-26    TPro  5.4<L>  /  Alb  3.4<L>  /  TBili  0.5  /  DBili  x   /  AST  33  /  ALT  27  /  AlkPhos  106  02-26    PTT - ( 27 Feb 2024 07:24 )  PTT:86.2 sec  Urinalysis Basic - ( 27 Feb 2024 07:24 )    Color: x / Appearance: x / SG: x / pH: x  Gluc: 99 mg/dL / Ketone: x  / Bili: x / Urobili: x   Blood: x / Protein: x / Nitrite: x   Leuk Esterase: x / RBC: x / WBC x   Sq Epi: x / Non Sq Epi: x / Bacteria: x                RADIOLOGY:    PHYSICAL EXAM:  GENERAL: NAD  NECK: Supple, No JVD, Normal thyroid  NERVOUS SYSTEM:  Alert & Oriented X3  CHEST/LUNG: Clear to auscultation bilaterally  HEART: Regular rate and rhythm  ABDOMEN: Soft, Nontender  EXTREMITIES:  2+ Peripheral Pulses

## 2024-02-27 NOTE — PROGRESS NOTE ADULT - SUBJECTIVE AND OBJECTIVE BOX
HPI:    89F w/ h/o chronic afib (on Eliquis), GERD, and chronic low back pain presenting s/p fall. Patient known to have chronic low back pain that has progressively worsened over the years. Pain acutely worsened two weeks ago and patient subsequently fell due to inability to support own weight. Evaluated by PCP after initial fall due to worsening pain. Prescribed Percocet 5-325 bid w/ mild relief. However, pt once again fell earlier today after pt unable to support her own weight while trying to ambulate. Now unable to ambulate or bear weight, even w/ assistance. No reported HT, LOC, lightheadedness, confusion, loss of continence, or focal weakness a/w either fall. No reported fevers, chills, CP, palpitations, SOB, abdominal pain, n/v/d, or dysuria (although daughter did note malodorous urine recently).    Interval history  -Patient seen at bedside with alyssa Garcia  -Patient notes leg pain with movement, but only mild pain at rest  2/7: Patient now DNR/DNI, added 10mg oxycontin Q12H for consistent pain  2/12: added PRN oxycodone 5mg Q6H for pain  2/14: stopped oxycodone ER 10mg since patient has been more somnolent and hypotensive occasionally   2/16: Patient was upgraded to telemetry, placed on Cardizem infusion after OR  2/20: Patient sleeping comfortably, spoke with daughter at bedside who reports that patient's pain has been OK but she worked with PT which did exacerbate it a bit.   2/26: Patient resting comfortably, plan for potential IR for epidural     ADVANCE DIRECTIVES:     [ ] Full Code [x ] DNR  MOLST  [ ]  Living Will  [ ]   DECISION MAKER(s):  [ ] Health Care Proxy(s)  [x ] Surrogate(s)  [ ] Guardian           Name(s): Phone Number(s):  Daughter      BASELINE (I)ADL(s) (prior to admission):    [place]: [ ]Total  [ ] Moderate [ ]Dependent  Palliative Performance Status Version 2:         %    http://[email or web address]/files/[email or web address]_performance_[email or web address]_[email or web address]    Allergies    No Known Allergies    Intolerances    MEDICATIONS  (STANDING):  acetaminophen     Tablet .. 1000 milliGRAM(s) Oral every 8 hours  apixaban 2.5 milliGRAM(s) Oral every 12 hours  calcium carbonate   1250 mG (OsCal) 1 Tablet(s) Oral daily  chlorhexidine 2% Cloths 1 Application(s) Topical <User Schedule>  chlorhexidine 2% Cloths 1 Application(s) Topical daily  cholecalciferol 5000 Unit(s) Oral daily  fluticasone propionate 50 MICROgram(s)/spray Nasal Spray 1 Spray(s) Both Nostrils two times a day  gabapentin 600 milliGRAM(s) Oral at bedtime  lidocaine   4% Patch 1 Patch Transdermal every 24 hours  methocarbamol 750 milliGRAM(s) Oral every 8 hours  metoprolol tartrate 25 milliGRAM(s) Oral three times a day  multivitamin 1 Tablet(s) Oral daily  pantoprazole    Tablet 40 milliGRAM(s) Oral before breakfast  polyethylene glycol 3350 17 Gram(s) Oral two times a day  rOPINIRole 0.25 milliGRAM(s) Oral two times a day  senna 2 Tablet(s) Oral at bedtime  tamsulosin 0.8 milliGRAM(s) Oral at bedtime    MEDICATIONS  (PRN):  traMADol 50 milliGRAM(s) Oral daily PRN Severe Pain (7 - 10)              PRESENT SYMPTOMS: [  ]Unable to obtain due to poor mentation   Source if other than patient:  [ ]Family   [ ]Team     Pain: [ ]yes [x ]no  QOL impact -    Location -   Aggravating factors -   Quality -   Radiation -   Timing-  Severity (0-10 scale):   Minimal acceptable level (0-10 scale):     CPOT:    https://[email or web address]/[email or web address]/Critical-Care-Pain-Observation-Tool-(CPOT)    PAIN AD Score:   [email or web address]/painad.pdf (press ctrl +  left click to view)    Dyspnea:                           [x ]None[ ]Mild [ ]Moderate [ ]Severe     Respiratory Distress Observation Scale (RDOS):   A score of 0 to 2 signifies little or no respiratory distress, 3 signifies mild distress, scores 4 to 6 indicate moderate distress, and scores greater than 7 signify severe distress  [email or web address]-[email or web address]-[email or web address]    Anxiety:                             [ x]None[ ]Mild [ ]Moderate [ ]Severe   Fatigue:                             [x ]None[ ]Mild [ ]Moderate [ ]Severe   Nausea:                             [ x]None[ ]Mild [ ]Moderate [ ]Severe   Loss of appetite:              [x ]None[ ]Mild [ ]Moderate [ ]Severe   Constipation:                    [x ]None[ ]Mild [ ]Moderate [ ]Severe    Other Symptoms:  [ x]All other review of systems negative     Palliative Performance Status Version 2:         30%    http://[facility].org/files/news/palliative_performance_scale_[email or web address].pdf    PHYSICAL EXAM:    Vital Signs Last 24 Hrs  T(C): 36.4 (27 Feb 2024 05:00), Max: 36.5 (26 Feb 2024 21:41)  T(F): 97.5 (27 Feb 2024 05:00), Max: 97.7 (26 Feb 2024 21:41)  HR: 71 (27 Feb 2024 05:00) (71 - 99)  BP: 119/58 (27 Feb 2024 05:00) (107/60 - 119/58)  BP(mean): --  RR: 18 (27 Feb 2024 05:00) (18 - 18)  SpO2: --            GENERAL:  [ ] No acute distress [ ]Lethargic  [ ]Unarousable  [ x]Verbal  [ ]Non-Verbal [ ]Cachexia    BEHAVIORAL/PSYCH:  [x ]Alert and Oriented x2  [ ] Anxiety [ ] Delirium [ ] Agitation [ x] Calm   EYES: [ x] No scleral icterus [ ] Scleral icterus [ ] Closed  ENMT:  [ ]Dry mouth  [x ]No external oral lesions [ ] No external ear or nose lesions  CARDIOVASCULAR:  [ ]Regular [ ]Irregular [x ]Tachy [ ]Not Tachy  [ ]Sung [ x] Edema [ ] No edema  PULMONARY:  [ ]Tachypnea  [ ]Audible excessive secretions [ x] No labored breathing [ ] labored breathing  GASTROINTESTINAL: [ ]Soft  [ ]Distended  [x ]Not distended [ ]Non tender [ ]Tender  MUSCULOSKELETAL: [ ]No clubbing [ ] clubbing  [x ] No cyanosis [ ] cyanosis  NEUROLOGIC: [ ]No focal deficits  [x ]Follows commands  [ ]Does not follow commands  [ x]Cognitive impairment  [ ]Dysphagia  [ ]Dysarthria  [ ]Paresis   SKIN: [ ] Jaundiced [ x] Non-jaundiced [ ]Rash [ ]No Rash [ ] Warm [ ] Dry  MISC/LINES: [ ] ET tube [ ] Trach [ ]NGT/OGT [ ]PEG [ ]David    LABS: reviewed by me                            10.2   4.01  )-----------( 124      ( 27 Feb 2024 07:24 )             29.3     02-27    125<L>  |  88<L>  |  16  ----------------------------<  99  3.8   |  27  |  1.0    Ca    8.3<L>      27 Feb 2024 07:24  Mg     1.7     02-26    TPro  5.4<L>  /  Alb  3.4<L>  /  TBili  0.5  /  DBili  x   /  AST  33  /  ALT  27  /  AlkPhos  106  02-26    PTT - ( 27 Feb 2024 07:24 )  PTT:86.2 sec  Urinalysis Basic - ( 27 Feb 2024 07:24 )    Color: x / Appearance: x / SG: x / pH: x  Gluc: 99 mg/dL / Ketone: x  / Bili: x / Urobili: x   Blood: x / Protein: x / Nitrite: x   Leuk Esterase: x / RBC: x / WBC x   Sq Epi: x / Non Sq Epi: x / Bacteria: x            RADIOLOGY & ADDITIONAL STUDIES: reviewed by me    < from: NM SPECT Bone Scan, Single Area Single Day (02.01.24 @ 16:43) >  IMPRESSION:    Abnormal uptake in the L1 vertebral body, corresponding to known   compression fracture deformity secondary to vertebral body mass.    No other evidence for metastatic bone disease.    < end of copied text >        EKG: reviewed by me    < from: 12 Lead ECG (01.25.24 @ 11:34) >  Ventricular Rate 94 BPM    Atrial Rate 81 BPM    QRS Duration 90 ms    Q-T Interval 384 ms    QTC Calculation(Bazett) 480 ms    R Axis 166 degrees    T Axis -48 degrees    Diagnosis Line Atrial fibrillation  Right ventricular hypertrophy  Anterior infarct , age undetermined  ST & T wave abnormality, consider inferior ischemia  Abnormal ECG    < end of copied text >      Patient discussed with primary medical team MD  Palliative care education provided to patient and/or family

## 2024-02-27 NOTE — PROGRESS NOTE ADULT - ATTENDING COMMENTS
Pt seen this am. She is back to her baseline in terms of alertness. Her daughter feels like while she is alert, she is grumpy and impatient with her. Explained to the daughter what IR and pain had mentioned and so since kyphoplasty and epidural are no longer feasible, we will try to manage the pain with pain medications both orally and with patches as much as the daughter permits. Since no surgery planned, switched heparin drip to 5mg eliquis bid. Unclear why pt is on 2.5mg bid at home (tried calling daughter 2x to confirm dose), based on guidelines for afib she should be on 5mg bid. No h/o of major bleeds noted. Recall pain. Pt's pain has been well controlled today as per pt. Monitor BMs. Pt's Na is 125 and she still has sacral edema present, so will give an extra dose of lasix IV today and keep checking bmp q 8 to q12hrs to assess trend. Fluid restricted. Daughter would like pt to go to [email or web address] and then home where she is the cdpap.

## 2024-02-27 NOTE — PROGRESS NOTE ADULT - ASSESSMENT
89F w/ h/o chronic afib (on Eliquis), GERD, and chronic low back pain presenting s/p fall. Admitted to medicine for back pain 2/2 Lumbar compression fracture.     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 Was called for low blood pressure, which improved on repeat readings, patient's blood pressure runs low, d/aleksandar the oxycodone  - laminoplasty cancelled and pt is not a surgical candidate  - s/p cardizem drip and now on metoprolol 25mg tid  - Pain management recs appreciated: daughter refusing oxy as it causes sedation in her mother, even prn dose   1. acetaminophen 1000mg q8h standing  2. Gabapentin 600mg qhs standing, if tolerated (no sedation/confusion)  3. Methocarbamol 750mg q8h standing  - IR mention that epidural will not alleviate pts pain and more due to spinal cord compression  - TSLO brace for PT  - physiatry recommending STR in SNF   NSx will not operate on pt as poor surgical candidate==>eliquis restarted.    #CHF, diastolic - acute -- now resolved  - On 2/10 had episode of unresponsives and bradycardia/hypotension. Likely vasovagal. CT head negative  - TTE (May 2013) demonstrated LVEF 55-65% w/ normal global LVSF and no significant valvulopathy.  - TTE 1/28- EF 60-65% mod pulm HTN   - elevated BNP  - Tele monitored for 24 hrs with no dysrhythmia   - Currently euvolemic on exam, transition back to home oral lasix 20 daily - dced on 2/19  - pt was given lasix on 2/23/24 as her last dose    #hyponatremia  - Na has been trending downwards and is 125  - asymptomatic  - Pt was given lasix on 2/23   - her serum osm is 165 which is consistent with thiazide induced hyponatremia  - Gave iv lasix 20 in addition to PO 20  - for now will continue bmp bid     #Chronic Atrial Fibrillation  #acute RVR, resolved  CHADS-VASC 5.   - Eliquis  - c/w metoprolol 25mg tid    #GERD  - c/w pantoprazole 40mg PO QD    #CKD 3b   - per PCP baseline is 1.2    #UTI - resolved  -UA+  -completed 3 day course of ceftriaxone    #Urinary retention - likely neurogenic   - hanson placed 1/29  - c/w flomax 0.8mg   - completed RT    #Constipation  - c/w aggressive bowel regimen: senna, miralax   -monitor BMs daily until resolves     # HTN  - c/w metoprolol 25mg TID                                                                          # DVT prophylaxis: eliquis  # GI prophylaxis: Protonix  # Diet: DASH/TLC  # Activity: IAT  # Code status: DNR/DNI  # Disposition: [facility] vs Home with home PT

## 2024-02-28 LAB
ANION GAP SERPL CALC-SCNC: 10 MMOL/L — SIGNIFICANT CHANGE UP (ref 7–14)
ANION GAP SERPL CALC-SCNC: 9 MMOL/L — SIGNIFICANT CHANGE UP (ref 7–14)
BASOPHILS # BLD AUTO: 0.02 K/UL — SIGNIFICANT CHANGE UP (ref 0–0.2)
BASOPHILS NFR BLD AUTO: 0.6 % — SIGNIFICANT CHANGE UP (ref 0–1)
BUN SERPL-MCNC: 17 MG/DL — SIGNIFICANT CHANGE UP (ref 10–20)
BUN SERPL-MCNC: 18 MG/DL — SIGNIFICANT CHANGE UP (ref 10–20)
CALCIUM SERPL-MCNC: 8.3 MG/DL — LOW (ref 8.4–10.5)
CALCIUM SERPL-MCNC: 8.7 MG/DL — SIGNIFICANT CHANGE UP (ref 8.4–10.5)
CHLORIDE SERPL-SCNC: 88 MMOL/L — LOW (ref 98–110)
CHLORIDE SERPL-SCNC: 88 MMOL/L — LOW (ref 98–110)
CO2 SERPL-SCNC: 26 MMOL/L — SIGNIFICANT CHANGE UP (ref 17–32)
CO2 SERPL-SCNC: 29 MMOL/L — SIGNIFICANT CHANGE UP (ref 17–32)
CREAT ?TM UR-MCNC: 29 MG/DL — SIGNIFICANT CHANGE UP
CREAT SERPL-MCNC: 0.9 MG/DL — SIGNIFICANT CHANGE UP (ref 0.7–1.5)
CREAT SERPL-MCNC: 0.9 MG/DL — SIGNIFICANT CHANGE UP (ref 0.7–1.5)
EGFR: 61 ML/MIN/1.73M2 — SIGNIFICANT CHANGE UP
EGFR: 61 ML/MIN/1.73M2 — SIGNIFICANT CHANGE UP
EOSINOPHIL # BLD AUTO: 0.1 K/UL — SIGNIFICANT CHANGE UP (ref 0–0.7)
EOSINOPHIL NFR BLD AUTO: 2.8 % — SIGNIFICANT CHANGE UP (ref 0–8)
GLUCOSE SERPL-MCNC: 101 MG/DL — HIGH (ref 70–99)
GLUCOSE SERPL-MCNC: 101 MG/DL — HIGH (ref 70–99)
HCT VFR BLD CALC: 32 % — LOW (ref 37–47)
HGB BLD-MCNC: 11 G/DL — LOW (ref 12–16)
IMM GRANULOCYTES NFR BLD AUTO: 0.3 % — SIGNIFICANT CHANGE UP (ref 0.1–0.3)
LYMPHOCYTES # BLD AUTO: 1.48 K/UL — SIGNIFICANT CHANGE UP (ref 1.2–3.4)
LYMPHOCYTES # BLD AUTO: 41.5 % — SIGNIFICANT CHANGE UP (ref 20.5–51.1)
MCHC RBC-ENTMCNC: 32.6 PG — HIGH (ref 27–31)
MCHC RBC-ENTMCNC: 34.4 G/DL — SIGNIFICANT CHANGE UP (ref 32–37)
MCV RBC AUTO: 95 FL — SIGNIFICANT CHANGE UP (ref 81–99)
MONOCYTES # BLD AUTO: 0.41 K/UL — SIGNIFICANT CHANGE UP (ref 0.1–0.6)
MONOCYTES NFR BLD AUTO: 11.5 % — HIGH (ref 1.7–9.3)
NEUTROPHILS # BLD AUTO: 1.55 K/UL — SIGNIFICANT CHANGE UP (ref 1.4–6.5)
NEUTROPHILS NFR BLD AUTO: 43.3 % — SIGNIFICANT CHANGE UP (ref 42.2–75.2)
NRBC # BLD: 0 /100 WBCS — SIGNIFICANT CHANGE UP (ref 0–0)
PLATELET # BLD AUTO: 127 K/UL — LOW (ref 130–400)
PMV BLD: 9.3 FL — SIGNIFICANT CHANGE UP (ref 7.4–10.4)
POTASSIUM SERPL-MCNC: 3.5 MMOL/L — SIGNIFICANT CHANGE UP (ref 3.5–5)
POTASSIUM SERPL-MCNC: 3.6 MMOL/L — SIGNIFICANT CHANGE UP (ref 3.5–5)
POTASSIUM SERPL-SCNC: 3.5 MMOL/L — SIGNIFICANT CHANGE UP (ref 3.5–5)
POTASSIUM SERPL-SCNC: 3.6 MMOL/L — SIGNIFICANT CHANGE UP (ref 3.5–5)
PROT ?TM UR-MCNC: 6 MG/DLG/24H — SIGNIFICANT CHANGE UP
PROT/CREAT UR-RTO: 0.2 RATIO — SIGNIFICANT CHANGE UP (ref 0–0.2)
RBC # BLD: 3.37 M/UL — LOW (ref 4.2–5.4)
RBC # FLD: 14.7 % — HIGH (ref 11.5–14.5)
SODIUM SERPL-SCNC: 124 MMOL/L — LOW (ref 135–146)
SODIUM SERPL-SCNC: 126 MMOL/L — LOW (ref 135–146)
UUN UR-MCNC: 154 MG/DL — SIGNIFICANT CHANGE UP
WBC # BLD: 3.57 K/UL — LOW (ref 4.8–10.8)
WBC # FLD AUTO: 3.57 K/UL — LOW (ref 4.8–10.8)

## 2024-02-28 PROCEDURE — 99232 SBSQ HOSP IP/OBS MODERATE 35: CPT

## 2024-02-28 RX ORDER — SODIUM CHLORIDE 9 MG/ML
1 INJECTION INTRAMUSCULAR; INTRAVENOUS; SUBCUTANEOUS EVERY 8 HOURS
Refills: 0 | Status: DISCONTINUED | OUTPATIENT
Start: 2024-02-28 | End: 2024-03-03

## 2024-02-28 RX ADMIN — Medication 1 TABLET(S): at 13:25

## 2024-02-28 RX ADMIN — Medication 1 SPRAY(S): at 18:46

## 2024-02-28 RX ADMIN — Medication 25 MILLIGRAM(S): at 05:45

## 2024-02-28 RX ADMIN — Medication 1000 MILLIGRAM(S): at 21:31

## 2024-02-28 RX ADMIN — PANTOPRAZOLE SODIUM 40 MILLIGRAM(S): 20 TABLET, DELAYED RELEASE ORAL at 05:44

## 2024-02-28 RX ADMIN — SENNA PLUS 2 TABLET(S): 8.6 TABLET ORAL at 21:28

## 2024-02-28 RX ADMIN — Medication 1000 MILLIGRAM(S): at 05:44

## 2024-02-28 RX ADMIN — LIDOCAINE 1 PATCH: 4 CREAM TOPICAL at 05:44

## 2024-02-28 RX ADMIN — Medication 40 MILLIGRAM(S): at 05:45

## 2024-02-28 RX ADMIN — GABAPENTIN 600 MILLIGRAM(S): 400 CAPSULE ORAL at 21:29

## 2024-02-28 RX ADMIN — TRAMADOL HYDROCHLORIDE 50 MILLIGRAM(S): 50 TABLET ORAL at 14:10

## 2024-02-28 RX ADMIN — METHOCARBAMOL 750 MILLIGRAM(S): 500 TABLET, FILM COATED ORAL at 05:44

## 2024-02-28 RX ADMIN — Medication 25 MILLIGRAM(S): at 21:29

## 2024-02-28 RX ADMIN — Medication 1 SPRAY(S): at 05:46

## 2024-02-28 RX ADMIN — LIDOCAINE 1 PATCH: 4 CREAM TOPICAL at 08:00

## 2024-02-28 RX ADMIN — APIXABAN 5 MILLIGRAM(S): 2.5 TABLET, FILM COATED ORAL at 05:45

## 2024-02-28 RX ADMIN — APIXABAN 5 MILLIGRAM(S): 2.5 TABLET, FILM COATED ORAL at 18:45

## 2024-02-28 RX ADMIN — METHOCARBAMOL 750 MILLIGRAM(S): 500 TABLET, FILM COATED ORAL at 13:26

## 2024-02-28 RX ADMIN — SODIUM CHLORIDE 1 GRAM(S): 9 INJECTION INTRAMUSCULAR; INTRAVENOUS; SUBCUTANEOUS at 13:26

## 2024-02-28 RX ADMIN — POLYETHYLENE GLYCOL 3350 17 GRAM(S): 17 POWDER, FOR SOLUTION ORAL at 18:45

## 2024-02-28 RX ADMIN — TAMSULOSIN HYDROCHLORIDE 0.8 MILLIGRAM(S): 0.4 CAPSULE ORAL at 21:28

## 2024-02-28 RX ADMIN — Medication 1000 MILLIGRAM(S): at 14:09

## 2024-02-28 RX ADMIN — METHOCARBAMOL 750 MILLIGRAM(S): 500 TABLET, FILM COATED ORAL at 21:28

## 2024-02-28 RX ADMIN — Medication 1000 MILLIGRAM(S): at 22:31

## 2024-02-28 RX ADMIN — SODIUM CHLORIDE 1 GRAM(S): 9 INJECTION INTRAMUSCULAR; INTRAVENOUS; SUBCUTANEOUS at 21:30

## 2024-02-28 RX ADMIN — CHLORHEXIDINE GLUCONATE 1 APPLICATION(S): 213 SOLUTION TOPICAL at 13:09

## 2024-02-28 RX ADMIN — Medication 1000 MILLIGRAM(S): at 13:23

## 2024-02-28 RX ADMIN — CHLORHEXIDINE GLUCONATE 1 APPLICATION(S): 213 SOLUTION TOPICAL at 05:45

## 2024-02-28 RX ADMIN — LIDOCAINE 1 PATCH: 4 CREAM TOPICAL at 18:48

## 2024-02-28 RX ADMIN — Medication 5000 UNIT(S): at 13:25

## 2024-02-28 RX ADMIN — ROPINIROLE 0.25 MILLIGRAM(S): 8 TABLET, FILM COATED, EXTENDED RELEASE ORAL at 18:46

## 2024-02-28 RX ADMIN — Medication 1 TABLET(S): at 13:24

## 2024-02-28 RX ADMIN — Medication 25 MILLIGRAM(S): at 13:25

## 2024-02-28 RX ADMIN — ROPINIROLE 0.25 MILLIGRAM(S): 8 TABLET, FILM COATED, EXTENDED RELEASE ORAL at 05:45

## 2024-02-28 RX ADMIN — TRAMADOL HYDROCHLORIDE 50 MILLIGRAM(S): 50 TABLET ORAL at 09:11

## 2024-02-28 NOTE — PROGRESS NOTE ADULT - ATTENDING COMMENTS
patient seen and examined at bedside this am   -no overnight events notified     Vital Signs Last 24 Hrs  T(C): 36.4 (28 Feb 2024 11:57), Max: 36.8 (27 Feb 2024 19:57)  T(F): 97.5 (28 Feb 2024 11:57), Max: 98.2 (27 Feb 2024 19:57)  HR: 78 (28 Feb 2024 13:20) (65 - 87)  BP: 104/59 (28 Feb 2024 13:20) (100/49 - 125/65)  BP(mean): --  RR: 18 (28 Feb 2024 11:57) (18 - 18)                          11.0   3.57  )-----------( 127      ( 28 Feb 2024 08:45 )             32.0   02-28    124<L>  |  88<L>  |  17  ----------------------------<  101<H>  3.6   |  26  |  0.9    Ca    8.7      28 Feb 2024 08:45    -no overnight events notified   -no neurosurgical intervention   -recalled heme/onc - no immunotherapy   -fluid restriction - monitor BMP while inpatient + encourage oral intake   -Palliative care follow up for ? CMO vs. NH d/c planning   -DNR/DNI - prognosis poor     Attending Physician Dr. Genevieve Wall # [phone]

## 2024-02-28 NOTE — PROGRESS NOTE ADULT - SUBJECTIVE AND OBJECTIVE BOX
24H events:    Patient is a 89y old Female who presents with a chief complaint of Back Pain (27 Feb 2024 12:40)    Primary diagnosis of Inability to ambulate due to multiple joints       Today is hospital day 35d.      PAST MEDICAL & SURGICAL HISTORY  Chronic atrial fibrillation    GERD (gastroesophageal reflux disease)    Chronic lower back pain    No significant past surgical history      SOCIAL HISTORY:  Negative for smoking/alcohol/drug use.     ALLERGIES:  No Known Allergies    MEDICATIONS:  STANDING MEDICATIONS  acetaminophen     Tablet .. 1000 milliGRAM(s) Oral every 8 hours  apixaban 5 milliGRAM(s) Oral every 12 hours  calcium carbonate   1250 mG (OsCal) 1 Tablet(s) Oral daily  chlorhexidine 2% Cloths 1 Application(s) Topical <User Schedule>  chlorhexidine 2% Cloths 1 Application(s) Topical daily  cholecalciferol 5000 Unit(s) Oral daily  fluticasone propionate 50 MICROgram(s)/spray Nasal Spray 1 Spray(s) Both Nostrils two times a day  furosemide    Tablet 40 milliGRAM(s) Oral daily  gabapentin 600 milliGRAM(s) Oral at bedtime  lidocaine   4% Patch 1 Patch Transdermal every 24 hours  methocarbamol 750 milliGRAM(s) Oral every 8 hours  metoprolol tartrate 25 milliGRAM(s) Oral three times a day  multivitamin 1 Tablet(s) Oral daily  pantoprazole    Tablet 40 milliGRAM(s) Oral before breakfast  polyethylene glycol 3350 17 Gram(s) Oral two times a day  rOPINIRole 0.25 milliGRAM(s) Oral two times a day  senna 2 Tablet(s) Oral at bedtime  sodium chloride 1 Gram(s) Oral every 8 hours  tamsulosin 0.8 milliGRAM(s) Oral at bedtime    PRN MEDICATIONS  traMADol 50 milliGRAM(s) Oral daily PRN    VITALS:   T(F): 97.5  HR: 78  BP: 100/49  RR: 18  SpO2: --    LABS:                        11.0   3.57  )-----------( 127      ( 28 Feb 2024 08:45 )             32.0     02-28    124<L>  |  88<L>  |  17  ----------------------------<  101<H>  3.6   |  26  |  0.9    Ca    8.7      28 Feb 2024 08:45      PTT - ( 27 Feb 2024 07:24 )  PTT:86.2 sec  Urinalysis Basic - ( 28 Feb 2024 08:45 )    Color: x / Appearance: x / SG: x / pH: x  Gluc: 101 mg/dL / Ketone: x  / Bili: x / Urobili: x   Blood: x / Protein: x / Nitrite: x   Leuk Esterase: x / RBC: x / WBC x   Sq Epi: x / Non Sq Epi: x / Bacteria: x                RADIOLOGY:    PHYSICAL EXAM:  GENERAL: NAD  NECK: Supple, No JVD  NERVOUS SYSTEM:  Alert & Oriented X2  CHEST/LUNG: Clear to auscultation bilaterally  HEART: Regular rate and rhythm  ABDOMEN: Soft, Nontender  EXTREMITIES:  + Peripheral Pulses

## 2024-02-28 NOTE — PROGRESS NOTE ADULT - ASSESSMENT
89F w/ h/o chronic afib (on Eliquis), GERD, and chronic low back pain presenting s/p fall. Admitted to medicine for back pain 2/2 Lumbar compression fracture.     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 Was called for low blood pressure, which improved on repeat readings, patient's blood pressure runs low, d/aleksandar the oxycodone  - laminoplasty cancelled and pt is not a surgical candidate  - s/p cardizem drip and now on metoprolol 25mg tid  - Pain management recs appreciated: daughter refusing oxy as it causes sedation in her mother, even prn dose   1. acetaminophen 1000mg q8h standing  2. Gabapentin 600mg qhs standing, if tolerated (no sedation/confusion)  3. Methocarbamol 750mg q8h standing  - IR mention that epidural will not alleviate pts pain and more due to spinal cord compression  - TSLO brace for PT  - physiatry recommending STR in SNF   NSx will not operate on pt as poor surgical candidate==>C/W eliquis     #CHF, diastolic - acute -- now resolved  - On 2/10 had episode of unresponsives and bradycardia/hypotension. Likely vasovagal. CT head negative  - TTE (May 2013) demonstrated LVEF 55-65% w/ normal global LVSF and no significant valvulopathy.  - TTE 1/28- EF 60-65% mod pulm HTN   - elevated BNP  - Tele monitored for 24 hrs with no dysrhythmia   - Currently euvolemic on exam, transition back to home oral lasix 20 daily     #hyponatremia  - Na has been trending downwards and is 125  - asymptomatic  - Pt was given lasix on 2/23   - her serum osm is 165 which is consistent with thiazide induced hyponatremia  - lasix PO 20  - Salt tabs started  - for now will continue bmp bid     #Chronic Atrial Fibrillation  #acute RVR, resolved  CHADS-VASC 5.   - Eliquis  - c/w metoprolol 25mg tid    #GERD  - c/w pantoprazole 40mg PO QD    #CKD 3b   - per PCP baseline is 1.2    #UTI - resolved  -UA+  -completed 3 day course of ceftriaxone    #Urinary retention - likely neurogenic   - hanson placed 1/29  - c/w flomax 0.8mg   - completed RT    #Constipation  - c/w aggressive bowel regimen: senna, miralax     # HTN  - c/w metoprolol 25mg TID                                                                          # DVT prophylaxis: eliquis  # GI prophylaxis: Protonix  # Diet: DASH/TLC  # Activity: IAT  # Code status: DNR/DNI  # Disposition: [facility] vs Home with home PT

## 2024-02-28 NOTE — PROGRESS NOTE ADULT - ASSESSMENT
89yFemale with history of afib on eliquis, GERD, chronic low back pain presents after fall. Patient found to have lumbar pathological fracture on admission. Palliative care consulted for GOC and symptom management.    Patient seen at bedside. Patient awake and resting comfortably, denied any pain at this time.    Education about palliative care provided to patient/family.  See Recs below.    Please call [phone] with questions or concerns 24/7.   We will continue to follow.

## 2024-02-28 NOTE — PROGRESS NOTE ADULT - SUBJECTIVE AND OBJECTIVE BOX
HPI:    89F w/ h/o chronic afib (on Eliquis), GERD, and chronic low back pain presenting s/p fall. Patient known to have chronic low back pain that has progressively worsened over the years. Pain acutely worsened two weeks ago and patient subsequently fell due to inability to support own weight. Evaluated by PCP after initial fall due to worsening pain. Prescribed Percocet 5-325 bid w/ mild relief. However, pt once again fell earlier today after pt unable to support her own weight while trying to ambulate. Now unable to ambulate or bear weight, even w/ assistance. No reported HT, LOC, lightheadedness, confusion, loss of continence, or focal weakness a/w either fall. No reported fevers, chills, CP, palpitations, SOB, abdominal pain, n/v/d, or dysuria (although daughter did note malodorous urine recently).    Interval history  -Patient seen at bedside with alyssa Garcia  -Patient notes leg pain with movement, but only mild pain at rest  2/7: Patient now DNR/DNI, added 10mg oxycontin Q12H for consistent pain  2/12: added PRN oxycodone 5mg Q6H for pain  2/14: stopped oxycodone ER 10mg since patient has been more somnolent and hypotensive occasionally   2/16: Patient was upgraded to telemetry, placed on Cardizem infusion after OR  2/20: Patient sleeping comfortably, spoke with daughter at bedside who reports that patient's pain has been OK but she worked with PT which did exacerbate it a bit.   2/26: Patient resting comfortably, plan for potential IR for epidural     ADVANCE DIRECTIVES:     [ ] Full Code [x ] DNR  MOLST  [ ]  Living Will  [ ]   DECISION MAKER(s):  [ ] Health Care Proxy(s)  [x ] Surrogate(s)  [ ] Guardian           Name(s): Phone Number(s):  Daughter      BASELINE (I)ADL(s) (prior to admission):    [place]: [ ]Total  [ ] Moderate [ ]Dependent  Palliative Performance Status Version 2:         %    [email or web address]://[email or web address].org/files/news/palliative_performance_scale_[email or web address].pdf    Allergies    No Known Allergies    Intolerances    MEDICATIONS  (STANDING):  acetaminophen     Tablet .. 1000 milliGRAM(s) Oral every 8 hours  apixaban 2.5 milliGRAM(s) Oral every 12 hours  calcium carbonate   1250 mG (OsCal) 1 Tablet(s) Oral daily  chlorhexidine 2% Cloths 1 Application(s) Topical <User Schedule>  chlorhexidine 2% Cloths 1 Application(s) Topical daily  cholecalciferol 5000 Unit(s) Oral daily  fluticasone propionate 50 MICROgram(s)/spray Nasal Spray 1 Spray(s) Both Nostrils two times a day  gabapentin 600 milliGRAM(s) Oral at bedtime  lidocaine   4% Patch 1 Patch Transdermal every 24 hours  methocarbamol 750 milliGRAM(s) Oral every 8 hours  metoprolol tartrate 25 milliGRAM(s) Oral three times a day  multivitamin 1 Tablet(s) Oral daily  pantoprazole    Tablet 40 milliGRAM(s) Oral before breakfast  polyethylene glycol 3350 17 Gram(s) Oral two times a day  rOPINIRole 0.25 milliGRAM(s) Oral two times a day  senna 2 Tablet(s) Oral at bedtime  tamsulosin 0.8 milliGRAM(s) Oral at bedtime    MEDICATIONS  (PRN):  traMADol 50 milliGRAM(s) Oral daily PRN Severe Pain (7 - 10)              PRESENT SYMPTOMS: [  ]Unable to obtain due to poor mentation   Source if other than patient:  [ ]Family   [ ]Team     Pain: [ ]yes [x ]no  QOL impact -    Location -   Aggravating factors -   Quality -   Radiation -   Timing-  Severity (0-10 scale):   Minimal acceptable level (0-10 scale):     CPOT:    https://www.[facility].org/getattachment/[email or web address]/Critical-Care-Pain-Observation-Tool-(CPOT)    PAIN AD Score:   http://geriatrictoolkit.[place] [place]/cog/painad.pdf (press ctrl +  left click to view)    Dyspnea:                           [x ]None[ ]Mild [ ]Moderate [ ]Severe     Respiratory Distress Observation Scale (RDOS):   A score of 0 to 2 signifies little or no respiratory distress, 3 signifies mild distress, scores 4 to 6 indicate moderate distress, and scores greater than 7 signify severe distress  [email or web address]://[email or web address]/sites/default/files/PDFS/[email or web address]-bgfwradbmua-xcvnrfex-dpcuvwnipot-yvqgl.pdf    Anxiety:                             [ x]None[ ]Mild [ ]Moderate [ ]Severe   Fatigue:                             [x ]None[ ]Mild [ ]Moderate [ ]Severe   Nausea:                             [ x]None[ ]Mild [ ]Moderate [ ]Severe   Loss of appetite:              [x ]None[ ]Mild [ ]Moderate [ ]Severe   Constipation:                    [x ]None[ ]Mild [ ]Moderate [ ]Severe    Other Symptoms:  [ x]All other review of systems negative     Palliative Performance Status Version 2:         30%    http://npcrc.org/files/news/palliative_performance_scale_ppsv2.pdf    PHYSICAL EXAM:    Vital Signs Last 24 Hrs  T(C): 36.4 (28 Feb 2024 11:57), Max: 36.8 (27 Feb 2024 19:57)  T(F): 97.5 (28 Feb 2024 11:57), Max: 98.2 (27 Feb 2024 19:57)  HR: 78 (28 Feb 2024 11:57) (65 - 87)  BP: 100/49 (28 Feb 2024 11:57) (100/49 - 125/65)  BP(mean): --  RR: 18 (28 Feb 2024 11:57) (18 - 18)  SpO2: --        GENERAL:  [ ] No acute distress [ ]Lethargic  [ ]Unarousable  [ x]Verbal  [ ]Non-Verbal [ ]Cachexia    BEHAVIORAL/PSYCH:  [x ]Alert and Oriented x2  [ ] Anxiety [ ] Delirium [ ] Agitation [ x] Calm   EYES: [ x] No scleral icterus [ ] Scleral icterus [ ] Closed  ENMT:  [ ]Dry mouth  [x ]No external oral lesions [ ] No external ear or nose lesions  CARDIOVASCULAR:  [ ]Regular [ ]Irregular [x ]Tachy [ ]Not Tachy  [ ]Sung [ x] Edema [ ] No edema  PULMONARY:  [ ]Tachypnea  [ ]Audible excessive secretions [ x] No labored breathing [ ] labored breathing  GASTROINTESTINAL: [ ]Soft  [ ]Distended  [x ]Not distended [ ]Non tender [ ]Tender  MUSCULOSKELETAL: [ ]No clubbing [ ] clubbing  [x ] No cyanosis [ ] cyanosis  NEUROLOGIC: [ ]No focal deficits  [x ]Follows commands  [ ]Does not follow commands  [ x]Cognitive impairment  [ ]Dysphagia  [ ]Dysarthria  [ ]Paresis   SKIN: [ ] Jaundiced [ x] Non-jaundiced [ ]Rash [ ]No Rash [ ] Warm [ ] Dry  MISC/LINES: [ ] ET tube [ ] Trach [ ]NGT/OGT [ ]PEG [ ]David    LABS: reviewed by me                            11.0   3.57  )-----------( 127      ( 28 Feb 2024 08:45 )             32.0     02-28    124<L>  |  88<L>  |  17  ----------------------------<  101<H>  3.6   |  26  |  0.9    Ca    8.7      28 Feb 2024 08:45      PTT - ( 27 Feb 2024 07:24 )  PTT:86.2 sec  Urinalysis Basic - ( 28 Feb 2024 08:45 )    Color: x / Appearance: x / SG: x / pH: x  Gluc: 101 mg/dL / Ketone: x  / Bili: x / Urobili: x   Blood: x / Protein: x / Nitrite: x   Leuk Esterase: x / RBC: x / WBC x   Sq Epi: x / Non Sq Epi: x / Bacteria: x          RADIOLOGY & ADDITIONAL STUDIES: reviewed by me    < from: NM SPECT Bone Scan, Single Area Single Day (02.01.24 @ 16:43) >  IMPRESSION:    Abnormal uptake in the L1 vertebral body, corresponding to known   compression fracture deformity secondary to vertebral body mass.    No other evidence for metastatic bone disease.    < end of copied text >        EKG: reviewed by me    < from: 12 Lead ECG (01.25.24 @ 11:34) >  Ventricular Rate 94 BPM    Atrial Rate 81 BPM    QRS Duration 90 ms    Q-T Interval 384 ms    QTC Calculation(Bazett) 480 ms    R Axis 166 degrees    T Axis -48 degrees    Diagnosis Line Atrial fibrillation  Right ventricular hypertrophy  Anterior infarct , age undetermined  ST & T wave abnormality, consider inferior ischemia  Abnormal ECG    < end of copied text >      Patient discussed with primary medical team MD  Palliative care education provided to patient and/or family

## 2024-02-29 LAB
ANION GAP SERPL CALC-SCNC: 11 MMOL/L — SIGNIFICANT CHANGE UP (ref 7–14)
BASOPHILS # BLD AUTO: 0.02 K/UL — SIGNIFICANT CHANGE UP (ref 0–0.2)
BASOPHILS NFR BLD AUTO: 0.6 % — SIGNIFICANT CHANGE UP (ref 0–1)
BUN SERPL-MCNC: 16 MG/DL — SIGNIFICANT CHANGE UP (ref 10–20)
CALCIUM SERPL-MCNC: 8.9 MG/DL — SIGNIFICANT CHANGE UP (ref 8.4–10.5)
CHLORIDE SERPL-SCNC: 92 MMOL/L — LOW (ref 98–110)
CO2 SERPL-SCNC: 28 MMOL/L — SIGNIFICANT CHANGE UP (ref 17–32)
CREAT SERPL-MCNC: 0.9 MG/DL — SIGNIFICANT CHANGE UP (ref 0.7–1.5)
EGFR: 61 ML/MIN/1.73M2 — SIGNIFICANT CHANGE UP
EOSINOPHIL # BLD AUTO: 0.09 K/UL — SIGNIFICANT CHANGE UP (ref 0–0.7)
EOSINOPHIL NFR BLD AUTO: 2.6 % — SIGNIFICANT CHANGE UP (ref 0–8)
GLUCOSE SERPL-MCNC: 102 MG/DL — HIGH (ref 70–99)
HCT VFR BLD CALC: 33 % — LOW (ref 37–47)
HGB BLD-MCNC: 11.5 G/DL — LOW (ref 12–16)
IMM GRANULOCYTES NFR BLD AUTO: 0.6 % — HIGH (ref 0.1–0.3)
LYMPHOCYTES # BLD AUTO: 1.32 K/UL — SIGNIFICANT CHANGE UP (ref 1.2–3.4)
LYMPHOCYTES # BLD AUTO: 38.8 % — SIGNIFICANT CHANGE UP (ref 20.5–51.1)
MAGNESIUM SERPL-MCNC: 1.5 MG/DL — LOW (ref 1.8–2.4)
MCHC RBC-ENTMCNC: 32.3 PG — HIGH (ref 27–31)
MCHC RBC-ENTMCNC: 34.8 G/DL — SIGNIFICANT CHANGE UP (ref 32–37)
MCV RBC AUTO: 92.7 FL — SIGNIFICANT CHANGE UP (ref 81–99)
MONOCYTES # BLD AUTO: 0.4 K/UL — SIGNIFICANT CHANGE UP (ref 0.1–0.6)
MONOCYTES NFR BLD AUTO: 11.8 % — HIGH (ref 1.7–9.3)
NEUTROPHILS # BLD AUTO: 1.55 K/UL — SIGNIFICANT CHANGE UP (ref 1.4–6.5)
NEUTROPHILS NFR BLD AUTO: 45.6 % — SIGNIFICANT CHANGE UP (ref 42.2–75.2)
NRBC # BLD: 0 /100 WBCS — SIGNIFICANT CHANGE UP (ref 0–0)
PLATELET # BLD AUTO: 149 K/UL — SIGNIFICANT CHANGE UP (ref 130–400)
PMV BLD: 9.3 FL — SIGNIFICANT CHANGE UP (ref 7.4–10.4)
POTASSIUM SERPL-MCNC: 3.7 MMOL/L — SIGNIFICANT CHANGE UP (ref 3.5–5)
POTASSIUM SERPL-SCNC: 3.7 MMOL/L — SIGNIFICANT CHANGE UP (ref 3.5–5)
RBC # BLD: 3.56 M/UL — LOW (ref 4.2–5.4)
RBC # FLD: 14.5 % — SIGNIFICANT CHANGE UP (ref 11.5–14.5)
SARS-COV-2 RNA SPEC QL NAA+PROBE: SIGNIFICANT CHANGE UP
SODIUM SERPL-SCNC: 131 MMOL/L — LOW (ref 135–146)
WBC # BLD: 3.4 K/UL — LOW (ref 4.8–10.8)
WBC # FLD AUTO: 3.4 K/UL — LOW (ref 4.8–10.8)

## 2024-02-29 PROCEDURE — 99497 ADVNCD CARE PLAN 30 MIN: CPT

## 2024-02-29 PROCEDURE — 99232 SBSQ HOSP IP/OBS MODERATE 35: CPT | Mod: 25

## 2024-02-29 PROCEDURE — 99232 SBSQ HOSP IP/OBS MODERATE 35: CPT

## 2024-02-29 RX ORDER — OXYCODONE HYDROCHLORIDE 5 MG/1
5 TABLET ORAL EVERY 8 HOURS
Refills: 0 | Status: DISCONTINUED | OUTPATIENT
Start: 2024-02-29 | End: 2024-03-03

## 2024-02-29 RX ORDER — MAGNESIUM SULFATE 500 MG/ML
2 VIAL (ML) INJECTION
Refills: 0 | Status: COMPLETED | OUTPATIENT
Start: 2024-02-29 | End: 2024-02-29

## 2024-02-29 RX ADMIN — CHLORHEXIDINE GLUCONATE 1 APPLICATION(S): 213 SOLUTION TOPICAL at 05:48

## 2024-02-29 RX ADMIN — PANTOPRAZOLE SODIUM 40 MILLIGRAM(S): 20 TABLET, DELAYED RELEASE ORAL at 05:42

## 2024-02-29 RX ADMIN — Medication 1 SPRAY(S): at 18:35

## 2024-02-29 RX ADMIN — Medication 5000 UNIT(S): at 12:31

## 2024-02-29 RX ADMIN — SENNA PLUS 2 TABLET(S): 8.6 TABLET ORAL at 22:03

## 2024-02-29 RX ADMIN — Medication 25 GRAM(S): at 15:31

## 2024-02-29 RX ADMIN — METHOCARBAMOL 750 MILLIGRAM(S): 500 TABLET, FILM COATED ORAL at 22:01

## 2024-02-29 RX ADMIN — Medication 25 GRAM(S): at 13:56

## 2024-02-29 RX ADMIN — Medication 25 MILLIGRAM(S): at 13:13

## 2024-02-29 RX ADMIN — METHOCARBAMOL 750 MILLIGRAM(S): 500 TABLET, FILM COATED ORAL at 13:13

## 2024-02-29 RX ADMIN — SODIUM CHLORIDE 1 GRAM(S): 9 INJECTION INTRAMUSCULAR; INTRAVENOUS; SUBCUTANEOUS at 22:03

## 2024-02-29 RX ADMIN — CHLORHEXIDINE GLUCONATE 1 APPLICATION(S): 213 SOLUTION TOPICAL at 12:32

## 2024-02-29 RX ADMIN — ROPINIROLE 0.25 MILLIGRAM(S): 8 TABLET, FILM COATED, EXTENDED RELEASE ORAL at 05:43

## 2024-02-29 RX ADMIN — SODIUM CHLORIDE 1 GRAM(S): 9 INJECTION INTRAMUSCULAR; INTRAVENOUS; SUBCUTANEOUS at 05:43

## 2024-02-29 RX ADMIN — TAMSULOSIN HYDROCHLORIDE 0.8 MILLIGRAM(S): 0.4 CAPSULE ORAL at 22:01

## 2024-02-29 RX ADMIN — APIXABAN 5 MILLIGRAM(S): 2.5 TABLET, FILM COATED ORAL at 18:34

## 2024-02-29 RX ADMIN — Medication 25 MILLIGRAM(S): at 22:02

## 2024-02-29 RX ADMIN — APIXABAN 5 MILLIGRAM(S): 2.5 TABLET, FILM COATED ORAL at 05:45

## 2024-02-29 RX ADMIN — GABAPENTIN 600 MILLIGRAM(S): 400 CAPSULE ORAL at 21:57

## 2024-02-29 RX ADMIN — Medication 40 MILLIGRAM(S): at 05:53

## 2024-02-29 RX ADMIN — Medication 1 TABLET(S): at 12:32

## 2024-02-29 RX ADMIN — Medication 25 MILLIGRAM(S): at 05:43

## 2024-02-29 RX ADMIN — ROPINIROLE 0.25 MILLIGRAM(S): 8 TABLET, FILM COATED, EXTENDED RELEASE ORAL at 18:34

## 2024-02-29 RX ADMIN — Medication 1 TABLET(S): at 12:31

## 2024-02-29 RX ADMIN — Medication 1000 MILLIGRAM(S): at 21:57

## 2024-02-29 RX ADMIN — SODIUM CHLORIDE 1 GRAM(S): 9 INJECTION INTRAMUSCULAR; INTRAVENOUS; SUBCUTANEOUS at 13:15

## 2024-02-29 RX ADMIN — Medication 1000 MILLIGRAM(S): at 13:13

## 2024-02-29 RX ADMIN — METHOCARBAMOL 750 MILLIGRAM(S): 500 TABLET, FILM COATED ORAL at 05:43

## 2024-02-29 NOTE — PROGRESS NOTE ADULT - ASSESSMENT
89yFemale with history of afib on eliquis, GERD, chronic low back pain presents after fall. Patient found to have lumbar pathological fracture on admission. Palliative care consulted for GOC and symptom management.    Patient seen at bedside. Patient awake and resting comfortably, denied any pain at this time. Spoke with patient and daughter at bedside regarding GOC going forward. She stated she just wanted her to go to rehab to see how much better she can get and what her exercise tolerance is. If she is unable to get better she would take her home to take care of her. Briefly discussed hospice and she also said she wouldn't want it right now and just wants her to go to rehab to see how much better she can get. Stated she would consider it in the future if her condition worsened.     Education about palliative care provided to patient/family.  See Recs below.    Please call [phone] with questions or concerns 24/7.   We will continue to follow.

## 2024-02-29 NOTE — PROGRESS NOTE ADULT - ATTENDING COMMENTS
patient seen and examined at bedside this am   -no overnight events notified     Vital Signs Last 24 Hrs  T(C): 36.7 (29 Feb 2024 14:22), Max: 36.7 (29 Feb 2024 14:22)  T(F): 98.1 (29 Feb 2024 14:22), Max: 98.1 (29 Feb 2024 14:22)  HR: 94 (29 Feb 2024 14:22) (71 - 94)  BP: 110/67 (29 Feb 2024 14:22) (107/55 - 119/65)  BP(mean): 80 (29 Feb 2024 14:22) (80 - 80)  RR: 18 (29 Feb 2024 14:22) (18 - 18)  SpO2: 97% (29 Feb 2024 14:22) (97% - 97%)    Parameters below as of 29 Feb 2024 14:22  Patient On (Oxygen Delivery Method): room air                          11.5   3.40  )-----------( 149      ( 29 Feb 2024 09:16 )             33.0   02-29    131<L>  |  92<L>  |  16  ----------------------------<  102<H>  3.7   |  28  |  0.9    Ca    8.9      29 Feb 2024 09:16  Mg     1.5     02-29      -no overnight events notified   -no neurosurgical intervention   -recalled heme/onc - no immunotherapy   -fluid restriction - monitor BMP while inpatient + encourage oral intake (Na+ improving)  -replete electrolytes as needed   -Palliative care follow up appreciated   -DNR/DNI - prognosis poor     DISPO: d/c planning NH   -staff updated family     Attending Physician Dr. Genevieve Wall # [phone]

## 2024-02-29 NOTE — PROGRESS NOTE ADULT - CONVERSATION DETAILS
Spoke with patient and daughter at bedside regarding GOC going forward. She stated she just wanted her to go to rehab to see how much better she can get and what her exercise tolerance is. If she is unable to get better she would take her home to take care of her. Briefly discussed hospice and she also said she wouldn't want it right now and just wants her to go to rehab to see how much better she can get. Stated she would consider it in the future if her condition worsened.
Patient seen at bedside with daughter. Palliative care introduced to daughter, Radha, who works in health care. She was able to provide a medical history and hospital course, and we noted that we're waiting for biopsy results to discuss next steps.  We discussed code status and DNR/DNI.  All questions answered.  Patient's daughter wishes to consider code status and talk with the patient alone about it. All questions answered.
Patient seen at bedside with daughter. Discussed goals of care and advanced directives. Daughter stated that she discussed with the patient previously about DNR/DNI and that would be something she wanted. She would still want full medical management at this time and was waiting to discuss further options with heme/onc. Filled out DNR/DNI MOLST and placed in chart.

## 2024-02-29 NOTE — PROGRESS NOTE ADULT - SUBJECTIVE AND OBJECTIVE BOX
HPI:    89F w/ h/o chronic afib (on Eliquis), GERD, and chronic low back pain presenting s/p fall. Patient known to have chronic low back pain that has progressively worsened over the years. Pain acutely worsened two weeks ago and patient subsequently fell due to inability to support own weight. Evaluated by PCP after initial fall due to worsening pain. Prescribed Percocet 5-325 bid w/ mild relief. However, pt once again fell earlier today after pt unable to support her own weight while trying to ambulate. Now unable to ambulate or bear weight, even w/ assistance. No reported HT, LOC, lightheadedness, confusion, loss of continence, or focal weakness a/w either fall. No reported fevers, chills, CP, palpitations, SOB, abdominal pain, n/v/d, or dysuria (although daughter did note malodorous urine recently).    Interval history  -Patient seen at bedside with alyssa Garcia  -Patient notes leg pain with movement, but only mild pain at rest  2/7: Patient now DNR/DNI, added 10mg oxycontin Q12H for consistent pain  2/12: added PRN oxycodone 5mg Q6H for pain  2/14: stopped oxycodone ER 10mg since patient has been more somnolent and hypotensive occasionally   2/16: Patient was upgraded to telemetry, placed on Cardizem infusion after OR  2/20: Patient sleeping comfortably, spoke with daughter at bedside who reports that patient's pain has been OK but she worked with PT which did exacerbate it a bit.   2/26: Patient resting comfortably, plan for potential IR for epidural  2/29: Patient comfortable, spoke with daughter at bedside about changing tramadol to oxycodone, potential discharge soon     ADVANCE DIRECTIVES:     [ ] Full Code [x ] DNR  MOLST  [ ]  Living Will  [ ]   DECISION MAKER(s):  [ ] Health Care Proxy(s)  [x ] Surrogate(s)  [ ] Guardian           Name(s): Phone Number(s):  Daughter      BASELINE (I)ADL(s) (prior to admission):    [place]: [ ]Total  [ ] Moderate [ ]Dependent  Palliative Performance Status Version 2:         %    http://npcrc.org/files/news/palliative_performance_scale_[email or web address].pdf    Allergies    No Known Allergies    Intolerances    MEDICATIONS  (STANDING):  acetaminophen     Tablet .. 1000 milliGRAM(s) Oral every 8 hours  apixaban 5 milliGRAM(s) Oral every 12 hours  calcium carbonate   1250 mG (OsCal) 1 Tablet(s) Oral daily  chlorhexidine 2% Cloths 1 Application(s) Topical daily  chlorhexidine 2% Cloths 1 Application(s) Topical <User Schedule>  cholecalciferol 5000 Unit(s) Oral daily  fluticasone propionate 50 MICROgram(s)/spray Nasal Spray 1 Spray(s) Both Nostrils two times a day  furosemide    Tablet 40 milliGRAM(s) Oral daily  gabapentin 600 milliGRAM(s) Oral at bedtime  lidocaine   4% Patch 1 Patch Transdermal every 24 hours  magnesium sulfate  IVPB 2 Gram(s) IV Intermittent every 2 hours  methocarbamol 750 milliGRAM(s) Oral every 8 hours  metoprolol tartrate 25 milliGRAM(s) Oral three times a day  multivitamin 1 Tablet(s) Oral daily  pantoprazole    Tablet 40 milliGRAM(s) Oral before breakfast  polyethylene glycol 3350 17 Gram(s) Oral two times a day  rOPINIRole 0.25 milliGRAM(s) Oral two times a day  senna 2 Tablet(s) Oral at bedtime  sodium chloride 1 Gram(s) Oral every 8 hours  tamsulosin 0.8 milliGRAM(s) Oral at bedtime    MEDICATIONS  (PRN):  oxyCODONE    IR 5 milliGRAM(s) Oral every 8 hours PRN Severe Pain (7 - 10)            PRESENT SYMPTOMS: [  ]Unable to obtain due to poor mentation   Source if other than patient:  [ ]Family   [ ]Team     Pain: [ ]yes [x ]no  QOL impact -    Location -   Aggravating factors -   Quality -   Radiation -   Timing-  Severity (0-10 scale):   Minimal acceptable level (0-10 scale):     CPOT:    https://www.[facility].org/getattachment/[email or web address]/Critical-Care-Pain-Observation-Tool-(CPOT)    PAIN AD Score:   http://geriatrictoolkit.[email or web address]/cog/painad.pdf (press ctrl +  left click to view)    Dyspnea:                           [x ]None[ ]Mild [ ]Moderate [ ]Severe     Respiratory Distress Observation Scale (RDOS):   A score of 0 to 2 signifies little or no respiratory distress, 3 signifies mild distress, scores 4 to 6 indicate moderate distress, and scores greater than 7 signify severe distress  [email or web address]://[email or web address]/sites/default/files/PDFS/896406-abkxfgukijj-wcwavaok-ixlxkqlidyw-bxgbv.pdf    Anxiety:                             [ x]None[ ]Mild [ ]Moderate [ ]Severe   Fatigue:                             [x ]None[ ]Mild [ ]Moderate [ ]Severe   Nausea:                             [ x]None[ ]Mild [ ]Moderate [ ]Severe   Loss of appetite:              [x ]None[ ]Mild [ ]Moderate [ ]Severe   Constipation:                    [x ]None[ ]Mild [ ]Moderate [ ]Severe    Other Symptoms:  [ x]All other review of systems negative     Palliative Performance Status Version 2:         30%    http://npcrc.org/files/news/palliative_performance_scale_[email or web address].pdf    PHYSICAL EXAM:    Vital Signs Last 24 Hrs  T(C): 36.4 (28 Feb 2024 11:57), Max: 36.8 (27 Feb 2024 19:57)  T(F): 97.5 (28 Feb 2024 11:57), Max: 98.2 (27 Feb 2024 19:57)  HR: 78 (28 Feb 2024 11:57) (65 - 87)  BP: 100/49 (28 Feb 2024 11:57) (100/49 - 125/65)  BP(mean): --  RR: 18 (28 Feb 2024 11:57) (18 - 18)  SpO2: --        GENERAL:  [ ] No acute distress [ ]Lethargic  [ ]Unarousable  [ x]Verbal  [ ]Non-Verbal [ ]Cachexia    BEHAVIORAL/PSYCH:  [x ]Alert and Oriented x2  [ ] Anxiety [ ] Delirium [ ] Agitation [ x] Calm   EYES: [ x] No scleral icterus [ ] Scleral icterus [ ] Closed  ENMT:  [ ]Dry mouth  [x ]No external oral lesions [ ] No external ear or nose lesions  CARDIOVASCULAR:  [ ]Regular [ ]Irregular [x ]Tachy [ ]Not Tachy  [ ]Sung [ x] Edema [ ] No edema  PULMONARY:  [ ]Tachypnea  [ ]Audible excessive secretions [ x] No labored breathing [ ] labored breathing  GASTROINTESTINAL: [ ]Soft  [ ]Distended  [x ]Not distended [ ]Non tender [ ]Tender  MUSCULOSKELETAL: [ ]No clubbing [ ] clubbing  [x ] No cyanosis [ ] cyanosis  NEUROLOGIC: [ ]No focal deficits  [x ]Follows commands  [ ]Does not follow commands  [ x]Cognitive impairment  [ ]Dysphagia  [ ]Dysarthria  [ ]Paresis   SKIN: [ ] Jaundiced [ x] Non-jaundiced [ ]Rash [ ]No Rash [ ] Warm [ ] Dry  MISC/LINES: [ ] ET tube [ ] Trach [ ]NGT/OGT [ ]PEG [ ]David    LABS: reviewed by me                            11.5   3.40  )-----------( 149      ( 29 Feb 2024 09:16 )             33.0     02-29    131<L>  |  92<L>  |  16  ----------------------------<  102<H>  3.7   |  28  |  0.9    Ca    8.9      29 Feb 2024 09:16  Mg     1.5     02-29        Urinalysis Basic - ( 29 Feb 2024 09:16 )    Color: x / Appearance: x / SG: x / pH: x  Gluc: 102 mg/dL / Ketone: x  / Bili: x / Urobili: x   Blood: x / Protein: x / Nitrite: x   Leuk Esterase: x / RBC: x / WBC x   Sq Epi: x / Non Sq Epi: x / Bacteria: x                  RADIOLOGY & ADDITIONAL STUDIES: reviewed by me    < from: NM SPECT Bone Scan, Single Area Single Day (02.01.24 @ 16:43) >  IMPRESSION:    Abnormal uptake in the L1 vertebral body, corresponding to known   compression fracture deformity secondary to vertebral body mass.    No other evidence for metastatic bone disease.    < end of copied text >        EKG: reviewed by me    < from: 12 Lead ECG (01.25.24 @ 11:34) >  Ventricular Rate 94 BPM    Atrial Rate 81 BPM    QRS Duration 90 ms    Q-T Interval 384 ms    QTC Calculation(Bazett) 480 ms    R Axis 166 degrees    T Axis -48 degrees    Diagnosis Line Atrial fibrillation  Right ventricular hypertrophy  Anterior infarct , age undetermined  ST & T wave abnormality, consider inferior ischemia  Abnormal ECG    < end of copied text >      Patient discussed with primary medical team MD  Palliative care education provided to patient and/or family

## 2024-02-29 NOTE — PROGRESS NOTE ADULT - ASSESSMENT
89F w/ h/o chronic afib (on Eliquis), GERD, and chronic low back pain presenting s/p fall. Admitted to medicine for back pain 2/2 Lumbar compression fracture.     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 Was called for low blood pressure, which improved on repeat readings, patient's blood pressure runs low, d/aleksandar the oxycodone  - laminoplasty cancelled and pt is not a surgical candidate  - s/p cardizem drip and now on metoprolol 25mg tid  - Pain management recs appreciated: daughter refusing oxy as it causes sedation in her mother, even prn dose   1. acetaminophen 1000mg q8h standing  2. Gabapentin 600mg qhs standing, if tolerated (no sedation/confusion)  3. Methocarbamol 750mg q8h standing  - IR mention that epidural will not alleviate pts pain and more due to spinal cord compression  - TSLO brace for PT  - physiatry recommending STR in SNF   NSx will not operate on pt as poor surgical candidate==>C/W eliquis   - Daughter agreed to oxycodone for pain  - Not interested in hospice currently    #CHF, diastolic - acute -- now resolved  - On 2/10 had episode of unresponsives and bradycardia/hypotension. Likely vasovagal. CT head negative  - TTE (May 2013) demonstrated LVEF 55-65% w/ normal global LVSF and no significant valvulopathy.  - TTE 1/28- EF 60-65% mod pulm HTN   - elevated BNP  - Tele monitored for 24 hrs with no dysrhythmia   - Currently euvolemic on exam, transition back to home oral lasix 20 daily     #hyponatremia  - Na has been trending downwards and is 125  - asymptomatic  - Pt was given lasix on 2/23   - her serum osm is 165 which is consistent with thiazide induced hyponatremia  - lasix PO 20  - Salt tabs started  - for now will continue bmp bid     #Chronic Atrial Fibrillation  #acute RVR, resolved  CHADS-VASC 5.   - Eliquis  - c/w metoprolol 25mg tid    #GERD  - c/w pantoprazole 40mg PO QD    #CKD 3b   - per PCP baseline is 1.2    #UTI - resolved  -UA+  -completed 3 day course of ceftriaxone    #Urinary retention - likely neurogenic   - hanson placed 1/29  - c/w flomax 0.8mg   - completed RT    #Constipation  - c/w aggressive bowel regimen: senna, miralax     # HTN  - c/w metoprolol 25mg TID                                                                          # DVT prophylaxis: eliquis  # GI prophylaxis: Protonix  # Diet: DASH/TLC  # Activity: IAT  # Code status: DNR/DNI  # Disposition: [facility] vs Home with home PT

## 2024-03-01 ENCOUNTER — TRANSCRIPTION ENCOUNTER (OUTPATIENT)
Age: 89
End: 2024-03-01

## 2024-03-01 LAB
ANION GAP SERPL CALC-SCNC: 12 MMOL/L — SIGNIFICANT CHANGE UP (ref 7–14)
BASOPHILS # BLD AUTO: 0.02 K/UL — SIGNIFICANT CHANGE UP (ref 0–0.2)
BASOPHILS NFR BLD AUTO: 0.5 % — SIGNIFICANT CHANGE UP (ref 0–1)
BUN SERPL-MCNC: 19 MG/DL — SIGNIFICANT CHANGE UP (ref 10–20)
CALCIUM SERPL-MCNC: 8.9 MG/DL — SIGNIFICANT CHANGE UP (ref 8.4–10.5)
CHLORIDE SERPL-SCNC: 92 MMOL/L — LOW (ref 98–110)
CO2 SERPL-SCNC: 29 MMOL/L — SIGNIFICANT CHANGE UP (ref 17–32)
CREAT SERPL-MCNC: 1 MG/DL — SIGNIFICANT CHANGE UP (ref 0.7–1.5)
EGFR: 54 ML/MIN/1.73M2 — LOW
EOSINOPHIL # BLD AUTO: 0.09 K/UL — SIGNIFICANT CHANGE UP (ref 0–0.7)
EOSINOPHIL NFR BLD AUTO: 2.3 % — SIGNIFICANT CHANGE UP (ref 0–8)
GLUCOSE SERPL-MCNC: 120 MG/DL — HIGH (ref 70–99)
HCT VFR BLD CALC: 33 % — LOW (ref 37–47)
HGB BLD-MCNC: 11.3 G/DL — LOW (ref 12–16)
IMM GRANULOCYTES NFR BLD AUTO: 0.5 % — HIGH (ref 0.1–0.3)
LYMPHOCYTES # BLD AUTO: 1.69 K/UL — SIGNIFICANT CHANGE UP (ref 1.2–3.4)
LYMPHOCYTES # BLD AUTO: 42.8 % — SIGNIFICANT CHANGE UP (ref 20.5–51.1)
MAGNESIUM SERPL-MCNC: 1.6 MG/DL — LOW (ref 1.8–2.4)
MCHC RBC-ENTMCNC: 32.8 PG — HIGH (ref 27–31)
MCHC RBC-ENTMCNC: 34.2 G/DL — SIGNIFICANT CHANGE UP (ref 32–37)
MCV RBC AUTO: 95.9 FL — SIGNIFICANT CHANGE UP (ref 81–99)
MONOCYTES # BLD AUTO: 0.49 K/UL — SIGNIFICANT CHANGE UP (ref 0.1–0.6)
MONOCYTES NFR BLD AUTO: 12.4 % — HIGH (ref 1.7–9.3)
NEUTROPHILS # BLD AUTO: 1.64 K/UL — SIGNIFICANT CHANGE UP (ref 1.4–6.5)
NEUTROPHILS NFR BLD AUTO: 41.5 % — LOW (ref 42.2–75.2)
NRBC # BLD: 0 /100 WBCS — SIGNIFICANT CHANGE UP (ref 0–0)
PLATELET # BLD AUTO: 155 K/UL — SIGNIFICANT CHANGE UP (ref 130–400)
PMV BLD: 9 FL — SIGNIFICANT CHANGE UP (ref 7.4–10.4)
POTASSIUM SERPL-MCNC: 3.4 MMOL/L — LOW (ref 3.5–5)
POTASSIUM SERPL-SCNC: 3.4 MMOL/L — LOW (ref 3.5–5)
RBC # BLD: 3.44 M/UL — LOW (ref 4.2–5.4)
RBC # FLD: 14.7 % — HIGH (ref 11.5–14.5)
SODIUM SERPL-SCNC: 133 MMOL/L — LOW (ref 135–146)
WBC # BLD: 3.95 K/UL — LOW (ref 4.8–10.8)
WBC # FLD AUTO: 3.95 K/UL — LOW (ref 4.8–10.8)

## 2024-03-01 PROCEDURE — 99232 SBSQ HOSP IP/OBS MODERATE 35: CPT

## 2024-03-01 RX ORDER — APIXABAN 2.5 MG/1
1 TABLET, FILM COATED ORAL
Refills: 0 | DISCHARGE

## 2024-03-01 RX ORDER — OXYCODONE HYDROCHLORIDE 5 MG/1
1 TABLET ORAL
Qty: 0 | Refills: 0 | DISCHARGE
Start: 2024-03-01

## 2024-03-01 RX ORDER — TAMSULOSIN HYDROCHLORIDE 0.4 MG/1
2 CAPSULE ORAL
Qty: 0 | Refills: 0 | DISCHARGE
Start: 2024-03-01

## 2024-03-01 RX ORDER — APIXABAN 2.5 MG/1
1 TABLET, FILM COATED ORAL
Qty: 0 | Refills: 0 | DISCHARGE
Start: 2024-03-01

## 2024-03-01 RX ORDER — SODIUM CHLORIDE 9 MG/ML
1 INJECTION INTRAMUSCULAR; INTRAVENOUS; SUBCUTANEOUS
Qty: 0 | Refills: 0 | DISCHARGE
Start: 2024-03-01 | End: 2024-03-04

## 2024-03-01 RX ORDER — METHOCARBAMOL 500 MG/1
1 TABLET, FILM COATED ORAL
Qty: 0 | Refills: 0 | DISCHARGE
Start: 2024-03-01

## 2024-03-01 RX ORDER — METOPROLOL TARTRATE 50 MG
1 TABLET ORAL
Qty: 0 | Refills: 0 | DISCHARGE
Start: 2024-03-01

## 2024-03-01 RX ORDER — POTASSIUM CHLORIDE 20 MEQ
40 PACKET (EA) ORAL ONCE
Refills: 0 | Status: COMPLETED | OUTPATIENT
Start: 2024-03-01 | End: 2024-03-01

## 2024-03-01 RX ORDER — LIDOCAINE 4 G/100G
1 CREAM TOPICAL
Qty: 1 | Refills: 0
Start: 2024-03-01

## 2024-03-01 RX ORDER — ROPINIROLE 8 MG/1
1 TABLET, FILM COATED, EXTENDED RELEASE ORAL
Qty: 0 | Refills: 0 | DISCHARGE
Start: 2024-03-01

## 2024-03-01 RX ORDER — FLUTICASONE PROPIONATE 50 MCG
1 SPRAY, SUSPENSION NASAL
Qty: 0 | Refills: 0 | DISCHARGE
Start: 2024-03-01

## 2024-03-01 RX ORDER — CALCIUM CARBONATE 500(1250)
1 TABLET ORAL
Qty: 0 | Refills: 0 | DISCHARGE
Start: 2024-03-01

## 2024-03-01 RX ORDER — POLYETHYLENE GLYCOL 3350 17 G/17G
17 POWDER, FOR SOLUTION ORAL
Qty: 0 | Refills: 0 | DISCHARGE
Start: 2024-03-01

## 2024-03-01 RX ORDER — MAGNESIUM SULFATE 500 MG/ML
2 VIAL (ML) INJECTION
Refills: 0 | Status: COMPLETED | OUTPATIENT
Start: 2024-03-01 | End: 2024-03-01

## 2024-03-01 RX ORDER — CHOLECALCIFEROL (VITAMIN D3) 125 MCG
5000 CAPSULE ORAL
Qty: 0 | Refills: 0 | DISCHARGE
Start: 2024-03-01

## 2024-03-01 RX ADMIN — SODIUM CHLORIDE 1 GRAM(S): 9 INJECTION INTRAMUSCULAR; INTRAVENOUS; SUBCUTANEOUS at 13:59

## 2024-03-01 RX ADMIN — CHLORHEXIDINE GLUCONATE 1 APPLICATION(S): 213 SOLUTION TOPICAL at 06:04

## 2024-03-01 RX ADMIN — METHOCARBAMOL 750 MILLIGRAM(S): 500 TABLET, FILM COATED ORAL at 06:03

## 2024-03-01 RX ADMIN — Medication 1 SPRAY(S): at 17:41

## 2024-03-01 RX ADMIN — SODIUM CHLORIDE 1 GRAM(S): 9 INJECTION INTRAMUSCULAR; INTRAVENOUS; SUBCUTANEOUS at 06:07

## 2024-03-01 RX ADMIN — METHOCARBAMOL 750 MILLIGRAM(S): 500 TABLET, FILM COATED ORAL at 21:02

## 2024-03-01 RX ADMIN — ROPINIROLE 0.25 MILLIGRAM(S): 8 TABLET, FILM COATED, EXTENDED RELEASE ORAL at 06:01

## 2024-03-01 RX ADMIN — POLYETHYLENE GLYCOL 3350 17 GRAM(S): 17 POWDER, FOR SOLUTION ORAL at 17:41

## 2024-03-01 RX ADMIN — CHLORHEXIDINE GLUCONATE 1 APPLICATION(S): 213 SOLUTION TOPICAL at 11:54

## 2024-03-01 RX ADMIN — TAMSULOSIN HYDROCHLORIDE 0.8 MILLIGRAM(S): 0.4 CAPSULE ORAL at 21:02

## 2024-03-01 RX ADMIN — Medication 1000 MILLIGRAM(S): at 21:02

## 2024-03-01 RX ADMIN — Medication 1 TABLET(S): at 11:46

## 2024-03-01 RX ADMIN — Medication 25 GRAM(S): at 11:51

## 2024-03-01 RX ADMIN — Medication 25 MILLIGRAM(S): at 06:01

## 2024-03-01 RX ADMIN — Medication 1000 MILLIGRAM(S): at 14:25

## 2024-03-01 RX ADMIN — METHOCARBAMOL 750 MILLIGRAM(S): 500 TABLET, FILM COATED ORAL at 13:56

## 2024-03-01 RX ADMIN — APIXABAN 5 MILLIGRAM(S): 2.5 TABLET, FILM COATED ORAL at 06:01

## 2024-03-01 RX ADMIN — ROPINIROLE 0.25 MILLIGRAM(S): 8 TABLET, FILM COATED, EXTENDED RELEASE ORAL at 17:41

## 2024-03-01 RX ADMIN — Medication 1000 MILLIGRAM(S): at 06:08

## 2024-03-01 RX ADMIN — APIXABAN 5 MILLIGRAM(S): 2.5 TABLET, FILM COATED ORAL at 17:41

## 2024-03-01 RX ADMIN — PANTOPRAZOLE SODIUM 40 MILLIGRAM(S): 20 TABLET, DELAYED RELEASE ORAL at 06:01

## 2024-03-01 RX ADMIN — Medication 40 MILLIGRAM(S): at 06:00

## 2024-03-01 RX ADMIN — Medication 25 MILLIGRAM(S): at 21:03

## 2024-03-01 RX ADMIN — Medication 1000 MILLIGRAM(S): at 22:00

## 2024-03-01 RX ADMIN — Medication 25 MILLIGRAM(S): at 13:56

## 2024-03-01 RX ADMIN — Medication 1000 MILLIGRAM(S): at 13:56

## 2024-03-01 RX ADMIN — Medication 1 SPRAY(S): at 06:00

## 2024-03-01 RX ADMIN — Medication 40 MILLIEQUIVALENT(S): at 11:52

## 2024-03-01 RX ADMIN — SODIUM CHLORIDE 1 GRAM(S): 9 INJECTION INTRAMUSCULAR; INTRAVENOUS; SUBCUTANEOUS at 21:09

## 2024-03-01 RX ADMIN — Medication 5000 UNIT(S): at 11:47

## 2024-03-01 RX ADMIN — GABAPENTIN 600 MILLIGRAM(S): 400 CAPSULE ORAL at 21:04

## 2024-03-01 NOTE — PROGRESS NOTE ADULT - PROBLEM SELECTOR PROBLEM 1
Compression fracture of spine

## 2024-03-01 NOTE — DISCHARGE NOTE PROVIDER - NSDCFUADDINST_GEN_ALL_CORE_FT
- Please make sure to check a BMP in 1 week and adjust sodium chloride tabs - BMP monitoring at NH and adjust sodium chloride tabs accordingly   - Oncology follow up as per daughter's wishes

## 2024-03-01 NOTE — DISCHARGE NOTE PROVIDER - PROVIDER TOKENS
PROVIDER:[TOKEN:[[id number]],FOLLOWUP:[2 weeks]],PROVIDER:[TOKEN:[[id number]],FOLLOWUP:[1 week]]

## 2024-03-01 NOTE — DISCHARGE NOTE PROVIDER - NSDCCPCAREPLAN_GEN_ALL_CORE_FT
PRINCIPAL DISCHARGE DIAGNOSIS  Diagnosis: Inability to ambulate due to multiple joints  Assessment and Plan of Treatment: Pathologic = having to do with a pathology (a disease)  Fracture = a break in a bon  A pathologic fracture is a break in a bone that is caused by an underlying disease. At [facility], we specialize in pathologic fractures of vertebrae, or bones of the spine.  For the most part, bones need a reason to break–for example, a significant trauma. However, some pathologies (diseases) weaken the bones of the spine. Forces as slight as the weight of the body or a minor trauma that would otherwise be tolerated can cause a fracture in the diseased bone.  Pathologic vertebral fractures may or may not cause symptoms. If pathologic fractures cause symptoms, these may include:  pain in back, legs, and arms  neurological impairment–such as numbness and/or weakness in the arms or legs (if the fracture has affected the spinal cord and/or nerves in the spine)  Pathologic fractures are frequently caused by tumors. Tumors may originate in the vertebrae, or may be the result of cancer that has spread from elsewhere in the body. The spread of cancer is called metastasis. The vertebrae are a common site for metastasis.  Pathologic fractures are also frequently caused by osteoporosis. Osteoporosis is a condition of weak and brittle bones that is most common in older women.  Other conditions that may weaken the vertebrae include infection, osteomalacia (a condition in which the bones become soft due to a vitamin D deficiency), and Paget’s disease (a condition in which the renewal and repair processes of bone are abnormal).  The goals of treatment are pain relief, reversal or stabilization of neurological deficits, and stabilization of the spine.  For the most part, nonoperative treatments are recommended for less severe pathologic fractures. These include taking pain medications, limiting physical activity, and wearing a brace. The brace helps support the back and prevents bending forward, which removes pressure from the fractured vertebrae.      SECONDARY DISCHARGE DIAGNOSES  Diagnosis: Back pain  Assessment and Plan of Treatment:

## 2024-03-01 NOTE — PROGRESS NOTE ADULT - PROBLEM SELECTOR PLAN 2
-Now DNR/DNI  -ongoing medical management  -Daughter Radha is surrogate  -GOC as appropriate.
-Full code  -ongoing medical management  -Daughter [place] is surrogate  -GOC as appropriate.
-Now DNR/DNI  -ongoing medical management  -Daughter Radha surrogate  -GOC as appropriate
-DNR/DNI  -ongoing medical management  -Daughter Radha surrogate  -GOC as appropriate
-Now DNR/DNI  -ongoing medical management  -Daughter Radha is surrogate  -GOC as appropriate.
-Now DNR/DNI  -ongoing medical management  -Daughter Radha is surrogate  -GOC as appropriate.
-DNR/DNI  -ongoing medical management  -Daughter Radha surrogate  -GOC as appropriate
-Now DNR/DNI  -ongoing medical management  -Daughter Radha is surrogate  -GOC as appropriate.
-Now DNR/DNI  -ongoing medical management  -Daughter Radha surrogate  -GOC as appropriate
-Now DNR/DNI  -ongoing medical management  -Daughter Radha is surrogate  -GOC as appropriate.
-Now DNR/DNI  -ongoing medical management  -Daughter Radha surrogate  -GOC as appropriate

## 2024-03-01 NOTE — PROGRESS NOTE ADULT - PROBLEM SELECTOR PLAN 1
Compression fractures with mixed lytic/sclerotic lesions showing pathologic fracture  -Was planned for kyphoplasty/laminectomy on 2/14, was placed on hold for now  -s/p radiation  -f/u heme onc  -previous Pain Management recs appreciated  -continue Tylenol, lidocaine patch, gabapentin per Pain management  -Added oxycodone 5mg q6h PRN for pain  -D/C'd standing 10mg of oxycodone ER Q12H for pain   -continue bowel regimen.
Compression fractures with mixed lytic/sclerotic lesions showing pathologic fracture  -Was planned for kyphoplasty/laminectomy on 2/14, was placed on hold, went to OR and had hypotension and Afib, case was cancelled at that time  -s/p radiation  -f/u heme onc  -previous Pain Management recs appreciated  -continue Tylenol, lidocaine patch, per Pain management  -continue gabapentin   -Changed tramadol to oxycodone 5mg PRN Q6H after discussion with patient's daughter  -IR consulted for epidural, they stated unable to be done  -not a candidate for intervention as per neurosurg  -continue bowel regimen
Compression fractures with mixed lytic/sclerotic lesions showing pathologic fracture  -Was planned for kyphoplasty/laminectomy on 2/14, was placed on hold, went to OR and had hypotension and Afib, case was cancelled at that time  -s/p radiation  -f/u heme onc  -previous Pain Management recs appreciated  -continue Tylenol, lidocaine patch, per Pain management  -continue gabapentin   -IR consulted for epidural, they stated unable to be done  -pending f/u from neurosurg  -continue bowel regimen
Compression fractures with mixed lytic/sclerotic lesions showing pathologic fracture  -Was planned for kyphoplasty/laminectomy on 2/14, was placed on hold, went to OR and had hypotension and Afib, case was cancelled at that time  -s/p radiation  -f/u heme onc  -previous Pain Management recs appreciated  -continue Tylenol, lidocaine patch, per Pain management  -continue gabapentin 400 3x a day  -D/C'd oxycodone 5mg q6h PRN for pain  -D/C'd standing 10mg of oxycodone ER Q12H for pain   -continue bowel regimen
Compression fractures with mixed lytic/sclerotic lesions showing pathologic fracture  -no neurosurgical intervention  -continue dexamethasone  -biopsy - awaiting final results, but showing epithelial carcinoma  -continue radiation  -f/u heme onc  -previous Pain Management recs appreciated  -continue Tylenol, lidocaine patch, gabapentin per Pain management  -continue oxycodone 15mg q6h PRN for pain  -added standing 10mg of oxycodone ER Q12H for pain   -continue bowel regimen.
Compression fractures with mixed lytic/sclerotic lesions showing pathologic fracture  -Planned for kyphoplasty on Monday  -continue dexamethasone  -biopsy - awaiting final results, but showing epithelial carcinoma  -continue radiation  -f/u heme onc  -previous Pain Management recs appreciated  -continue Tylenol, lidocaine patch, gabapentin per Pain management  -continue oxycodone 15mg q6h PRN for pain  -added standing 10mg of oxycodone ER Q12H for pain   -continue bowel regimen.
Compression fractures with mixed lytic/sclerotic lesions showing pathologic fracture  -Planned for kyphoplasty on Monday  -s/p radiation  -f/u heme onc  -plan for kyphoplasty wednesday  -previous Pain Management recs appreciated  -continue Tylenol, lidocaine patch, gabapentin per Pain management  -Added oxycodone 5mg q6h PRN for pain  -added standing 10mg of oxycodone ER Q12H for pain   -continue bowel regimen.
Compression fractures with mixed lytic/sclerotic lesions showing pathologic fracture  -Was planned for kyphoplasty/laminectomy on 2/14, was placed on hold, went to OR and had hypotension and Afib, case was cancelled at that time  -s/p radiation  -f/u heme onc  -previous Pain Management recs appreciated  -continue Tylenol, lidocaine patch, per Pain management  -continue gabapentin 400 3x a day  -D/C'd oxycodone 5mg q6h PRN for pain  -D/C'd standing 10mg of oxycodone ER Q12H for pain   -continue bowel regimen
Compression fractures with mixed lytic/sclerotic lesions showing pathologic fracture  -Was planned for kyphoplasty/laminectomy on 2/14, was placed on hold, went to OR and had hypotension and Afib, case was cancelled at that time  -s/p radiation  -f/u heme onc  -previous Pain Management recs appreciated  -continue Tylenol, lidocaine patch, per Pain management  -continue gabapentin 400 3x a day  -D/C'd oxycodone 5mg q6h PRN for pain  -D/C'd standing 10mg of oxycodone ER Q12H for pain   -continue bowel regimen
Compression fractures with mixed lytic/sclerotic lesions showing pathologic fracture  -no neurosurgical intervention  -continue dexamethasone  -biopsy - awaiting final results, but showing epithelial carcinoma  -continue radiation  -f/u heme onc  -previous Pain Management recs appreciated  -continue Tylenol, lidocaine patch, gabapentin per Pain management  -continue oxycodone 15mg q6h PRN for pain  -added standing 10mg of oxycodone ER Q12H for pain   -continue bowel regimen.
Compression fractures with mixed lytic/sclerotic lesions showing pathologic fracture  -no neurosurgical intervention  -continue dexamethasone  -biopsy - awaiting final results, but showing epithelial carcinoma  -continue radiation  -f/u heme onc  -previous Pain Management recs appreciated  -continue tylenol, lidocaine patch, gabapentin per PAin managmenet  -continue oxycodone 15mg q6h PRN for pain  -recommend stopping IV morphine as dose is minimal compared to oxycodone dose  -continue bowel regimen.
Compression fractures with mixed lytic/sclerotic lesions showing pathologic fracture  -Was planned for kyphoplasty/laminectomy on 2/14, was placed on hold, went to OR and had hypotension and Afib, case was cancelled at that time  -s/p radiation  -f/u heme onc  -previous Pain Management recs appreciated  -continue Tylenol, lidocaine patch, per Pain management  -continue gabapentin   -IR consult for epidural  -continue bowel regimen
Compression fractures with mixed lytic/sclerotic lesions showing pathologic fracture  -Was planned for kyphoplasty/laminectomy on 2/14, was placed on hold, went to OR and had hypotension and Afib, case was cancelled at that time  -s/p radiation  -f/u heme onc  -previous Pain Management recs appreciated  -continue Tylenol, lidocaine patch, per Pain management  -continue gabapentin   -IR consulted for epidural, they stated unable to be done  -not a candidate for intervention as per neurosurg  -continue bowel regimen
Compression fractures with mixed lytic/sclerotic lesions showing pathologic fracture  -Planned for kyphoplasty on Monday  -s/p radiation  -f/u heme onc  -plan for kyphoplasty wednesday  -previous Pain Management recs appreciated  -continue Tylenol, lidocaine patch, gabapentin per Pain management  -Added oxycodone 5mg q6h PRN for pain  -added standing 10mg of oxycodone ER Q12H for pain   -continue bowel regimen.
Compression fractures with mixed lytic/sclerotic lesions showing pathologic fracture  -Was planned for kyphoplasty/laminectomy on 2/14, was placed on hold, now planned for later today  -s/p radiation  -f/u heme onc  -previous Pain Management recs appreciated  -continue Tylenol, lidocaine patch, gabapentin per Pain management  -D/C'd oxycodone 5mg q6h PRN for pain  -D/C'd standing 10mg of oxycodone ER Q12H for pain   -continue bowel regimen.
Compression fractures with mixed lytic/sclerotic lesions showing pathologic fracture  -Was planned for kyphoplasty/laminectomy on 2/14, was placed on hold, went to OR and had hypotension and Afib, case was cancelled at that time  -s/p radiation  -f/u heme onc  -previous Pain Management recs appreciated  -continue Tylenol, lidocaine patch, per Pain management  -continue gabapentin   -Changed tramadol to oxycodone 5mg PRN Q6H after discussion with patient's daughter  -IR consulted for epidural, they stated unable to be done  -not a candidate for intervention as per neurosurg  -continue bowel regimen
Compression fractures with mixed lytic/sclerotic lesions showing pathologic fracture  -Was planned for kyphoplasty/laminectomy on 2/14, was placed on hold, went to OR and had hypotension and Afib, case was cancelled at that time  -s/p radiation  -f/u heme onc  -previous Pain Management recs appreciated  -continue Tylenol, lidocaine patch, per Pain management  -continue gabapentin 400 3x a day  -D/C'd oxycodone 5mg q6h PRN for pain  -D/C'd standing 10mg of oxycodone ER Q12H for pain   -continue bowel regimen
Compression fractures with mixed lytic/sclerotic lesions showing pathologic fracture  -Was planned for kyphoplasty/laminectomy on 2/14, was placed on hold, went to OR and had hypotension and Afib, case was cancelled at that time  -s/p radiation  -f/u heme onc  -previous Pain Management recs appreciated  -continue Tylenol, lidocaine patch, per Pain management  -would restart gabapentin 300 3x a day  -D/C'd oxycodone 5mg q6h PRN for pain  -D/C'd standing 10mg of oxycodone ER Q12H for pain   -continue bowel regimen.

## 2024-03-01 NOTE — DISCHARGE NOTE PROVIDER - ATTENDING DISCHARGE PHYSICAL EXAMINATION:
Vital Signs Last 24 Hrs  T(C): 36.7 (01 Mar 2024 12:39), Max: 36.9 (29 Feb 2024 20:30)  T(F): 98.1 (01 Mar 2024 12:39), Max: 98.5 (29 Feb 2024 20:30)  HR: 98 (01 Mar 2024 12:39) (77 - 98)  BP: 116/56 (01 Mar 2024 12:39) (116/56 - 124/61)  BP(mean): --  RR: 18 (01 Mar 2024 12:39) (18 - 18)  SpO2: 98% (29 Feb 2024 20:30) (98% - 98%)    Parameters below as of 29 Feb 2024 20:30  Patient On (Oxygen Delivery Method): room air    -pt with advanced malignancy - not a neurosurgical candidate - no immunotherapy as per heme/onc team - seen by Palliative care team - daughter aware of the overall poor prognosis - ok with d/c planning to NH - discussed with CM in rounds  Vital Signs Last 24 Hrs  T(C): 36.7 (02 Mar 2024 04:33), Max: 36.8 (01 Mar 2024 20:48)  T(F): 98.1 (02 Mar 2024 04:33), Max: 98.2 (01 Mar 2024 20:48)  HR: 78 (02 Mar 2024 04:33) (75 - 78)  BP: 111/65 (02 Mar 2024 04:33) (111/65 - 122/74)  BP(mean): 79 (02 Mar 2024 04:33) (79 - 79)  RR: 18 (02 Mar 2024 04:33) (18 - 20)  SpO2: 99% (01 Mar 2024 20:48) (99% - 99%)    Parameters below as of 01 Mar 2024 20:48  Patient On (Oxygen Delivery Method): room air    -pt with advanced malignancy - not a neurosurgical candidate - no immunotherapy as per heme/onc team - seen by Palliative care team - daughter aware of the overall poor prognosis - ok with d/c planning to NH - discussed with CM in rounds  Vital Signs Last 24 Hrs  T(C): 36.9 (03 Mar 2024 06:21), Max: 36.9 (02 Mar 2024 21:26)  T(F): 98.4 (03 Mar 2024 06:21), Max: 98.4 (02 Mar 2024 21:26)  HR: 78 (03 Mar 2024 06:21) (78 - 98)  BP: 119/74 (03 Mar 2024 06:21) (102/60 - 119/74)  BP(mean): --  RR: 18 (03 Mar 2024 06:21) (18 - 18)  SpO2: --      -pt with advanced malignancy - not a neurosurgical candidate - no immunotherapy as per heme/onc team - seen by Palliative care team - daughter aware of the overall poor prognosis - ok with d/c planning to NH - discussed with CM in rounds

## 2024-03-01 NOTE — DISCHARGE NOTE PROVIDER - HOSPITAL COURSE
89F w/ h/o chronic afib (on Eliquis), GERD, and chronic low back pain presenting s/p fall. Admitted to medicine for back pain 2/2 Lumbar compression fracture.     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 Was called for low blood pressure, which improved on repeat readings, patient's blood pressure runs low, d/aleksandar the oxycodone  - laminoplasty cancelled and pt is not a surgical candidate  - s/p cardizem drip and now on metoprolol 25mg tid  - Pain management recs appreciated: daughter refusing oxy as it causes sedation in her mother, even prn dose   1. acetaminophen 1000mg q8h standing  2. Gabapentin 600mg qhs standing, if tolerated (no sedation/confusion)  3. Methocarbamol 750mg q8h standing  - IR mention that epidural will not alleviate pts pain and more due to spinal cord compression  - TSLO brace for PT  - physiatry recommending STR in SNF   NSx will not operate on pt as poor surgical candidate==>C/W eliquis   - Daughter agreed to oxycodone for pain  - Not interested in hospice currently    #CHF, diastolic - acute -- now resolved  - On 2/10 had episode of unresponsives and bradycardia/hypotension. Likely vasovagal. CT head negative  - TTE (May 2013) demonstrated LVEF 55-65% w/ normal global LVSF and no significant valvulopathy.  - TTE 1/28- EF 60-65% mod pulm HTN   - elevated BNP  - Tele monitored for 24 hrs with no dysrhythmia   - Currently euvolemic on exam, transition back to home oral lasix 20 daily     #hyponatremia  - Na improving  - asymptomatic  - Pt was given lasix on 2/23   - her serum osm is 165 which is consistent with thiazide induced hyponatremia  - lasix PO 20  - Salt tabs started    #Chronic Atrial Fibrillation  #acute RVR, resolved  CHADS-VASC 5.   - Eliquis  - c/w metoprolol 25mg tid    #GERD  - c/w pantoprazole 40mg PO QD    #CKD 3b   - per PCP baseline is 1.2    #UTI - resolved  -UA+  -completed 3 day course of ceftriaxone    #Urinary retention - likely neurogenic   - hanson placed 1/29  - c/w flomax 0.8mg   - completed RT    #Constipation  - c/w aggressive bowel regimen: senna, miralax     # HTN  - c/w metoprolol 25mg TID                                                                          # DVT prophylaxis: eliquis  # GI prophylaxis: Protonix  # Diet: DASH/TLC  # Activity: IAT  # Code status: DNR/DNI   89F w/ h/o chronic afib (on Eliquis), GERD, and chronic low back pain presenting s/p fall. Admitted to medicine for back pain 2/2 Lumbar compression fracture.     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 Was called for low blood pressure, which improved on repeat readings, patient's blood pressure runs low, d/aleksandar the oxycodone  - laminoplasty cancelled and pt is not a surgical candidate  - s/p cardizem drip and now on metoprolol 25mg tid  - Pain management recs appreciated: daughter refusing oxy as it causes sedation in her mother, even prn dose   1. acetaminophen 1000mg q8h standing  2. Gabapentin 600mg qhs standing, if tolerated (no sedation/confusion)  3. Methocarbamol 750mg q8h standing  - IR mention that epidural will not alleviate pts pain and more due to spinal cord compression  - TSLO brace for PT  - physiatry recommending STR in SNF   NSx will not operate on pt as poor surgical candidate==>C/W eliquis   - Daughter agreed to oxycodone for pain  - Not interested in hospice currently    #CHF, diastolic - acute -- now resolved  - On 2/10 had episode of unresponsives and bradycardia/hypotension. Likely vasovagal. CT head negative  - TTE (May 2013) demonstrated LVEF 55-65% w/ normal global LVSF and no significant valvulopathy.  - TTE 1/28- EF 60-65% mod pulm HTN   - elevated BNP  - Tele monitored for 24 hrs with no dysrhythmia   - Currently euvolemic on exam, transition back to home oral lasix 20 daily     #hyponatremia  - Na improving  - asymptomatic  - Pt was given lasix on 2/23   - her serum osm is 165 which is consistent with thiazide induced hyponatremia  - lasix PO 20  - Salt tabs started    #Chronic Atrial Fibrillation  #acute RVR, resolved  CHADS-VASC 5.   - Eliquis  - c/w metoprolol 25mg tid    #GERD  - c/w pantoprazole 40mg PO QD    #CKD 3b   - per PCP baseline is 1.2    #UTI - resolved  -UA+  -completed 3 day course of ceftriaxone    #Urinary retention - likely neurogenic   - hanson placed 1/29  - c/w flomax 0.8mg   - completed RT    #Constipation  - c/w aggressive bowel regimen: senna, miralax     # HTN  - c/w metoprolol 25mg TID                                                                          # DVT prophylaxis: eliquis  # GI prophylaxis: Protonix  # Diet: DASH/TLC  # Activity: IAT  # Code status: DNR/DNI  DISPO: NH today - daughter in agreement - see by Palliative care - daughter aware of the overall poor prognosis

## 2024-03-01 NOTE — DISCHARGE NOTE PROVIDER - CARE PROVIDERS DIRECT ADDRESSES
[email or web address]

## 2024-03-01 NOTE — PROGRESS NOTE ADULT - PROBLEM SELECTOR PROBLEM 2
Palliative care by specialist

## 2024-03-01 NOTE — DISCHARGE NOTE PROVIDER - NSDCMRMEDTOKEN_GEN_ALL_CORE_FT
apixaban 2.5 mg oral tablet: 1 tab(s) orally 2 times a day  cholecalciferol oral tablet: 5000 unit(s) orally once a day  fluticasone 50 mcg/inh nasal spray: 1 spray(s) nasal 2 times a day  furosemide 20 mg oral tablet: 1 tab(s) orally once a day  gabapentin 100 mg oral capsule: 2 cap(s) orally 3 times a day  methocarbamol 750 mg oral tablet: 1 tab(s) orally every 8 hours  metoprolol tartrate 25 mg oral tablet: 1 tab(s) orally 3 times a day  Multiple Vitamins oral tablet: 1 tab(s) orally once a day  omeprazole 20 mg oral delayed release capsule: 1 cap(s) orally once a day  oxyCODONE 5 mg oral tablet: 1 tab(s) orally every 8 hours As needed Severe Pain (7 - 10)  polyethylene glycol 3350 oral powder for reconstitution: 17 gram(s) orally 2 times a day  rOPINIRole 0.25 mg oral tablet: 1 tab(s) orally 2 times a day  senna (sennosides) 8.6 mg oral tablet: 2 tab(s) orally once a day (at bedtime)  Vitamin C 500 mg oral tablet: 1 tab(s) orally once a day   apixaban 5 mg oral tablet: 1 tab(s) orally every 12 hours  calcium carbonate 1250 mg (500 mg elemental calcium) oral tablet: 1 tab(s) orally once a day  cholecalciferol oral tablet: 5000 unit(s) orally once a day  fluticasone 50 mcg/inh nasal spray: 1 spray(s) nasal 2 times a day  furosemide 20 mg oral tablet: 1 tab(s) orally once a day  gabapentin 100 mg oral capsule: 2 cap(s) orally 3 times a day  methocarbamol 750 mg oral tablet: 1 tab(s) orally every 8 hours  metoprolol tartrate 25 mg oral tablet: 1 tab(s) orally 3 times a day  Multiple Vitamins oral tablet: 1 tab(s) orally once a day  omeprazole 20 mg oral delayed release capsule: 1 cap(s) orally once a day  oxyCODONE 5 mg oral tablet: 1 tab(s) orally every 8 hours As needed Severe Pain (7 - 10)  polyethylene glycol 3350 oral powder for reconstitution: 17 gram(s) orally 2 times a day  rOPINIRole 0.25 mg oral tablet: 1 tab(s) orally 2 times a day  senna (sennosides) 8.6 mg oral tablet: 2 tab(s) orally once a day (at bedtime)  sodium chloride 1 g oral tablet: 1 tab(s) orally every 8 hours  tamsulosin 0.4 mg oral capsule: 2 cap(s) orally once a day (at bedtime)  Vitamin C 500 mg oral tablet: 1 tab(s) orally once a day   apixaban 5 mg oral tablet: 1 tab(s) orally every 12 hours  calcium carbonate 1250 mg (500 mg elemental calcium) oral tablet: 1 tab(s) orally once a day  cholecalciferol oral tablet: 5000 unit(s) orally once a day  fluticasone 50 mcg/inh nasal spray: 1 spray(s) nasal 2 times a day  furosemide 20 mg oral tablet: 1 tab(s) orally once a day  gabapentin 100 mg oral capsule: 2 cap(s) orally 3 times a day  methocarbamol 750 mg oral tablet: 1 tab(s) orally every 8 hours  metoprolol tartrate 25 mg oral tablet: 1 tab(s) orally 3 times a day  Multiple Vitamins oral tablet: 1 tab(s) orally once a day  omeprazole 20 mg oral delayed release capsule: 1 cap(s) orally once a day  oxyCODONE 5 mg oral tablet: 1 tab(s) orally every 8 hours As needed Severe Pain (7 - 10)  polyethylene glycol 3350 oral powder for reconstitution: 17 gram(s) orally 2 times a day  rOPINIRole 0.25 mg oral tablet: 1 tab(s) orally 2 times a day  senna (sennosides) 8.6 mg oral tablet: 2 tab(s) orally once a day (at bedtime)  sodium chloride 1 g oral tablet: 1 tab(s) orally 2 times a day monitor BMP for Sodium levels  tamsulosin 0.4 mg oral capsule: 2 cap(s) orally once a day (at bedtime)  Vitamin C 500 mg oral tablet: 1 tab(s) orally once a day

## 2024-03-01 NOTE — PROGRESS NOTE ADULT - ASSESSMENT
89yFemale with history of afib on eliquis, GERD, chronic low back pain presents after fall. Patient found to have lumbar pathological fracture on admission. Palliative care consulted for GOC and symptom management.    Patient seen at bedside. Patient awake and resting comfortably, denied any pain at this time. Planned for discharge soon.    Education about palliative care provided to patient/family.  See Recs below.    Please call [phone] with questions or concerns 24/7.   We will continue to follow.

## 2024-03-01 NOTE — DISCHARGE NOTE PROVIDER - CARE PROVIDER_API CALL
Oz Ng  Medical Oncology  [address] [place] [id number]  Phone: [phone]  Fax: [phone]  Follow Up Time: 2 weeks    Hilda Moraes  Urology  [address] [place] [address], [address] [address]  Phone: [phone]  Fax: [phone]  Follow Up Time: 1 week

## 2024-03-01 NOTE — PROGRESS NOTE ADULT - SUBJECTIVE AND OBJECTIVE BOX
HPI:    89F w/ h/o chronic afib (on Eliquis), GERD, and chronic low back pain presenting s/p fall. Patient known to have chronic low back pain that has progressively worsened over the years. Pain acutely worsened two weeks ago and patient subsequently fell due to inability to support own weight. Evaluated by PCP after initial fall due to worsening pain. Prescribed Percocet 5-325 bid w/ mild relief. However, pt once again fell earlier today after pt unable to support her own weight while trying to ambulate. Now unable to ambulate or bear weight, even w/ assistance. No reported HT, LOC, lightheadedness, confusion, loss of continence, or focal weakness a/w either fall. No reported fevers, chills, CP, palpitations, SOB, abdominal pain, n/v/d, or dysuria (although daughter did note malodorous urine recently).    Interval history  -Patient seen at bedside with alyssa Garcia  -Patient notes leg pain with movement, but only mild pain at rest  2/7: Patient now DNR/DNI, added 10mg oxycontin Q12H for consistent pain  2/12: added PRN oxycodone 5mg Q6H for pain  2/14: stopped oxycodone ER 10mg since patient has been more somnolent and hypotensive occasionally   2/16: Patient was upgraded to telemetry, placed on Cardizem infusion after OR  2/20: Patient sleeping comfortably, spoke with daughter at bedside who reports that patient's pain has been OK but she worked with PT which did exacerbate it a bit.   2/26: Patient resting comfortably, plan for potential IR for epidural  2/29: Patient comfortable, spoke with daughter at bedside about changing tramadol to oxycodone, potential discharge soon     ADVANCE DIRECTIVES:     [ ] Full Code [x ] DNR  MOLST  [ ]  Living Will  [ ]   DECISION MAKER(s):  [ ] Health Care Proxy(s)  [x ] Surrogate(s)  [ ] Guardian           Name(s): Phone Number(s):  Daughter      BASELINE (I)ADL(s) (prior to admission):    [place]: [ ]Total  [ ] Moderate [ ]Dependent  Palliative Performance Status Version 2:         %    http://npcrc.org/files/news/palliative_performance_scale_ppsv2.pdf    Allergies    No Known Allergies    Intolerances    MEDICATIONS  (STANDING):  acetaminophen     Tablet .. 1000 milliGRAM(s) Oral every 8 hours  apixaban 5 milliGRAM(s) Oral every 12 hours  calcium carbonate   1250 mG (OsCal) 1 Tablet(s) Oral daily  chlorhexidine 2% Cloths 1 Application(s) Topical daily  chlorhexidine 2% Cloths 1 Application(s) Topical <User Schedule>  cholecalciferol 5000 Unit(s) Oral daily  fluticasone propionate 50 MICROgram(s)/spray Nasal Spray 1 Spray(s) Both Nostrils two times a day  furosemide    Tablet 40 milliGRAM(s) Oral daily  gabapentin 600 milliGRAM(s) Oral at bedtime  lidocaine   4% Patch 1 Patch Transdermal every 24 hours  magnesium sulfate  IVPB 2 Gram(s) IV Intermittent every 2 hours  methocarbamol 750 milliGRAM(s) Oral every 8 hours  metoprolol tartrate 25 milliGRAM(s) Oral three times a day  multivitamin 1 Tablet(s) Oral daily  pantoprazole    Tablet 40 milliGRAM(s) Oral before breakfast  polyethylene glycol 3350 17 Gram(s) Oral two times a day  rOPINIRole 0.25 milliGRAM(s) Oral two times a day  senna 2 Tablet(s) Oral at bedtime  sodium chloride 1 Gram(s) Oral every 8 hours  tamsulosin 0.8 milliGRAM(s) Oral at bedtime    MEDICATIONS  (PRN):  oxyCODONE    IR 5 milliGRAM(s) Oral every 8 hours PRN Severe Pain (7 - 10)          PRESENT SYMPTOMS: [  ]Unable to obtain due to poor mentation   Source if other than patient:  [ ]Family   [ ]Team     Pain: [ ]yes [x ]no  QOL impact -    Location -   Aggravating factors -   Quality -   Radiation -   Timing-  Severity (0-10 scale):   Minimal acceptable level (0-10 scale):     CPOT:    https://www.sccm.org/getattachment/bad14x25-7p3t-7w7d-3k0y-7035l1011i2n/Critical-Care-Pain-Observation-Tool-(CPOT)    PAIN AD Score:   http://geriatrictoolkit.[facility]/cog/painad.pdf (press ctrl +  left click to view)    Dyspnea:                           [x ]None[ ]Mild [ ]Moderate [ ]Severe     Respiratory Distress Observation Scale (RDOS):   A score of 0 to 2 signifies little or no respiratory distress, 3 signifies mild distress, scores 4 to 6 indicate moderate distress, and scores greater than 7 signify severe distress  [email or web address]://[email or web address]/sites/default/files/PDFS/225563-hbeghxmezcp-gkqzdkaa-dqjhiyymskm-ikvfx.pdf    Anxiety:                             [ x]None[ ]Mild [ ]Moderate [ ]Severe   Fatigue:                             [x ]None[ ]Mild [ ]Moderate [ ]Severe   Nausea:                             [ x]None[ ]Mild [ ]Moderate [ ]Severe   Loss of appetite:              [x ]None[ ]Mild [ ]Moderate [ ]Severe   Constipation:                    [x ]None[ ]Mild [ ]Moderate [ ]Severe    Other Symptoms:  [ x]All other review of systems negative     Palliative Performance Status Version 2:         30%    http://npcrc.org/files/news/palliative_performance_scale_ppsv2.pdf    PHYSICAL EXAM:    Vital Signs Last 24 Hrs  T(C): 36.7 (01 Mar 2024 12:39), Max: 36.9 (29 Feb 2024 20:30)  T(F): 98.1 (01 Mar 2024 12:39), Max: 98.5 (29 Feb 2024 20:30)  HR: 98 (01 Mar 2024 12:39) (77 - 98)  BP: 116/56 (01 Mar 2024 12:39) (110/67 - 124/61)  BP(mean): 80 (29 Feb 2024 14:22) (80 - 80)  RR: 18 (01 Mar 2024 12:39) (18 - 18)  SpO2: 98% (29 Feb 2024 20:30) (97% - 98%)    Parameters below as of 29 Feb 2024 20:30  Patient On (Oxygen Delivery Method): room air        GENERAL:  [ ] No acute distress [ ]Lethargic  [ ]Unarousable  [ x]Verbal  [ ]Non-Verbal [ ]Cachexia    BEHAVIORAL/PSYCH:  [x ]Alert and Oriented x2  [ ] Anxiety [ ] Delirium [ ] Agitation [ x] Calm   EYES: [ x] No scleral icterus [ ] Scleral icterus [ ] Closed  ENMT:  [ ]Dry mouth  [x ]No external oral lesions [ ] No external ear or nose lesions  CARDIOVASCULAR:  [ ]Regular [ ]Irregular [x ]Tachy [ ]Not Tachy  [ ]Sung [ x] Edema [ ] No edema  PULMONARY:  [ ]Tachypnea  [ ]Audible excessive secretions [ x] No labored breathing [ ] labored breathing  GASTROINTESTINAL: [ ]Soft  [ ]Distended  [x ]Not distended [ ]Non tender [ ]Tender  MUSCULOSKELETAL: [ ]No clubbing [ ] clubbing  [x ] No cyanosis [ ] cyanosis  NEUROLOGIC: [ ]No focal deficits  [x ]Follows commands  [ ]Does not follow commands  [ x]Cognitive impairment  [ ]Dysphagia  [ ]Dysarthria  [ ]Paresis   SKIN: [ ] Jaundiced [ x] Non-jaundiced [ ]Rash [ ]No Rash [ ] Warm [ ] Dry  MISC/LINES: [ ] ET tube [ ] Trach [ ]NGT/OGT [ ]PEG [ ]David    LABS: reviewed by me                            11.3   3.95  )-----------( 155      ( 01 Mar 2024 07:56 )             33.0     03-01    133<L>  |  92<L>  |  19  ----------------------------<  120<H>  3.4<L>   |  29  |  1.0    Ca    8.9      01 Mar 2024 07:56  Mg     1.6     03-01        Urinalysis Basic - ( 01 Mar 2024 07:56 )    Color: x / Appearance: x / SG: x / pH: x  Gluc: 120 mg/dL / Ketone: x  / Bili: x / Urobili: x   Blood: x / Protein: x / Nitrite: x   Leuk Esterase: x / RBC: x / WBC x   Sq Epi: x / Non Sq Epi: x / Bacteria: x              RADIOLOGY & ADDITIONAL STUDIES: reviewed by me    < from: NM SPECT Bone Scan, Single Area Single Day (02.01.24 @ 16:43) >  IMPRESSION:    Abnormal uptake in the L1 vertebral body, corresponding to known   compression fracture deformity secondary to vertebral body mass.    No other evidence for metastatic bone disease.    < end of copied text >        EKG: reviewed by me    < from: 12 Lead ECG (01.25.24 @ 11:34) >  Ventricular Rate 94 BPM    Atrial Rate 81 BPM    QRS Duration 90 ms    Q-T Interval 384 ms    QTC Calculation(Bazett) 480 ms    R Axis 166 degrees    T Axis -48 degrees    Diagnosis Line Atrial fibrillation  Right ventricular hypertrophy  Anterior infarct , age undetermined  ST & T wave abnormality, consider inferior ischemia  Abnormal ECG    < end of copied text >      Patient discussed with primary medical team MD  Palliative care education provided to patient and/or family

## 2024-03-02 LAB
ANION GAP SERPL CALC-SCNC: 14 MMOL/L — SIGNIFICANT CHANGE UP (ref 7–14)
BASOPHILS # BLD AUTO: 0.03 K/UL — SIGNIFICANT CHANGE UP (ref 0–0.2)
BASOPHILS NFR BLD AUTO: 0.7 % — SIGNIFICANT CHANGE UP (ref 0–1)
BUN SERPL-MCNC: 18 MG/DL — SIGNIFICANT CHANGE UP (ref 10–20)
CALCIUM SERPL-MCNC: 8.9 MG/DL — SIGNIFICANT CHANGE UP (ref 8.4–10.5)
CHLORIDE SERPL-SCNC: 95 MMOL/L — LOW (ref 98–110)
CO2 SERPL-SCNC: 23 MMOL/L — SIGNIFICANT CHANGE UP (ref 17–32)
CREAT SERPL-MCNC: 1 MG/DL — SIGNIFICANT CHANGE UP (ref 0.7–1.5)
EGFR: 54 ML/MIN/1.73M2 — LOW
EOSINOPHIL # BLD AUTO: 0.1 K/UL — SIGNIFICANT CHANGE UP (ref 0–0.7)
EOSINOPHIL NFR BLD AUTO: 2.4 % — SIGNIFICANT CHANGE UP (ref 0–8)
GLUCOSE SERPL-MCNC: 90 MG/DL — SIGNIFICANT CHANGE UP (ref 70–99)
HCT VFR BLD CALC: 34.1 % — LOW (ref 37–47)
HGB BLD-MCNC: 11.9 G/DL — LOW (ref 12–16)
IMM GRANULOCYTES NFR BLD AUTO: 0.7 % — HIGH (ref 0.1–0.3)
LYMPHOCYTES # BLD AUTO: 1.87 K/UL — SIGNIFICANT CHANGE UP (ref 1.2–3.4)
LYMPHOCYTES # BLD AUTO: 44.4 % — SIGNIFICANT CHANGE UP (ref 20.5–51.1)
MAGNESIUM SERPL-MCNC: 1.8 MG/DL — SIGNIFICANT CHANGE UP (ref 1.8–2.4)
MCHC RBC-ENTMCNC: 33.1 PG — HIGH (ref 27–31)
MCHC RBC-ENTMCNC: 34.9 G/DL — SIGNIFICANT CHANGE UP (ref 32–37)
MCV RBC AUTO: 94.7 FL — SIGNIFICANT CHANGE UP (ref 81–99)
MONOCYTES # BLD AUTO: 0.6 K/UL — SIGNIFICANT CHANGE UP (ref 0.1–0.6)
MONOCYTES NFR BLD AUTO: 14.3 % — HIGH (ref 1.7–9.3)
NEUTROPHILS # BLD AUTO: 1.58 K/UL — SIGNIFICANT CHANGE UP (ref 1.4–6.5)
NEUTROPHILS NFR BLD AUTO: 37.5 % — LOW (ref 42.2–75.2)
NRBC # BLD: 0 /100 WBCS — SIGNIFICANT CHANGE UP (ref 0–0)
PLATELET # BLD AUTO: 139 K/UL — SIGNIFICANT CHANGE UP (ref 130–400)
PMV BLD: 8.8 FL — SIGNIFICANT CHANGE UP (ref 7.4–10.4)
POTASSIUM SERPL-MCNC: 3.9 MMOL/L — SIGNIFICANT CHANGE UP (ref 3.5–5)
POTASSIUM SERPL-SCNC: 3.9 MMOL/L — SIGNIFICANT CHANGE UP (ref 3.5–5)
RBC # BLD: 3.6 M/UL — LOW (ref 4.2–5.4)
RBC # FLD: 14.8 % — HIGH (ref 11.5–14.5)
SODIUM SERPL-SCNC: 132 MMOL/L — LOW (ref 135–146)
WBC # BLD: 4.21 K/UL — LOW (ref 4.8–10.8)
WBC # FLD AUTO: 4.21 K/UL — LOW (ref 4.8–10.8)

## 2024-03-02 PROCEDURE — 99232 SBSQ HOSP IP/OBS MODERATE 35: CPT

## 2024-03-02 RX ADMIN — Medication 1 TABLET(S): at 12:33

## 2024-03-02 RX ADMIN — LIDOCAINE 1 PATCH: 4 CREAM TOPICAL at 05:04

## 2024-03-02 RX ADMIN — Medication 1000 MILLIGRAM(S): at 22:02

## 2024-03-02 RX ADMIN — SODIUM CHLORIDE 1 GRAM(S): 9 INJECTION INTRAMUSCULAR; INTRAVENOUS; SUBCUTANEOUS at 05:05

## 2024-03-02 RX ADMIN — CHLORHEXIDINE GLUCONATE 1 APPLICATION(S): 213 SOLUTION TOPICAL at 12:33

## 2024-03-02 RX ADMIN — GABAPENTIN 600 MILLIGRAM(S): 400 CAPSULE ORAL at 22:05

## 2024-03-02 RX ADMIN — METHOCARBAMOL 750 MILLIGRAM(S): 500 TABLET, FILM COATED ORAL at 12:34

## 2024-03-02 RX ADMIN — SENNA PLUS 2 TABLET(S): 8.6 TABLET ORAL at 22:02

## 2024-03-02 RX ADMIN — OXYCODONE HYDROCHLORIDE 5 MILLIGRAM(S): 5 TABLET ORAL at 10:45

## 2024-03-02 RX ADMIN — METHOCARBAMOL 750 MILLIGRAM(S): 500 TABLET, FILM COATED ORAL at 22:02

## 2024-03-02 RX ADMIN — APIXABAN 5 MILLIGRAM(S): 2.5 TABLET, FILM COATED ORAL at 17:22

## 2024-03-02 RX ADMIN — Medication 1000 MILLIGRAM(S): at 12:34

## 2024-03-02 RX ADMIN — Medication 40 MILLIGRAM(S): at 05:06

## 2024-03-02 RX ADMIN — APIXABAN 5 MILLIGRAM(S): 2.5 TABLET, FILM COATED ORAL at 05:06

## 2024-03-02 RX ADMIN — LIDOCAINE 1 PATCH: 4 CREAM TOPICAL at 17:24

## 2024-03-02 RX ADMIN — Medication 5000 UNIT(S): at 12:33

## 2024-03-02 RX ADMIN — TAMSULOSIN HYDROCHLORIDE 0.8 MILLIGRAM(S): 0.4 CAPSULE ORAL at 22:02

## 2024-03-02 RX ADMIN — Medication 1000 MILLIGRAM(S): at 06:05

## 2024-03-02 RX ADMIN — Medication 1 SPRAY(S): at 17:21

## 2024-03-02 RX ADMIN — ROPINIROLE 0.25 MILLIGRAM(S): 8 TABLET, FILM COATED, EXTENDED RELEASE ORAL at 17:21

## 2024-03-02 RX ADMIN — SODIUM CHLORIDE 1 GRAM(S): 9 INJECTION INTRAMUSCULAR; INTRAVENOUS; SUBCUTANEOUS at 22:08

## 2024-03-02 RX ADMIN — LIDOCAINE 1 PATCH: 4 CREAM TOPICAL at 10:00

## 2024-03-02 RX ADMIN — ROPINIROLE 0.25 MILLIGRAM(S): 8 TABLET, FILM COATED, EXTENDED RELEASE ORAL at 05:04

## 2024-03-02 RX ADMIN — Medication 25 MILLIGRAM(S): at 22:04

## 2024-03-02 RX ADMIN — METHOCARBAMOL 750 MILLIGRAM(S): 500 TABLET, FILM COATED ORAL at 05:05

## 2024-03-02 RX ADMIN — CHLORHEXIDINE GLUCONATE 1 APPLICATION(S): 213 SOLUTION TOPICAL at 05:07

## 2024-03-02 RX ADMIN — Medication 25 MILLIGRAM(S): at 12:33

## 2024-03-02 RX ADMIN — SODIUM CHLORIDE 1 GRAM(S): 9 INJECTION INTRAMUSCULAR; INTRAVENOUS; SUBCUTANEOUS at 12:35

## 2024-03-02 RX ADMIN — Medication 1000 MILLIGRAM(S): at 05:05

## 2024-03-02 RX ADMIN — Medication 25 MILLIGRAM(S): at 05:05

## 2024-03-02 RX ADMIN — Medication 1 TABLET(S): at 12:34

## 2024-03-02 RX ADMIN — POLYETHYLENE GLYCOL 3350 17 GRAM(S): 17 POWDER, FOR SOLUTION ORAL at 17:23

## 2024-03-02 RX ADMIN — Medication 1000 MILLIGRAM(S): at 12:36

## 2024-03-02 NOTE — PROGRESS NOTE ADULT - SUBJECTIVE AND OBJECTIVE BOX
24H events:    Patient is a 89y old Female who presents with a chief complaint of Back Pain (01 Mar 2024 13:16)    Primary diagnosis of Inability to ambulate due to multiple joints       Today is hospital day 38d.      PAST MEDICAL & SURGICAL HISTORY  Chronic atrial fibrillation    GERD (gastroesophageal reflux disease)    Chronic lower back pain    No significant past surgical history      SOCIAL HISTORY:  Negative for smoking/alcohol/drug use.     ALLERGIES:  No Known Allergies    MEDICATIONS:  STANDING MEDICATIONS  acetaminophen     Tablet .. 1000 milliGRAM(s) Oral every 8 hours  apixaban 5 milliGRAM(s) Oral every 12 hours  calcium carbonate   1250 mG (OsCal) 1 Tablet(s) Oral daily  chlorhexidine 2% Cloths 1 Application(s) Topical <User Schedule>  chlorhexidine 2% Cloths 1 Application(s) Topical daily  cholecalciferol 5000 Unit(s) Oral daily  fluticasone propionate 50 MICROgram(s)/spray Nasal Spray 1 Spray(s) Both Nostrils two times a day  furosemide    Tablet 40 milliGRAM(s) Oral daily  gabapentin 600 milliGRAM(s) Oral at bedtime  lidocaine   4% Patch 1 Patch Transdermal every 24 hours  methocarbamol 750 milliGRAM(s) Oral every 8 hours  metoprolol tartrate 25 milliGRAM(s) Oral three times a day  multivitamin 1 Tablet(s) Oral daily  pantoprazole    Tablet 40 milliGRAM(s) Oral before breakfast  polyethylene glycol 3350 17 Gram(s) Oral two times a day  rOPINIRole 0.25 milliGRAM(s) Oral two times a day  senna 2 Tablet(s) Oral at bedtime  sodium chloride 1 Gram(s) Oral every 8 hours  tamsulosin 0.8 milliGRAM(s) Oral at bedtime    PRN MEDICATIONS  oxyCODONE    IR 5 milliGRAM(s) Oral every 8 hours PRN    VITALS:   T(F): 98.1  HR: 78  BP: 111/65  RR: 18  SpO2: 99%    LABS:                        11.9   4.21  )-----------( 139      ( 02 Mar 2024 08:14 )             34.1     03-02    132<L>  |  95<L>  |  18  ----------------------------<  90  3.9   |  23  |  1.0    Ca    8.9      02 Mar 2024 08:14  Mg     1.6     03-01        Urinalysis Basic - ( 02 Mar 2024 08:14 )    Color: x / Appearance: x / SG: x / pH: x  Gluc: 90 mg/dL / Ketone: x  / Bili: x / Urobili: x   Blood: x / Protein: x / Nitrite: x   Leuk Esterase: x / RBC: x / WBC x   Sq Epi: x / Non Sq Epi: x / Bacteria: x                RADIOLOGY:    PHYSICAL EXAM:  GENERAL: NAD  NECK: Supple, No JVD  NERVOUS SYSTEM:  Alert & Oriented X3  CHEST/LUNG: Clear to auscultation bilaterally  HEART: Regular rate and rhythm  ABDOMEN: Soft, Nontender  EXTREMITIES:  + Peripheral Pulses

## 2024-03-02 NOTE — PROGRESS NOTE ADULT - ATTENDING COMMENTS
patient seen and examined at bedside this am   -no overnight events notified     Vital Signs Last 24 Hrs  T(C): 36.7 (02 Mar 2024 04:33), Max: 36.8 (01 Mar 2024 20:48)  T(F): 98.1 (02 Mar 2024 04:33), Max: 98.2 (01 Mar 2024 20:48)  HR: 78 (02 Mar 2024 04:33) (75 - 78)  BP: 111/65 (02 Mar 2024 04:33) (111/65 - 122/74)  BP(mean): 79 (02 Mar 2024 04:33) (79 - 79)  RR: 18 (02 Mar 2024 04:33) (18 - 20)  SpO2: 99% (01 Mar 2024 20:48) (99% - 99%)    Parameters below as of 01 Mar 2024 20:48  Patient On (Oxygen Delivery Method): room air                          11.9   4.21  )-----------( 139      ( 02 Mar 2024 08:14 )             34.1              03-02    132<L>  |  95<L>  |  18  ----------------------------<  90  3.9   |  23  |  1.0    Ca    8.9      02 Mar 2024 08:14  Mg     1.8     03-02      -no overnight events notified   -no neurosurgical intervention   -recalled heme/onc - no immunotherapy   -BMP monitoring in NH - on salt tabs - Na+ improving + fluid restriction   -replete electrolytes as needed   -Palliative care follow up appreciated   -DNR/DNI - prognosis poor     DISPO: d/c planning NH   -staff updated family     Attending Physician Dr. Genevieve Wall # [phone]

## 2024-03-02 NOTE — PROGRESS NOTE ADULT - ATTENDING SUPERVISION STATEMENT
Fellow
Resident

## 2024-03-03 ENCOUNTER — TRANSCRIPTION ENCOUNTER (OUTPATIENT)
Age: 89
End: 2024-03-03

## 2024-03-03 VITALS
DIASTOLIC BLOOD PRESSURE: 64 MMHG | TEMPERATURE: 98 F | HEART RATE: 71 BPM | RESPIRATION RATE: 18 BRPM | SYSTOLIC BLOOD PRESSURE: 103 MMHG

## 2024-03-03 LAB — MAGNESIUM SERPL-MCNC: 1.5 MG/DL — LOW (ref 1.8–2.4)

## 2024-03-03 PROCEDURE — 99239 HOSP IP/OBS DSCHRG MGMT >30: CPT

## 2024-03-03 RX ORDER — ACETAMINOPHEN 500 MG
2 TABLET ORAL
Qty: 42 | Refills: 0
Start: 2024-03-03 | End: 2024-03-09

## 2024-03-03 RX ORDER — MAGNESIUM SULFATE 500 MG/ML
2 VIAL (ML) INJECTION ONCE
Refills: 0 | Status: COMPLETED | OUTPATIENT
Start: 2024-03-03 | End: 2024-03-03

## 2024-03-03 RX ADMIN — APIXABAN 5 MILLIGRAM(S): 2.5 TABLET, FILM COATED ORAL at 06:12

## 2024-03-03 RX ADMIN — SODIUM CHLORIDE 1 GRAM(S): 9 INJECTION INTRAMUSCULAR; INTRAVENOUS; SUBCUTANEOUS at 06:11

## 2024-03-03 RX ADMIN — Medication 1 TABLET(S): at 12:28

## 2024-03-03 RX ADMIN — POLYETHYLENE GLYCOL 3350 17 GRAM(S): 17 POWDER, FOR SOLUTION ORAL at 06:15

## 2024-03-03 RX ADMIN — Medication 25 MILLIGRAM(S): at 13:32

## 2024-03-03 RX ADMIN — LIDOCAINE 1 PATCH: 4 CREAM TOPICAL at 06:19

## 2024-03-03 RX ADMIN — PANTOPRAZOLE SODIUM 40 MILLIGRAM(S): 20 TABLET, DELAYED RELEASE ORAL at 06:18

## 2024-03-03 RX ADMIN — Medication 40 MILLIGRAM(S): at 06:12

## 2024-03-03 RX ADMIN — SODIUM CHLORIDE 1 GRAM(S): 9 INJECTION INTRAMUSCULAR; INTRAVENOUS; SUBCUTANEOUS at 13:33

## 2024-03-03 RX ADMIN — ROPINIROLE 0.25 MILLIGRAM(S): 8 TABLET, FILM COATED, EXTENDED RELEASE ORAL at 06:12

## 2024-03-03 RX ADMIN — Medication 1 SPRAY(S): at 06:17

## 2024-03-03 RX ADMIN — METHOCARBAMOL 750 MILLIGRAM(S): 500 TABLET, FILM COATED ORAL at 13:32

## 2024-03-03 RX ADMIN — METHOCARBAMOL 750 MILLIGRAM(S): 500 TABLET, FILM COATED ORAL at 06:12

## 2024-03-03 RX ADMIN — Medication 1000 MILLIGRAM(S): at 13:32

## 2024-03-03 RX ADMIN — Medication 25 GRAM(S): at 12:27

## 2024-03-03 RX ADMIN — Medication 1000 MILLIGRAM(S): at 06:12

## 2024-03-03 RX ADMIN — Medication 25 MILLIGRAM(S): at 06:12

## 2024-03-03 RX ADMIN — CHLORHEXIDINE GLUCONATE 1 APPLICATION(S): 213 SOLUTION TOPICAL at 12:34

## 2024-03-03 RX ADMIN — Medication 5000 UNIT(S): at 12:28

## 2024-03-03 RX ADMIN — CHLORHEXIDINE GLUCONATE 1 APPLICATION(S): 213 SOLUTION TOPICAL at 06:17

## 2024-03-03 NOTE — PROGRESS NOTE ADULT - TIME BILLING
direct pt care
direct patient care and chart review  -coordinated current plan of care with medical staff on MDR  -d/c paper edited
direct patient care and chart review  -coordinated current plan of care with medical staff on MDR
direct pt care
direct patient care and chart review  -coordinated current plan of care with medical staff on MDR
direct patient care and chart review  -coordinated current plan of care with medical staff on MDR
direct pt care

## 2024-03-03 NOTE — PROGRESS NOTE ADULT - PROVIDER SPECIALTY LIST ADULT
Heme/Onc
Hospitalist
Internal Medicine
Palliative Care
Heme/Onc
Heme/Onc
Hospitalist
Internal Medicine
Internal Medicine
Neurosurgery
Palliative Care
Hospitalist
Internal Medicine
Palliative Care
ENT
Hospitalist
Internal Medicine
Palliative Care
Palliative Care
Hospitalist
Internal Medicine
Palliative Care

## 2024-03-03 NOTE — DISCHARGE NOTE NURSING/CASE MANAGEMENT/SOCIAL WORK - PATIENT PORTAL LINK FT
You can access the FollowMyHealth Patient Portal offered by [facility] by registering at the following website: [email or web address]. By joining [facility]’s FollowMyHealth portal, you will also be able to view your health information using other applications (apps) compatible with our system.

## 2024-03-03 NOTE — PROGRESS NOTE ADULT - NUTRITIONAL ASSESSMENT
This patient has been assessed with a concern for Malnutrition and has been determined to have a diagnosis/diagnoses of Moderate protein-calorie malnutrition.    This patient is being managed with:   Diet DASH/TLC-  Sodium & Cholesterol Restricted  Minced and Moist (MINCEDMOIST)  1500mL Fluid Restriction ([id number])  Free Water Flush Instructions:  Magic Cup twice daily  vanilla only...Ensures: vanilla only  Supplement Feeding Modality:  Oral  Ensure Compact Cans or Servings Per Day:  1       Frequency:  Three Times a day  Entered: Feb 20 2024  1:32PM  
This patient has been assessed with a concern for Malnutrition and has been determined to have a diagnosis/diagnoses of Moderate protein-calorie malnutrition.    This patient is being managed with:   Diet DASH/TLC-  Sodium & Cholesterol Restricted  Minced and Moist (MINCEDMOIST)  1500mL Fluid Restriction ([id number])  Supplement Feeding Modality:  Oral  Ensure Compact Cans or Servings Per Day:  1       Frequency:  Three Times a day  Entered: Feb 15 2024  6:49PM  
This patient has been assessed with a concern for Malnutrition and has been determined to have a diagnosis/diagnoses of Moderate protein-calorie malnutrition.    This patient is being managed with:   Diet DASH/TLC-  Sodium & Cholesterol Restricted  Minced and Moist (MINCEDMOIST)  1500mL Fluid Restriction ([id number])  Supplement Feeding Modality:  Oral  Ensure Compact Cans or Servings Per Day:  1       Frequency:  Three Times a day  Entered: Feb 15 2024  6:49PM  
This patient has been assessed with a concern for Malnutrition and has been determined to have a diagnosis/diagnoses of Moderate protein-calorie malnutrition.    This patient is being managed with:   Diet DASH/TLC-  Sodium & Cholesterol Restricted  Minced and Moist (MINCEDMOIST)  1500mL Fluid Restriction ([id number])  Free Water Flush Instructions:  Magic Cup twice daily  vanilla only...Ensures: vanilla only  Supplement Feeding Modality:  Oral  Ensure Compact Cans or Servings Per Day:  1       Frequency:  Three Times a day  Entered: Feb 20 2024  1:32PM  
This patient has been assessed with a concern for Malnutrition and has been determined to have a diagnosis/diagnoses of Moderate protein-calorie malnutrition.    This patient is being managed with:   Diet DASH/TLC-  Sodium & Cholesterol Restricted  Minced and Moist (MINCEDMOIST)  1500mL Fluid Restriction ([id number])  Entered: Jan 24 2024  2:57PM  
This patient has been assessed with a concern for Malnutrition and has been determined to have a diagnosis/diagnoses of Moderate protein-calorie malnutrition.    This patient is being managed with:   Diet DASH/TLC-  Sodium & Cholesterol Restricted  Minced and Moist (MINCEDMOIST)  1500mL Fluid Restriction ([id number])  Entered: Feb 14 2024  5:39PM    Diet NPO after Midnight-     NPO Start Date: 14-Feb-2024   NPO Start Time: 23:59  Entered: Feb 14 2024  5:38PM    Diet DASH/TLC-  Sodium & Cholesterol Restricted  Minced and Moist (MINCEDMOIST)  1500mL Fluid Restriction ([id number])  Supplement Feeding Modality:  Oral  Ensure Compact Cans or Servings Per Day:  1       Frequency:  Three Times a day  Entered: Feb 13 2024  8:32AM    The following pending diet order is being considered for treatment of Moderate protein-calorie malnutrition:null
This patient has been assessed with a concern for Malnutrition and has been determined to have a diagnosis/diagnoses of Moderate protein-calorie malnutrition.    This patient is being managed with:   Diet DASH/TLC-  Sodium & Cholesterol Restricted  Minced and Moist (MINCEDMOIST)  1500mL Fluid Restriction ([id number])  Supplement Feeding Modality:  Oral  Ensure Compact Cans or Servings Per Day:  1       Frequency:  Three Times a day  Entered: Feb 15 2024  6:49PM  
This patient has been assessed with a concern for Malnutrition and has been determined to have a diagnosis/diagnoses of Moderate protein-calorie malnutrition.    This patient is being managed with:   Diet DASH/TLC-  Sodium & Cholesterol Restricted  Minced and Moist (MINCEDMOIST)  1500mL Fluid Restriction ([id number])  Supplement Feeding Modality:  Oral  Ensure Compact Cans or Servings Per Day:  1       Frequency:  Three Times a day  Entered: Feb 15 2024  6:49PM  
This patient has been assessed with a concern for Malnutrition and has been determined to have a diagnosis/diagnoses of Moderate protein-calorie malnutrition.    This patient is being managed with:   Diet DASH/TLC-  Sodium & Cholesterol Restricted  Minced and Moist (MINCEDMOIST)  1500mL Fluid Restriction ([id number])  Entered: Feb 26 2024  3:39PM    Diet DASH/TLC-  Sodium & Cholesterol Restricted  Minced and Moist (MINCEDMOIST)  1500mL Fluid Restriction ([id number])  Free Water Flush Instructions:  Magic Cup twice daily  vanilla only...Ensures: vanilla only  Supplement Feeding Modality:  Oral  Ensure Compact Cans or Servings Per Day:  1       Frequency:  Three Times a day  Entered: Feb 20 2024  1:32PM    The following pending diet order is being considered for treatment of Moderate protein-calorie malnutrition:null
This patient has been assessed with a concern for Malnutrition and has been determined to have a diagnosis/diagnoses of Moderate protein-calorie malnutrition.    This patient is being managed with:   Diet DASH/TLC-  Sodium & Cholesterol Restricted  Minced and Moist (MINCEDMOIST)  1500mL Fluid Restriction ([id number])  Free Water Flush Instructions:  Magic Cup twice daily  vanilla only...Ensures: vanilla only  Supplement Feeding Modality:  Oral  Ensure Compact Cans or Servings Per Day:  1       Frequency:  Three Times a day  Entered: Feb 20 2024  1:32PM  
This patient has been assessed with a concern for Malnutrition and has been determined to have a diagnosis/diagnoses of Moderate protein-calorie malnutrition.    This patient is being managed with:   Diet NPO after Midnight-     NPO Start Date: 13-Feb-2024   NPO Start Time: 23:59  Entered: Feb 13 2024  1:29PM    Diet DASH/TLC-  Sodium & Cholesterol Restricted  Minced and Moist (MINCEDMOIST)  1500mL Fluid Restriction ([id number])  Supplement Feeding Modality:  Oral  Ensure Compact Cans or Servings Per Day:  1       Frequency:  Three Times a day  Entered: Feb 13 2024  8:32AM    Diet DASH/TLC-  Sodium & Cholesterol Restricted  Minced and Moist (MINCEDMOIST)  1500mL Fluid Restriction ([id number])  Entered: Jan 24 2024  2:57PM    The following pending diet order is being considered for treatment of Moderate protein-calorie malnutrition:null
This patient has been assessed with a concern for Malnutrition and has been determined to have a diagnosis/diagnoses of Moderate protein-calorie malnutrition.    This patient is being managed with:   Diet DASH/TLC-  Sodium & Cholesterol Restricted  Minced and Moist (MINCEDMOIST)  1500mL Fluid Restriction ([id number])  Supplement Feeding Modality:  Oral  Ensure Compact Cans or Servings Per Day:  1       Frequency:  Three Times a day  Entered: Feb 15 2024  6:49PM  
This patient has been assessed with a concern for Malnutrition and has been determined to have a diagnosis/diagnoses of Moderate protein-calorie malnutrition.    This patient is being managed with:   Diet DASH/TLC-  Sodium & Cholesterol Restricted  Minced and Moist (MINCEDMOIST)  1500mL Fluid Restriction ([id number])  Free Water Flush Instructions:  Magic Cup twice daily  vanilla only...Ensures: vanilla only  Supplement Feeding Modality:  Oral  Ensure Compact Cans or Servings Per Day:  1       Frequency:  Three Times a day  Entered: Feb 20 2024  1:32PM  
This patient has been assessed with a concern for Malnutrition and has been determined to have a diagnosis/diagnoses of Moderate protein-calorie malnutrition.    This patient is being managed with:   Diet DASH/TLC-  Sodium & Cholesterol Restricted  Minced and Moist (MINCEDMOIST)  1500mL Fluid Restriction ([id number])  Free Water Flush Instructions:  Magic Cup twice daily  vanilla only...Ensures: vanilla only  Supplement Feeding Modality:  Oral  Ensure Compact Cans or Servings Per Day:  1       Frequency:  Three Times a day  Entered: Feb 20 2024  1:32PM  
This patient has been assessed with a concern for Malnutrition and has been determined to have a diagnosis/diagnoses of Moderate protein-calorie malnutrition.    This patient is being managed with:   Diet NPO after Midnight-     NPO Start Date: 13-Feb-2024   NPO Start Time: 23:59  Entered: Feb 13 2024  1:29PM    Diet DASH/TLC-  Sodium & Cholesterol Restricted  Minced and Moist (MINCEDMOIST)  1500mL Fluid Restriction ([id number])  Supplement Feeding Modality:  Oral  Ensure Compact Cans or Servings Per Day:  1       Frequency:  Three Times a day  Entered: Feb 13 2024  8:32AM    Diet DASH/TLC-  Sodium & Cholesterol Restricted  Minced and Moist (MINCEDMOIST)  1500mL Fluid Restriction ([id number])  Entered: Jan 24 2024  2:57PM    The following pending diet order is being considered for treatment of Moderate protein-calorie malnutrition:null
This patient has been assessed with a concern for Malnutrition and has been determined to have a diagnosis/diagnoses of Moderate protein-calorie malnutrition.    This patient is being managed with:   Diet NPO after Midnight-     NPO Start Date: 13-Feb-2024   NPO Start Time: 23:59  Entered: Feb 13 2024  1:29PM    Diet DASH/TLC-  Sodium & Cholesterol Restricted  Minced and Moist (MINCEDMOIST)  1500mL Fluid Restriction ([id number])  Supplement Feeding Modality:  Oral  Ensure Compact Cans or Servings Per Day:  1       Frequency:  Three Times a day  Entered: Feb 13 2024  8:32AM    Diet DASH/TLC-  Sodium & Cholesterol Restricted  Minced and Moist (MINCEDMOIST)  1500mL Fluid Restriction ([id number])  Entered: Jan 24 2024  2:57PM    The following pending diet order is being considered for treatment of Moderate protein-calorie malnutrition:null
This patient has been assessed with a concern for Malnutrition and has been determined to have a diagnosis/diagnoses of Moderate protein-calorie malnutrition.    This patient is being managed with:   Diet DASH/TLC-  Sodium & Cholesterol Restricted  Minced and Moist (MINCEDMOIST)  1500mL Fluid Restriction ([id number])  Supplement Feeding Modality:  Oral  Ensure Compact Cans or Servings Per Day:  1       Frequency:  Three Times a day  Entered: Feb 15 2024  6:49PM  
This patient has been assessed with a concern for Malnutrition and has been determined to have a diagnosis/diagnoses of Moderate protein-calorie malnutrition.    This patient is being managed with:   Diet DASH/TLC-  Sodium & Cholesterol Restricted  Minced and Moist (MINCEDMOIST)  1500mL Fluid Restriction ([id number])  Entered: Feb 26 2024  3:39PM    Diet DASH/TLC-  Sodium & Cholesterol Restricted  Minced and Moist (MINCEDMOIST)  1500mL Fluid Restriction ([id number])  Free Water Flush Instructions:  Magic Cup twice daily  vanilla only...Ensures: vanilla only  Supplement Feeding Modality:  Oral  Ensure Compact Cans or Servings Per Day:  1       Frequency:  Three Times a day  Entered: Feb 20 2024  1:32PM    The following pending diet order is being considered for treatment of Moderate protein-calorie malnutrition:null
This patient has been assessed with a concern for Malnutrition and has been determined to have a diagnosis/diagnoses of Moderate protein-calorie malnutrition.    This patient is being managed with:   Diet DASH/TLC-  Sodium & Cholesterol Restricted  Minced and Moist (MINCEDMOIST)  1500mL Fluid Restriction ([id number])  Supplement Feeding Modality:  Oral  Ensure Compact Cans or Servings Per Day:  1       Frequency:  Three Times a day  Entered: Feb 15 2024  6:49PM  
This patient has been assessed with a concern for Malnutrition and has been determined to have a diagnosis/diagnoses of Moderate protein-calorie malnutrition.    This patient is being managed with:   Diet NPO after Midnight-     NPO Start Date: 13-Feb-2024   NPO Start Time: 23:59  Entered: Feb 13 2024  1:29PM    Diet DASH/TLC-  Sodium & Cholesterol Restricted  Minced and Moist (MINCEDMOIST)  1500mL Fluid Restriction ([id number])  Supplement Feeding Modality:  Oral  Ensure Compact Cans or Servings Per Day:  1       Frequency:  Three Times a day  Entered: Feb 13 2024  8:32AM    Diet DASH/TLC-  Sodium & Cholesterol Restricted  Minced and Moist (MINCEDMOIST)  1500mL Fluid Restriction ([id number])  Entered: Jan 24 2024  2:57PM    The following pending diet order is being considered for treatment of Moderate protein-calorie malnutrition:null
This patient has been assessed with a concern for Malnutrition and has been determined to have a diagnosis/diagnoses of Moderate protein-calorie malnutrition.    This patient is being managed with:   Diet DASH/TLC-  Sodium & Cholesterol Restricted  Minced and Moist (MINCEDMOIST)  1500mL Fluid Restriction ([id number])  Free Water Flush Instructions:  Magic Cup twice daily  vanilla only...Ensures: vanilla only  Supplement Feeding Modality:  Oral  Ensure Compact Cans or Servings Per Day:  1       Frequency:  Three Times a day  Entered: Feb 20 2024  1:32PM  
This patient has been assessed with a concern for Malnutrition and has been determined to have a diagnosis/diagnoses of Moderate protein-calorie malnutrition.    This patient is being managed with:   Diet DASH/TLC-  Sodium & Cholesterol Restricted  Minced and Moist (MINCEDMOIST)  1500mL Fluid Restriction ([id number])  Entered: Feb 26 2024  3:39PM    Diet DASH/TLC-  Sodium & Cholesterol Restricted  Minced and Moist (MINCEDMOIST)  1500mL Fluid Restriction ([id number])  Free Water Flush Instructions:  Magic Cup twice daily  vanilla only...Ensures: vanilla only  Supplement Feeding Modality:  Oral  Ensure Compact Cans or Servings Per Day:  1       Frequency:  Three Times a day  Entered: Feb 20 2024  1:32PM    The following pending diet order is being considered for treatment of Moderate protein-calorie malnutrition:null
This patient has been assessed with a concern for Malnutrition and has been determined to have a diagnosis/diagnoses of Moderate protein-calorie malnutrition.    This patient is being managed with:   Diet DASH/TLC-  Sodium & Cholesterol Restricted  Minced and Moist (MINCEDMOIST)  1500mL Fluid Restriction ([id number])  Entered: Feb 26 2024  3:39PM    Diet DASH/TLC-  Sodium & Cholesterol Restricted  Minced and Moist (MINCEDMOIST)  1500mL Fluid Restriction ([id number])  Free Water Flush Instructions:  Magic Cup twice daily  vanilla only...Ensures: vanilla only  Supplement Feeding Modality:  Oral  Ensure Compact Cans or Servings Per Day:  1       Frequency:  Three Times a day  Entered: Feb 20 2024  1:32PM    The following pending diet order is being considered for treatment of Moderate protein-calorie malnutrition:null
This patient has been assessed with a concern for Malnutrition and has been determined to have a diagnosis/diagnoses of Moderate protein-calorie malnutrition.    This patient is being managed with:   Diet DASH/TLC-  Sodium & Cholesterol Restricted  Minced and Moist (MINCEDMOIST)  1500mL Fluid Restriction ([id number])  Free Water Flush Instructions:  Magic Cup twice daily  vanilla only...Ensures: vanilla only  Supplement Feeding Modality:  Oral  Ensure Compact Cans or Servings Per Day:  1       Frequency:  Three Times a day  Entered: Feb 20 2024  1:32PM  
This patient has been assessed with a concern for Malnutrition and has been determined to have a diagnosis/diagnoses of Moderate protein-calorie malnutrition.    This patient is being managed with:   Diet NPO after Midnight-     NPO Start Date: 14-Feb-2024   NPO Start Time: 23:59  Entered: Feb 14 2024  5:38PM    The following pending diet order is being considered for treatment of Moderate protein-calorie malnutrition:  Diet DASH/TLC-  Sodium & Cholesterol Restricted  Minced and Moist (MINCEDMOIST)  1500mL Fluid Restriction ([id number])  Supplement Feeding Modality:  Oral  Ensure Compact Cans or Servings Per Day:  1       Frequency:  Three Times a day  Entered: Feb 13 2024  8:32AM  
This patient has been assessed with a concern for Malnutrition and has been determined to have a diagnosis/diagnoses of Moderate protein-calorie malnutrition.    This patient is being managed with:   Diet DASH/TLC-  Sodium & Cholesterol Restricted  Minced and Moist (MINCEDMOIST)  1500mL Fluid Restriction ([id number])  Entered: Feb 26 2024  3:39PM    Diet DASH/TLC-  Sodium & Cholesterol Restricted  Minced and Moist (MINCEDMOIST)  1500mL Fluid Restriction ([id number])  Free Water Flush Instructions:  Magic Cup twice daily  vanilla only...Ensures: vanilla only  Supplement Feeding Modality:  Oral  Ensure Compact Cans or Servings Per Day:  1       Frequency:  Three Times a day  Entered: Feb 20 2024  1:32PM    The following pending diet order is being considered for treatment of Moderate protein-calorie malnutrition:null

## 2024-03-03 NOTE — DISCHARGE NOTE NURSING/CASE MANAGEMENT/SOCIAL WORK - NSDCPEELIQUISDIET_GEN_ALL_CORE
Mother reports child was walking on hardwood floor and \"slipped and hit head on wall\" ~ 1 hour ago. Cried immediately after incident. + hematoma and laceration to forehead. No active bleeding.   Alert. Walking for past ~ 1 month.  Here with parents and twin sibling.    Eat healthy foods you enjoy. Apixaban/Eliquis DOES NOT have a special diet. Limit your alcohol intake.

## 2024-03-03 NOTE — PROGRESS NOTE ADULT - ASSESSMENT
89F w/ h/o chronic afib (on Eliquis), GERD, and chronic low back pain presenting s/p fall. Admitted to medicine for back pain 2/2 Lumbar compression fracture.     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 Was called for low blood pressure, which improved on repeat readings, patient's blood pressure runs low, d/aleksandar the oxycodone  - laminoplasty cancelled and pt is not a surgical candidate  - s/p cardizem drip and now on metoprolol 25mg tid  - Pain management recs appreciated: daughter refusing oxy as it causes sedation in her mother, even prn dose   1. acetaminophen 1000mg q8h standing  2. Gabapentin 600mg qhs standing, if tolerated (no sedation/confusion)  3. Methocarbamol 750mg q8h standing  - IR mention that epidural will not alleviate pts pain and more due to spinal cord compression  - TSLO brace for PT  - physiatry recommending STR in SNF   NSx will not operate on pt as poor surgical candidate==>C/W eliquis   - Daughter agreed to oxycodone for pain  - Not interested in hospice currently    #CHF, diastolic - acute -- now resolved  - On 2/10 had episode of unresponsives and bradycardia/hypotension. Likely vasovagal. CT head negative  - TTE (May 2013) demonstrated LVEF 55-65% w/ normal global LVSF and no significant valvulopathy.  - TTE 1/28- EF 60-65% mod pulm HTN   - elevated BNP  - Tele monitored for 24 hrs with no dysrhythmia   - Currently euvolemic on exam, transition back to home oral lasix 20 daily     #hyponatremia  - Na has been trending downwards and is 125  - asymptomatic  - Pt was given lasix on 2/23   - her serum osm is 165 which is consistent with thiazide induced hyponatremia  - lasix PO 20  - Salt tabs started - 2 more days in NH and with close BMP monitoring   - for now will continue bmp bid     # suspected Mg2+ def   -replete     #Chronic Atrial Fibrillation  #acute RVR, resolved  CHADS-VASC 5.   - Eliquis  - c/w metoprolol 25mg tid    #GERD  - c/w pantoprazole 40mg PO QD    #CKD 3b   - per PCP baseline is 1.2    #UTI - resolved  -UA+  -completed 3 day course of ceftriaxone    #Urinary retention - likely neurogenic   - hanson placed 1/29 - continued   - c/w flomax 0.8mg   - completed RT    #Constipation  - c/w aggressive bowel regimen: senna, miralax     # HTN  - c/w metoprolol 25mg TID                                          DISPO: NH today   -discussed with CM in rounds   -daughter aware of the d/c planning

## 2024-03-03 NOTE — PROGRESS NOTE ADULT - SUBJECTIVE AND OBJECTIVE BOX
SUNNY COLBERT  89y  Female      Patient is a 89y old  Female who presents with a chief complaint of Back Pain (02 Mar 2024 10:13)      INTERVAL HPI/OVERNIGHT EVENTS:  pt seen this am   -no overnight events notified   -replete magnesium   -d/c NH once bed available   -discussed with CM in rounds     Vital Signs Last 24 Hrs  T(C): 36.9 (03 Mar 2024 06:21), Max: 36.9 (02 Mar 2024 21:26)  T(F): 98.4 (03 Mar 2024 06:21), Max: 98.4 (02 Mar 2024 21:26)  HR: 78 (03 Mar 2024 06:21) (78 - 98)  BP: 119/74 (03 Mar 2024 06:21) (102/60 - 119/74)  BP(mean): --  RR: 18 (03 Mar 2024 06:21) (18 - 18)      PHYSICAL EXAM:  GENERAL: elderly ill appearing - comfortable in bed   NERVOUS SYSTEM:  AAO x 1  CHEST/LUNG: Clear air entry  CV/HEART: Regular rate and rhythm  GI/ABDOMEN: Soft, Nontender, Nondistended; Bowel sounds present  EXTREMITIES:  2+ Peripheral Pulses, No clubbing, cyanosis, or edema  SKIN: No rashes or lesions    LAB:                        11.9   4.21  )-----------( 139      ( 02 Mar 2024 08:14 )             34.1     03-02    132<L>  |  95<L>  |  18  ----------------------------<  90  3.9   |  23  |  1.0    Ca    8.9      02 Mar 2024 08:14  Mg     1.5     03-03          Daily     Daily   CAPILLARY BLOOD GLUCOSE        Urinalysis Basic - ( 02 Mar 2024 08:14 )    Color: x / Appearance: x / SG: x / pH: x  Gluc: 90 mg/dL / Ketone: x  / Bili: x / Urobili: x   Blood: x / Protein: x / Nitrite: x   Leuk Esterase: x / RBC: x / WBC x   Sq Epi: x / Non Sq Epi: x / Bacteria: x          acetaminophen     Tablet .. 1000 milliGRAM(s) Oral every 8 hours  apixaban 5 milliGRAM(s) Oral every 12 hours  calcium carbonate   1250 mG (OsCal) 1 Tablet(s) Oral daily  chlorhexidine 2% Cloths 1 Application(s) Topical <User Schedule>  chlorhexidine 2% Cloths 1 Application(s) Topical daily  cholecalciferol 5000 Unit(s) Oral daily  fluticasone propionate 50 MICROgram(s)/spray Nasal Spray 1 Spray(s) Both Nostrils two times a day  furosemide    Tablet 40 milliGRAM(s) Oral daily  gabapentin 600 milliGRAM(s) Oral at bedtime  lidocaine   4% Patch 1 Patch Transdermal every 24 hours  magnesium sulfate  IVPB 2 Gram(s) IV Intermittent once  methocarbamol 750 milliGRAM(s) Oral every 8 hours  metoprolol tartrate 25 milliGRAM(s) Oral three times a day  multivitamin 1 Tablet(s) Oral daily  oxyCODONE    IR 5 milliGRAM(s) Oral every 8 hours PRN  pantoprazole    Tablet 40 milliGRAM(s) Oral before breakfast  polyethylene glycol 3350 17 Gram(s) Oral two times a day  rOPINIRole 0.25 milliGRAM(s) Oral two times a day  senna 2 Tablet(s) Oral at bedtime  sodium chloride 1 Gram(s) Oral every 8 hours  tamsulosin 0.8 milliGRAM(s) Oral at bedtime

## 2024-03-03 NOTE — PROGRESS NOTE ADULT - REASON FOR ADMISSION
Back Pain

## 2024-03-11 ENCOUNTER — APPOINTMENT (OUTPATIENT)
Age: 89
End: 2024-03-11

## 2024-03-13 DIAGNOSIS — E86.1 HYPOVOLEMIA: ICD-10-CM

## 2024-03-13 DIAGNOSIS — I48.19 OTHER PERSISTENT ATRIAL FIBRILLATION: ICD-10-CM

## 2024-03-13 DIAGNOSIS — M84.58XA PATHOLOGICAL FRACTURE IN NEOPLASTIC DISEASE, OTHER SPECIFIED SITE, INITIAL ENCOUNTER FOR FRACTURE: ICD-10-CM

## 2024-03-13 DIAGNOSIS — R41.82 ALTERED MENTAL STATUS, UNSPECIFIED: ICD-10-CM

## 2024-03-13 DIAGNOSIS — I13.0 HYPERTENSIVE HEART AND CHRONIC KIDNEY DISEASE WITH HEART FAILURE AND STAGE 1 THROUGH STAGE 4 CHRONIC KIDNEY DISEASE, OR UNSPECIFIED CHRONIC KIDNEY DISEASE: ICD-10-CM

## 2024-03-13 DIAGNOSIS — E87.1 HYPO-OSMOLALITY AND HYPONATREMIA: ICD-10-CM

## 2024-03-13 DIAGNOSIS — Z91.81 HISTORY OF FALLING: ICD-10-CM

## 2024-03-13 DIAGNOSIS — M43.8X6 OTHER SPECIFIED DEFORMING DORSOPATHIES, LUMBAR REGION: ICD-10-CM

## 2024-03-13 DIAGNOSIS — I50.31 ACUTE DIASTOLIC (CONGESTIVE) HEART FAILURE: ICD-10-CM

## 2024-03-13 DIAGNOSIS — N18.32 CHRONIC KIDNEY DISEASE, STAGE 3B: ICD-10-CM

## 2024-03-13 DIAGNOSIS — Z66 DO NOT RESUSCITATE: ICD-10-CM

## 2024-03-13 DIAGNOSIS — I95.9 HYPOTENSION, UNSPECIFIED: ICD-10-CM
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
## 2024-03-13 DIAGNOSIS — Z79.01 LONG TERM (CURRENT) USE OF ANTICOAGULANTS: ICD-10-CM

## 2024-03-13 DIAGNOSIS — R33.9 RETENTION OF URINE, UNSPECIFIED: ICD-10-CM

## 2024-03-13 DIAGNOSIS — K59.00 CONSTIPATION, UNSPECIFIED: ICD-10-CM

## 2024-03-13 DIAGNOSIS — C68.9 MALIGNANT NEOPLASM OF URINARY ORGAN, UNSPECIFIED: ICD-10-CM

## 2024-03-13 DIAGNOSIS — K21.9 GASTRO-ESOPHAGEAL REFLUX DISEASE WITHOUT ESOPHAGITIS: ICD-10-CM
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
## 2024-03-13 DIAGNOSIS — I27.20 PULMONARY HYPERTENSION, UNSPECIFIED: ICD-10-CM

## 2024-03-13 DIAGNOSIS — C79.51 SECONDARY MALIGNANT NEOPLASM OF BONE: ICD-10-CM

## 2024-03-13 DIAGNOSIS — G95.89 OTHER SPECIFIED DISEASES OF SPINAL CORD: ICD-10-CM

## 2024-03-13 DIAGNOSIS — G95.81 CONUS MEDULLARIS SYNDROME: ICD-10-CM

## 2024-03-13 DIAGNOSIS — Z91.199 PATIENT'S NONCOMPLIANCE WITH OTHER MEDICAL TREATMENT AND REGIMEN DUE TO UNSPECIFIED REASON: ICD-10-CM

## 2024-03-13 DIAGNOSIS — R55 SYNCOPE AND COLLAPSE: ICD-10-CM

## 2024-03-13 DIAGNOSIS — E44.0 MODERATE PROTEIN-CALORIE MALNUTRITION: ICD-10-CM

## 2024-03-20 ENCOUNTER — APPOINTMENT (OUTPATIENT)
Dept: HEMATOLOGY ONCOLOGY | Facility: CLINIC | Age: 89
End: 2024-03-20

## 2024-03-25 ENCOUNTER — APPOINTMENT (OUTPATIENT)
Age: 89
End: 2024-03-25

## 2025-01-01 ENCOUNTER — INPATIENT (INPATIENT)
Facility: HOSPITAL | Age: 89
LOS: 8 days | DRG: 871 | End: 2025-04-16
Attending: HOSPITALIST | Admitting: STUDENT IN AN ORGANIZED HEALTH CARE EDUCATION/TRAINING PROGRAM
Payer: MEDICARE

## 2025-01-01 ENCOUNTER — RESULT REVIEW (OUTPATIENT)
Age: 89
End: 2025-01-01

## 2025-01-01 VITALS
RESPIRATION RATE: 22 BRPM | SYSTOLIC BLOOD PRESSURE: 87 MMHG | OXYGEN SATURATION: 97 % | DIASTOLIC BLOOD PRESSURE: 65 MMHG | HEART RATE: 152 BPM | TEMPERATURE: 101 F

## 2025-01-01 VITALS — RESPIRATION RATE: 18 BRPM | OXYGEN SATURATION: 97 %

## 2025-01-01 DIAGNOSIS — Z71.89 OTHER SPECIFIED COUNSELING: ICD-10-CM

## 2025-01-01 DIAGNOSIS — N12 TUBULO-INTERSTITIAL NEPHRITIS, NOT SPECIFIED AS ACUTE OR CHRONIC: ICD-10-CM

## 2025-01-01 DIAGNOSIS — G93.41 METABOLIC ENCEPHALOPATHY: ICD-10-CM

## 2025-01-01 DIAGNOSIS — L89.90 PRESSURE ULCER OF UNSPECIFIED SITE, UNSPECIFIED STAGE: ICD-10-CM

## 2025-01-01 DIAGNOSIS — Z51.5 ENCOUNTER FOR PALLIATIVE CARE: ICD-10-CM

## 2025-01-01 DIAGNOSIS — A41.9 SEPSIS, UNSPECIFIED ORGANISM: ICD-10-CM

## 2025-01-01 DIAGNOSIS — F03.90 UNSPECIFIED DEMENTIA, UNSPECIFIED SEVERITY, WITHOUT BEHAVIORAL DISTURBANCE, PSYCHOTIC DISTURBANCE, MOOD DISTURBANCE, AND ANXIETY: ICD-10-CM

## 2025-01-01 LAB
ALBUMIN SERPL ELPH-MCNC: 2.4 G/DL — LOW (ref 3.5–5.2)
ALBUMIN SERPL ELPH-MCNC: 2.5 G/DL — LOW (ref 3.5–5.2)
ALBUMIN SERPL ELPH-MCNC: 2.6 G/DL — LOW (ref 3.5–5.2)
ALBUMIN SERPL ELPH-MCNC: 2.8 G/DL — LOW (ref 3.5–5.2)
ALBUMIN SERPL ELPH-MCNC: 2.8 G/DL — LOW (ref 3.5–5.2)
ALBUMIN SERPL ELPH-MCNC: 3 G/DL — LOW (ref 3.5–5.2)
ALBUMIN SERPL ELPH-MCNC: 3.2 G/DL — LOW (ref 3.5–5.2)
ALP SERPL-CCNC: 117 U/L — HIGH (ref 30–115)
ALP SERPL-CCNC: 133 U/L — HIGH (ref 30–115)
ALP SERPL-CCNC: 140 U/L — HIGH (ref 30–115)
ALP SERPL-CCNC: 149 U/L — HIGH (ref 30–115)
ALP SERPL-CCNC: 158 U/L — HIGH (ref 30–115)
ALP SERPL-CCNC: 168 U/L — HIGH (ref 30–115)
ALP SERPL-CCNC: 173 U/L — HIGH (ref 30–115)
ALP SERPL-CCNC: 189 U/L — HIGH (ref 30–115)
ALT FLD-CCNC: 15 U/L — SIGNIFICANT CHANGE UP (ref 0–41)
ALT FLD-CCNC: 16 U/L — SIGNIFICANT CHANGE UP (ref 0–41)
ALT FLD-CCNC: 16 U/L — SIGNIFICANT CHANGE UP (ref 0–41)
ALT FLD-CCNC: 18 U/L — SIGNIFICANT CHANGE UP (ref 0–41)
ALT FLD-CCNC: 19 U/L — SIGNIFICANT CHANGE UP (ref 0–41)
ALT FLD-CCNC: 21 U/L — SIGNIFICANT CHANGE UP (ref 0–41)
ALT FLD-CCNC: 26 U/L — SIGNIFICANT CHANGE UP (ref 0–41)
ALT FLD-CCNC: 28 U/L — SIGNIFICANT CHANGE UP (ref 0–41)
ALT FLD-CCNC: 34 U/L — SIGNIFICANT CHANGE UP (ref 0–41)
ALT FLD-CCNC: 45 U/L — HIGH (ref 0–41)
ANION GAP SERPL CALC-SCNC: 10 MMOL/L — SIGNIFICANT CHANGE UP (ref 7–14)
ANION GAP SERPL CALC-SCNC: 11 MMOL/L — SIGNIFICANT CHANGE UP (ref 7–14)
ANION GAP SERPL CALC-SCNC: 12 MMOL/L — SIGNIFICANT CHANGE UP (ref 7–14)
ANION GAP SERPL CALC-SCNC: 12 MMOL/L — SIGNIFICANT CHANGE UP (ref 7–14)
ANION GAP SERPL CALC-SCNC: 13 MMOL/L — SIGNIFICANT CHANGE UP (ref 7–14)
ANION GAP SERPL CALC-SCNC: 13 MMOL/L — SIGNIFICANT CHANGE UP (ref 7–14)
ANION GAP SERPL CALC-SCNC: 14 MMOL/L — SIGNIFICANT CHANGE UP (ref 7–14)
ANION GAP SERPL CALC-SCNC: 15 MMOL/L — HIGH (ref 7–14)
ANION GAP SERPL CALC-SCNC: 16 MMOL/L — HIGH (ref 7–14)
APPEARANCE UR: ABNORMAL
APPEARANCE UR: ABNORMAL
APTT BLD: 36.7 SEC — SIGNIFICANT CHANGE UP (ref 27–39.2)
AST SERPL-CCNC: 18 U/L — SIGNIFICANT CHANGE UP (ref 0–41)
AST SERPL-CCNC: 21 U/L — SIGNIFICANT CHANGE UP (ref 0–41)
AST SERPL-CCNC: 21 U/L — SIGNIFICANT CHANGE UP (ref 0–41)
AST SERPL-CCNC: 25 U/L — SIGNIFICANT CHANGE UP (ref 0–41)
AST SERPL-CCNC: 25 U/L — SIGNIFICANT CHANGE UP (ref 0–41)
AST SERPL-CCNC: 26 U/L — SIGNIFICANT CHANGE UP (ref 0–41)
AST SERPL-CCNC: 28 U/L — SIGNIFICANT CHANGE UP (ref 0–41)
AST SERPL-CCNC: 32 U/L — SIGNIFICANT CHANGE UP (ref 0–41)
AST SERPL-CCNC: 36 U/L — SIGNIFICANT CHANGE UP (ref 0–41)
AST SERPL-CCNC: 73 U/L — HIGH (ref 0–41)
B-OH-BUTYR SERPL-SCNC: 0.2 MMOL/L — SIGNIFICANT CHANGE UP
BASOPHILS # BLD AUTO: 0 K/UL — SIGNIFICANT CHANGE UP (ref 0–0.2)
BASOPHILS # BLD AUTO: 0.01 K/UL — SIGNIFICANT CHANGE UP (ref 0–0.2)
BASOPHILS # BLD AUTO: 0.02 K/UL — SIGNIFICANT CHANGE UP (ref 0–0.2)
BASOPHILS NFR BLD AUTO: 0 % — SIGNIFICANT CHANGE UP (ref 0–1)
BASOPHILS NFR BLD AUTO: 0.1 % — SIGNIFICANT CHANGE UP (ref 0–1)
BASOPHILS NFR BLD AUTO: 0.2 % — SIGNIFICANT CHANGE UP (ref 0–1)
BASOPHILS NFR BLD AUTO: 0.3 % — SIGNIFICANT CHANGE UP (ref 0–1)
BILIRUB SERPL-MCNC: 0.4 MG/DL — SIGNIFICANT CHANGE UP (ref 0.2–1.2)
BILIRUB SERPL-MCNC: 0.5 MG/DL — SIGNIFICANT CHANGE UP (ref 0.2–1.2)
BILIRUB SERPL-MCNC: 0.5 MG/DL — SIGNIFICANT CHANGE UP (ref 0.2–1.2)
BILIRUB SERPL-MCNC: 0.7 MG/DL — SIGNIFICANT CHANGE UP (ref 0.2–1.2)
BILIRUB UR-MCNC: NEGATIVE — SIGNIFICANT CHANGE UP
BILIRUB UR-MCNC: NEGATIVE — SIGNIFICANT CHANGE UP
BLD GP AB SCN SERPL QL: SIGNIFICANT CHANGE UP
BUN SERPL-MCNC: 32 MG/DL — HIGH (ref 10–20)
BUN SERPL-MCNC: 34 MG/DL — HIGH (ref 10–20)
BUN SERPL-MCNC: 34 MG/DL — HIGH (ref 10–20)
BUN SERPL-MCNC: 36 MG/DL — HIGH (ref 10–20)
BUN SERPL-MCNC: 37 MG/DL — HIGH (ref 10–20)
BUN SERPL-MCNC: 37 MG/DL — HIGH (ref 10–20)
BUN SERPL-MCNC: 38 MG/DL — HIGH (ref 10–20)
BUN SERPL-MCNC: 39 MG/DL — HIGH (ref 10–20)
BUN SERPL-MCNC: 45 MG/DL — HIGH (ref 10–20)
C DIFF GDH STL QL: NEGATIVE — SIGNIFICANT CHANGE UP
C DIFF GDH STL QL: SIGNIFICANT CHANGE UP
CALCIUM SERPL-MCNC: 8.3 MG/DL — LOW (ref 8.4–10.5)
CALCIUM SERPL-MCNC: 8.4 MG/DL — SIGNIFICANT CHANGE UP (ref 8.4–10.5)
CALCIUM SERPL-MCNC: 8.5 MG/DL — SIGNIFICANT CHANGE UP (ref 8.4–10.5)
CALCIUM SERPL-MCNC: 8.6 MG/DL — SIGNIFICANT CHANGE UP (ref 8.4–10.5)
CALCIUM SERPL-MCNC: 8.7 MG/DL — SIGNIFICANT CHANGE UP (ref 8.4–10.5)
CALCIUM SERPL-MCNC: 8.9 MG/DL — SIGNIFICANT CHANGE UP (ref 8.4–10.5)
CALCIUM SERPL-MCNC: 9.1 MG/DL — SIGNIFICANT CHANGE UP (ref 8.4–10.5)
CALCIUM SERPL-MCNC: 9.1 MG/DL — SIGNIFICANT CHANGE UP (ref 8.4–10.5)
CALCIUM SERPL-MCNC: 9.2 MG/DL — SIGNIFICANT CHANGE UP (ref 8.4–10.5)
CHLORIDE SERPL-SCNC: 101 MMOL/L — SIGNIFICANT CHANGE UP (ref 98–110)
CHLORIDE SERPL-SCNC: 103 MMOL/L — SIGNIFICANT CHANGE UP (ref 98–110)
CHLORIDE SERPL-SCNC: 106 MMOL/L — SIGNIFICANT CHANGE UP (ref 98–110)
CHLORIDE SERPL-SCNC: 106 MMOL/L — SIGNIFICANT CHANGE UP (ref 98–110)
CHLORIDE SERPL-SCNC: 108 MMOL/L — SIGNIFICANT CHANGE UP (ref 98–110)
CHLORIDE SERPL-SCNC: 108 MMOL/L — SIGNIFICANT CHANGE UP (ref 98–110)
CHLORIDE SERPL-SCNC: 113 MMOL/L — HIGH (ref 98–110)
CHLORIDE SERPL-SCNC: 97 MMOL/L — LOW (ref 98–110)
CHLORIDE SERPL-SCNC: 98 MMOL/L — SIGNIFICANT CHANGE UP (ref 98–110)
CHLORIDE SERPL-SCNC: 98 MMOL/L — SIGNIFICANT CHANGE UP (ref 98–110)
CHLORIDE SERPL-SCNC: 99 MMOL/L — SIGNIFICANT CHANGE UP (ref 98–110)
CO2 SERPL-SCNC: 12 MMOL/L — LOW (ref 17–32)
CO2 SERPL-SCNC: 13 MMOL/L — LOW (ref 17–32)
CO2 SERPL-SCNC: 16 MMOL/L — LOW (ref 17–32)
CO2 SERPL-SCNC: 16 MMOL/L — LOW (ref 17–32)
CO2 SERPL-SCNC: 17 MMOL/L — SIGNIFICANT CHANGE UP (ref 17–32)
CO2 SERPL-SCNC: 18 MMOL/L — SIGNIFICANT CHANGE UP (ref 17–32)
CO2 SERPL-SCNC: 18 MMOL/L — SIGNIFICANT CHANGE UP (ref 17–32)
CO2 SERPL-SCNC: 19 MMOL/L — SIGNIFICANT CHANGE UP (ref 17–32)
CO2 SERPL-SCNC: 20 MMOL/L — SIGNIFICANT CHANGE UP (ref 17–32)
COLOR SPEC: ABNORMAL
COLOR SPEC: YELLOW — SIGNIFICANT CHANGE UP
CREAT ?TM UR-MCNC: 44 MG/DL — SIGNIFICANT CHANGE UP
CREAT SERPL-MCNC: 1 MG/DL — SIGNIFICANT CHANGE UP (ref 0.7–1.5)
CREAT SERPL-MCNC: 1 MG/DL — SIGNIFICANT CHANGE UP (ref 0.7–1.5)
CREAT SERPL-MCNC: 1.1 MG/DL — SIGNIFICANT CHANGE UP (ref 0.7–1.5)
CREAT SERPL-MCNC: 1.1 MG/DL — SIGNIFICANT CHANGE UP (ref 0.7–1.5)
CREAT SERPL-MCNC: 1.2 MG/DL — SIGNIFICANT CHANGE UP (ref 0.7–1.5)
CREAT SERPL-MCNC: 1.2 MG/DL — SIGNIFICANT CHANGE UP (ref 0.7–1.5)
CREAT SERPL-MCNC: 1.3 MG/DL — SIGNIFICANT CHANGE UP (ref 0.7–1.5)
CREAT SERPL-MCNC: 1.5 MG/DL — SIGNIFICANT CHANGE UP (ref 0.7–1.5)
CREAT SERPL-MCNC: 1.7 MG/DL — HIGH (ref 0.7–1.5)
CULTURE RESULTS: SIGNIFICANT CHANGE UP
DIFF PNL FLD: ABNORMAL
DIFF PNL FLD: ABNORMAL
DIGOXIN SERPL-MCNC: 2.3 NG/ML — HIGH (ref 0.8–2)
EGFR: 28 ML/MIN/1.73M2 — LOW
EGFR: 28 ML/MIN/1.73M2 — LOW
EGFR: 32 ML/MIN/1.73M2 — LOW
EGFR: 32 ML/MIN/1.73M2 — LOW
EGFR: 39 ML/MIN/1.73M2 — LOW
EGFR: 42 ML/MIN/1.73M2 — LOW
EGFR: 47 ML/MIN/1.73M2 — LOW
EGFR: 53 ML/MIN/1.73M2 — LOW
EOSINOPHIL # BLD AUTO: 0 K/UL — SIGNIFICANT CHANGE UP (ref 0–0.7)
EOSINOPHIL # BLD AUTO: 0.02 K/UL — SIGNIFICANT CHANGE UP (ref 0–0.7)
EOSINOPHIL # BLD AUTO: 0.04 K/UL — SIGNIFICANT CHANGE UP (ref 0–0.7)
EOSINOPHIL # BLD AUTO: 0.07 K/UL — SIGNIFICANT CHANGE UP (ref 0–0.7)
EOSINOPHIL # BLD AUTO: 0.1 K/UL — SIGNIFICANT CHANGE UP (ref 0–0.7)
EOSINOPHIL # BLD AUTO: 0.1 K/UL — SIGNIFICANT CHANGE UP (ref 0–0.7)
EOSINOPHIL # BLD AUTO: 0.13 K/UL — SIGNIFICANT CHANGE UP (ref 0–0.7)
EOSINOPHIL NFR BLD AUTO: 0 % — SIGNIFICANT CHANGE UP (ref 0–8)
EOSINOPHIL NFR BLD AUTO: 0.2 % — SIGNIFICANT CHANGE UP (ref 0–8)
EOSINOPHIL NFR BLD AUTO: 0.4 % — SIGNIFICANT CHANGE UP (ref 0–8)
EOSINOPHIL NFR BLD AUTO: 0.5 % — SIGNIFICANT CHANGE UP (ref 0–8)
EOSINOPHIL NFR BLD AUTO: 0.5 % — SIGNIFICANT CHANGE UP (ref 0–8)
EOSINOPHIL NFR BLD AUTO: 1.2 % — SIGNIFICANT CHANGE UP (ref 0–8)
EOSINOPHIL NFR BLD AUTO: 1.6 % — SIGNIFICANT CHANGE UP (ref 0–8)
FERRITIN SERPL-MCNC: 430 NG/ML — HIGH (ref 13–330)
FLUAV AG NPH QL: SIGNIFICANT CHANGE UP
FLUBV AG NPH QL: SIGNIFICANT CHANGE UP
FOLATE SERPL-MCNC: 14.7 NG/ML — SIGNIFICANT CHANGE UP
GAS PNL BLDA: SIGNIFICANT CHANGE UP
GAS PNL BLDV: SIGNIFICANT CHANGE UP
GI PCR PANEL: SIGNIFICANT CHANGE UP
GLUCOSE BLDC GLUCOMTR-MCNC: 125 MG/DL — HIGH (ref 70–99)
GLUCOSE BLDC GLUCOMTR-MCNC: 135 MG/DL — HIGH (ref 70–99)
GLUCOSE BLDC GLUCOMTR-MCNC: 155 MG/DL — HIGH (ref 70–99)
GLUCOSE BLDC GLUCOMTR-MCNC: 167 MG/DL — HIGH (ref 70–99)
GLUCOSE BLDC GLUCOMTR-MCNC: 81 MG/DL — SIGNIFICANT CHANGE UP (ref 70–99)
GLUCOSE SERPL-MCNC: 108 MG/DL — HIGH (ref 70–99)
GLUCOSE SERPL-MCNC: 110 MG/DL — HIGH (ref 70–99)
GLUCOSE SERPL-MCNC: 116 MG/DL — HIGH (ref 70–99)
GLUCOSE SERPL-MCNC: 122 MG/DL — HIGH (ref 70–99)
GLUCOSE SERPL-MCNC: 126 MG/DL — HIGH (ref 70–99)
GLUCOSE SERPL-MCNC: 166 MG/DL — HIGH (ref 70–99)
GLUCOSE SERPL-MCNC: 82 MG/DL — SIGNIFICANT CHANGE UP (ref 70–99)
GLUCOSE SERPL-MCNC: 89 MG/DL — SIGNIFICANT CHANGE UP (ref 70–99)
GLUCOSE SERPL-MCNC: 91 MG/DL — SIGNIFICANT CHANGE UP (ref 70–99)
GLUCOSE SERPL-MCNC: 93 MG/DL — SIGNIFICANT CHANGE UP (ref 70–99)
GLUCOSE SERPL-MCNC: 94 MG/DL — SIGNIFICANT CHANGE UP (ref 70–99)
GLUCOSE UR QL: NEGATIVE MG/DL — SIGNIFICANT CHANGE UP
GLUCOSE UR QL: NEGATIVE MG/DL — SIGNIFICANT CHANGE UP
HCT VFR BLD CALC: 26.4 % — LOW (ref 37–47)
HCT VFR BLD CALC: 30.8 % — LOW (ref 37–47)
HCT VFR BLD CALC: 32 % — LOW (ref 37–47)
HCT VFR BLD CALC: 33.2 % — LOW (ref 37–47)
HCT VFR BLD CALC: 33.4 % — LOW (ref 37–47)
HCT VFR BLD CALC: 33.6 % — LOW (ref 37–47)
HCT VFR BLD CALC: 34.3 % — LOW (ref 37–47)
HCT VFR BLD CALC: 34.6 % — LOW (ref 37–47)
HCT VFR BLD CALC: 35.3 % — LOW (ref 37–47)
HCT VFR BLD CALC: 36.4 % — LOW (ref 37–47)
HGB BLD-MCNC: 10 G/DL — LOW (ref 12–16)
HGB BLD-MCNC: 10.3 G/DL — LOW (ref 12–16)
HGB BLD-MCNC: 10.5 G/DL — LOW (ref 12–16)
HGB BLD-MCNC: 10.8 G/DL — LOW (ref 12–16)
HGB BLD-MCNC: 10.9 G/DL — LOW (ref 12–16)
HGB BLD-MCNC: 11.1 G/DL — LOW (ref 12–16)
HGB BLD-MCNC: 11.1 G/DL — LOW (ref 12–16)
HGB BLD-MCNC: 11.3 G/DL — LOW (ref 12–16)
HGB BLD-MCNC: 11.3 G/DL — LOW (ref 12–16)
HGB BLD-MCNC: 8.8 G/DL — LOW (ref 12–16)
IMM GRANULOCYTES NFR BLD AUTO: 0.7 % — HIGH (ref 0.1–0.3)
IMM GRANULOCYTES NFR BLD AUTO: 0.8 % — HIGH (ref 0.1–0.3)
IMM GRANULOCYTES NFR BLD AUTO: 0.8 % — HIGH (ref 0.1–0.3)
IMM GRANULOCYTES NFR BLD AUTO: 1.1 % — HIGH (ref 0.1–0.3)
IMM GRANULOCYTES NFR BLD AUTO: 1.2 % — HIGH (ref 0.1–0.3)
IMM GRANULOCYTES NFR BLD AUTO: 1.3 % — HIGH (ref 0.1–0.3)
INR BLD: 1.79 RATIO — HIGH (ref 0.65–1.3)
IRON SATN MFR SERPL: 35 % — SIGNIFICANT CHANGE UP (ref 15–50)
IRON SATN MFR SERPL: 52 UG/DL — SIGNIFICANT CHANGE UP (ref 35–150)
KETONES UR-MCNC: NEGATIVE MG/DL — SIGNIFICANT CHANGE UP
KETONES UR-MCNC: NEGATIVE MG/DL — SIGNIFICANT CHANGE UP
LACTATE SERPL-SCNC: 2.6 MMOL/L — HIGH (ref 0.7–2)
LACTATE SERPL-SCNC: 2.9 MMOL/L — HIGH (ref 0.7–2)
LACTATE SERPL-SCNC: 2.9 MMOL/L — HIGH (ref 0.7–2)
LACTATE SERPL-SCNC: 3 MMOL/L — HIGH (ref 0.7–2)
LACTATE SERPL-SCNC: 3.1 MMOL/L — HIGH (ref 0.7–2)
LACTATE SERPL-SCNC: 3.5 MMOL/L — HIGH (ref 0.7–2)
LACTATE SERPL-SCNC: 4.1 MMOL/L — CRITICAL HIGH (ref 0.7–2)
LACTATE SERPL-SCNC: 4.3 MMOL/L — CRITICAL HIGH (ref 0.7–2)
LDH SERPL L TO P-CCNC: 484 — HIGH (ref 50–242)
LEUKOCYTE ESTERASE UR-ACNC: ABNORMAL
LEUKOCYTE ESTERASE UR-ACNC: ABNORMAL
LYMPHOCYTES # BLD AUTO: 0.33 K/UL — LOW (ref 1.2–3.4)
LYMPHOCYTES # BLD AUTO: 1.04 K/UL — LOW (ref 1.2–3.4)
LYMPHOCYTES # BLD AUTO: 1.43 K/UL — SIGNIFICANT CHANGE UP (ref 1.2–3.4)
LYMPHOCYTES # BLD AUTO: 1.5 K/UL — SIGNIFICANT CHANGE UP (ref 1.2–3.4)
LYMPHOCYTES # BLD AUTO: 1.55 K/UL — SIGNIFICANT CHANGE UP (ref 1.2–3.4)
LYMPHOCYTES # BLD AUTO: 1.64 K/UL — SIGNIFICANT CHANGE UP (ref 1.2–3.4)
LYMPHOCYTES # BLD AUTO: 1.7 % — LOW (ref 20.5–51.1)
LYMPHOCYTES # BLD AUTO: 12.8 % — LOW (ref 20.5–51.1)
LYMPHOCYTES # BLD AUTO: 18.2 % — LOW (ref 20.5–51.1)
LYMPHOCYTES # BLD AUTO: 19.4 % — LOW (ref 20.5–51.1)
LYMPHOCYTES # BLD AUTO: 2.67 K/UL — SIGNIFICANT CHANGE UP (ref 1.2–3.4)
LYMPHOCYTES # BLD AUTO: 21.8 % — SIGNIFICANT CHANGE UP (ref 20.5–51.1)
LYMPHOCYTES # BLD AUTO: 8.1 % — LOW (ref 20.5–51.1)
LYMPHOCYTES # BLD AUTO: 9.8 % — LOW (ref 20.5–51.1)
MAGNESIUM SERPL-MCNC: 1.9 MG/DL — SIGNIFICANT CHANGE UP (ref 1.8–2.4)
MAGNESIUM SERPL-MCNC: 1.9 MG/DL — SIGNIFICANT CHANGE UP (ref 1.8–2.4)
MAGNESIUM SERPL-MCNC: 2 MG/DL — SIGNIFICANT CHANGE UP (ref 1.8–2.4)
MAGNESIUM SERPL-MCNC: 2.1 MG/DL — SIGNIFICANT CHANGE UP (ref 1.8–2.4)
MAGNESIUM SERPL-MCNC: 2.2 MG/DL — SIGNIFICANT CHANGE UP (ref 1.8–2.4)
MAGNESIUM SERPL-MCNC: 2.3 MG/DL — SIGNIFICANT CHANGE UP (ref 1.8–2.4)
MAGNESIUM SERPL-MCNC: 2.3 MG/DL — SIGNIFICANT CHANGE UP (ref 1.8–2.4)
MCHC RBC-ENTMCNC: 31 G/DL — LOW (ref 32–37)
MCHC RBC-ENTMCNC: 31.6 G/DL — LOW (ref 32–37)
MCHC RBC-ENTMCNC: 31.8 G/DL — LOW (ref 32–37)
MCHC RBC-ENTMCNC: 32 G/DL — SIGNIFICANT CHANGE UP (ref 32–37)
MCHC RBC-ENTMCNC: 32 PG — HIGH (ref 27–31)
MCHC RBC-ENTMCNC: 32 PG — HIGH (ref 27–31)
MCHC RBC-ENTMCNC: 32.1 G/DL — SIGNIFICANT CHANGE UP (ref 32–37)
MCHC RBC-ENTMCNC: 32.2 G/DL — SIGNIFICANT CHANGE UP (ref 32–37)
MCHC RBC-ENTMCNC: 32.3 G/DL — SIGNIFICANT CHANGE UP (ref 32–37)
MCHC RBC-ENTMCNC: 32.4 PG — HIGH (ref 27–31)
MCHC RBC-ENTMCNC: 32.5 G/DL — SIGNIFICANT CHANGE UP (ref 32–37)
MCHC RBC-ENTMCNC: 32.5 PG — HIGH (ref 27–31)
MCHC RBC-ENTMCNC: 32.5 PG — HIGH (ref 27–31)
MCHC RBC-ENTMCNC: 32.6 PG — HIGH (ref 27–31)
MCHC RBC-ENTMCNC: 32.7 PG — HIGH (ref 27–31)
MCHC RBC-ENTMCNC: 33 G/DL — SIGNIFICANT CHANGE UP (ref 32–37)
MCHC RBC-ENTMCNC: 33.3 G/DL — SIGNIFICANT CHANGE UP (ref 32–37)
MCV RBC AUTO: 100.3 FL — HIGH (ref 81–99)
MCV RBC AUTO: 100.7 FL — HIGH (ref 81–99)
MCV RBC AUTO: 100.9 FL — HIGH (ref 81–99)
MCV RBC AUTO: 101.2 FL — HIGH (ref 81–99)
MCV RBC AUTO: 101.2 FL — HIGH (ref 81–99)
MCV RBC AUTO: 101.7 FL — HIGH (ref 81–99)
MCV RBC AUTO: 102.7 FL — HIGH (ref 81–99)
MCV RBC AUTO: 104.9 FL — HIGH (ref 81–99)
MCV RBC AUTO: 96 FL — SIGNIFICANT CHANGE UP (ref 81–99)
MCV RBC AUTO: 98.8 FL — SIGNIFICANT CHANGE UP (ref 81–99)
MONOCYTES # BLD AUTO: 0.17 K/UL — SIGNIFICANT CHANGE UP (ref 0.1–0.6)
MONOCYTES # BLD AUTO: 0.27 K/UL — SIGNIFICANT CHANGE UP (ref 0.1–0.6)
MONOCYTES # BLD AUTO: 0.39 K/UL — SIGNIFICANT CHANGE UP (ref 0.1–0.6)
MONOCYTES # BLD AUTO: 0.59 K/UL — SIGNIFICANT CHANGE UP (ref 0.1–0.6)
MONOCYTES # BLD AUTO: 0.61 K/UL — HIGH (ref 0.1–0.6)
MONOCYTES # BLD AUTO: 0.68 K/UL — HIGH (ref 0.1–0.6)
MONOCYTES # BLD AUTO: 0.85 K/UL — HIGH (ref 0.1–0.6)
MONOCYTES NFR BLD AUTO: 0.9 % — LOW (ref 1.7–9.3)
MONOCYTES NFR BLD AUTO: 3.3 % — SIGNIFICANT CHANGE UP (ref 1.7–9.3)
MONOCYTES NFR BLD AUTO: 3.3 % — SIGNIFICANT CHANGE UP (ref 1.7–9.3)
MONOCYTES NFR BLD AUTO: 4.1 % — SIGNIFICANT CHANGE UP (ref 1.7–9.3)
MONOCYTES NFR BLD AUTO: 5 % — SIGNIFICANT CHANGE UP (ref 1.7–9.3)
MONOCYTES NFR BLD AUTO: 6.9 % — SIGNIFICANT CHANGE UP (ref 1.7–9.3)
MONOCYTES NFR BLD AUTO: 7.4 % — SIGNIFICANT CHANGE UP (ref 1.7–9.3)
MRSA PCR RESULT.: NEGATIVE — SIGNIFICANT CHANGE UP
NEUTROPHILS # BLD AUTO: 14.13 K/UL — HIGH (ref 1.4–6.5)
NEUTROPHILS # BLD AUTO: 16.03 K/UL — HIGH (ref 1.4–6.5)
NEUTROPHILS # BLD AUTO: 18.38 K/UL — HIGH (ref 1.4–6.5)
NEUTROPHILS # BLD AUTO: 5.61 K/UL — SIGNIFICANT CHANGE UP (ref 1.4–6.5)
NEUTROPHILS # BLD AUTO: 5.86 K/UL — SIGNIFICANT CHANGE UP (ref 1.4–6.5)
NEUTROPHILS # BLD AUTO: 6.63 K/UL — HIGH (ref 1.4–6.5)
NEUTROPHILS # BLD AUTO: 8.63 K/UL — HIGH (ref 1.4–6.5)
NEUTROPHILS NFR BLD AUTO: 70.2 % — SIGNIFICANT CHANGE UP (ref 42.2–75.2)
NEUTROPHILS NFR BLD AUTO: 70.4 % — SIGNIFICANT CHANGE UP (ref 42.2–75.2)
NEUTROPHILS NFR BLD AUTO: 74.7 % — SIGNIFICANT CHANGE UP (ref 42.2–75.2)
NEUTROPHILS NFR BLD AUTO: 81.4 % — HIGH (ref 42.2–75.2)
NEUTROPHILS NFR BLD AUTO: 84.8 % — HIGH (ref 42.2–75.2)
NEUTROPHILS NFR BLD AUTO: 87.2 % — HIGH (ref 42.2–75.2)
NEUTROPHILS NFR BLD AUTO: 95.6 % — HIGH (ref 42.2–75.2)
NITRITE UR-MCNC: NEGATIVE — SIGNIFICANT CHANGE UP
NITRITE UR-MCNC: NEGATIVE — SIGNIFICANT CHANGE UP
NRBC BLD AUTO-RTO: 0 /100 WBCS — SIGNIFICANT CHANGE UP (ref 0–0)
OSMOLALITY UR: 336 MOS/KG — SIGNIFICANT CHANGE UP (ref 50–1200)
PH UR: 6 — SIGNIFICANT CHANGE UP (ref 5–8)
PH UR: 6 — SIGNIFICANT CHANGE UP (ref 5–8)
PHOSPHATE SERPL-MCNC: 3.2 MG/DL — SIGNIFICANT CHANGE UP (ref 2.1–4.9)
PLATELET # BLD AUTO: 126 K/UL — LOW (ref 130–400)
PLATELET # BLD AUTO: 148 K/UL — SIGNIFICANT CHANGE UP (ref 130–400)
PLATELET # BLD AUTO: 164 K/UL — SIGNIFICANT CHANGE UP (ref 130–400)
PLATELET # BLD AUTO: 165 K/UL — SIGNIFICANT CHANGE UP (ref 130–400)
PLATELET # BLD AUTO: 168 K/UL — SIGNIFICANT CHANGE UP (ref 130–400)
PLATELET # BLD AUTO: 175 K/UL — SIGNIFICANT CHANGE UP (ref 130–400)
PLATELET # BLD AUTO: 186 K/UL — SIGNIFICANT CHANGE UP (ref 130–400)
PLATELET # BLD AUTO: 217 K/UL — SIGNIFICANT CHANGE UP (ref 130–400)
PLATELET # BLD AUTO: 217 K/UL — SIGNIFICANT CHANGE UP (ref 130–400)
PLATELET # BLD AUTO: 234 K/UL — SIGNIFICANT CHANGE UP (ref 130–400)
PMV BLD: 10.1 FL — SIGNIFICANT CHANGE UP (ref 7.4–10.4)
PMV BLD: 10.2 FL — SIGNIFICANT CHANGE UP (ref 7.4–10.4)
PMV BLD: 10.3 FL — SIGNIFICANT CHANGE UP (ref 7.4–10.4)
PMV BLD: 10.4 FL — SIGNIFICANT CHANGE UP (ref 7.4–10.4)
PMV BLD: 10.5 FL — HIGH (ref 7.4–10.4)
PMV BLD: 10.8 FL — HIGH (ref 7.4–10.4)
PMV BLD: 9.4 FL — SIGNIFICANT CHANGE UP (ref 7.4–10.4)
PMV BLD: 9.5 FL — SIGNIFICANT CHANGE UP (ref 7.4–10.4)
PMV BLD: 9.6 FL — SIGNIFICANT CHANGE UP (ref 7.4–10.4)
PMV BLD: 9.6 FL — SIGNIFICANT CHANGE UP (ref 7.4–10.4)
POTASSIUM SERPL-MCNC: 3.4 MMOL/L — LOW (ref 3.5–5)
POTASSIUM SERPL-MCNC: 4 MMOL/L — SIGNIFICANT CHANGE UP (ref 3.5–5)
POTASSIUM SERPL-MCNC: 4 MMOL/L — SIGNIFICANT CHANGE UP (ref 3.5–5)
POTASSIUM SERPL-MCNC: 4.3 MMOL/L — SIGNIFICANT CHANGE UP (ref 3.5–5)
POTASSIUM SERPL-MCNC: 4.3 MMOL/L — SIGNIFICANT CHANGE UP (ref 3.5–5)
POTASSIUM SERPL-MCNC: 4.4 MMOL/L — SIGNIFICANT CHANGE UP (ref 3.5–5)
POTASSIUM SERPL-MCNC: 4.4 MMOL/L — SIGNIFICANT CHANGE UP (ref 3.5–5)
POTASSIUM SERPL-MCNC: 5.2 MMOL/L — HIGH (ref 3.5–5)
POTASSIUM SERPL-MCNC: 5.2 MMOL/L — HIGH (ref 3.5–5)
POTASSIUM SERPL-MCNC: 5.3 MMOL/L — HIGH (ref 3.5–5)
POTASSIUM SERPL-MCNC: 5.3 MMOL/L — HIGH (ref 3.5–5)
POTASSIUM SERPL-SCNC: 3.4 MMOL/L — LOW (ref 3.5–5)
POTASSIUM SERPL-SCNC: 4 MMOL/L — SIGNIFICANT CHANGE UP (ref 3.5–5)
POTASSIUM SERPL-SCNC: 4 MMOL/L — SIGNIFICANT CHANGE UP (ref 3.5–5)
POTASSIUM SERPL-SCNC: 4.3 MMOL/L — SIGNIFICANT CHANGE UP (ref 3.5–5)
POTASSIUM SERPL-SCNC: 4.3 MMOL/L — SIGNIFICANT CHANGE UP (ref 3.5–5)
POTASSIUM SERPL-SCNC: 4.4 MMOL/L — SIGNIFICANT CHANGE UP (ref 3.5–5)
POTASSIUM SERPL-SCNC: 4.4 MMOL/L — SIGNIFICANT CHANGE UP (ref 3.5–5)
POTASSIUM SERPL-SCNC: 5.2 MMOL/L — HIGH (ref 3.5–5)
POTASSIUM SERPL-SCNC: 5.2 MMOL/L — HIGH (ref 3.5–5)
POTASSIUM SERPL-SCNC: 5.3 MMOL/L — HIGH (ref 3.5–5)
POTASSIUM SERPL-SCNC: 5.3 MMOL/L — HIGH (ref 3.5–5)
POTASSIUM UR-SCNC: 34 MMOL/L — SIGNIFICANT CHANGE UP
PROCALCITONIN SERPL-MCNC: 2.85 NG/ML — HIGH (ref 0.02–0.1)
PROT ?TM UR-MCNC: 434 MG/DL — SIGNIFICANT CHANGE UP
PROT SERPL-MCNC: 3.8 G/DL — LOW (ref 6–8)
PROT SERPL-MCNC: 4.5 G/DL — LOW (ref 6–8)
PROT SERPL-MCNC: 4.6 G/DL — LOW (ref 6–8)
PROT SERPL-MCNC: 4.7 G/DL — LOW (ref 6–8)
PROT SERPL-MCNC: 4.7 G/DL — LOW (ref 6–8)
PROT SERPL-MCNC: 4.9 G/DL — LOW (ref 6–8)
PROT SERPL-MCNC: 5 G/DL — LOW (ref 6–8)
PROT UR-MCNC: 300 MG/DL
PROT UR-MCNC: 300 MG/DL
PROT/CREAT UR-RTO: 9.9 RATIO — HIGH (ref 0–0.2)
PROTHROM AB SERPL-ACNC: 21.4 SEC — HIGH (ref 9.95–12.87)
RBC # BLD: 2.75 M/UL — LOW (ref 4.2–5.4)
RBC # BLD: 3.06 M/UL — LOW (ref 4.2–5.4)
RBC # BLD: 3.17 M/UL — LOW (ref 4.2–5.4)
RBC # BLD: 3.28 M/UL — LOW (ref 4.2–5.4)
RBC # BLD: 3.33 M/UL — LOW (ref 4.2–5.4)
RBC # BLD: 3.34 M/UL — LOW (ref 4.2–5.4)
RBC # BLD: 3.4 M/UL — LOW (ref 4.2–5.4)
RBC # BLD: 3.42 M/UL — LOW (ref 4.2–5.4)
RBC # BLD: 3.47 M/UL — LOW (ref 4.2–5.4)
RBC # BLD: 3.47 M/UL — LOW (ref 4.2–5.4)
RBC # FLD: 16 % — HIGH (ref 11.5–14.5)
RBC # FLD: 16.2 % — HIGH (ref 11.5–14.5)
RBC # FLD: 16.5 % — HIGH (ref 11.5–14.5)
RBC # FLD: 16.5 % — HIGH (ref 11.5–14.5)
RBC # FLD: 16.6 % — HIGH (ref 11.5–14.5)
RBC # FLD: 16.7 % — HIGH (ref 11.5–14.5)
RBC # FLD: 16.8 % — HIGH (ref 11.5–14.5)
RBC # FLD: 17.1 % — HIGH (ref 11.5–14.5)
RSV RNA NPH QL NAA+NON-PROBE: SIGNIFICANT CHANGE UP
SARS-COV-2 RNA SPEC QL NAA+PROBE: SIGNIFICANT CHANGE UP
SODIUM SERPL-SCNC: 129 MMOL/L — LOW (ref 135–146)
SODIUM SERPL-SCNC: 129 MMOL/L — LOW (ref 135–146)
SODIUM SERPL-SCNC: 130 MMOL/L — LOW (ref 135–146)
SODIUM SERPL-SCNC: 130 MMOL/L — LOW (ref 135–146)
SODIUM SERPL-SCNC: 132 MMOL/L — LOW (ref 135–146)
SODIUM SERPL-SCNC: 134 MMOL/L — LOW (ref 135–146)
SODIUM SERPL-SCNC: 135 MMOL/L — SIGNIFICANT CHANGE UP (ref 135–146)
SODIUM SERPL-SCNC: 136 MMOL/L — SIGNIFICANT CHANGE UP (ref 135–146)
SODIUM SERPL-SCNC: 137 MMOL/L — SIGNIFICANT CHANGE UP (ref 135–146)
SODIUM SERPL-SCNC: 138 MMOL/L — SIGNIFICANT CHANGE UP (ref 135–146)
SODIUM SERPL-SCNC: 142 MMOL/L — SIGNIFICANT CHANGE UP (ref 135–146)
SODIUM UR-SCNC: 48 MMOL/L — SIGNIFICANT CHANGE UP
SOURCE RESPIRATORY: SIGNIFICANT CHANGE UP
SP GR SPEC: 1.01 — SIGNIFICANT CHANGE UP (ref 1–1.03)
SP GR SPEC: 1.02 — SIGNIFICANT CHANGE UP (ref 1–1.03)
SPECIMEN SOURCE: SIGNIFICANT CHANGE UP
TIBC SERPL-MCNC: 150 UG/DL — LOW (ref 220–430)
TROPONIN T, HIGH SENSITIVITY RESULT: 81 NG/L — CRITICAL HIGH (ref 6–13)
TROPONIN T, HIGH SENSITIVITY RESULT: 92 NG/L — CRITICAL HIGH (ref 6–13)
TSH SERPL-MCNC: 1.2 UIU/ML — SIGNIFICANT CHANGE UP (ref 0.27–4.2)
UIBC SERPL-MCNC: 98 UG/DL — LOW (ref 110–370)
UROBILINOGEN FLD QL: 0.2 MG/DL — SIGNIFICANT CHANGE UP (ref 0.2–1)
UROBILINOGEN FLD QL: 0.2 MG/DL — SIGNIFICANT CHANGE UP (ref 0.2–1)
UUN UR-MCNC: 272 MG/DL — SIGNIFICANT CHANGE UP
VIT B12 SERPL-MCNC: >2000 PG/ML — HIGH (ref 232–1245)
WBC # BLD: 12.27 K/UL — HIGH (ref 4.8–10.8)
WBC # BLD: 16.67 K/UL — HIGH (ref 4.8–10.8)
WBC # BLD: 18.41 K/UL — HIGH (ref 4.8–10.8)
WBC # BLD: 19.23 K/UL — HIGH (ref 4.8–10.8)
WBC # BLD: 20.26 K/UL — HIGH (ref 4.8–10.8)
WBC # BLD: 7.84 K/UL — SIGNIFICANT CHANGE UP (ref 4.8–10.8)
WBC # BLD: 7.98 K/UL — SIGNIFICANT CHANGE UP (ref 4.8–10.8)
WBC # BLD: 8.15 K/UL — SIGNIFICANT CHANGE UP (ref 4.8–10.8)
WBC # BLD: 9.56 K/UL — SIGNIFICANT CHANGE UP (ref 4.8–10.8)
WBC # BLD: 9.65 K/UL — SIGNIFICANT CHANGE UP (ref 4.8–10.8)
WBC # FLD AUTO: 12.27 K/UL — HIGH (ref 4.8–10.8)
WBC # FLD AUTO: 16.67 K/UL — HIGH (ref 4.8–10.8)
WBC # FLD AUTO: 18.41 K/UL — HIGH (ref 4.8–10.8)
WBC # FLD AUTO: 19.23 K/UL — HIGH (ref 4.8–10.8)
WBC # FLD AUTO: 20.26 K/UL — HIGH (ref 4.8–10.8)
WBC # FLD AUTO: 7.84 K/UL — SIGNIFICANT CHANGE UP (ref 4.8–10.8)
WBC # FLD AUTO: 7.98 K/UL — SIGNIFICANT CHANGE UP (ref 4.8–10.8)
WBC # FLD AUTO: 8.15 K/UL — SIGNIFICANT CHANGE UP (ref 4.8–10.8)
WBC # FLD AUTO: 9.56 K/UL — SIGNIFICANT CHANGE UP (ref 4.8–10.8)
WBC # FLD AUTO: 9.65 K/UL — SIGNIFICANT CHANGE UP (ref 4.8–10.8)

## 2025-01-01 PROCEDURE — 93970 EXTREMITY STUDY: CPT | Mod: 26

## 2025-01-01 PROCEDURE — 93010 ELECTROCARDIOGRAM REPORT: CPT

## 2025-01-01 PROCEDURE — 84540 ASSAY OF URINE/UREA-N: CPT

## 2025-01-01 PROCEDURE — 86901 BLOOD TYPING SEROLOGIC RH(D): CPT

## 2025-01-01 PROCEDURE — 99233 SBSQ HOSP IP/OBS HIGH 50: CPT

## 2025-01-01 PROCEDURE — 99223 1ST HOSP IP/OBS HIGH 75: CPT

## 2025-01-01 PROCEDURE — 83935 ASSAY OF URINE OSMOLALITY: CPT

## 2025-01-01 PROCEDURE — 85027 COMPLETE CBC AUTOMATED: CPT

## 2025-01-01 PROCEDURE — 71045 X-RAY EXAM CHEST 1 VIEW: CPT | Mod: 26

## 2025-01-01 PROCEDURE — 84133 ASSAY OF URINE POTASSIUM: CPT

## 2025-01-01 PROCEDURE — 87507 IADNA-DNA/RNA PROBE TQ 12-25: CPT

## 2025-01-01 PROCEDURE — 99291 CRITICAL CARE FIRST HOUR: CPT

## 2025-01-01 PROCEDURE — 93005 ELECTROCARDIOGRAM TRACING: CPT

## 2025-01-01 PROCEDURE — 86900 BLOOD TYPING SEROLOGIC ABO: CPT

## 2025-01-01 PROCEDURE — 99232 SBSQ HOSP IP/OBS MODERATE 35: CPT

## 2025-01-01 PROCEDURE — 93971 EXTREMITY STUDY: CPT | Mod: 26,LT

## 2025-01-01 PROCEDURE — 86850 RBC ANTIBODY SCREEN: CPT

## 2025-01-01 PROCEDURE — 80162 ASSAY OF DIGOXIN TOTAL: CPT

## 2025-01-01 PROCEDURE — 83735 ASSAY OF MAGNESIUM: CPT

## 2025-01-01 PROCEDURE — 85014 HEMATOCRIT: CPT

## 2025-01-01 PROCEDURE — 84100 ASSAY OF PHOSPHORUS: CPT

## 2025-01-01 PROCEDURE — 73030 X-RAY EXAM OF SHOULDER: CPT | Mod: 26,LT

## 2025-01-01 PROCEDURE — 93971 EXTREMITY STUDY: CPT | Mod: LT

## 2025-01-01 PROCEDURE — 87449 NOS EACH ORGANISM AG IA: CPT

## 2025-01-01 PROCEDURE — 84300 ASSAY OF URINE SODIUM: CPT

## 2025-01-01 PROCEDURE — 85025 COMPLETE CBC W/AUTO DIFF WBC: CPT

## 2025-01-01 PROCEDURE — 83615 LACTATE (LD) (LDH) ENZYME: CPT

## 2025-01-01 PROCEDURE — 82607 VITAMIN B-12: CPT

## 2025-01-01 PROCEDURE — 81001 URINALYSIS AUTO W/SCOPE: CPT

## 2025-01-01 PROCEDURE — 93306 TTE W/DOPPLER COMPLETE: CPT

## 2025-01-01 PROCEDURE — 84132 ASSAY OF SERUM POTASSIUM: CPT

## 2025-01-01 PROCEDURE — P9047: CPT | Mod: JZ

## 2025-01-01 PROCEDURE — 73080 X-RAY EXAM OF ELBOW: CPT | Mod: 26,LT

## 2025-01-01 PROCEDURE — 84145 PROCALCITONIN (PCT): CPT

## 2025-01-01 PROCEDURE — 82746 ASSAY OF FOLIC ACID SERUM: CPT

## 2025-01-01 PROCEDURE — 84295 ASSAY OF SERUM SODIUM: CPT

## 2025-01-01 PROCEDURE — 85018 HEMOGLOBIN: CPT

## 2025-01-01 PROCEDURE — 73130 X-RAY EXAM OF HAND: CPT | Mod: LT

## 2025-01-01 PROCEDURE — 83605 ASSAY OF LACTIC ACID: CPT

## 2025-01-01 PROCEDURE — 71045 X-RAY EXAM CHEST 1 VIEW: CPT | Mod: 26,76

## 2025-01-01 PROCEDURE — 87086 URINE CULTURE/COLONY COUNT: CPT

## 2025-01-01 PROCEDURE — 99221 1ST HOSP IP/OBS SF/LOW 40: CPT

## 2025-01-01 PROCEDURE — 87640 STAPH A DNA AMP PROBE: CPT

## 2025-01-01 PROCEDURE — 93970 EXTREMITY STUDY: CPT

## 2025-01-01 PROCEDURE — 84443 ASSAY THYROID STIM HORMONE: CPT

## 2025-01-01 PROCEDURE — 71045 X-RAY EXAM CHEST 1 VIEW: CPT | Mod: 26,77

## 2025-01-01 PROCEDURE — 70498 CT ANGIOGRAPHY NECK: CPT | Mod: 26

## 2025-01-01 PROCEDURE — 82803 BLOOD GASES ANY COMBINATION: CPT

## 2025-01-01 PROCEDURE — 99233 SBSQ HOSP IP/OBS HIGH 50: CPT | Mod: GC

## 2025-01-01 PROCEDURE — 82010 KETONE BODYS QUAN: CPT

## 2025-01-01 PROCEDURE — 76770 US EXAM ABDO BACK WALL COMP: CPT

## 2025-01-01 PROCEDURE — 87641 MR-STAPH DNA AMP PROBE: CPT

## 2025-01-01 PROCEDURE — 73130 X-RAY EXAM OF HAND: CPT | Mod: 26,LT

## 2025-01-01 PROCEDURE — 76770 US EXAM ABDO BACK WALL COMP: CPT | Mod: 26

## 2025-01-01 PROCEDURE — 83540 ASSAY OF IRON: CPT

## 2025-01-01 PROCEDURE — 83550 IRON BINDING TEST: CPT

## 2025-01-01 PROCEDURE — 82962 GLUCOSE BLOOD TEST: CPT

## 2025-01-01 PROCEDURE — 82570 ASSAY OF URINE CREATININE: CPT

## 2025-01-01 PROCEDURE — 99497 ADVNCD CARE PLAN 30 MIN: CPT

## 2025-01-01 PROCEDURE — 76937 US GUIDE VASCULAR ACCESS: CPT | Mod: 26

## 2025-01-01 PROCEDURE — 80048 BASIC METABOLIC PNL TOTAL CA: CPT

## 2025-01-01 PROCEDURE — 70450 CT HEAD/BRAIN W/O DYE: CPT | Mod: 26,XU

## 2025-01-01 PROCEDURE — 71045 X-RAY EXAM CHEST 1 VIEW: CPT

## 2025-01-01 PROCEDURE — 70496 CT ANGIOGRAPHY HEAD: CPT | Mod: 26

## 2025-01-01 PROCEDURE — 36410 VNPNXR 3YR/> PHY/QHP DX/THER: CPT

## 2025-01-01 PROCEDURE — 87324 CLOSTRIDIUM AG IA: CPT

## 2025-01-01 PROCEDURE — 80053 COMPREHEN METABOLIC PANEL: CPT

## 2025-01-01 PROCEDURE — 36415 COLL VENOUS BLD VENIPUNCTURE: CPT

## 2025-01-01 PROCEDURE — 84156 ASSAY OF PROTEIN URINE: CPT

## 2025-01-01 PROCEDURE — 99233 SBSQ HOSP IP/OBS HIGH 50: CPT | Mod: 25

## 2025-01-01 PROCEDURE — 82728 ASSAY OF FERRITIN: CPT

## 2025-01-01 PROCEDURE — 82330 ASSAY OF CALCIUM: CPT

## 2025-01-01 PROCEDURE — 93306 TTE W/DOPPLER COMPLETE: CPT | Mod: 26

## 2025-01-01 PROCEDURE — 74177 CT ABD & PELVIS W/CONTRAST: CPT | Mod: 26

## 2025-01-01 RX ORDER — SODIUM CHLORIDE 9 G/1000ML
1000 INJECTION, SOLUTION INTRAVENOUS
Refills: 0 | Status: DISCONTINUED | OUTPATIENT
Start: 2025-01-01 | End: 2025-01-01

## 2025-01-01 RX ORDER — METOPROLOL SUCCINATE 50 MG/1
2.5 TABLET, EXTENDED RELEASE ORAL ONCE
Refills: 0 | Status: COMPLETED | OUTPATIENT
Start: 2025-01-01 | End: 2025-01-01

## 2025-01-01 RX ORDER — NOREPINEPHRINE BITARTRATE 8 MG
0.05 SOLUTION INTRAVENOUS
Qty: 8 | Refills: 0 | Status: DISCONTINUED | OUTPATIENT
Start: 2025-01-01 | End: 2025-01-01

## 2025-01-01 RX ORDER — APIXABAN 2.5 MG/1
2.5 TABLET, FILM COATED ORAL EVERY 12 HOURS
Refills: 0 | Status: DISCONTINUED | OUTPATIENT
Start: 2025-01-01 | End: 2025-01-01

## 2025-01-01 RX ORDER — SODIUM CHLORIDE 9 G/1000ML
500 INJECTION, SOLUTION INTRAVENOUS ONCE
Refills: 0 | Status: COMPLETED | OUTPATIENT
Start: 2025-01-01 | End: 2025-01-01

## 2025-01-01 RX ORDER — CEFEPIME 2 G/20ML
1000 INJECTION, POWDER, FOR SOLUTION INTRAVENOUS EVERY 12 HOURS
Refills: 0 | Status: DISCONTINUED | OUTPATIENT
Start: 2025-01-01 | End: 2025-01-01

## 2025-01-01 RX ORDER — INFLUENZA A VIRUS A/IDAHO/07/2018 (H1N1) ANTIGEN (MDCK CELL DERIVED, PROPIOLACTONE INACTIVATED, INFLUENZA A VIRUS A/INDIANA/08/2018 (H3N2) ANTIGEN (MDCK CELL DERIVED, PROPIOLACTONE INACTIVATED), INFLUENZA B VIRUS B/SINGAPORE/INFTT-16-0610/2016 ANTIGEN (MDCK CELL DERIVED, PROPIOLACTONE INACTIVATED), INFLUENZA B VIRUS B/IOWA/06/2017 ANTIGEN (MDCK CELL DERIVED, PROPIOLACTONE INACTIVATED) 15; 15; 15; 15 UG/.5ML; UG/.5ML; UG/.5ML; UG/.5ML
0.5 INJECTION, SUSPENSION INTRAMUSCULAR ONCE
Refills: 0 | Status: COMPLETED | OUTPATIENT
Start: 2025-01-01 | End: 2025-01-01

## 2025-01-01 RX ORDER — MELATONIN 5 MG
3 TABLET ORAL AT BEDTIME
Refills: 0 | Status: DISCONTINUED | OUTPATIENT
Start: 2025-01-01 | End: 2025-01-01

## 2025-01-01 RX ORDER — NITROFURANTOIN MACROCRYSTAL 100 MG
1 CAPSULE ORAL
Refills: 0 | DISCHARGE

## 2025-01-01 RX ORDER — METOPROLOL SUCCINATE 50 MG/1
25 TABLET, EXTENDED RELEASE ORAL THREE TIMES A DAY
Refills: 0 | Status: DISCONTINUED | OUTPATIENT
Start: 2025-01-01 | End: 2025-01-01

## 2025-01-01 RX ORDER — CEFEPIME 2 G/20ML
2000 INJECTION, POWDER, FOR SOLUTION INTRAVENOUS ONCE
Refills: 0 | Status: COMPLETED | OUTPATIENT
Start: 2025-01-01 | End: 2025-01-01

## 2025-01-01 RX ORDER — DIGOXIN 250 UG/1
62.5 TABLET ORAL EVERY OTHER DAY
Refills: 0 | Status: DISCONTINUED | OUTPATIENT
Start: 2025-01-01 | End: 2025-01-01

## 2025-01-01 RX ORDER — DIGOXIN 250 UG/1
125 TABLET ORAL EVERY 8 HOURS
Refills: 0 | Status: COMPLETED | OUTPATIENT
Start: 2025-01-01 | End: 2025-01-01

## 2025-01-01 RX ORDER — METOPROLOL SUCCINATE 50 MG/1
25 TABLET, EXTENDED RELEASE ORAL ONCE
Refills: 0 | Status: COMPLETED | OUTPATIENT
Start: 2025-01-01 | End: 2025-01-01

## 2025-01-01 RX ORDER — MIDODRINE HYDROCHLORIDE 5 MG/1
5 TABLET ORAL EVERY 8 HOURS
Refills: 0 | Status: DISCONTINUED | OUTPATIENT
Start: 2025-01-01 | End: 2025-01-01

## 2025-01-01 RX ORDER — DIGOXIN 250 UG/1
250 TABLET ORAL DAILY
Refills: 0 | Status: DISCONTINUED | OUTPATIENT
Start: 2025-01-01 | End: 2025-01-01

## 2025-01-01 RX ORDER — IPRATROPIUM BROMIDE AND ALBUTEROL SULFATE .5; 2.5 MG/3ML; MG/3ML
3 SOLUTION RESPIRATORY (INHALATION) EVERY 6 HOURS
Refills: 0 | Status: DISCONTINUED | OUTPATIENT
Start: 2025-01-01 | End: 2025-01-01

## 2025-01-01 RX ORDER — SODIUM CHLORIDE 9 G/1000ML
250 INJECTION, SOLUTION INTRAVENOUS ONCE
Refills: 0 | Status: COMPLETED | OUTPATIENT
Start: 2025-01-01 | End: 2025-01-01

## 2025-01-01 RX ORDER — SODIUM ZIRCONIUM CYCLOSILICATE 5 G/5G
10 POWDER, FOR SUSPENSION ORAL ONCE
Refills: 0 | Status: COMPLETED | OUTPATIENT
Start: 2025-01-01 | End: 2025-01-01

## 2025-01-01 RX ORDER — METOPROLOL SUCCINATE 50 MG/1
25 TABLET, EXTENDED RELEASE ORAL DAILY
Refills: 0 | Status: DISCONTINUED | OUTPATIENT
Start: 2025-01-01 | End: 2025-01-01

## 2025-01-01 RX ORDER — ACETAMINOPHEN 500 MG/5ML
1000 LIQUID (ML) ORAL ONCE
Refills: 0 | Status: COMPLETED | OUTPATIENT
Start: 2025-01-01 | End: 2025-01-01

## 2025-01-01 RX ORDER — HALOPERIDOL 10 MG/1
0.5 TABLET ORAL
Refills: 0 | Status: DISCONTINUED | OUTPATIENT
Start: 2025-01-01 | End: 2025-01-01

## 2025-01-01 RX ORDER — ENOXAPARIN SODIUM 100 MG/ML
60 INJECTION SUBCUTANEOUS EVERY 24 HOURS
Refills: 0 | Status: DISCONTINUED | OUTPATIENT
Start: 2025-01-01 | End: 2025-01-01

## 2025-01-01 RX ORDER — HALOPERIDOL 10 MG/1
0.5 TABLET ORAL AT BEDTIME
Refills: 0 | Status: DISCONTINUED | OUTPATIENT
Start: 2025-01-01 | End: 2025-01-01

## 2025-01-01 RX ORDER — MIDODRINE HYDROCHLORIDE 5 MG/1
15 TABLET ORAL EVERY 8 HOURS
Refills: 0 | Status: DISCONTINUED | OUTPATIENT
Start: 2025-01-01 | End: 2025-01-01

## 2025-01-01 RX ORDER — SODIUM CHLORIDE 9 G/1000ML
500 INJECTION, SOLUTION INTRAVENOUS
Refills: 0 | Status: DISCONTINUED | OUTPATIENT
Start: 2025-01-01 | End: 2025-01-01

## 2025-01-01 RX ORDER — ACETAMINOPHEN 500 MG/5ML
650 LIQUID (ML) ORAL EVERY 6 HOURS
Refills: 0 | Status: DISCONTINUED | OUTPATIENT
Start: 2025-01-01 | End: 2025-01-01

## 2025-01-01 RX ORDER — PHENYLEPHRINE HCL IN 0.9% NACL 0.5 MG/5ML
0.1 SYRINGE (ML) INTRAVENOUS
Qty: 40 | Refills: 0 | Status: DISCONTINUED | OUTPATIENT
Start: 2025-01-01 | End: 2025-01-01

## 2025-01-01 RX ORDER — METOPROLOL SUCCINATE 50 MG/1
5 TABLET, EXTENDED RELEASE ORAL ONCE
Refills: 0 | Status: COMPLETED | OUTPATIENT
Start: 2025-01-01 | End: 2025-01-01

## 2025-01-01 RX ORDER — VANCOMYCIN HCL IN 5 % DEXTROSE 1.5G/250ML
125 PLASTIC BAG, INJECTION (ML) INTRAVENOUS EVERY 6 HOURS
Refills: 0 | Status: DISCONTINUED | OUTPATIENT
Start: 2025-01-01 | End: 2025-01-01

## 2025-01-01 RX ORDER — ALBUMIN (HUMAN) 12.5 G/50ML
100 INJECTION, SOLUTION INTRAVENOUS ONCE
Refills: 0 | Status: COMPLETED | OUTPATIENT
Start: 2025-01-01 | End: 2025-01-01

## 2025-01-01 RX ORDER — SODIUM BICARBONATE 1 MEQ/ML
0.24 SYRINGE (ML) INTRAVENOUS
Qty: 150 | Refills: 0 | Status: DISCONTINUED | OUTPATIENT
Start: 2025-01-01 | End: 2025-01-01

## 2025-01-01 RX ORDER — MIDODRINE HYDROCHLORIDE 5 MG/1
10 TABLET ORAL EVERY 8 HOURS
Refills: 0 | Status: DISCONTINUED | OUTPATIENT
Start: 2025-01-01 | End: 2025-01-01

## 2025-01-01 RX ORDER — METOPROLOL SUCCINATE 50 MG/1
2.5 TABLET, EXTENDED RELEASE ORAL EVERY 6 HOURS
Refills: 0 | Status: DISCONTINUED | OUTPATIENT
Start: 2025-01-01 | End: 2025-01-01

## 2025-01-01 RX ORDER — LINEZOLID 2 MG/ML
600 INJECTION, SOLUTION INTRAVENOUS EVERY 12 HOURS
Refills: 0 | Status: DISCONTINUED | OUTPATIENT
Start: 2025-01-01 | End: 2025-01-01

## 2025-01-01 RX ORDER — LINEZOLID 2 MG/ML
INJECTION, SOLUTION INTRAVENOUS
Refills: 0 | Status: DISCONTINUED | OUTPATIENT
Start: 2025-01-01 | End: 2025-01-01

## 2025-01-01 RX ORDER — SILVER SULFADIAZINE 1 %
1 CREAM (GRAM) TOPICAL
Refills: 0 | Status: DISCONTINUED | OUTPATIENT
Start: 2025-01-01 | End: 2025-01-01

## 2025-01-01 RX ORDER — DIGOXIN 250 UG/1
250 TABLET ORAL ONCE
Refills: 0 | Status: COMPLETED | OUTPATIENT
Start: 2025-01-01 | End: 2025-01-01

## 2025-01-01 RX ORDER — MIDODRINE HYDROCHLORIDE 5 MG/1
2 TABLET ORAL
Refills: 0 | DISCHARGE

## 2025-01-01 RX ORDER — B1/B2/B3/B5/B6/B12/VIT C/FOLIC 500-0.5 MG
1 TABLET ORAL DAILY
Refills: 0 | Status: DISCONTINUED | OUTPATIENT
Start: 2025-01-01 | End: 2025-01-01

## 2025-01-01 RX ORDER — MAGNESIUM, ALUMINUM HYDROXIDE 200-200 MG
30 TABLET,CHEWABLE ORAL EVERY 4 HOURS
Refills: 0 | Status: DISCONTINUED | OUTPATIENT
Start: 2025-01-01 | End: 2025-01-01

## 2025-01-01 RX ORDER — LINEZOLID 2 MG/ML
600 INJECTION, SOLUTION INTRAVENOUS ONCE
Refills: 0 | Status: COMPLETED | OUTPATIENT
Start: 2025-01-01 | End: 2025-01-01

## 2025-01-01 RX ORDER — ONDANSETRON HCL/PF 4 MG/2 ML
4 VIAL (ML) INJECTION EVERY 8 HOURS
Refills: 0 | Status: DISCONTINUED | OUTPATIENT
Start: 2025-01-01 | End: 2025-01-01

## 2025-01-01 RX ORDER — MIDODRINE HYDROCHLORIDE 5 MG/1
5 TABLET ORAL ONCE
Refills: 0 | Status: COMPLETED | OUTPATIENT
Start: 2025-01-01 | End: 2025-01-01

## 2025-01-01 RX ORDER — LACTOBACILLUS ACIDOPHILUS/PECT 75 MM-100
1 CAPSULE ORAL
Refills: 0 | Status: DISCONTINUED | OUTPATIENT
Start: 2025-01-01 | End: 2025-01-01

## 2025-01-01 RX ORDER — MIDODRINE HYDROCHLORIDE 5 MG/1
20 TABLET ORAL EVERY 8 HOURS
Refills: 0 | Status: DISCONTINUED | OUTPATIENT
Start: 2025-01-01 | End: 2025-01-01

## 2025-01-01 RX ORDER — VANCOMYCIN HCL IN 5 % DEXTROSE 1.5G/250ML
1000 PLASTIC BAG, INJECTION (ML) INTRAVENOUS ONCE
Refills: 0 | Status: COMPLETED | OUTPATIENT
Start: 2025-01-01 | End: 2025-01-01

## 2025-01-01 RX ORDER — TAMSULOSIN HYDROCHLORIDE 0.4 MG/1
0.8 CAPSULE ORAL AT BEDTIME
Refills: 0 | Status: DISCONTINUED | OUTPATIENT
Start: 2025-01-01 | End: 2025-01-01

## 2025-01-01 RX ORDER — APIXABAN 2.5 MG/1
5 TABLET, FILM COATED ORAL EVERY 12 HOURS
Refills: 0 | Status: DISCONTINUED | OUTPATIENT
Start: 2025-01-01 | End: 2025-01-01

## 2025-01-01 RX ORDER — SODIUM CHLORIDE 9 G/1000ML
250 INJECTION, SOLUTION INTRAVENOUS ONCE
Refills: 0 | Status: DISCONTINUED | OUTPATIENT
Start: 2025-01-01 | End: 2025-01-01

## 2025-01-01 RX ORDER — SODIUM BICARBONATE 1 MEQ/ML
1300 SYRINGE (ML) INTRAVENOUS THREE TIMES A DAY
Refills: 0 | Status: DISCONTINUED | OUTPATIENT
Start: 2025-01-01 | End: 2025-01-01

## 2025-01-01 RX ORDER — LORAZEPAM 4 MG/ML
0.5 VIAL (ML) INJECTION
Refills: 0 | Status: DISCONTINUED | OUTPATIENT
Start: 2025-01-01 | End: 2025-01-01

## 2025-01-01 RX ORDER — CEFEPIME 2 G/20ML
2000 INJECTION, POWDER, FOR SOLUTION INTRAVENOUS EVERY 12 HOURS
Refills: 0 | Status: DISCONTINUED | OUTPATIENT
Start: 2025-01-01 | End: 2025-01-01

## 2025-01-01 RX ORDER — ACETAMINOPHEN 500 MG/5ML
975 LIQUID (ML) ORAL ONCE
Refills: 0 | Status: COMPLETED | OUTPATIENT
Start: 2025-01-01 | End: 2025-01-01

## 2025-01-01 RX ORDER — SODIUM CHLORIDE 9 G/1000ML
2000 INJECTION, SOLUTION INTRAVENOUS ONCE
Refills: 0 | Status: COMPLETED | OUTPATIENT
Start: 2025-01-01 | End: 2025-01-01

## 2025-01-01 RX ORDER — METOPROLOL SUCCINATE 50 MG/1
25 TABLET, EXTENDED RELEASE ORAL EVERY 12 HOURS
Refills: 0 | Status: DISCONTINUED | OUTPATIENT
Start: 2025-01-01 | End: 2025-01-01

## 2025-01-01 RX ORDER — APIXABAN 2.5 MG/1
1 TABLET, FILM COATED ORAL
Refills: 0 | DISCHARGE

## 2025-01-01 RX ORDER — ENOXAPARIN SODIUM 100 MG/ML
60 INJECTION SUBCUTANEOUS EVERY 12 HOURS
Refills: 0 | Status: DISCONTINUED | OUTPATIENT
Start: 2025-01-01 | End: 2025-01-01

## 2025-01-01 RX ADMIN — APIXABAN 2.5 MILLIGRAM(S): 2.5 TABLET, FILM COATED ORAL at 18:32

## 2025-01-01 RX ADMIN — Medication 1 APPLICATION(S): at 06:01

## 2025-01-01 RX ADMIN — SODIUM CHLORIDE 75 MILLILITER(S): 9 INJECTION, SOLUTION INTRAVENOUS at 13:31

## 2025-01-01 RX ADMIN — Medication 400 MILLIGRAM(S): at 06:40

## 2025-01-01 RX ADMIN — Medication 1 APPLICATION(S): at 06:16

## 2025-01-01 RX ADMIN — Medication 1 APPLICATION(S): at 17:53

## 2025-01-01 RX ADMIN — Medication 1 TABLET(S): at 12:28

## 2025-01-01 RX ADMIN — SODIUM CHLORIDE 60 MILLILITER(S): 9 INJECTION, SOLUTION INTRAVENOUS at 09:00

## 2025-01-01 RX ADMIN — Medication 400 MILLIGRAM(S): at 20:31

## 2025-01-01 RX ADMIN — Medication 1300 MILLIGRAM(S): at 05:55

## 2025-01-01 RX ADMIN — Medication 1 APPLICATION(S): at 05:09

## 2025-01-01 RX ADMIN — METOPROLOL SUCCINATE 25 MILLIGRAM(S): 50 TABLET, EXTENDED RELEASE ORAL at 05:07

## 2025-01-01 RX ADMIN — CEFEPIME 100 MILLIGRAM(S): 2 INJECTION, POWDER, FOR SOLUTION INTRAVENOUS at 17:50

## 2025-01-01 RX ADMIN — Medication 650 MILLIGRAM(S): at 18:29

## 2025-01-01 RX ADMIN — ENOXAPARIN SODIUM 60 MILLIGRAM(S): 100 INJECTION SUBCUTANEOUS at 19:02

## 2025-01-01 RX ADMIN — Medication 40 MILLIGRAM(S): at 08:30

## 2025-01-01 RX ADMIN — DIGOXIN 125 MICROGRAM(S): 250 TABLET ORAL at 02:53

## 2025-01-01 RX ADMIN — MIDODRINE HYDROCHLORIDE 5 MILLIGRAM(S): 5 TABLET ORAL at 21:47

## 2025-01-01 RX ADMIN — METOPROLOL SUCCINATE 25 MILLIGRAM(S): 50 TABLET, EXTENDED RELEASE ORAL at 09:47

## 2025-01-01 RX ADMIN — Medication 2.38 MICROGRAM(S)/KG/MIN: at 08:21

## 2025-01-01 RX ADMIN — MIDODRINE HYDROCHLORIDE 10 MILLIGRAM(S): 5 TABLET ORAL at 14:22

## 2025-01-01 RX ADMIN — Medication 1000 MILLIGRAM(S): at 07:53

## 2025-01-01 RX ADMIN — LINEZOLID 600 MILLIGRAM(S): 2 INJECTION, SOLUTION INTRAVENOUS at 18:32

## 2025-01-01 RX ADMIN — ENOXAPARIN SODIUM 60 MILLIGRAM(S): 100 INJECTION SUBCUTANEOUS at 05:06

## 2025-01-01 RX ADMIN — MIDODRINE HYDROCHLORIDE 15 MILLIGRAM(S): 5 TABLET ORAL at 13:25

## 2025-01-01 RX ADMIN — Medication 1 TABLET(S): at 11:37

## 2025-01-01 RX ADMIN — METOPROLOL SUCCINATE 2.5 MILLIGRAM(S): 50 TABLET, EXTENDED RELEASE ORAL at 11:20

## 2025-01-01 RX ADMIN — Medication 1300 MILLIGRAM(S): at 09:53

## 2025-01-01 RX ADMIN — CEFEPIME 100 MILLIGRAM(S): 2 INJECTION, POWDER, FOR SOLUTION INTRAVENOUS at 18:33

## 2025-01-01 RX ADMIN — LINEZOLID 600 MILLIGRAM(S): 2 INJECTION, SOLUTION INTRAVENOUS at 11:37

## 2025-01-01 RX ADMIN — Medication 1 APPLICATION(S): at 05:10

## 2025-01-01 RX ADMIN — APIXABAN 2.5 MILLIGRAM(S): 2.5 TABLET, FILM COATED ORAL at 06:22

## 2025-01-01 RX ADMIN — Medication 1000 MILLIGRAM(S): at 12:23

## 2025-01-01 RX ADMIN — METOPROLOL SUCCINATE 25 MILLIGRAM(S): 50 TABLET, EXTENDED RELEASE ORAL at 16:23

## 2025-01-01 RX ADMIN — Medication 100 MEQ/KG/HR: at 12:22

## 2025-01-01 RX ADMIN — METOPROLOL SUCCINATE 25 MILLIGRAM(S): 50 TABLET, EXTENDED RELEASE ORAL at 20:56

## 2025-01-01 RX ADMIN — Medication 40 MILLIGRAM(S): at 05:07

## 2025-01-01 RX ADMIN — CEFEPIME 100 MILLIGRAM(S): 2 INJECTION, POWDER, FOR SOLUTION INTRAVENOUS at 05:11

## 2025-01-01 RX ADMIN — METOPROLOL SUCCINATE 25 MILLIGRAM(S): 50 TABLET, EXTENDED RELEASE ORAL at 05:57

## 2025-01-01 RX ADMIN — METOPROLOL SUCCINATE 25 MILLIGRAM(S): 50 TABLET, EXTENDED RELEASE ORAL at 05:11

## 2025-01-01 RX ADMIN — MIDODRINE HYDROCHLORIDE 10 MILLIGRAM(S): 5 TABLET ORAL at 21:50

## 2025-01-01 RX ADMIN — Medication 50 MILLIEQUIVALENT(S): at 10:10

## 2025-01-01 RX ADMIN — Medication 650 MILLIGRAM(S): at 18:59

## 2025-01-01 RX ADMIN — METOPROLOL SUCCINATE 25 MILLIGRAM(S): 50 TABLET, EXTENDED RELEASE ORAL at 22:39

## 2025-01-01 RX ADMIN — DIGOXIN 125 MICROGRAM(S): 250 TABLET ORAL at 18:36

## 2025-01-01 RX ADMIN — Medication 1300 MILLIGRAM(S): at 21:25

## 2025-01-01 RX ADMIN — Medication 1 APPLICATION(S): at 06:36

## 2025-01-01 RX ADMIN — Medication 1 TABLET(S): at 12:29

## 2025-01-01 RX ADMIN — CEFEPIME 100 MILLIGRAM(S): 2 INJECTION, POWDER, FOR SOLUTION INTRAVENOUS at 05:57

## 2025-01-01 RX ADMIN — METOPROLOL SUCCINATE 2.5 MILLIGRAM(S): 50 TABLET, EXTENDED RELEASE ORAL at 18:18

## 2025-01-01 RX ADMIN — CEFEPIME 100 MILLIGRAM(S): 2 INJECTION, POWDER, FOR SOLUTION INTRAVENOUS at 06:21

## 2025-01-01 RX ADMIN — Medication 1 APPLICATION(S): at 18:35

## 2025-01-01 RX ADMIN — Medication 1 APPLICATION(S): at 06:23

## 2025-01-01 RX ADMIN — METOPROLOL SUCCINATE 25 MILLIGRAM(S): 50 TABLET, EXTENDED RELEASE ORAL at 06:21

## 2025-01-01 RX ADMIN — Medication 125 MILLIGRAM(S): at 11:26

## 2025-01-01 RX ADMIN — SODIUM CHLORIDE 2000 MILLILITER(S): 9 INJECTION, SOLUTION INTRAVENOUS at 13:00

## 2025-01-01 RX ADMIN — SODIUM CHLORIDE 250 MILLILITER(S): 9 INJECTION, SOLUTION INTRAVENOUS at 18:17

## 2025-01-01 RX ADMIN — METOPROLOL SUCCINATE 25 MILLIGRAM(S): 50 TABLET, EXTENDED RELEASE ORAL at 21:50

## 2025-01-01 RX ADMIN — CEFEPIME 100 MILLIGRAM(S): 2 INJECTION, POWDER, FOR SOLUTION INTRAVENOUS at 17:55

## 2025-01-01 RX ADMIN — MIDODRINE HYDROCHLORIDE 10 MILLIGRAM(S): 5 TABLET ORAL at 05:07

## 2025-01-01 RX ADMIN — METOPROLOL SUCCINATE 2.5 MILLIGRAM(S): 50 TABLET, EXTENDED RELEASE ORAL at 16:59

## 2025-01-01 RX ADMIN — LINEZOLID 300 MILLIGRAM(S): 2 INJECTION, SOLUTION INTRAVENOUS at 06:58

## 2025-01-01 RX ADMIN — MIDODRINE HYDROCHLORIDE 5 MILLIGRAM(S): 5 TABLET ORAL at 22:42

## 2025-01-01 RX ADMIN — ALBUMIN (HUMAN) 50 MILLILITER(S): 12.5 INJECTION, SOLUTION INTRAVENOUS at 09:47

## 2025-01-01 RX ADMIN — Medication 1 APPLICATION(S): at 22:06

## 2025-01-01 RX ADMIN — Medication 1 APPLICATION(S): at 17:28

## 2025-01-01 RX ADMIN — Medication 1300 MILLIGRAM(S): at 13:25

## 2025-01-01 RX ADMIN — MIDODRINE HYDROCHLORIDE 5 MILLIGRAM(S): 5 TABLET ORAL at 16:23

## 2025-01-01 RX ADMIN — LINEZOLID 300 MILLIGRAM(S): 2 INJECTION, SOLUTION INTRAVENOUS at 05:30

## 2025-01-01 RX ADMIN — LINEZOLID 300 MILLIGRAM(S): 2 INJECTION, SOLUTION INTRAVENOUS at 12:44

## 2025-01-01 RX ADMIN — Medication 1 APPLICATION(S): at 05:12

## 2025-01-01 RX ADMIN — Medication 2.38 MICROGRAM(S)/KG/MIN: at 21:25

## 2025-01-01 RX ADMIN — SODIUM ZIRCONIUM CYCLOSILICATE 10 GRAM(S): 5 POWDER, FOR SUSPENSION ORAL at 08:30

## 2025-01-01 RX ADMIN — MIDODRINE HYDROCHLORIDE 10 MILLIGRAM(S): 5 TABLET ORAL at 15:06

## 2025-01-01 RX ADMIN — MIDODRINE HYDROCHLORIDE 10 MILLIGRAM(S): 5 TABLET ORAL at 22:05

## 2025-01-01 RX ADMIN — APIXABAN 2.5 MILLIGRAM(S): 2.5 TABLET, FILM COATED ORAL at 05:09

## 2025-01-01 RX ADMIN — LINEZOLID 300 MILLIGRAM(S): 2 INJECTION, SOLUTION INTRAVENOUS at 17:52

## 2025-01-01 RX ADMIN — CEFEPIME 100 MILLIGRAM(S): 2 INJECTION, POWDER, FOR SOLUTION INTRAVENOUS at 17:32

## 2025-01-01 RX ADMIN — Medication 1000 MILLIGRAM(S): at 13:22

## 2025-01-01 RX ADMIN — Medication 1 APPLICATION(S): at 18:18

## 2025-01-01 RX ADMIN — Medication 975 MILLIGRAM(S): at 11:30

## 2025-01-01 RX ADMIN — CEFEPIME 100 MILLIGRAM(S): 2 INJECTION, POWDER, FOR SOLUTION INTRAVENOUS at 05:07

## 2025-01-01 RX ADMIN — METOPROLOL SUCCINATE 2.5 MILLIGRAM(S): 50 TABLET, EXTENDED RELEASE ORAL at 19:13

## 2025-01-01 RX ADMIN — Medication 1 APPLICATION(S): at 05:57

## 2025-01-01 RX ADMIN — METOPROLOL SUCCINATE 25 MILLIGRAM(S): 50 TABLET, EXTENDED RELEASE ORAL at 21:34

## 2025-01-01 RX ADMIN — METOPROLOL SUCCINATE 2.5 MILLIGRAM(S): 50 TABLET, EXTENDED RELEASE ORAL at 20:27

## 2025-01-01 RX ADMIN — Medication 1300 MILLIGRAM(S): at 06:23

## 2025-01-01 RX ADMIN — CEFEPIME 100 MILLIGRAM(S): 2 INJECTION, POWDER, FOR SOLUTION INTRAVENOUS at 12:00

## 2025-01-01 RX ADMIN — Medication 1 APPLICATION(S): at 06:15

## 2025-01-01 RX ADMIN — Medication 650 MILLIGRAM(S): at 05:19

## 2025-01-01 RX ADMIN — Medication 1 APPLICATION(S): at 17:35

## 2025-01-01 RX ADMIN — Medication 1000 MILLIGRAM(S): at 10:00

## 2025-01-01 RX ADMIN — CEFEPIME 100 MILLIGRAM(S): 2 INJECTION, POWDER, FOR SOLUTION INTRAVENOUS at 06:14

## 2025-01-01 RX ADMIN — Medication 1 TABLET(S): at 09:10

## 2025-01-01 RX ADMIN — MIDODRINE HYDROCHLORIDE 5 MILLIGRAM(S): 5 TABLET ORAL at 05:58

## 2025-01-01 RX ADMIN — SODIUM CHLORIDE 500 MILLILITER(S): 9 INJECTION, SOLUTION INTRAVENOUS at 09:35

## 2025-01-01 RX ADMIN — METOPROLOL SUCCINATE 25 MILLIGRAM(S): 50 TABLET, EXTENDED RELEASE ORAL at 05:55

## 2025-01-01 RX ADMIN — APIXABAN 2.5 MILLIGRAM(S): 2.5 TABLET, FILM COATED ORAL at 17:32

## 2025-01-01 RX ADMIN — METOPROLOL SUCCINATE 25 MILLIGRAM(S): 50 TABLET, EXTENDED RELEASE ORAL at 15:06

## 2025-01-01 RX ADMIN — MIDODRINE HYDROCHLORIDE 5 MILLIGRAM(S): 5 TABLET ORAL at 05:09

## 2025-01-01 RX ADMIN — Medication 1300 MILLIGRAM(S): at 21:34

## 2025-01-01 RX ADMIN — APIXABAN 5 MILLIGRAM(S): 2.5 TABLET, FILM COATED ORAL at 21:56

## 2025-01-01 RX ADMIN — Medication 650 MILLIGRAM(S): at 17:38

## 2025-01-01 RX ADMIN — CEFEPIME 100 MILLIGRAM(S): 2 INJECTION, POWDER, FOR SOLUTION INTRAVENOUS at 17:28

## 2025-01-01 RX ADMIN — METOPROLOL SUCCINATE 25 MILLIGRAM(S): 50 TABLET, EXTENDED RELEASE ORAL at 22:05

## 2025-01-01 RX ADMIN — METOPROLOL SUCCINATE 5 MILLIGRAM(S): 50 TABLET, EXTENDED RELEASE ORAL at 21:44

## 2025-01-01 RX ADMIN — MIDODRINE HYDROCHLORIDE 10 MILLIGRAM(S): 5 TABLET ORAL at 09:37

## 2025-01-01 RX ADMIN — Medication 125 MILLIGRAM(S): at 09:30

## 2025-01-01 RX ADMIN — Medication 1 APPLICATION(S): at 05:11

## 2025-01-01 RX ADMIN — Medication 1 APPLICATION(S): at 05:58

## 2025-01-01 RX ADMIN — SODIUM CHLORIDE 100 MILLILITER(S): 9 INJECTION, SOLUTION INTRAVENOUS at 17:00

## 2025-01-01 RX ADMIN — LINEZOLID 300 MILLIGRAM(S): 2 INJECTION, SOLUTION INTRAVENOUS at 02:24

## 2025-01-01 RX ADMIN — ALBUMIN (HUMAN) 50 MILLILITER(S): 12.5 INJECTION, SOLUTION INTRAVENOUS at 10:10

## 2025-01-01 RX ADMIN — MIDODRINE HYDROCHLORIDE 15 MILLIGRAM(S): 5 TABLET ORAL at 21:34

## 2025-01-01 RX ADMIN — LINEZOLID 300 MILLIGRAM(S): 2 INJECTION, SOLUTION INTRAVENOUS at 18:16

## 2025-01-01 RX ADMIN — NOREPINEPHRINE BITARTRATE 5.95 MICROGRAM(S)/KG/MIN: 8 SOLUTION at 20:01

## 2025-01-01 RX ADMIN — MIDODRINE HYDROCHLORIDE 15 MILLIGRAM(S): 5 TABLET ORAL at 21:25

## 2025-01-01 RX ADMIN — LINEZOLID 300 MILLIGRAM(S): 2 INJECTION, SOLUTION INTRAVENOUS at 06:24

## 2025-01-01 RX ADMIN — SODIUM CHLORIDE 2000 MILLILITER(S): 9 INJECTION, SOLUTION INTRAVENOUS at 11:30

## 2025-01-01 RX ADMIN — Medication 400 MILLIGRAM(S): at 11:00

## 2025-01-01 RX ADMIN — LINEZOLID 300 MILLIGRAM(S): 2 INJECTION, SOLUTION INTRAVENOUS at 17:25

## 2025-01-01 RX ADMIN — Medication 400 MILLIGRAM(S): at 09:47

## 2025-01-01 RX ADMIN — Medication 1 APPLICATION(S): at 17:21

## 2025-01-01 RX ADMIN — Medication 40 MILLIGRAM(S): at 07:14

## 2025-01-01 RX ADMIN — CEFEPIME 100 MILLIGRAM(S): 2 INJECTION, POWDER, FOR SOLUTION INTRAVENOUS at 21:56

## 2025-01-01 RX ADMIN — LINEZOLID 300 MILLIGRAM(S): 2 INJECTION, SOLUTION INTRAVENOUS at 05:54

## 2025-01-01 RX ADMIN — Medication 1 TABLET(S): at 17:32

## 2025-01-01 RX ADMIN — NOREPINEPHRINE BITARTRATE 5.95 MICROGRAM(S)/KG/MIN: 8 SOLUTION at 09:15

## 2025-01-01 RX ADMIN — Medication 1000 MILLIGRAM(S): at 17:06

## 2025-01-01 RX ADMIN — Medication 20 MILLIGRAM(S): at 11:27

## 2025-01-01 RX ADMIN — Medication 1300 MILLIGRAM(S): at 13:43

## 2025-01-01 RX ADMIN — Medication 1000 MILLIGRAM(S): at 14:25

## 2025-01-01 RX ADMIN — LINEZOLID 300 MILLIGRAM(S): 2 INJECTION, SOLUTION INTRAVENOUS at 05:10

## 2025-01-01 RX ADMIN — METOPROLOL SUCCINATE 25 MILLIGRAM(S): 50 TABLET, EXTENDED RELEASE ORAL at 21:26

## 2025-01-01 RX ADMIN — SODIUM CHLORIDE 500 MILLILITER(S): 9 INJECTION, SOLUTION INTRAVENOUS at 08:38

## 2025-01-01 RX ADMIN — Medication 1 APPLICATION(S): at 05:29

## 2025-01-01 RX ADMIN — CEFEPIME 100 MILLIGRAM(S): 2 INJECTION, POWDER, FOR SOLUTION INTRAVENOUS at 05:58

## 2025-01-01 RX ADMIN — Medication 2.38 MICROGRAM(S)/KG/MIN: at 09:38

## 2025-01-01 RX ADMIN — SODIUM CHLORIDE 60 MILLILITER(S): 9 INJECTION, SOLUTION INTRAVENOUS at 23:33

## 2025-01-01 RX ADMIN — DIGOXIN 250 MICROGRAM(S): 250 TABLET ORAL at 11:00

## 2025-01-01 RX ADMIN — CEFEPIME 100 MILLIGRAM(S): 2 INJECTION, POWDER, FOR SOLUTION INTRAVENOUS at 05:54

## 2025-01-01 RX ADMIN — APIXABAN 2.5 MILLIGRAM(S): 2.5 TABLET, FILM COATED ORAL at 09:10

## 2025-01-01 RX ADMIN — METOPROLOL SUCCINATE 2.5 MILLIGRAM(S): 50 TABLET, EXTENDED RELEASE ORAL at 04:56

## 2025-01-01 RX ADMIN — SODIUM CHLORIDE 60 MILLILITER(S): 9 INJECTION, SOLUTION INTRAVENOUS at 22:00

## 2025-01-01 RX ADMIN — Medication 1 TABLET(S): at 09:30

## 2025-01-01 RX ADMIN — MIDODRINE HYDROCHLORIDE 5 MILLIGRAM(S): 5 TABLET ORAL at 13:21

## 2025-01-01 RX ADMIN — CEFEPIME 100 MILLIGRAM(S): 2 INJECTION, POWDER, FOR SOLUTION INTRAVENOUS at 17:21

## 2025-01-01 RX ADMIN — Medication 1 APPLICATION(S): at 05:20

## 2025-01-01 RX ADMIN — NOREPINEPHRINE BITARTRATE 5.95 MICROGRAM(S)/KG/MIN: 8 SOLUTION at 22:10

## 2025-01-01 RX ADMIN — Medication 400 MILLIGRAM(S): at 16:07

## 2025-01-01 RX ADMIN — MIDODRINE HYDROCHLORIDE 5 MILLIGRAM(S): 5 TABLET ORAL at 09:53

## 2025-01-01 RX ADMIN — Medication 1000 MILLIGRAM(S): at 21:01

## 2025-01-01 RX ADMIN — Medication 1 TABLET(S): at 11:26

## 2025-01-01 RX ADMIN — SODIUM CHLORIDE 500 MILLILITER(S): 9 INJECTION, SOLUTION INTRAVENOUS at 09:04

## 2025-01-01 RX ADMIN — METOPROLOL SUCCINATE 2.5 MILLIGRAM(S): 50 TABLET, EXTENDED RELEASE ORAL at 06:35

## 2025-01-01 RX ADMIN — CEFEPIME 100 MILLIGRAM(S): 2 INJECTION, POWDER, FOR SOLUTION INTRAVENOUS at 18:16

## 2025-01-01 RX ADMIN — Medication 1 TABLET(S): at 11:31

## 2025-01-01 RX ADMIN — Medication 1 APPLICATION(S): at 21:35

## 2025-01-01 RX ADMIN — MIDODRINE HYDROCHLORIDE 15 MILLIGRAM(S): 5 TABLET ORAL at 05:55

## 2025-01-01 RX ADMIN — MIDODRINE HYDROCHLORIDE 15 MILLIGRAM(S): 5 TABLET ORAL at 06:21

## 2025-01-01 RX ADMIN — Medication 650 MILLIGRAM(S): at 00:15

## 2025-01-01 RX ADMIN — APIXABAN 2.5 MILLIGRAM(S): 2.5 TABLET, FILM COATED ORAL at 17:55

## 2025-01-01 RX ADMIN — LINEZOLID 300 MILLIGRAM(S): 2 INJECTION, SOLUTION INTRAVENOUS at 17:32

## 2025-01-01 RX ADMIN — MIDODRINE HYDROCHLORIDE 10 MILLIGRAM(S): 5 TABLET ORAL at 05:10

## 2025-01-01 RX ADMIN — SODIUM CHLORIDE 1000 MILLILITER(S): 9 INJECTION, SOLUTION INTRAVENOUS at 12:08

## 2025-01-01 RX ADMIN — ALBUMIN (HUMAN) 50 MILLILITER(S): 12.5 INJECTION, SOLUTION INTRAVENOUS at 12:48

## 2025-01-01 RX ADMIN — MIDODRINE HYDROCHLORIDE 15 MILLIGRAM(S): 5 TABLET ORAL at 13:43

## 2025-01-01 RX ADMIN — CEFEPIME 2000 MILLIGRAM(S): 2 INJECTION, POWDER, FOR SOLUTION INTRAVENOUS at 12:30

## 2025-01-01 RX ADMIN — APIXABAN 2.5 MILLIGRAM(S): 2.5 TABLET, FILM COATED ORAL at 05:57

## 2025-01-01 RX ADMIN — ENOXAPARIN SODIUM 60 MILLIGRAM(S): 100 INJECTION SUBCUTANEOUS at 06:00

## 2025-01-01 RX ADMIN — Medication 650 MILLIGRAM(S): at 21:48

## 2025-01-01 RX ADMIN — METOPROLOL SUCCINATE 2.5 MILLIGRAM(S): 50 TABLET, EXTENDED RELEASE ORAL at 01:09

## 2025-01-01 RX ADMIN — MIDODRINE HYDROCHLORIDE 10 MILLIGRAM(S): 5 TABLET ORAL at 22:10

## 2025-01-01 RX ADMIN — Medication 1 APPLICATION(S): at 06:22

## 2025-01-10 ENCOUNTER — EMERGENCY (EMERGENCY)
Facility: HOSPITAL | Age: 89
LOS: 0 days | Discharge: HOME | End: 2025-01-10
Attending: EMERGENCY MEDICINE
Payer: MEDICARE

## 2025-01-10 VITALS
DIASTOLIC BLOOD PRESSURE: 80 MMHG | RESPIRATION RATE: 18 BRPM | OXYGEN SATURATION: 97 % | HEART RATE: 76 BPM | HEIGHT: 62 IN | WEIGHT: 139.99 LBS | TEMPERATURE: 99 F | SYSTOLIC BLOOD PRESSURE: 121 MMHG

## 2025-01-10 VITALS
DIASTOLIC BLOOD PRESSURE: 73 MMHG | HEART RATE: 63 BPM | OXYGEN SATURATION: 95 % | RESPIRATION RATE: 18 BRPM | SYSTOLIC BLOOD PRESSURE: 108 MMHG

## 2025-01-10 LAB
ALBUMIN SERPL ELPH-MCNC: 3.7 G/DL — SIGNIFICANT CHANGE UP (ref 3.5–5.2)
ALP SERPL-CCNC: 125 U/L — HIGH (ref 30–115)
ALT FLD-CCNC: 6 U/L — SIGNIFICANT CHANGE UP (ref 0–41)
ANION GAP SERPL CALC-SCNC: 13 MMOL/L — SIGNIFICANT CHANGE UP (ref 7–14)
APPEARANCE UR: ABNORMAL
AST SERPL-CCNC: 11 U/L — SIGNIFICANT CHANGE UP (ref 0–41)
BASOPHILS # BLD AUTO: 0.04 K/UL — SIGNIFICANT CHANGE UP (ref 0–0.2)
BASOPHILS NFR BLD AUTO: 0.5 % — SIGNIFICANT CHANGE UP (ref 0–1)
BILIRUB SERPL-MCNC: 1 MG/DL — SIGNIFICANT CHANGE UP (ref 0.2–1.2)
BILIRUB UR-MCNC: ABNORMAL
BUN SERPL-MCNC: 21 MG/DL — HIGH (ref 10–20)
CALCIUM SERPL-MCNC: 9.2 MG/DL — SIGNIFICANT CHANGE UP (ref 8.4–10.5)
CHLORIDE SERPL-SCNC: 97 MMOL/L — LOW (ref 98–110)
CO2 SERPL-SCNC: 25 MMOL/L — SIGNIFICANT CHANGE UP (ref 17–32)
COLOR SPEC: ABNORMAL
CREAT SERPL-MCNC: 1.2 MG/DL — SIGNIFICANT CHANGE UP (ref 0.7–1.5)
DIFF PNL FLD: ABNORMAL
EGFR: 43 ML/MIN/1.73M2 — LOW
EOSINOPHIL # BLD AUTO: 0.08 K/UL — SIGNIFICANT CHANGE UP (ref 0–0.7)
EOSINOPHIL NFR BLD AUTO: 1.1 % — SIGNIFICANT CHANGE UP (ref 0–8)
GAS PNL BLDV: SIGNIFICANT CHANGE UP
GAS PNL BLDV: SIGNIFICANT CHANGE UP
GLUCOSE SERPL-MCNC: 147 MG/DL — HIGH (ref 70–99)
GLUCOSE UR QL: NEGATIVE MG/DL — SIGNIFICANT CHANGE UP
HCT VFR BLD CALC: 34.2 % — LOW (ref 37–47)
HGB BLD-MCNC: 11.4 G/DL — LOW (ref 12–16)
IMM GRANULOCYTES NFR BLD AUTO: 0.5 % — HIGH (ref 0.1–0.3)
KETONES UR-MCNC: NEGATIVE MG/DL — SIGNIFICANT CHANGE UP
LEUKOCYTE ESTERASE UR-ACNC: ABNORMAL
LIDOCAIN IGE QN: 11 U/L — SIGNIFICANT CHANGE UP (ref 7–60)
LYMPHOCYTES # BLD AUTO: 1.66 K/UL — SIGNIFICANT CHANGE UP (ref 1.2–3.4)
LYMPHOCYTES # BLD AUTO: 22.5 % — SIGNIFICANT CHANGE UP (ref 20.5–51.1)
MCHC RBC-ENTMCNC: 31.4 PG — HIGH (ref 27–31)
MCHC RBC-ENTMCNC: 33.3 G/DL — SIGNIFICANT CHANGE UP (ref 32–37)
MCV RBC AUTO: 94.2 FL — SIGNIFICANT CHANGE UP (ref 81–99)
MONOCYTES # BLD AUTO: 0.53 K/UL — SIGNIFICANT CHANGE UP (ref 0.1–0.6)
MONOCYTES NFR BLD AUTO: 7.2 % — SIGNIFICANT CHANGE UP (ref 1.7–9.3)
NEUTROPHILS # BLD AUTO: 5.03 K/UL — SIGNIFICANT CHANGE UP (ref 1.4–6.5)
NEUTROPHILS NFR BLD AUTO: 68.2 % — SIGNIFICANT CHANGE UP (ref 42.2–75.2)
NITRITE UR-MCNC: POSITIVE
NRBC # BLD: 0 /100 WBCS — SIGNIFICANT CHANGE UP (ref 0–0)
PH UR: 6.5 — SIGNIFICANT CHANGE UP (ref 5–8)
PLATELET # BLD AUTO: 235 K/UL — SIGNIFICANT CHANGE UP (ref 130–400)
PMV BLD: 9.7 FL — SIGNIFICANT CHANGE UP (ref 7.4–10.4)
POTASSIUM SERPL-MCNC: 2.4 MMOL/L — CRITICAL LOW (ref 3.5–5)
POTASSIUM SERPL-SCNC: 2.4 MMOL/L — CRITICAL LOW (ref 3.5–5)
PROT SERPL-MCNC: 5.5 G/DL — LOW (ref 6–8)
PROT UR-MCNC: 300 MG/DL
RBC # BLD: 3.63 M/UL — LOW (ref 4.2–5.4)
RBC # FLD: 14.6 % — HIGH (ref 11.5–14.5)
SODIUM SERPL-SCNC: 135 MMOL/L — SIGNIFICANT CHANGE UP (ref 135–146)
SP GR SPEC: 1.01 — SIGNIFICANT CHANGE UP (ref 1–1.03)
TROPONIN T, HIGH SENSITIVITY RESULT: 58 NG/L — CRITICAL HIGH (ref 6–13)
UROBILINOGEN FLD QL: 0.2 MG/DL — SIGNIFICANT CHANGE UP (ref 0.2–1)
WBC # BLD: 7.38 K/UL — SIGNIFICANT CHANGE UP (ref 4.8–10.8)
WBC # FLD AUTO: 7.38 K/UL — SIGNIFICANT CHANGE UP (ref 4.8–10.8)

## 2025-01-10 PROCEDURE — 83605 ASSAY OF LACTIC ACID: CPT

## 2025-01-10 PROCEDURE — 71045 X-RAY EXAM CHEST 1 VIEW: CPT

## 2025-01-10 PROCEDURE — 85018 HEMOGLOBIN: CPT

## 2025-01-10 PROCEDURE — 93005 ELECTROCARDIOGRAM TRACING: CPT

## 2025-01-10 PROCEDURE — 85014 HEMATOCRIT: CPT

## 2025-01-10 PROCEDURE — 87040 BLOOD CULTURE FOR BACTERIA: CPT

## 2025-01-10 PROCEDURE — 36415 COLL VENOUS BLD VENIPUNCTURE: CPT

## 2025-01-10 PROCEDURE — 96375 TX/PRO/DX INJ NEW DRUG ADDON: CPT

## 2025-01-10 PROCEDURE — 93010 ELECTROCARDIOGRAM REPORT: CPT

## 2025-01-10 PROCEDURE — 85025 COMPLETE CBC W/AUTO DIFF WBC: CPT

## 2025-01-10 PROCEDURE — 82962 GLUCOSE BLOOD TEST: CPT

## 2025-01-10 PROCEDURE — 83690 ASSAY OF LIPASE: CPT

## 2025-01-10 PROCEDURE — 84484 ASSAY OF TROPONIN QUANT: CPT

## 2025-01-10 PROCEDURE — 96374 THER/PROPH/DIAG INJ IV PUSH: CPT | Mod: XU

## 2025-01-10 PROCEDURE — 70450 CT HEAD/BRAIN W/O DYE: CPT | Mod: 26

## 2025-01-10 PROCEDURE — 96376 TX/PRO/DX INJ SAME DRUG ADON: CPT

## 2025-01-10 PROCEDURE — 99285 EMERGENCY DEPT VISIT HI MDM: CPT

## 2025-01-10 PROCEDURE — 87086 URINE CULTURE/COLONY COUNT: CPT

## 2025-01-10 PROCEDURE — 82803 BLOOD GASES ANY COMBINATION: CPT

## 2025-01-10 PROCEDURE — 82330 ASSAY OF CALCIUM: CPT

## 2025-01-10 PROCEDURE — 80053 COMPREHEN METABOLIC PANEL: CPT

## 2025-01-10 PROCEDURE — 81001 URINALYSIS AUTO W/SCOPE: CPT

## 2025-01-10 PROCEDURE — 74177 CT ABD & PELVIS W/CONTRAST: CPT | Mod: 26

## 2025-01-10 PROCEDURE — 84295 ASSAY OF SERUM SODIUM: CPT

## 2025-01-10 PROCEDURE — 99285 EMERGENCY DEPT VISIT HI MDM: CPT | Mod: 25

## 2025-01-10 PROCEDURE — 84132 ASSAY OF SERUM POTASSIUM: CPT

## 2025-01-10 PROCEDURE — 74177 CT ABD & PELVIS W/CONTRAST: CPT | Mod: MC

## 2025-01-10 PROCEDURE — 70450 CT HEAD/BRAIN W/O DYE: CPT | Mod: MC

## 2025-01-10 PROCEDURE — 71045 X-RAY EXAM CHEST 1 VIEW: CPT | Mod: 26

## 2025-01-10 RX ORDER — CEFPODOXIME PROXETIL 100 MG/5ML
1 GRANULE, FOR SUSPENSION ORAL
Qty: 20 | Refills: 0
Start: 2025-01-10 | End: 2025-01-19

## 2025-01-10 RX ORDER — POTASSIUM CHLORIDE 600 MG/1
20 TABLET, FILM COATED, EXTENDED RELEASE ORAL ONCE
Refills: 0 | Status: COMPLETED | OUTPATIENT
Start: 2025-01-10 | End: 2025-01-10

## 2025-01-10 RX ORDER — CEFTRIAXONE SODIUM 1 G/1
1000 INJECTION, POWDER, FOR SOLUTION INTRAMUSCULAR; INTRAVENOUS ONCE
Refills: 0 | Status: COMPLETED | OUTPATIENT
Start: 2025-01-10 | End: 2025-01-10

## 2025-01-10 RX ORDER — ONDANSETRON 4 MG/1
1 TABLET ORAL
Qty: 6 | Refills: 0
Start: 2025-01-10 | End: 2025-01-11

## 2025-01-10 RX ORDER — ONDANSETRON 4 MG/1
4 TABLET ORAL ONCE
Refills: 0 | Status: COMPLETED | OUTPATIENT
Start: 2025-01-10 | End: 2025-01-10

## 2025-01-10 RX ORDER — POTASSIUM CHLORIDE 600 MG/1
40 TABLET, FILM COATED, EXTENDED RELEASE ORAL ONCE
Refills: 0 | Status: COMPLETED | OUTPATIENT
Start: 2025-01-10 | End: 2025-01-10

## 2025-01-10 RX ORDER — MAGNESIUM SULFATE 500 MG/ML
2 INJECTION, SOLUTION INTRAMUSCULAR; INTRAVENOUS ONCE
Refills: 0 | Status: COMPLETED | OUTPATIENT
Start: 2025-01-10 | End: 2025-01-10

## 2025-01-10 RX ORDER — POTASSIUM CHLORIDE 600 MG/1
1 TABLET, FILM COATED, EXTENDED RELEASE ORAL
Qty: 2 | Refills: 0
Start: 2025-01-10 | End: 2025-01-10

## 2025-01-10 RX ORDER — SODIUM CHLORIDE 9 MG/ML
1000 INJECTION, SOLUTION INTRAVENOUS ONCE
Refills: 0 | Status: COMPLETED | OUTPATIENT
Start: 2025-01-10 | End: 2025-01-10

## 2025-01-10 RX ADMIN — ONDANSETRON 4 MILLIGRAM(S): 4 TABLET ORAL at 12:54

## 2025-01-10 RX ADMIN — SODIUM CHLORIDE 2000 MILLILITER(S): 9 INJECTION, SOLUTION INTRAVENOUS at 12:55

## 2025-01-10 RX ADMIN — CEFTRIAXONE SODIUM 100 MILLIGRAM(S): 1 INJECTION, POWDER, FOR SOLUTION INTRAMUSCULAR; INTRAVENOUS at 15:10

## 2025-01-10 RX ADMIN — POTASSIUM CHLORIDE 50 MILLIEQUIVALENT(S): 600 TABLET, FILM COATED, EXTENDED RELEASE ORAL at 14:28

## 2025-01-10 RX ADMIN — POTASSIUM CHLORIDE 50 MILLIEQUIVALENT(S): 600 TABLET, FILM COATED, EXTENDED RELEASE ORAL at 17:02

## 2025-01-10 RX ADMIN — MAGNESIUM SULFATE 25 GRAM(S): 500 INJECTION, SOLUTION INTRAMUSCULAR; INTRAVENOUS at 15:11

## 2025-01-10 RX ADMIN — POTASSIUM CHLORIDE 40 MILLIEQUIVALENT(S): 600 TABLET, FILM COATED, EXTENDED RELEASE ORAL at 14:28

## 2025-01-10 NOTE — ED PROVIDER NOTE - PATIENT PORTAL LINK FT
You can access the FollowMyHealth Patient Portal offered by [facility] by registering at the following website: [email or web address]. By joining [facility]’s FollowMyHealth portal, you will also be able to view your health information using other applications (apps) compatible with our system.

## 2025-01-10 NOTE — ED PROVIDER NOTE - PHYSICAL EXAMINATION
GENERAL: Well-developed; well-nourished; in no acute distress. AOx1-2 oriented to herself and place  SKIN: warm, well perfused  HEAD: Normocephalic; atraumatic.  EYES: ocular motions intact and appropriate  ENT: airway clear.  CARD: Regular rate and rhythm.   RESP: LCTAB; No wheezes or crackles  ABD: soft, nontender, and nondistended  Upper EXT: Normal ROM. Rad pulses 2+ symm, sensation intact and symm  Lower EXT: moving b/l LEs  Neuro: no focal deficits. hard of hearing

## 2025-01-10 NOTE — ED PROVIDER NOTE - NSFOLLOWUPINSTRUCTIONS_ED_ALL_ED_FT
Please follow up with your primary care physician and any medical specialist(s) if they were recommended. If you've been prescribed medications, please take your medications as prescribed. Return to the emergency department if your symptoms do not resolve and/or worsen.  ------------------------------------------------------------------------------------------------------------------------  Urinary Tract Infection    A urinary tract infection (UTI) is an infection of any part of the urinary tract, which includes the kidneys, ureters, bladder, and urethra. Risk factors include ignoring your need to urinate, wiping back to front if female, being an uncircumcised male, and having diabetes or a weak immune system. Symptoms include frequent urination, pain or burning with urination, foul smelling urine, cloudy urine, pain in the lower abdomen, blood in the urine, and fever. If you were prescribed an antibiotic medicine, take it as told by your health care provider. Do not stop taking the antibiotic even if you start to feel better.    SEEK IMMEDIATE MEDICAL CARE IF YOU HAVE ANY OF THE FOLLOWING SYMPTOMS: severe back or abdominal pain, fever, inability to keep fluids or medicine down, dizziness/lightheadedness, or a change in mental status.  ------------------------------------------------------------------------------------------------------------------------  Nausea / Vomiting    Nausea is the feeling that you have to vomit. As nausea gets worse, it can lead to vomiting. Vomiting puts you at an increased risk for dehydration. Older adults and people with other diseases or a weak immune system are at higher risk for dehydration. Drink clear fluids in small but frequent amounts as tolerated. Eat bland, easy-to-digest foods in small amounts as tolerated.    SEEK IMMEDIATE MEDICAL CARE IF YOU HAVE ANY OF THE FOLLOWING SYMPTOMS: fever, inability to keep sufficient fluids down, black or bloody vomitus, black or bloody stools, lightheadedness/dizziness, chest pain, severe headache, rash, shortness of breath, cold or clammy skin, confusion, pain with urination, or any signs of dehydration.

## 2025-01-10 NOTE — ED PROVIDER NOTE - PROGRESS NOTE DETAILS
Santana Main, PGY2 Resident: Patient reassessed and daughter/patient updated on labs and imaging results. will send medications to pharmacy

## 2025-01-10 NOTE — ED ADULT TRIAGE NOTE - CHIEF COMPLAINT QUOTE
Pt. BIBA c/o nausea. Pt., denies pain. Pt. was treated for a UTI 2 weeks ago. Pt. is altered at baseline as per daughter.

## 2025-01-10 NOTE — ED PROVIDER NOTE - OBJECTIVE STATEMENT
89 year old F, pmhx of chronic afib (on Eliquis), GERD, and chronic low back pain, comes in for AMS and decreased PO intake. Patient is Togolese speaking and hard of hearing at baseline, daughter at bedside is translating. States that patient has been more confused over the past 2 weeks, also has been having decreased PO intake over the past week. Patient also has been having vomiting with even small bites of food. Denies fevers, chills, resp distress, diarrhea. No recent falls/injuries. Notably, patient recently had hematuria in diaper, was prescribed macrobid for suspected UTI by PMD, completed abx course already.

## 2025-01-10 NOTE — ED PROVIDER NOTE - CARE PLAN
1 Principal Discharge DX:	UTI (urinary tract infection)  Secondary Diagnosis:	Hypokalemia  Secondary Diagnosis:	Nausea & vomiting  Secondary Diagnosis:	Altered mental status

## 2025-01-15 LAB
CULTURE RESULTS: SIGNIFICANT CHANGE UP
CULTURE RESULTS: SIGNIFICANT CHANGE UP
SPECIMEN SOURCE: SIGNIFICANT CHANGE UP
SPECIMEN SOURCE: SIGNIFICANT CHANGE UP

## 2025-02-03 NOTE — PRE-OP CHECKLIST - AICD PRESENT
I spoke with the patient today to remind him to get his PSA lab prior to his appointment on 2/102025.  Patient agreed to get their labs. Order is in the [facility] system.    
no
none per patient's daughter
no

## 2025-04-07 NOTE — ED PROVIDER NOTE - CARE PLAN
Principal Discharge DX:	Pyelonephritis  Secondary Diagnosis:	Sepsis  Secondary Diagnosis:	Atrial fibrillation with rapid ventricular response   1

## 2025-04-07 NOTE — H&P ADULT - ATTENDING COMMENTS
Patient seen on 04/07/25.      89  year old F, pmhx of chronic afib (on Eliquis), GERD, dementia, FTT , Orthostatic hypotension on midodrine ,Patient presents for evaluation of change in mental status.  Daughter states over the past few months patient has been having increasing cognitive issues including hallucinations poor sleep.  Daughter states that yesterday patient seemed to be getting sick and she was given Tylenol at night.  Patient is on Macrobid for UTI prophylaxis due to frequent UTIs.  This daughter had several concerns regarding patient including further decline in mental status/increased confusion/decreased ability to follow commands.  Daughter states it appeared like the left eye had not been opening as much is normal but now it is opening normally.  Patient also complained of left upper extremity pain and swelling.  Patient was unable to raise her left upper extremity secondary to pain.  No trauma.  Patient with decreased p.o. intake but was able to eat a little yesterday. 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  #Severe Sepsis on admission secondary to acute pyelonephritis  #Hx of recurrent UTIs  #lactic acidosis  - pt presents with AMS, and dec PO intake   - work up significant for +UA, CTH negative,  - pt is severely septic on admission , BP 87/65, , T 100.8 , Lactate 4.1   - given 2 L IV fluids with improvement in BP  - urine cx and blood cx obtained CXR done, no pnuemonia but has b/l effusions  - s/p Cefepime and vancomycin in ED  - c/w Cefepime   - ID consult  - Follow up Blood and urine cx  - IV fluids 60 cc/hr  - repeat lactate  - SDU monitoring      #Sacral ulcer  - unstageable sacral ulcer with minimal white discharge.  - wound care eval     #Left shoulder pain and UE swelling, r/o dvt. rotator cuff tear of uncertain age  - Patient points to shoulder and elbow as areas of pain and states she cannot hold it up secondary to pain although patient is confused according to daughter  - Xray shoulder : Acromioclavicular arthrosis. High riding humerus which could reflect rotator cuff tear of uncertain age. Bones appear osteopenic with no acute fracture or dislocation.Glenohumeral arthritic changes.  - Check LUE duplex, r/o dvt     #Hx of Lumbar Compression Fracture  #Hx of Pathologic L1 vertebrae fracture  #Hx of Conus Medullaris Syndrome  - c/w home pain management ( currently stopped gabapentin and robaxin, only on tylenol PRN)  - OP neurosurgery     #HFpEF - not in exacerbation   - TTE 1/28/2024- EF 60-65% mod pulm HTN   - Currently euvolemic on exam, hold home oral lasix 20 daily FOR SOFT BP    #Chronic Atrial Fibrillation with acute RVR  - 's on admission . afib RVR secondary to sepsis   -CHADS2-VASC = 5.   -c/w Eliquis 2.5 mg twice daily   - metoprolol 25mg three times daily with holding parameters ( BP improved, MAP 79 when I saw her)     #GERD  - c/w pantoprazole 40mg PO QD    #CKD 3b   - baseline is 1.2, Cr now at baseline 1.1    #Urinary retention   - Bladder Scan Q6  - Hold flomax for now since BP is soft    # Orthostatic hypotension  - c/w home midodrine 5mg twice daily     #Chronic Hyponatremia  - Hold home Sodium 1 g Q24 as Na level is 142    #Mild Macrocytic anemia  - Hb stable at baseline 11.3  -   - check b12, folate  - c/w Multivitamins    #Misc  - DVT ppx ; Eliquis  - GI ppx ; PPI  - Diet ; DASH  - Activity increase as tolerated   - DNR/DNI

## 2025-04-07 NOTE — ED PROCEDURE NOTE - PROCEDURE ADDITIONAL DETAILS
Patient had 10 cm stool burden in rectum on CT.  Urinary retention likely due to this.  Manual disimpaction performed with large amount of brown stool removed from rectum.  Very small amount of red blood noted likely due to disimpaction.  Patient voided throughout procedure.

## 2025-04-07 NOTE — H&P ADULT - VTE RISK ASSESSMENT
VTE Assessment already completed for this visit
Normal vision: sees adequately in most situations; can see medication labels, newsprint

## 2025-04-07 NOTE — H&P ADULT - NSHPLABSRESULTS_GEN_ALL_CORE
LABS:      CBC Full  -  ( 07 Apr 2025 12:10 )  WBC Count : 12.27 K/uL  RBC Count : 3.47 M/uL  Hemoglobin : 11.3 g/dL  Hematocrit : 36.4 %  Platelet Count - Automated : 234 K/uL  Mean Cell Volume : 104.9 fL  Mean Cell Hemoglobin : 32.6 pg  Mean Cell Hemoglobin Concentration : 31.0 g/dL  Auto Neutrophil # : x  Auto Lymphocyte # : x  Auto Monocyte # : x  Auto Eosinophil # : x  Auto Basophil # : x  Auto Neutrophil % : x  Auto Lymphocyte % : x  Auto Monocyte % : x  Auto Eosinophil % : x  Auto Basophil % : x    04-07    142  |  113[H]  |  38[H]  ----------------------------<  89  5.3[H]   |  19  |  1.1    Ca    8.9      07 Apr 2025 12:10    TPro  5.0[L]  /  Alb  2.6[L]  /  TBili  0.4  /  DBili  x   /  AST  73[H]  /  ALT  45[H]  /  AlkPhos  168[H]  04-07    PT/INR - ( 07 Apr 2025 12:10 )   PT: 21.40 sec;   INR: 1.79 ratio         PTT - ( 07 Apr 2025 12:10 )  PTT:36.7 sec      Urinalysis Basic - ( 07 Apr 2025 12:10 )    Color: x / Appearance: x / SG: x / pH: x  Gluc: 89 mg/dL / Ketone: x  / Bili: x / Urobili: x   Blood: x / Protein: x / Nitrite: x   Leuk Esterase: x / RBC: x / WBC x   Sq Epi: x / Non Sq Epi: x / Bacteria: x                RADIOLOGY & ADDITIONAL STUDIES (The following images were personally reviewed):

## 2025-04-07 NOTE — ED PROVIDER NOTE - ATTENDING CONTRIBUTION TO CARE
89 year old F, pmhx of chronic afib (on Eliquis), GERD, dementia, FTT , Orthostatic hypotension on midodrine  Patient presents for evaluation of change in mental status.  Daughter states over the past few months patient has been having increasing cognitive issues including hallucinations poor sleep.  Daughter states that yesterday patient seemed to be getting sick and she was given Tylenol at night.  Patient is on Macrobid for UTI prophylaxis due to frequent UTIs.  This daughter had several concerns regarding patient including further decline in mental status/increased confusion/decreased ability to follow commands.  Daughter states it appeared like the left eye had not been opening as much is normal but now it is opening normally.  Patient also complained of left upper extremity pain and swelling.  Patient was unable to raise her left upper extremity secondary to pain.  No trauma.  Patient with decreased p.o. intake but was able to eat a little yesterday.  Patient is on Eliquis for A-fib the last dose was yesterday at 9 PM.At baseline patient is not ambulatory.    vs hypotensive, tachycardic, febril  gen- Elderly, chronically ill-appearing  card-Tachycardic irregular  lungs-ctab, no wheezing or rhonchi  abd-sntnd, no guarding or rebound  neuro-No exam limited by mental status, patient with minimal bilateral extremity strength (chronic) sensation intact bilateral lower extremities.  Upper extremity sensation intact.  Right upper extremity with full strength.  Left upper extremity motor exam limited by pain.  Patient points to shoulder and elbow as areas of pain and states she cannot hold it up secondary to pain although patient is confused according to daughter.  No gray-scale nerves abnormality.  Pupils equal bilaterally, neck supple no meningismus  skin- Unstageable sacral ulcer with minimal white discharge

## 2025-04-07 NOTE — ED ADULT TRIAGE NOTE - CHIEF COMPLAINT QUOTE
pt prenote c/o AMS and left arm weakness since this morning. pt hypotensive, febrile and tachycardic upon arrival

## 2025-04-07 NOTE — H&P ADULT - ASSESSMENT
89  year old F, pmhx of chronic afib (on Eliquis), GERD, dementia, FTT , Orthostatic hypotension on midodrine ,Patient presents for evaluation of change in mental status.  Daughter states over the past few months patient has been having increasing cognitive issues including hallucinations poor sleep.  Daughter states that yesterday patient seemed to be getting sick and she was given Tylenol at night.  Patient is on Macrobid for UTI prophylaxis due to frequent UTIs.  This daughter had several concerns regarding patient including further decline in mental status/increased confusion/decreased ability to follow commands.  Daughter states it appeared like the left eye had not been opening as much is normal but now it is opening normally.  Patient also complained of left upper extremity pain and swelling.  Patient was unable to raise her left upper extremity secondary to pain.  No trauma.  Patient with decreased p.o. intake but was able to eat a little yesterday. 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  #Severe Sepsis on admission secondary to acute pyelonephritis  #Hx of recurrent UTIs  - pt presents with AMS, and dec PO intake   - work up significant for +UA, CTH negative,  - pt is severely septic on admission , BP 87/65, , T 100.8 , Lactate 4.1   - given 2 L IV fluids with improvement in BP  - urine cx and blood cx obtianed, CXR done, no pnuemonia but has b/l effusions  - s/p Cefepime and vancomycin in ED  - c/w Cefepime   - ID consult  - Follow up Blood and urine cx  - IV fluids 60 cc/hr  - repeat lactate  - SDU monitoring      #Sacral ulcer  - unstageable sacral ulcer with minimal white discharge.  - wound care eval     #Left shoulder pain and UE swelling, r/o dvt. rotator cuff tear  - Patient points to shoulder and elbow as areas of pain and states she cannot hold it up secondary to pain although patient is confused according to daughter  - Xray shoulder : Acromioclavicular arthrosis. High riding humerus which could reflect rotator cuff tear of uncertain age. Bones appear osteopenic with no acute fracture or dislocation.Glenohumeral arthritic changes.  - Check LUE duplex, r/o dvt     #Hx of Lumbar Compression Fracture  #Hx of Pathologic L1 vertebrae fracture  #Hx of Conus Medullaris Syndrome  - c/w home pain management  - OP neurosurgery     #HFpEF - not in exacerbation   - TTE 1/28/2024- EF 60-65% mod pulm HTN   - Currently euvolemic on exam, hold home oral lasix 20 daily     #Chronic Atrial Fibrillation  #acute RVR, resolved  -CHADS-VASC 5.   -c/w Eliquis  - hold metoprolol 25mg three times daily as BP is soft     #GERD  - c/w pantoprazole 40mg PO QD    #CKD 3b   - baseline is 1.2, Cr now at baseline 1.1    #Urinary retention   - Bladder Scan Q6  - Hold flomax for now since BP is soft    # Orthostatic hypotension  - c/w home midodrine     #Mild Macrocytic anemia  - Hb stable at baseline 11.3  -   - check b12, folate  - c/w Multivitamins    #Misc  - DVT ppx ; Eliquis  - GI ppx ; PPI  - Diet ; DASH  - Activity increase as tolerated   DNR/DNI 89  year old F, pmhx of chronic afib (on Eliquis), GERD, dementia, FTT , Orthostatic hypotension on midodrine ,Patient presents for evaluation of change in mental status.  Daughter states over the past few months patient has been having increasing cognitive issues including hallucinations poor sleep.  Daughter states that yesterday patient seemed to be getting sick and she was given Tylenol at night.  Patient is on Macrobid for UTI prophylaxis due to frequent UTIs.  This daughter had several concerns regarding patient including further decline in mental status/increased confusion/decreased ability to follow commands.  Daughter states it appeared like the left eye had not been opening as much is normal but now it is opening normally.  Patient also complained of left upper extremity pain and swelling.  Patient was unable to raise her left upper extremity secondary to pain.  No trauma.  Patient with decreased p.o. intake but was able to eat a little yesterday. 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  #Severe Sepsis on admission secondary to acute pyelonephritis  #Hx of recurrent UTIs  - pt presents with AMS, and dec PO intake   - work up significant for +UA, CTH negative,  - pt is severely septic on admission , BP 87/65, , T 100.8 , Lactate 4.1   - given 2 L IV fluids with improvement in BP  - urine cx and blood cx obtained CXR done, no pnuemonia but has b/l effusions  - s/p Cefepime and vancomycin in ED  - c/w Cefepime   - ID consult  - Follow up Blood and urine cx  - IV fluids 60 cc/hr  - repeat lactate  - SDU monitoring      #Sacral ulcer  - unstageable sacral ulcer with minimal white discharge.  - wound care eval     #Left shoulder pain and UE swelling, r/o dvt. rotator cuff tear of uncertain age  - Patient points to shoulder and elbow as areas of pain and states she cannot hold it up secondary to pain although patient is confused according to daughter  - Xray shoulder : Acromioclavicular arthrosis. High riding humerus which could reflect rotator cuff tear of uncertain age. Bones appear osteopenic with no acute fracture or dislocation.Glenohumeral arthritic changes.  - Check LUE duplex, r/o dvt     #Hx of Lumbar Compression Fracture  #Hx of Pathologic L1 vertebrae fracture  #Hx of Conus Medullaris Syndrome  - c/w home pain management ( currently stopped gabapentin and robaxin, only on tylenol PRN)  - OP neurosurgery     #HFpEF - not in exacerbation   - TTE 1/28/2024- EF 60-65% mod pulm HTN   - Currently euvolemic on exam, hold home oral lasix 20 daily FOR SOFT BP    #Chronic Atrial Fibrillation with acute RVR  - 's on admission . afib RVR secondary to sepsis   -CHADS2-VASC = 5.   -c/w Eliquis  - metoprolol 25mg three times daily with holding parameters ( BP improved, MAP 79 when I saw her)     #GERD  - c/w pantoprazole 40mg PO QD    #CKD 3b   - baseline is 1.2, Cr now at baseline 1.1    #Urinary retention   - Bladder Scan Q6  - Hold flomax for now since BP is soft    # Orthostatic hypotension  - c/w home midodrine 5mg twice daily     #Chronic Hyponatremia  - Hold home Sodium 1 g Q24 as Na level is 142    #Mild Macrocytic anemia  - Hb stable at baseline 11.3  -   - check b12, folate  - c/w Multivitamins    #Misc  - DVT ppx ; Eliquis  - GI ppx ; PPI  - Diet ; DASH  - Activity increase as tolerated   DNR/DNI 89  year old F, pmhx of chronic afib (on Eliquis), GERD, dementia, FTT , Orthostatic hypotension on midodrine ,Patient presents for evaluation of change in mental status.  Daughter states over the past few months patient has been having increasing cognitive issues including hallucinations poor sleep.  Daughter states that yesterday patient seemed to be getting sick and she was given Tylenol at night.  Patient is on Macrobid for UTI prophylaxis due to frequent UTIs.  This daughter had several concerns regarding patient including further decline in mental status/increased confusion/decreased ability to follow commands.  Daughter states it appeared like the left eye had not been opening as much is normal but now it is opening normally.  Patient also complained of left upper extremity pain and swelling.  Patient was unable to raise her left upper extremity secondary to pain.  No trauma.  Patient with decreased p.o. intake but was able to eat a little yesterday. 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  #Severe Sepsis on admission secondary to acute pyelonephritis  #Hx of recurrent UTIs  - pt presents with AMS, and dec PO intake   - work up significant for +UA, CTH negative,  - pt is severely septic on admission , BP 87/65, , T 100.8 , Lactate 4.1   - given 2 L IV fluids with improvement in BP  - urine cx and blood cx obtained CXR done, no pnuemonia but has b/l effusions  - s/p Cefepime and vancomycin in ED  - c/w Cefepime   - ID consult  - Follow up Blood and urine cx  - IV fluids 60 cc/hr  - repeat lactate  - SDU monitoring      #Sacral ulcer  - unstageable sacral ulcer with minimal white discharge.  - wound care eval     #Left shoulder pain and UE swelling, r/o dvt. rotator cuff tear of uncertain age  - Patient points to shoulder and elbow as areas of pain and states she cannot hold it up secondary to pain although patient is confused according to daughter  - Xray shoulder : Acromioclavicular arthrosis. High riding humerus which could reflect rotator cuff tear of uncertain age. Bones appear osteopenic with no acute fracture or dislocation.Glenohumeral arthritic changes.  - Check LUE duplex, r/o dvt     #Hx of Lumbar Compression Fracture  #Hx of Pathologic L1 vertebrae fracture  #Hx of Conus Medullaris Syndrome  - c/w home pain management ( currently stopped gabapentin and robaxin, only on tylenol PRN)  - OP neurosurgery     #HFpEF - not in exacerbation   - TTE 1/28/2024- EF 60-65% mod pulm HTN   - Currently euvolemic on exam, hold home oral lasix 20 daily FOR SOFT BP    #Chronic Atrial Fibrillation with acute RVR  - 's on admission . afib RVR secondary to sepsis   -CHADS2-VASC = 5.   -c/w Eliquis 2.5 mg twice daily   - metoprolol 25mg three times daily with holding parameters ( BP improved, MAP 79 when I saw her)     #GERD  - c/w pantoprazole 40mg PO QD    #CKD 3b   - baseline is 1.2, Cr now at baseline 1.1    #Urinary retention   - Bladder Scan Q6  - Hold flomax for now since BP is soft    # Orthostatic hypotension  - c/w home midodrine 5mg twice daily     #Chronic Hyponatremia  - Hold home Sodium 1 g Q24 as Na level is 142    #Mild Macrocytic anemia  - Hb stable at baseline 11.3  -   - check b12, folate  - c/w Multivitamins    #Misc  - DVT ppx ; Eliquis  - GI ppx ; PPI  - Diet ; DASH  - Activity increase as tolerated   DNR/DNI 89  year old F, pmhx of chronic afib (on Eliquis), GERD, dementia, FTT , Orthostatic hypotension on midodrine ,Patient presents for evaluation of change in mental status.  Daughter states over the past few months patient has been having increasing cognitive issues including hallucinations poor sleep.  Daughter states that yesterday patient seemed to be getting sick and she was given Tylenol at night.  Patient is on Macrobid for UTI prophylaxis due to frequent UTIs.  This daughter had several concerns regarding patient including further decline in mental status/increased confusion/decreased ability to follow commands.  Daughter states it appeared like the left eye had not been opening as much is normal but now it is opening normally.  Patient also complained of left upper extremity pain and swelling.  Patient was unable to raise her left upper extremity secondary to pain.  No trauma.  Patient with decreased p.o. intake but was able to eat a little yesterday. 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  #Severe Sepsis on admission secondary to acute pyelonephritis  #Hx of recurrent UTIs  #lactic acidosis  - pt presents with AMS, and dec PO intake   - work up significant for +UA, CTH negative,  - pt is severely septic on admission , BP 87/65, , T 100.8 , Lactate 4.1   - given 2 L IV fluids with improvement in BP  - urine cx and blood cx obtained CXR done, no pnuemonia but has b/l effusions  - s/p Cefepime and vancomycin in ED  - c/w Cefepime   - ID consult  - Follow up Blood and urine cx  - IV fluids 60 cc/hr  - repeat lactate  - SDU monitoring      #Sacral ulcer  - unstageable sacral ulcer with minimal white discharge.  - wound care eval     #Left shoulder pain and UE swelling, r/o dvt. rotator cuff tear of uncertain age  - Patient points to shoulder and elbow as areas of pain and states she cannot hold it up secondary to pain although patient is confused according to daughter  - Xray shoulder : Acromioclavicular arthrosis. High riding humerus which could reflect rotator cuff tear of uncertain age. Bones appear osteopenic with no acute fracture or dislocation.Glenohumeral arthritic changes.  - Check LUE duplex, r/o dvt     #Hx of Lumbar Compression Fracture  #Hx of Pathologic L1 vertebrae fracture  #Hx of Conus Medullaris Syndrome  - c/w home pain management ( currently stopped gabapentin and robaxin, only on tylenol PRN)  - OP neurosurgery     #HFpEF - not in exacerbation   - TTE 1/28/2024- EF 60-65% mod pulm HTN   - Currently euvolemic on exam, hold home oral lasix 20 daily FOR SOFT BP    #Chronic Atrial Fibrillation with acute RVR  - 's on admission . afib RVR secondary to sepsis   -CHADS2-VASC = 5.   -c/w Eliquis 2.5 mg twice daily   - metoprolol 25mg three times daily with holding parameters ( BP improved, MAP 79 when I saw her)     #GERD  - c/w pantoprazole 40mg PO QD    #CKD 3b   - baseline is 1.2, Cr now at baseline 1.1    #Urinary retention   - Bladder Scan Q6  - Hold flomax for now since BP is soft    # Orthostatic hypotension  - c/w home midodrine 5mg twice daily     #Chronic Hyponatremia  - Hold home Sodium 1 g Q24 as Na level is 142    #Mild Macrocytic anemia  - Hb stable at baseline 11.3  -   - check b12, folate  - c/w Multivitamins    #Misc  - DVT ppx ; Eliquis  - GI ppx ; PPI  - Diet ; DASH  - Activity increase as tolerated   - DNR/DNI

## 2025-04-07 NOTE — CONSULT NOTE ADULT - SUBJECTIVE AND OBJECTIVE BOX
Urology Consult    Pt is a 89 y/o Female unable to participate in her own care, daughter at bedside.  Pt unable to answer questions, daughter not sure of anything 2/2 pt with dementia.  Pt found to have mild right hydronephrosis on CT scan with ascending UTI, cystitis.  No obvious stone seen, ? hyperemia of UVJ.    PAST MEDICAL & SURGICAL HISTORY:  Chronic atrial fibrillation    GERD (gastroesophageal reflux disease)    Chronic lower back pain    No significant past surgical history      MEDICATIONS  (STANDING):  lactated ringers. 1000 milliLiter(s) (100 mL/Hr) IV Continuous <Continuous>    Allergies    No Known Allergies    SOCIAL HISTORY: No illicit drug use    FAMILY HISTORY:  Family history of stroke (Father)        REVIEW OF SYSTEMS   [x] A ten-point review of systems was otherwise negative except as noted.  [X] Due to altered mental status/intubation, subjective information were not able to be obtained from patient. History was obtained, to the extent possible, from review of the chart and collateral sources of information.    Vital Signs Last 24 Hrs  T(C): 36.6 (07 Apr 2025 16:15), Max: 38.2 (07 Apr 2025 11:30)  T(F): 97.9 (07 Apr 2025 16:15), Max: 100.8 (07 Apr 2025 11:30)  HR: 117 (07 Apr 2025 16:15) (117 - 152)  BP: 114/56 (07 Apr 2025 16:15) (87/65 - 114/56)  BP(mean): 78 (07 Apr 2025 16:15) (78 - 78)  RR: 18 (07 Apr 2025 16:15) (18 - 22)  SpO2: 96% (07 Apr 2025 16:15) (96% - 98%)    Parameters below as of 07 Apr 2025 16:15  Patient On (Oxygen Delivery Method): room air    PHYSICAL EXAM:    GEN: NAD, awake and alert. Confused unable to answer questions  SKIN: Good color, non diaphoretic.  RESP: Non-labored breathing. No use of accessory muscles.  ABDO: Soft, NT/ND, no palpable bladder, + suprapubic tenderness.  BACK: No CVAT B/L  : voiding on her own   EXT: DIEHL x 4      I&O's Summary      LABS:                        11.3   12.27 )-----------( 234      ( 07 Apr 2025 12:10 )             36.4     04-07    142  |  113[H]  |  38[H]  ----------------------------<  89  5.3[H]   |  19  |  1.1    Ca    8.9      07 Apr 2025 12:10    TPro  5.0[L]  /  Alb  2.6[L]  /  TBili  0.4  /  DBili  x   /  AST  73[H]  /  ALT  45[H]  /  AlkPhos  168[H]  04-07    PT/INR - ( 07 Apr 2025 12:10 )   PT: 21.40 sec;   INR: 1.79 ratio         PTT - ( 07 Apr 2025 12:10 )  PTT:36.7 sec  Urinalysis Basic - ( 07 Apr 2025 12:10 )    Color: x / Appearance: x / SG: x / pH: x  Gluc: 89 mg/dL / Ketone: x  / Bili: x / Urobili: x   Blood: x / Protein: x / Nitrite: x   Leuk Esterase: x / RBC: x / WBC x   Sq Epi: x / Non Sq Epi: x / Bacteria: x            RADIOLOGY & ADDITIONAL STUDIES:\    < from: CT Abdomen and Pelvis w/ IV Cont (04.07.25 @ 13:22) >    ACC: [id number] EXAM:  CT ABDOMEN AND PELVIS IC   ORDERED BY: REGLA QUINTEROS     PROCEDURE DATE:  04/07/2025          INTERPRETATION:  CLINICAL INFORMATION: Urinary tract infection.   Requesting emergent CT abdomen pelvis. Altered mental status.    COMPARISON: 1/10/2025    CONTRAST/COMPLICATIONS:  IV Contrast: IV contrast documented in unlinked concurrent exam  Oral Contrast: NONE  .    PROCEDURE:  CT of the Abdomen and Pelvis was performed.  Sagittal and coronal reformats were performed.    FINDINGS:  LOWER CHEST: Bilateral pleural effusions with adjacent compressive   atelectatic change. Coronary calcifications. Aortic valve calcifications.   Tiny pericardial effusion. Streaky atelectasis or scarring in the right   middle lobe.    LIVER: Within normal limits.  BILE DUCTS: Normal caliber.  GALLBLADDER: Within normal limits.  SPLEEN: Within normal limits.  PANCREAS: Within normal limits.  ADRENALS: Within normal limits.  KIDNEYS/URETERS: Symmetric enhancement. Mild right hydroureteronephrosis   with urothelial enhancement without visualized ureteral stone. Multiple   vascular calcifications are noted along the course of the ureter however   none appear to be within the ureter itself. Distal right ureter just at   the level of theUVJ appears to have more nodular enhancement (series 3   image 276).    BLADDER: Circumferential edematous mild wall thickening with mural   hyperemia and perivesicular stranding.  REPRODUCTIVE ORGANS: Appears to be within normal limits.    BOWEL: Nobowel obstruction. Mild edematous wall thickening of the rectum  PERITONEUM/RETROPERITONEUM: Within normal limits.  VESSELS: Atherosclerosis. No abdominal aortic aneurysm. Tortuous aorta.  LYMPH NODES: No lymphadenopathy.  ABDOMINAL WALL: Within normal limits.  BONES: Diffuse osteopenia. Redemonstrated severe L1 compression deformity   associated with lytic lesion. Degenerative changes. Levoscoliotic   curvature of the lumbar spine.    IMPRESSION:  1.  Circumferential mild edematous wall thickening of the bladder with   mural hyperemia and perivesicular stranding consistent with cystitis/UTI.  2.  Mild right hydroureteronephrosis with urothelial enhancement.   Multiple vascular calcifications are noted along the course of the ureter   howevernone appear to be within the ureter itself. Findings can be   secondary to recently passed stone or ascending infection. No definite CT   imaging evidence of pyelonephritis.  3.  More nodular enhancing appearance of the right distal ureter at the   level of the UVJ could also be infectious in etiology however a ureteral   neoplasm cannot be entirely excluded. Further evaluation can be   considered as clinically appropriate.  4.  Mild edematous wall thickening of the rectum which could represent   proctitis in the appropriate clinical setting.  5.  Partially imaged small bilateral pleural effusions and trace   pericardial effusion.        --- End of Report ---            FRANC FOSTER MD; Attending Radiologist  This document has been electronically signed. Apr 7 2025  2:23PM    < end of copied text >

## 2025-04-07 NOTE — ED ADULT NURSE NOTE - NSFALLHARMRISKINTERV_ED_ALL_ED

## 2025-04-07 NOTE — H&P ADULT - NSHPPHYSICALEXAM_GEN_ALL_CORE
gen- Elderly, chronically ill-appearing  card-Tachycardic irregular  lungs-ctab, no wheezing or rhonchi  abd- ntnd, no guarding or rebound  neuro- exam limited by mental status, patient with minimal bilateral extremity strength (chronic) sensation intact bilateral lower extremities.  Upper extremity sensation intact.  Right upper extremity with full strength.  Left upper extremity motor exam limited by pain.  Patient points to shoulder and elbow as areas of pain and states she cannot hold it up secondary to pain although patient is confused according to daughter.  No gray-scale nerves abnormality.  Pupils equal bilaterally, neck supple no meningismus  skin- Unstageable sacral ulcer with minimal white discharge gen- Elderly, chronically ill-appearing  card-Tachycardic irregular  lungs-ctab, no wheezing or rhonchi  abd- ntnd, no guarding or rebound  neuro- pt is more confused than her baseline, responds to some questions by daughter, other exam limited by mental status, patient with minimal bilateral extremity strength (chronic) sensation intact bilateral lower extremities.  Upper extremity sensation intact.  Right upper extremity with full strength.  Left upper extremity motor exam limited by pain.  Patient points to shoulder and elbow as areas of pain and states she cannot hold it up secondary to pain although patient is confused according to daughter.  No gray-scale nerves abnormality.  Pupils equal bilaterally, neck supple no meningismus  skin- Unstageable sacral ulcer with minimal white discharge

## 2025-04-07 NOTE — ED PROVIDER NOTE - CLINICAL SUMMARY MEDICAL DECISION MAKING FREE TEXT BOX
in ED, pt w/ improvement of rapid afib w/ fluids, midodrine, abx  labs w/ leukocytosis, elevated trop (more likely related to rapid afib), ua positive, ctap c/f pyelo, cth w/o acute findings   given rising lactate, c/f end organ dysfunction case d/w icu fellow who approved pt for sdu

## 2025-04-07 NOTE — ED PROVIDER NOTE - OBJECTIVE STATEMENT
89F with PMH of frequent UTIs, dementia, A-fib on Eliquis, GERD, low back pain presents for altered mental status.  Per daughter patient has been confused over the last few days but worse today, not following commands or answering questions.  She was concern for left-sided weakness.  No known fever, cough, vomiting, diarrhea.  HPI and ROS are further limited due to patient's mental status.

## 2025-04-07 NOTE — CONSULT NOTE ADULT - ASSESSMENT
89 y/o f with AMS, cystitis, ascending UTI and possible pyelonephritis    A) UTI  Cystitis  ascending UTI  ? passed stone  ? dementia      P) Consider ID consult for broad spectrum abx  IV fluids  sonogram in 72 hrs after abx to see if hydro has resolved  No acute urologic intervention at this time.  will d/w attending

## 2025-04-07 NOTE — H&P ADULT - HISTORY OF PRESENT ILLNESS
89  year old F, pmhx of chronic afib (on Eliquis), GERD, dementia, FTT , Orthostatic hypotension on midodrine  Patient presents for evaluation of change in mental status.  Daughter states over the past few months patient has been having increasing cognitive issues including hallucinations poor sleep.  Daughter states that yesterday patient seemed to be getting sick and she was given Tylenol at night.  Patient is on Macrobid for UTI prophylaxis due to frequent UTIs.  This daughter had several concerns regarding patient including further decline in mental status/increased confusion/decreased ability to follow commands.  Daughter states it appeared like the left eye had not been opening as much is normal but now it is opening normally.  Patient also complained of left upper extremity pain and swelling.  Patient was unable to raise her left upper extremity secondary to pain.  No trauma.  Patient with decreased p.o. intake but was able to eat a little yesterday.     Vitals in ED:  BP 87/65    T 100.8  O2 97% RA    labs significant for:  CBC with Hb 11.3, WBC 12.2 K   CMP at baseline ( Cr 1.1), Mildly elevated alkphos 168  Trops 92>>81  Lactate 4.1>>4.3    UA +    Imagin) CXR with b/l pleural effusion and left lung opacities    2) CT abdomen pelvis with IV cont :   1.  Circumferential mild edematous wall thickening of the bladder with   mural hyperemia and perivesicular stranding consistent with cystitis/UTI.  2.  Mild right hydroureteronephrosis with urothelial enhancement.   Multiple vascular calcifications are noted along the course of the ureter   howevernone appear to be within the ureter itself. Findings can be   secondary to recently passed stone or ascending infection. No definite CT   imaging evidence of pyelonephritis.  3.  More nodular enhancing appearance of the right distal ureter at the   level of the UVJ could also be infectious in etiology however a ureteral   neoplasm cannot be entirely excluded. Further evaluation can be   considered as clinically appropriate.  4.  Mild edematous wall thickening of the rectum which could represent   proctitis in the appropriate clinical setting.  5.  Partially imaged small bilateral pleural effusions and trace   pericardial effusion.    3) CTH and CTA neck and head :    CT HEAD:  No acute intracranial pathology. No evidence of midline shift, mass   effect or intracranial hemorrhage.    Moderate chronic microvascular type changes as well as chronicbilateral   cerebellar infarcts.    CTA HEAD:  No evidence of flow-limiting stenosis, occlusion or aneurysm.    CTA NECK:  No evidence of carotid or vertebral artery stenosis.    4)xray left shoulder   Acromioclavicular arthrosis. High riding humerus which could reflect   rotator cuff tear of uncertain age. Bones appear osteopenic with no acute   fracture or dislocation.    Glenohumeral arthritic changes.      patient given 2 L IV fluids ( with improvement in BP), Cefepime and vancomycin in ED , septic work up done, admitted to SDU    89  year old F, pmhx of chronic afib (on Eliquis), GERD, dementia, FTT , Orthostatic hypotension on midodrine  Patient presents for evaluation of change in mental status.  Daughter states over the past few months patient has been having increasing cognitive issues including hallucinations poor sleep.  Daughter states that yesterday patient seemed to be getting sick and she was given Tylenol at night.  Patient is on Macrobid for UTI prophylaxis due to frequent UTIs.  This daughter had several concerns regarding patient including further decline in mental status/increased confusion/decreased ability to follow commands.  Daughter states it appeared like the left eye had not been opening as much is normal but now it is opening normally.  Patient also complained of left upper extremity pain and swelling.  Patient was unable to raise her left upper extremity secondary to pain.  No trauma.  Patient with decreased p.o. intake but was able to eat a little yesterday.     Vitals in ED:  BP 87/65    T 100.8  O2 97% RA    labs significant for:  CBC with Hb 11.3, WBC 12.2 K   CMP at baseline ( Cr 1.1), Mildly elevated alkphos 168  Trops 92>>81  Lactate 4.1>>4.3    UA +    Imagin) CXR with b/l pleural effusion and small left lung opacity    2) CT abdomen pelvis with IV cont :   1.  Circumferential mild edematous wall thickening of the bladder with   mural hyperemia and perivesicular stranding consistent with cystitis/UTI.  2.  Mild right hydroureteronephrosis with urothelial enhancement.   Multiple vascular calcifications are noted along the course of the ureter   howevernone appear to be within the ureter itself. Findings can be   secondary to recently passed stone or ascending infection. No definite CT   imaging evidence of pyelonephritis.  3.  More nodular enhancing appearance of the right distal ureter at the   level of the UVJ could also be infectious in etiology however a ureteral   neoplasm cannot be entirely excluded. Further evaluation can be   considered as clinically appropriate.  4.  Mild edematous wall thickening of the rectum which could represent   proctitis in the appropriate clinical setting.  5.  Partially imaged small bilateral pleural effusions and trace   pericardial effusion.    3) CTH and CTA neck and head :    CT HEAD:  No acute intracranial pathology. No evidence of midline shift, mass   effect or intracranial hemorrhage.    Moderate chronic microvascular type changes as well as chronicbilateral   cerebellar infarcts.    CTA HEAD:  No evidence of flow-limiting stenosis, occlusion or aneurysm.    CTA NECK:  No evidence of carotid or vertebral artery stenosis.    4)xray left shoulder   Acromioclavicular arthrosis. High riding humerus which could reflect   rotator cuff tear of uncertain age. Bones appear osteopenic with no acute   fracture or dislocation.    Glenohumeral arthritic changes.      patient given 2 L IV fluids ( with improvement in BP), Cefepime and vancomycin in ED , septic work up done, admitted to SDU

## 2025-04-07 NOTE — ED PROVIDER NOTE - PROGRESS NOTE DETAILS
pt w/ improvement of BP and HR w/ fluids, antipyretic and home dose of midodrine. pt more awake, alert and interactive (pt still demented limiting ability to communicate). cth/cta w/o acute findings. icu consulted for rising lactate for sdu admission. elevated trop likely related to rapid afib

## 2025-04-08 NOTE — PATIENT PROFILE ADULT - FALL HARM RISK - HARM RISK INTERVENTIONS
Assistance with ambulation/Assistance OOB with selected safe patient handling equipment/Communicate Risk of Fall with Harm to all staff/Discuss with provider need for PT consult/Monitor for mental status changes/Monitor gait and stability/Move patient closer to nurses' station/Reinforce activity limits and safety measures with patient and family/Reorient to person, place and time as needed/Sit up slowly, dangle for a short time, stand at bedside before walking/Tailored Fall Risk Interventions/Toileting schedule using arm’s reach rule for commode and bathroom/Use of alarms - bed, chair and/or voice tab/Visual Cue: Yellow wristband and red socks/Bed in lowest position, wheels locked, appropriate side rails in place/Call bell, personal items and telephone in reach/Instruct patient to call for assistance before getting out of bed or chair/Non-slip footwear when patient is out of bed/[place] to call system/Physically safe environment - no spills, clutter or unnecessary equipment/Purposeful Proactive Rounding/Room/bathroom lighting operational, light cord in reach

## 2025-04-08 NOTE — CONSULT NOTE ADULT - SUBJECTIVE AND OBJECTIVE BOX
Patient is a 89y old  Female who presents with a chief complaint of sepsis (2025 21:44)      89  year old F, pmhx of chronic afib (on Eliquis), GERD, dementia, FTT , Orthostatic hypotension on midodrine presents for evaluation of change in mental status.  Daughter states over the past few months patient has been having increasing cognitive issues including hallucinations poor sleep.  Daughter states that yesterday patient seemed to be getting sick and she was given Tylenol at night.  Patient is on Macrobid for UTI prophylaxis due to frequent UTIs.  This daughter had several concerns regarding patient including further decline in mental status/increased confusion/decreased ability to follow commands.  Daughter states it appeared like the left eye had not been opening as much is normal but now it is opening normally.  Patient also complained of left upper extremity pain and swelling.  Patient was unable to raise her left upper extremity secondary to pain.  No trauma.  Patient with decreased p.o. intake but was able to eat a little yesterday.     Vitals in ED:  BP 87/65    T 100.8  O2 97% RA    labs significant for:  CBC with Hb 11.3, WBC 12.2 K   CMP at baseline ( Cr 1.1), Mildly elevated alkphos 168  Trops 92>>81  Lactate 4.1>>4.3    UA +    Imagin) CXR with b/l pleural effusion and small left lung opacity    2) CT abdomen pelvis with IV cont :   1.  Circumferential mild edematous wall thickening of the bladder with   mural hyperemia and perivesicular stranding consistent with cystitis/UTI.  2.  Mild right hydroureteronephrosis with urothelial enhancement.   Multiple vascular calcifications are noted along the course of the ureter   howevernone appear to be within the ureter itself. Findings can be   secondary to recently passed stone or ascending infection. No definite CT   imaging evidence of pyelonephritis.  3.  More nodular enhancing appearance of the right distal ureter at the   level of the UVJ could also be infectious in etiology however a ureteral   neoplasm cannot be entirely excluded. Further evaluation can be   considered as clinically appropriate.  4.  Mild edematous wall thickening of the rectum which could represent   proctitis in the appropriate clinical setting.  5.  Partially imaged small bilateral pleural effusions and trace   pericardial effusion.    3) CTH and CTA neck and head :    CT HEAD:  No acute intracranial pathology. No evidence of midline shift, mass   effect or intracranial hemorrhage.    Moderate chronic microvascular type changes as well as chronicbilateral   cerebellar infarcts.    CTA HEAD:  No evidence of flow-limiting stenosis, occlusion or aneurysm.    CTA NECK:  No evidence of carotid or vertebral artery stenosis.    4)xray left shoulder   Acromioclavicular arthrosis. High riding humerus which could reflect   rotator cuff tear of uncertain age. Bones appear osteopenic with no acute   fracture or dislocation.    Glenohumeral arthritic changes.      patient given 2 L IV fluids ( with improvement in BP), Cefepime and vancomycin in ED , septic work up done, admitted to SDU  called to evaluate      PAST MEDICAL & SURGICAL HISTORY:  Chronic atrial fibrillation      GERD (gastroesophageal reflux disease)      Chronic lower back pain      No significant past surgical history          SOCIAL HX:   Smoking  -    FAMILY HISTORY:  Family history of stroke (Father)        REVIEW OF SYSTEMS see hpi    Allergies    No Known Allergies    Intolerances        acetaminophen     Tablet .. 650 milliGRAM(s) Oral every 6 hours PRN  aluminum hydroxide/magnesium hydroxide/simethicone Suspension 30 milliLiter(s) Oral every 4 hours PRN  apixaban 2.5 milliGRAM(s) Oral every 12 hours  cefepime   IVPB 2000 milliGRAM(s) IV Intermittent every 12 hours  lactated ringers. 1000 milliLiter(s) IV Continuous <Continuous>  melatonin 3 milliGRAM(s) Oral at bedtime PRN  metoprolol tartrate 25 milliGRAM(s) Oral every 12 hours  midodrine. 5 milliGRAM(s) Oral every 8 hours  multivitamin 1 Tablet(s) Oral daily  ondansetron Injectable 4 milliGRAM(s) IV Push every 8 hours PRN  pantoprazole    Tablet 40 milliGRAM(s) Oral before breakfast  : Home Meds:      PHYSICAL EXAM    ICU Vital Signs Last 24 Hrs  T(C): 36.5 (2025 00:06), Max: 38.2 (2025 11:30)  T(F): 97.7 (2025 00:06), Max: 100.8 (2025 11:30)  HR: 130 (2025 00:50) (107 - 152)  BP: 98/54 (2025 00:50) (87/65 - 115/78)  BP(mean): 69 (2025 00:50) (69 - 78)  RR: 18 (2025 00:50) (18 - 22)  SpO2: 95% (2025 00:50) (95% - 98%)    O2 Parameters below as of 2025 00:50  Patient On (Oxygen Delivery Method): nasal cannula  O2 Flow (L/min): 2          General: ill looking, pale  Lungs: dec bs both bases  Cardiovascular: irregular  Abdomen: Soft, Positive BS  Extremities: No clubbing  Neurological: Non focal         LABS:                          11.3   12.27 )-----------( 234      ( 2025 12:10 )             36.4                                               04-    142  |  113[H]  |  38[H]  ----------------------------<  89  5.3[H]   |  19  |  1.1    Ca    8.9      2025 12:10    TPro  5.0[L]  /  Alb  2.6[L]  /  TBili  0.4  /  DBili  x   /  AST  73[H]  /  ALT  45[H]  /  AlkPhos  168[H]  04-07      PT/INR - ( 2025 12:10 )   PT: 21.40 sec;   INR: 1.79 ratio         PTT - ( 2025 12:10 )  PTT:36.7 sec                                       Urinalysis Basic - ( 2025 12:10 )    Color: x / Appearance: x / SG: x / pH: x  Gluc: 89 mg/dL / Ketone: x  / Bili: x / Urobili: x   Blood: x / Protein: x / Nitrite: x   Leuk Esterase: x / RBC: x / WBC x   Sq Epi: x / Non Sq Epi: x / Bacteria: x                                                  LIVER FUNCTIONS - ( 2025 12:10 )  Alb: 2.6 g/dL / Pro: 5.0 g/dL / ALK PHOS: 168 U/L / ALT: 45 U/L / AST: 73 U/L / GGT: x                                                  Urinalysis with Rflx Culture (collected 2025 12:05)                                                     MEDICATIONS  (STANDING):  apixaban 2.5 milliGRAM(s) Oral every 12 hours  cefepime   IVPB 2000 milliGRAM(s) IV Intermittent every 12 hours  lactated ringers. 1000 milliLiter(s) (60 mL/Hr) IV Continuous <Continuous>  metoprolol tartrate 25 milliGRAM(s) Oral every 12 hours  midodrine. 5 milliGRAM(s) Oral every 8 hours  multivitamin 1 Tablet(s) Oral daily  pantoprazole    Tablet 40 milliGRAM(s) Oral before breakfast    MEDICATIONS  (PRN):  acetaminophen     Tablet .. 650 milliGRAM(s) Oral every 6 hours PRN Temp greater or equal to 38C (100.4F), Mild Pain (1 - 3)  aluminum hydroxide/magnesium hydroxide/simethicone Suspension 30 milliLiter(s) Oral every 4 hours PRN Dyspepsia  melatonin 3 milliGRAM(s) Oral at bedtime PRN Insomnia  ondansetron Injectable 4 milliGRAM(s) IV Push every 8 hours PRN Nausea and/or Vomiting    CT AP/ HEAD NOTED

## 2025-04-08 NOTE — PATIENT PROFILE ADULT - DO YOU FEEL UNSAFE AT HOME, WORK, OR SCHOOL?
no 80M with a pmhx significant for Prostate cancer s/p RALP 2018 now with male stress incontinence. Pt in PST today in preparation for cystoscopy, insertion of artificial urinary sphincter scheduled for 5/11/2022    Covid swab schedules at [facility] for 5/8/2022

## 2025-04-08 NOTE — PROGRESS NOTE ADULT - ASSESSMENT
89  year old F, pmhx of chronic afib (on Eliquis), GERD, dementia, FTT , Orthostatic hypotension on midodrine ,Patient presents for evaluation of change in mental status.  Daughter states over the past few months patient has been having increasing cognitive issues including hallucinations poor sleep.  Daughter states that yesterday patient seemed to be getting sick and she was given Tylenol at night.  Patient is on Macrobid for UTI prophylaxis due to frequent UTIs.  This daughter had several concerns regarding patient including further decline in mental status/increased confusion/decreased ability to follow commands.  Daughter states it appeared like the left eye had not been opening as much is normal but now it is opening normally.  Patient also complained of left upper extremity pain and swelling.  Patient was unable to raise her left upper extremity secondary to pain.  No trauma.  Patient with decreased p.o. intake but was able to eat a little yesterday. 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  #Severe Sepsis on admission secondary to acute pyelonephritis  #Hx of recurrent UTIs  #lactic acidosis  - pt presents with AMS, and dec PO intake   - work up significant for +UA, CTH negative,  - pt is severely septic on admission , BP 87/65, , T 100.8 , Lactate 4.1   - given 2 L IV fluids with improvement in BP  - urine cx and blood cx obtained CXR done, no pnuemonia but has b/l effusions  - s/p Cefepime and vancomycin in ED  - c/w Cefepime 2g q12h  - ID consult  - Follow up Blood and urine cx  - IV fluids 60 cc/hr  - lactate 4.1 -> 4.3 -> 3.1  - Monitor BP  - SDU monitoring      #Sacral ulcer  - unstageable sacral ulcer with minimal white discharge.  - burn eval    #Left shoulder pain and UE swelling, r/o dvt. rotator cuff tear of uncertain age  - Patient points to shoulder and elbow as areas of pain and states she cannot hold it up secondary to pain although patient is confused according to daughter  - Xray shoulder : Acromioclavicular arthrosis. High riding humerus which could reflect rotator cuff tear of uncertain age. Bones appear osteopenic with no acute fracture or dislocation.Glenohumeral arthritic changes.  - f/u LUE duplex, r/o dvt   - Ortho c/s for possible rotator cuff tear and LE swelling. No intervention, treat with pain control. Left hand xray for completion    #Hx of Lumbar Compression Fracture  #Hx of Pathologic L1 vertebrae fracture  #Hx of Conus Medullaris Syndrome  - c/w home pain management ( currently stopped gabapentin and robaxin, only on tylenol PRN)  - OP neurosurgery     #HFpEF - not in exacerbation   - TTE 1/28/2024- EF 60-65% mod pulm HTN   - Currently euvolemic on exam, hold home oral lasix 20 daily FOR SOFT BP    #Chronic Atrial Fibrillation with acute RVR  - 's on admission . afib RVR secondary to sepsis   -CHADS2-VASC = 5.   -c/w Eliquis 2.5 mg twice daily   - metoprolol 25mg three times daily with holding parameters     #GERD  - c/w pantoprazole 40mg PO QD    #CKD 3b   - baseline is 1.2, Cr now at baseline 1.1    #Urinary retention   - Bladder Scan Q6  - Hold flomax for now since BP is soft    # Orthostatic hypotension  - c/w home midodrine 5mg twice daily     #Chronic Hyponatremia  - Hold home Sodium 1 g Q24 as Na level is 142    #Mild Macrocytic anemia  - Hb stable at baseline 11.3  -   - check b12, folate  - c/w Multivitamins    #Misc  - DVT ppx ; Eliquis  - GI ppx ; PPI  - Diet ; DASH  - Activity increase as tolerated   - DNR/DNI   89  year old F, pmhx of chronic afib (on Eliquis), GERD, dementia, FTT , Orthostatic hypotension on midodrine ,Patient presents for evaluation of change in mental status.  Daughter states over the past few months patient has been having increasing cognitive issues including hallucinations poor sleep.  Daughter states that yesterday patient seemed to be getting sick and she was given Tylenol at night.  Patient is on Macrobid for UTI prophylaxis due to frequent UTIs.  This daughter had several concerns regarding patient including further decline in mental status/increased confusion/decreased ability to follow commands.  Daughter states it appeared like the left eye had not been opening as much is normal but now it is opening normally.  Patient also complained of left upper extremity pain and swelling.  Patient was unable to raise her left upper extremity secondary to pain.  No trauma.  Patient with decreased p.o. intake but was able to eat a little yesterday. Admitted to SDU for sepsis secondary to complicated UTI/Pylenephritis    #TME 2/2 Severe Sepsis present on admission secondary to acute pyelonephritis  #Hx of recurrent UTIs  #lactic acidosis  - pt presents with AMS, and dec PO intake   - work up significant for +UA, CTH negative,  - pt is severely septic on admission , BP 87/65, , T 100.8 , Lactate 4.1   - given 2 L IV fluids with improvement in BP  - urine cx and blood cx obtained CXR done, no pnuemonia but has b/l effusions  - s/p Cefepime and vancomycin in ED  - c/w Cefepime 2g q12h  - ID consult  - Follow up Blood and urine cx  - IV fluids 60 cc/hr  - lactate 4.1 -> 4.3 -> 3.1  - Monitor BP  - SDU monitoring  - labs pending today     #Sacral ulcer  - unstageable sacral ulcer with minimal white discharge.  - burn eval    #Left shoulder pain and UE swelling, r/o dvt. rotator cuff tear of uncertain age  - Patient points to shoulder and elbow as areas of pain and states she cannot hold it up secondary to pain although patient is confused according to daughter  - Xray shoulder : Acromioclavicular arthrosis. High riding humerus which could reflect rotator cuff tear of uncertain age. Bones appear osteopenic with no acute fracture or dislocation.Glenohumeral arthritic changes.  - f/u LUE duplex, r/o dvt   - Ortho c/s for possible rotator cuff tear and LE swelling. No intervention, treat with pain control. Left hand xray for completion    #Hx of Lumbar Compression Fracture  #Hx of Pathologic L1 vertebrae fracture  #Hx of Conus Medullaris Syndrome  - c/w home pain management ( currently stopped gabapentin and robaxin, only on tylenol PRN)  - OP neurosurgery     #chronic HFpEF - not in exacerbation   - TTE 1/28/2024- EF 60-65% mod pulm HTN   - Currently euvolemic on exam, hold home oral lasix 20 daily FOR SOFT BP    #Chronic Atrial Fibrillation with acute RVR  - 's on admission . afib RVR secondary to sepsis   -CHADS2-VASC = 5.   -c/w Eliquis 2.5 mg twice daily   - metoprolol 25mg three times daily with holding parameters     #GERD  - c/w pantoprazole 40mg PO QD    #CKD 3b   - baseline is 1.2, Cr now at baseline 1.1    #Urinary retention   - Bladder Scan Q6  - Hold flomax for now since BP is soft    # Orthostatic hypotension  - c/w home midodrine 5mg twice daily     #Chronic Hyponatremia  - Hold home Sodium 1 g Q24 as Na level is 142    #Mild Macrocytic anemia  - Hb stable at baseline 11.3  -   - check b12, folate  - c/w Multivitamins    #Misc  - DVT ppx ; Eliquis  - GI ppx ; PPI  - Diet ; DASH  - Activity increase as tolerated   - DNR/DNI

## 2025-04-08 NOTE — CONSULT NOTE ADULT - NS ATTEST RISK PROBLEM GEN_ALL_CORE FT
-My assessment required my independent history taking and time required is independent of the teaching service  -I independently interpreted the most recent imaging ( CXR ) and all available labs ( CBC, CMP) and cultures ( along with the sensitivities / MATA )  -Time excludes teaching time  -I reviewed all prior tests and documents  -I discussed my recommendations with the primary team housestaff/Attending  -I assisted with initiation of antibiotics

## 2025-04-08 NOTE — PROGRESS NOTE ADULT - SUBJECTIVE AND OBJECTIVE BOX
SUBJECTIVE/OVERNIGHT EVENTS  Today is hospital day 1d. This morning patient was seen and examined at bedside, resting comfortably in bed. No acute or major events overnight.      MEDICATIONS  STANDING MEDICATIONS  apixaban 2.5 milliGRAM(s) Oral every 12 hours  cefepime   IVPB 2000 milliGRAM(s) IV Intermittent every 12 hours  lactated ringers. 1000 milliLiter(s) IV Continuous <Continuous>  metoprolol tartrate 25 milliGRAM(s) Oral three times a day  midodrine. 5 milliGRAM(s) Oral every 8 hours  multivitamin 1 Tablet(s) Oral daily  pantoprazole    Tablet 40 milliGRAM(s) Oral before breakfast    PRN MEDICATIONS  acetaminophen     Tablet .. 650 milliGRAM(s) Oral every 6 hours PRN  aluminum hydroxide/magnesium hydroxide/simethicone Suspension 30 milliLiter(s) Oral every 4 hours PRN  melatonin 3 milliGRAM(s) Oral at bedtime PRN  ondansetron Injectable 4 milliGRAM(s) IV Push every 8 hours PRN    VITALS  T(F): 98 (04-08-25 @ 08:02), Max: 98 (04-08-25 @ 08:02)  HR: 71 (04-08-25 @ 08:02) (71 - 133)  BP: 132/70 (04-08-25 @ 08:02) (90/60 - 132/70)  RR: 18 (04-08-25 @ 08:02) (18 - 20)  SpO2: 97% (04-08-25 @ 08:02) (95% - 98%)    PHYSICAL EXAM  VITALS:   T(C): 36.7 (04-08-25 @ 08:02), Max: 36.7 (04-08-25 @ 08:02)  HR: 71 (04-08-25 @ 08:02) (71 - 133)  BP: 132/70 (04-08-25 @ 08:02) (90/60 - 132/70)  RR: 18 (04-08-25 @ 08:02) (18 - 20)  SpO2: 97% (04-08-25 @ 08:02) (95% - 98%)    GENERAL: NAD, lying in bed comfortably  HEAD:  Atraumatic, normocephalic  EYES: EOMI, PERRLA, conjunctiva and sclera clear  ENT: Moist mucous membranes  NECK: Supple, no JVD  HEART: Regular rate and rhythm, no murmurs, rubs, or gallops  LUNGS: Unlabored respirations.  Clear to auscultation bilaterally, no crackles, wheezing, or rhonchi  ABDOMEN: Soft, nontender, nondistended, +BS  EXTREMITIES: Lt arm pitting edema, no lower extremity edema  NERVOUS SYSTEM:  A&Ox2, no focal deficits   SKIN: multiple skin bruises    LABS             11.3   12.27 )-----------( 234      ( 04-07-25 @ 12:10 )             36.4     142  |  113  |  38  -------------------------<  89   04-07-25 @ 12:10  5.3  |  19  |  1.1    Ca      8.9     04-07-25 @ 12:10    TPro  5.0  /  Alb  2.6  /  TBili  0.4  /  DBili  x   /  AST  73  /  ALT  45  /  AlkPhos  168  /  GGT  x     04-07-25 @ 12:10    PT/INR - ( 04-07-25 @ 12:10 )   PT: 21.40 sec[H];   INR: 1.79 ratio[H]  PTT - ( 04-07-25 @ 12:10 )  PTT:36.7 sec    Troponin T, High Sensitivity Result: 81 ng/L (04-07-25 @ 17:13)  Troponin T, High Sensitivity Result: 92 ng/L (04-07-25 @ 12:10)    Urinalysis Basic - ( 07 Apr 2025 12:10 )    Color: x / Appearance: x / SG: x / pH: x  Gluc: 89 mg/dL / Ketone: x  / Bili: x / Urobili: x   Blood: x / Protein: x / Nitrite: x   Leuk Esterase: x / RBC: x / WBC x   Sq Epi: x / Non Sq Epi: x / Bacteria: x          Urinalysis with Rflx Culture (collected 07 Apr 2025 12:05)      IMAGING

## 2025-04-08 NOTE — CONSULT NOTE ADULT - SUBJECTIVE AND OBJECTIVE BOX
INTERVAL HPI/OVERNIGHT EVENTS:  Patient is a 89 yo Female with PMH of chronic Afib (on Eliquis), HFpEF, L1 epithelial carcinoma s/p radiation, conus medullaris syndrome, recurrent UTIs, GERD, CKD 3b, dementia, orthostatic hypotension on midodrine, and FTT. Per daughter, patient has had progressive cognitive decline for several months with poor PO intake and poor sleep. Patient had acute decline mental status 2 days ago and was unable to open her Left eye or lift her Left arm. Patient has been on Macrobid prophylaxis for the past 2 weeks for recurrent UTIs, about 3 i the past year. Per daughter, Patient has had a cough for the past few weeks productive with thin yellow sputum. No trauma. Endorses myalgias, malaise, subjective fever. Denies N/V/D, sore throat, sick contact.     ID Hx: Most recent urine culture on       VITAL SIGNS:  T(F): 98 (04-08-25 @ 08:02)  HR: 71 (04-08-25 @ 08:02)  BP: 132/70 (04-08-25 @ 08:02)  RR: 18 (04-08-25 @ 08:02)  SpO2: 97% (04-08-25 @ 08:02)  Wt(kg): --    PHYSICAL EXAM:    Constitutional: NAD, well-groomed  Neck: No neck stiffness, No JVD  Respiratory: No crackles, wheezing. Decreased breath sounds at b/l base  CVS: Irregular rate and rhythm  Gastrointestinal: BS+, soft, nontender, nondistended  Extremities: nonpitting b/l LE edema, nonpitting Left upper extremity edema  Vascular: 2+ peripheral pulses  Neurological: A/O x 2, lower extremity hemiplegia  Musculoskeletal: 0/5 strength b/l lower extremities, 1/5 strength Left upper extremity, 4/5 Right upper extremity  Skin: Hematomas and ecchymosis on b/l lower extremities. Left upper extremity ecchymosis at old IV site.    MEDICATIONS  (STANDING):  apixaban 2.5 milliGRAM(s) Oral every 12 hours  cefepime   IVPB 2000 milliGRAM(s) IV Intermittent every 12 hours  lactated ringers. 1000 milliLiter(s) (60 mL/Hr) IV Continuous <Continuous>  metoprolol tartrate 25 milliGRAM(s) Oral every 12 hours  midodrine. 5 milliGRAM(s) Oral every 8 hours  multivitamin 1 Tablet(s) Oral daily  pantoprazole    Tablet 40 milliGRAM(s) Oral before breakfast    MEDICATIONS  (PRN):  acetaminophen     Tablet .. 650 milliGRAM(s) Oral every 6 hours PRN Temp greater or equal to 38C (100.4F), Mild Pain (1 - 3)  aluminum hydroxide/magnesium hydroxide/simethicone Suspension 30 milliLiter(s) Oral every 4 hours PRN Dyspepsia  melatonin 3 milliGRAM(s) Oral at bedtime PRN Insomnia  ondansetron Injectable 4 milliGRAM(s) IV Push every 8 hours PRN Nausea and/or Vomiting      Allergies    No Known Allergies    Intolerances        LABS:                        11.3   12.27 )-----------( 234      ( 07 Apr 2025 12:10 )             36.4     04-07    142  |  113[H]  |  38[H]  ----------------------------<  89  5.3[H]   |  19  |  1.1    Ca    8.9      07 Apr 2025 12:10    TPro  5.0[L]  /  Alb  2.6[L]  /  TBili  0.4  /  DBili  x   /  AST  73[H]  /  ALT  45[H]  /  AlkPhos  168[H]  04-07    PT/INR - ( 07 Apr 2025 12:10 )   PT: 21.40 sec;   INR: 1.79 ratio         PTT - ( 07 Apr 2025 12:10 )  PTT:36.7 sec  Urinalysis Basic - ( 07 Apr 2025 12:10 )    Color: x / Appearance: x / SG: x / pH: x  Gluc: 89 mg/dL / Ketone: x  / Bili: x / Urobili: x   Blood: x / Protein: x / Nitrite: x   Leuk Esterase: x / RBC: x / WBC x   Sq Epi: x / Non Sq Epi: x / Bacteria: x        RADIOLOGY & ADDITIONAL TESTS:      1) CXR with b/l pleural effusion and small left lung opacity    2) CT abdomen pelvis with IV cont :   1.  Circumferential mild edematous wall thickening of the bladder with   mural hyperemia and perivesicular stranding consistent with cystitis/UTI.  2.  Mild right hydroureteronephrosis with urothelial enhancement.   Multiple vascular calcifications are noted along the course of the ureter   howevernone appear to be within the ureter itself. Findings can be   secondary to recently passed stone or ascending infection. No definite CT   imaging evidence of pyelonephritis.  3.  More nodular enhancing appearance of the right distal ureter at the   level of the UVJ could also be infectious in etiology however a ureteral   neoplasm cannot be entirely excluded. Further evaluation can be   considered as clinically appropriate.  4.  Mild edematous wall thickening of the rectum which could represent   proctitis in the appropriate clinical setting.  5.  Partially imaged small bilateral pleural effusions and trace   pericardial effusion.    3) CTH and CTA neck and head :    CT HEAD:  No acute intracranial pathology. No evidence of midline shift, mass   effect or intracranial hemorrhage.    Moderate chronic microvascular type changes as well as chronic bilateral   cerebellar infarcts.    CTA HEAD:  No evidence of flow-limiting stenosis, occlusion or aneurysm.    CTA NECK:  No evidence of carotid or vertebral artery stenosis.    4)xray left shoulder   Acromioclavicular arthrosis. High riding humerus which could reflect   rotator cuff tear of uncertain age. Bones appear osteopenic with no acute   fracture or dislocation.    Glenohumeral arthritic changes.   HPI:  Patient is a 89 yo Female with PMH of chronic Afib on Eliquis, HFpEF, L1 epithelial carcinoma s/p radiation, conus medullaris syndrome, recurrent UTIs, GERD, CKD 3b, dementia, orthostatic hypotension on midodrine, and FTT. Per daughter, patient has had progressive cognitive decline for several months with poor PO intake and poor sleep. Patient had acute decline in mental status 2 days ago and was unable to open her Left eye or lift her Left arm. Patient has been on Macrobid prophylaxis for the past 2 weeks for recurrent UTIs, had x3 in the past year. Per daughter, Patient has had a cough for the past few weeks productive with thin yellow sputum. No trauma. Endorses myalgias, malaise, subjective fever. Denies N/V/D, sore throat, sick contacts.      VITAL SIGNS:  T(F): 98 (04-08-25 @ 08:02)  HR: 71 (04-08-25 @ 08:02)  BP: 132/70 (04-08-25 @ 08:02)  RR: 18 (04-08-25 @ 08:02)  SpO2: 97% (04-08-25 @ 08:02)  Wt(kg): --    PHYSICAL EXAM:    Constitutional: NAD, well-groomed  Neck: No neck stiffness, No JVD  Respiratory: No crackles, wheezing. Decreased breath sounds at b/l base  CVS: Irregular rate and rhythm  Gastrointestinal: BS+, soft, nontender, nondistended  Extremities: b/l LE nonpitting edema, Left hand nonpitting edema  Vascular: 2+ peripheral pulses  Neurological: A/O x 2, lower extremity hemiplegia  Musculoskeletal: 0/5 strength b/l lower extremities, 1/5 strength Left upper extremity, 4/5 Right upper extremity  Skin: Ecchymosis on b/l lower extremities and Left upper extremity at old IV site.    MEDICATIONS  (STANDING):  apixaban 2.5 milliGRAM(s) Oral every 12 hours  cefepime   IVPB 2000 milliGRAM(s) IV Intermittent every 12 hours  lactated ringers. 1000 milliLiter(s) (60 mL/Hr) IV Continuous <Continuous>  metoprolol tartrate 25 milliGRAM(s) Oral every 12 hours  midodrine. 5 milliGRAM(s) Oral every 8 hours  multivitamin 1 Tablet(s) Oral daily  pantoprazole    Tablet 40 milliGRAM(s) Oral before breakfast    MEDICATIONS  (PRN):  acetaminophen     Tablet .. 650 milliGRAM(s) Oral every 6 hours PRN Temp greater or equal to 38C (100.4F), Mild Pain (1 - 3)  aluminum hydroxide/magnesium hydroxide/simethicone Suspension 30 milliLiter(s) Oral every 4 hours PRN Dyspepsia  melatonin 3 milliGRAM(s) Oral at bedtime PRN Insomnia  ondansetron Injectable 4 milliGRAM(s) IV Push every 8 hours PRN Nausea and/or Vomiting      Allergies    No Known Drug Allergies          LABS:                        11.3   12.27 )-----------( 234      ( 07 Apr 2025 12:10 )             36.4     04-07    142  |  113[H]  |  38[H]  ----------------------------<  89  5.3[H]   |  19  |  1.1    Ca    8.9      07 Apr 2025 12:10    TPro  5.0[L]  /  Alb  2.6[L]  /  TBili  0.4  /  DBili  x   /  AST  73[H]  /  ALT  45[H]  /  AlkPhos  168[H]  04-07    PT/INR - ( 07 Apr 2025 12:10 )   PT: 21.40 sec;   INR: 1.79 ratio         PTT - ( 07 Apr 2025 12:10 )  PTT:36.7 sec  Urinalysis Basic - ( 07 Apr 2025 12:10 )    Color: x / Appearance: x / SG: x / pH: x  Gluc: 89 mg/dL / Ketone: x  / Bili: x / Urobili: x   Blood: Large / Protein: 3+ / Nitrite: x   Leuk Esterase: Large / RBC: 99 / WBC >998   Sq Epi: x / Non Sq Epi: x / Bacteria: Many        RADIOLOGY & ADDITIONAL TESTS:      CT ABDOMEN PELVIS  PROCEDURE DATE: 4/07/25  FINDINGS:  1.  Circumferential mild edematous wall thickening of the bladder with   mural hyperemia and perivesicular stranding consistent with cystitis/UTI.  2.  Mild right hydroureteronephrosis with urothelial enhancement.   Multiple vascular calcifications are noted along the course of the ureter   however none appear to be within the ureter itself. Findings can be   secondary to recently passed stone or ascending infection. No definite CT   imaging evidence of pyelonephritis.  3.  More nodular enhancing appearance of the right distal ureter at the   level of the UVJ could also be infectious in etiology however a ureteral   neoplasm cannot be entirely excluded. Further evaluation can be   considered as clinically appropriate.  4.  Mild edematous wall thickening of the rectum which could represent   proctitis in the appropriate clinical setting.  5.  Partially imaged small bilateral pleural effusions and trace   pericardial effusion.    CT HEAD  PROCEDURE DATE: 4/07/25  FINDINGS:  -No acute intracranial pathology. No evidence of midline shift, mass   effect or intracranial hemorrhage.  -Moderate chronic microvascular type changes as well as chronic bilateral   cerebellar infarcts.    CTA HEAD AND NECK  PROCEDURE DATE: 4/07/25  FINDINGS:  -No evidence of flow-limiting stenosis, occlusion or aneurysm.  -No evidence of carotid or vertebral artery stenosis.    XRAY LEFT SHOULDER  PROCEDURE DATE: 4/07/25  FINDINGS:  -Acromioclavicular arthrosis. High riding humerus which could reflect   rotator cuff tear of uncertain age. Bones appear osteopenic with no acute   fracture or dislocation.  -Glenohumeral arthritic changes.

## 2025-04-08 NOTE — CONSULT NOTE ADULT - ASSESSMENT
IMPRESSION:  Patient is a 89 yo Female with PMH of chronic Afib (on Eliquis), HFpEF, L1 epithelial carcinoma s/p radiation, conus medullaris syndrome, recurrent UTIs, GERD, CKD 3b, dementia, orthostatic hypotension on midodrine, and FTT. Patient currently on Macrobid prophylaxis for recurrent UTIs. Per daughter, Patient also has cough for the past few weeks productive with thin yellow sputum. Patient has had progressive cognitive decline for several months, but acute AMS began 2 days ago. Endorses myalgias, malaise, subjective fever. Denies N/V/D, sore throat, sick contact.       #UTI    #AMS    #    PLAN IMPRESSION:  Patient is a 89 yo Female with PMH of chronic Afib on Eliquis, HFpEF, L1 epithelial carcinoma s/p radiation, conus medullaris syndrome, recurrent UTIs, GERD, CKD 3b, dementia, orthostatic hypotension on midodrine, and FTT. Patient currently on Macrobid prophylaxis for recurrent UTIs. Per daughter, Patient also has a cough productive with thin yellow sputum for the past few weeks. Patient has had progressive cognitive decline for several months, but acute decline began 2 days ago. Endorses myalgias, malaise, subjective fever. Denies N/V/D, sore throat, sick contacts.    #Severe Sepsis 2/2 Complicated UTI  -On admission, Patient met criteria for severe sepsis. Interval improvement in vitals, no repeat WBC.  -UA positive for Large LE, >998 WBC, and many bacteria. CTAP revealed symmetric enhancement of kidneys, nodular enhancement of distal R ureter, and mild R hydronephrosis. -Patient is on Flomax for retention and has been on Macrobid prophylaxis for 2 weeks. Patient had 3 UTIs in the past year, but no reliable prior urine culture.    #AMS 2/2 ME  -Lactate trending down to 3.1, from 4.1 on admission. AMS likely in the setting of infection.  -Per daughter, Patient mental status is fluctuating, but overall improved from prior to admission.    #B/L Pleural Effusion  -CTAP revealed b/l pleural effusions (R > L). Patient has nonpitting b/l lower extremity edema and cough productive with thin yellow sputum.  -Likely 2/2 CHF exacerbation, not infectious.    #B/L Lower extremity ecchymosis  -Per daughter, began yesterday after Patient was moved.   -Likely 2/2 trauma rather than infectious.    PLAN  -Decrease Cefepime to 1 gram BID  -Apply silver sulfadiazine cream to b/l lower extremities under dressing  -Follow-up urine culture

## 2025-04-08 NOTE — CONSULT NOTE ADULT - ASSESSMENT
IMPRESSION:    AMS/ TME  Sepsis/ UTI  Lactic acidosis  bibasilar atelectasis/ effusion  HO afib (on Eliquis),  HO dementia  HI Orthostatic hypotension on midodrine     PLAN:    CNS: Avoid CNS depressant    HEENT:  Oral care    PULMONARY:  HOB @ 45 degrees, on NC aspiration precaution    CARDIOVASCULAR: dc IVF, trend LA, echo. midodrine    GI: GI prophylaxis                                          Feeding per speech    RENAL:  F/u  lytes.  Correct as needed. accurate I/O    INFECTIOUS DISEASE: abx, pancx    HEMATOLOGICAL:  DVT prophylaxis. on Eliquis    ENDOCRINE:  Follow up FS.  Insulin protocol if needed.  SDU    Poor prognosis

## 2025-04-08 NOTE — CONSULT NOTE ADULT - ASSESSMENT
left upper extremity swelling     no concerns septic joint on exam   doubt extremity swelling is due to orthopedic condition   can obtain hand xray for completion   elevation of hand encourage rom    follow up vascular studies of upper extremity   likely rotator cuff tear that can be treated conservatively with pain meds   no restriction on weight bearing and rom   medical management

## 2025-04-08 NOTE — CONSULT NOTE ADULT - SUBJECTIVE AND OBJECTIVE BOX
Orthopaedic Surgery Consult Note    For Surgeon:    HPI:  89yFemale  Patient is a 89y old  Female who presents with a chief complaint of sepsis (2025 09:38)    HPI:  89  year old F, pmhx of chronic afib (on Eliquis), GERD, dementia, FTT , Orthostatic hypotension on midodrine  Patient presents for evaluation of change in mental status.  Daughter states over the past few months patient has been having increasing cognitive issues including hallucinations poor sleep.  Daughter states that yesterday patient seemed to be getting sick and she was given Tylenol at night.  Patient is on Macrobid for UTI prophylaxis due to frequent UTIs.  This daughter had several concerns regarding patient including further decline in mental status/increased confusion/decreased ability to follow commands.  Daughter states it appeared like the left eye had not been opening as much is normal but now it is opening normally.  Patient also complained of left upper extremity pain and swelling.  Patient was unable to raise her left upper extremity secondary to pain.  No trauma.  Patient with decreased p.o. intake but was able to eat a little yesterday.   seen at bedside with daughter present.   pt started yesterday.   denies previous complaint of shoulder pain,   no injury     Vitals in ED:  BP 87/65    T 100.8  O2 97% RA    labs significant for:  CBC with Hb 11.3, WBC 12.2 K   CMP at baseline ( Cr 1.1), Mildly elevated alkphos 168  Trops 92>>81  Lactate 4.1>>4.3    UA +    Imagin) CXR with b/l pleural effusion and small left lung opacity    2) CT abdomen pelvis with IV cont :   1.  Circumferential mild edematous wall thickening of the bladder with   mural hyperemia and perivesicular stranding consistent with cystitis/UTI.  2.  Mild right hydroureteronephrosis with urothelial enhancement.   Multiple vascular calcifications are noted along the course of the ureter   howevernone appear to be within the ureter itself. Findings can be   secondary to recently passed stone or ascending infection. No definite CT   imaging evidence of pyelonephritis.  3.  More nodular enhancing appearance of the right distal ureter at the   level of the UVJ could also be infectious in etiology however a ureteral   neoplasm cannot be entirely excluded. Further evaluation can be   considered as clinically appropriate.  4.  Mild edematous wall thickening of the rectum which could represent   proctitis in the appropriate clinical setting.  5.  Partially imaged small bilateral pleural effusions and trace   pericardial effusion.    3) CTH and CTA neck and head :    CT HEAD:  No acute intracranial pathology. No evidence of midline shift, mass   effect or intracranial hemorrhage.    Moderate chronic microvascular type changes as well as chronicbilateral   cerebellar infarcts.    CTA HEAD:  No evidence of flow-limiting stenosis, occlusion or aneurysm.    CTA NECK:  No evidence of carotid or vertebral artery stenosis.    4)xray left shoulder   Acromioclavicular arthrosis. High riding humerus which could reflect   rotator cuff tear of uncertain age. Bones appear osteopenic with no acute   fracture or dislocation.    Glenohumeral arthritic changes.      patient given 2 L IV fluids ( with improvement in BP), Cefepime and vancomycin in ED , septic work up done, admitted to SDU    (2025 21:44)      Allergies    No Known Allergies    Intolerances      PAST MEDICAL & SURGICAL HISTORY:  Chronic atrial fibrillation      GERD (gastroesophageal reflux disease)      Chronic lower back pain      No significant past surgical history        MEDICATIONS  (STANDING):  apixaban 2.5 milliGRAM(s) Oral every 12 hours  cefepime   IVPB 2000 milliGRAM(s) IV Intermittent every 12 hours  lactated ringers. 1000 milliLiter(s) (60 mL/Hr) IV Continuous <Continuous>  metoprolol tartrate 25 milliGRAM(s) Oral three times a day  midodrine. 5 milliGRAM(s) Oral every 8 hours  multivitamin 1 Tablet(s) Oral daily  pantoprazole    Tablet 40 milliGRAM(s) Oral before breakfast    MEDICATIONS  (PRN):  acetaminophen     Tablet .. 650 milliGRAM(s) Oral every 6 hours PRN Temp greater or equal to 38C (100.4F), Mild Pain (1 - 3)  aluminum hydroxide/magnesium hydroxide/simethicone Suspension 30 milliLiter(s) Oral every 4 hours PRN Dyspepsia  melatonin 3 milliGRAM(s) Oral at bedtime PRN Insomnia  ondansetron Injectable 4 milliGRAM(s) IV Push every 8 hours PRN Nausea and/or Vomiting      Vital Signs Last 24 Hrs  T(C): 36.7 (2025 08:02), Max: 38.2 (2025 11:30)  T(F): 98 (2025 08:02), Max: 100.8 (2025 11:30)  HR: 71 (2025 08:02) (71 - 152)  BP: 132/70 (2025 08:02) (87/65 - 132/70)  BP(mean): 79 (2025 05:00) (69 - 79)  RR: 18 (2025 08:02) (18 - 22)  SpO2: 97% (2025 08:02) (95% - 98%)    Parameters below as of 2025 05:00  Patient On (Oxygen Delivery Method): nasal cannula  O2 Flow (L/min): 2      Physical Exam:  left upper extremity:    nl appearance of shoulder, no pain with micromotion,   pain with passive motion above 45 degrees, no pain with passive elbow rom, no pain at wrist with motion or fingers,   fingers and hand are swollen but pt open and closes hand,   no erythema present,   small skin tear lateral elbow,   forearm is wrapped in kerlix for iv protection, was told skin intact about the forearm, can open and close hand decent strength                         11.3   12.27 )-----------( 234      ( 2025 12:10 )             36.4     04-07    142  |  113[H]  |  38[H]  ----------------------------<  89  5.3[H]   |  19  |  1.1    Ca    8.9      2025 12:10    TPro  5.0[L]  /  Alb  2.6[L]  /  TBili  0.4  /  DBili  x   /  AST  73[H]  /  ALT  45[H]  /  AlkPhos  168[H]  04-07    PT/INR - ( 2025 12:10 )   PT: 21.40 sec;   INR: 1.79 ratio         PTT - ( 2025 12:10 )  PTT:36.7 sec  Imaging: reviewed no fx seen, high riding humerus consistent with chronic rtc          Orthopaedic Surgery Consult Note    For Surgeon:    HPI:  89yFemale  Patient is a 89y old  Female who presents with a chief complaint of sepsis (2025 09:38)    HPI:  89  year old F, pmhx of chronic afib (on Eliquis), GERD, dementia, FTT , Orthostatic hypotension on midodrine  Patient presents for evaluation of change in mental status.  Daughter states over the past few months patient has been having increasing cognitive issues including hallucinations poor sleep.  Daughter states that yesterday patient seemed to be getting sick and she was given Tylenol at night.  Patient is on Macrobid for UTI prophylaxis due to frequent UTIs.  This daughter had several concerns regarding patient including further decline in mental status/increased confusion/decreased ability to follow commands.  Daughter states it appeared like the left eye had not been opening as much is normal but now it is opening normally.  Patient also complained of left upper extremity pain and swelling.  Patient was unable to raise her left upper extremity secondary to pain.  No trauma.  Patient with decreased p.o. intake but was able to eat a little yesterday.   seen at bedside with daughter present.   pt started yesterday.   denies previous complaint of shoulder pain,   no injury     Vitals in ED:  BP 87/65    T 100.8  O2 97% RA    labs significant for:  CBC with Hb 11.3, WBC 12.2 K   CMP at baseline ( Cr 1.1), Mildly elevated alkphos 168  Trops 92>>81  Lactate 4.1>>4.3    UA +    Imagin) CXR with b/l pleural effusion and small left lung opacity    2) CT abdomen pelvis with IV cont :   1.  Circumferential mild edematous wall thickening of the bladder with   mural hyperemia and perivesicular stranding consistent with cystitis/UTI.  2.  Mild right hydroureteronephrosis with urothelial enhancement.   Multiple vascular calcifications are noted along the course of the ureter   howevernone appear to be within the ureter itself. Findings can be   secondary to recently passed stone or ascending infection. No definite CT   imaging evidence of pyelonephritis.  3.  More nodular enhancing appearance of the right distal ureter at the   level of the UVJ could also be infectious in etiology however a ureteral   neoplasm cannot be entirely excluded. Further evaluation can be   considered as clinically appropriate.  4.  Mild edematous wall thickening of the rectum which could represent   proctitis in the appropriate clinical setting.  5.  Partially imaged small bilateral pleural effusions and trace   pericardial effusion.    3) CTH and CTA neck and head :    CT HEAD:  No acute intracranial pathology. No evidence of midline shift, mass   effect or intracranial hemorrhage.    Moderate chronic microvascular type changes as well as chronicbilateral   cerebellar infarcts.    CTA HEAD:  No evidence of flow-limiting stenosis, occlusion or aneurysm.    CTA NECK:  No evidence of carotid or vertebral artery stenosis.    4)xray left shoulder   Acromioclavicular arthrosis. High riding humerus which could reflect   rotator cuff tear of uncertain age. Bones appear osteopenic with no acute   fracture or dislocation.    Glenohumeral arthritic changes.      patient given 2 L IV fluids ( with improvement in BP), Cefepime and vancomycin in ED , septic work up done, admitted to SDU    (2025 21:44)      Allergies    No Known Allergies    Intolerances      PAST MEDICAL & SURGICAL HISTORY:  Chronic atrial fibrillation      GERD (gastroesophageal reflux disease)      Chronic lower back pain      No significant past surgical history        MEDICATIONS  (STANDING):  apixaban 2.5 milliGRAM(s) Oral every 12 hours  cefepime   IVPB 2000 milliGRAM(s) IV Intermittent every 12 hours  lactated ringers. 1000 milliLiter(s) (60 mL/Hr) IV Continuous <Continuous>  metoprolol tartrate 25 milliGRAM(s) Oral three times a day  midodrine. 5 milliGRAM(s) Oral every 8 hours  multivitamin 1 Tablet(s) Oral daily  pantoprazole    Tablet 40 milliGRAM(s) Oral before breakfast    MEDICATIONS  (PRN):  acetaminophen     Tablet .. 650 milliGRAM(s) Oral every 6 hours PRN Temp greater or equal to 38C (100.4F), Mild Pain (1 - 3)  aluminum hydroxide/magnesium hydroxide/simethicone Suspension 30 milliLiter(s) Oral every 4 hours PRN Dyspepsia  melatonin 3 milliGRAM(s) Oral at bedtime PRN Insomnia  ondansetron Injectable 4 milliGRAM(s) IV Push every 8 hours PRN Nausea and/or Vomiting      Vital Signs Last 24 Hrs  T(C): 36.7 (2025 08:02), Max: 38.2 (2025 11:30)  T(F): 98 (2025 08:02), Max: 100.8 (2025 11:30)  HR: 71 (2025 08:02) (71 - 152)  BP: 132/70 (2025 08:02) (87/65 - 132/70)  BP(mean): 79 (2025 05:00) (69 - 79)  RR: 18 (2025 08:02) (18 - 22)  SpO2: 97% (2025 08:02) (95% - 98%)    Parameters below as of 2025 05:00  Patient On (Oxygen Delivery Method): nasal cannula  O2 Flow (L/min): 2      Physical Exam:  left upper extremity:    nl appearance of shoulder, no pain with micromotion,   pain with passive motion above 45 degrees, no pain with passive elbow rom, no pain at wrist with motion or fingers,   fingers and hand are swollen but pt open and closes hand,   no erythema present,   small skin tear lateral elbow,   forearm is wrapped in kerlix for iv protection, examined again and has no swelling of forearm, has large superficial skin tear over forearm , can open and close hand decent strength                         11.3   12.27 )-----------( 234      ( 2025 12:10 )             36.4     04-07    142  |  113[H]  |  38[H]  ----------------------------<  89  5.3[H]   |  19  |  1.1    Ca    8.9      2025 12:10    TPro  5.0[L]  /  Alb  2.6[L]  /  TBili  0.4  /  DBili  x   /  AST  73[H]  /  ALT  45[H]  /  AlkPhos  168[H]  04-07    PT/INR - ( 2025 12:10 )   PT: 21.40 sec;   INR: 1.79 ratio         PTT - ( 2025 12:10 )  PTT:36.7 sec  Imaging: reviewed no fx seen, high riding humerus consistent with chronic rtc          Normal vision: sees adequately in most situations; can see medication labels, newsprint

## 2025-04-09 NOTE — ADVANCED PRACTICE NURSE CONSULT - RECOMMEDATIONS
Cleanse wound with soap and water, pat dry.  Apply Medihoney twice daily PRN for soiling, cover with xeroform and abdominal pad.  Apply Triad to groin, bilateral buttocks as well as sacrum twice daily PRN for soiling. Leave open to air.   Apply Silvadene to skin tears and cover with xeroform/ kerlex wrap daily.   Avoid adhesive use as patient skin is very fragile.   Skin and incontinence care.  Pressure Injury Prevention.  Assess wound daily and inform primary provider of any changes.   Case discussed with primary RN.  Wound/ ostomy specialist to f/u as needed.   Other recommendations per Primary Team.

## 2025-04-09 NOTE — PROGRESS NOTE ADULT - SUBJECTIVE AND OBJECTIVE BOX
FILEMON, SUNNY  89y, Female    All available historical data reviewed    OVERNIGHT EVENTS:  none  no fevers  NC 2 lit      ROS:  NAD at rest  Does respond  ROS not reliable  no pressors  no diarrhea  no hanson    VITALS:  T(F): 97.1, Max: 97.9 (04-08-25 @ 16:13)  HR: 127  BP: 89/59  RR: 20Vital Signs Last 24 Hrs  T(C): 36.2 (09 Apr 2025 04:30), Max: 36.6 (08 Apr 2025 16:13)  T(F): 97.1 (09 Apr 2025 04:30), Max: 97.9 (08 Apr 2025 16:13)  HR: 127 (09 Apr 2025 09:45) (111 - 145)  BP: 89/59 (09 Apr 2025 09:45) (66/47 - 112/57)  BP(mean): 69 (09 Apr 2025 09:45) (58 - 76)  RR: 20 (09 Apr 2025 08:29) (18 - 20)  SpO2: 97% (09 Apr 2025 08:29) (97% - 99%)    Parameters below as of 08 Apr 2025 16:13  Patient On (Oxygen Delivery Method): nasal cannula  O2 Flow (L/min): 3      TESTS & MEASUREMENTS:                        11.1   7.84  )-----------( 186      ( 09 Apr 2025 04:45 )             34.6     04-09    138  |  106  |  34[H]  ----------------------------<  82  5.3[H]   |  16[L]  |  1.0    Ca    9.2      09 Apr 2025 04:45  Mg     2.3     04-09    TPro  4.5[L]  /  Alb  2.6[L]  /  TBili  0.5  /  DBili  x   /  AST  21  /  ALT  28  /  AlkPhos  149[H]  04-09    LIVER FUNCTIONS - ( 09 Apr 2025 04:45 )  Alb: 2.6 g/dL / Pro: 4.5 g/dL / ALK PHOS: 149 U/L / ALT: 28 U/L / AST: 21 U/L / GGT: x             Urinalysis with Rflx Culture (collected 04-07-25 @ 12:05)    Culture - Urine (collected 04-07-25 @ 12:05)  Source: Catheterized None  Preliminary Report (04-08-25 @ 21:00):    >100,000 CFU/ml Enterococcus faecium    Culture - Blood (collected 04-07-25 @ 12:02)  Source: Blood Blood-Peripheral  Preliminary Report (04-08-25 @ 22:02):    No growth at 24 hours    Culture - Blood (collected 04-07-25 @ 12:02)  Source: Blood Blood-Peripheral  Preliminary Report (04-08-25 @ 22:02):    No growth at 24 hours      Urinalysis Basic - ( 09 Apr 2025 04:45 )    Color: x / Appearance: x / SG: x / pH: x  Gluc: 82 mg/dL / Ketone: x  / Bili: x / Urobili: x   Blood: x / Protein: x / Nitrite: x   Leuk Esterase: x / RBC: x / WBC x   Sq Epi: x / Non Sq Epi: x / Bacteria: x          Social History:  Tobacco Use: No  Alcohol Use: No  Drug Use: No    RADIOLOGY & ADDITIONAL TESTS:  Personal review of radiological diagnostics performed  Echo and EKG results noted when applicable.     MEDICATIONS:  acetaminophen     Tablet .. 650 milliGRAM(s) Oral every 6 hours PRN  acetaminophen   IVPB .. 1000 milliGRAM(s) IV Intermittent once  aluminum hydroxide/magnesium hydroxide/simethicone Suspension 30 milliLiter(s) Oral every 4 hours PRN  apixaban 2.5 milliGRAM(s) Oral every 12 hours  cefepime   IVPB 1000 milliGRAM(s) IV Intermittent every 12 hours  chlorhexidine 2% Cloths 1 Application(s) Topical <User Schedule>  influenza  Vaccine (HIGH DOSE) 0.5 milliLiter(s) IntraMuscular once  lactated ringers. 1000 milliLiter(s) IV Continuous <Continuous>  melatonin 3 milliGRAM(s) Oral at bedtime PRN  metoprolol tartrate 25 milliGRAM(s) Oral three times a day  midodrine 10 milliGRAM(s) Oral every 8 hours  multivitamin 1 Tablet(s) Oral daily  ondansetron Injectable 4 milliGRAM(s) IV Push every 8 hours PRN  pantoprazole    Tablet 40 milliGRAM(s) Oral before breakfast  phenylephrine    Infusion 0.1 MICROgram(s)/kG/Min IV Continuous <Continuous>  silver sulfADIAZINE 1% Cream 1 Application(s) Topical two times a day      ANTIBIOTICS:  cefepime   IVPB 1000 milliGRAM(s) IV Intermittent every 12 hours

## 2025-04-09 NOTE — PROGRESS NOTE ADULT - SUBJECTIVE AND OBJECTIVE BOX
SUBJECTIVE/OVERNIGHT EVENTS  Today is hospital day 2d. This morning patient was seen and examined at bedside, resting comfortably in bed. No acute or major events overnight.      MEDICATIONS  STANDING MEDICATIONS  acetaminophen   IVPB .. 1000 milliGRAM(s) IV Intermittent once  apixaban 2.5 milliGRAM(s) Oral every 12 hours  cefepime   IVPB 1000 milliGRAM(s) IV Intermittent every 12 hours  chlorhexidine 2% Cloths 1 Application(s) Topical <User Schedule>  chlorhexidine 2% Cloths 1 Application(s) Topical <User Schedule>  influenza  Vaccine (HIGH DOSE) 0.5 milliLiter(s) IntraMuscular once  lactated ringers. 1000 milliLiter(s) IV Continuous <Continuous>  linezolid    Tablet 600 milliGRAM(s) Oral every 12 hours  metoprolol tartrate 25 milliGRAM(s) Oral three times a day  midodrine 10 milliGRAM(s) Oral every 8 hours  multivitamin 1 Tablet(s) Oral daily  pantoprazole    Tablet 40 milliGRAM(s) Oral before breakfast  phenylephrine    Infusion 0.1 MICROgram(s)/kG/Min IV Continuous <Continuous>  silver sulfADIAZINE 1% Cream 1 Application(s) Topical two times a day    PRN MEDICATIONS  acetaminophen     Tablet .. 650 milliGRAM(s) Oral every 6 hours PRN  aluminum hydroxide/magnesium hydroxide/simethicone Suspension 30 milliLiter(s) Oral every 4 hours PRN  melatonin 3 milliGRAM(s) Oral at bedtime PRN  ondansetron Injectable 4 milliGRAM(s) IV Push every 8 hours PRN    VITALS  T(F): 97.1 (04-09-25 @ 04:30), Max: 97.9 (04-08-25 @ 16:13)  HR: 127 (04-09-25 @ 09:45) (111 - 145)  BP: 89/59 (04-09-25 @ 09:45) (66/47 - 112/57)  RR: 20 (04-09-25 @ 08:29) (18 - 20)  SpO2: 97% (04-09-25 @ 08:29) (97% - 99%)    PHYSICAL EXAM  GENERAL  (  ) NAD, lying in bed comfortably     ( x ) obtunded     (  ) lethargic     (  ) somnolent    HEAD  (  ) Atraumatic     (  ) hematoma     (  ) laceration (specify location:       )     NECK  (  ) Supple     (  ) neck stiffness     (  ) nuchal rigidity     (  )  no JVD     (  ) JVD present ( -- cm)    HEART  Rate -->  (  ) normal rate    (  ) bradycardic    (x  ) tachycardic  Rhythm -->  (  ) regular    (  ) regularly irregular    ( x ) irregularly irregular  Murmurs -->  (  ) normal s1/s2    (  ) systolic murmur    (  ) diastolic murmur    (  ) continuous murmur     (  ) S3 present    (  ) S4 present    LUNGS  (  )Unlabored respirations     (  ) tachypnea  ( x ) B/L air entry     (  ) decreased breath sounds in:  (location     )    (  ) no adventitious sound     (  ) crackles     (  ) wheezing      (  ) rhonchi      (specify location:       )  (  ) chest wall tenderness (specify location:       )    ABDOMEN  ( x ) Soft     (  ) tense   |   (  ) nondistended     (  ) distended   |   (  ) +BS     (  ) hypoactive bowel sounds     (  ) hyperactive bowel sounds  (  ) nontender     (  ) RUQ tenderness     (  ) RLQ tenderness     (  ) LLQ tenderness     (  ) epigastric tenderness     (  ) diffuse tenderness  (  ) Splenomegaly      (  ) Hepatomegaly      (  ) Jaundice     (  ) ecchymosis     EXTREMITIES  (  ) Normal     (  ) Rash     (  ) ecchymosis     (  ) varicose veins      (  ) pitting edema     (  ) non-pitting edema   (  ) ulceration     (  ) gangrene:     (location:     )  - wound pn bl LE and R UE    NERVOUS SYSTEM  (  ) A&Ox3     ( x ) confused     (  ) lethargic  CN II-XII:     (  ) Intact     (  ) focal deficits  (Specify:     )   Upper extremities:     (  ) strength X/5     (  ) focal deficit (specify:    )  Lower extremities:     (  ) strength  X/5    (  ) focal deficit (specify:    )  LABS             11.1   7.84  )-----------( 186      ( 04-09-25 @ 04:45 )             34.6     138  |  106  |  34  -------------------------<  82   04-09-25 @ 04:45  5.3  |  16  |  1.0    Ca      9.2     04-09-25 @ 04:45  Mg     2.3     04-09-25 @ 04:45    TPro  4.5  /  Alb  2.6  /  TBili  0.5  /  DBili  x   /  AST  21  /  ALT  28  /  AlkPhos  149  /  GGT  x     04-09-25 @ 04:45    PT/INR - ( 04-07-25 @ 12:10 )   PT: 21.40 sec[H];   INR: 1.79 ratio[H]  PTT - ( 04-07-25 @ 12:10 )  PTT:36.7 sec    Troponin T, High Sensitivity Result: 81 ng/L (04-07-25 @ 17:13)  Troponin T, High Sensitivity Result: 92 ng/L (04-07-25 @ 12:10)    Urinalysis Basic - ( 09 Apr 2025 04:45 )    Color: x / Appearance: x / SG: x / pH: x  Gluc: 82 mg/dL / Ketone: x  / Bili: x / Urobili: x   Blood: x / Protein: x / Nitrite: x   Leuk Esterase: x / RBC: x / WBC x   Sq Epi: x / Non Sq Epi: x / Bacteria: x          Urinalysis with Rflx Culture (collected 07 Apr 2025 12:05)    Culture - Urine (collected 07 Apr 2025 12:05)  Source: Catheterized None  Preliminary Report (08 Apr 2025 21:00):    >100,000 CFU/ml Enterococcus faecium    Culture - Blood (collected 07 Apr 2025 12:02)  Source: Blood Blood-Peripheral  Preliminary Report (08 Apr 2025 22:02):    No growth at 24 hours    Culture - Blood (collected 07 Apr 2025 12:02)  Source: Blood Blood-Peripheral  Preliminary Report (08 Apr 2025 22:02):    No growth at 24 hours      IMAGING

## 2025-04-09 NOTE — PROGRESS NOTE ADULT - ASSESSMENT
IMPRESSION:  Patient is a 89 yo Female with PMH of chronic Afib on Eliquis, HFpEF, L1 epithelial carcinoma s/p radiation, conus medullaris syndrome, recurrent UTIs, GERD, CKD 3b, dementia, orthostatic hypotension on midodrine, and FTT. Patient currently on Macrobid prophylaxis for recurrent UTIs. Per daughter, Patient also has a cough productive with thin yellow sputum for the past few weeks. Patient has had progressive cognitive decline for several months, but acute decline began 2 days ago. Endorses myalgias, malaise, subjective fever. Denies N/V/D, sore throat, sick contacts.    Independent history obtained from her daughter Marlys  at the bedside   ID consulted for diagnostic work up and antimicrobial treatment of sepsis     IMPRESSION/RECOMMENDATIONS  Immunosuppression/Immunosenescence ( above age 60 yrs there is a exponential decline in immunity which could result in poor clinical outcomes.   Acute illness  ( severe sepsis ) which poses a threat to life or bodily function without treatment   Severe sepsis : resolving  Acute pyelonephritis with mild right hydroureteronephrosis   4/7 BCX NG  4/7 UCx E faeium  Metabolic encephalopathy    < from: CT Abdomen and Pelvis w/ IV Cont (04.07.25 @ 13:22) > ( Independent interpretation of test : no bacterial PNA  1.  Circumferential mild edematous wall thickening of the bladder with mural hyperemia and perivesicular stranding consistent with cystitis/UTI.  2.  Mild right hydroureteronephrosis with urothelial enhancement. Multiple vascular calcifications are noted along the course of the ureter   however none appear to be within the ureter itself. Findings can be secondary to recently passed stone or ascending infection. No definite CT imaging evidence of pyelonephritis.  3.  More nodular enhancing appearance of the right distal ureter at the level of the UVJ could also be infectious in etiology however a ureteral   neoplasm cannot be entirely excluded. Further evaluation can be considered as clinically appropriate.  4.  Mild edematous wall thickening of the rectum which could represent   proctitis in the appropriate clinical setting.  5.  Partially imaged small bilateral pleural effusions and trace pericardial effusion.    < end of copied text >    4/7 CXR left pleural effusion : ( Independent interpretation of test : no bacterial PNA    4/7 COVID 19/ Influenza/ RSV NG.   4/7 Nares ORSa NG  WBC 7.8    chronic Afib on Eliquis  HFpEF  L1 epithelial carcinoma s/p radiation, conus medullaris syndrome  recurrent UTIs      #B/L Lower extremity ecchymosis    -continue with Cefepime to 1 gm iv q12h  -add po Linezolid 600 mg q12h. Linezolid will be monitored daily for myelosuppression especially thrombocytopenia by checking the CBC daily   -Apply silver sulfadiazine cream to b/l lower extremities under dressing  -Follow-up final urine culture    Discussion of management/test results( independently interpretated by me ) /antibiotic regimen  with external/primary medical team. Dr Goel  Discussion of management/test results( independently interpretated by me ) /antibiotic regimen  with her daughter Marlys at the bedside .

## 2025-04-09 NOTE — PROGRESS NOTE ADULT - SUBJECTIVE AND OBJECTIVE BOX
Over Night Events: events noted, afebrile, on NC. ID/ ortho noted, afebrile    PHYSICAL EXAM    ICU Vital Signs Last 24 Hrs  T(C): 36.2 (09 Apr 2025 04:30), Max: 36.7 (08 Apr 2025 08:02)  T(F): 97.1 (09 Apr 2025 04:30), Max: 98 (08 Apr 2025 08:02)  HR: 129 (09 Apr 2025 04:30) (71 - 145)  BP: 103/55 (09 Apr 2025 04:30) (102/62 - 132/70)  BP(mean): 76 (09 Apr 2025 04:30) (72 - 76)  RR: 20 (09 Apr 2025 04:30) (18 - 20)  SpO2: 99% (09 Apr 2025 04:30) (97% - 99%)    O2 Parameters below as of 08 Apr 2025 16:13  Patient On (Oxygen Delivery Method): nasal cannula  O2 Flow (L/min): 3          General: ill looking  Lungs: dec bs both bases  Cardiovascular: SE 2.6  Abdomen: Soft, Positive BS  Extremities: No clubbing   Neurological: Non focal       04-08-25 @ 07:01  -  04-09-25 @ 07:00  --------------------------------------------------------  IN:    Lactated Ringers: 360 mL  Total IN: 360 mL    OUT:    Blood Loss (mL): 100 mL    Voided (mL): 150 mL  Total OUT: 250 mL    Total NET: 110 mL          LABS:                          11.1   7.84  )-----------( 186      ( 09 Apr 2025 04:45 )             34.6                                               04-09    138  |  106  |  34[H]  ----------------------------<  82  5.3[H]   |  16[L]  |  1.0    Ca    9.2      09 Apr 2025 04:45  Mg     2.3     04-09    TPro  4.5[L]  /  Alb  2.6[L]  /  TBili  0.5  /  DBili  x   /  AST  21  /  ALT  28  /  AlkPhos  149[H]  04-09      PT/INR - ( 07 Apr 2025 12:10 )   PT: 21.40 sec;   INR: 1.79 ratio         PTT - ( 07 Apr 2025 12:10 )  PTT:36.7 sec                                       Urinalysis Basic - ( 09 Apr 2025 04:45 )    Color: x / Appearance: x / SG: x / pH: x  Gluc: 82 mg/dL / Ketone: x  / Bili: x / Urobili: x   Blood: x / Protein: x / Nitrite: x   Leuk Esterase: x / RBC: x / WBC x   Sq Epi: x / Non Sq Epi: x / Bacteria: x                                                  LIVER FUNCTIONS - ( 09 Apr 2025 04:45 )  Alb: 2.6 g/dL / Pro: 4.5 g/dL / ALK PHOS: 149 U/L / ALT: 28 U/L / AST: 21 U/L / GGT: x                                                  Urinalysis with Rflx Culture (collected 07 Apr 2025 12:05)    Culture - Urine (collected 07 Apr 2025 12:05)  Source: Catheterized None  Preliminary Report (08 Apr 2025 21:00):    >100,000 CFU/ml Enterococcus faecium    Culture - Blood (collected 07 Apr 2025 12:02)  Source: Blood Blood-Peripheral  Preliminary Report (08 Apr 2025 22:02):    No growth at 24 hours    Culture - Blood (collected 07 Apr 2025 12:02)  Source: Blood Blood-Peripheral  Preliminary Report (08 Apr 2025 22:02):    No growth at 24 hours                                                                                           MEDICATIONS  (STANDING):  acetaminophen   IVPB .. 1000 milliGRAM(s) IV Intermittent once  apixaban 2.5 milliGRAM(s) Oral every 12 hours  cefepime   IVPB 1000 milliGRAM(s) IV Intermittent every 12 hours  chlorhexidine 2% Cloths 1 Application(s) Topical <User Schedule>  influenza  Vaccine (HIGH DOSE) 0.5 milliLiter(s) IntraMuscular once  lactated ringers. 1000 milliLiter(s) (60 mL/Hr) IV Continuous <Continuous>  metoprolol tartrate 25 milliGRAM(s) Oral three times a day  midodrine. 5 milliGRAM(s) Oral every 8 hours  multivitamin 1 Tablet(s) Oral daily  pantoprazole    Tablet 40 milliGRAM(s) Oral before breakfast  silver sulfADIAZINE 1% Cream 1 Application(s) Topical two times a day  sodium zirconium cyclosilicate 10 Gram(s) Oral once    MEDICATIONS  (PRN):  acetaminophen     Tablet .. 650 milliGRAM(s) Oral every 6 hours PRN Temp greater or equal to 38C (100.4F), Mild Pain (1 - 3)  aluminum hydroxide/magnesium hydroxide/simethicone Suspension 30 milliLiter(s) Oral every 4 hours PRN Dyspepsia  melatonin 3 milliGRAM(s) Oral at bedtime PRN Insomnia  ondansetron Injectable 4 milliGRAM(s) IV Push every 8 hours PRN Nausea and/or Vomiting

## 2025-04-09 NOTE — PROGRESS NOTE ADULT - ASSESSMENT
IMPRESSION:    AMS/ TME  Sepsis/ UTI/ Cx noted  Lactic acidosis improved  Metabolic acidosis  bibasilar atelectasis/ effusion  HO afib (on Eliquis),  HO dementia  HI Orthostatic hypotension on midodrine     PLAN:    CNS: Avoid CNS depressant    HEENT:  Oral care    PULMONARY:  HOB @ 45 degrees, on NC aspiration precaution    CARDIOVASCULAR: echo. midodrine    GI: GI prophylaxis                                          Feeding per speech    RENAL:  F/u  lytes.  Correct as needed. accurate I/O, repeat CMP    INFECTIOUS DISEASE: abx, pancx    HEMATOLOGICAL:  DVT prophylaxis. on Eliquis    ENDOCRINE:  Follow up FS.  Insulin protocol if needed.  SDU    Poor prognosis

## 2025-04-09 NOTE — PROGRESS NOTE ADULT - ASSESSMENT
89  year old F, pmhx of chronic afib (on Eliquis), GERD, dementia, FTT , Orthostatic hypotension on midodrine ,Patient presents for evaluation of change in mental status.  Daughter states over the past few months patient has been having increasing cognitive issues including hallucinations poor sleep.  Daughter states that yesterday patient seemed to be getting sick and she was given Tylenol at night.  Patient is on Macrobid for UTI prophylaxis due to frequent UTIs.  This daughter had several concerns regarding patient including further decline in mental status/increased confusion/decreased ability to follow commands.  Daughter states it appeared like the left eye had not been opening as much is normal but now it is opening normally.  Patient also complained of left upper extremity pain and swelling.  Patient was unable to raise her left upper extremity secondary to pain.  No trauma.  Patient with decreased p.o. intake but was able to eat a little yesterday. Admitted to SDU for sepsis secondary to complicated UTI/Pylenephritis      A/P   #TME 2/2 Severe Sepsis present on admission secondary to acute pyelonephritis/ Hx of recurrent UTIs/ lactic acidosis likley 2/2 Sepsis and hypotension   - BP improved today after fluid resuscitation   - ID is following, recommendations noted:   -continue with Cefepime to 1 gm iv q12h  -add po Linezolid 600 mg q12h. Linezolid will be monitored daily for myelosuppression especially thrombocytopenia by checking the CBC daily   -Apply silver sulfadiazine cream to b/l lower extremities under dressing  -Follow-up final urine culture  - keep MAP above 65, start Levophed if MAP is below   - on Midodrine 10 mg Q 8 hours   - HR control   - mental status improved     #Sacral ulcer  - unstageable sacral ulcer with minimal white discharge.  - wound care eval   - change position Q 2 hours, off load pressure points   - consult dietitian     #Left shoulder pain and UE swelling  - can start Lidocaine topical   - Xray shoulder : Acromioclavicular arthrosis. High riding humerus which could reflect rotator cuff tear of uncertain age. Bones appear osteopenic with no acute fracture or dislocation. Glenohumeral arthritic changes.  - f/u LUE duplex negative for DVT   - keep LUE elevated     #Hx of Lumbar Compression Fracture  #Hx of Pathologic L1 vertebrae fracture  #Hx of Conus Medullaris Syndrome  - was on  gabapentin and robaxin,  tylenol PRN at home   - held on admission, will reevaluate in 24 hours    - OP neurosurgery f/u     #chronic HFpEF / PHTN   - not in exacerbation   - TTE 1/28/2024- EF 60-65% mod pulm HTN   - Currently euvolemic on exam  - monitor for fluid oveload     #Chronic Atrial Fibrillation with acute RVR  -CHADS2-VASC = 5.   -c/w Eliquis 2.5 mg twice daily   - resume  metoprolol 25mg three times daily with holding parameters     # GI prophylaxis     # Dispo ; SDU     I spoke with pt's daughter at the bedside, she agreed with a plan of care

## 2025-04-09 NOTE — PHARMACOTHERAPY INTERVENTION NOTE - COMMENTS
Recommended to initiate cefepime dosed at 1g q12h for UTI as per ID. Intervention pending.      Rebecca Wang PharmD   Clinical Pharmacy Specialist, Infectious Diseases  Tele-Antim[facility] Stewardship Program (Tele-ASP)  Tele-ASP Phone: [phone]    Recommended to initiate cefepime dosed at 1g q12h for UTI as per ID.      Rebecca Wang PharmD   Clinical Pharmacy Specialist, Infectious Diseases  Tele-Antimicrobial Stewardship Program (Tele-[facility])  Tele-ASP Phone: [phone]

## 2025-04-09 NOTE — PROGRESS NOTE ADULT - SUBJECTIVE AND OBJECTIVE BOX
89  year old F, pmhx of chronic afib (on Eliquis), GERD, dementia, FTT , Orthostatic hypotension on midodrine  Patient presents for evaluation of change in mental status.  Daughter states over the past few months patient has been having increasing cognitive issues including hallucinations poor sleep.  Daughter states that  patient seemed to be getting sick and she was given Tylenol at night.  Patient is on Macrobid for UTI prophylaxis due to frequent UTIs.  This daughter had several concerns regarding patient including further decline in mental status/increased confusion/decreased ability to follow commands.  Daughter states it appeared like the left eye had not been opening as much is normal but now it is opening normally.  Patient also complained of left upper extremity pain and swelling.  Patient was unable to raise her left upper extremity secondary to pain.  No trauma.  Patient with decreased p.o. intake.   Pt was admitted to SDU with Acute pyelonephritis with mild right hydroureteronephrosis , urine Cx is growing  E faeium, she has Metabolic encephalopathy with underlying dementia.   Today she was found to have A-fib with RVR, was hypotensive, BP improved after fluid resuscitation HR controlled with BB. ID consulted and following, po zyvox was added to Abx regimen.     PAST MEDICAL & SURGICAL HISTORY:  Chronic atrial fibrillation      GERD (gastroesophageal reflux disease)      Chronic lower back pain      No significant past surgical history          Vital Signs Last 24 Hrs  T(C): 36.1 (09 Apr 2025 16:00), Max: 36.4 (09 Apr 2025 12:00)  T(F): 96.9 (09 Apr 2025 16:00), Max: 97.5 (09 Apr 2025 12:00)  HR: 106 (09 Apr 2025 17:27) (85 - 145)  BP: 93/57 (09 Apr 2025 17:27) (66/47 - 196/99)  BP(mean): 68 (09 Apr 2025 17:27) (57 - 140)  RR: 20 (09 Apr 2025 16:00) (18 - 20)  SpO2: 97% (09 Apr 2025 16:00) (97% - 99%)    Parameters below as of 09 Apr 2025 16:00  Patient On (Oxygen Delivery Method): nasal cannula      PHYSICAL EXAM:  GENERAL: NAD, frail with temporal muscle waisting   HEAD:  Atraumatic, Normocephalic  NECK: Supple, No JVD, Normal thyroid  NERVOUS SYSTEM:  Awake, answering questions, hard of hearing, moving all extremities   CHEST/LUNG: decreased BS at bases   HEART: irregularly irregular, tachycardic   ABDOMEN: Soft, Nontender, Nondistended; Bowel sounds present  EXTREMITIES:  dressing noted on LE b/l below knees   SKIN: paper thin skin with multiply skin tears on UE       LABS:                        11.1   7.84  )-----------( 186      ( 09 Apr 2025 04:45 )             34.6     04-09    138  |  106  |  34[H]  ----------------------------<  82  5.3[H]   |  16[L]  |  1.0    Ca    9.2      09 Apr 2025 04:45  Mg     2.3     04-09    TPro  4.5[L]  /  Alb  2.6[L]  /  TBili  0.5  /  DBili  x   /  AST  21  /  ALT  28  /  AlkPhos  149[H]  04-09      Urinalysis Basic - ( 09 Apr 2025 04:45 )    Color: x / Appearance: x / SG: x / pH: x  Gluc: 82 mg/dL / Ketone: x  / Bili: x / Urobili: x   Blood: x / Protein: x / Nitrite: x   Leuk Esterase: x / RBC: x / WBC x   Sq Epi: x / Non Sq Epi: x / Bacteria: x        Urinalysis with Rflx Culture (collected 07 Apr 2025 12:05)    Culture - Urine (collected 07 Apr 2025 12:05)  Source: Catheterized None  Final Report (09 Apr 2025 12:53):    >100,000 CFU/ml Enterococcus faecium  Organism: Enterococcus faecium (09 Apr 2025 12:53)  Organism: Enterococcus faecium (09 Apr 2025 12:53)    Culture - Blood (collected 07 Apr 2025 12:02)  Source: Blood Blood-Peripheral  Preliminary Report (08 Apr 2025 22:02):    No growth at 24 hours    Culture - Blood (collected 07 Apr 2025 12:02)  Source: Blood Blood-Peripheral  Preliminary Report (08 Apr 2025 22:02):    No growth at 24 hours    RADIOLOGY & ADDITIONAL TESTS:  < from: Xray Hand 3 Views, Left (04.08.25 @ 12:59) >  impression:    Soft tissue swelling suggested at the level of the fifth digit.    The bones are diffusely osteopenic. There are arthritic changes with   joint space narrowing and osteophyte formation. Findings appear most   pronounced at the second distal interphalangeal joint. Arthritic changes   are also noted at the basal joint. There is no acute fracture.    Chondrocalcinosis within the region of the triangular fibrocartilage    < from: [facility] Duplex Upper Ext Vein Scan, Left (04.08.25 @ 10:33) >  IMPRESSION:  No evidence of left upper extremity deep venous thrombosis. Study limited   due to patient positioning and overlying dressings      < from: [facility] Duplex Lower Ext Vein Scan, Bilat (04.08.25 @ 10:30) >  IMPRESSION:  No evidence of DVT in the visualized portions of the bilateral lower   extremities. Visualization limited secondary to dressings in the lower   extremities and limb contractures    < from: TTE Echo Complete w/o Contrast w/ Doppler (04.09.25 @ 10:47) >  Summary:   1. Left ventricular ejection fraction, by visual estimation, is 45 to   50%.   2. Mildly decreased global left ventricular systolic function.   3. The left ventricular diastolic function could not be assessed in this   study.   4. Severely reduced RV systolic function.   5. Severely enlarged right atrium.   6. Left atrial enlargement.   7. Sclerotic aortic valve with mildly decreased opening.   8. Mild aortic regurgitation.   9. Mild-to-moderate mitral regurgitation.  10. Moderate tricuspid regurgitation.  11. Estimated pulmonary artery systolic pressure is 65.8 mmHg assuming a   right atrial pressure of 8 mmHg, which is consistent with severe   pulmonary hypertension.  12. Small pericardial effusion visualized on limited views.    MEDICATIONS  (STANDING):  acetaminophen   IVPB .. 1000 milliGRAM(s) IV Intermittent once  apixaban 2.5 milliGRAM(s) Oral every 12 hours  cefepime   IVPB 1000 milliGRAM(s) IV Intermittent every 12 hours  chlorhexidine 2% Cloths 1 Application(s) Topical <User Schedule>  chlorhexidine 2% Cloths 1 Application(s) Topical <User Schedule>  influenza  Vaccine (HIGH DOSE) 0.5 milliLiter(s) IntraMuscular once  linezolid    Tablet 600 milliGRAM(s) Oral every 12 hours  metoprolol tartrate 25 milliGRAM(s) Oral three times a day  midodrine 10 milliGRAM(s) Oral every 8 hours  multivitamin 1 Tablet(s) Oral daily  pantoprazole    Tablet 40 milliGRAM(s) Oral before breakfast  phenylephrine    Infusion 0.1 MICROgram(s)/kG/Min (2.38 mL/Hr) IV Continuous <Continuous>  silver sulfADIAZINE 1% Cream 1 Application(s) Topical two times a day    MEDICATIONS  (PRN):  acetaminophen     Tablet .. 650 milliGRAM(s) Oral every 6 hours PRN Temp greater or equal to 38C (100.4F), Mild Pain (1 - 3)  aluminum hydroxide/magnesium hydroxide/simethicone Suspension 30 milliLiter(s) Oral every 4 hours PRN Dyspepsia  melatonin 3 milliGRAM(s) Oral at bedtime PRN Insomnia  ondansetron Injectable 4 milliGRAM(s) IV Push every 8 hours PRN Nausea and/or Vomiting

## 2025-04-09 NOTE — PROGRESS NOTE ADULT - ASSESSMENT
89  year old F, pmhx of chronic afib (on Eliquis), GERD, dementia, FTT , Orthostatic hypotension on midodrine ,Patient presents for evaluation of change in mental status.  Daughter states over the past few months patient has been having increasing cognitive issues including hallucinations poor sleep.  Daughter states that yesterday patient seemed to be getting sick and she was given Tylenol at night.  Patient is on Macrobid for UTI prophylaxis due to frequent UTIs.  Admitted to SDU for sepsis secondary to complicated UTI/Pylenephritis    #TME 2/2 Severe Sepsis present on admission secondary to acute pyelonephritis  #Hx of recurrent UTIs  #lactic acidosis likley 2/2 Sepsis and hypotension   - pt presents with AMS, and dec PO intake   - work up significant for +UA, CTH negative,  - pt is severely septic on admission , BP 87/65, , T 100.8 , Lactate 4.1   - cefepime and linezolid per ID  - MAP of 52 4/9 AM, 1 LR bolus given and started on phenylnephrine  - fu echo    #Sacral ulcer  - unstageable sacral ulcer with minimal white discharge.  - wound care consult    #Left shoulder pain and UE swelling, r/o dvt. rotator cuff tear of uncertain age  - Patient points to shoulder and elbow as areas of pain and states she cannot hold it up secondary to pain although patient is confused according to daughter  - Xray shoulder : Acromioclavicular arthrosis. High riding humerus which could reflect rotator cuff tear of uncertain age. Bones appear osteopenic with no acute fracture or dislocation. Glenohumeral arthritic changes.  - Ortho c/s for possible rotator cuff tear and LE swelling. No intervention, treat with pain control. Left hand xray for completion    #Hx of Lumbar Compression Fracture  #Hx of Pathologic L1 vertebrae fracture  #Hx of Conus Medullaris Syndrome  - c/w home pain management ( currently stopped gabapentin and robaxin, only on tylenol PRN)  - OP neurosurgery     #chronic HFpEF - not in exacerbation   - TTE 1/28/2024- EF 60-65% mod pulm HTN   - Currently euvolemic on exam, hold home oral lasix 20 daily FOR SOFT BP      #Chronic Atrial Fibrillation with acute RVR  - 's on admission . afib RVR secondary to sepsis   -CHADS2-VASC = 5.   -c/w Eliquis 2.5 mg twice daily   - metoprolol 25mg three times daily

## 2025-04-09 NOTE — ADVANCED PRACTICE NURSE CONSULT - ASSESSMENT
Reason for Admission: sepsis  History of Present Illness:   89  year old F, pmhx of chronic afib (on Eliquis), GERD, dementia, FTT , Orthostatic hypotension on midodrine. Patient presents for evaluation of change in mental status.  Daughter states over the past few months patient has been having increasing cognitive issues including hallucinations poor sleep.  Daughter states that yesterday patient seemed to be getting sick and she was given Tylenol at night.  Patient is on Macrobid for UTI prophylaxis due to frequent UTIs.  This daughter had several concerns regarding patient including further decline in mental status/increased confusion/decreased ability to follow commands.  Daughter states it appeared like the left eye had not been opening as much is normal but now it is opening normally.  Patient also complained of left upper extremity pain and swelling.  Patient was unable to raise her left upper extremity secondary to pain.  No trauma.  Patient with decreased p.o. intake but was able to eat a little yesterday.   Allergies:  	No Known Allergies:   Home Medications:   * Patient Currently Takes Medications as of 03-Mar-2024 12:14 documented in Structured Notes  · 	cefpodoxime 200 mg oral tablet: 1 tab(s) orally every 12 hours  · 	potassium chloride 20 mEq oral tablet, extended release: 1 tab(s) orally 2 times a day  · 	ondansetron 4 mg oral tablet: 1 tab(s) orally every 8 hours as needed for  nausea  · 	calcium carbonate 1250 mg (500 mg elemental calcium) oral tablet: 1 tab(s) orally once a day  · 	sodium chloride 1 g oral tablet: 1 tab(s) orally 2 times a day monitor BMP for Sodium levels  · 	apixaban 5 mg oral tablet: 1 tab(s) orally every 12 hours  · 	tamsulosin 0.4 mg oral capsule: 2 cap(s) orally once a day (at bedtime)  · 	metoprolol tartrate 25 mg oral tablet: 1 tab(s) orally 3 times a day  · 	rOPINIRole 0.25 mg oral tablet: 1 tab(s) orally 2 times a day  · 	oxyCODONE 5 mg oral tablet: 1 tab(s) orally every 8 hours As needed Severe Pain (7 - 10)  · 	cholecalciferol oral tablet: 5000 unit(s) orally once a day  · 	Multiple Vitamins oral tablet: 1 tab(s) orally once a day  · 	fluticasone 50 mcg/inh nasal spray: 1 spray(s) nasal 2 times a day  · 	methocarbamol 750 mg oral tablet: 1 tab(s) orally every 8 hours  · 	polyethylene glycol 3350 oral powder for reconstitution: 17 gram(s) orally 2 times a day  · 	furosemide 20 mg oral tablet: 1 tab(s) orally once a day  · 	omeprazole 20 mg oral delayed release capsule: 1 cap(s) orally once a day  · 	Vitamin C 500 mg oral tablet: 1 tab(s) orally once a day  · 	senna (sennosides) 8.6 mg oral tablet: 2 tab(s) orally once a day (at bedtime)  · 	gabapentin 100 mg oral capsule: 2 cap(s) orally 3 times a day    Patient received in bed, limited mobility, high risk for pressure injury development or progression.    Wound – Consulted for sacrum inappropriate documentation   Type & Location: Coccyx Unstageable   Size: ~[id number]  Tissue Description: Tan, yellow adherent tissue  Wound Exudate: None   Wound Edge: intact  Periwound Condition: Moisture associated skin damage         Wound  Type & Location: Moisture associated skin damage to groin, sacrum, coccyx and bilateral buttock  Tissue Description: Scattered redness with irregular ulcerations through out region  Wound Exudate: None   Wound Edge: intact  Periwound Condition: intact    Patient has skin tears to upper and lower extremities which are full flap loss – patient on Eliquis wit very thin skin that has bruising through out.

## 2025-04-10 NOTE — PROGRESS NOTE ADULT - SUBJECTIVE AND OBJECTIVE BOX
Over Night Events: events noted, ID reviewed, on lidia, afebrile    PHYSICAL EXAM    ICU Vital Signs Last 24 Hrs  T(C): 35.1 (10 Apr 2025 07:19), Max: 36.8 (10 Apr 2025 04:00)  T(F): 95.1 (10 Apr 2025 07:19), Max: 98.2 (10 Apr 2025 04:00)  HR: 109 (10 Apr 2025 07:19) (85 - 130)  BP: 91/54 (10 Apr 2025 07:19) (66/47 - 196/99)  BP(mean): 66 (10 Apr 2025 07:19) (55 - 140)  RR: 20 (10 Apr 2025 07:19) (20 - 20)  SpO2: 97% (10 Apr 2025 07:19) (93% - 100%)    O2 Parameters below as of 09 Apr 2025 16:00  Patient On (Oxygen Delivery Method): nasal cannula            General: ill looking  Pale  Lungs: dec bs both bases  Cardiovascular: LOUISA 2.6  Abdomen: Soft, Positive BS  Neurological: Non focal       04-09-25 @ 07:01  -  04-10-25 @ 07:00  --------------------------------------------------------  IN:    IV PiggyBack: 500 mL    Lactated Ringers: 420 mL    Lactated Ringers Bolus: 500 mL    Phenylephrine: 137.3 mL  Total IN: 1557.3 mL    OUT:  Total OUT: 0 mL    Total NET: 1557.3 mL          LABS:                          11.1   7.84  )-----------( 186      ( 09 Apr 2025 04:45 )             34.6                                               04-09    138  |  106  |  34[H]  ----------------------------<  82  5.3[H]   |  16[L]  |  1.0    Ca    9.2      09 Apr 2025 04:45  Mg     2.3     04-09    TPro  4.5[L]  /  Alb  2.6[L]  /  TBili  0.5  /  DBili  x   /  AST  21  /  ALT  28  /  AlkPhos  149[H]  04-09                                             Urinalysis Basic - ( 09 Apr 2025 04:45 )    Color: x / Appearance: x / SG: x / pH: x  Gluc: 82 mg/dL / Ketone: x  / Bili: x / Urobili: x   Blood: x / Protein: x / Nitrite: x   Leuk Esterase: x / RBC: x / WBC x   Sq Epi: x / Non Sq Epi: x / Bacteria: x                                                  LIVER FUNCTIONS - ( 09 Apr 2025 04:45 )  Alb: 2.6 g/dL / Pro: 4.5 g/dL / ALK PHOS: 149 U/L / ALT: 28 U/L / AST: 21 U/L / GGT: x                                                  Urinalysis with Rflx Culture (collected 07 Apr 2025 12:05)    Culture - Urine (collected 07 Apr 2025 12:05)  Source: Catheterized None  Final Report (09 Apr 2025 12:53):    >100,000 CFU/ml Enterococcus faecium  Organism: Enterococcus faecium (09 Apr 2025 12:53)  Organism: Enterococcus faecium (09 Apr 2025 12:53)    Culture - Blood (collected 07 Apr 2025 12:02)  Source: Blood Blood-Peripheral  Preliminary Report (09 Apr 2025 22:01):    No growth at 48 Hours    Culture - Blood (collected 07 Apr 2025 12:02)  Source: Blood Blood-Peripheral  Preliminary Report (09 Apr 2025 22:01):    No growth at 48 Hours                                                                                           MEDICATIONS  (STANDING):  acetaminophen   IVPB .. 1000 milliGRAM(s) IV Intermittent once  apixaban 2.5 milliGRAM(s) Oral every 12 hours  cefepime   IVPB 1000 milliGRAM(s) IV Intermittent every 12 hours  chlorhexidine 2% Cloths 1 Application(s) Topical <User Schedule>  chlorhexidine 2% Cloths 1 Application(s) Topical <User Schedule>  influenza  Vaccine (HIGH DOSE) 0.5 milliLiter(s) IntraMuscular once  linezolid    Tablet 600 milliGRAM(s) Oral every 12 hours  metoprolol tartrate 25 milliGRAM(s) Oral three times a day  midodrine 10 milliGRAM(s) Oral every 8 hours  multivitamin 1 Tablet(s) Oral daily  pantoprazole    Tablet 40 milliGRAM(s) Oral before breakfast  phenylephrine    Infusion 0.1 MICROgram(s)/kG/Min (2.38 mL/Hr) IV Continuous <Continuous>  silver sulfADIAZINE 1% Cream 1 Application(s) Topical two times a day    MEDICATIONS  (PRN):  acetaminophen     Tablet .. 650 milliGRAM(s) Oral every 6 hours PRN Temp greater or equal to 38C (100.4F), Mild Pain (1 - 3)  aluminum hydroxide/magnesium hydroxide/simethicone Suspension 30 milliLiter(s) Oral every 4 hours PRN Dyspepsia  melatonin 3 milliGRAM(s) Oral at bedtime PRN Insomnia  ondansetron Injectable 4 milliGRAM(s) IV Push every 8 hours PRN Nausea and/or Vomiting

## 2025-04-10 NOTE — PROGRESS NOTE ADULT - ASSESSMENT
89  year old F, pmhx of chronic afib (on Eliquis), GERD, dementia, FTT , Orthostatic hypotension on midodrine ,Patient presents for evaluation of change in mental status.  Daughter states over the past few months patient has been having increasing cognitive issues including hallucinations poor sleep.  Daughter states that yesterday patient seemed to be getting sick and she was given Tylenol at night.  Patient is on Macrobid for UTI prophylaxis due to frequent UTIs.  This daughter had several concerns regarding patient including further decline in mental status/increased confusion/decreased ability to follow commands.  Daughter states it appeared like the left eye had not been opening as much is normal but now it is opening normally.  Patient also complained of left upper extremity pain and swelling.  Patient was unable to raise her left upper extremity secondary to pain.  No trauma.  Patient with decreased p.o. intake but was able to eat a little yesterday. Admitted to SDU for sepsis secondary to complicated UTI/Pylenephritis      A/P   #TME 2/2 Severe Sepsis present on admission secondary to acute pyelonephritis/ Hx of recurrent UTIs/ lactic acidosis likley 2/2 Sepsis and hypotension   - hold off with IV fluids, will give Albumin infusion today, pt is third spacing   - ID is following, recommendations noted:   -continue with Cefepime to 1 gm iv q12h  -change  po Linezolid 600 mg q12h to IV  - monitor for  myelosuppression especially thrombocytopenia by checking the CBC daily   -Apply silver sulfadiazine cream to b/l lower extremities under dressing  -  final urine culture noted   - keep MAP above 65, started on  Levophed   - on Midodrine 10 mg Q 8 hours   - HR control : start Cardizem drip   - LA is trending up likely due to hypoperfusion  - pt is more confused today     #Chronic Atrial Fibrillation with acute RVR  -CHADS2-VASC = 5.   -c/w Eliquis 2.5 mg twice daily   - pt is not taking  metoprolol 25mg three times daily ( confused and altered today)   - consult EP  - start low dose of Cardizem drip     #Sacral ulcer  - unstageable sacral ulcer with minimal white discharge.  - wound care eval   - change position Q 2 hours, off load pressure points   - consult dietitian     #Left shoulder pain and UE swelling  - can start Lidocaine topical   - Xray shoulder : Acromioclavicular arthrosis. High riding humerus which could reflect rotator cuff tear of uncertain age. Bones appear osteopenic with no acute fracture or dislocation. Glenohumeral arthritic changes.  - f/u LUE duplex negative for DVT   - keep LUE elevated     #Hx of Lumbar Compression Fracture  #Hx of Pathologic L1 vertebrae fracture  #Hx of Conus Medullaris Syndrome  - was on  gabapentin and robaxin,  tylenol PRN at home   - held on admission, will give IV Tylenol PRN for pain   - OP neurosurgery f/u     #chronic HFpEF / PHTN   - not in exacerbation   - TTE 1/28/2024- EF 60-65% mod pulm HTN   - Currently euvolemic on exam  - monitor for fluid oveload         # GI prophylaxis     #Progress Note Handoff  Pending (specify):  give Albumin infusion, start Cardizem IV for rate control, consult EP, keep MAP above 65 with Levophed infusion, put David in for output monitoring, change po Zyvox to IV, supportive care   Family discussion: I spoke with pt's daughter at the bedside, she agreed with a plan of care   Disposition: SDU

## 2025-04-10 NOTE — DIETITIAN INITIAL EVALUATION ADULT - PERTINENT MEDS FT
MEDICATIONS  (STANDING):  cefepime   IVPB 1000 milliGRAM(s) IV Intermittent every 12 hours  chlorhexidine 2% Cloths 1 Application(s) Topical <User Schedule>  chlorhexidine 2% Cloths 1 Application(s) Topical <User Schedule>  enoxaparin Injectable 60 milliGRAM(s) SubCutaneous every 12 hours  linezolid  IVPB      linezolid  IVPB 600 milliGRAM(s) IV Intermittent every 12 hours  metoprolol tartrate 25 milliGRAM(s) Oral three times a day  midodrine 10 milliGRAM(s) Oral every 8 hours  multivitamin 1 Tablet(s) Oral daily  pantoprazole    Tablet 40 milliGRAM(s) Oral before breakfast  phenylephrine    Infusion 0.1 MICROgram(s)/kG/Min (2.38 mL/Hr) IV Continuous <Continuous>  silver sulfADIAZINE 1% Cream 1 Application(s) Topical two times a day    MEDICATIONS  (PRN):  acetaminophen     Tablet .. 650 milliGRAM(s) Oral every 6 hours PRN Temp greater or equal to 38C (100.4F), Mild Pain (1 - 3)  aluminum hydroxide/magnesium hydroxide/simethicone Suspension 30 milliLiter(s) Oral every 4 hours PRN Dyspepsia  melatonin 3 milliGRAM(s) Oral at bedtime PRN Insomnia  ondansetron Injectable 4 milliGRAM(s) IV Push every 8 hours PRN Nausea and/or Vomiting

## 2025-04-10 NOTE — CONSULT NOTE ADULT - CONSULT REQUESTED DATE/TIME
10-Apr-2025 16:03
08-Apr-2025 09:40
07-Apr-2025 16:50
08-Apr-2025 10:57
08-Apr-2025 06:13
10-Apr-2025 14:39

## 2025-04-10 NOTE — DIETITIAN INITIAL EVALUATION ADULT - NUTRITIONGOAL OUTCOME1
Pt to demonstrate tolerance to nutrition regimen, with at least 50% po intake achieved over next 3 days.    Pt at high nutrition risk.    Monitor: Skin, labs, BM, wt, nutrition focused physical findings, body composition, GI, swallow function, po intake, diet order.

## 2025-04-10 NOTE — CONSULT NOTE ADULT - ASSESSMENT
89F w/  recurrent UTI, orthostatic hypotension on midodrine, chronic afib, GERD admitted to metabolic encephalopathy 2/2 septic shock 2/2 acute pyelonephritis    Decision Maker: HCP is reported to be her daughter Marlys Acuna (she believes paperwork at home and can bring in)  GOC; continue life-sustaining therapy including IV pressors  Advance Directives: DNR/DNI    - patient appears comfortable at time of exam  -evaluation completed w/ palliative Attending Dr. Booker Noonan MD  Palliative Medicine Fellow  [facility]   [phone]

## 2025-04-10 NOTE — CONSULT NOTE ADULT - SUBJECTIVE AND OBJECTIVE BOX
HPI:   89F w/ recurrent UTI, orthostatic hypotension on midodrine, chronic afib, GERD admitted to metabolic encephalopathy 2/2 septic shock 2/2 acute pyelonephritis    - the patient was sleeping and history was collected from the patient's daughter/hcp at bedside- Marlys  - her daughter reports that at baseline, the patient is dependent on her and her family for adls. She spends her time watching television and spending time with family (she lives w/ Marlys and Marlys's  and son). 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  - no reported pain or discomfort observed by Marlys    PERTINENT PM/SXH:   Dementia  Recurrent UTI  Atrial fibrillation, chronic  Chronic atrial fibrillation  GERD (gastroesophageal reflux disease)  Chronic lower back pain    FAMILY HISTORY:  Family history of stroke (Father)    SOCIAL HISTORY:   Significant other/partner[ ]  Children[x ]  Holiness/Spirituality:  Substance hx:  [ ]   Tobacco hx:  [ ]   Alcohol hx: [ ]   Living Situation: [x ]Home  [ ]Long term care  [ ]Rehab [ ]Other  Home Services: [ ] HHA [ ] Visting RN [ ] Hospice  Occupation:  Home Opioid hx:  [ ] Y [ ] N [ ] I-Stop Reference No:    ADVANCE DIRECTIVES:    [ ] Full Code [x ] DNR/DNI  MOLST  [x ]  Living Will  [ ]   DECISION MAKER(s):  [x ] Health Care Proxy(s)  [ ] Surrogate(s)  [ ] Guardian           Name(s): Phone Number(s):  HCP/Daughter: Marlys Acuna    BASELINE (I)ADL(s) (prior to admission):  [place]: [ ]Total  [ ] Moderate [x ]Dependent    No Known Allergies    MEDICATIONS  (STANDING):  apixaban 2.5 milliGRAM(s) Oral every 12 hours  cefepime   IVPB 1000 milliGRAM(s) IV Intermittent every 12 hours  chlorhexidine 2% Cloths 1 Application(s) Topical <User Schedule>  chlorhexidine 2% Cloths 1 Application(s) Topical <User Schedule>  linezolid  IVPB      linezolid  IVPB 600 milliGRAM(s) IV Intermittent every 12 hours  metoprolol tartrate 25 milliGRAM(s) Oral three times a day  midodrine 10 milliGRAM(s) Oral every 8 hours  multivitamin 1 Tablet(s) Oral daily  pantoprazole    Tablet 40 milliGRAM(s) Oral before breakfast  phenylephrine    Infusion 0.1 MICROgram(s)/kG/Min (2.38 mL/Hr) IV Continuous <Continuous>  silver sulfADIAZINE 1% Cream 1 Application(s) Topical two times a day    MEDICATIONS  (PRN):  acetaminophen     Tablet .. 650 milliGRAM(s) Oral every 6 hours PRN Temp greater or equal to 38C (100.4F), Mild Pain (1 - 3)  aluminum hydroxide/magnesium hydroxide/simethicone Suspension 30 milliLiter(s) Oral every 4 hours PRN Dyspepsia  melatonin 3 milliGRAM(s) Oral at bedtime PRN Insomnia  ondansetron Injectable 4 milliGRAM(s) IV Push every 8 hours PRN Nausea and/or Vomiting      PRESENT SYMPTOMS: [ ]Unable to obtain due to poor mentation   Source if other than patient:  [x ]Family   [ ]Team   [ X]All review of systems negative including pain and dyspnea unless noted below    All components of pain assessment below addressed. Blank spaces indicate that the patient did/could not complete the assessment.  Pain: [ ]yes [x ]no  QOL impact -   Location -                    Aggravating factors -  Quality -  Radiation -  Timing-  Severity (0-10 scale):  Minimal acceptable level (0-10 scale):     CPOT:0  RDOS:1      PHYSICAL EXAM:  Vital Signs Last 24 Hrs  T(C): 35.1 (10 Apr 2025 07:19), Max: 36.8 (10 Apr 2025 04:00)  T(F): 95.1 (10 Apr 2025 07:19), Max: 98.2 (10 Apr 2025 04:00)  HR: 108 (10 Apr 2025 12:45) (91 - 142)  BP: 93/67 (10 Apr 2025 12:45) (66/50 - 159/114)  BP(mean): 76 (10 Apr 2025 12:45) (55 - 134)  RR: 20 (10 Apr 2025 10:00) (20 - 20)  SpO2: 95% (10 Apr 2025 11:00) (93% - 100%)    Parameters below as of 10 Apr 2025 11:00  Patient On (Oxygen Delivery Method): nasal cannula  O2 Flow (L/min): 3   I&O's Summary    09 Apr 2025 07:01  -  10 Apr 2025 07:00  --------------------------------------------------------  IN: 1566.8 mL / OUT: 0 mL / NET: 1566.8 mL    10 Apr 2025 07:01  -  10 Apr 2025 14:40  --------------------------------------------------------  IN: 37.7 mL / OUT: 0 mL / NET: 37.7 mL    GENERAL:  [X ] No acute distress [x ]Lethargic  [ ]Unarousable  [ ]Verbal  [ ]Non-Verbal [ ]Cachexia    BEHAVIORAL/PSYCH:  [ ]Alert and Oriented x  [ ] Anxiety [ ] Delirium [ ] Agitation [X ] Calm   EYES: [ ] No scleral icterus [ ] Scleral icterus [x] Closed  ENMT:  [ ]Dry mouth  [ ]No external oral lesions [ X] No external ear or nose lesions  CARDIOVASCULAR:  [ ]Regular [ ]Irregular [x ]Tachy [ ]Not Tachy  [ ]Sung [ ] Edema [ ] No edema  PULMONARY:  [ ]Tachypnea  [ ]Audible excessive secretions [X ] No labored breathing [ ] labored breathing  GASTROINTESTINAL: [ ]Soft  [ ]Distended  [ X]Not distended [ ]Non tender [ ]Tender  MUSCULOSKELETAL: [ ]No clubbing [ ] clubbing  [ X] No cyanosis [ ] cyanosis  NEUROLOGIC: [ ]No focal deficits  [ ]Follows commands  [ ]Does not follow commands  [ ]Cognitive impairment  [ ]Dysphagia  [ ]Dysarthria  [ ]Paresis   SKIN: [ ] Jaundiced [X ] Non-jaundiced [ ]Rash [ ]No Rash [ ] Warm [ ] Dry  MISC/LINES: [ ] ET tube [ ] Trach [ ]NGT/OGT [ ]PEG [ ]David    CRITICAL CARE:  [x ] Shock Present  [ x]Septic [ ]Cardiogenic [ ]Neurologic [ ]Hypovolemic  [ ]  Vasopressors [ ]  Inotropes   [ ]Respiratory failure present [ ]Mechanical ventilation [ ]Non-invasive ventilatory support [ ]High flow  [x ]Acute  [ ]Chronic [ x]Hypoxic  [ ]Hypercarbic [ ]Other  [ ]Other organ failure     LABS: reviewed by me, notable for:                         11.3   9.65  )-----------( 217      ( 10 Apr 2025 09:33 )             35.3   04-10    136  |  108  |  37[H]  ----------------------------<  110[H]  5.2[H]   |  12[L]  |  1.2    Ca    8.9      10 Apr 2025 11:25  Phos  3.2     04-10  Mg     2.1     04-10    TPro  4.7[L]  /  Alb  3.2[L]  /  TBili  0.4  /  DBili  x   /  AST  28  /  ALT  21  /  AlkPhos  140[H]  04-10    Urinalysis Basic - ( 10 Apr 2025 11:25 )  Color: x / Appearance: x / SG: x / pH: x  Gluc: 110 mg/dL / Ketone: x  / Bili: x / Urobili: x   Blood: x / Protein: x / Nitrite: x   Leuk Esterase: x / RBC: x / WBC x   Sq Epi: x / Non Sq Epi: x / Bacteria: x      RADIOLOGY & ADDITIONAL STUDIES: CXR personally reviewed by me: b/l lng opacifications present    Palliative Care Spiritual/Emotional Screening Tool Question  Severity (0-4):                    OR                    [X ] Unable to determine/NA  Score of 2 or greater indicates recommendation of Chaplaincy referral  Chaplaincy Referral: [ ] Yes [ ] Refused [ ] Following     Caregiver [place]:  [ ] Yes [ ] No    OR    [x ] Unable to determine. Will assess at later time if appropriate.  Social Work Referral [ ]  Patient and Family Centered Care Referral [ ]    Anticipatory Grief Present: [ ] Yes [ ] No    OR     [ x] Unable to determine. Will assess at later time if appropriate.  Social Work Referral [ ]  Patient and Family Centered Care Referral [ ]    REFERRALS:   [ ]Chaplaincy  [ ]Hospice  [ ]Child Life  [ ]Social Work  [ ]Case management [ ]Holistic Therapy     Palliative care education provided to patient and/or family    Goals of Care Document:    HPI:   89F w/ recurrent UTI, orthostatic hypotension on midodrine, chronic afib, GERD admitted to metabolic encephalopathy 2/2 septic shock 2/2 acute pyelonephritis    - the patient was sleeping and history was collected from the patient's daughter/hcp at bedside- Marlys  - her daughter reports that at baseline, the patient is dependent on her and her family for adls. She spends her time watching television and spending time with family (she lives w/ Marlys and Marlys's  and son). 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  - no reported pain or discomfort observed by Marlys    PERTINENT PM/SXH:   Dementia  Recurrent UTI  Atrial fibrillation, chronic  Chronic atrial fibrillation  GERD (gastroesophageal reflux disease)  Chronic lower back pain    FAMILY HISTORY:  Family history of stroke (Father)    SOCIAL HISTORY:   Significant other/partner[ ]  Children[x ]  Jehovah's witness/Spirituality:  Substance hx:  [ ]   Tobacco hx:  [ ]   Alcohol hx: [ ]   Living Situation: [x ]Home  [ ]Long term care  [ ]Rehab [ ]Other  Home Services: [ ] HHA [ ] Visting RN [ ] Hospice  Occupation:  Home Opioid hx:  [ ] Y [ ] N [ ] I-Stop Reference No:    ADVANCE DIRECTIVES:    [ ] Full Code [x ] DNR/DNI  MOLST  [x ]  Living Will  [ ]   DECISION MAKER(s):  [x ] Health Care Proxy(s)  [ ] Surrogate(s)  [ ] Guardian           Name(s): Phone Number(s):  HCP/Daughter: Marlys Acuna    BASELINE (I)ADL(s) (prior to admission):  [place]: [ ]Total  [ ] Moderate [x ]Dependent    No Known Allergies    MEDICATIONS  (STANDING):  apixaban 2.5 milliGRAM(s) Oral every 12 hours  cefepime   IVPB 1000 milliGRAM(s) IV Intermittent every 12 hours  chlorhexidine 2% Cloths 1 Application(s) Topical <User Schedule>  chlorhexidine 2% Cloths 1 Application(s) Topical <User Schedule>  linezolid  IVPB      linezolid  IVPB 600 milliGRAM(s) IV Intermittent every 12 hours  metoprolol tartrate 25 milliGRAM(s) Oral three times a day  midodrine 10 milliGRAM(s) Oral every 8 hours  multivitamin 1 Tablet(s) Oral daily  pantoprazole    Tablet 40 milliGRAM(s) Oral before breakfast  phenylephrine    Infusion 0.1 MICROgram(s)/kG/Min (2.38 mL/Hr) IV Continuous <Continuous>  silver sulfADIAZINE 1% Cream 1 Application(s) Topical two times a day    MEDICATIONS  (PRN):  acetaminophen     Tablet .. 650 milliGRAM(s) Oral every 6 hours PRN Temp greater or equal to 38C (100.4F), Mild Pain (1 - 3)  aluminum hydroxide/magnesium hydroxide/simethicone Suspension 30 milliLiter(s) Oral every 4 hours PRN Dyspepsia  melatonin 3 milliGRAM(s) Oral at bedtime PRN Insomnia  ondansetron Injectable 4 milliGRAM(s) IV Push every 8 hours PRN Nausea and/or Vomiting      PRESENT SYMPTOMS: [ ]Unable to obtain due to poor mentation   Source if other than patient:  [x ]Family   [ ]Team   [ X]All review of systems negative including pain and dyspnea unless noted below    All components of pain assessment below addressed. Blank spaces indicate that the patient did/could not complete the assessment.  Pain: [ ]yes [x ]no  QOL impact -   Location -                    Aggravating factors -  Quality -  Radiation -  Timing-  Severity (0-10 scale):  Minimal acceptable level (0-10 scale):     CPOT:0  RDOS:1      PHYSICAL EXAM:  Vital Signs Last 24 Hrs  T(C): 35.1 (10 Apr 2025 07:19), Max: 36.8 (10 Apr 2025 04:00)  T(F): 95.1 (10 Apr 2025 07:19), Max: 98.2 (10 Apr 2025 04:00)  HR: 108 (10 Apr 2025 12:45) (91 - 142)  BP: 93/67 (10 Apr 2025 12:45) (66/50 - 159/114)  BP(mean): 76 (10 Apr 2025 12:45) (55 - 134)  RR: 20 (10 Apr 2025 10:00) (20 - 20)  SpO2: 95% (10 Apr 2025 11:00) (93% - 100%)    Parameters below as of 10 Apr 2025 11:00  Patient On (Oxygen Delivery Method): nasal cannula  O2 Flow (L/min): 3   I&O's Summary    09 Apr 2025 07:01  -  10 Apr 2025 07:00  --------------------------------------------------------  IN: 1566.8 mL / OUT: 0 mL / NET: 1566.8 mL    10 Apr 2025 07:01  -  10 Apr 2025 14:40  --------------------------------------------------------  IN: 37.7 mL / OUT: 0 mL / NET: 37.7 mL    GENERAL:  [X ] No acute distress [x ]Lethargic  [ ]Unarousable  [ ]Verbal  [ ]Non-Verbal [ ]Cachexia    BEHAVIORAL/PSYCH:  [ ]Alert and Oriented x  [ ] Anxiety [ ] Delirium [ ] Agitation [X ] Calm   EYES: [ ] No scleral icterus [ ] Scleral icterus [x] Closed  ENMT:  [ ]Dry mouth  [ ]No external oral lesions [ X] No external ear or nose lesions  CARDIOVASCULAR:  [ ]Regular [ ]Irregular [x ]Tachy [ ]Not Tachy  [ ]Sung [ ] Edema [ ] No edema  PULMONARY:  [ ]Tachypnea  [ ]Audible excessive secretions [X ] No labored breathing [ ] labored breathing  GASTROINTESTINAL: [ ]Soft  [ ]Distended  [ X]Not distended [ ]Non tender [ ]Tender  MUSCULOSKELETAL: [ ]No clubbing [ ] clubbing  [ X] No cyanosis [ ] cyanosis  NEUROLOGIC: [ ]No focal deficits  [ ]Follows commands  [ ]Does not follow commands  [ ]Cognitive impairment  [ ]Dysphagia  [ ]Dysarthria  [ ]Paresis   SKIN: [ ] Jaundiced [X ] Non-jaundiced [ ]Rash [ ]No Rash [ ] Warm [ ] Dry  MISC/LINES: [ ] ET tube [ ] Trach [ ]NGT/OGT [ ]PEG [ ]David    CRITICAL CARE:  [x ] Shock Present  [ x]Septic [ ]Cardiogenic [ ]Neurologic [ ]Hypovolemic  [ ]  Vasopressors [ ]  Inotropes   [ ]Respiratory failure present [ ]Mechanical ventilation [ ]Non-invasive ventilatory support [ ]High flow  [x ]Acute  [ ]Chronic [ x]Hypoxic  [ ]Hypercarbic [ ]Other  [ ]Other organ failure     LABS: reviewed by me, notable for: CBC WNL, elevated K, UA WNL                        11.3   9.65  )-----------( 217      ( 10 Apr 2025 09:33 )             35.3   04-10    136  |  108  |  37[H]  ----------------------------<  110[H]  5.2[H]   |  12[L]  |  1.2    Ca    8.9      10 Apr 2025 11:25  Phos  3.2     04-10  Mg     2.1     04-10    TPro  4.7[L]  /  Alb  3.2[L]  /  TBili  0.4  /  DBili  x   /  AST  28  /  ALT  21  /  AlkPhos  140[H]  04-10    Urinalysis Basic - ( 10 Apr 2025 11:25 )  Color: x / Appearance: x / SG: x / pH: x  Gluc: 110 mg/dL / Ketone: x  / Bili: x / Urobili: x   Blood: x / Protein: x / Nitrite: x   Leuk Esterase: x / RBC: x / WBC x   Sq Epi: x / Non Sq Epi: x / Bacteria: x      RADIOLOGY & ADDITIONAL STUDIES: CXR personally reviewed by me: b/l lng opacifications present    Palliative Care Spiritual/Emotional Screening Tool Question  Severity (0-4):                    OR                    [X ] Unable to determine/NA  Score of 2 or greater indicates recommendation of Chaplaincy referral  Chaplaincy Referral: [ ] Yes [ ] Refused [ ] Following     Caregiver [place]:  [ ] Yes [ ] No    OR    [x ] Unable to determine. Will assess at later time if appropriate.  Social Work Referral [ ]  Patient and Family Centered Care Referral [ ]    Anticipatory Grief Present: [ ] Yes [ ] No    OR     [ x] Unable to determine. Will assess at later time if appropriate.  Social Work Referral [ ]  Patient and Family Centered Care Referral [ ]    REFERRALS:   [ ]Chaplaincy  [ ]Hospice  [ ]Child Life  [ ]Social Work  [ ]Case management [ ]Holistic Therapy     Palliative care education provided to patient and/or family    Goals of Care Document:

## 2025-04-10 NOTE — DIETITIAN INITIAL EVALUATION ADULT - NS FNS DIET ORDER
DASH/TLC diet. Per RN, pt with poor po intake in setting of confusion. Consuming 0-25% of meals at this time. Would benefit from SLP services to evaluate need for diet texture/consistency modification given poor po intake and ongoing confusion.

## 2025-04-10 NOTE — CONSULT NOTE ADULT - PROBLEM SELECTOR RECOMMENDATION 6
Complex medical decision making / symptom management in the setting of critical illness    Will continue to follow for ongoing GOC discussion / titration of palliative regimen. Emotional support provided, questions answered.  Active Psychosocial Referrals:  []Social Work/Case management []PT/OT []Chaplaincy []Hospice  []Patient/Family Support []Holistic RN []Massage Therapy []Music Therapy []Ethics  Coping: [] well [] with difficulty [] poor coping [] unable to assess  Support system: [] strong [] adequate [] inadequate

## 2025-04-10 NOTE — DIETITIAN INITIAL EVALUATION ADULT - ORAL INTAKE PTA/DIET HISTORY
Pt confused at time of RD visit & unable to participate in nutrition interview. Per family, pt with reduced po intake over past year d/t increased weakness & increased confusion, reportedly worsening over past 3 months. Reportedly follows a regular diet PTP. No therapeutic diet restrictions reported PTP. Reportedly occasionally consumes Ensure at home but not daily. No oral supplements reported. No food preferences reported. Consumes 1-2 meals/day. <75% energy intake >3 months reported. UBW reported to be 70.3 kg; compared to current wt 63.5 kg, this wt change is 9.7% unintentional wt loss reported to have occurred over ~3 month duration.

## 2025-04-10 NOTE — CONSULT NOTE ADULT - ASSESSMENT
89  year old F, pmhx of chronic afib (on Eliquis), GERD, dementia, FTT , Orthostatic hypotension on midodrine ,Patient presents for evaluation of change in mental status.  Daughter states over the past few months patient has been having increasing cognitive issues including hallucinations poor sleep.  Daughter states that yesterday patient seemed to be getting sick and she was given Tylenol at night.  Patient is on Macrobid for UTI prophylaxis due to frequent UTIs.  This daughter had several concerns regarding patient including further decline in mental status/increased confusion/decreased ability to follow commands.  Daughter states it appeared like the left eye had not been opening as much is normal but now it is opening normally.  Patient also complained of left upper extremity pain and swelling.  Patient was unable to raise her left upper extremity secondary to pain.  No trauma.  Patient with decreased p.o. intake but was able to eat a little yesterday. Admitted to SDU for sepsis secondary to complicated UTI/Pylenephritis    EP was consulted for AF with RVR.     - AF with RVR  - sepsis due to pyelonephritis/ UTI  - metabolic encephalopathy     telemetry reviewed - episodes of AFIB with RVR today morning, patient is currently rate controlled  TTE noted  c/w systemic anticoagulation therapy for thromboembolic prophylaxis given h/o permanent AFIB and ChadsVasc at least 3. Patient was unable to take a pill today morning due to changes in mental status, therapeutic Lovenox could be considered as a coverage until mental status improves.  recommend diltiazem drip at the lowest rate if patient has rapid ventricular response again and adjust the drip rate as needed.   metoprolol pushes could be utilized prn to improve rate control if needed  do not recommend any antiarrhythmic at this time given unknown status of left atrial appendage and possibility of converting on AA drugs  wean off pressors as tolerated  continue telemetry monitoring  keep electrolytes K>4 and Mg>2     89  year old F, pmhx of chronic afib (on Eliquis), GERD, dementia, FTT , Orthostatic hypotension on midodrine ,Patient presents for evaluation of change in mental status.  Daughter states over the past few months patient has been having increasing cognitive issues including hallucinations poor sleep.  Daughter states that yesterday patient seemed to be getting sick and she was given Tylenol at night.  Patient is on Macrobid for UTI prophylaxis due to frequent UTIs.  This daughter had several concerns regarding patient including further decline in mental status/increased confusion/decreased ability to follow commands.  Daughter states it appeared like the left eye had not been opening as much is normal but now it is opening normally.  Patient also complained of left upper extremity pain and swelling.  Patient was unable to raise her left upper extremity secondary to pain.  No trauma.  Patient with decreased p.o. intake but was able to eat a little yesterday. Admitted to SDU for sepsis secondary to complicated UTI/Pylenephritis    EP was consulted for AF with RVR.     - AF with RVR  - sepsis due to pyelonephritis/ UTI  - metabolic encephalopathy     telemetry reviewed - episodes of AFIB with RVR today morning, patient is currently rate controlled  TTE noted  c/w systemic anticoagulation therapy for thromboembolic prophylaxis given h/o permanent AFIB and ChadsVasc at least 3. Patient was unable to take a pill today morning due to changes in mental status, therapeutic Lovenox could be considered as a coverage until mental status improves.  recommend to c/w metoprolol pushes for rate control as needed  do not recommend any antiarrhythmic at this time given unknown status of left atrial appendage and possibility of converting on AA drugs  wean off pressors as tolerated  continue telemetry monitoring  keep electrolytes K>4 and Mg>2

## 2025-04-10 NOTE — DIETITIAN INITIAL EVALUATION ADULT - NSFNSGIASSESSMENTFT_GEN_A_CORE
last BM 4/9; reportedly had loose BMs earlier this admit but reportedly with no nausea/vomiting/diarrhea/constipation at this time.

## 2025-04-10 NOTE — PROGRESS NOTE ADULT - SUBJECTIVE AND OBJECTIVE BOX
SUBJECTIVE/OVERNIGHT EVENTS  Today is hospital day 3d. This morning patient was seen and examined at bedside      MEDICATIONS  STANDING MEDICATIONS  apixaban 2.5 milliGRAM(s) Oral every 12 hours  cefepime   IVPB 1000 milliGRAM(s) IV Intermittent every 12 hours  chlorhexidine 2% Cloths 1 Application(s) Topical <User Schedule>  chlorhexidine 2% Cloths 1 Application(s) Topical <User Schedule>  influenza  Vaccine (HIGH DOSE) 0.5 milliLiter(s) IntraMuscular once  linezolid  IVPB      linezolid  IVPB 600 milliGRAM(s) IV Intermittent once  linezolid  IVPB 600 milliGRAM(s) IV Intermittent every 12 hours  metoprolol tartrate 25 milliGRAM(s) Oral three times a day  midodrine 10 milliGRAM(s) Oral every 8 hours  multivitamin 1 Tablet(s) Oral daily  pantoprazole    Tablet 40 milliGRAM(s) Oral before breakfast  phenylephrine    Infusion 0.1 MICROgram(s)/kG/Min IV Continuous <Continuous>  silver sulfADIAZINE 1% Cream 1 Application(s) Topical two times a day    PRN MEDICATIONS  acetaminophen     Tablet .. 650 milliGRAM(s) Oral every 6 hours PRN  aluminum hydroxide/magnesium hydroxide/simethicone Suspension 30 milliLiter(s) Oral every 4 hours PRN  melatonin 3 milliGRAM(s) Oral at bedtime PRN  metoprolol tartrate Injectable 2.5 milliGRAM(s) IV Push once PRN  ondansetron Injectable 4 milliGRAM(s) IV Push every 8 hours PRN    VITALS  T(F): 95.1 (04-10-25 @ 07:19), Max: 98.2 (04-10-25 @ 04:00)  HR: 106 (04-10-25 @ 10:00) (85 - 122)  BP: 93/55 (04-10-25 @ 10:00) (66/50 - 196/99)  RR: 20 (04-10-25 @ 10:00) (20 - 20)  SpO2: 94% (04-10-25 @ 10:00) (93% - 100%)    PHYSICAL EXAM  GENERAL  (  ) NAD, lying in bed comfortably     (  ) obtunded     (x  ) lethargic     (  ) somnolent    HEAD  (  ) Atraumatic     (  ) hematoma     (  ) laceration (specify location:       )     NECK  (  ) Supple     (  ) neck stiffness     (  ) nuchal rigidity     (  )  no JVD     (  ) JVD present ( -- cm)    HEART  Rate -->  (  ) normal rate    (  ) bradycardic    (  x) tachycardic  Rhythm -->  (  ) regular    (  ) regularly irregular    (x  ) irregularly irregular  Murmurs -->  (  ) normal s1/s2    (  ) systolic murmur    (  ) diastolic murmur    (  ) continuous murmur     (  ) S3 present    (  ) S4 present    LUNGS  (  )Unlabored respirations     (  ) tachypnea  (x  ) B/L air entry     (  ) decreased breath sounds in:  (location     )    (  ) no adventitious sound     (  ) crackles     (  ) wheezing      (  ) rhonchi      (specify location:       )  (  ) chest wall tenderness (specify location:       )    ABDOMEN  ( x ) Soft     (  ) tense   |   (  ) nondistended     (  ) distended   |   (  ) +BS     (  ) hypoactive bowel sounds     (  ) hyperactive bowel sounds  (  ) nontender     (  ) RUQ tenderness     (  ) RLQ tenderness     (  ) LLQ tenderness     (  ) epigastric tenderness     (  ) diffuse tenderness  (  ) Splenomegaly      (  ) Hepatomegaly      (  ) Jaundice     (  ) ecchymosis     EXTREMITIES  (  ) Normal     (  ) Rash     (  ) ecchymosis     (  ) varicose veins      ( x ) pitting edema     (  ) non-pitting edema   (  ) ulceration     (  ) gangrene:     (location:     )    NERVOUS SYSTEM  (  ) A&Ox3     (  ) confused     (  x) lethargic  CN II-XII:     (  ) Intact     (  ) focal deficits  (Specify:     )   Upper extremities:     (  ) strength X/5     (  ) focal deficit (specify:    )  Lower extremities:     (  ) strength  X/5    (  ) focal deficit (specify:    )  LABS             11.3   9.65  )-----------( 217      ( 04-10-25 @ 09:33 )             35.3     135  |  106  |  38  -------------------------<  166   04-10-25 @ 09:33  4.4  |  17  |  1.2    Ca      9.1     04-10-25 @ 09:33  Mg     2.0     04-10-25 @ 09:33    TPro  4.7  /  Alb  2.5  /  TBili  0.4  /  DBili  x   /  AST  25  /  ALT  26  /  AlkPhos  173  /  GGT  x     04-10-25 @ 09:33      Troponin T, High Sensitivity Result: 81 ng/L (04-07-25 @ 17:13)  Troponin T, High Sensitivity Result: 92 ng/L (04-07-25 @ 12:10)    Urinalysis Basic - ( 10 Apr 2025 09:33 )    Color: x / Appearance: x / SG: x / pH: x  Gluc: 166 mg/dL / Ketone: x  / Bili: x / Urobili: x   Blood: x / Protein: x / Nitrite: x   Leuk Esterase: x / RBC: x / WBC x   Sq Epi: x / Non Sq Epi: x / Bacteria: x          Urinalysis with Rflx Culture (collected 07 Apr 2025 12:05)    Culture - Urine (collected 07 Apr 2025 12:05)  Source: Catheterized None  Final Report (09 Apr 2025 12:53):    >100,000 CFU/ml Enterococcus faecium  Organism: Enterococcus faecium (09 Apr 2025 12:53)  Organism: Enterococcus faecium (09 Apr 2025 12:53)    Culture - Blood (collected 07 Apr 2025 12:02)  Source: Blood Blood-Peripheral  Preliminary Report (09 Apr 2025 22:01):    No growth at 48 Hours    Culture - Blood (collected 07 Apr 2025 12:02)  Source: Blood Blood-Peripheral  Preliminary Report (09 Apr 2025 22:01):    No growth at 48 Hours      IMAGING

## 2025-04-10 NOTE — DIETITIAN INITIAL EVALUATION ADULT - ADD RECOMMEND
Recommendation: Remove DASH/TLC diet order to liberalize diet & encourage adequate po intake. Order Ensure Plus High Protein twice daily (350 kcal, 20 g protein). Consult SLP services to evaluate diet order texture/consistency in setting of poor po intake in pt meeting malnutrition criteria; diet order texture/consistencies as per SLP.

## 2025-04-10 NOTE — CONSULT NOTE ADULT - CONSULT REASON
R/O obstructing stone-sepsis
left shoulder pain
Sepsis 2/2 UTI
AFIb with RVR
ams/ sepsis
Symptom Management and Complex Medical Decision Making/GOC in the setting of septic shock 2/2 UTI

## 2025-04-10 NOTE — CONSULT NOTE ADULT - ATTENDING COMMENTS
IMPRESSION:  Patient is a 89 yo Female with PMH of chronic Afib on Eliquis, HFpEF, L1 epithelial carcinoma s/p radiation, conus medullaris syndrome, recurrent UTIs, GERD, CKD 3b, dementia, orthostatic hypotension on midodrine, and FTT. Patient currently on Macrobid prophylaxis for recurrent UTIs. Per daughter, Patient also has a cough productive with thin yellow sputum for the past few weeks. Patient has had progressive cognitive decline for several months, but acute decline began 2 days ago. Endorses myalgias, malaise, subjective fever. Denies N/V/D, sore throat, sick contacts.    Independent history obtained from her daughter Marlys  at the bedside     IMPRESSION/RECOMMENDATIONS  Immunosuppression/Immunosenescence ( above age 60 yrs there is a exponential decline in immunity which could result in poor clinical outcomes.   Acute illness  ( severe sepsis ) which poses a threat to life or bodily function without treatment   Severe sepsis   Acute pyelonephritis with mild right hydroureteronephrosis   Metabolic encephalopathy    < from: CT Abdomen and Pelvis w/ IV Cont (04.07.25 @ 13:22) > ( Independent interpretation of test : no bacterial PNA  1.  Circumferential mild edematous wall thickening of the bladder with mural hyperemia and perivesicular stranding consistent with cystitis/UTI.  2.  Mild right hydroureteronephrosis with urothelial enhancement. Multiple vascular calcifications are noted along the course of the ureter   however none appear to be within the ureter itself. Findings can be secondary to recently passed stone or ascending infection. No definite CT imaging evidence of pyelonephritis.  3.  More nodular enhancing appearance of the right distal ureter at the level of the UVJ could also be infectious in etiology however a ureteral   neoplasm cannot be entirely excluded. Further evaluation can be considered as clinically appropriate.  4.  Mild edematous wall thickening of the rectum which could represent   proctitis in the appropriate clinical setting.  5.  Partially imaged small bilateral pleural effusions and trace pericardial effusion.    < end of copied text >    4/7 CXR left pleural effusion : ( Independent interpretation of test : no bacterial PNA    WBC 12.2     chronic Afib on Eliquis  'HFpEF  L1 epithelial carcinoma s/p radiation, conus medullaris syndrome  recurrent UTIs      #B/L Lower extremity ecchymosis    -Decrease Cefepime to 1 gm iv q12h  -Apply silver sulfadiazine cream to b/l lower extremities under dressing  -Follow-up urine culture    Discussion of management/test results( independently interpretated by me ) /antibiotic regimen  with external/primary medical team. Dr Roldan  Discussion of management/test results( independently interpretated by me ) /antibiotic regimen  with her daughter Marlys at the bedside
89F with chronic AF, GERD, dementia, FTT, orthostatic hypotension on midodrine, here with AMS, and found to have severe sepsis from pyelonephritis, on cefepime, levophed. Also with shoulder pain, on lidocaine patch. DNR/DNI/trial NIV. Palliative consulted for GOC. Patient comfortable. See above, agree with above, daughter open to further GOC conversations. Currently cared for by daughter at home.     ______________  Ant Celeste MD  Palliative Medicine  [facility]  [phone]

## 2025-04-10 NOTE — CONSULT NOTE ADULT - CONVERSATION DETAILS
- Introduced the role of Palliative Medicine for symptom management, advance care planning, and emotional support  - Diagnosis and prognosis discussed.   - GOC discussed and patient desires to continue with life-sustaining therapy|  - Most important to the HCP is trying to get the patient improved to get home  - HCP form was reportedly previously completed by the patient and Marlys thinks she has the paperwork at home  - We discussed advanced directives and we confirmed that the patient is to be DNR?DNI. MOLST was completed prior to our conversation and is in the chart - Introduced the role of Palliative Medicine for symptom management, advance care planning, and emotional support  - Diagnosis and prognosis discussed.   - GOC discussed and patient desires to continue with life-sustaining therapy  - Most important to the HCP is trying to get the patient improved to get home  - HCP form was reportedly previously completed by the patient and Marlys thinks she has the paperwork at home  - We discussed advanced directives and we confirmed that the patient is to be DNR/DNI. MOLST was completed prior to our conversation and is in the chart

## 2025-04-10 NOTE — PROGRESS NOTE ADULT - ASSESSMENT
IMPRESSION:  Patient is a 89 yo Female with PMH of chronic Afib on Eliquis, HFpEF, L1 epithelial carcinoma s/p radiation, conus medullaris syndrome, recurrent UTIs, GERD, CKD 3b, dementia, orthostatic hypotension on midodrine, and FTT. Patient currently on Macrobid prophylaxis for recurrent UTIs. Per daughter, Patient also has a cough productive with thin yellow sputum for the past few weeks. Patient has had progressive cognitive decline for several months, but acute decline began 2 days ago. Endorses myalgias, malaise, subjective fever. Denies N/V/D, sore throat, sick contacts.    Independent history obtained from her daughter Marlys  at the bedside   ID consulted for diagnostic work up and antimicrobial treatment of sepsis     IMPRESSION/RECOMMENDATIONS  Immunosuppression/Immunosenescence ( above age 60 yrs there is a exponential decline in immunity which could result in poor clinical outcomes.   Acute illness  ( severe sepsis ) which poses a threat to life or bodily function without treatment   Severe sepsis : resolving (still on pressros, T 95 )  Acute pyelonephritis with mild right hydroureteronephrosis   4/7 BCX NG  4/7 UCx E faeium  Metabolic encephalopathy    < from: CT Abdomen and Pelvis w/ IV Cont (04.07.25 @ 13:22) > ( Independent interpretation of test : no bacterial PNA  1.  Circumferential mild edematous wall thickening of the bladder with mural hyperemia and perivesicular stranding consistent with cystitis/UTI.  2.  Mild right hydroureteronephrosis with urothelial enhancement. Multiple vascular calcifications are noted along the course of the ureter   however none appear to be within the ureter itself. Findings can be secondary to recently passed stone or ascending infection. No definite CT imaging evidence of pyelonephritis.  3.  More nodular enhancing appearance of the right distal ureter at the level of the UVJ could also be infectious in etiology however a ureteral   neoplasm cannot be entirely excluded. Further evaluation can be considered as clinically appropriate.  4.  Mild edematous wall thickening of the rectum which could represent   proctitis in the appropriate clinical setting.  5.  Partially imaged small bilateral pleural effusions and trace pericardial effusion.    < end of copied text >    4/10 CXR b/l opacities/ pleural effusion : ( Independent interpretation of test : no bacterial PNA    4/7 COVID 19/ Influenza/ RSV NG.   4/7 Nares ORSa NG  WBC 7.8    chronic Afib on Eliquis  HFpEF  L1 epithelial carcinoma s/p radiation, conus medullaris syndrome  recurrent UTIs      #B/L Lower extremity ecchymosis    -continue with Cefepime to 1 gm iv q12h  -change po to iv  Linezolid 600 mg q12h. Linezolid will be monitored daily for myelosuppression especially thrombocytopenia by checking the CBC daily   -straight catheter with UCs today  -Apply silver sulfadiazine cream to b/l lower extremities under dressing  -Follow-up final urine culture    Discussion of management/test results( independently interpretated by me ) /antibiotic regimen  with external/primary medical team. Dr Goel  Discussion of management/test results( independently interpretated by me ) /antibiotic regimen  with her daughter Marlys at the bedside .

## 2025-04-10 NOTE — PROGRESS NOTE ADULT - SUBJECTIVE AND OBJECTIVE BOX
89  year old F, pmhx of chronic afib (on Eliquis), GERD, dementia, FTT , Orthostatic hypotension on midodrine  Patient presents for evaluation of change in mental status.  Daughter states over the past few months patient has been having increasing cognitive issues including hallucinations poor sleep.  Daughter states that  patient seemed to be getting sick and she was given Tylenol at night.  Patient is on Macrobid for UTI prophylaxis due to frequent UTIs.  This daughter had several concerns regarding patient including further decline in mental status/increased confusion/decreased ability to follow commands.  Daughter states it appeared like the left eye had not been opening as much is normal but now it is opening normally.  Patient also complained of left upper extremity pain and swelling.  Patient was unable to raise her left upper extremity secondary to pain.  No trauma.  Patient with decreased p.o. intake.   Pt was admitted to SDU with Acute pyelonephritis with mild right hydroureteronephrosis , urine Cx is growing  E faeium, she has Metabolic encephalopathy with underlying dementia.   Pt  was found to have A-fib with RVR, was hypotensive, BP improved after fluid resuscitation HR controlled with BB. ID recommended to add  Zyvox  to Abx regimen.   Today pt is confused, tender to light touch, still hypotensive and tachycardic, developed worsening soft tissue edema after IV hydration.     PAST MEDICAL & SURGICAL HISTORY:  Chronic atrial fibrillation      GERD (gastroesophageal reflux disease)      Chronic lower back pain      No significant past surgical history      Vital Signs Last 24 Hrs  T(C): 35.1 (10 Apr 2025 07:19), Max: 36.8 (10 Apr 2025 04:00)  T(F): 95.1 (10 Apr 2025 07:19), Max: 98.2 (10 Apr 2025 04:00)  HR: 108 (10 Apr 2025 12:45) (85 - 142)  BP: 93/67 (10 Apr 2025 12:45) (66/50 - 159/114)  BP(mean): 76 (10 Apr 2025 12:45) (55 - 134)  RR: 20 (10 Apr 2025 10:00) (20 - 20)  SpO2: 95% (10 Apr 2025 11:00) (93% - 100%)    Parameters below as of 10 Apr 2025 11:00  Patient On (Oxygen Delivery Method): nasal cannula  O2 Flow (L/min): 3        PHYSICAL EXAM:  GENERAL: NAD, frail with temporal muscle waisting   HEAD:  Atraumatic, Normocephalic  NECK: Supple, No JVD, Normal thyroid  NERVOUS SYSTEM:  Awake, answering questions, hard of hearing, moving all extremities   CHEST/LUNG: decreased BS at bases   HEART: irregularly irregular, tachycardic   ABDOMEN: Soft, Nontender, Nondistended; Bowel sounds present  EXTREMITIES:  dressing noted on LE b/l below knees   SKIN: paper thin skin with multiply skin tears on UE       LABS:                                   11.3   9.65  )-----------( 217      ( 10 Apr 2025 09:33 )             35.3   04-10    136  |  108  |  37[H]  ----------------------------<  110[H]  5.2[H]   |  12[L]  |  1.2    Ca    8.9      10 Apr 2025 11:25  Phos  3.2     04-10  Mg     2.1     04-10    TPro  4.7[L]  /  Alb  3.2[L]  /  TBili  0.4  /  DBili  x   /  AST  28  /  ALT  21  /  AlkPhos  140[H]  04-10        Urinalysis Basic - ( 09 Apr 2025 04:45 )    Color: x / Appearance: x / SG: x / pH: x  Gluc: 82 mg/dL / Ketone: x  / Bili: x / Urobili: x   Blood: x / Protein: x / Nitrite: x   Leuk Esterase: x / RBC: x / WBC x   Sq Epi: x / Non Sq Epi: x / Bacteria: x        Urinalysis with Rflx Culture (collected 07 Apr 2025 12:05)    Culture - Urine (collected 07 Apr 2025 12:05)  Source: Catheterized None  Final Report (09 Apr 2025 12:53):    >100,000 CFU/ml Enterococcus faecium  Culture - Urine (04.07.25 @ 12:05)   - Ampicillin: R >8 Predicts results to ampicillin/sulbactam, amoxacillin-clavulanate and piperacillin-tazobactam.  - Ciprofloxacin: I 2  - Levofloxacin: I 4  - Nitrofurantoin: S <=32 Should not be used to treat pyelonephritis.  - Tetracycline: R >8  - Vancomycin: S 0.5  Specimen Source: Catheterized None  Culture Results:   >100,000 CFU/ml Enterococcus faecium  Organism Identification: Enterococcus faecium  Organism: Enterococcus faeciumOrganism: Enterococcus faecium (09 Apr 2025 12:53)  Organism: Enterococcus faecium (09 Apr 2025 12:53)    Culture - Blood (collected 07 Apr 2025 12:02)  Source: Blood Blood-Peripheral  Preliminary Report (08 Apr 2025 22:02):    No growth at 24 hours      Culture - Blood (collected 07 Apr 2025 12:02)  Source: Blood Blood-Peripheral  Preliminary Report (08 Apr 2025 22:02):    No growth at 24 hours    RADIOLOGY & ADDITIONAL TESTS:  < from: Xray Hand 3 Views, Left (04.08.25 @ 12:59) >  impression:    Soft tissue swelling suggested at the level of the fifth digit.    The bones are diffusely osteopenic. There are arthritic changes with   joint space narrowing and osteophyte formation. Findings appear most   pronounced at the second distal interphalangeal joint. Arthritic changes   are also noted at the basal joint. There is no acute fracture.    Chondrocalcinosis within the region of the triangular fibrocartilage    < from: [facility] Duplex Upper Ext Vein Scan, Left (04.08.25 @ 10:33) >  IMPRESSION:  No evidence of left upper extremity deep venous thrombosis. Study limited   due to patient positioning and overlying dressings      < from: VA Duplex Lower Ext Vein Scan, Bilat (04.08.25 @ 10:30) >  IMPRESSION:  No evidence of DVT in the visualized portions of the bilateral lower   extremities. Visualization limited secondary to dressings in the lower   extremities and limb contractures    < from: TTE Echo Complete w/o Contrast w/ Doppler (04.09.25 @ 10:47) >  Summary:   1. Left ventricular ejection fraction, by visual estimation, is 45 to   50%.   2. Mildly decreased global left ventricular systolic function.   3. The left ventricular diastolic function could not be assessed in this   study.   4. Severely reduced RV systolic function.   5. Severely enlarged right atrium.   6. Left atrial enlargement.   7. Sclerotic aortic valve with mildly decreased opening.   8. Mild aortic regurgitation.   9. Mild-to-moderate mitral regurgitation.  10. Moderate tricuspid regurgitation.  11. Estimated pulmonary artery systolic pressure is 65.8 mmHg assuming a   right atrial pressure of 8 mmHg, which is consistent with severe   pulmonary hypertension.  12. Small pericardial effusion visualized on limited views.    < from: Xray Chest 1 View- PORTABLE-Urgent (Xray Chest 1 View- PORTABLE-Urgent .) (04.10.25 @ 08:35) >    Impression:      Small to moderate left pleural effusion is unchanged. Developing opacity   in the right lung base    < end of copied text >    MEDICATIONS  (STANDING):  apixaban 2.5 milliGRAM(s) Oral every 12 hours  cefepime   IVPB 1000 milliGRAM(s) IV Intermittent every 12 hours  chlorhexidine 2% Cloths 1 Application(s) Topical <User Schedule>  chlorhexidine 2% Cloths 1 Application(s) Topical <User Schedule>  linezolid  IVPB      linezolid  IVPB 600 milliGRAM(s) IV Intermittent every 12 hours  metoprolol tartrate 25 milliGRAM(s) Oral three times a day  midodrine 10 milliGRAM(s) Oral every 8 hours  multivitamin 1 Tablet(s) Oral daily  pantoprazole    Tablet 40 milliGRAM(s) Oral before breakfast  phenylephrine    Infusion 0.1 MICROgram(s)/kG/Min (2.38 mL/Hr) IV Continuous <Continuous>  silver sulfADIAZINE 1% Cream 1 Application(s) Topical two times a day    MEDICATIONS  (PRN):  acetaminophen     Tablet .. 650 milliGRAM(s) Oral every 6 hours PRN Temp greater or equal to 38C (100.4F), Mild Pain (1 - 3)  aluminum hydroxide/magnesium hydroxide/simethicone Suspension 30 milliLiter(s) Oral every 4 hours PRN Dyspepsia  melatonin 3 milliGRAM(s) Oral at bedtime PRN Insomnia  ondansetron Injectable 4 milliGRAM(s) IV Push every 8 hours PRN Nausea and/or Vomiting

## 2025-04-10 NOTE — DIETITIAN INITIAL EVALUATION ADULT - OTHER INFO
Pertinent Medical Information: Presented for evaluation of change in mental status. Admitted to SDU for sepsis for sepsis 2/2 complicated UTI/pylenephritis. Chronic HFpEF / PHTN; noted not in exacerbation. Chronic a.fib with acute RVR. Per progress notes, today pt is confused, tender to light touch, still hypotensive and tachycardic, developed worsening soft tissue edema after IV hydration.     PMH includes chronic afib (on Eliquis), GERD, dementia, FTT , Orthostatic hypotension

## 2025-04-10 NOTE — PROGRESS NOTE ADULT - ASSESSMENT
89  year old F, pmhx of chronic afib (on Eliquis), GERD, dementia, FTT , Orthostatic hypotension on midodrine ,Patient presents for evaluation of change in mental status.  Daughter states over the past few months patient has been having increasing cognitive issues including hallucinations poor sleep.  Daughter states that yesterday patient seemed to be getting sick and she was given Tylenol at night.  Patient is on Macrobid for UTI prophylaxis due to frequent UTIs.  This daughter had several concerns regarding patient including further decline in mental status/increased confusion/decreased ability to follow commands.  Daughter states it appeared like the left eye had not been opening as much is normal but now it is opening normally.  Patient also complained of left upper extremity pain and swelling.  Patient was unable to raise her left upper extremity secondary to pain.  No trauma.  Patient with decreased p.o. intake but was able to eat a little yesterday. Admitted to SDU for sepsis secondary to complicated UTI/Pylenephritis    #TME 2/2 Severe Sepsis present on admission secondary to acute pyelonephritis/ Hx of recurrent UTIs/ lactic acidosis likley 2/2 Sepsis and hypotension   -continue with Cefepime to 1 gm iv q12h  - IV lnezolid 600 q12  -Apply silver sulfadiazine cream to b/l lower extremities under dressing  -Follow-up final urine culture  - on phenylnephrine  - on Midodrine 10 mg Q 8 hours   -ID following    #Sacral ulcer  - unstageable sacral ulcer with minimal white discharge.  - wound care eval noted  - change position Q 2 hours, off load pressure points     #Left shoulder pain and UE swelling  - can start Lidocaine topical   - Xray shoulder : Acromioclavicular arthrosis. High riding humerus which could reflect rotator cuff tear of uncertain age. Bones appear osteopenic with no acute fracture or dislocation. Glenohumeral arthritic changes.  - LUE duplex negative for DVT   - keep LUE elevated     #Hx of Lumbar Compression Fracture  #Hx of Pathologic L1 vertebrae fracture  #Hx of Conus Medullaris Syndrome  - was on  gabapentin and robaxin,  tylenol PRN at home   - OP neurosurgery f/u     #chronic HFpEF / PHTN   - not in exacerbation   - TTE 1/28/2024- EF 60-65% mod pulm HTN   - monitor for fluid oveload     #Chronic Atrial Fibrillation with acute RVR  -CHADS2-VASC = 5.   -c/w Eliquis 2.5 mg twice daily   - resume  metoprolol 25mg three times daily  - patient having hard time taking oral meds  -EP consulted 4/10 for alternate options of medications and administrations

## 2025-04-10 NOTE — DIETITIAN INITIAL EVALUATION ADULT - ETIOLOGY
reduced po intake over past year d/t increased weakness & increased confusion, reportedly worsening over past 3 months

## 2025-04-10 NOTE — DIETITIAN NUTRITION RISK NOTIFICATION - TREATMENT: THE FOLLOWING DIET HAS BEEN RECOMMENDED
Diet, Regular:   Supplement Feeding Modality:  Oral  Ensure Plus High Protein Cans or Servings Per Day:  1       Frequency:  Two Times a day (04-10-25 @ 21:04) [Pending Verification By Attending]  Diet, DASH/TLC:   Sodium & Cholesterol Restricted (04-07-25 @ 21:44) [Active]

## 2025-04-10 NOTE — CONSULT NOTE ADULT - SUBJECTIVE AND OBJECTIVE BOX
89  year old F, pmhx of chronic afib (on Eliquis), GERD, dementia, FTT , Orthostatic hypotension on midodrine ,Patient presents for evaluation of change in mental status.  Daughter states over the past few months patient has been having increasing cognitive issues including hallucinations poor sleep.  Daughter states that yesterday patient seemed to be getting sick and she was given Tylenol at night.  Patient is on Macrobid for UTI prophylaxis due to frequent UTIs.  This daughter had several concerns regarding patient including further decline in mental status/increased confusion/decreased ability to follow commands.  Daughter states it appeared like the left eye had not been opening as much is normal but now it is opening normally.  Patient also complained of left upper extremity pain and swelling.  Patient was unable to raise her left upper extremity secondary to pain.  No trauma.  Patient with decreased p.o. intake but was able to eat a little yesterday. Admitted to SDU for sepsis secondary to complicated UTI/Pylenephritis    EP was consulted for AF with RVR.     Imagin) CXR with b/l pleural effusion and small left lung opacity    2) CT abdomen pelvis with IV cont :   1.  Circumferential mild edematous wall thickening of the bladder with   mural hyperemia and perivesicular stranding consistent with cystitis/UTI.  2.  Mild right hydroureteronephrosis with urothelial enhancement.   Multiple vascular calcifications are noted along the course of the ureter   however none appear to be within the ureter itself. Findings can be   secondary to recently passed stone or ascending infection. No definite CT   imaging evidence of pyelonephritis.  3.  More nodular enhancing appearance of the right distal ureter at the   level of the UVJ could also be infectious in etiology however a ureteral   neoplasm cannot be entirely excluded. Further evaluation can be   considered as clinically appropriate.  4.  Mild edematous wall thickening of the rectum which could represent   proctitis in the appropriate clinical setting.  5.  Partially imaged small bilateral pleural effusions and trace   pericardial effusion.    3) CTH and CTA neck and head :    CT HEAD:  No acute intracranial pathology. No evidence of midline shift, mass   effect or intracranial hemorrhage.    Moderate chronic microvascular type changes as well as chronicbilateral   cerebellar infarcts.    CTA HEAD:  No evidence of flow-limiting stenosis, occlusion or aneurysm.    CTA NECK:  No evidence of carotid or vertebral artery stenosis.    4)xray left shoulder   Acromioclavicular arthrosis. High riding humerus which could reflect   rotator cuff tear of uncertain age. Bones appear osteopenic with no acute   fracture or dislocation.    Glenohumeral arthritic changes.    REVIEW OF SYSTEMS  [x] A ten-point review of systems was otherwise negative except as noted.  [ ] Due to altered mental status/intubation, subjective information were not able to be obtained from the patient. History was obtained, to the extent possible, from review of the chart and collateral sources of information.    PAST MEDICAL & SURGICAL HISTORY:  Chronic atrial fibrillation  GERD (gastroesophageal reflux disease)  Chronic lower back pain  No significant past surgical history    Home Medications:  calcium carbonate 1250 mg (500 mg elemental calcium) oral tablet: 1 tab(s) orally once a day (01 Mar 2024 14:01)  cholecalciferol oral tablet: 5000 unit(s) orally once a day (01 Mar 2024 11:18)  Eliquis 2.5 mg oral tablet: 1 tab(s) orally 2 times a day (2025 22:22)  furosemide 20 mg oral tablet: 2 tab(s) orally once a day (2025 22:20)  Macrobid 100 mg oral capsule: 1 cap(s) orally 3 times a day (2025 22:22)  metoprolol tartrate 25 mg oral tablet: 1 tab(s) orally 3 times a day (01 Mar 2024 11:18)  midodrine 5 mg oral tablet: 2 tab(s) orally 2 times a day (2025 22:30)  Multiple Vitamins oral tablet: 1 tab(s) orally once a day (01 Mar 2024 11:18)  omeprazole 20 mg oral delayed release capsule: 1 cap(s) orally once a day (2024 14:47)  polyethylene glycol 3350 oral powder for reconstitution: 17 gram(s) orally 2 times a day (01 Mar 2024 11:18)  senna (sennosides) 8.6 mg oral tablet: 2 tab(s) orally once a day (at bedtime) (2024 14:46)  sodium chloride 1 g oral tablet: 1 tab(s) orally 2 times a day monitor BMP for Sodium levels (02 Mar 2024 12:41)  tamsulosin 0.4 mg oral capsule: 2 cap(s) orally once a day (at bedtime) (01 Mar 2024 14:01)  Vitamin C 500 mg oral tablet: 1 tab(s) orally once a day (2024 14:47)    Allergies:  No Known Allergies    FAMILY HISTORY:  Family history of stroke (Father)      SOCIAL HISTORY:    CIGARETTES:  ALCOHOL:  MARIJUANA:  ILLICIT DRUGS:    PREVIOUS DIAGNOSTIC TESTING:      ECHO  FINDINGS:  Summary:   1. Left ventricular ejection fraction, by visual estimation, is 45 to   50%.   2. Mildly decreased global left ventricular systolic function.   3. The left ventricular diastolic function could not be assessed in this   study.   4. Severely reduced RV systolic function.   5. Severely enlarged right atrium.   6. Left atrial enlargement.   7. Sclerotic aortic valve with mildly decreased opening.   8. Mild aortic regurgitation.   9. Mild-to-moderate mitral regurgitation.  10. Moderate tricuspid regurgitation.  11. Estimated pulmonary artery systolic pressure is 65.8 mmHg assuming a   right atrial pressure of 8 mmHg, which is consistent with severe   pulmonary hypertension.  12. Small pericardial effusion visualized on limited views.    STRESS  FINDINGS:    CATHETERIZATION  FINDINGS:    ELECTROPHYSIOLOGY STUDY  FINDINGS:    CAROTID ULTRASOUND:  FINDINGS    VENOUS DUPLEX SCAN:  FINDINGS:    CHEST CT PULMONARY ANGIO with IV Contrast:  FINDINGS:      MEDICATIONS  (STANDING):  apixaban 2.5 milliGRAM(s) Oral every 12 hours  cefepime   IVPB 1000 milliGRAM(s) IV Intermittent every 12 hours  chlorhexidine 2% Cloths 1 Application(s) Topical <User Schedule>  chlorhexidine 2% Cloths 1 Application(s) Topical <User Schedule>  linezolid  IVPB      linezolid  IVPB 600 milliGRAM(s) IV Intermittent every 12 hours  metoprolol tartrate 25 milliGRAM(s) Oral three times a day  midodrine 10 milliGRAM(s) Oral every 8 hours  multivitamin 1 Tablet(s) Oral daily  pantoprazole    Tablet 40 milliGRAM(s) Oral before breakfast  phenylephrine    Infusion 0.1 MICROgram(s)/kG/Min (2.38 mL/Hr) IV Continuous <Continuous>  silver sulfADIAZINE 1% Cream 1 Application(s) Topical two times a day    MEDICATIONS  (PRN):  acetaminophen     Tablet .. 650 milliGRAM(s) Oral every 6 hours PRN Temp greater or equal to 38C (100.4F), Mild Pain (1 - 3)  aluminum hydroxide/magnesium hydroxide/simethicone Suspension 30 milliLiter(s) Oral every 4 hours PRN Dyspepsia  melatonin 3 milliGRAM(s) Oral at bedtime PRN Insomnia  ondansetron Injectable 4 milliGRAM(s) IV Push every 8 hours PRN Nausea and/or Vomiting      Vital Signs Last 24 Hrs  T(C): 35.1 (10 Apr 2025 07:19), Max: 36.8 (10 Apr 2025 04:00)  T(F): 95.1 (10 Apr 2025 07:19), Max: 98.2 (10 Apr 2025 04:00)  HR: 108 (10 Apr 2025 12:45) (91 - 142)  BP: 93/67 (10 Apr 2025 12:45) (66/50 - 159/114)  BP(mean): 76 (10 Apr 2025 12:45) (55 - 134)  RR: 20 (10 Apr 2025 10:00) (20 - 20)  SpO2: 95% (10 Apr 2025 11:00) (93% - 100%)    Parameters below as of 10 Apr 2025 11:00  Patient On (Oxygen Delivery Method): nasal cannula  O2 Flow (L/min): 3      PHYSICAL EXAM:    GENERAL: In no apparent distress, well nourished, and hydrated.  HEART: irregularly irregular  PULMONARY: Clear to auscultation and perfusion.   NEUROLOGICAL: Grossly nonfocal        INTERPRETATION OF TELEMETRY:    EC Lead ECG:   Ventricular Rate 124 BPM    QRS Duration 82 ms    Q-T Interval 334 ms    QTC Calculation(Bazett) 479 ms    R Axis -62 degrees    T Axis 227 degrees    Diagnosis Line Atrial fibrillation with rapid ventricular response  Left axis deviation  Low voltage QRS  Anterior infarct , age undetermined  Abnormal ECG    Confirmed by Eliazar Bardales ([id number]) on 4/10/2025 7:14:42 AM (25 @ 07:45)  12 Lead ECG:   Ventricular Rate 132 BPM    QRS Duration 90 ms    Q-T Interval 312 ms    QTC Calculation(Bazett) 462 ms    R Axis -44 degrees    T Axis 174 degrees    Diagnosis Line Atrial fibrillation with rapid ventricular response  Left axis deviation  Pulmonary disease pattern  Minimal voltage criteria for LVH, may be normal variant ( Quintin product )  ST & T wave abnormality, consider lateral ischemia  Abnormal ECG    Confirmed by Eliazar Bardales ([id number]) on 4/10/2025 7:14:38 AM (25 @ 04:15)  12 Lead ECG:   Ventricular Rate 128 BPM    QRS Duration 84 ms    Q-T Interval 304 ms    QTC Calculation(Bazett) 443 ms    R Axis 251 degrees    T Axis 209 degrees    Diagnosis Line *** Poor data quality, interpretation may be adversely affected  Atrial fibrillation with rapid ventricular response  Right superior axis deviation  Pulmonary disease pattern  Nonspecific ST and T wave abnormality  Abnormal ECG    Confirmed by Eliazar Bardales ([id number]) on 4/10/2025 7:14:35 AM (25 @ 20:55)        LABS:                        11.3   9.65  )-----------( 217      ( 10 Apr 2025 09:33 )             35.3     04-10    136  |  108  |  37[H]  ----------------------------<  110[H]  5.2[H]   |  12[L]  |  1.2    Ca    8.9      10 Apr 2025 11:25  Phos  3.2     04-10  Mg     2.1     10    TPro  4.7[L]  /  Alb  3.2[L]  /  TBili  0.4  /  DBili  x   /  AST  28  /  ALT  21  /  AlkPhos  140[H]  04-10          Urinalysis Basic - ( 10 Apr 2025 11:25 )    Color: x / Appearance: x / SG: x / pH: x  Gluc: 110 mg/dL / Ketone: x  / Bili: x / Urobili: x   Blood: x / Protein: x / Nitrite: x   Leuk Esterase: x / RBC: x / WBC x   Sq Epi: x / Non Sq Epi: x / Bacteria: x      BNP            RADIOLOGY & ADDITIONAL STUDIES:

## 2025-04-10 NOTE — CONSULT NOTE ADULT - NS ATTEND AMEND GEN_ALL_CORE FT
Agree with above   patient's daughter reports recurrent UTI history   will need cystoscopy as outpatient after UTI is resolved/
Pt. was seen/ examined  Labs/ imaging reviewed, including doppler, no DVT  Discussed rotator cuff tear with daughter (patient currently confused)  Swelling L hand probably due to dressing/ wrapping  Continue elevation of LUE  Recommend PT/rehab follow up  Please re-consult PRN
complex patient  Severely ill  Prognosis grave  Agree with above recommendations

## 2025-04-10 NOTE — PROGRESS NOTE ADULT - SUBJECTIVE AND OBJECTIVE BOX
SUNNY COLBERT  89y, Female    All available historical data reviewed    OVERNIGHT EVENTS:  none    ROS:  unable to obtain history secondary to patient's mental status  very weak  Hypothermic 95  on pressors  BMs x one  no hanson catheter  NV 2 LIT  no cough  no abd pain    VITALS:  T(F): 95.1, Max: 98.2 (04-10-25 @ 04:00)  HR: 109  BP: 91/54  RR: 20Vital Signs Last 24 Hrs  T(C): 35.1 (10 Apr 2025 07:19), Max: 36.8 (10 Apr 2025 04:00)  T(F): 95.1 (10 Apr 2025 07:19), Max: 98.2 (10 Apr 2025 04:00)  HR: 109 (10 Apr 2025 07:19) (85 - 129)  BP: 91/54 (10 Apr 2025 07:19) (66/50 - 196/99)  BP(mean): 66 (10 Apr 2025 07:19) (55 - 140)  RR: 20 (10 Apr 2025 07:19) (20 - 20)  SpO2: 97% (10 Apr 2025 07:19) (93% - 100%)    Parameters below as of 09 Apr 2025 16:00  Patient On (Oxygen Delivery Method): nasal cannula        TESTS & MEASUREMENTS:                        11.1   7.84  )-----------( 186      ( 09 Apr 2025 04:45 )             34.6     04-09    138  |  106  |  34[H]  ----------------------------<  82  5.3[H]   |  16[L]  |  1.0    Ca    9.2      09 Apr 2025 04:45  Mg     2.3     04-09    TPro  4.5[L]  /  Alb  2.6[L]  /  TBili  0.5  /  DBili  x   /  AST  21  /  ALT  28  /  AlkPhos  149[H]  04-09    LIVER FUNCTIONS - ( 09 Apr 2025 04:45 )  Alb: 2.6 g/dL / Pro: 4.5 g/dL / ALK PHOS: 149 U/L / ALT: 28 U/L / AST: 21 U/L / GGT: x             Urinalysis with Rflx Culture (collected 04-07-25 @ 12:05)    Culture - Urine (collected 04-07-25 @ 12:05)  Source: Catheterized None  Final Report (04-09-25 @ 12:53):    >100,000 CFU/ml Enterococcus faecium  Organism: Enterococcus faecium (04-09-25 @ 12:53)  Organism: Enterococcus faecium (04-09-25 @ 12:53)      Method Type: MATA      -  Ampicillin: R >8 Predicts results to ampicillin/sulbactam, amoxacillin-clavulanate and  piperacillin-tazobactam.      -  Ciprofloxacin: I 2      -  Levofloxacin: I 4      -  Nitrofurantoin: S <=32 Should not be used to treat pyelonephritis.      -  Tetracycline: R >8      -  Vancomycin: S 0.5    Culture - Blood (collected 04-07-25 @ 12:02)  Source: Blood Blood-Peripheral  Preliminary Report (04-09-25 @ 22:01):    No growth at 48 Hours    Culture - Blood (collected 04-07-25 @ 12:02)  Source: Blood Blood-Peripheral  Preliminary Report (04-09-25 @ 22:01):    No growth at 48 Hours      Urinalysis Basic - ( 09 Apr 2025 04:45 )    Color: x / Appearance: x / SG: x / pH: x  Gluc: 82 mg/dL / Ketone: x  / Bili: x / Urobili: x   Blood: x / Protein: x / Nitrite: x   Leuk Esterase: x / RBC: x / WBC x   Sq Epi: x / Non Sq Epi: x / Bacteria: x          Social History:  Tobacco Use: No  Alcohol Use: No  Drug Use: No    RADIOLOGY & ADDITIONAL TESTS:  Personal review of radiological diagnostics performed  Echo and EKG results noted when applicable.     MEDICATIONS:  acetaminophen     Tablet .. 650 milliGRAM(s) Oral every 6 hours PRN  acetaminophen   IVPB .. 1000 milliGRAM(s) IV Intermittent once  albumin human 25% IVPB 100 milliLiter(s) IV Intermittent once  aluminum hydroxide/magnesium hydroxide/simethicone Suspension 30 milliLiter(s) Oral every 4 hours PRN  apixaban 2.5 milliGRAM(s) Oral every 12 hours  cefepime   IVPB 1000 milliGRAM(s) IV Intermittent every 12 hours  chlorhexidine 2% Cloths 1 Application(s) Topical <User Schedule>  chlorhexidine 2% Cloths 1 Application(s) Topical <User Schedule>  influenza  Vaccine (HIGH DOSE) 0.5 milliLiter(s) IntraMuscular once  linezolid    Tablet 600 milliGRAM(s) Oral every 12 hours  melatonin 3 milliGRAM(s) Oral at bedtime PRN  metoprolol tartrate 25 milliGRAM(s) Oral three times a day  metoprolol tartrate Injectable 2.5 milliGRAM(s) IV Push once PRN  midodrine 10 milliGRAM(s) Oral every 8 hours  multivitamin 1 Tablet(s) Oral daily  ondansetron Injectable 4 milliGRAM(s) IV Push every 8 hours PRN  pantoprazole    Tablet 40 milliGRAM(s) Oral before breakfast  phenylephrine    Infusion 0.1 MICROgram(s)/kG/Min IV Continuous <Continuous>  silver sulfADIAZINE 1% Cream 1 Application(s) Topical two times a day      ANTIBIOTICS:  cefepime   IVPB 1000 milliGRAM(s) IV Intermittent every 12 hours  linezolid    Tablet 600 milliGRAM(s) Oral every 12 hours

## 2025-04-10 NOTE — PROGRESS NOTE ADULT - ASSESSMENT
IMPRESSION:    AMS/ TME  Sepsis/ UTI/ Cx noted on lidia  Lactic acidosis improved  Metabolic acidosis  bibasilar atelectasis/ effusion  HO afib (on Eliquis),  HO dementia  HI Orthostatic hypotension on midodrine     PLAN:    CNS: Avoid CNS depressant    HEENT:  Oral care    PULMONARY:  HOB @ 45 degrees, on NC aspiration precaution, keep Sao2 92 to 96%    CARDIOVASCULAR: echo noted. midodrine 10 q 8    GI: GI prophylaxis                                          Feeding per speech    RENAL:  F/u  lytes.  Correct as needed. accurate I/O    INFECTIOUS DISEASE: abx, pancx    HEMATOLOGICAL:  DVT prophylaxis. on Eliquis    ENDOCRINE:  Follow up FS.  Insulin protocol if needed.  SDU    Poor prognosis

## 2025-04-10 NOTE — DIETITIAN INITIAL EVALUATION ADULT - PERTINENT LABORATORY DATA
04-10    136  |  108  |  37[H]  ----------------------------<  110[H]  5.2[H]   |  12[L]  |  1.2    Ca    8.9      10 Apr 2025 11:25  Phos  3.2     04-10  Mg     2.1     04-10    TPro  4.7[L]  /  Alb  3.2[L]  /  TBili  0.4  /  DBili  x   /  AST  28  /  ALT  21  /  AlkPhos  140[H]  04-10

## 2025-04-11 NOTE — PROGRESS NOTE ADULT - ASSESSMENT
IMPRESSION:    AMS/ TME  Sepsis/ UTI/ Cx noted on lidia  Lactic acidosis improved  Metabolic acidosis// mixed  bibasilar atelectasis/ effusion  HO afib (on Eliquis),  HO dementia  HI Orthostatic hypotension on midodrine     PLAN:    CNS: Avoid CNS depressant    HEENT:  Oral care    PULMONARY:  HOB @ 45 degrees, on NC aspiration precaution, keep Sao2 92 to 96%, ABG    CARDIOVASCULAR: echo noted. midodrine 10 q 8 bicarb drip    GI: GI prophylaxis                                          Feeding per speech    RENAL:  F/u  lytes.  Correct as needed. accurate I/O, po bicarb    INFECTIOUS DISEASE: abx, pancx    HEMATOLOGICAL:  DVT prophylaxis. on Eliquis    ENDOCRINE:  Follow up FS.  Insulin protocol if needed.  SDU    Poor prognosis

## 2025-04-11 NOTE — PROGRESS NOTE ADULT - SUBJECTIVE AND OBJECTIVE BOX
SUNNY COLBERT  89y, Female    All available historical data reviewed    OVERNIGHT EVENTS:  no fevers  no changes    ROS:  unable to obtain history secondary to patient's mental status   very weak  on pressors  hanson placed  NC 2 LIT no cough  BM, no diarrhea  VITALS:  T(F): 96.7, Max: 97.1 (04-10-25 @ 16:00)  HR: 102  BP: 115/82  RR: 20Vital Signs Last 24 Hrs  T(C): 35.9 (11 Apr 2025 08:28), Max: 36.2 (10 Apr 2025 16:00)  T(F): 96.7 (11 Apr 2025 08:28), Max: 97.1 (10 Apr 2025 16:00)  HR: 102 (11 Apr 2025 08:28) (98 - 142)  BP: 115/82 (11 Apr 2025 08:28) (78/54 - 157/101)  BP(mean): 95 (11 Apr 2025 08:28) (62 - 115)  RR: 20 (11 Apr 2025 08:28) (20 - 20)  SpO2: 100% (11 Apr 2025 08:28) (95% - 100%)    Parameters below as of 10 Apr 2025 20:00  Patient On (Oxygen Delivery Method): nasal cannula        TESTS & MEASUREMENTS:                        10.3   7.98  )-----------( 168      ( 11 Apr 2025 05:03 )             32.0     04-11    137  |  108  |  37[H]  ----------------------------<  93  4.3   |  13[L]  |  1.1    Ca    9.1      11 Apr 2025 05:03  Phos  3.2     04-10  Mg     2.3     04-11    TPro  4.6[L]  /  Alb  2.8[L]  /  TBili  0.4  /  DBili  x   /  AST  21  /  ALT  18  /  AlkPhos  133[H]  04-11    LIVER FUNCTIONS - ( 11 Apr 2025 05:03 )  Alb: 2.8 g/dL / Pro: 4.6 g/dL / ALK PHOS: 133 U/L / ALT: 18 U/L / AST: 21 U/L / GGT: x             Urinalysis with Rflx Culture (collected 04-07-25 @ 12:05)    Culture - Urine (collected 04-07-25 @ 12:05)  Source: Catheterized None  Final Report (04-09-25 @ 12:53):    >100,000 CFU/ml Enterococcus faecium  Organism: Enterococcus faecium (04-09-25 @ 12:53)  Organism: Enterococcus faecium (04-09-25 @ 12:53)      Method Type: MATA      -  Ampicillin: R >8 Predicts results to ampicillin/sulbactam, amoxacillin-clavulanate and  piperacillin-tazobactam.      -  Ciprofloxacin: I 2      -  Levofloxacin: I 4      -  Nitrofurantoin: S <=32 Should not be used to treat pyelonephritis.      -  Tetracycline: R >8      -  Vancomycin: S 0.5    Culture - Blood (collected 04-07-25 @ 12:02)  Source: Blood Blood-Peripheral  Preliminary Report (04-10-25 @ 22:01):    No growth at 72 Hours    Culture - Blood (collected 04-07-25 @ 12:02)  Source: Blood Blood-Peripheral  Preliminary Report (04-10-25 @ 22:01):    No growth at 72 Hours      Urinalysis Basic - ( 11 Apr 2025 05:03 )    Color: x / Appearance: x / SG: x / pH: x  Gluc: 93 mg/dL / Ketone: x  / Bili: x / Urobili: x   Blood: x / Protein: x / Nitrite: x   Leuk Esterase: x / RBC: x / WBC x   Sq Epi: x / Non Sq Epi: x / Bacteria: x          Social History:  Tobacco Use: No  Alcohol Use: No  Drug Use: No    RADIOLOGY & ADDITIONAL TESTS:  Personal review of radiological diagnostics performed  Echo and EKG results noted when applicable.     MEDICATIONS:  acetaminophen     Tablet .. 650 milliGRAM(s) Oral every 6 hours PRN  aluminum hydroxide/magnesium hydroxide/simethicone Suspension 30 milliLiter(s) Oral every 4 hours PRN  cefepime   IVPB 1000 milliGRAM(s) IV Intermittent every 12 hours  chlorhexidine 2% Cloths 1 Application(s) Topical <User Schedule>  chlorhexidine 2% Cloths 1 Application(s) Topical <User Schedule>  linezolid  IVPB      linezolid  IVPB 600 milliGRAM(s) IV Intermittent every 12 hours  melatonin 3 milliGRAM(s) Oral at bedtime PRN  metoprolol tartrate 25 milliGRAM(s) Oral three times a day  midodrine 10 milliGRAM(s) Oral every 8 hours  multivitamin 1 Tablet(s) Oral daily  ondansetron Injectable 4 milliGRAM(s) IV Push every 8 hours PRN  pantoprazole    Tablet 40 milliGRAM(s) Oral before breakfast  phenylephrine    Infusion 0.1 MICROgram(s)/kG/Min IV Continuous <Continuous>  silver sulfADIAZINE 1% Cream 1 Application(s) Topical two times a day  sodium bicarbonate  Infusion 0.236 mEq/kG/Hr IV Continuous <Continuous>      ANTIBIOTICS:  cefepime   IVPB 1000 milliGRAM(s) IV Intermittent every 12 hours  linezolid  IVPB      linezolid  IVPB 600 milliGRAM(s) IV Intermittent every 12 hours

## 2025-04-11 NOTE — PROGRESS NOTE ADULT - ASSESSMENT
89  year old F, pmhx of chronic afib (on Eliquis), GERD, dementia, FTT , Orthostatic hypotension on midodrine ,Patient presents for evaluation of change in mental status.  Daughter states over the past few months patient has been having increasing cognitive issues including hallucinations poor sleep.  Daughter states that yesterday patient seemed to be getting sick and she was given Tylenol at night.  Patient is on Macrobid for UTI prophylaxis due to frequent UTIs.  This daughter had several concerns regarding patient including further decline in mental status/increased confusion/decreased ability to follow commands.  Daughter states it appeared like the left eye had not been opening as much is normal but now it is opening normally.  Patient also complained of left upper extremity pain and swelling.  Patient was unable to raise her left upper extremity secondary to pain.  No trauma.  Patient with decreased p.o. intake but was able to eat a little yesterday. Admitted to SDU for sepsis secondary to complicated UTI/Pylenephritis    #TME 2/2 Severe Sepsis present on admission secondary to acute pyelonephritis/ Hx of recurrent UTIs/ lactic acidosis likley 2/2 Sepsis and hypotension   -continue with Cefepime to 1 gm iv q12h  - IV lnezolid 600 q12  -Apply silver sulfadiazine cream to b/l lower extremities under dressing  -Follow-up final urine culture  - on phenylnephrine  - on Midodrine 10 mg Q 8 hours   -ID following  - bicarb drip    #Sacral ulcer  - unstageable sacral ulcer with minimal white discharge.  - wound care eval noted  - change position Q 2 hours, off load pressure points     #Left shoulder pain and UE swelling  - can start Lidocaine topical   - Xray shoulder : Acromioclavicular arthrosis. High riding humerus which could reflect rotator cuff tear of uncertain age. Bones appear osteopenic with no acute fracture or dislocation. Glenohumeral arthritic changes.  - LUE duplex negative for DVT   - keep LUE elevated     #Hx of Lumbar Compression Fracture  #Hx of Pathologic L1 vertebrae fracture  #Hx of Conus Medullaris Syndrome  - was on  gabapentin and robaxin,  tylenol PRN at home   - OP neurosurgery f/u     #chronic HFpEF / PHTN   - not in exacerbation   - TTE 1/28/2024- EF 60-65% mod pulm HTN   - monitor for fluid oveload     #Chronic Atrial Fibrillation with acute RVR  -CHADS2-VASC = 5.   -c/w Eliquis 2.5 mg twice daily   - resume  metoprolol 25mg three times daily  - patient having hard time taking oral meds  -EP consulted 4/10 for alternate options of medications and administrations  - discussed with EP 4/11 - ok to start digoxin if does not tolerate IV metop pushes, would stay away from amio

## 2025-04-11 NOTE — PROGRESS NOTE ADULT - SUBJECTIVE AND OBJECTIVE BOX
Over Night Events: events noted, on NC, off lidia, ID/ palliative reviewed    PHYSICAL EXAM    ICU Vital Signs Last 24 Hrs  T(C): 35.6 (11 Apr 2025 04:00), Max: 36.2 (10 Apr 2025 16:00)  T(F): 96 (11 Apr 2025 04:00), Max: 97.1 (10 Apr 2025 16:00)  HR: 98 (11 Apr 2025 04:00) (98 - 142)  BP: 121/52 (11 Apr 2025 04:00) (78/54 - 157/101)  BP(mean): 72 (11 Apr 2025 03:00) (62 - 115)  RR: 20 (11 Apr 2025 00:00) (20 - 20)  SpO2: 99% (11 Apr 2025 00:00) (94% - 100%)    O2 Parameters below as of 10 Apr 2025 20:00  Patient On (Oxygen Delivery Method): nasal cannula            General: ill looking, pale  Lungs: dec bs both bases  Cardiovascular: irregular  Abdomen: Soft, Positive BS  Extremities: No clubbing   Neurological: Non focal       04-10-25 @ 07:01  -  04-11-25 @ 07:00  --------------------------------------------------------  IN:    Phenylephrine: 39.6 mL  Total IN: 39.6 mL    OUT:    Voided (mL): 0 mL  Total OUT: 0 mL    Total NET: 39.6 mL          LABS:                          10.3   7.98  )-----------( 168      ( 11 Apr 2025 05:03 )             32.0                                               04-11    137  |  108  |  37[H]  ----------------------------<  93  4.3   |  13[L]  |  1.1    Ca    9.1      11 Apr 2025 05:03  Phos  3.2     04-10  Mg     2.3     04-11    TPro  4.6[L]  /  Alb  2.8[L]  /  TBili  0.4  /  DBili  x   /  AST  21  /  ALT  18  /  AlkPhos  133[H]  04-11                                             Urinalysis Basic - ( 11 Apr 2025 05:03 )    Color: x / Appearance: x / SG: x / pH: x  Gluc: 93 mg/dL / Ketone: x  / Bili: x / Urobili: x   Blood: x / Protein: x / Nitrite: x   Leuk Esterase: x / RBC: x / WBC x   Sq Epi: x / Non Sq Epi: x / Bacteria: x                                                  LIVER FUNCTIONS - ( 11 Apr 2025 05:03 )  Alb: 2.8 g/dL / Pro: 4.6 g/dL / ALK PHOS: 133 U/L / ALT: 18 U/L / AST: 21 U/L / GGT: x                                                                                                                                       MEDICATIONS  (STANDING):  cefepime   IVPB 1000 milliGRAM(s) IV Intermittent every 12 hours  chlorhexidine 2% Cloths 1 Application(s) Topical <User Schedule>  chlorhexidine 2% Cloths 1 Application(s) Topical <User Schedule>  linezolid  IVPB      linezolid  IVPB 600 milliGRAM(s) IV Intermittent every 12 hours  metoprolol tartrate 25 milliGRAM(s) Oral three times a day  midodrine 10 milliGRAM(s) Oral every 8 hours  multivitamin 1 Tablet(s) Oral daily  pantoprazole    Tablet 40 milliGRAM(s) Oral before breakfast  phenylephrine    Infusion 0.1 MICROgram(s)/kG/Min (2.38 mL/Hr) IV Continuous <Continuous>  silver sulfADIAZINE 1% Cream 1 Application(s) Topical two times a day    MEDICATIONS  (PRN):  acetaminophen     Tablet .. 650 milliGRAM(s) Oral every 6 hours PRN Temp greater or equal to 38C (100.4F), Mild Pain (1 - 3)  aluminum hydroxide/magnesium hydroxide/simethicone Suspension 30 milliLiter(s) Oral every 4 hours PRN Dyspepsia  melatonin 3 milliGRAM(s) Oral at bedtime PRN Insomnia  ondansetron Injectable 4 milliGRAM(s) IV Push every 8 hours PRN Nausea and/or Vomiting

## 2025-04-11 NOTE — PROGRESS NOTE ADULT - SUBJECTIVE AND OBJECTIVE BOX
HPI: 89F with chronic AF, GERD, dementia, FTT, orthostatic hypotension on midodrine, here with AMS, and found to have severe sepsis from pyelonephritis, on cefepime, levophed. Also with shoulder pain, on lidocaine patch. DNR/DNI/trial NIV. Palliative consulted for GOC. Patient comfortable. See above, agree with above, daughter open to further GOC conversations. Currently cared for by daughter at home.     INTERVAL EVENTS  4/11: patient comfortable, no issues reported per daughter    ADVANCE DIRECTIVES:    [ ] Full Code [x ] DNR/DNI  MOLST  [x ]  Living Will  [ ]   DECISION MAKER(s):  [x ] Health Care Proxy(s)  [ ] Surrogate(s)  [ ] Guardian           Name(s): Phone Number(s):  HCP/Daughter: Marlys Acuna    BASELINE (I)ADL(s) (prior to admission):  [place]: [ ]Total  [ ] Moderate [x ]Dependent    No Known Allergies    MEDICATIONS  (STANDING):  cefepime   IVPB 1000 milliGRAM(s) IV Intermittent every 12 hours  chlorhexidine 2% Cloths 1 Application(s) Topical <User Schedule>  chlorhexidine 2% Cloths 1 Application(s) Topical <User Schedule>  linezolid  IVPB      linezolid  IVPB 600 milliGRAM(s) IV Intermittent every 12 hours  metoprolol tartrate 25 milliGRAM(s) Oral three times a day  midodrine 10 milliGRAM(s) Oral every 8 hours  multivitamin 1 Tablet(s) Oral daily  pantoprazole    Tablet 40 milliGRAM(s) Oral before breakfast  phenylephrine    Infusion 0.1 MICROgram(s)/kG/Min (2.38 mL/Hr) IV Continuous <Continuous>  silver sulfADIAZINE 1% Cream 1 Application(s) Topical two times a day  sodium bicarbonate  Infusion 0.236 mEq/kG/Hr (100 mL/Hr) IV Continuous <Continuous>    MEDICATIONS  (PRN):  acetaminophen     Tablet .. 650 milliGRAM(s) Oral every 6 hours PRN Temp greater or equal to 38C (100.4F), Mild Pain (1 - 3)  aluminum hydroxide/magnesium hydroxide/simethicone Suspension 30 milliLiter(s) Oral every 4 hours PRN Dyspepsia  haloperidol    Injectable 0.5 milliGRAM(s) IntraMuscular at bedtime PRN Agitation  melatonin 3 milliGRAM(s) Oral at bedtime PRN Insomnia  metoprolol tartrate Injectable 2.5 milliGRAM(s) IV Push every 6 hours PRN if HR consistently >140  ondansetron Injectable 4 milliGRAM(s) IV Push every 8 hours PRN Nausea and/or Vomiting        PRESENT SYMPTOMS: [ ]Unable to obtain due to poor mentation   Source if other than patient:  [x ]Family   [ ]Team   [ X]All review of systems negative including pain and dyspnea unless noted below    All components of pain assessment below addressed. Blank spaces indicate that the patient did/could not complete the assessment.  Pain: [ ]yes [x ]no  QOL impact -   Location -                    Aggravating factors -  Quality -  Radiation -  Timing-  Severity (0-10 scale):  Minimal acceptable level (0-10 scale):     CPOT:0  RDOS:1      PHYSICAL EXAM:  Vital Signs Last 24 Hrs  T(C): 35.7 (11 Apr 2025 15:57), Max: 36.1 (10 Apr 2025 20:00)  T(F): 96.3 (11 Apr 2025 15:57), Max: 97 (10 Apr 2025 20:00)  HR: 118 (11 Apr 2025 15:57) (84 - 125)  BP: 131/97 (11 Apr 2025 15:57) (78/54 - 157/101)  BP(mean): 109 (11 Apr 2025 15:57) (62 - 115)  RR: 20 (11 Apr 2025 15:57) (20 - 20)  SpO2: 100% (11 Apr 2025 15:57) (98% - 100%)    Parameters below as of 11 Apr 2025 15:57  Patient On (Oxygen Delivery Method): nasal cannula        GENERAL:  [X ] No acute distress [x ]Lethargic  [ ]Unarousable  [ ]Verbal  [ ]Non-Verbal [ ]Cachexia    BEHAVIORAL/PSYCH:  [ ]Alert and Oriented x  [ ] Anxiety [ ] Delirium [ ] Agitation [X ] Calm   EYES: [ ] No scleral icterus [ ] Scleral icterus [x] Closed  ENMT:  [ ]Dry mouth  [ ]No external oral lesions [ X] No external ear or nose lesions  CARDIOVASCULAR:  [ ]Regular [ ]Irregular [x ]Tachy [ ]Not Tachy  [ ]Sung [ ] Edema [ ] No edema  PULMONARY:  [ ]Tachypnea  [ ]Audible excessive secretions [X ] No labored breathing [ ] labored breathing  GASTROINTESTINAL: [ ]Soft  [ ]Distended  [ X]Not distended [ ]Non tender [ ]Tender  MUSCULOSKELETAL: [ ]No clubbing [ ] clubbing  [ X] No cyanosis [ ] cyanosis  NEUROLOGIC: [ ]No focal deficits  [ ]Follows commands  [ ]Does not follow commands  [ ]Cognitive impairment  [ ]Dysphagia  [ ]Dysarthria  [ ]Paresis   SKIN: [ ] Jaundiced [X ] Non-jaundiced [ ]Rash [ ]No Rash [ ] Warm [ ] Dry  MISC/LINES: [ ] ET tube [ ] Trach [ ]NGT/OGT [ ]PEG [ ]David    CRITICAL CARE:  [x ] Shock Present  [ x]Septic [ ]Cardiogenic [ ]Neurologic [ ]Hypovolemic  [ ]  Vasopressors [ ]  Inotropes   [ ]Respiratory failure present [ ]Mechanical ventilation [ ]Non-invasive ventilatory support [ ]High flow  [x ]Acute  [ ]Chronic [ x]Hypoxic  [ ]Hypercarbic [ ]Other  [ ]Other organ failure     LABS: reviewed by me, notable for: CBC WNL, BMP WNL, Mg WNL                                   10.3   7.98  )-----------( 168      ( 11 Apr 2025 05:03 )             32.0     04-11    137  |  108  |  37[H]  ----------------------------<  93  4.3   |  13[L]  |  1.1    Ca    9.1      11 Apr 2025 05:03  Phos  3.2     04-10  Mg     2.3     04-11          RADIOLOGY & ADDITIONAL STUDIES: CXR personally reviewed by me: b/l lng opacifications present    Palliative Care Spiritual/Emotional Screening Tool Question  Severity (0-4):                    OR                    [X ] Unable to determine/NA  Score of 2 or greater indicates recommendation of Chaplaincy referral  Chaplaincy Referral: [ ] Yes [ ] Refused [ ] Following     Caregiver [place]:  [ ] Yes [ ] No    OR    [x ] Unable to determine. Will assess at later time if appropriate.  Social Work Referral [ ]  Patient and Family Centered Care Referral [ ]    Anticipatory Grief Present: [ ] Yes [ ] No    OR     [ x] Unable to determine. Will assess at later time if appropriate.  Social Work Referral [ ]  Patient and Family Centered Care Referral [ ]    REFERRALS:   [ ]Chaplaincy  [ ]Hospice  [ ]Child Life  [ ]Social Work  [ ]Case management [ ]Holistic Therapy     Palliative care education provided to patient and/or family    Goals of Care Document:

## 2025-04-11 NOTE — PROGRESS NOTE ADULT - ASSESSMENT
IMPRESSION:  Patient is a 89 yo Female with PMH of chronic Afib on Eliquis, HFpEF, L1 epithelial carcinoma s/p radiation, conus medullaris syndrome, recurrent UTIs, GERD, CKD 3b, dementia, orthostatic hypotension on midodrine, and FTT. Patient currently on Macrobid prophylaxis for recurrent UTIs. Per daughter, Patient also has a cough productive with thin yellow sputum for the past few weeks. Patient has had progressive cognitive decline for several months, but acute decline began 2 days ago. Endorses myalgias, malaise, subjective fever. Denies N/V/D, sore throat, sick contacts.    Independent history obtained from her daughter Marlys  at the bedside   ID consulted for diagnostic work up and antimicrobial treatment of sepsis     IMPRESSION/RECOMMENDATIONS  Immunosuppression/Immunosenescence ( above age 60 yrs there is a exponential decline in immunity which could result in poor clinical outcomes.   Acute illness  ( severe sepsis ) which poses a threat to life or bodily function without treatment   Severe sepsis : resolving (still on pressros, T 95 )  Acute pyelonephritis with mild right hydroureteronephrosis   4/7 BCX NG  4/7 UCx E faeium  Metabolic encephalopathy    < from: CT Abdomen and Pelvis w/ IV Cont (04.07.25 @ 13:22) > ( Independent interpretation of test : no bacterial PNA  1.  Circumferential mild edematous wall thickening of the bladder with mural hyperemia and perivesicular stranding consistent with cystitis/UTI.  2.  Mild right hydroureteronephrosis with urothelial enhancement. Multiple vascular calcifications are noted along the course of the ureter   however none appear to be within the ureter itself. Findings can be secondary to recently passed stone or ascending infection. No definite CT imaging evidence of pyelonephritis.  3.  More nodular enhancing appearance of the right distal ureter at the level of the UVJ could also be infectious in etiology however a ureteral   neoplasm cannot be entirely excluded. Further evaluation can be considered as clinically appropriate.  4.  Mild edematous wall thickening of the rectum which could represent   proctitis in the appropriate clinical setting.  5.  Partially imaged small bilateral pleural effusions and trace pericardial effusion.    < end of copied text >    4/10 CXR b/l opacities/ pleural effusion : ( Independent interpretation of test : no bacterial PNA    4/7 COVID 19/ Influenza/ RSV NG.   4/7 Nares ORSa NG  WBC 7.8    chronic Afib on Eliquis  HFpEF  L1 epithelial carcinoma s/p radiation, conus medullaris syndrome  recurrent UTIs      #B/L Lower extremity ecchymosis    -continue with Cefepime to 1 gm iv q12h  -continue with  iv  Linezolid 600 mg q12h. Linezolid will be monitored daily for myelosuppression especially thrombocytopenia by checking the CBC daily   -FU UCx from 4/10  -Apply silver sulfadiazine cream to b/l lower extremities under dressing    Discussion of management/test results( independently interpretated by me ) /antibiotic regimen  with external/primary medical team. Dr Goel  Discussion of management/test results( independently interpretated by me ) /antibiotic regimen  with her daughter Marlys at the bedside .

## 2025-04-11 NOTE — PROGRESS NOTE ADULT - NS ATTEST RISK PROBLEM GEN_ALL_CORE FT
-I independently reviewed the completed labs ( CBC, CMP, cultures  along with sensitivities ) and imaging (CT/CXR as available with independent interpretation )  -My assessment required my independent history taking  -Time excludes teaching time.  -I independently reviewed and interpretated all prior notes, tests results.  -I discussed my diagnostic/therapeutic recommendations with the primary team housestaff/Attending  -I assisted in the decision regarding continued need for hospitalization / or escalation of care as needed.  -Patient is on drug therapy that requires intense monitoring or toxicity and adjustment of the Antibiotics based on creatinine clearence linezolid
-I independently reviewed the completed labs ( CBC, CMP, cultures  along with sensitivities ) and imaging (CT/CXR as available with independent interpretation )  -My assessment required my independent history taking  -Time excludes teaching time.  -I independently reviewed and interpretated all prior notes, tests results.  -I discussed my diagnostic/therapeutic recommendations with the primary team housestaff/Attending  -I assisted in the decision regarding continued need for hospitalization / or escalation of care as needed.  -Patient is on drug therapy that requires intense monitoring or toxicity and adjustment of the Antibiotics based on creatinine clearence Linezolid

## 2025-04-11 NOTE — PROGRESS NOTE ADULT - ASSESSMENT
89F w/  recurrent UTI, orthostatic hypotension on midodrine, chronic afib, GERD admitted to metabolic encephalopathy 2/2 septic shock 2/2 acute pyelonephritis    Decision Maker: HCP is reported to be her daughter Marlys Acuna (she believes paperwork at home and can bring in)  [facility]; continue life-sustaining therapy including IV pressors  Advance Directives: DNR/DNI    - patient appears comfortable at time of exam  - per lillian, remains comfortable  - septic, c/w IV abx, monitor in SDU  - will follow    ______________  Ant Celeste MD  Palliative Medicine  [facility]   [facility] Ger[facility] Pall[facility]  [phone]

## 2025-04-11 NOTE — PROGRESS NOTE ADULT - ASSESSMENT
89  year old F, pmhx of chronic afib (on Eliquis), GERD, dementia, FTT , Orthostatic hypotension on midodrine ,Patient presents for evaluation of change in mental status.  Daughter states over the past few months patient has been having increasing cognitive issues including hallucinations poor sleep.  Daughter states that yesterday patient seemed to be getting sick and she was given Tylenol at night.  Patient is on Macrobid for UTI prophylaxis due to frequent UTIs.  This daughter had several concerns regarding patient including further decline in mental status/increased confusion/decreased ability to follow commands.  Daughter states it appeared like the left eye had not been opening as much is normal but now it is opening normally.  Patient also complained of left upper extremity pain and swelling.  Patient was unable to raise her left upper extremity secondary to pain.  No trauma.  Patient with decreased p.o. intake but was able to eat a little yesterday. Admitted to SDU for sepsis secondary to complicated UTI/Pylenephritis      A/P   #TME 2/2 Severe Sepsis present on admission secondary to acute pyelonephritis/ Hx of recurrent UTIs/ lactic acidosis likley 2/2 Sepsis and hypotension   - BP is better today, received  Albumin infusion on 4/10  - ID is following, recommendations noted:   -continue with Cefepime to 1 gm iv q12h and  Linezolid 600 mg q12h to IV  - Apply silver sulfadiazine cream to b/l lower extremities under dressing  - keep MAP above 65,  on Midodrine 10 mg Q 8 hours     #Chronic Atrial Fibrillation with acute RVR  -CHADS2-VASC = 5.   - on therapeutic Lovenox now   - pt is not taking  po meds now, altered   -  HR is better  controlled   with BB, consulted by EP, recommendations noted, will ask about Digoxin and Amio for better rate control     #Sacral ulcer  - un stageable sacral ulcer with minimal white discharge.  - wound care eval   - change position Q 2 hours, off load pressure points   - consult dietitian     #Left shoulder pain and UE swelling  - can start Lidocaine topical   - Xray shoulder : Acromioclavicular arthrosis. High riding humerus which could reflect rotator cuff tear of uncertain age. Bones appear osteopenic with no acute fracture or dislocation. Glenohumeral arthritic changes.  - f/u LUE duplex negative for DVT   - keep LUE elevated     #Hx of Lumbar Compression Fracture  #Hx of Pathologic L1 vertebrae fracture  #Hx of Conus Medullaris Syndrome  - was on  gabapentin and robaxin,  tylenol PRN at home   - held on admission, will give IV Tylenol PRN for pain   - OP neurosurgery f/u     #chronic HFpEF / PHTN   - not in exacerbation   - TTE 1/28/2024- EF 60-65% mod pulm HTN   - Currently euvolemic on exam  - monitor for fluid oveload         # GI prophylaxis     #Progress Note Handoff  Pending (specify):  EP follow up for rate control ( consider Digoxin vs Amio ), keep MAP above 65 , put David in for output monitoring, keep pt NPO for today, daughter decided to hold off with NGT for today, supportive care   Family discussion: I spoke with pt's daughter at the bedside, she agreed with a plan of care   Disposition: SDU

## 2025-04-11 NOTE — PROGRESS NOTE ADULT - SUBJECTIVE AND OBJECTIVE BOX
SUBJECTIVE/OVERNIGHT EVENTS  Today is hospital day 4d. This morning patient was seen and examined at bedside, resting comfortably in bed. No acute or major events overnight.    MEDICATIONS  STANDING MEDICATIONS  cefepime   IVPB 1000 milliGRAM(s) IV Intermittent every 12 hours  chlorhexidine 2% Cloths 1 Application(s) Topical <User Schedule>  chlorhexidine 2% Cloths 1 Application(s) Topical <User Schedule>  linezolid  IVPB      linezolid  IVPB 600 milliGRAM(s) IV Intermittent every 12 hours  metoprolol tartrate 25 milliGRAM(s) Oral three times a day  midodrine 10 milliGRAM(s) Oral every 8 hours  multivitamin 1 Tablet(s) Oral daily  pantoprazole    Tablet 40 milliGRAM(s) Oral before breakfast  phenylephrine    Infusion 0.1 MICROgram(s)/kG/Min IV Continuous <Continuous>  silver sulfADIAZINE 1% Cream 1 Application(s) Topical two times a day  sodium bicarbonate  Infusion 0.236 mEq/kG/Hr IV Continuous <Continuous>    PRN MEDICATIONS  acetaminophen     Tablet .. 650 milliGRAM(s) Oral every 6 hours PRN  aluminum hydroxide/magnesium hydroxide/simethicone Suspension 30 milliLiter(s) Oral every 4 hours PRN  melatonin 3 milliGRAM(s) Oral at bedtime PRN  ondansetron Injectable 4 milliGRAM(s) IV Push every 8 hours PRN    VITALS  T(F): 96.7 (04-11-25 @ 08:28), Max: 97.1 (04-10-25 @ 16:00)  HR: 102 (04-11-25 @ 08:28) (98 - 142)  BP: 115/82 (04-11-25 @ 08:28) (78/54 - 157/101)  RR: 20 (04-11-25 @ 08:28) (20 - 20)  SpO2: 100% (04-11-25 @ 08:28) (95% - 100%)    PHYSICAL EXAM  GENERAL  (  ) NAD, lying in bed comfortably     (  ) obtunded     ( x ) lethargic     (  ) somnolent    HEAD  (  ) Atraumatic     (  ) hematoma     (  ) laceration (specify location:       )     NECK  (  ) Supple     (  ) neck stiffness     (  ) nuchal rigidity     (  )  no JVD     (  ) JVD present ( -- cm)    HEART  Rate -->  (  ) normal rate    (  ) bradycardic    ( x ) tachycardic  Rhythm -->  (  ) regular    (  ) regularly irregular    (x  ) irregularly irregular  Murmurs -->  (  ) normal s1/s2    (  ) systolic murmur    (  ) diastolic murmur    (  ) continuous murmur     (  ) S3 present    (  ) S4 present    LUNGS  (  )Unlabored respirations     (  ) tachypnea  ( x ) B/L air entry     (  ) decreased breath sounds in:  (location     )    (  ) no adventitious sound     (  ) crackles     (  ) wheezing      (  ) rhonchi      (specify location:       )  (  ) chest wall tenderness (specify location:       )    ABDOMEN  ( x ) Soft     (  ) tense   |   (  ) nondistended     (  ) distended   |   (  ) +BS     (  ) hypoactive bowel sounds     (  ) hyperactive bowel sounds  (  ) nontender     (  ) RUQ tenderness     (  ) RLQ tenderness     (  ) LLQ tenderness     (  ) epigastric tenderness     (  ) diffuse tenderness  (  ) Splenomegaly      (  ) Hepatomegaly      (  ) Jaundice     (  ) ecchymosis     EXTREMITIES  (  ) Normal     (  ) Rash     (  ) ecchymosis     (  ) varicose veins      ( x ) pitting edema     (  ) non-pitting edema   (  ) ulceration     (  ) gangrene:     (location:     )    NERVOUS SYSTEM  (  ) A&Ox3     (  ) confused     ( x ) lethargic  CN II-XII:     (  ) Intact     (  ) focal deficits  (Specify:     )   Upper extremities:     (  ) strength X/5     (  ) focal deficit (specify:    )  Lower extremities:     (  ) strength  X/5    (  ) focal deficit (specify:    )  LABS             10.3   7.98  )-----------( 168      ( 04-11-25 @ 05:03 )             32.0     137  |  108  |  37  -------------------------<  93   04-11-25 @ 05:03  4.3  |  13  |  1.1    Ca      9.1     04-11-25 @ 05:03  Phos   3.2     04-10-25 @ 11:25  Mg     2.3     04-11-25 @ 05:03    TPro  4.6  /  Alb  2.8  /  TBili  0.4  /  DBili  x   /  AST  21  /  ALT  18  /  AlkPhos  133  /  GGT  x     04-11-25 @ 05:03        Urinalysis Basic - ( 11 Apr 2025 05:03 )    Color: x / Appearance: x / SG: x / pH: x  Gluc: 93 mg/dL / Ketone: x  / Bili: x / Urobili: x   Blood: x / Protein: x / Nitrite: x   Leuk Esterase: x / RBC: x / WBC x   Sq Epi: x / Non Sq Epi: x / Bacteria: x          IMAGING

## 2025-04-11 NOTE — PROGRESS NOTE ADULT - SUBJECTIVE AND OBJECTIVE BOX
89  year old F, pmhx of chronic afib (on Eliquis), GERD, dementia, FTT , Orthostatic hypotension on midodrine  Patient presents for evaluation of change in mental status.  Daughter states over the past few months patient has been having increasing cognitive issues including hallucinations poor sleep.  Daughter states that  patient seemed to be getting sick and she was given Tylenol at night.  Patient is on Macrobid for UTI prophylaxis due to frequent UTIs.  This daughter had several concerns regarding patient including further decline in mental status/increased confusion/decreased ability to follow commands.  Daughter states it appeared like the left eye had not been opening as much is normal but now it is opening normally.  Patient also complained of left upper extremity pain and swelling.  Patient was unable to raise her left upper extremity secondary to pain.  No trauma.  Patient with decreased p.o. intake.   Pt was admitted to SDU with Acute pyelonephritis with mild right hydroureteronephrosis , urine Cx is growing  E faeium, she has Metabolic encephalopathy with underlying dementia.   Pt  was found to have A-fib with RVR, was hypotensive, BP improved after fluid resuscitation HR controlled with BB. ID recommended to add  Zyvox  to Abx regimen.   Today pt appears comfortable, not responsive to voice, pt was awake and screaming this morning.     PAST MEDICAL & SURGICAL HISTORY:  Chronic atrial fibrillation      GERD (gastroesophageal reflux disease)      Chronic lower back pain      No significant past surgical history      Vital Signs Last 24 Hrs  T(C): 35.7 (11 Apr 2025 11:39), Max: 36.2 (10 Apr 2025 16:00)  T(F): 96.2 (11 Apr 2025 11:39), Max: 97.1 (10 Apr 2025 16:00)  HR: 84 (11 Apr 2025 11:39) (84 - 125)  BP: 113/63 (11 Apr 2025 11:39) (78/54 - 157/101)  BP(mean): 82 (11 Apr 2025 11:39) (62 - 115)  RR: 20 (11 Apr 2025 11:39) (20 - 20)  SpO2: 98% (11 Apr 2025 11:39) (98% - 100%)    Parameters below as of 10 Apr 2025 20:00  Patient On (Oxygen Delivery Method): nasal cannula            PHYSICAL EXAM:  GENERAL: NAD, frail with temporal muscle waisting   HEAD:  Atraumatic, Normocephalic  NECK: Supple, No JVD, Normal thyroid  NERVOUS SYSTEM:  Awake, answering questions, hard of hearing, moving all extremities   CHEST/LUNG: decreased BS at bases   HEART: irregularly irregular, tachycardic   ABDOMEN: Soft, Nontender, Nondistended; Bowel sounds present  EXTREMITIES:  dressing noted on LE b/l below knees   SKIN: paper thin skin with multiply skin tears on UE       LABS:                                   10.3   7.98  )-----------( 168      ( 11 Apr 2025 05:03 )             32.0   04-11    137  |  108  |  37[H]  ----------------------------<  93  4.3   |  13[L]  |  1.1    Ca    9.1      11 Apr 2025 05:03  Phos  3.2     04-10  Mg     2.3     04-11    TPro  4.6[L]  /  Alb  2.8[L]  /  TBili  0.4  /  DBili  x   /  AST  21  /  ALT  18  /  AlkPhos  133[H]  04-11      Urinalysis Basic - ( 09 Apr 2025 04:45 )    Color: x / Appearance: x / SG: x / pH: x  Gluc: 82 mg/dL / Ketone: x  / Bili: x / Urobili: x   Blood: x / Protein: x / Nitrite: x   Leuk Esterase: x / RBC: x / WBC x   Sq Epi: x / Non Sq Epi: x / Bacteria: x        Urinalysis with Rflx Culture (collected 07 Apr 2025 12:05)    Culture - Urine (collected 07 Apr 2025 12:05)  Source: Catheterized None  Final Report (09 Apr 2025 12:53):    >100,000 CFU/ml Enterococcus faecium  Culture - Urine (04.07.25 @ 12:05)   - Ampicillin: R >8 Predicts results to ampicillin/sulbactam, amoxacillin-clavulanate and piperacillin-tazobactam.  - Ciprofloxacin: I 2  - Levofloxacin: I 4  - Nitrofurantoin: S <=32 Should not be used to treat pyelonephritis.  - Tetracycline: R >8  - Vancomycin: S 0.5  Specimen Source: Catheterized None  Culture Results:   >100,000 CFU/ml Enterococcus faecium  Organism Identification: Enterococcus faecium  Organism: Enterococcus faeciumOrganism: Enterococcus faecium (09 Apr 2025 12:53)  Organism: Enterococcus faecium (09 Apr 2025 12:53)    Culture - Blood (collected 07 Apr 2025 12:02)  Source: Blood Blood-Peripheral  Preliminary Report (08 Apr 2025 22:02):    No growth at 24 hours      Culture - Blood (collected 07 Apr 2025 12:02)  Source: Blood Blood-Peripheral  Preliminary Report (08 Apr 2025 22:02):    No growth at 24 hours    RADIOLOGY & ADDITIONAL TESTS:  < from: Xray Hand 3 Views, Left (04.08.25 @ 12:59) >  impression:    Soft tissue swelling suggested at the level of the fifth digit.    The bones are diffusely osteopenic. There are arthritic changes with   joint space narrowing and osteophyte formation. Findings appear most   pronounced at the second distal interphalangeal joint. Arthritic changes   are also noted at the basal joint. There is no acute fracture.    Chondrocalcinosis within the region of the triangular fibrocartilage    < from: VA Duplex Upper Ext Vein Scan, Left (04.08.25 @ 10:33) >  IMPRESSION:  No evidence of left upper extremity deep venous thrombosis. Study limited   due to patient positioning and overlying dressings      < from: VA Duplex Lower Ext Vein Scan, Bilat (04.08.25 @ 10:30) >  IMPRESSION:  No evidence of DVT in the visualized portions of the bilateral lower   extremities. Visualization limited secondary to dressings in the lower   extremities and limb contractures    < from: TTE Echo Complete w/o Contrast w/ Doppler (04.09.25 @ 10:47) >  Summary:   1. Left ventricular ejection fraction, by visual estimation, is 45 to   50%.   2. Mildly decreased global left ventricular systolic function.   3. The left ventricular diastolic function could not be assessed in this   study.   4. Severely reduced RV systolic function.   5. Severely enlarged right atrium.   6. Left atrial enlargement.   7. Sclerotic aortic valve with mildly decreased opening.   8. Mild aortic regurgitation.   9. Mild-to-moderate mitral regurgitation.  10. Moderate tricuspid regurgitation.  11. Estimated pulmonary artery systolic pressure is 65.8 mmHg assuming a   right atrial pressure of 8 mmHg, which is consistent with severe   pulmonary hypertension.  12. Small pericardial effusion visualized on limited views.    < from: Xray Chest 1 View- PORTABLE-Urgent (Xray Chest 1 View- PORTABLE-Urgent .) (04.10.25 @ 08:35) >    Impression:      Small to moderate left pleural effusion is unchanged. Developing opacity   in the right lung base    < from: Xray Chest 1 View- PORTABLE-Routine (Xray Chest 1 View- PORTABLE-Routine in AM.) (04.11.25 @ 06:06) >    IMPRESSION: Low lung volume.    Bilateral opacities, slightly worse.    < end of copied text >      MEDICATIONS  (STANDING):  cefepime   IVPB 1000 milliGRAM(s) IV Intermittent every 12 hours  chlorhexidine 2% Cloths 1 Application(s) Topical <User Schedule>  chlorhexidine 2% Cloths 1 Application(s) Topical <User Schedule>  linezolid  IVPB      linezolid  IVPB 600 milliGRAM(s) IV Intermittent every 12 hours  metoprolol tartrate 25 milliGRAM(s) Oral three times a day  midodrine 10 milliGRAM(s) Oral every 8 hours  multivitamin 1 Tablet(s) Oral daily  pantoprazole    Tablet 40 milliGRAM(s) Oral before breakfast  phenylephrine    Infusion 0.1 MICROgram(s)/kG/Min (2.38 mL/Hr) IV Continuous <Continuous>  silver sulfADIAZINE 1% Cream 1 Application(s) Topical two times a day  sodium bicarbonate  Infusion 0.236 mEq/kG/Hr (100 mL/Hr) IV Continuous <Continuous>    MEDICATIONS  (PRN):  acetaminophen     Tablet .. 650 milliGRAM(s) Oral every 6 hours PRN Temp greater or equal to 38C (100.4F), Mild Pain (1 - 3)  aluminum hydroxide/magnesium hydroxide/simethicone Suspension 30 milliLiter(s) Oral every 4 hours PRN Dyspepsia  melatonin 3 milliGRAM(s) Oral at bedtime PRN Insomnia  ondansetron Injectable 4 milliGRAM(s) IV Push every 8 hours PRN Nausea and/or Vomiting

## 2025-04-12 NOTE — PROGRESS NOTE ADULT - ASSESSMENT
89  year old F, pmhx of chronic afib (on Eliquis), GERD, dementia, FTT , Orthostatic hypotension on midodrine ,Patient presents for evaluation of change in mental status.  Daughter states over the past few months patient has been having increasing cognitive issues including hallucinations poor sleep.  Daughter states that yesterday patient seemed to be getting sick and she was given Tylenol at night.  Patient is on Macrobid for UTI prophylaxis due to frequent UTIs.  This daughter had several concerns regarding patient including further decline in mental status/increased confusion/decreased ability to follow commands.  Daughter states it appeared like the left eye had not been opening as much is normal but now it is opening normally.  Patient also complained of left upper extremity pain and swelling.  Patient was unable to raise her left upper extremity secondary to pain.  No trauma.  Patient with decreased p.o. intake but was able to eat a little yesterday. Admitted to SDU for sepsis secondary to complicated UTI/Pylenephritis      A/P   #TME 2/2 Severe Sepsis present on admission secondary to acute pyelonephritis/ Hx of recurrent UTIs/ lactic acidosis likley 2/2 Sepsis and hypotension   - on low dose of Levophed , received  Albumin infusion on 4/10  - ID is following, recommendations noted:   -continue with Cefepime to 1 gm iv q12h and  Linezolid 600 mg q12h to IV  - Apply silver sulfadiazine cream to b/l lower extremities under dressing  - keep MAP above 65,  on Midodrine 10 mg Q 8 hours     # AMS/ toxic metabolic encephalopathy   - NPO   - NGT was put in today, will confirm placement and start feedings  - supportive care, fall and aspiration precautions   - treat underlying infection     # Gross hematuria / acute blood loss anemia  - hold Lovenox  - monitor H/H, keep Hb above 7.5  - send anemia work up     #Chronic Atrial Fibrillation with acute RVR  -CHADS2-VASC = 5.   - hold  therapeutic Lovenox ( Hb dropped due to gross hematuria)  -  HR is better  controlled   with BB    #Sacral ulcer  - un stageable sacral ulcer with minimal white discharge.  - wound care eval   - change position Q 2 hours, off load pressure points   - consult dietitian     #Left shoulder pain and UE swelling  - can start Lidocaine topical   - Xray shoulder : Acromioclavicular arthrosis. High riding humerus which could reflect rotator cuff tear of uncertain age. Bones appear osteopenic with no acute fracture or dislocation. Glenohumeral arthritic changes.  - f/u LUE duplex negative for DVT   - keep LUE elevated     #Hx of Lumbar Compression Fracture  #Hx of Pathologic L1 vertebrae fracture  #Hx of Conus Medullaris Syndrome  - was on  gabapentin and robaxin,  tylenol PRN at home   - held on admission, will give IV Tylenol PRN for pain   - OP neurosurgery f/u     #chronic HFpEF / PHTN   - not in exacerbation   - TTE 1/28/2024- EF 60-65% mod pulm HTN   - Currently euvolemic on exam  - monitor for fluid overload         # GI prophylaxis     #Progress Note Handoff  Pending (specify):  hold Lovenox, monitor H/H, keep Hb above 7.5, send anemia work up, confirm NGT placement, start meds and NGT feedings, keep MAP above 65, on Levophed now, supportive care    Family discussion: I spoke with pt's daughter at the bedside, she agreed with a plan of care   Disposition: SDU    89  year old F, pmhx of chronic afib (on Eliquis), GERD, dementia, FTT , Orthostatic hypotension on midodrine ,Patient presents for evaluation of change in mental status.  Daughter states over the past few months patient has been having increasing cognitive issues including hallucinations poor sleep.  Daughter states that yesterday patient seemed to be getting sick and she was given Tylenol at night.  Patient is on Macrobid for UTI prophylaxis due to frequent UTIs.  This daughter had several concerns regarding patient including further decline in mental status/increased confusion/decreased ability to follow commands.  Daughter states it appeared like the left eye had not been opening as much is normal but now it is opening normally.  Patient also complained of left upper extremity pain and swelling.  Patient was unable to raise her left upper extremity secondary to pain.  No trauma.  Patient with decreased p.o. intake but was able to eat a little yesterday. Admitted to SDU for sepsis secondary to complicated UTI/Pylenephritis      A/P   #TME 2/2 Severe Sepsis present on admission secondary to acute pyelonephritis/ Hx of recurrent UTIs/ lactic acidosis likley 2/2 Sepsis and hypotension   - on low dose of  pressors , received  Albumin infusion on 4/10  - ID is following, recommendations noted:   -continue with Cefepime to 1 gm iv q12h and  Linezolid 600 mg q12h to IV  - Apply silver sulfadiazine cream to b/l lower extremities under dressing  - keep MAP above 65,  on Midodrine 10 mg Q 8 hours     # AMS/ toxic metabolic encephalopathy   - NPO   - NGT was put in today, will confirm placement and start feedings  - supportive care, fall and aspiration precautions   - treat underlying infection     # Gross hematuria / acute blood loss anemia  - hold Lovenox  - monitor H/H, keep Hb above 7.5  - send anemia work up     #Chronic Atrial Fibrillation with acute RVR  -CHADS2-VASC = 5.   - hold  therapeutic Lovenox ( Hb dropped due to gross hematuria)  -  HR is better  controlled   with BB    #Sacral ulcer  - un stageable sacral ulcer with minimal white discharge.  - wound care eval   - change position Q 2 hours, off load pressure points   - consult dietitian     #Left shoulder pain and UE swelling  - can start Lidocaine topical   - Xray shoulder : Acromioclavicular arthrosis. High riding humerus which could reflect rotator cuff tear of uncertain age. Bones appear osteopenic with no acute fracture or dislocation. Glenohumeral arthritic changes.  - f/u LUE duplex negative for DVT   - keep LUE elevated     #Hx of Lumbar Compression Fracture  #Hx of Pathologic L1 vertebrae fracture  #Hx of Conus Medullaris Syndrome  - was on  gabapentin and robaxin,  tylenol PRN at home   - held on admission, will give IV Tylenol PRN for pain   - OP neurosurgery f/u     #chronic HFpEF / PHTN   - not in exacerbation   - TTE 1/28/2024- EF 60-65% mod pulm HTN   - Currently euvolemic on exam  - monitor for fluid overload         # GI prophylaxis     #Progress Note Handoff  Pending (specify):  hold Lovenox, monitor H/H, keep Hb above 7.5, send anemia work up, confirm NGT placement, start meds and NGT feedings, keep MAP above 65, on low dose of pressors , supportive care    Family discussion: I spoke with pt's daughter at the bedside, she agreed with a plan of care   Disposition: SDU

## 2025-04-12 NOTE — PROGRESS NOTE ADULT - NEUROLOGICAL
responds to pain
responds to pain
responds to pain/responds to verbal commands
responds to pain/responds to verbal commands

## 2025-04-12 NOTE — PROGRESS NOTE ADULT - ASSESSMENT
89  year old F, pmhx of chronic afib (on Eliquis), GERD, dementia, FTT , Orthostatic hypotension on midodrine ,Patient presents for evaluation of change in mental status.  Daughter states over the past few months patient has been having increasing cognitive issues including hallucinations poor sleep.  Daughter states that yesterday patient seemed to be getting sick and she was given Tylenol at night.  Patient is on Macrobid for UTI prophylaxis due to frequent UTIs.  This daughter had several concerns regarding patient including further decline in mental status/increased confusion/decreased ability to follow commands.  Daughter states it appeared like the left eye had not been opening as much is normal but now it is opening normally.  Patient also complained of left upper extremity pain and swelling.  Patient was unable to raise her left upper extremity secondary to pain.  No trauma.  Patient with decreased p.o. intake but was able to eat a little yesterday. Admitted to SDU for sepsis secondary to complicated UTI/Pylenephritis    #TME 2/2 Severe Sepsis present on admission secondary to acute pyelonephritis/ Hx of recurrent UTIs/ lactic acidosis likley 2/2 Sepsis and hypotension   -continue with Cefepime to 1 gm iv q12h  - IV lnezolid 600 q12  -Apply silver sulfadiazine cream to b/l lower extremities under dressing  -Follow-up final urine culture  - on Midodrine 10 mg Q 8 hours-> has not taken in past 24 hours due to not being able to eat   -ID following  - on levo as of 4/12    #Sacral ulcer  - unstageable sacral ulcer with minimal white discharge.  - wound care eval noted  - change position Q 2 hours, off load pressure points     #Left shoulder pain and UE swelling  - can start Lidocaine topical   - Xray shoulder : Acromioclavicular arthrosis. High riding humerus which could reflect rotator cuff tear of uncertain age. Bones appear osteopenic with no acute fracture or dislocation. Glenohumeral arthritic changes.  - LUE duplex negative for DVT   - keep LUE elevated     #Hx of Lumbar Compression Fracture  #Hx of Pathologic L1 vertebrae fracture  #Hx of Conus Medullaris Syndrome  - was on  gabapentin and robaxin,  tylenol PRN at home   - OP neurosurgery f/u     #chronic HFpEF / PHTN   - not in exacerbation   - TTE 1/28/2024- EF 60-65% mod pulm HTN   - monitor for fluid oveload     #Chronic Atrial Fibrillation with acute RVR  -CHADS2-VASC = 5.   -c/w Eliquis 2.5 mg twice daily   - resume  metoprolol 25mg three times daily  - patient having hard time taking oral meds  -EP consulted 4/10 for alternate options of medications and administrations  - discussed with EP 4/11 - ok to start digoxin if does not tolerate IV metop pushes, would stay away from amio 89  year old F, pmhx of chronic afib (on Eliquis), GERD, dementia, FTT , Orthostatic hypotension on midodrine ,Patient presents for evaluation of change in mental status.  Daughter states over the past few months patient has been having increasing cognitive issues including hallucinations poor sleep.  Daughter states that yesterday patient seemed to be getting sick and she was given Tylenol at night.  Patient is on Macrobid for UTI prophylaxis due to frequent UTIs.  This daughter had several concerns regarding patient including further decline in mental status/increased confusion/decreased ability to follow commands.  Daughter states it appeared like the left eye had not been opening as much is normal but now it is opening normally.  Patient also complained of left upper extremity pain and swelling.  Patient was unable to raise her left upper extremity secondary to pain.  No trauma.  Patient with decreased p.o. intake but was able to eat a little yesterday. Admitted to SDU for sepsis secondary to complicated UTI/Pylenephritis    #TME 2/2 Severe Sepsis present on admission secondary to acute pyelonephritis/ Hx of recurrent UTIs/ lactic acidosis likley 2/2 Sepsis and hypotension   -continue with Cefepime to 1 gm iv q12h  - IV lnezolid 600 q12  -Apply silver sulfadiazine cream to b/l lower extremities under dressing  -Follow-up final urine culture  - on Midodrine 10 mg Q 8 hours-> has not taken in past 24 hours due to not being able to eat   -ID following  - on levo as of 4/12    #Sacral ulcer  - unstageable sacral ulcer with minimal white discharge.  - wound care eval noted  - change position Q 2 hours, off load pressure points     #Left shoulder pain and UE swelling  - can start Lidocaine topical   - Xray shoulder : Acromioclavicular arthrosis. High riding humerus which could reflect rotator cuff tear of uncertain age. Bones appear osteopenic with no acute fracture or dislocation. Glenohumeral arthritic changes.  - LUE duplex negative for DVT   - keep LUE elevated     #Hx of Lumbar Compression Fracture  #Hx of Pathologic L1 vertebrae fracture  #Hx of Conus Medullaris Syndrome  - was on  gabapentin and robaxin,  tylenol PRN at home   - OP neurosurgery f/u     #chronic HFpEF / PHTN   - not in exacerbation   - TTE 1/28/2024- EF 60-65% mod pulm HTN   - monitor for fluid oveload     #Chronic Atrial Fibrillation with acute RVR  -CHADS2-VASC = 5.   -c/w Eliquis 2.5 mg twice daily -> now lovenox  - resume  metoprolol 25mg three times daily  - patient having hard time taking oral meds  -EP consulted 4/10 for alternate options of medications and administrations  - discussed with EP 4/11 - ok to start digoxin if does not tolerate IV metop pushes, would stay away from amio  - will hold lovenox 4/12 for 24 hours due to hematuria

## 2025-04-12 NOTE — PROGRESS NOTE ADULT - SUBJECTIVE AND OBJECTIVE BOX
89  year old F, pmhx of chronic afib (on Eliquis), GERD, dementia, FTT , Orthostatic hypotension on midodrine  Patient presents for evaluation of change in mental status.  Daughter states over the past few months patient has been having increasing cognitive issues including hallucinations poor sleep.  Daughter states that  patient seemed to be getting sick and she was given Tylenol at night.  Patient is on Macrobid for UTI prophylaxis due to frequent UTIs.  This daughter had several concerns regarding patient including further decline in mental status/increased confusion/decreased ability to follow commands.  Daughter states it appeared like the left eye had not been opening as much is normal but now it is opening normally.  Patient also complained of left upper extremity pain and swelling.  Patient was unable to raise her left upper extremity secondary to pain.  No trauma.  Patient with decreased p.o. intake.   Pt was admitted to SDU with Acute pyelonephritis with mild right hydroureteronephrosis , urine Cx is growing  E faeium, she has Metabolic encephalopathy with underlying dementia.   Pt  was found to have A-fib with RVR, was hypotensive, BP improved after fluid resuscitation HR controlled with BB. ID recommended to add  Zyvox  to Abx regimen.   In last 24 hours, Hb dropped, pt developed gross hematuria, will hold AC today, monitor H/H, put NGT in for feedings and medications,  daughter agreed.   Today pt appears comfortable with her eyes closed, reacts to painful stimuli.    PAST MEDICAL & SURGICAL HISTORY:  Chronic atrial fibrillation      GERD (gastroesophageal reflux disease)      Chronic lower back pain      No significant past surgical history      Vital Signs Last 24 Hrs  T(C): 35.9 (12 Apr 2025 12:00), Max: 36.3 (12 Apr 2025 04:00)  T(F): 96.6 (12 Apr 2025 12:00), Max: 97.3 (12 Apr 2025 04:00)  HR: 98 (12 Apr 2025 10:15) (63 - 123)  BP: 146/67 (12 Apr 2025 10:15) (80/49 - 146/67)  BP(mean): 92 (12 Apr 2025 10:15) (63 - 110)  RR: 20 (12 Apr 2025 08:14) (20 - 20)  SpO2: 100% (12 Apr 2025 09:00) (97% - 100%)    Parameters below as of 12 Apr 2025 08:14  Patient On (Oxygen Delivery Method): nasal cannula      PHYSICAL EXAM:  GENERAL: NAD, frail with temporal muscle waisting   HEAD:  Atraumatic, Normocephalic  NECK: Supple, No JVD, Normal thyroid  NERVOUS SYSTEM:  Awake, answering questions, hard of hearing, moving all extremities   CHEST/LUNG: decreased BS at bases   HEART: irregularly irregular, tachycardic   ABDOMEN: Soft, Nontender, Nondistended; Bowel sounds present  EXTREMITIES:  dressing noted on LE b/l below knees   SKIN: paper thin skin with multiply skin tears on UE       LABS:                                            8.8    8.15  )-----------( 164      ( 12 Apr 2025 05:18 )             26.4   04-12    132[L]  |  101  |  34[H]  ----------------------------<  126[H]  3.4[L]   |  19  |  1.3    Ca    8.7      12 Apr 2025 05:18  Mg     1.9     04-12    TPro  3.8[L]  /  Alb  2.4[L]  /  TBili  0.4  /  DBili  x   /  AST  18  /  ALT  16  /  AlkPhos  117[H]  04-12    Urinalysis Basic - ( 09 Apr 2025 04:45 )    Color: x / Appearance: x / SG: x / pH: x  Gluc: 82 mg/dL / Ketone: x  / Bili: x / Urobili: x   Blood: x / Protein: x / Nitrite: x   Leuk Esterase: x / RBC: x / WBC x   Sq Epi: x / Non Sq Epi: x / Bacteria: x    Urinalysis with Rflx Culture (collected 07 Apr 2025 12:05)    Culture - Urine (collected 07 Apr 2025 12:05)  Source: Catheterized None  Final Report (09 Apr 2025 12:53):    >100,000 CFU/ml Enterococcus faecium  Culture - Urine (04.07.25 @ 12:05)   - Ampicillin: R >8 Predicts results to ampicillin/sulbactam, amoxacillin-clavulanate and piperacillin-tazobactam.  - Ciprofloxacin: I 2  - Levofloxacin: I 4  - Nitrofurantoin: S <=32 Should not be used to treat pyelonephritis.  - Tetracycline: R >8  - Vancomycin: S 0.5  Specimen Source: Catheterized None  Culture Results:   >100,000 CFU/ml Enterococcus faecium  Organism Identification: Enterococcus faecium  Organism: Enterococcus faeciumOrganism: Enterococcus faecium (09 Apr 2025 12:53)  Organism: Enterococcus faecium (09 Apr 2025 12:53)    Culture - Blood (collected 07 Apr 2025 12:02)  Source: Blood Blood-Peripheral  Preliminary Report (08 Apr 2025 22:02):    No growth at 24 hours      Culture - Blood (collected 07 Apr 2025 12:02)  Source: Blood Blood-Peripheral  Preliminary Report (08 Apr 2025 22:02):    No growth at 24 hours        RADIOLOGY & ADDITIONAL TESTS:  < from: Xray Hand 3 Views, Left (04.08.25 @ 12:59) >  impression:    Soft tissue swelling suggested at the level of the fifth digit.    The bones are diffusely osteopenic. There are arthritic changes with   joint space narrowing and osteophyte formation. Findings appear most   pronounced at the second distal interphalangeal joint. Arthritic changes   are also noted at the basal joint. There is no acute fracture.    Chondrocalcinosis within the region of the triangular fibrocartilage    < from: [facility] Duplex Upper Ext Vein Scan, Left (04.08.25 @ 10:33) >  IMPRESSION:  No evidence of left upper extremity deep venous thrombosis. Study limited   due to patient positioning and overlying dressings      < from: VA Duplex Lower Ext Vein Scan, Bilat (04.08.25 @ 10:30) >  IMPRESSION:  No evidence of DVT in the visualized portions of the bilateral lower   extremities. Visualization limited secondary to dressings in the lower   extremities and limb contractures    < from: TTE Echo Complete w/o Contrast w/ Doppler (04.09.25 @ 10:47) >  Summary:   1. Left ventricular ejection fraction, by visual estimation, is 45 to   50%.   2. Mildly decreased global left ventricular systolic function.   3. The left ventricular diastolic function could not be assessed in this   study.   4. Severely reduced RV systolic function.   5. Severely enlarged right atrium.   6. Left atrial enlargement.   7. Sclerotic aortic valve with mildly decreased opening.   8. Mild aortic regurgitation.   9. Mild-to-moderate mitral regurgitation.  10. Moderate tricuspid regurgitation.  11. Estimated pulmonary artery systolic pressure is 65.8 mmHg assuming a   right atrial pressure of 8 mmHg, which is consistent with severe   pulmonary hypertension.  12. Small pericardial effusion visualized on limited views.    < from: Xray Chest 1 View- PORTABLE-Urgent (Xray Chest 1 View- PORTABLE-Urgent .) (04.10.25 @ 08:35) >    Impression:      Small to moderate left pleural effusion is unchanged. Developing opacity   in the right lung base    < from: Xray Chest 1 View- PORTABLE-Routine (Xray Chest 1 View- PORTABLE-Routine in AM.) (04.11.25 @ 06:06) >    IMPRESSION: Low lung volume.    Bilateral opacities, slightly worse.    < end of copied text >      MEDICATIONS  (STANDING):  acetaminophen   IVPB .. 1000 milliGRAM(s) IV Intermittent once  cefepime   IVPB 1000 milliGRAM(s) IV Intermittent every 12 hours  chlorhexidine 2% Cloths 1 Application(s) Topical <User Schedule>  chlorhexidine 2% Cloths 1 Application(s) Topical <User Schedule>  linezolid  IVPB      linezolid  IVPB 600 milliGRAM(s) IV Intermittent every 12 hours  metoprolol tartrate 25 milliGRAM(s) Oral three times a day  midodrine 10 milliGRAM(s) Oral every 8 hours  multivitamin 1 Tablet(s) Oral daily  norepinephrine Infusion 0.05 MICROgram(s)/kG/Min (5.95 mL/Hr) IV Continuous <Continuous>  pantoprazole    Tablet 40 milliGRAM(s) Oral before breakfast  silver sulfADIAZINE 1% Cream 1 Application(s) Topical two times a day    MEDICATIONS  (PRN):  acetaminophen     Tablet .. 650 milliGRAM(s) Oral every 6 hours PRN Temp greater or equal to 38C (100.4F), Mild Pain (1 - 3)  aluminum hydroxide/magnesium hydroxide/simethicone Suspension 30 milliLiter(s) Oral every 4 hours PRN Dyspepsia  haloperidol    Injectable 0.5 milliGRAM(s) IntraMuscular at bedtime PRN Agitation  melatonin 3 milliGRAM(s) Oral at bedtime PRN Insomnia  metoprolol tartrate Injectable 2.5 milliGRAM(s) IV Push every 6 hours PRN if HR consistently >140  ondansetron Injectable 4 milliGRAM(s) IV Push every 8 hours PRN Nausea and/or Vomiting

## 2025-04-12 NOTE — PROGRESS NOTE ADULT - SUBJECTIVE AND OBJECTIVE BOX
Patient is a 89y old  Female who presents with a chief complaint of sepsis (11 Apr 2025 16:21)        Over Night Events:        ROS:     All ROS are negative except HPI         PHYSICAL EXAM    ICU Vital Signs Last 24 Hrs  T(C): 36.3 (12 Apr 2025 04:00), Max: 36.3 (12 Apr 2025 04:00)  T(F): 97.3 (12 Apr 2025 04:00), Max: 97.3 (12 Apr 2025 04:00)  HR: 123 (12 Apr 2025 04:00) (81 - 123)  BP: 126/87 (12 Apr 2025 04:00) (113/63 - 136/91)  BP(mean): 110 (11 Apr 2025 21:24) (82 - 110)  ABP: --  ABP(mean): --  RR: 20 (12 Apr 2025 04:00) (20 - 20)  SpO2: 97% (12 Apr 2025 04:00) (97% - 100%)    O2 Parameters below as of 11 Apr 2025 21:24  Patient On (Oxygen Delivery Method): nasal cannula            CONSTITUTIONAL:  Frail, NAD    ENT:   Airway patent,   Mouth with normal mucosa.   No thrush    EYES:   Pupils equal,   Round and reactive to light.    CARDIAC:   Normal rate,   Regular rhythm.      RESPIRATORY:   No wheezing  Bilateral BS reduced     GASTROINTESTINAL:  Abdomen soft,   Non-tender,     MUSCULOSKELETAL:   Range of motion is not limited,  No clubbing, cyanosis    NEUROLOGICAL:   Alert and oriented   No motor  deficits.    SKIN:   Skin normal color for race,   Warm and dry and intact.   LE dressing       04-10-25 @ 07:01  -  04-11-25 @ 07:00  --------------------------------------------------------  IN:    Phenylephrine: 39.6 mL  Total IN: 39.6 mL    OUT:    Voided (mL): 0 mL  Total OUT: 0 mL    Total NET: 39.6 mL      04-11-25 @ 07:01  -  04-12-25 @ 06:45  --------------------------------------------------------  IN:  Total IN: 0 mL    OUT:    Voided (mL): 500 mL  Total OUT: 500 mL    Total NET: -500 mL          LABS:                            8.8    8.15  )-----------( 164      ( 12 Apr 2025 05:18 )             26.4                                               04-12    132[L]  |  101  |  34[H]  ----------------------------<  126[H]  3.4[L]   |  19  |  1.3    Ca    8.7      12 Apr 2025 05:18  Phos  3.2     04-10  Mg     1.9     04-12    TPro  3.8[L]  /  Alb  2.4[L]  /  TBili  0.4  /  DBili  x   /  AST  18  /  ALT  16  /  AlkPhos  117[H]  04-12                                             Urinalysis Basic - ( 12 Apr 2025 05:18 )    Color: x / Appearance: x / SG: x / pH: x  Gluc: 126 mg/dL / Ketone: x  / Bili: x / Urobili: x   Blood: x / Protein: x / Nitrite: x   Leuk Esterase: x / RBC: x / WBC x   Sq Epi: x / Non Sq Epi: x / Bacteria: x                                                  LIVER FUNCTIONS - ( 12 Apr 2025 05:18 )  Alb: 2.4 g/dL / Pro: 3.8 g/dL / ALK PHOS: 117 U/L / ALT: 16 U/L / AST: 18 U/L / GGT: x                                                  Culture - Urine (collected 10 Apr 2025 10:20)  Source: Clean Catch Clean Catch (Midstream)  Final Report (11 Apr 2025 23:50):    <10,000 CFU/mL Normal Urogenital Lorena                                                                                           MEDICATIONS  (STANDING):  acetaminophen   IVPB .. 1000 milliGRAM(s) IV Intermittent once  cefepime   IVPB 1000 milliGRAM(s) IV Intermittent every 12 hours  chlorhexidine 2% Cloths 1 Application(s) Topical <User Schedule>  chlorhexidine 2% Cloths 1 Application(s) Topical <User Schedule>  enoxaparin Injectable 60 milliGRAM(s) SubCutaneous every 24 hours  linezolid  IVPB      linezolid  IVPB 600 milliGRAM(s) IV Intermittent every 12 hours  metoprolol tartrate 25 milliGRAM(s) Oral three times a day  midodrine 10 milliGRAM(s) Oral every 8 hours  multivitamin 1 Tablet(s) Oral daily  pantoprazole    Tablet 40 milliGRAM(s) Oral before breakfast  phenylephrine    Infusion 0.1 MICROgram(s)/kG/Min (2.38 mL/Hr) IV Continuous <Continuous>  silver sulfADIAZINE 1% Cream 1 Application(s) Topical two times a day  sodium bicarbonate  Infusion 0.236 mEq/kG/Hr (100 mL/Hr) IV Continuous <Continuous>    MEDICATIONS  (PRN):  acetaminophen     Tablet .. 650 milliGRAM(s) Oral every 6 hours PRN Temp greater or equal to 38C (100.4F), Mild Pain (1 - 3)  aluminum hydroxide/magnesium hydroxide/simethicone Suspension 30 milliLiter(s) Oral every 4 hours PRN Dyspepsia  haloperidol    Injectable 0.5 milliGRAM(s) IntraMuscular at bedtime PRN Agitation  melatonin 3 milliGRAM(s) Oral at bedtime PRN Insomnia  metoprolol tartrate Injectable 2.5 milliGRAM(s) IV Push every 6 hours PRN if HR consistently >140  ondansetron Injectable 4 milliGRAM(s) IV Push every 8 hours PRN Nausea and/or Vomiting      New X-rays reviewed:                                                                                  ECHO     Patient is a 89y old  Female who presents with a chief complaint of sepsis (11 Apr 2025 16:21)        Over Night Events: MAP low this am, will restart pressors if MAP <65         ROS:     All ROS are negative except HPI         PHYSICAL EXAM    ICU Vital Signs Last 24 Hrs  T(C): 36.3 (12 Apr 2025 04:00), Max: 36.3 (12 Apr 2025 04:00)  T(F): 97.3 (12 Apr 2025 04:00), Max: 97.3 (12 Apr 2025 04:00)  HR: 123 (12 Apr 2025 04:00) (81 - 123)  BP: 126/87 (12 Apr 2025 04:00) (113/63 - 136/91)  BP(mean): 110 (11 Apr 2025 21:24) (82 - 110)  ABP: --  ABP(mean): --  RR: 20 (12 Apr 2025 04:00) (20 - 20)  SpO2: 97% (12 Apr 2025 04:00) (97% - 100%)    O2 Parameters below as of 11 Apr 2025 21:24  Patient On (Oxygen Delivery Method): nasal cannula            CONSTITUTIONAL:  Frail, NAD    ENT:   Airway patent,   Mouth with normal mucosa.   No thrush    EYES:   Pupils equal,   Round and reactive to light.    CARDIAC:   Normal rate,   Regular rhythm.      RESPIRATORY:   No wheezing  Bilateral BS reduced     GASTROINTESTINAL:  Abdomen soft,   Non-tender,     MUSCULOSKELETAL:   Range of motion is not limited,  No clubbing, cyanosis    NEUROLOGICAL:   Alert and oriented   No motor  deficits.    SKIN:   Skin normal color for race,   Warm and dry and intact.   LE dressing       04-10-25 @ 07:01  -  04-11-25 @ 07:00  --------------------------------------------------------  IN:    Phenylephrine: 39.6 mL  Total IN: 39.6 mL    OUT:    Voided (mL): 0 mL  Total OUT: 0 mL    Total NET: 39.6 mL      04-11-25 @ 07:01  -  04-12-25 @ 06:45  --------------------------------------------------------  IN:  Total IN: 0 mL    OUT:    Voided (mL): 500 mL  Total OUT: 500 mL    Total NET: -500 mL          LABS:                            8.8    8.15  )-----------( 164      ( 12 Apr 2025 05:18 )             26.4                                               04-12    132[L]  |  101  |  34[H]  ----------------------------<  126[H]  3.4[L]   |  19  |  1.3    Ca    8.7      12 Apr 2025 05:18  Phos  3.2     04-10  Mg     1.9     04-12    TPro  3.8[L]  /  Alb  2.4[L]  /  TBili  0.4  /  DBili  x   /  AST  18  /  ALT  16  /  AlkPhos  117[H]  04-12                                             Urinalysis Basic - ( 12 Apr 2025 05:18 )    Color: x / Appearance: x / SG: x / pH: x  Gluc: 126 mg/dL / Ketone: x  / Bili: x / Urobili: x   Blood: x / Protein: x / Nitrite: x   Leuk Esterase: x / RBC: x / WBC x   Sq Epi: x / Non Sq Epi: x / Bacteria: x                                                  LIVER FUNCTIONS - ( 12 Apr 2025 05:18 )  Alb: 2.4 g/dL / Pro: 3.8 g/dL / ALK PHOS: 117 U/L / ALT: 16 U/L / AST: 18 U/L / GGT: x                                                  Culture - Urine (collected 10 Apr 2025 10:20)  Source: Clean Catch Clean Catch (Midstream)  Final Report (11 Apr 2025 23:50):    <10,000 CFU/mL Normal Urogenital [place]                                                                                           MEDICATIONS  (STANDING):  acetaminophen   IVPB .. 1000 milliGRAM(s) IV Intermittent once  cefepime   IVPB 1000 milliGRAM(s) IV Intermittent every 12 hours  chlorhexidine 2% Cloths 1 Application(s) Topical <User Schedule>  chlorhexidine 2% Cloths 1 Application(s) Topical <User Schedule>  enoxaparin Injectable 60 milliGRAM(s) SubCutaneous every 24 hours  linezolid  IVPB      linezolid  IVPB 600 milliGRAM(s) IV Intermittent every 12 hours  metoprolol tartrate 25 milliGRAM(s) Oral three times a day  midodrine 10 milliGRAM(s) Oral every 8 hours  multivitamin 1 Tablet(s) Oral daily  pantoprazole    Tablet 40 milliGRAM(s) Oral before breakfast  phenylephrine    Infusion 0.1 MICROgram(s)/kG/Min (2.38 mL/Hr) IV Continuous <Continuous>  silver sulfADIAZINE 1% Cream 1 Application(s) Topical two times a day  sodium bicarbonate  Infusion 0.236 mEq/kG/Hr (100 mL/Hr) IV Continuous <Continuous>    MEDICATIONS  (PRN):  acetaminophen     Tablet .. 650 milliGRAM(s) Oral every 6 hours PRN Temp greater or equal to 38C (100.4F), Mild Pain (1 - 3)  aluminum hydroxide/magnesium hydroxide/simethicone Suspension 30 milliLiter(s) Oral every 4 hours PRN Dyspepsia  haloperidol    Injectable 0.5 milliGRAM(s) IntraMuscular at bedtime PRN Agitation  melatonin 3 milliGRAM(s) Oral at bedtime PRN Insomnia  metoprolol tartrate Injectable 2.5 milliGRAM(s) IV Push every 6 hours PRN if HR consistently >140  ondansetron Injectable 4 milliGRAM(s) IV Push every 8 hours PRN Nausea and/or Vomiting      New X-rays reviewed:                                                                                  ECHO

## 2025-04-12 NOTE — PROGRESS NOTE ADULT - SUBJECTIVE AND OBJECTIVE BOX
SUNNY COLBERT  89y, Female    All available historical data reviewed    OVERNIGHT EVENTS:  none    ROS:  unable to obtain history secondary to patient's mental status and/or sedation  nc 2 lit  on vaso  no diarrhea  no cough  hanson with hematuria    VITALS:  T(F): 97, Max: 97.3 (04-12-25 @ 04:00)  HR: 64  BP: 98/54  RR: 20Vital Signs Last 24 Hrs  T(C): 36.1 (12 Apr 2025 08:14), Max: 36.3 (12 Apr 2025 04:00)  T(F): 97 (12 Apr 2025 08:14), Max: 97.3 (12 Apr 2025 04:00)  HR: 64 (12 Apr 2025 09:15) (63 - 123)  BP: 98/54 (12 Apr 2025 09:15) (80/49 - 136/91)  BP(mean): 68 (12 Apr 2025 09:15) (63 - 110)  RR: 20 (12 Apr 2025 08:14) (20 - 20)  SpO2: 100% (12 Apr 2025 09:00) (97% - 100%)    Parameters below as of 12 Apr 2025 08:14  Patient On (Oxygen Delivery Method): nasal cannula        TESTS & MEASUREMENTS:                        8.8    8.15  )-----------( 164      ( 12 Apr 2025 05:18 )             26.4     04-12    132[L]  |  101  |  34[H]  ----------------------------<  126[H]  3.4[L]   |  19  |  1.3    Ca    8.7      12 Apr 2025 05:18  Phos  3.2     04-10  Mg     1.9     04-12    TPro  3.8[L]  /  Alb  2.4[L]  /  TBili  0.4  /  DBili  x   /  AST  18  /  ALT  16  /  AlkPhos  117[H]  04-12    LIVER FUNCTIONS - ( 12 Apr 2025 05:18 )  Alb: 2.4 g/dL / Pro: 3.8 g/dL / ALK PHOS: 117 U/L / ALT: 16 U/L / AST: 18 U/L / GGT: x             Culture - Urine (collected 04-10-25 @ 10:20)  Source: Clean Catch Clean Catch (Midstream)  Final Report (04-11-25 @ 23:50):    <10,000 CFU/mL Normal Urogenital [place]na    Urinalysis with Rflx Culture (collected 04-07-25 @ 12:05)    Culture - Urine (collected 04-07-25 @ 12:05)  Source: Catheterized None  Final Report (04-09-25 @ 12:53):    >100,000 CFU/ml Enterococcus faecium  Organism: Enterococcus faecium (04-09-25 @ 12:53)  Organism: Enterococcus faecium (04-09-25 @ 12:53)      -  Levofloxacin: I 4      -  Nitrofurantoin: S <=32 Should not be used to treat pyelonephritis.      -  Vancomycin: S 0.5      -  Ciprofloxacin: I 2      -  Ampicillin: R >8 Predicts results to ampicillin/sulbactam, amoxacillin-clavulanate and  piperacillin-tazobactam.      -  Tetracycline: R >8      Method Type: MATA    Culture - Blood (collected 04-07-25 @ 12:02)  Source: Blood Blood-Peripheral  Preliminary Report (04-11-25 @ 22:00):    No growth at 4 days    Culture - Blood (collected 04-07-25 @ 12:02)  Source: Blood Blood-Peripheral  Preliminary Report (04-11-25 @ 22:00):    No growth at 4 days      Urinalysis Basic - ( 12 Apr 2025 05:18 )    Color: x / Appearance: x / SG: x / pH: x  Gluc: 126 mg/dL / Ketone: x  / Bili: x / Urobili: x   Blood: x / Protein: x / Nitrite: x   Leuk Esterase: x / RBC: x / WBC x   Sq Epi: x / Non Sq Epi: x / Bacteria: x          Social History:  Tobacco Use: No  Alcohol Use: No  Drug Use: No    RADIOLOGY & ADDITIONAL TESTS:  Personal review of radiological diagnostics performed  Echo and EKG results noted when applicable.     MEDICATIONS:  acetaminophen     Tablet .. 650 milliGRAM(s) Oral every 6 hours PRN  acetaminophen   IVPB .. 1000 milliGRAM(s) IV Intermittent once  aluminum hydroxide/magnesium hydroxide/simethicone Suspension 30 milliLiter(s) Oral every 4 hours PRN  cefepime   IVPB 1000 milliGRAM(s) IV Intermittent every 12 hours  chlorhexidine 2% Cloths 1 Application(s) Topical <User Schedule>  chlorhexidine 2% Cloths 1 Application(s) Topical <User Schedule>  haloperidol    Injectable 0.5 milliGRAM(s) IntraMuscular at bedtime PRN  linezolid  IVPB      linezolid  IVPB 600 milliGRAM(s) IV Intermittent every 12 hours  melatonin 3 milliGRAM(s) Oral at bedtime PRN  metoprolol tartrate 25 milliGRAM(s) Oral three times a day  metoprolol tartrate Injectable 2.5 milliGRAM(s) IV Push every 6 hours PRN  midodrine 10 milliGRAM(s) Oral every 8 hours  multivitamin 1 Tablet(s) Oral daily  norepinephrine Infusion 0.05 MICROgram(s)/kG/Min IV Continuous <Continuous>  ondansetron Injectable 4 milliGRAM(s) IV Push every 8 hours PRN  pantoprazole    Tablet 40 milliGRAM(s) Oral before breakfast  silver sulfADIAZINE 1% Cream 1 Application(s) Topical two times a day      ANTIBIOTICS:  cefepime   IVPB 1000 milliGRAM(s) IV Intermittent every 12 hours  linezolid  IVPB      linezolid  IVPB 600 milliGRAM(s) IV Intermittent every 12 hours

## 2025-04-12 NOTE — PROGRESS NOTE ADULT - ASSESSMENT
IMPRESSION:  Patient is a 89 yo Female with PMH of chronic Afib on Eliquis, HFpEF, L1 epithelial carcinoma s/p radiation, conus medullaris syndrome, recurrent UTIs, GERD, CKD 3b, dementia, orthostatic hypotension on midodrine, and FTT. Patient currently on Macrobid prophylaxis for recurrent UTIs. Per daughter, Patient also has a cough productive with thin yellow sputum for the past few weeks. Patient has had progressive cognitive decline for several months, but acute decline began 2 days ago. Endorses myalgias, malaise, subjective fever. Denies N/V/D, sore throat, sick contacts.    Independent history obtained from her daughter Marlys  at the bedside   ID consulted for diagnostic work up and antimicrobial treatment of sepsis   Status of condition: critical     IMPRESSION/RECOMMENDATIONS  Immunosuppression/Immunosenescence ( above age 60 yrs there is a exponential decline in immunity which could result in poor clinical outcomes.   Acute illness  ( severe sepsis ) which poses a threat to life or bodily function without treatment   Severe sepsis : resolving (still on pressros, hypothermic )  Acute pyelonephritis with mild right hydroureteronephrosis   4/10 UCX NG  4/7 BCX NG  4/7 UCx E faeium  Metabolic encephalopathy  Hematuria ( hanson inserted 4/10 ) : dropping HG     < from: CT Abdomen and Pelvis w/ IV Cont (04.07.25 @ 13:22) > ( Independent interpretation of test : no bacterial PNA  1.  Circumferential mild edematous wall thickening of the bladder with mural hyperemia and perivesicular stranding consistent with cystitis/UTI.  2.  Mild right hydroureteronephrosis with urothelial enhancement. Multiple vascular calcifications are noted along the course of the ureter   however none appear to be within the ureter itself. Findings can be secondary to recently passed stone or ascending infection. No definite CT imaging evidence of pyelonephritis.  3.  More nodular enhancing appearance of the right distal ureter at the level of the UVJ could also be infectious in etiology however a ureteral   neoplasm cannot be entirely excluded. Further evaluation can be considered as clinically appropriate.  4.  Mild edematous wall thickening of the rectum which could represent   proctitis in the appropriate clinical setting.  5.  Partially imaged small bilateral pleural effusions and trace pericardial effusion.    < end of copied text >    4/12 CXR increasing b/l opacities/ pleural effusion : ( Independent interpretation of test : no bacterial PNA.     4/7 COVID 19/ Influenza/ RSV NG.   4/7 Miguel Morillo NG  WBC 8.1  Monitor Hg    chronic Afib on Eliquis  HFpEF  L1 epithelial carcinoma s/p radiation, conus medullaris syndrome  recurrent UTIs      #B/L Lower extremity ecchymosis    -monitor Hg   -continue with Cefepime to 1 gm iv q12h  -continue with  iv  Linezolid 600 mg q12h. Linezolid will be monitored daily for myelosuppression especially thrombocytopenia by checking the CBC daily   -Apply silver sulfadiazine cream to b/l lower extremities under dressing    Discussion of management/test results( independently interpretated by me ) /antibiotic regimen  with external/primary medical team. Dr Goel  Discussion of management/test results( independently interpretated by me ) /antibiotic regimen  with her daughter Marlys at the bedside .

## 2025-04-12 NOTE — PROGRESS NOTE ADULT - ASSESSMENT
IMPRESSION:    AMS/TME  Sepsis/ UTI/ Pyelonephritis   Lactic acidosis improved  Metabolic acidosis/mixed  bibasilar atelectasis/effusion  HO afib (on Eliquis),  HO dementia  HI Orthostatic hypotension on midodrine       PLAN:    CNS: Avoid CNS depressant    HEENT: Oral care    PULMONARY:  HOB @ 45 degrees, on NC, aspiration precaution, keep Sao2 92 to 96%, Incentive iron, f/u procal     CARDIOVASCULAR: echo noted. Midodrine 10 q 8    GI: GI prophylaxis, Feeding per speech, may need NG tube     RENAL:  F/u  lytes. Correct as needed. f/u UO     INFECTIOUS DISEASE: Cefepime and Linezolid, f/u UC, f/u ID reccs     HEMATOLOGICAL:  DVT prophylaxis. On Therapeutic AC     ENDOCRINE:  Follow up FS.  Insulin protocol if needed.    SDU  Poor prognosis  GOC disscusion    IMPRESSION:    AMS/TME  Sepsis/ UTI/ Pyelonephritis   Lactic acidosis improved  Metabolic acidosis/mixed  bibasilar atelectasis/effusion  HO afib (on Eliquis),  HO dementia  HI Orthostatic hypotension on midodrine       PLAN:    CNS: Avoid CNS depressant    HEENT: Oral care    PULMONARY:  HOB @ 45 degrees, on NC, aspiration precaution, keep Sao2 92 to 96%, Incentive iron, f/u procal, CXR noted     CARDIOVASCULAR: echo noted. Midodrine 10 q 8, uptitrate, start levo if MAP < 65     GI: GI prophylaxis, Feeding per speech, may need NG tube, switch to Po bicarb, Dc IV       RENAL:  F/u  lytes. Correct as needed. f/u UO     INFECTIOUS DISEASE: Cefepime and Linezolid, f/u UC, f/u ID reccs     HEMATOLOGICAL:  DVT prophylaxis. On Therapeutic AC     ENDOCRINE:  Follow up FS.  Insulin protocol if needed.    SDU  Poor prognosis  GOC disscusion    IMPRESSION:    AMS/TME  Sepsis/ UTI/ Pyelonephritis   Lactic acidosis improved  Metabolic acidosis/mixed  bibasilar atelectasis/effusion  HO afib (on Eliquis),  HO dementia  HI Orthostatic hypotension on midodrine       PLAN:    CNS: Avoid CNS depressant, Ct head non con     HEENT: Oral care    PULMONARY:  HOB @ 45 degrees, on NC, aspiration precaution, keep Sao2 92 to 96%, Incentive iron, f/u procal, CXR noted     CARDIOVASCULAR: echo noted. Midodrine 10 q 8, uptitrate, start levo if MAP < 65     GI: GI prophylaxis, Feeding per speech, may need NG tube, switch to Po bicarb, Dc IV       RENAL:  F/u  lytes. Correct as needed. f/u UO     INFECTIOUS DISEASE: Cefepime and Linezolid, f/u UC, f/u ID reccs     HEMATOLOGICAL:  DVT prophylaxis. On Therapeutic AC     ENDOCRINE:  Follow up FS.  Insulin protocol if needed.    SDU  Poor prognosis  GOC disscusion

## 2025-04-12 NOTE — PROGRESS NOTE ADULT - SUBJECTIVE AND OBJECTIVE BOX
SUBJECTIVE/OVERNIGHT EVENTS  Today is hospital day 5d. This morning patient was seen and examined at bedside, resting comfortably in bed. No acute or major events overnight.      MEDICATIONS  STANDING MEDICATIONS  acetaminophen   IVPB .. 1000 milliGRAM(s) IV Intermittent once  cefepime   IVPB 1000 milliGRAM(s) IV Intermittent every 12 hours  chlorhexidine 2% Cloths 1 Application(s) Topical <User Schedule>  chlorhexidine 2% Cloths 1 Application(s) Topical <User Schedule>  linezolid  IVPB      linezolid  IVPB 600 milliGRAM(s) IV Intermittent every 12 hours  metoprolol tartrate 25 milliGRAM(s) Oral three times a day  midodrine 10 milliGRAM(s) Oral every 8 hours  multivitamin 1 Tablet(s) Oral daily  norepinephrine Infusion 0.05 MICROgram(s)/kG/Min IV Continuous <Continuous>  pantoprazole    Tablet 40 milliGRAM(s) Oral before breakfast  silver sulfADIAZINE 1% Cream 1 Application(s) Topical two times a day    PRN MEDICATIONS  acetaminophen     Tablet .. 650 milliGRAM(s) Oral every 6 hours PRN  aluminum hydroxide/magnesium hydroxide/simethicone Suspension 30 milliLiter(s) Oral every 4 hours PRN  haloperidol    Injectable 0.5 milliGRAM(s) IntraMuscular at bedtime PRN  melatonin 3 milliGRAM(s) Oral at bedtime PRN  metoprolol tartrate Injectable 2.5 milliGRAM(s) IV Push every 6 hours PRN  ondansetron Injectable 4 milliGRAM(s) IV Push every 8 hours PRN    VITALS  T(F): 97 (04-12-25 @ 08:14), Max: 97.3 (04-12-25 @ 04:00)  HR: 64 (04-12-25 @ 09:15) (63 - 123)  BP: 98/54 (04-12-25 @ 09:15) (80/49 - 136/91)  RR: 20 (04-12-25 @ 08:14) (20 - 20)  SpO2: 100% (04-12-25 @ 09:00) (97% - 100%)    PHYSICAL EXAM  GENERAL  (  ) NAD, lying in bed comfortably     (  ) obtunded     ( x ) lethargic     (  ) somnolent    HEAD  (  ) Atraumatic     (  ) hematoma     (  ) laceration (specify location:       )     NECK  (  ) Supple     (  ) neck stiffness     (  ) nuchal rigidity     (  )  no JVD     (  ) JVD present ( -- cm)    HEART  Rate -->  (  ) normal rate    (  ) bradycardic    (  ) tachycardic  Rhythm -->  (  ) regular    (  ) regularly irregular    ( x ) irregularly irregular  Murmurs -->  (  ) normal s1/s2    (  ) systolic murmur    (  ) diastolic murmur    (  ) continuous murmur     (  ) S3 present    (  ) S4 present    LUNGS  (  )Unlabored respirations     (  ) tachypnea  ( x ) B/L air entry     (  ) decreased breath sounds in:  (location     )    (  ) no adventitious sound     (  ) crackles     (  ) wheezing      (  ) rhonchi      (specify location:       )  (  ) chest wall tenderness (specify location:       )    ABDOMEN  (  x) Soft     (  ) tense   |   (  ) nondistended     (  ) distended   |   (  ) +BS     (  ) hypoactive bowel sounds     (  ) hyperactive bowel sounds  (  ) nontender     (  ) RUQ tenderness     (  ) RLQ tenderness     (  ) LLQ tenderness     (  ) epigastric tenderness     (  ) diffuse tenderness  (  ) Splenomegaly      (  ) Hepatomegaly      (  ) Jaundice     (  ) ecchymosis     EXTREMITIES  (  ) Normal     (  ) Rash     (  ) ecchymosis     (  ) varicose veins      (  ) pitting edema     (  ) non-pitting edema   (  ) ulceration     (  ) gangrene:     (location:     )    NERVOUS SYSTEM  (  ) A&Ox3     (  ) confused     ( x ) lethargic  CN II-XII:     (  ) Intact     (  ) focal deficits  (Specify:     )   Upper extremities:     (  ) strength X/5     (  ) focal deficit (specify:    )  Lower extremities:     (  ) strength  X/5    (  ) focal deficit (specify:    )    LABS             8.8    8.15  )-----------( 164      ( 04-12-25 @ 05:18 )             26.4     132  |  101  |  34  -------------------------<  126   04-12-25 @ 05:18  3.4  |  19  |  1.3    Ca      8.7     04-12-25 @ 05:18  Phos   3.2     04-10-25 @ 11:25  Mg     1.9     04-12-25 @ 05:18    TPro  3.8  /  Alb  2.4  /  TBili  0.4  /  DBili  x   /  AST  18  /  ALT  16  /  AlkPhos  117  /  GGT  x     04-12-25 @ 05:18        Urinalysis Basic - ( 12 Apr 2025 05:18 )    Color: x / Appearance: x / SG: x / pH: x  Gluc: 126 mg/dL / Ketone: x  / Bili: x / Urobili: x   Blood: x / Protein: x / Nitrite: x   Leuk Esterase: x / RBC: x / WBC x   Sq Epi: x / Non Sq Epi: x / Bacteria: x          Culture - Urine (collected 10 Apr 2025 10:20)  Source: Clean Catch Clean Catch (Midstream)  Final Report (11 Apr 2025 23:50):    <10,000 CFU/mL Normal Urogenital Lorena      IMAGING

## 2025-04-13 NOTE — PROGRESS NOTE ADULT - SUBJECTIVE AND OBJECTIVE BOX
Patient is a 89y old  Female who presents with a chief complaint of sepsis (12 Apr 2025 13:00)        Over Night Events:  on levophed 0.07, on ra.                PHYSICAL EXAM    ICU Vital Signs Last 24 Hrs  T(C): 37 (13 Apr 2025 04:00), Max: 37 (13 Apr 2025 04:00)  T(F): 98.6 (13 Apr 2025 04:00), Max: 98.6 (13 Apr 2025 04:00)  HR: 126 (13 Apr 2025 04:00) (63 - 126)  BP: 117/66 (13 Apr 2025 04:00) (80/49 - 146/67)  BP(mean): 84 (13 Apr 2025 04:00) (58 - 112)  ABP: --  ABP(mean): --  RR: 20 (13 Apr 2025 04:00) (18 - 20)  SpO2: 99% (13 Apr 2025 04:00) (99% - 100%)    O2 Parameters below as of 12 Apr 2025 08:14  Patient On (Oxygen Delivery Method): nasal cannula            CONSTITUTIONAL:  ill appearing elderly female    ENT:   Airway patent,   Mouth with normal mucosa.        EYES:   Pupils equal,   Round and reactive to light.    CARDIAC:   Normal rate,   Regular rhythm.         RESPIRATORY:   decreased basilar bs  Bilateral BS  Normal chest expansion       GASTROINTESTINAL:  Abdomen soft,   Non-tender,        MUSCULOSKELETAL:   No clubbing, cyanosis    NEUROLOGICAL:   Alert    SKIN:   Skin normal color for race,   Warm and dry and intact.       04-12-25 @ 07:01  -  04-13-25 @ 07:00  --------------------------------------------------------  IN:    Enteral Tube Flush: 450 mL    Jevity 1.2: 900 mL    Norepinephrine: 123.7 mL  Total IN: 1473.7 mL    OUT:    Indwelling Catheter - Urethral (mL): 750 mL    Oral Fluid: 0 mL  Total OUT: 750 mL    Total NET: 723.7 mL          LABS:                            11.1   19.23 )-----------( 175      ( 13 Apr 2025 06:40 )             33.6                                               04-12    132[L]  |  101  |  34[H]  ----------------------------<  126[H]  3.4[L]   |  19  |  1.3    Ca    8.7      12 Apr 2025 05:18  Mg     1.9     04-12    TPro  3.8[L]  /  Alb  2.4[L]  /  TBili  0.4  /  DBili  x   /  AST  18  /  ALT  16  /  AlkPhos  117[H]  04-12                                             Urinalysis Basic - ( 12 Apr 2025 05:18 )    Color: x / Appearance: x / SG: x / pH: x  Gluc: 126 mg/dL / Ketone: x  / Bili: x / Urobili: x   Blood: x / Protein: x / Nitrite: x   Leuk Esterase: x / RBC: x / WBC x   Sq Epi: x / Non Sq Epi: x / Bacteria: x                                                  LIVER FUNCTIONS - ( 12 Apr 2025 05:18 )  Alb: 2.4 g/dL / Pro: 3.8 g/dL / ALK PHOS: 117 U/L / ALT: 16 U/L / AST: 18 U/L / GGT: x                                                  Culture - Urine (collected 10 Apr 2025 10:20)  Source: Clean Catch Clean Catch (Midstream)  Final Report (11 Apr 2025 23:50):    <10,000 CFU/mL Normal Urogenital Lorena                                                                                           MEDICATIONS  (STANDING):  acetaminophen   IVPB .. 1000 milliGRAM(s) IV Intermittent once  cefepime   IVPB 1000 milliGRAM(s) IV Intermittent every 12 hours  chlorhexidine 2% Cloths 1 Application(s) Topical <User Schedule>  chlorhexidine 2% Cloths 1 Application(s) Topical <User Schedule>  linezolid  IVPB      linezolid  IVPB 600 milliGRAM(s) IV Intermittent every 12 hours  metoprolol tartrate 25 milliGRAM(s) Oral three times a day  midodrine 10 milliGRAM(s) Oral every 8 hours  multivitamin 1 Tablet(s) Oral daily  norepinephrine Infusion 0.05 MICROgram(s)/kG/Min (5.95 mL/Hr) IV Continuous <Continuous>  pantoprazole    Tablet 40 milliGRAM(s) Oral before breakfast  silver sulfADIAZINE 1% Cream 1 Application(s) Topical two times a day    MEDICATIONS  (PRN):  acetaminophen     Tablet .. 650 milliGRAM(s) Oral every 6 hours PRN Temp greater or equal to 38C (100.4F), Mild Pain (1 - 3)  aluminum hydroxide/magnesium hydroxide/simethicone Suspension 30 milliLiter(s) Oral every 4 hours PRN Dyspepsia  haloperidol    Injectable 0.5 milliGRAM(s) IntraMuscular at bedtime PRN Agitation  melatonin 3 milliGRAM(s) Oral at bedtime PRN Insomnia  metoprolol tartrate Injectable 2.5 milliGRAM(s) IV Push every 6 hours PRN if HR consistently >140  ondansetron Injectable 4 milliGRAM(s) IV Push every 8 hours PRN Nausea and/or Vomiting      New X-rays reviewed:                                                                                  ECHO    CXR interpreted by me:       Patient is a 89y old  Female who presents with a chief complaint of sepsis (12 Apr 2025 13:00)        Over Night Events:  on levophed 0.07, on ra.            PHYSICAL EXAM    ICU Vital Signs Last 24 Hrs  T(C): 37 (13 Apr 2025 04:00), Max: 37 (13 Apr 2025 04:00)  T(F): 98.6 (13 Apr 2025 04:00), Max: 98.6 (13 Apr 2025 04:00)  HR: 126 (13 Apr 2025 04:00) (63 - 126)  BP: 117/66 (13 Apr 2025 04:00) (80/49 - 146/67)  BP(mean): 84 (13 Apr 2025 04:00) (58 - 112)  ABP: --  ABP(mean): --  RR: 20 (13 Apr 2025 04:00) (18 - 20)  SpO2: 99% (13 Apr 2025 04:00) (99% - 100%)    O2 Parameters below as of 12 Apr 2025 08:14  Patient On (Oxygen Delivery Method): nasal cannula            CONSTITUTIONAL:  ill appearing elderly female    ENT:   Airway patent,   Mouth with normal mucosa.        EYES:   Pupils equal,   Round and reactive to light.    CARDIAC:   Normal rate,   iRegular rhythm.         RESPIRATORY:   decreased basilar bs  Bilateral BS  Normal chest expansion       GASTROINTESTINAL:  Abdomen soft,   Non-tender,        MUSCULOSKELETAL:   No clubbing, cyanosis    NEUROLOGICAL:   Alert    SKIN:   Skin normal color for race,         04-12-25 @ 07:01  -  04-13-25 @ 07:00  --------------------------------------------------------  IN:    Enteral Tube Flush: 450 mL    Jevity 1.2: 900 mL    Norepinephrine: 123.7 mL  Total IN: 1473.7 mL    OUT:    Indwelling Catheter - Urethral (mL): 750 mL    Oral Fluid: 0 mL  Total OUT: 750 mL    Total NET: 723.7 mL          LABS:                            11.1   19.23 )-----------( 175      ( 13 Apr 2025 06:40 )             33.6                                               04-12    132[L]  |  101  |  34[H]  ----------------------------<  126[H]  3.4[L]   |  19  |  1.3    Ca    8.7      12 Apr 2025 05:18  Mg     1.9     04-12    TPro  3.8[L]  /  Alb  2.4[L]  /  TBili  0.4  /  DBili  x   /  AST  18  /  ALT  16  /  AlkPhos  117[H]  04-12                                             Urinalysis Basic - ( 12 Apr 2025 05:18 )    Color: x / Appearance: x / SG: x / pH: x  Gluc: 126 mg/dL / Ketone: x  / Bili: x / Urobili: x   Blood: x / Protein: x / Nitrite: x   Leuk Esterase: x / RBC: x / WBC x   Sq Epi: x / Non Sq Epi: x / Bacteria: x                                                  LIVER FUNCTIONS - ( 12 Apr 2025 05:18 )  Alb: 2.4 g/dL / Pro: 3.8 g/dL / ALK PHOS: 117 U/L / ALT: 16 U/L / AST: 18 U/L / GGT: x                                                  Culture - Urine (collected 10 Apr 2025 10:20)  Source: Clean Catch Clean Catch (Midstream)  Final Report (11 Apr 2025 23:50):    <10,000 CFU/mL Normal Urogenital Lorena                                                                                           MEDICATIONS  (STANDING):  acetaminophen   IVPB .. 1000 milliGRAM(s) IV Intermittent once  cefepime   IVPB 1000 milliGRAM(s) IV Intermittent every 12 hours  chlorhexidine 2% Cloths 1 Application(s) Topical <User Schedule>  chlorhexidine 2% Cloths 1 Application(s) Topical <User Schedule>  linezolid  IVPB      linezolid  IVPB 600 milliGRAM(s) IV Intermittent every 12 hours  metoprolol tartrate 25 milliGRAM(s) Oral three times a day  midodrine 10 milliGRAM(s) Oral every 8 hours  multivitamin 1 Tablet(s) Oral daily  norepinephrine Infusion 0.05 MICROgram(s)/kG/Min (5.95 mL/Hr) IV Continuous <Continuous>  pantoprazole    Tablet 40 milliGRAM(s) Oral before breakfast  silver sulfADIAZINE 1% Cream 1 Application(s) Topical two times a day    MEDICATIONS  (PRN):  acetaminophen     Tablet .. 650 milliGRAM(s) Oral every 6 hours PRN Temp greater or equal to 38C (100.4F), Mild Pain (1 - 3)  aluminum hydroxide/magnesium hydroxide/simethicone Suspension 30 milliLiter(s) Oral every 4 hours PRN Dyspepsia  haloperidol    Injectable 0.5 milliGRAM(s) IntraMuscular at bedtime PRN Agitation  melatonin 3 milliGRAM(s) Oral at bedtime PRN Insomnia  metoprolol tartrate Injectable 2.5 milliGRAM(s) IV Push every 6 hours PRN if HR consistently >140  ondansetron Injectable 4 milliGRAM(s) IV Push every 8 hours PRN Nausea and/or Vomiting      New X-rays reviewed:                                                                                  ECHO    CXR interpreted by me:

## 2025-04-13 NOTE — PROGRESS NOTE ADULT - SUBJECTIVE AND OBJECTIVE BOX
89  year old F, pmhx of chronic afib (on Eliquis), GERD, dementia, FTT , Orthostatic hypotension on midodrine  Patient presents for evaluation of change in mental status.  Daughter states over the past few months patient has been having increasing cognitive issues including hallucinations poor sleep.  Daughter states that  patient seemed to be getting sick and she was given Tylenol at night.  Patient is on Macrobid for UTI prophylaxis due to frequent UTIs.  This daughter had several concerns regarding patient including further decline in mental status/increased confusion/decreased ability to follow commands.  Daughter states it appeared like the left eye had not been opening as much is normal but now it is opening normally.  Patient also complained of left upper extremity pain and swelling.  Patient was unable to raise her left upper extremity secondary to pain.  No trauma.  Patient with decreased p.o. intake.   Pt was admitted to SDU with Acute pyelonephritis with mild right hydroureteronephrosis , urine Cx is growing  E faeium, she has Metabolic encephalopathy with underlying dementia.   She  was found to have A-fib with RVR, was hypotensive, BP improved after fluid resuscitation HR controlled with BB. ID recommended to add  Zyvox  to Abx regimen.   Pt  developed gross hematuria,  AC  held on 4/12,  NGT was put  in for feedings and medications. In last 24 hours pt developed worsening leukocytosis and hyponatremia.   Today pt appears comfortable, daughter at the bedside.     PAST MEDICAL & SURGICAL HISTORY:  Chronic atrial fibrillation      GERD (gastroesophageal reflux disease)      Chronic lower back pain      No significant past surgical history      Vital Signs Last 24 Hrs  T(C): 36.2 (13 Apr 2025 11:00), Max: 37 (13 Apr 2025 04:00)  T(F): 97.2 (13 Apr 2025 11:00), Max: 98.6 (13 Apr 2025 04:00)  HR: 115 (13 Apr 2025 11:00) (70 - 126)  BP: 93/72 (13 Apr 2025 11:00) (73/52 - 136/86)  BP(mean): 79 (13 Apr 2025 11:00) (58 - 112)  RR: 19 (13 Apr 2025 11:00) (18 - 20)  SpO2: 99% (13 Apr 2025 11:00) (98% - 100%)      PHYSICAL EXAM:  GENERAL: NAD, frail with temporal muscle waisting   HEAD:  Atraumatic, Normocephalic  NECK: Supple, No JVD, Normal thyroid  NERVOUS SYSTEM:  Awake, answering questions, hard of hearing, moving all extremities   CHEST/LUNG: decreased BS at bases   HEART: irregularly irregular, tachycardic   ABDOMEN: Soft, Nontender, Nondistended; Bowel sounds present  EXTREMITIES:  dressing noted on LE b/l below knees   SKIN: paper thin skin with multiply skin tears on UE       LABS:                                   10.8   20.26 )-----------( 165      ( 13 Apr 2025 11:33 )             33.4   04-13    129[L]  |  98  |  36[H]  ----------------------------<  91  4.3   |  16[L]  |  1.3    Ca    8.5      13 Apr 2025 06:40  Mg     1.9     04-13    TPro  4.5[L]  /  Alb  2.6[L]  /  TBili  0.5  /  DBili  x   /  AST  32  /  ALT  19  /  AlkPhos  158[H]  04-13      Urinalysis Basic - ( 09 Apr 2025 04:45 )    Color: x / Appearance: x / SG: x / pH: x  Gluc: 82 mg/dL / Ketone: x  / Bili: x / Urobili: x   Blood: x / Protein: x / Nitrite: x   Leuk Esterase: x / RBC: x / WBC x   Sq Epi: x / Non Sq Epi: x / Bacteria: x    Urinalysis with Rflx Culture (collected 07 Apr 2025 12:05)    Culture - Urine (collected 07 Apr 2025 12:05)  Source: Catheterized None  Final Report (09 Apr 2025 12:53):    >100,000 CFU/ml Enterococcus faecium  Culture - Urine (04.07.25 @ 12:05)   - Ampicillin: R >8 Predicts results to ampicillin/sulbactam, amoxacillin-clavulanate and piperacillin-tazobactam.  - Ciprofloxacin: I 2  - Levofloxacin: I 4  - Nitrofurantoin: S <=32 Should not be used to treat pyelonephritis.  - Tetracycline: R >8  - Vancomycin: S 0.5  Specimen Source: Catheterized None  Culture Results:   >100,000 CFU/ml Enterococcus faecium  Organism Identification: Enterococcus faecium  Organism: Enterococcus faeciumOrganism: Enterococcus faecium (09 Apr 2025 12:53)  Organism: Enterococcus faecium (09 Apr 2025 12:53)    Culture - Blood (collected 07 Apr 2025 12:02)  Source: Blood Blood-Peripheral  Preliminary Report (08 Apr 2025 22:02):    No growth at 24 hours      Culture - Blood (collected 07 Apr 2025 12:02)  Source: Blood Blood-Peripheral  Preliminary Report (08 Apr 2025 22:02):    No growth at 24 hours        RADIOLOGY & ADDITIONAL TESTS:  < from: Xray Hand 3 Views, Left (04.08.25 @ 12:59) >  impression:    Soft tissue swelling suggested at the level of the fifth digit.    The bones are diffusely osteopenic. There are arthritic changes with   joint space narrowing and osteophyte formation. Findings appear most   pronounced at the second distal interphalangeal joint. Arthritic changes   are also noted at the basal joint. There is no acute fracture.    Chondrocalcinosis within the region of the triangular fibrocartilage    < from: [facility] Duplex Upper Ext Vein Scan, Left (04.08.25 @ 10:33) >  IMPRESSION:  No evidence of left upper extremity deep venous thrombosis. Study limited   due to patient positioning and overlying dressings      < from: VA Duplex Lower Ext Vein Scan, Bilat (04.08.25 @ 10:30) >  IMPRESSION:  No evidence of DVT in the visualized portions of the bilateral lower   extremities. Visualization limited secondary to dressings in the lower   extremities and limb contractures    < from: TTE Echo Complete w/o Contrast w/ Doppler (04.09.25 @ 10:47) >  Summary:   1. Left ventricular ejection fraction, by visual estimation, is 45 to   50%.   2. Mildly decreased global left ventricular systolic function.   3. The left ventricular diastolic function could not be assessed in this   study.   4. Severely reduced RV systolic function.   5. Severely enlarged right atrium.   6. Left atrial enlargement.   7. Sclerotic aortic valve with mildly decreased opening.   8. Mild aortic regurgitation.   9. Mild-to-moderate mitral regurgitation.  10. Moderate tricuspid regurgitation.  11. Estimated pulmonary artery systolic pressure is 65.8 mmHg assuming a   right atrial pressure of 8 mmHg, which is consistent with severe   pulmonary hypertension.  12. Small pericardial effusion visualized on limited views.    < from: Xray Chest 1 View- PORTABLE-Urgent (Xray Chest 1 View- PORTABLE-Urgent .) (04.10.25 @ 08:35) >    Impression:      Small to moderate left pleural effusion is unchanged. Developing opacity   in the right lung base    < from: Xray Chest 1 View- PORTABLE-Routine (Xray Chest 1 View- PORTABLE-Routine in AM.) (04.11.25 @ 06:06) >    IMPRESSION: Low lung volume.    Bilateral opacities, slightly worse.    < end of copied text >      MEDICATIONS  (STANDING):  acetaminophen   IVPB .. 1000 milliGRAM(s) IV Intermittent once  albumin human 25% IVPB 100 milliLiter(s) IV Intermittent once  cefepime   IVPB 1000 milliGRAM(s) IV Intermittent every 12 hours  chlorhexidine 2% Cloths 1 Application(s) Topical <User Schedule>  chlorhexidine 2% Cloths 1 Application(s) Topical <User Schedule>  linezolid  IVPB      linezolid  IVPB 600 milliGRAM(s) IV Intermittent every 12 hours  metoprolol tartrate 25 milliGRAM(s) Oral three times a day  midodrine 15 milliGRAM(s) Oral every 8 hours  multivitamin 1 Tablet(s) Oral daily  norepinephrine Infusion 0.05 MICROgram(s)/kG/Min (5.95 mL/Hr) IV Continuous <Continuous>  pantoprazole    Tablet 40 milliGRAM(s) Oral before breakfast  silver sulfADIAZINE 1% Cream 1 Application(s) Topical two times a day  sodium bicarbonate 1300 milliGRAM(s) Oral three times a day    MEDICATIONS  (PRN):  acetaminophen     Tablet .. 650 milliGRAM(s) Oral every 6 hours PRN Temp greater or equal to 38C (100.4F), Mild Pain (1 - 3)  aluminum hydroxide/magnesium hydroxide/simethicone Suspension 30 milliLiter(s) Oral every 4 hours PRN Dyspepsia  haloperidol    Injectable 0.5 milliGRAM(s) IntraMuscular at bedtime PRN Agitation  melatonin 3 milliGRAM(s) Oral at bedtime PRN Insomnia  metoprolol tartrate Injectable 2.5 milliGRAM(s) IV Push every 6 hours PRN if HR consistently >140  ondansetron Injectable 4 milliGRAM(s) IV Push every 8 hours PRN Nausea and/or Vomiting

## 2025-04-13 NOTE — PROVIDER CONTACT NOTE (EICU) - BACKGROUND
Rossy is 89 admitted with sepsis, now septic shock on norepinephrine, midodrine (h/o orthostatic hypotension) and atrial fibrillation

## 2025-04-13 NOTE — PROGRESS NOTE ADULT - ASSESSMENT
IMPRESSION:    AMS/TME  Sepsis/ UTI/ Pyelonephritis   Lactic acidosis improved  Metabolic acidosis/mixed  bibasilar atelectasis/effusion  HO afib (on Eliquis),  HO dementia  HI Orthostatic hypotension on midodrine       PLAN:    CNS: Avoid CNS depressant,     HEENT: Oral care    PULMONARY:  HOB @ 45 degrees, on NC, aspiration precaution, keep Sao2 92 to 96%, Incentive iron, CXR noted     CARDIOVASCULAR: echo noted hfmref . increase midodrine to 15 tid on levophed, keep MAP 60-65    GI: GI prophylaxis, NG feeds, switch to Po bicarb,      RENAL:  F/u  lytes. Correct as needed. f/u UO     INFECTIOUS DISEASE: Cefepime and Linezolid, f/u UC, f/u ID recs     HEMATOLOGICAL:  DVT prophylaxis. On Therapeutic AC     ENDOCRINE:  Follow up FS.  Insulin protocol if needed.    SDU  Poor prognosis  GOC disscusion    IMPRESSION:    AMS/TME  Sepsis/ UTI/ Pyelonephritis   hypotension  Lactic acidosis improved  Metabolic acidosis/mixed  bibasilar atelectasis/effusion  HO afib (on Eliquis),  HO dementia  HI Orthostatic hypotension on midodrine       PLAN:    CNS: Avoid CNS depressant,     HEENT: Oral care    PULMONARY:  HOB @ 45 degrees, on NC, aspiration precaution, keep Sao2 92 to 96%, Incentive iron, CXR noted worsening bilateral edema    CARDIOVASCULAR: echo noted hfmref . increase midodrine to 20 tid on levophed, keep MAP 60-65    GI: GI prophylaxis, NG feeds, switch to Po bicarb,      RENAL:  F/u  lytes. Correct as needed. f/u UO s/p albumin will give another dose    INFECTIOUS DISEASE: Cefepime and Linezolid, f/u UC, f/u ID recs     HEMATOLOGICAL:  DVT prophylaxis. On Therapeutic AC     ENDOCRINE:  Follow up FS.  Insulin protocol if needed.    SDU  Poor prognosis  GOC disscusion

## 2025-04-13 NOTE — PROVIDER CONTACT NOTE (EICU) - RECOMMENDATIONS
In my opinion, PO will added longer acting HR control which is the team's objective. Per report there is a fine balance between the b-blocker, HR, and norepinephrine dosages. Advised d/w prescriber to clarify the PO vs IV orders. Rossy is on midodrine which is a vasopressor in addition to the norepinephrine.     reviewed the POCUS and labs; d/w primary nurse the Hbg drop from 11-->8 over the course of a day or so, noted they will advise the team with AM labs.

## 2025-04-13 NOTE — PROGRESS NOTE ADULT - ASSESSMENT
89  year old F, pmhx of chronic afib (on Eliquis), GERD, dementia, FTT , Orthostatic hypotension on midodrine ,Patient presents for evaluation of change in mental status.  Daughter states over the past few months patient has been having increasing cognitive issues including hallucinations poor sleep.  Daughter states that yesterday patient seemed to be getting sick and she was given Tylenol at night.  Patient is on Macrobid for UTI prophylaxis due to frequent UTIs.  This daughter had several concerns regarding patient including further decline in mental status/increased confusion/decreased ability to follow commands.  Daughter states it appeared like the left eye had not been opening as much is normal but now it is opening normally.  Patient also complained of left upper extremity pain and swelling.  Patient was unable to raise her left upper extremity secondary to pain.  No trauma.  Patient with decreased p.o. intake but was able to eat a little yesterday. Admitted to SDU for sepsis secondary to complicated UTI/Pylenephritis      A/P   #TME 2/2 Severe Sepsis present on admission secondary to acute pyelonephritis/ Hx of recurrent UTIs/ lactic acidosis likley 2/2 Sepsis and hypotension   -give  Albumin infusion today  - resume Midodrine, taper off pressors, keep MAP above 65   - ID is following, recommendations noted:   -continue with Cefepime to 1 gm iv q12h and  Linezolid 600 mg q12h to IV  - Apply silver sulfadiazine cream to b/l lower extremities under dressing  - trend WBC       # AMS/ toxic metabolic encephalopathy   - NPO , NGT feedings   - supportive care, fall and aspiration precautions   - treat underlying infection     # Gross hematuria / acute blood loss anemia  -  Lovenox held on 4/12  - monitor H/H, keep Hb above 7.5  - send anemia work up     #Chronic Atrial Fibrillation with acute RVR  -CHADS2-VASC = 5.   -  therapeutic Lovenox  held ( Hb dropped due to gross hematuria)  -  HR is better  controlled   with BB    #Sacral ulcer  - un stageable sacral ulcer with minimal white discharge.  - wound care eval   - change position Q 2 hours, off load pressure points   - consult dietitian     #Left shoulder pain and UE swelling  - can start Lidocaine topical   - Xray shoulder : Acromioclavicular arthrosis. High riding humerus which could reflect rotator cuff tear of uncertain age. Bones appear osteopenic with no acute fracture or dislocation. Glenohumeral arthritic changes.  - f/u LUE duplex negative for DVT   - keep LUE elevated     #Hx of Lumbar Compression Fracture  #Hx of Pathologic L1 vertebrae fracture  #Hx of Conus Medullaris Syndrome  - was on  gabapentin and robaxin,  tylenol PRN at home   - held on admission, will give IV Tylenol PRN for pain   - OP neurosurgery f/u     #chronic HFpEF / PHTN   - not in exacerbation   - TTE 1/28/2024- EF 60-65% mod pulm HTN   - Currently euvolemic on exam  - monitor for fluid overload         # GI prophylaxis     #Progress Note Handoff  Pending (specify):  give Albumin, taper off pressors, monitor Na level, monitor H/H, keep Hb above 7.5, send anemia work up, trend WBC,  supportive care    Family discussion: I spoke with pt's daughter at the bedside, she agreed with a plan of care   Disposition: SDU

## 2025-04-13 NOTE — PROVIDER CONTACT NOTE (EICU) - ASSESSMENT
- in bed, HR between 132-146; appears to be a.fib, though on monitor. Norepinephrine infusion noted  - ordered for betablockers, PO and IV. PO not allowed while on vasopressors per order

## 2025-04-14 NOTE — PROGRESS NOTE ADULT - ASSESSMENT
89F w/  recurrent UTI, orthostatic hypotension on midodrine, chronic afib, GERD admitted to metabolic encephalopathy 2/2 septic shock 2/2 acute pyelonephritis    Decision Maker: HCP is reported to be her daughter Marlys Acuna (she believes paperwork at home and can bring in)  [facility]; continue life-sustaining therapy including IV pressors  Advance Directives: DNR/DNI    - patient appears comfortable at time of exam  - per lillian, remains comfortable, and wants full medical meaures  - septic, c/w IV abx, IV pressors, monitor in SDU  - will follow    ______________  Ant Celeste MD  Palliative Medicine  [facility]   of Geriatric [facility] Pall[facility]  [phone]

## 2025-04-14 NOTE — PROGRESS NOTE ADULT - SUBJECTIVE AND OBJECTIVE BOX
HPI: 89F with chronic AF, GERD, dementia, FTT, orthostatic hypotension on midodrine, here with AMS, and found to have severe sepsis from pyelonephritis, on cefepime, levophed. Also with shoulder pain, on lidocaine patch. DNR/DNI/trial NIV. Palliative consulted for GOC. Patient comfortable. See above, agree with above, daughter open to further GOC conversations. Currently cared for by daughter at home.     INTERVAL EVENTS  4/11: patient comfortable, no issues reported per daughter  4/14: patient appears comfortable, daughter at bedside, denies patient having pain    ADVANCE DIRECTIVES:    [ ] Full Code [x ] DNR/DNI  MOLST  [x ]  Living Will  [ ]   DECISION MAKER(s):  [x ] Health Care Proxy(s)  [ ] Surrogate(s)  [ ] Guardian           Name(s): Phone Number(s):  HCP/Daughter: Marlys Acuna    BASELINE (I)ADL(s) (prior to admission):  [place]: [ ]Total  [ ] Moderate [x ]Dependent    No Known Allergies    MEDICATIONS  (STANDING):  apixaban 2.5 milliGRAM(s) Oral every 12 hours  cefepime   IVPB 1000 milliGRAM(s) IV Intermittent every 12 hours  chlorhexidine 2% Cloths 1 Application(s) Topical <User Schedule>  chlorhexidine 2% Cloths 1 Application(s) Topical <User Schedule>  digoxin  Injectable 125 MICROGram(s) IV Push every 8 hours  lactated ringers Bolus 250 milliLiter(s) IV Bolus once  lactobacillus acidophilus 1 Tablet(s) Oral three times a day with meals  linezolid  IVPB      linezolid  IVPB 600 milliGRAM(s) IV Intermittent every 12 hours  metoprolol tartrate 25 milliGRAM(s) Oral three times a day  midodrine 15 milliGRAM(s) Oral every 8 hours  multivitamin 1 Tablet(s) Oral daily  phenylephrine    Infusion 0.1 MICROgram(s)/kG/Min (2.38 mL/Hr) IV Continuous <Continuous>  silver sulfADIAZINE 1% Cream 1 Application(s) Topical two times a day  sodium bicarbonate 1300 milliGRAM(s) Oral three times a day    MEDICATIONS  (PRN):  acetaminophen     Tablet .. 650 milliGRAM(s) Oral every 6 hours PRN Temp greater or equal to 38C (100.4F), Mild Pain (1 - 3)  aluminum hydroxide/magnesium hydroxide/simethicone Suspension 30 milliLiter(s) Oral every 4 hours PRN Dyspepsia  haloperidol    Injectable 0.5 milliGRAM(s) IntraMuscular at bedtime PRN Agitation  melatonin 3 milliGRAM(s) Oral at bedtime PRN Insomnia  ondansetron Injectable 4 milliGRAM(s) IV Push every 8 hours PRN Nausea and/or Vomiting      PRESENT SYMPTOMS: [X ]Unable to obtain due to poor mentation   Source if other than patient:  [x ]Family   [ ]Team   [ X]All review of systems negative including pain and dyspnea unless noted below    All components of pain assessment below addressed. Blank spaces indicate that the patient did/could not complete the assessment.  Pain: [ ]yes [x ]no  QOL impact -   Location -                    Aggravating factors -  Quality -  Radiation -  Timing-  Severity (0-10 scale):  Minimal acceptable level (0-10 scale):     CPOT:0  RDOS:1      PHYSICAL EXAM:  Vital Signs Last 24 Hrs  T(C): 36.4 (14 Apr 2025 11:33), Max: 37.3 (14 Apr 2025 02:30)  T(F): 97.6 (14 Apr 2025 11:33), Max: 99.1 (14 Apr 2025 02:30)  HR: 120 (14 Apr 2025 13:50) (110 - 130)  BP: 76/41 (14 Apr 2025 13:50) (73/42 - 155/102)  BP(mean): 53 (14 Apr 2025 13:50) (53 - 119)  RR: 18 (14 Apr 2025 11:33) (18 - 20)  SpO2: 98% (14 Apr 2025 11:33) (95% - 99%)    Parameters below as of 13 Apr 2025 18:29  Patient On (Oxygen Delivery Method): nasal cannula  O2 Flow (L/min): 3      GENERAL:  [X ] No acute distress [x ]Lethargic  [ ]Unarousable  [ ]Verbal  [ ]Non-Verbal [ ]Cachexia    BEHAVIORAL/PSYCH:  [ ]Alert and Oriented x  [ ] Anxiety [ ] Delirium [ ] Agitation [X ] Calm   EYES: [ ] No scleral icterus [ ] Scleral icterus [x] Closed  ENMT:  [ ]Dry mouth  [ ]No external oral lesions [ X] No external ear or nose lesions  CARDIOVASCULAR:  [ ]Regular [ ]Irregular [x ]Tachy [ ]Not Tachy  [ ]Sung [ ] Edema [ ] No edema  PULMONARY:  [ ]Tachypnea  [ ]Audible excessive secretions [X ] No labored breathing [ ] labored breathing  GASTROINTESTINAL: [ ]Soft  [ ]Distended  [ X]Not distended [ ]Non tender [ ]Tender  MUSCULOSKELETAL: [ ]No clubbing [ ] clubbing  [ X] No cyanosis [ ] cyanosis  NEUROLOGIC: [ ]No focal deficits  [ ]Follows commands  [ ]Does not follow commands  [ ]Cognitive impairment  [ ]Dysphagia  [ ]Dysarthria  [ ]Paresis   SKIN: [ ] Jaundiced [X ] Non-jaundiced [ ]Rash [ ]No Rash [ ] Warm [ ] Dry  MISC/LINES: [ ] ET tube [ ] Trach [ ]NGT/OGT [ ]PEG [ ]David    CRITICAL CARE:  [x ] Shock Present  [ x]Septic [ ]Cardiogenic [ ]Neurologic [ ]Hypovolemic  [ ]  Vasopressors [ ]  Inotropes   [ ]Respiratory failure present [ ]Mechanical ventilation [ ]Non-invasive ventilatory support [ ]High flow  [x ]Acute  [ ]Chronic [ x]Hypoxic  [ ]Hypercarbic [ ]Other  [ ]Other organ failure     LABS: reviewed by me, notable for: CBC WNL, BMP WNL, Mg WNL                                   10.0   16.67 )-----------( 148      ( 14 Apr 2025 05:44 )             30.8     04-14    130[L]  |  98  |  39[H]  ----------------------------<  108[H]  4.0   |  18  |  1.5    Ca    8.6      14 Apr 2025 05:44  Mg     2.1     04-14        RADIOLOGY & ADDITIONAL STUDIES: CXR personally reviewed by me: 4/13: LL opacity    Palliative Care Spiritual/Emotional Screening Tool Question  Severity (0-4):                    OR                    [X ] Unable to determine/NA  Score of 2 or greater indicates recommendation of Chaplaincy referral  Chaplaincy Referral: [ ] Yes [ ] Refused [ ] Following     Caregiver Flushing:  [ ] Yes [ ] No    OR    [x ] Unable to determine. Will assess at later time if appropriate.  Social Work Referral [ ]  Patient and Family Centered Care Referral [ ]    Anticipatory Grief Present: [ ] Yes [ ] No    OR     [ x] Unable to determine. Will assess at later time if appropriate.  Social Work Referral [ ]  Patient and Family Centered Care Referral [ ]    REFERRALS:   [ ]Chaplaincy  [ ]Hospice  [ ]Child Life  [ ]Social Work  [ ]Case management [ ]Holistic Therapy     Palliative care education provided to patient and/or family    Goals of Care Document:

## 2025-04-14 NOTE — CHART NOTE - NSCHARTNOTEFT_GEN_A_CORE
: IGNACIO    INDICATION: respiratory failure    PROCEDURE:  [x] LIMITED ECHO  [x] LIMITED CHEST  [ ] LIMITED RETROPERITONEAL  [ ] LIMITED ABDOMINAL  [ ] LIMITED DVT  [ ] NEEDLE GUIDANCE VASCULAR  [ ] NEEDLE GUIDANCE THORACENTESIS  [ ] NEEDLE GUIDANCE PARACENTESIS  [ ] NEEDLE GUIDANCE PERICARDIOCENTESIS  [ ] OTHER    FINDINGS:     Lung: bilateral B line pattern. bilateral pleural effusion on the lung bases    Cardiac:   LV EF appears grossly normal  RA appears enlarged  there is no pericardial effusion  LVOT VTI as measured on the apical 5 chamber view is 11cm which is reduced    INTERPRETATION:  fluid overload
Patient's BP was 112/54 (MAP 78) with a HR of 120-130 bpm.   EKG showed atrial fibrillation with RVR. Patient was on Levophed.   An attempt to wean off Levophed was made with administration of 15mg Midodrine, but weaning was unsuccessful. also one dose of metoprolol tartate 2.5 one dose given for HR.  IVC was collapsible. A 250cc fluid bolus was administered.   Heart rate initially improved but persisted in the 130s, so Levophed was switched to Phenylephrine this morning.
Registered Dietitian Follow-Up     Patient Profile Reviewed                           Yes [x]   No []     Nutrition History Previously Obtained        Yes [x]  No []       Pertinent Subjective Information: Pt tolerating current TF regimen at goal rate at this time per staff.      Pertinent Medical Information:   # AMS/TME  - NPO , NGT feedings   # Sepsis/ UTI/ Pyelonephritis   # hypotension on pressors  # HIRAL  # Metabolic acidosis/mixed  # bibasilar atelectasis/effusion  # Rapid A fib     Diet order: Diet, NPO with Tube Feed:   Tube Feeding Modality: Nasogastric  Jevity 1.2 Jonh (JEVITY1.2RTH)  Total Volume for 24 Hours (mL): 1200  Bolus  Total Volume of Bolus (mL):  300  Total # of Feeds: 4  Tube Feed Frequency: Every 6 hours   Tube Feed Start Time: 18:00  Bolus Feed Rate (mL per Hour): 200   Bolus Feed Duration (in Hours): 2  Bolus   Total Volume per Flush (mL): 150   Frequency: Every 6 Hours    Start Time: 18:00 (25 @ 16:39) [Active]    Anthropometrics:  Height (cm): 154.9 (04-10-25 @ 10:00)  Weight (kg): 63.5 (25 @ 09:40)  BMI (kg/m2): 26.5 (04-10-25 @ 10:00)  IBW: 47.7 KG    Daily Weight in k.3 (-14), Weight in k.1 (), Weight in k.7 (), Weight in k.8 (), Weight in k.5 () -- no weight loss observed; weight fluctuations likely d/t fluid shifts    MEDICATIONS  (STANDING):  apixaban 2.5 milliGRAM(s) Oral every 12 hours  cefepime   IVPB 1000 milliGRAM(s) IV Intermittent every 12 hours  chlorhexidine 2% Cloths 1 Application(s) Topical <User Schedule>  chlorhexidine 2% Cloths 1 Application(s) Topical <User Schedule>  digoxin     Tablet 250 MICROGram(s) Oral daily  digoxin  Injectable 125 MICROGram(s) IV Push every 8 hours  lactated ringers Bolus 250 milliLiter(s) IV Bolus once  lactobacillus acidophilus 1 Tablet(s) Oral three times a day with meals  linezolid  IVPB      linezolid  IVPB 600 milliGRAM(s) IV Intermittent every 12 hours  metoprolol tartrate 25 milliGRAM(s) Oral three times a day  midodrine 15 milliGRAM(s) Oral every 8 hours  multivitamin 1 Tablet(s) Oral daily  phenylephrine    Infusion 0.1 MICROgram(s)/kG/Min (2.38 mL/Hr) IV Continuous <Continuous>  silver sulfADIAZINE 1% Cream 1 Application(s) Topical two times a day  sodium bicarbonate 1300 milliGRAM(s) Oral three times a day    MEDICATIONS  (PRN):  acetaminophen     Tablet .. 650 milliGRAM(s) Oral every 6 hours PRN Temp greater or equal to 38C (100.4F), Mild Pain (1 - 3)  aluminum hydroxide/magnesium hydroxide/simethicone Suspension 30 milliLiter(s) Oral every 4 hours PRN Dyspepsia  haloperidol    Injectable 0.5 milliGRAM(s) IntraMuscular at bedtime PRN Agitation  melatonin 3 milliGRAM(s) Oral at bedtime PRN Insomnia  ondansetron Injectable 4 milliGRAM(s) IV Push every 8 hours PRN Nausea and/or Vomiting    Pertinent Labs:  @ 05:44: Na 130[L], BUN 39[H], Cr 1.5, [H], K+ 4.0, Phos --, Mg 2.1, Alk Phos 158[H], ALT/SGPT 16, AST/SGOT 25, HbA1c --   @ 11:33: Na 130[L], BUN 38[H], Cr 1.3, [H], K+ 4.0, Phos --, Mg --, Alk Phos --, ALT/SGPT --, AST/SGOT --, HbA1c --    Physical Findings:  - Appearance: lethargic  - GI function: last BM on   - Tubes: NGT  - Oral/Mouth cavity: NPO w/ TF  - Skin: Unstageable to sacrum  - Edema: generalized edema 3+      Nutrition Requirements:  Weight Used: 63.5 KG -- with consideration for age, BMI, HF, edema     Estimated Energy Needs    Continue [x]  Adjust []  ENERGY: 5081-0286 kcal/day (30-35 kcal/day)    Estimated Protein Needs    Continue [x]  Adjust []  PROTEIN: 76.2-95.25 g/day (1.2-1.45g/kg)    Estimated Fluid Needs        Continue [x]  Adjust []  FLUID: 6168-6883 mL/day (20-25 mL/kg)     Nutrient Intake: Current TF regimen at goal rate provides -- 1440 kcal, 66.6g pro, 968 mL free water     [x] Previous Nutrition Diagnosis: Severe protein-calorie malnutrition              [x] Ongoing          [] Resolved     Nutrition Education: Deferred      Goal/Expected Outcome: Pt to meet >85 - 105% of estimated energy/protein needs within 3-5 days      Indicator/Monitoring: Diet order, weights, labs, NFPF, body composition, BM and tolerance to TF regimen    Recommendation:   1. Recommend to adjust TF regimen to meet estimated needs: Jevity 1.2 via NGT, total volume: 1600 mL, bolus of 400 mL q6hrs. TF regimen at goal rate to provide -- 1920 kcal, 88.8g pro, 1291 mL free water + additional water flushes of 40 mL pre/post feeds -- team to adjust water flushes prn. TF regimen at goal rate meets >85 - 105% of estimated needs.   2. Maintain aspiration precautions    High risk f/u    RD to remain available: Tanya Álvarez [phone] or TEAMS
To whom it may concern,    Rossy Wesley is critically ill and is currently admitted in the step down unit at [facility] for septic shock secondary to pyelonephritis. The duration of her admission is unknown at this time.

## 2025-04-14 NOTE — PROGRESS NOTE ADULT - ASSESSMENT
89  year old F, pmhx of chronic afib (on Eliquis), GERD, dementia, FTT , Orthostatic hypotension on midodrine ,Patient presents for evaluation of change in mental status.  Daughter states over the past few months patient has been having increasing cognitive issues including hallucinations poor sleep.  Daughter states that yesterday patient seemed to be getting sick and she was given Tylenol at night.  Patient is on Macrobid for UTI prophylaxis due to frequent UTIs.  This daughter had several concerns regarding patient including further decline in mental status/increased confusion/decreased ability to follow commands.  Daughter states it appeared like the left eye had not been opening as much is normal but now it is opening normally.  Patient also complained of left upper extremity pain and swelling.  Patient was unable to raise her left upper extremity secondary to pain.  No trauma.  Patient with decreased p.o. intake but was able to eat a little yesterday. Admitted to SDU for sepsis secondary to complicated UTI/Pylenephritis      A/P   #TME 2/2 Severe Sepsis present on admission secondary to acute pyelonephritis/ Hx of recurrent UTIs/ lactic acidosis likley 2/2 Sepsis and hypotension / Suspected PNA   -give  Albumin infusion, taper off pressors at tolerated, keep MAP above 60  - c/w  Midodrine 15 mg Q 8 houtds    - ID is following, recommendations noted:   -continue with Cefepime to 1 gm iv q12h and  Linezolid 600 mg q12h to IV  - repeat urine Cx is NTD , pt needs ID follow up   - check procalcitonin level   - CXR noted   - check nasal swab for MRSA   - Apply silver sulfadiazine cream to b/l lower extremities under dressing  - trend WBC       # AMS/ toxic metabolic encephalopathy   - NPO , NGT feedings   - supportive care, fall and aspiration precautions   - treat underlying infection   - will repeat HCT when more stable and off pressors     # Gross hematuria / acute blood loss anemia  -  Lovenox held on 4/12  - monitor H/H, keep Hb above 7.5  -  anemia work up     #Chronic Atrial Fibrillation with acute RVR  -CHADS2-VASC = 5.   -  therapeutic Lovenox  held ( Hb dropped due to gross hematuria)  - EP follow up today, will consider  Digoxin load for better rate control   - hold off with Amiodarone ( AC was held)      #Sacral ulcer  - un stageable sacral ulcer with minimal white discharge.  - wound care eval   - change position Q 2 hours, off load pressure points   - consult dietitian     #Left shoulder pain and UE swelling  - can start Lidocaine topical   - Xray shoulder : Acromioclavicular arthrosis. High riding humerus which could reflect rotator cuff tear of uncertain age. Bones appear osteopenic with no acute fracture or dislocation. Glenohumeral arthritic changes.  - f/u LUE duplex negative for DVT   - keep LUE elevated     #Hx of Lumbar Compression Fracture  #Hx of Pathologic L1 vertebrae fracture  #Hx of Conus Medullaris Syndrome  - was on  gabapentin and robaxin,  tylenol PRN at home   - held on admission, will give IV Tylenol PRN for pain   - OP neurosurgery f/u     #chronic HFpEF / PHTN   - not in exacerbation   - TTE 1/28/2024- EF 60-65% mod pulm HTN   - Currently euvolemic on exam  - monitor for fluid overload         # GI prophylaxis     #Progress Note Handoff  Pending (specify):  give Albumin, taper off pressors, keep MAP above 60, monitor Na level, BUN/cr and urine output, EP follow up for rate control, will likely load with Dognoxin, hold off with Amiodarone ( AC held), supportive care, consult dietitian, add probiotics , will lower volume in bolus feeds ( pt developed diarrhea) , check IVC, maintain fluid balance   Family discussion: I spoke with pt's daughter at the bedside, she agreed with a plan of care   Disposition: SDU    89  year old F, pmhx of chronic afib (on Eliquis), GERD, dementia, FTT , Orthostatic hypotension on midodrine ,Patient presents for evaluation of change in mental status.  Daughter states over the past few months patient has been having increasing cognitive issues including hallucinations poor sleep.  Daughter states that yesterday patient seemed to be getting sick and she was given Tylenol at night.  Patient is on Macrobid for UTI prophylaxis due to frequent UTIs.  This daughter had several concerns regarding patient including further decline in mental status/increased confusion/decreased ability to follow commands.  Daughter states it appeared like the left eye had not been opening as much is normal but now it is opening normally.  Patient also complained of left upper extremity pain and swelling.  Patient was unable to raise her left upper extremity secondary to pain.  No trauma.  Patient with decreased p.o. intake but was able to eat a little yesterday. Admitted to SDU for sepsis secondary to complicated UTI/Pylenephritis      A/P   #TME 2/2 Severe Sepsis present on admission secondary to acute pyelonephritis/ Hx of recurrent UTIs/ lactic acidosis likley 2/2 Sepsis and hypotension / Suspected PNA   -give  Albumin infusion, taper off pressors at tolerated, keep MAP above 60  - c/w  Midodrine 15 mg Q 8 houtds    - ID is following, recommendations noted:   -continue with Cefepime to 1 gm iv q12h and  Linezolid 600 mg q12h to IV  - repeat urine Cx is NTD , pt needs ID follow up   - check procalcitonin level   - CXR noted   - check nasal swab for MRSA   - Apply silver sulfadiazine cream to b/l lower extremities under dressing  - trend WBC       # AMS/ toxic metabolic encephalopathy   - NPO , NGT feedings   - supportive care, fall and aspiration precautions   - treat underlying infection   - will repeat HCT when more stable and off pressors     # Gross hematuria / acute blood loss anemia  -  Lovenox held on 4/12  - monitor H/H, keep Hb above 7.5, stable now   -  anemia work up     #Chronic Atrial Fibrillation with acute RVR  -CHADS2-VASC = 5.   -  therapeutic Lovenox  held ( Hb dropped due to gross hematuria)  - EP follow up , load with  Digoxin  for better rate control   - hold off with Amiodarone ( AC was held)    - resume eliquis 2.5 mg Q 12 hours     #Sacral ulcer  - un stageable sacral ulcer with minimal white discharge.  - wound care eval   - change position Q 2 hours, off load pressure points   - consulted by  dietitian     #Left shoulder pain and UE swelling  - can start Lidocaine topical   - Xray shoulder : Acromioclavicular arthrosis. High riding humerus which could reflect rotator cuff tear of uncertain age. Bones appear osteopenic with no acute fracture or dislocation. Glenohumeral arthritic changes.  - f/u LUE duplex negative for DVT   - keep LUE elevated     #Hx of Lumbar Compression Fracture  #Hx of Pathologic L1 vertebrae fracture  #Hx of Conus Medullaris Syndrome  - was on  gabapentin and robaxin,  tylenol PRN at home   - held on admission, will give IV Tylenol PRN for pain   - OP neurosurgery f/u     #chronic HFpEF / PHTN   - not in exacerbation   - TTE 1/28/2024- EF 60-65% mod pulm HTN   - Currently euvolemic on exam  - monitor for fluid overload     # diarrhea  - consult dietitian   - start Probiotics  - add banana flakes to feedings   - monitor and replete electrolytes   - maintain volume status         # GI prophylaxis     #Progress Note Handoff  Pending (specify):  give Albumin, taper off pressors, keep MAP above 60, monitor Na level, BUN/cr and urine output, EP follow up for rate control,  load with Dognoxin, hold off with Amiodarone ( AC held), supportive care, consult dietitian, add probiotics ,  lower volume in bolus feeds ( pt developed diarrhea) , check IVC, maintain fluid balance, resume Eliquis    Family discussion: I spoke with pt's daughter at the bedside, she agreed with a plan of care   Disposition: SDU

## 2025-04-14 NOTE — PROGRESS NOTE ADULT - SUBJECTIVE AND OBJECTIVE BOX
89  year old F, pmhx of chronic afib (on Eliquis), GERD, dementia, FTT , Orthostatic hypotension on midodrine  Patient presents for evaluation of change in mental status.  Daughter states over the past few months patient has been having increasing cognitive issues including hallucinations poor sleep.  Daughter states that  patient seemed to be getting sick and she was given Tylenol at night.  Patient is on Macrobid for UTI prophylaxis due to frequent UTIs.  This daughter had several concerns regarding patient including further decline in mental status/increased confusion/decreased ability to follow commands.  Daughter states it appeared like the left eye had not been opening as much is normal but now it is opening normally.  Patient also complained of left upper extremity pain and swelling.  Patient was unable to raise her left upper extremity secondary to pain.  No trauma.  Patient with decreased p.o. intake.   Pt was admitted to SDU with Acute pyelonephritis with mild right hydroureteronephrosis , urine Cx is growing  E faeium, she has Metabolic encephalopathy with underlying dementia.   She  was found to have A-fib with RVR, was hypotensive, BP improved after fluid resuscitation HR controlled with BB. ID recommended to add  Zyvox  to Abx regimen.   Pt  developed gross hematuria,  AC  held on 4/12,  NGT was put  in for feedings and medications.   Today pt is moving her head from side to side, responds to pain, appears uncomfortable, daughter at bedside.     PAST MEDICAL & SURGICAL HISTORY:  Chronic atrial fibrillation      GERD (gastroesophageal reflux disease)      Chronic lower back pain      No significant past surgical history      Vital Signs Last 24 Hrs  T(C): 36.4 (14 Apr 2025 11:33), Max: 37.3 (14 Apr 2025 02:30)  T(F): 97.6 (14 Apr 2025 11:33), Max: 99.1 (14 Apr 2025 02:30)  HR: 121 (14 Apr 2025 11:33) (110 - 132)  BP: 91/53 (14 Apr 2025 11:33) (73/42 - 155/102)  BP(mean): 67 (14 Apr 2025 11:33) (54 - 119)  RR: 18 (14 Apr 2025 11:33) (18 - 20)  SpO2: 98% (14 Apr 2025 11:33) (95% - 99%)    Parameters below as of 13 Apr 2025 18:29  Patient On (Oxygen Delivery Method): nasal cannula  O2 Flow (L/min): 3        PHYSICAL EXAM:  GENERAL:  frail with temporal muscle waisting with her eyes closed   HEAD:  Atraumatic, Normocephalic  NECK: Supple, No JVD, Normal thyroid  NERVOUS SYSTEM: altered,  does not follow commands   CHEST/LUNG: decreased BS at bases   HEART: irregularly irregular, tachycardic   ABDOMEN: Soft, Nontender, Nondistended; Bowel sounds present  EXTREMITIES:  dressing noted on LE b/l below knees   SKIN: paper thin skin with multiply skin tears on UE       LABS:                                              10.0   16.67 )-----------( 148      ( 14 Apr 2025 05:44 )             30.8   04-14    130[L]  |  98  |  39[H]  ----------------------------<  108[H]  4.0   |  18  |  1.5    Ca    8.6      14 Apr 2025 05:44  Mg     2.1     04-14    TPro  4.5[L]  /  Alb  2.8[L]  /  TBili  0.7  /  DBili  x   /  AST  25  /  ALT  16  /  AlkPhos  158[H]  04-14      Urinalysis Basic - ( 09 Apr 2025 04:45 )    Color: x / Appearance: x / SG: x / pH: x  Gluc: 82 mg/dL / Ketone: x  / Bili: x / Urobili: x   Blood: x / Protein: x / Nitrite: x   Leuk Esterase: x / RBC: x / WBC x   Sq Epi: x / Non Sq Epi: x / Bacteria: x    Urinalysis with Rflx Culture (collected 07 Apr 2025 12:05)    Culture - Urine (collected 07 Apr 2025 12:05)  Source: Catheterized None  Final Report (09 Apr 2025 12:53):    >100,000 CFU/ml Enterococcus faecium  Culture - Urine (04.07.25 @ 12:05)   - Ampicillin: R >8 Predicts results to ampicillin/sulbactam, amoxacillin-clavulanate and piperacillin-tazobactam.  - Ciprofloxacin: I 2  - Levofloxacin: I 4  - Nitrofurantoin: S <=32 Should not be used to treat pyelonephritis.  - Tetracycline: R >8  - Vancomycin: S 0.5  Specimen Source: Catheterized None  Culture Results:   >100,000 CFU/ml Enterococcus faecium  Organism Identification: Enterococcus faecium  Organism: Enterococcus faeciumOrganism: Enterococcus faecium (09 Apr 2025 12:53)  Organism: Enterococcus faecium (09 Apr 2025 12:53)    Culture - Blood (collected 07 Apr 2025 12:02)  Source: Blood Blood-Peripheral  Preliminary Report (08 Apr 2025 22:02):    No growth at 24 hours      Culture - Blood (collected 07 Apr 2025 12:02)  Source: Blood Blood-Peripheral  Preliminary Report (08 Apr 2025 22:02):    No growth at 24 hours      Culture - Urine (04.10.25 @ 10:20)   Specimen Source: Clean Catch Clean Catch (Midstream)  Culture Results:   <10,000 CFU/mL Normal Urogenital L[place]na  RADIOLOGY & ADDITIONAL TESTS:  < from: Xray Hand 3 Views, Left (04.08.25 @ 12:59) >  impression:    Soft tissue swelling suggested at the level of the fifth digit.    The bones are diffusely osteopenic. There are arthritic changes with   joint space narrowing and osteophyte formation. Findings appear most   pronounced at the second distal interphalangeal joint. Arthritic changes   are also noted at the basal joint. There is no acute fracture.    Chondrocalcinosis within the region of the triangular fibrocartilage    < from: [facility] Duplex Upper Ext Vein Scan, Left (04.08.25 @ 10:33) >  IMPRESSION:  No evidence of left upper extremity deep venous thrombosis. Study limited   due to patient positioning and overlying dressings      < from: [facility] Duplex Lower Ext Vein Scan, Bilat (04.08.25 @ 10:30) >  IMPRESSION:  No evidence of DVT in the visualized portions of the bilateral lower   extremities. Visualization limited secondary to dressings in the lower   extremities and limb contractures    < from: TTE Echo Complete w/o Contrast w/ Doppler (04.09.25 @ 10:47) >  Summary:   1. Left ventricular ejection fraction, by visual estimation, is 45 to   50%.   2. Mildly decreased global left ventricular systolic function.   3. The left ventricular diastolic function could not be assessed in this   study.   4. Severely reduced RV systolic function.   5. Severely enlarged right atrium.   6. Left atrial enlargement.   7. Sclerotic aortic valve with mildly decreased opening.   8. Mild aortic regurgitation.   9. Mild-to-moderate mitral regurgitation.  10. Moderate tricuspid regurgitation.  11. Estimated pulmonary artery systolic pressure is 65.8 mmHg assuming a   right atrial pressure of 8 mmHg, which is consistent with severe   pulmonary hypertension.  12. Small pericardial effusion visualized on limited views.    < from: Xray Chest 1 View- PORTABLE-Urgent (Xray Chest 1 View- PORTABLE-Urgent .) (04.10.25 @ 08:35) >    Impression:      Small to moderate left pleural effusion is unchanged. Developing opacity   in the right lung base    < from: Xray Chest 1 View- PORTABLE-Routine (Xray Chest 1 View- PORTABLE-Routine in AM.) (04.11.25 @ 06:06) >    IMPRESSION: Low lung volume.    Bilateral opacities, slightly worse.    < from: Xray Chest 1 View-PORTABLE IMMEDIATE (Xray Chest 1 View-PORTABLE IMMEDIATE .) (04.13.25 @ 15:50) >  IMPRESSION:    Bilateral lower lobe opacity and pleural effusion unchanged. No air leak.    < end of copied text >        MEDICATIONS  (STANDING):  apixaban 2.5 milliGRAM(s) Oral every 12 hours  cefepime   IVPB 1000 milliGRAM(s) IV Intermittent every 12 hours  chlorhexidine 2% Cloths 1 Application(s) Topical <User Schedule>  chlorhexidine 2% Cloths 1 Application(s) Topical <User Schedule>  digoxin  Injectable 125 MICROGram(s) IV Push every 8 hours  lactated ringers Bolus 250 milliLiter(s) IV Bolus once  lactobacillus acidophilus 1 Tablet(s) Oral three times a day with meals  linezolid  IVPB      linezolid  IVPB 600 milliGRAM(s) IV Intermittent every 12 hours  metoprolol tartrate 25 milliGRAM(s) Oral three times a day  midodrine 15 milliGRAM(s) Oral every 8 hours  multivitamin 1 Tablet(s) Oral daily  phenylephrine    Infusion 0.1 MICROgram(s)/kG/Min (2.38 mL/Hr) IV Continuous <Continuous>  silver sulfADIAZINE 1% Cream 1 Application(s) Topical two times a day  sodium bicarbonate 1300 milliGRAM(s) Oral three times a day    MEDICATIONS  (PRN):  acetaminophen     Tablet .. 650 milliGRAM(s) Oral every 6 hours PRN Temp greater or equal to 38C (100.4F), Mild Pain (1 - 3)  aluminum hydroxide/magnesium hydroxide/simethicone Suspension 30 milliLiter(s) Oral every 4 hours PRN Dyspepsia  haloperidol    Injectable 0.5 milliGRAM(s) IntraMuscular at bedtime PRN Agitation  melatonin 3 milliGRAM(s) Oral at bedtime PRN Insomnia  ondansetron Injectable 4 milliGRAM(s) IV Push every 8 hours PRN Nausea and/or Vomiting

## 2025-04-14 NOTE — PROGRESS NOTE ADULT - SUBJECTIVE AND OBJECTIVE BOX
Patient is a 89y old Female who presents with a chief complaint of sepsis (14 Apr 2025 16:41)    Overnight patient was in afib with RVR. Was transitioned from levophed to phenylephrine and was given 1 push of metoprolol.  Patient was seen at bedside. Patient lethargic/obtunded, opening eyes slightly to pain, nonverbal, not following commands.   ROS unable to obtain  Patient is HD stable on phenylephrin 0.4,, On 3L NC  Patient has NGT with tube feeds, was noted to have diarrhea per nurse    ALLERGIES:  No Known Allergies    MEDICATIONS:  STANDING MEDICATIONS  apixaban 2.5 milliGRAM(s) Oral every 12 hours  cefepime   IVPB 1000 milliGRAM(s) IV Intermittent every 12 hours  chlorhexidine 2% Cloths 1 Application(s) Topical <User Schedule>  chlorhexidine 2% Cloths 1 Application(s) Topical <User Schedule>  digoxin  Injectable 125 MICROGram(s) IV Push every 8 hours  lactated ringers Bolus 250 milliLiter(s) IV Bolus once  lactobacillus acidophilus 1 Tablet(s) Oral three times a day with meals  linezolid  IVPB      linezolid  IVPB 600 milliGRAM(s) IV Intermittent every 12 hours  metoprolol tartrate 25 milliGRAM(s) Oral three times a day  midodrine 15 milliGRAM(s) Oral every 8 hours  multivitamin 1 Tablet(s) Oral daily  phenylephrine    Infusion 0.1 MICROgram(s)/kG/Min IV Continuous <Continuous>  silver sulfADIAZINE 1% Cream 1 Application(s) Topical two times a day  sodium bicarbonate 1300 milliGRAM(s) Oral three times a day    PRN MEDICATIONS  acetaminophen     Tablet .. 650 milliGRAM(s) Oral every 6 hours PRN  aluminum hydroxide/magnesium hydroxide/simethicone Suspension 30 milliLiter(s) Oral every 4 hours PRN  haloperidol    Injectable 0.5 milliGRAM(s) IntraMuscular at bedtime PRN  melatonin 3 milliGRAM(s) Oral at bedtime PRN  ondansetron Injectable 4 milliGRAM(s) IV Push every 8 hours PRN      VITALS LAST 24H:  Vital Signs Last 24 Hrs  T(C): 36.6 (14 Apr 2025 16:00), Max: 37.3 (14 Apr 2025 02:30)  T(F): 97.9 (14 Apr 2025 16:00), Max: 99.1 (14 Apr 2025 02:30)  HR: 121 (14 Apr 2025 16:00) (110 - 130)  BP: 102/67 (14 Apr 2025 16:00) (73/42 - 155/102)  BP(mean): 73 (14 Apr 2025 16:00) (53 - 119)  RR: 18 (14 Apr 2025 16:00) (18 - 20)  SpO2: 98% (14 Apr 2025 16:00) (95% - 99%)    Parameters below as of 14 Apr 2025 16:00  Patient On (Oxygen Delivery Method): nasal cannula        PHYSICAL EXAM:  GENERAL: obtunded, frail and appears critically ill  HEENT:  EOMI, Moist mucous membranes  CHEST/LUNG: Decreased BS on the right  HEART: Irregular rate and rhythm  ABDOMEN: Soft, nontender, nondistended, Bowel sounds present  URO: David in place, urine appears clear this morning  EXTREMITIES:  2+ Peripheral Pulse, no lower extremity edema  NERVOUS SYSTEM:  A&Ox0    LABS:                        10.0   16.67 )-----------( 148      ( 14 Apr 2025 05:44 )             30.8     04-14    130[L]  |  98  |  39[H]  ----------------------------<  108[H]  4.0   |  18  |  1.5    Ca    8.6      14 Apr 2025 05:44  Mg     2.1     04-14    TPro  4.5[L]  /  Alb  2.8[L]  /  TBili  0.7  /  DBili  x   /  AST  25  /  ALT  16  /  AlkPhos  158[H]  04-14      Urinalysis Basic - ( 14 Apr 2025 05:44 )    Color: x / Appearance: x / SG: x / pH: x  Gluc: 108 mg/dL / Ketone: x  / Bili: x / Urobili: x   Blood: x / Protein: x / Nitrite: x   Leuk Esterase: x / RBC: x / WBC x   Sq Epi: x / Non Sq Epi: x / Bacteria: x

## 2025-04-14 NOTE — PROGRESS NOTE ADULT - ASSESSMENT
89  year old F, pmhx of chronic afib (on Eliquis), GERD, dementia, FTT , Orthostatic hypotension on midodrine ,Patient presents for evaluation of change in mental status. AMS likely secondary to TME secondary to septic shock 2/2 pyelonephritis. Course complicated by Afib with RVR with trial of rate control      #TME 2/2 Severe Sepsis present on admission secondary to acute pyelonephritis  #Hx of recurrent UTIs on bactrim at home  # Suspected PNA   -Given IV albumin due to 3rd spacing secondary to hypoalbuminemia  - taper off pressors at tolerated, keep MAP above 60  - c/w  Midodrine 15 mg Q 8 hours    -continue with Cefepime to 1 gm iv q12h and  Linezolid 600 mg q12h to IV as per ID  - F/u procal  - CXR showing bilateral lower lobe opacities  - MRSA negative  - NPO , NGT feedings   - will repeat HCT when more stable and off pressors     # Gross hematuria / acute blood loss anemia - resolved  - monitor H/H, keep Hb above 7.5, stable now   - Hematuria resolved and urine is clear      #Chronic Atrial Fibrillation with acute RVR  -CHADS2-VASC = 5.   -  therapeutic Lovenox  held ( Hb dropped due to gross hematuria) on 4/12  - Due to low GFR and family refusing frequent labs due to skin sloughing on minor trauma, Decided to restart AC on eliquis 2.5  - C/w metoprolol 25 TID and EP agrees with starting dig  - F/u Dig level    # diarrhea  - consult dietitian   - start Probiotics  - add banatrol  - monitor and replete electrolytes     #Sacral ulcer  - un stageable sacral ulcer with minimal white discharge.  - Continue wound care  - change position Q 2 hours, off load pressure points     #Left shoulder pain and UE swelling  - can start Lidocaine topical   - Xray shoulder : Acromioclavicular arthrosis. High riding humerus which could reflect rotator cuff tear of uncertain age. Bones appear osteopenic with no acute fracture or dislocation. Glenohumeral arthritic changes.  - f/u LUE duplex negative for DVT   - keep LUE elevated     #Hx of Lumbar Compression Fracture  #Hx of Pathologic L1 vertebrae fracture  #Hx of Conus Medullaris Syndrome  - was on  gabapentin and robaxin,  tylenol PRN at home   - held on admission, will give IV Tylenol PRN for pain   - OP neurosurgery f/u     #chronic HFpEF / PHTN   - not in exacerbation   - TTE 1/28/2024- EF 60-65% mod pulm HTN   - Currently euvolemic on exam  - monitor for fluid overload       DVT PPX:  Eliquis 2.5  GI PPX: Femotidine  DIET: NPO with tube feeds  ACTIVITY: AT  CODE STATUS: Full code  Disposition: SDU    Pending: wean off pressors, Trend wbc, Mental status improvement, Afib with RVR improvement

## 2025-04-14 NOTE — PROGRESS NOTE ADULT - ASSESSMENT
IMPRESSION:    AMS/TME  Sepsis/ UTI/ Pyelonephritis   hypotension on pressors  HIRAL  Metabolic acidosis/mixed  bibasilar atelectasis/effusion  HO afib (on Eliquis),  HO dementia  HI Orthostatic hypotension on midodrine       PLAN:    CNS: Avoid CNS depressants. on haldol prn for agitation    HEENT: Oral care    PULMONARY:  HOB @ 45 degrees, on NC, aspiration precaution, keep Sao2 92 to 96%, Incentive iron, CXR noted bilateral edema    CARDIOVASCULAR: echo noted hfmref . keep even to negative fluid balance. Midodrine to 15 tid , keep MAP >60. amiodarone drip for rate control    GI: GI prophylaxis, NG feeds, c/w Po bicarb,      RENAL:  F/u  lytes. Correct as needed.     INFECTIOUS DISEASE: Cefepime and Linezolid, f/u UC, f/u ID recs     HEMATOLOGICAL:  DVT prophylaxis. On Therapeutic AC     ENDOCRINE:  Follow up FS.  Insulin protocol if needed.    SDU  Poor prognosis  GOC disscusion - palliative care follow up   IMPRESSION:    AMS/TME  Sepsis/ UTI/ Pyelonephritis   hypotension on pressors  HIRAL  Metabolic acidosis/mixed  bibasilar atelectasis/effusion  HO afib (on Eliquis),  HO dementia  HI Orthostatic hypotension on midodrine       PLAN:    CNS: Avoid CNS depressants. on haldol prn for agitation    HEENT: Oral care    PULMONARY:  HOB @ 45 degrees, on NC, aspiration precaution, keep Sao2 92 to 96%, Incentive iron, CXR noted bilateral edema    CARDIOVASCULAR: echo noted hfmref . keep even to negative fluid balance. Midodrine to 15 tid , keep MAP >60. amiodarone drip for rate control after AC    GI: GI prophylaxis, NG feeds, c/w Po bicarb,      RENAL:  F/u  lytes. Correct as needed.     INFECTIOUS DISEASE: continue with Cefepime and Linezolid per id     HEMATOLOGICAL:  DVT prophylaxis. Therapeutic AC with heparin drip    ENDOCRINE:  Follow up FS.  Insulin protocol if needed.    SDU  Poor prognosis  GOC disscusion - palliative care follow up   IMPRESSION:    AMS/TME  Sepsis/ UTI/ Pyelonephritis   hypotension on pressors  HIRAL  Metabolic acidosis/mixed  bibasilar atelectasis/effusion  HO afib (on Eliquis),  HO dementia  HI Orthostatic hypotension on midodrine       PLAN:    CNS: Avoid CNS depressants. on haldol prn for agitation    HEENT: Oral care    PULMONARY:  HOB @ 45 degrees, on NC, aspiration precaution, keep Sao2 92 to 96%, Incentive iron, CXR noted bilateral edema    CARDIOVASCULAR: echo noted hfmref . keep even to negative fluid balance. Midodrine to 15 tid , keep MAP >60. amiodarone drip for rate control after AC    GI: GI prophylaxis, NG feeds, c/w Po bicarb,      RENAL:  F/u  lytes. Correct as needed.     INFECTIOUS DISEASE: continue with Cefepime and Linezolid per id     HEMATOLOGICAL:  DVT prophylaxis. Therapeutic AC with eliquis    ENDOCRINE:  Follow up FS.  Insulin protocol if needed.    SDU  Poor prognosis  GOC disscusion - palliative care follow up   IMPRESSION:    AMS/TME  Sepsis/ UTI/ Pyelonephritis   hypotension on pressors  HIRAL  Metabolic acidosis/mixed  bibasilar atelectasis/effusion  Rapid A fib  HO afib (on Eliquis),  HO dementia  HI Orthostatic hypotension on midodrine       PLAN:    CNS: Avoid CNS depressants. on haldol prn for agitation    HEENT: Oral care    PULMONARY:  HOB @ 45 degrees, on NC, aspiration precaution, keep Sao2 92 to 96%, Incentive iron, CXR noted bilateral edema    CARDIOVASCULAR: echo noted hfmref . keep even to negative fluid balance. Midodrine to 15 tid , keep MAP >60. amiodarone drip for rate control after AC    GI: GI prophylaxis, NG feeds, c/w Po bicarb,      RENAL:  F/u  lytes. Correct as needed.     INFECTIOUS DISEASE: continue with Cefepime and Linezolid per id     HEMATOLOGICAL:  DVT prophylaxis. Therapeutic AC     ENDOCRINE:  Follow up FS.  Insulin protocol if needed.    SDU  Poor prognosis  GOC discussion - palliative care follow up

## 2025-04-14 NOTE — PROGRESS NOTE ADULT - SUBJECTIVE AND OBJECTIVE BOX
Patient is a 89y old  Female who presents with a chief complaint of sepsis (13 Apr 2025 11:37)      Over Night Events:  Patient seen and examined. on 0.4 of neo. afebrile      ROS:  See HPI    PHYSICAL EXAM    ICU Vital Signs Last 24 Hrs  T(C): 37.3 (14 Apr 2025 02:30), Max: 37.3 (14 Apr 2025 02:30)  T(F): 99.1 (14 Apr 2025 02:30), Max: 99.1 (14 Apr 2025 02:30)  HR: 129 (14 Apr 2025 05:45) (110 - 132)  BP: 90/53 (14 Apr 2025 07:00) (73/42 - 155/102)  BP(mean): 68 (14 Apr 2025 07:00) (54 - 119)  ABP: --  ABP(mean): --  RR: 20 (14 Apr 2025 05:15) (18 - 20)  SpO2: 97% (14 Apr 2025 05:45) (95% - 99%)    O2 Parameters below as of 13 Apr 2025 18:29  Patient On (Oxygen Delivery Method): nasal cannula  O2 Flow (L/min): 3          General: ill appearing              Lymph Nodes: NO cervical LN   Lungs: Bilateral BS  Cardiovascular: tachycardiac  Abdomen: Soft, Positive BS  Extremities: diffuse bruising  Skin: warm   Neurological: lethargic but arousable    I&O's Detail    13 Apr 2025 07:01  -  14 Apr 2025 07:00  --------------------------------------------------------  IN:    Norepinephrine: 29.7 mL  Total IN: 29.7 mL    OUT:    Indwelling Catheter - Urethral (mL): 220 mL    Voided (mL): 50 mL  Total OUT: 270 mL    Total NET: -240.3 mL          LABS:                          10.0   16.67 )-----------( 148      ( 14 Apr 2025 05:44 )             30.8         14 Apr 2025 05:44    130    |  98     |  39     ----------------------------<  108    4.0     |  18     |  1.5      Ca    8.6        14 Apr 2025 05:44  Mg     2.1       14 Apr 2025 05:44    TPro  4.5    /  Alb  2.8    /  TBili  0.7    /  DBili  x      /  AST  25     /  ALT  16     /  AlkPhos  158    14 Apr 2025 05:44  Amylase x     lipase x                                                                                        Urinalysis Basic - ( 14 Apr 2025 05:44 )    Color: x / Appearance: x / SG: x / pH: x  Gluc: 108 mg/dL / Ketone: x  / Bili: x / Urobili: x   Blood: x / Protein: x / Nitrite: x   Leuk Esterase: x / RBC: x / WBC x   Sq Epi: x / Non Sq Epi: x / Bacteria: x                                                                                                                                                     MEDICATIONS  (STANDING):  cefepime   IVPB 1000 milliGRAM(s) IV Intermittent every 12 hours  chlorhexidine 2% Cloths 1 Application(s) Topical <User Schedule>  chlorhexidine 2% Cloths 1 Application(s) Topical <User Schedule>  lactated ringers Bolus 250 milliLiter(s) IV Bolus once  linezolid  IVPB      linezolid  IVPB 600 milliGRAM(s) IV Intermittent every 12 hours  metoprolol tartrate 25 milliGRAM(s) Oral three times a day  midodrine 15 milliGRAM(s) Oral every 8 hours  multivitamin 1 Tablet(s) Oral daily  norepinephrine Infusion 0.05 MICROgram(s)/kG/Min (5.95 mL/Hr) IV Continuous <Continuous>  pantoprazole    Tablet 40 milliGRAM(s) Oral before breakfast  phenylephrine    Infusion 0.1 MICROgram(s)/kG/Min (2.38 mL/Hr) IV Continuous <Continuous>  silver sulfADIAZINE 1% Cream 1 Application(s) Topical two times a day  sodium bicarbonate 1300 milliGRAM(s) Oral three times a day    MEDICATIONS  (PRN):  acetaminophen     Tablet .. 650 milliGRAM(s) Oral every 6 hours PRN Temp greater or equal to 38C (100.4F), Mild Pain (1 - 3)  aluminum hydroxide/magnesium hydroxide/simethicone Suspension 30 milliLiter(s) Oral every 4 hours PRN Dyspepsia  haloperidol    Injectable 0.5 milliGRAM(s) IntraMuscular at bedtime PRN Agitation  melatonin 3 milliGRAM(s) Oral at bedtime PRN Insomnia  metoprolol tartrate Injectable 2.5 milliGRAM(s) IV Push every 6 hours PRN if HR consistently >140  ondansetron Injectable 4 milliGRAM(s) IV Push every 8 hours PRN Nausea and/or Vomiting                 Patient is a 89y old  Female who presents with a chief complaint of sepsis (13 Apr 2025 11:37)      Over Night Events:  Patient seen and examined. on 0.4 of neo. afebrile    PHYSICAL EXAM    ICU Vital Signs Last 24 Hrs  T(C): 37.3 (14 Apr 2025 02:30), Max: 37.3 (14 Apr 2025 02:30)  T(F): 99.1 (14 Apr 2025 02:30), Max: 99.1 (14 Apr 2025 02:30)  HR: 129 (14 Apr 2025 05:45) (110 - 132)  BP: 90/53 (14 Apr 2025 07:00) (73/42 - 155/102)  BP(mean): 68 (14 Apr 2025 07:00) (54 - 119)  RR: 20 (14 Apr 2025 05:15) (18 - 20)  SpO2: 97% (14 Apr 2025 05:45) (95% - 99%)    O2 Parameters below as of 13 Apr 2025 18:29  Patient On (Oxygen Delivery Method): nasal cannula  O2 Flow (L/min): 3          General: ill appearing              Lungs: dec bs both bases  Cardiovascular: tachycardic, irregular  Abdomen: Soft, Positive BS  Extremities: diffuse bruising  Skin: warm   Neurological: lethargic but arousable    I&O's Detail    13 Apr 2025 07:01  -  14 Apr 2025 07:00  --------------------------------------------------------  IN:    Norepinephrine: 29.7 mL  Total IN: 29.7 mL    OUT:    Indwelling Catheter - Urethral (mL): 220 mL    Voided (mL): 50 mL  Total OUT: 270 mL    Total NET: -240.3 mL          LABS:                          10.0   16.67 )-----------( 148      ( 14 Apr 2025 05:44 )             30.8         14 Apr 2025 05:44    130    |  98     |  39     ----------------------------<  108    4.0     |  18     |  1.5      Ca    8.6        14 Apr 2025 05:44  Mg     2.1       14 Apr 2025 05:44    TPro  4.5    /  Alb  2.8    /  TBili  0.7    /  DBili  x      /  AST  25     /  ALT  16     /  AlkPhos  158    14 Apr 2025 05:44  Amylase x     lipase x                                                                                        Urinalysis Basic - ( 14 Apr 2025 05:44 )    Color: x / Appearance: x / SG: x / pH: x  Gluc: 108 mg/dL / Ketone: x  / Bili: x / Urobili: x   Blood: x / Protein: x / Nitrite: x   Leuk Esterase: x / RBC: x / WBC x   Sq Epi: x / Non Sq Epi: x / Bacteria: x                                                                                                                                                     MEDICATIONS  (STANDING):  cefepime   IVPB 1000 milliGRAM(s) IV Intermittent every 12 hours  chlorhexidine 2% Cloths 1 Application(s) Topical <User Schedule>  chlorhexidine 2% Cloths 1 Application(s) Topical <User Schedule>  lactated ringers Bolus 250 milliLiter(s) IV Bolus once  linezolid  IVPB      linezolid  IVPB 600 milliGRAM(s) IV Intermittent every 12 hours  metoprolol tartrate 25 milliGRAM(s) Oral three times a day  midodrine 15 milliGRAM(s) Oral every 8 hours  multivitamin 1 Tablet(s) Oral daily  norepinephrine Infusion 0.05 MICROgram(s)/kG/Min (5.95 mL/Hr) IV Continuous <Continuous>  pantoprazole    Tablet 40 milliGRAM(s) Oral before breakfast  phenylephrine    Infusion 0.1 MICROgram(s)/kG/Min (2.38 mL/Hr) IV Continuous <Continuous>  silver sulfADIAZINE 1% Cream 1 Application(s) Topical two times a day  sodium bicarbonate 1300 milliGRAM(s) Oral three times a day    MEDICATIONS  (PRN):  acetaminophen     Tablet .. 650 milliGRAM(s) Oral every 6 hours PRN Temp greater or equal to 38C (100.4F), Mild Pain (1 - 3)  aluminum hydroxide/magnesium hydroxide/simethicone Suspension 30 milliLiter(s) Oral every 4 hours PRN Dyspepsia  haloperidol    Injectable 0.5 milliGRAM(s) IntraMuscular at bedtime PRN Agitation  melatonin 3 milliGRAM(s) Oral at bedtime PRN Insomnia  metoprolol tartrate Injectable 2.5 milliGRAM(s) IV Push every 6 hours PRN if HR consistently >140  ondansetron Injectable 4 milliGRAM(s) IV Push every 8 hours PRN Nausea and/or Vomiting

## 2025-04-15 NOTE — PROGRESS NOTE ADULT - CONVERSATION DETAILS
Spoke with patient's daughter at bedside regarding goals of care going forward. Confirmed DNR/DNI. She wanted to have a discussion regarding comfort measures going forward since the patient has had a significant decline recently. She feels as though performing these continued interventions would be futile and would therefore would just want to make her comfortable. She wants to continue the high flow nasal cannula and the pressors at current rate without adjusting them further. She was agreeable to initiating DNR/DNI/CMO with no escalation of care. Filled out and placed MOLST in chart.
- Introduced the role of Palliative Medicine for symptom management, advance care planning, and emotional support  - Diagnosis and prognosis discussed.   - GOC discussed and patient desires to continue with life-sustaining therapy  - Most important to the HCP is trying to get the patient improved to get home  - HCP form was reportedly previously completed by the patient and Marlys thinks she has the paperwork at home  - We discussed advanced directives and we confirmed that the patient is to be DNR/DNI. MOLST was completed prior to our conversation and is in the chart
Daughter would like to continue all medical measures for now

## 2025-04-15 NOTE — PROGRESS NOTE ADULT - MUSCULOSKELETAL
no calf tenderness/no strength/no chest wall tenderness
no strength
no calf tenderness/no strength/no chest wall tenderness

## 2025-04-15 NOTE — PROGRESS NOTE ADULT - TIME BILLING
time spent on review of labs, imaging studies, old records, obtaining history, personally examining patient, counselling and communicating with patient, entering orders for medications/tests/etc, discussions with other health care providers, documentation in electronic health records, independent interpretation of labs, imaging/procedure results and care coordination.    Time spent excludes teaching
time spent on review of labs, imaging studies, old records, obtaining history, personally examining patient, counselling and communicating with patient, entering orders for medications/tests/etc, discussions with other health care providers, documentation in electronic health records, independent interpretation of labs, imaging/procedure results and care coordination.    Time spent excludes teaching
time spent on review of labs, imaging studies, old records, obtaining history, personally examining patient, counselling and communicating with patient, entering orders for medications/tests/etc, discussions with other health care providers, documentation in electronic health records, independent interpretation of labs, imaging/procedure results and care coordination.
time spent on review of labs, imaging studies, old records, obtaining history, personally examining patient, counselling and communicating with patient, entering orders for medications/tests/etc, discussions with other health care providers, documentation in electronic health records, independent interpretation of labs, imaging/procedure results and care coordination.    Time spent excludes teaching
I have personally seen and examined this patient. I have reviewed all pertinent clinical information and reviewed all relevant imaging ( and noted the impression from the Radiologist ) and diagnostic studies personally. I counseled the patient about the diagnostic testing and treatment plan. I discussed my recommendations with the primary team. Time spent teaching was excluded.

## 2025-04-15 NOTE — PROGRESS NOTE ADULT - ASSESSMENT
IMPRESSION:  Patient is a 89 yo Female with PMH of chronic Afib on Eliquis, HFpEF, L1 epithelial carcinoma s/p radiation, conus medullaris syndrome, recurrent UTIs, GERD, CKD 3b, dementia, orthostatic hypotension on midodrine, and FTT. Patient currently on Macrobid prophylaxis for recurrent UTIs. Per daughter, Patient also has a cough productive with thin yellow sputum for the past few weeks. Patient has had progressive cognitive decline for several months, but acute decline began 2 days ago. Endorses myalgias, malaise, subjective fever. Denies N/V/D, sore throat, sick contacts.    Independent history obtained from her daughter Marlys  at the bedside   ID consulted for diagnostic work up and antimicrobial treatment of sepsis   Status of condition: critical     IMPRESSION/RECOMMENDATIONS  Immunosuppression/Immunosenescence ( above age 60 yrs there is a exponential decline in immunity which could result in poor clinical outcomes.   Acute illness  ( severe sepsis ) which poses a threat to life or bodily function without treatment   Severe sepsis : resolved. Off pressors  Acute pyelonephritis with mild right hydroureteronephrosis   4/10 UCX NG  4/7 BCX NG  4/7 UCx E faeium  Metabolic encephalopathy  Hematuria ( hanson since 4/10 ) : resolved  4/10 UCx NG    < from: CT Abdomen and Pelvis w/ IV Cont (04.07.25 @ 13:22) > ( Independent interpretation of test : no bacterial PNA  1.  Circumferential mild edematous wall thickening of the bladder with mural hyperemia and perivesicular stranding consistent with cystitis/UTI.  2.  Mild right hydroureteronephrosis with urothelial enhancement. Multiple vascular calcifications are noted along the course of the ureter   however none appear to be within the ureter itself. Findings can be secondary to recently passed stone or ascending infection. No definite CT imaging evidence of pyelonephritis.  3.  More nodular enhancing appearance of the right distal ureter at the level of the UVJ could also be infectious in etiology however a ureteral   neoplasm cannot be entirely excluded. Further evaluation can be considered as clinically appropriate.  4.  Mild edematous wall thickening of the rectum which could represent   proctitis in the appropriate clinical setting.  5.  Partially imaged small bilateral pleural effusions and trace pericardial effusion.    < end of copied text >    4/15 CXR increasing b/l opacities/ pleural effusion : ( Independent interpretation of test : no bacterial PNA.     4/7 COVID 19/ Influenza/ RSV NG.   4/7 Nares ORSa NG  WBC 8.1  Monitor Hg    chronic Afib on Eliquis  HFpEF  L1 epithelial carcinoma s/p radiation, conus medullaris syndrome  recurrent UTIs      #B/L Lower extremity ecchymosis    -monitor WBC  -monitor Hg   -will hold ABx for now  -d/c Cefepime / Linezolid 600  -Apply silver sulfadiazine cream to b/l lower extremities under dressing    Discussion of management/test results( independently interpretated by me ) /antibiotic regimen  with external/primary medical team. Dr Goel  Discussion of management/test results( independently interpretated by me ) /antibiotic regimen  with her daughter Marlys at the bedside .

## 2025-04-15 NOTE — PROGRESS NOTE ADULT - SUBJECTIVE AND OBJECTIVE BOX
HPI: 89F with chronic AF, GERD, dementia, FTT, orthostatic hypotension on midodrine, here with AMS, and found to have severe sepsis from pyelonephritis, on cefepime, levophed. Also with shoulder pain, on lidocaine patch. DNR/DNI/trial NIV. Palliative consulted for GOC. Patient comfortable. See above, agree with above, daughter open to further GOC conversations. Currently cared for by daughter at home.     INTERVAL EVENTS  : patient comfortable, no issues reported per daughter  : patient appears comfortable, daughter at bedside, denies patient having pain  4/15: patient lethargic but comfortable appearing, daughter at bedside, initiated CMO for patient    ADVANCE DIRECTIVES:    [ ] Full Code [x ] DNR/DNI  MOLST  [x ]  Living Will  [ ]   DECISION MAKER(s):  [x ] Health Care Proxy(s)  [ ] Surrogate(s)  [ ] Guardian           Name(s): Phone Number(s):  HCP/Daughter: Marlys Acuna    BASELINE (I)ADL(s) (prior to admission):  Gordon: [ ]Total  [ ] Moderate [x ]Dependent    No Known Allergies    MEDICATIONS  (STANDING):  albuterol/ipratropium for Nebulization 3 milliLiter(s) Nebulizer every 6 hours  chlorhexidine 2% Cloths 1 Application(s) Topical <User Schedule>  famotidine    Tablet 20 milliGRAM(s) Oral every 48 hours  metoprolol tartrate 25 milliGRAM(s) Oral three times a day  midodrine 20 milliGRAM(s) Oral every 8 hours  phenylephrine    Infusion 0.1 MICROgram(s)/kG/Min (2.38 mL/Hr) IV Continuous <Continuous>  silver sulfADIAZINE 1% Cream 1 Application(s) Topical two times a day    MEDICATIONS  (PRN):  acetaminophen     Tablet .. 650 milliGRAM(s) Oral every 6 hours PRN Temp greater or equal to 38C (100.4F), Mild Pain (1 - 3)  aluminum hydroxide/magnesium hydroxide/simethicone Suspension 30 milliLiter(s) Oral every 4 hours PRN Dyspepsia  haloperidol    Injectable 0.5 milliGRAM(s) IV Push every 2 hours PRN Agitation  melatonin 3 milliGRAM(s) Oral at bedtime PRN Insomnia  morphine  - Injectable 1 milliGRAM(s) IV Push every 2 hours PRN Moderate pain (4-6), Severe pain (7-10), Respiratory rate greater than 22  ondansetron Injectable 4 milliGRAM(s) IV Push every 8 hours PRN Nausea and/or Vomiting      PRESENT SYMPTOMS: [X ]Unable to obtain due to poor mentation   Source if other than patient:  [x ]Family   [ ]Team   [ X]All review of systems negative including pain and dyspnea unless noted below    All components of pain assessment below addressed. Blank spaces indicate that the patient did/could not complete the assessment.  Pain: [ ]yes [x ]no  QOL impact -   Location -                    Aggravating factors -  Quality -  Radiation -  Timing-  Severity (0-10 scale):  Minimal acceptable level (0-10 scale):     CPOT:0  RDOS:2      PHYSICAL EXAM:    Vital Signs Last 24 Hrs  T(C): 36.6 (15 Apr 2025 11:00), Max: 37.5 (15 Apr 2025 00:00)  T(F): 97.9 (15 Apr 2025 11:00), Max: 99.5 (15 Apr 2025 00:00)  HR: 133 (15 Apr 2025 14:30) (78 - 148)  BP: 95/49 (15 Apr 2025 14:30) (75/39 - 117/63)  BP(mean): 61 (15 Apr 2025 14:30) (54 - 98)  RR: 20 (15 Apr 2025 11:00) (18 - 20)  SpO2: 99% (15 Apr 2025 11:00) (98% - 100%)        GENERAL:  [X ] No acute distress [x ]Lethargic  [ ]Unarousable  [ ]Verbal  [ ]Non-Verbal [ ]Cachexia    BEHAVIORAL/PSYCH:  [ ]Alert and Oriented x  [ ] Anxiety [ ] Delirium [ ] Agitation [X ] Calm   EYES: [ ] No scleral icterus [ ] Scleral icterus [x] Closed  ENMT:  [ ]Dry mouth  [ ]No external oral lesions [ X] No external ear or nose lesions  CARDIOVASCULAR:  [ ]Regular [ ]Irregular [x ]Tachy [ ]Not Tachy  [ ]Sung [ ] Edema [ ] No edema  PULMONARY:  [ ]Tachypnea  [ ]Audible excessive secretions [X ] No labored breathing [ ] labored breathing  GASTROINTESTINAL: [ ]Soft  [ ]Distended  [ X]Not distended [ ]Non tender [ ]Tender  MUSCULOSKELETAL: [ ]No clubbing [ ] clubbing  [ X] No cyanosis [ ] cyanosis  NEUROLOGIC: [ ]No focal deficits  [ ]Follows commands  [ ]Does not follow commands  [x ]Cognitive impairment  [ ]Dysphagia  [ ]Dysarthria  [ ]Paresis   SKIN: [ ] Jaundiced [X ] Non-jaundiced [ ]Rash [ ]No Rash [ ] Warm [ ] Dry  MISC/LINES: [ ] ET tube [ ] Trach [ ]NGT/OGT [ ]PEG [ ]David    CRITICAL CARE:  [x ] Shock Present  [ ]Septic [ ]Cardiogenic [ ]Neurologic [ ]Hypovolemic  [ ]  Vasopressors [ ]  Inotropes   [ ]Respiratory failure present [ ]Mechanical ventilation [ ]Non-invasive ventilatory support [x ]High flow  [ ]Acute  [ ]Chronic [ ]Hypoxic  [ ]Hypercarbic [ ]Other  [ ]Other organ failure     LABS: reviewed by me                                   10. )-----------( 126      ( 15 Apr 2025 04:41 )             33.2     04-15    129[L]  |  97[L]  |  45[H]  ----------------------------<  122[H]  4.4   |  19  |  1.7[H]    Ca    8.4      15 Apr 2025 04:41  Mg     2.2     04-15    TPro  4.5[L]  /  Alb  3.0[L]  /  TBili  0.4  /  DBili  x   /  AST  26  /  ALT  15  /  AlkPhos  189[H]  04-15      Urinalysis Basic - ( 15 Apr 2025 13:53 )    Color: Red / Appearance: Turbid / S.023 / pH: x  Gluc: x / Ketone: Negative mg/dL  / Bili: Negative / Urobili: 0.2 mg/dL   Blood: x / Protein: 300 mg/dL / Nitrite: Negative   Leuk Esterase: Large / RBC: >1900 /HPF / WBC >998 /HPF   Sq Epi: x / Non Sq Epi: 6 /HPF / Bacteria: Moderate /HPF            RADIOLOGY & ADDITIONAL STUDIES: CXR personally reviewed by me    < from: Xray Chest 1 View-PORTABLE IMMEDIATE (Xray Chest 1 View-PORTABLE IMMEDIATE .) (04.15.25 @ 14:40) >  PROCEDURE DATE:  04/15/2025          INTERPRETATION:  STUDY INDICATION: desatting    TECHNIQUE:  Portable frontal view of the chest obtained.    COMPARISON: 2025    FINDINGS/  IMPRESSION:    NG tube terminating in the left upper quadrant.    Bilateral effusion/opacities without significant change. No pneumothorax.    Stable cardiomediastinal silhouette.    Unchanged osseous structures.    --- End of Report ---    < end of copied text >      Palliative Care Spiritual/Emotional Screening Tool Question  Severity (0-4):                    OR                    [X ] Unable to determine/NA  Score of 2 or greater indicates recommendation of Chaplaincy referral  Chaplaincy Referral: [ ] Yes [ ] Refused [ ] Following     Caregiver Stevenson:  [ ] Yes [ ] No    OR    [x ] Unable to determine. Will assess at later time if appropriate.  Social Work Referral [ ]  Patient and Family Centered Care Referral [ ]    Anticipatory Grief Present: [ ] Yes [ ] No    OR     [ x] Unable to determine. Will assess at later time if appropriate.  Social Work Referral [ ]  Patient and Family Centered Care Referral [ ]    REFERRALS:   [ ]Chaplaincy  [ ]Hospice  [ ]Child Life  [ ]Social Work  [ ]Case management [ ]Holistic Therapy     Palliative care education provided to patient and/or family    Goals of Care Document:

## 2025-04-15 NOTE — PROGRESS NOTE ADULT - ATTENDING COMMENTS
Events noted, on NC, remains on levophed, increase wbc has been on abx, repeat cx, ID fup, procal, fungitell, very poor prognosis, plan as above
Interval history: Pt seen and examined at bedside. Non verbal   Vital Signs (24 Hrs):  T(C): 36.7 (25 @ 08:02), Max: 36.7 (25 @ 08:02)  HR: 71 (25 @ 08:02) (71 - 130)  BP: 132/70 (25 @ 08:02) (90/60 - 132/70)  RR: 18 (25 @ 08:02) (18 - 18)  SpO2: 97% (25 @ 08:02) (95% - 98%)  Wt(kg): --  Daily     Daily Weight in k.5 (2025 09:40)    I&O's Summary    PHYSICAL EXAM:  GENERAL: NAD, elderly   HEAD:  Atraumatic, Normocephalic  NECK: Supple, No JVD  CHEST/LUNG: Clear to auscultation bilaterally; No wheeze  HEART: Regular rate and rhythm; No murmurs, rubs, or gallops  ABDOMEN: Soft, Nontender, Nondistended; Bowel sounds present  EXTREMITIES:  2+ Peripheral Pulses, No clubbing, cyanosis, -left upper ext swelling   PSYCH: AAOx0    Labs reviewed  Imaging reviewed independently and reviewed read  < from: [facility] Duplex Upper Ext Vein Scan, Left (25 @ 10:33) >    IMPRESSION:  No evidence of left upper extremity deep venous thrombosis. Study limited   due to patient positioning and overlying dressings    < end of copied text >    Plan  89  year old F, pmhx of chronic afib (on Eliquis), GERD, dementia, FTT , Orthostatic hypotension on midodrine ,Patient presents for evaluation of change in mental status.  Daughter states over the past few months patient has been having increasing cognitive issues including hallucinations poor sleep.  Daughter states that yesterday patient seemed to be getting sick and she was given Tylenol at night.  Patient is on Macrobid for UTI prophylaxis due to frequent UTIs.  This daughter had several concerns regarding patient including further decline in mental status/increased confusion/decreased ability to follow commands.  Daughter states it appeared like the left eye had not been opening as much is normal but now it is opening normally.  Patient also complained of left upper extremity pain and swelling.  Patient was unable to raise her left upper extremity secondary to pain.  No trauma.  Patient with decreased p.o. intake but was able to eat a little yesterday. Admitted to SDU for sepsis secondary to complicated UTI/Pylenephritis    #TME 2/2 Severe Sepsis present on admission secondary to acute pyelonephritis  #Hx of recurrent UTIs  #lactic acidosis likley 2/2 Sepsis and hypotension   - pt presents with AMS, and dec PO intake   - work up significant for +UA, CTH negative,  - pt is severely septic on admission , BP 87/65, , T 100.8 , Lactate 4.1   - given 2 L IV fluids with improvement in BP  - urine cx and blood cx obtained CXR done, no pnuemonia but has b/l effusions  - s/p Cefepime and vancomycin in ED  - c/w Cefepime 2g q12h  - ID consult  - Follow up Blood and urine cx  -  hold IV fluids 60 cc/hr, properly recesitated, has effusions on Ct scan  - lactate 4.1 -> 4.3 -> 3.1  - Monitor BP  - SDU monitoring  - labs pending today , pt is very swollen team trying     #Sacral ulcer  - unstageable sacral ulcer with minimal white discharge.  - burn eval    #Left shoulder pain and UE swelling, r/o dvt. rotator cuff tear of uncertain age  - Patient points to shoulder and elbow as areas of pain and states she cannot hold it up secondary to pain although patient is confused according to daughter  - Xray shoulder : Acromioclavicular arthrosis. High riding humerus which could reflect rotator cuff tear of uncertain age. Bones appear osteopenic with no acute fracture or dislocation. Glenohumeral arthritic changes.  - f/u LUE duplex, r/o dvt   - Ortho c/s for possible rotator cuff tear and LE swelling. No intervention, treat with pain control. Left hand xray for completion    #Hx of Lumbar Compression Fracture  #Hx of Pathologic L1 vertebrae fracture  #Hx of Conus Medullaris Syndrome  - c/w home pain management ( currently stopped gabapentin and robaxin, only on tylenol PRN)  - OP neurosurgery     #chronic HFpEF - not in exacerbation   - TTE 2024- EF 60-65% mod pulm HTN   - Currently euvolemic on exam, hold home oral lasix 20 daily FOR SOFT BP  - start intravenous lasix in 24hrs if lactate and bp stable     #Chronic Atrial Fibrillation with acute RVR  - 's on admission . afib RVR secondary to sepsis   -CHADS2-VASC = 5.   -c/w Eliquis 2.5 mg twice daily   - metoprolol 25mg three times daily with holding parameters     #Progress Note Handoff  Pending (specify):  follow up labs, CC, cultures  Family discussion: house staff updated pt family  Disposition: dw cc, sdu   Decision to admit the pt is based on acuity as above
events noted, on lidia 0.4, was hypotensive, rapid A fib, on abx, NC, NGT, rate control, AC, plan as above, very poor prognosis
Ms. Mortensen was seen and examined at bedside today, this patient with baseline dementia remains on low-dose Levophed despite initiation of midodrine.  Will continue to increase dose as tolerated to wean off pressors.  Continue treatment with Zyvox for pyelonephritis.  Hemoglobin stabilized, resume LMWH.  Maintain in SDU.  I agree with the fellow note, with the exceptions listed in my attestation above.  The remainder of impression and plan per fellow note.
Ms. Mortensen was seen and examined at bedside today, this patient with advanced dementia initially presented with pyelonephritis and culture data demonstrates E faecium.  Patient is on Zyvox and is on midodrine now with an attempt to decrease her dose of Levophed.  She did have a slight drop in her hemoglobin concentrations today, will repeat this test today, will hold LMWH for now while pending repeat.  Stop additional IV fluid infusions.  Maintain patient in SDU for high risk of decompensation and multiple organ dysfunction.  I agree with the fellow note, with the exceptions listed in my attestation above.  The remainder of impression and plan per fellow note.

## 2025-04-15 NOTE — PROGRESS NOTE ADULT - SUBJECTIVE AND OBJECTIVE BOX
Patient is a 89y old Female who presents with a chief complaint of sepsis (15 Apr 2025 08:17)    No acute or major events overnight. Patient was seen at bedside. This morning patient still AOx0 comatous, not opening eyes, not following commands, mildly withdrawing to pain. nonverbal.   HR is better controlled today in the 90s range after starting dig, Pressor requirements decreasing, however patient still have diarrhea and urine is still mildly pink. Patient did not make much urine yesterday  Patient on tube feeds    ALLERGIES:  No Known Allergies    MEDICATIONS:  STANDING MEDICATIONS  apixaban 2.5 milliGRAM(s) Oral every 12 hours  cefepime   IVPB 1000 milliGRAM(s) IV Intermittent every 12 hours  chlorhexidine 2% Cloths 1 Application(s) Topical <User Schedule>  chlorhexidine 2% Cloths 1 Application(s) Topical <User Schedule>  famotidine    Tablet 20 milliGRAM(s) Oral every 48 hours  lactated ringers Bolus 250 milliLiter(s) IV Bolus once  lactobacillus acidophilus 1 Tablet(s) Oral three times a day with meals  linezolid  IVPB 600 milliGRAM(s) IV Intermittent every 12 hours  linezolid  IVPB      metoprolol tartrate 25 milliGRAM(s) Oral three times a day  midodrine 15 milliGRAM(s) Oral every 8 hours  multivitamin 1 Tablet(s) Oral daily  phenylephrine    Infusion 0.1 MICROgram(s)/kG/Min IV Continuous <Continuous>  silver sulfADIAZINE 1% Cream 1 Application(s) Topical two times a day  sodium bicarbonate 1300 milliGRAM(s) Oral three times a day  vancomycin    Solution 125 milliGRAM(s) Oral every 6 hours    PRN MEDICATIONS  acetaminophen     Tablet .. 650 milliGRAM(s) Oral every 6 hours PRN  aluminum hydroxide/magnesium hydroxide/simethicone Suspension 30 milliLiter(s) Oral every 4 hours PRN  haloperidol    Injectable 0.5 milliGRAM(s) IntraMuscular at bedtime PRN  melatonin 3 milliGRAM(s) Oral at bedtime PRN  ondansetron Injectable 4 milliGRAM(s) IV Push every 8 hours PRN      VITALS LAST 24H:  Vital Signs Last 24 Hrs  T(C): 37.3 (15 Apr 2025 04:00), Max: 37.5 (15 Apr 2025 00:00)  T(F): 99.2 (15 Apr 2025 04:00), Max: 99.5 (15 Apr 2025 00:00)  HR: 108 (15 Apr 2025 08:30) (92 - 130)  BP: 97/53 (15 Apr 2025 08:30) (76/41 - 117/63)  BP(mean): 68 (15 Apr 2025 08:30) (53 - 98)  RR: 20 (15 Apr 2025 08:30) (18 - 20)  SpO2: 99% (15 Apr 2025 08:30) (98% - 100%)    Parameters below as of 14 Apr 2025 16:00  Patient On (Oxygen Delivery Method): nasal cannula        PHYSICAL EXAM:  GENERAL: obtunded, frail and appears critically ill  CHEST/LUNG: Decreased BS on the right  HEART: Irregular rate and rhythm  ABDOMEN: Soft, nontender, nondistended, Bowel sounds present  URO: David in place, pink urine  EXTREMITIES:  2+ Peripheral Pulse, no lower extremity edema  NERVOUS SYSTEM:  A&Ox0      LABS:                        10.5   18.41 )-----------( 126      ( 15 Apr 2025 04:41 )             33.2     04-15    129[L]  |  97[L]  |  45[H]  ----------------------------<  122[H]  4.4   |  19  |  1.7[H]    Ca    8.4      15 Apr 2025 04:41  Mg     2.2     04-15    TPro  4.5[L]  /  Alb  3.0[L]  /  TBili  0.4  /  DBili  x   /  AST  26  /  ALT  15  /  AlkPhos  189[H]  04-15      Urinalysis Basic - ( 15 Apr 2025 04:41 )    Color: x / Appearance: x / SG: x / pH: x  Gluc: 122 mg/dL / Ketone: x  / Bili: x / Urobili: x   Blood: x / Protein: x / Nitrite: x   Leuk Esterase: x / RBC: x / WBC x   Sq Epi: x / Non Sq Epi: x / Bacteria: x                RADIOLOGY:  CXR      CT

## 2025-04-15 NOTE — PROGRESS NOTE ADULT - NUTRITIONAL ASSESSMENT
This patient has been assessed with a concern for Malnutrition and has been determined to have a diagnosis/diagnoses of Severe protein-calorie malnutrition.    This patient is being managed with:   Diet DASH/TLC-  Sodium & Cholesterol Restricted  Entered: Apr 7 2025  9:40PM    The following pending diet order is being considered for treatment of Severe protein-calorie malnutrition:  Diet Regular-  Supplement Feeding Modality:  Oral  Ensure Plus High Protein Cans or Servings Per Day:  1       Frequency:  Two Times a day  Entered: Apr 10 2025  9:04PM  
This patient has been assessed with a concern for Malnutrition and has been determined to have a diagnosis/diagnoses of Severe protein-calorie malnutrition.    This patient is being managed with:   Diet NPO with Tube Feed-  Tube Feeding Modality: Nasogastric  Jevity 1.2 Jonh (JEVITY1.2RTH)  Total Volume for 24 Hours (mL): 1200  Bolus  Total Volume of Bolus (mL):  300  Total # of Feeds: 4  Tube Feed Frequency: Every 6 hours   Tube Feed Start Time: 18:00  Bolus Feed Rate (mL per Hour): 200   Bolus Feed Duration (in Hours): 2  Bolus   Total Volume per Flush (mL): 150   Frequency: Every 6 Hours    Start Time: 18:00  Entered: Apr 12 2025  4:38PM    The following pending diet order is being considered for treatment of Severe protein-calorie malnutrition:  Diet NPO with Tube Feed-  Tube Feeding Modality: Nasogastric  Jevity 1.2 Jonh (JEVITY1.2RTH)  Total Volume for 24 Hours (mL): 1600  Bolus  Total Volume of Bolus (mL):  400  Total # of Feeds: 4  Tube Feed Frequency: Every 6 hours   Tube Feed Start Time: 00:00  Bolus Feed Rate (mL per Hour): 400   Bolus Feed Duration (in Hours): 1  Free Water Flush Instructions:  40 mL pre/post feeds -- team to adjust water flushes prn  Entered: Apr 14 2025 11:46AM    Diet Regular-  Supplement Feeding Modality:  Oral  Ensure Plus High Protein Cans or Servings Per Day:  1       Frequency:  Two Times a day  Entered: Apr 10 2025  9:04PM  
This patient has been assessed with a concern for Malnutrition and has been determined to have a diagnosis/diagnoses of Severe protein-calorie malnutrition.    This patient is being managed with:   Diet DASH/TLC-  Sodium & Cholesterol Restricted  Entered: Apr 7 2025  9:40PM    The following pending diet order is being considered for treatment of Severe protein-calorie malnutrition:  Diet Regular-  Supplement Feeding Modality:  Oral  Ensure Plus High Protein Cans or Servings Per Day:  1       Frequency:  Two Times a day  Entered: Apr 10 2025  9:04PM  
This patient has been assessed with a concern for Malnutrition and has been determined to have a diagnosis/diagnoses of Severe protein-calorie malnutrition.    This patient is being managed with:   Diet NPO with Tube Feed-  Tube Feeding Modality: Nasogastric  Jevity 1.2 Jonh (JEVITY1.2RTH)  Total Volume for 24 Hours (mL): 1200  Bolus  Total Volume of Bolus (mL):  300  Total # of Feeds: 4  Tube Feed Frequency: Every 6 hours   Tube Feed Start Time: 18:00  Bolus Feed Rate (mL per Hour): 200   Bolus Feed Duration (in Hours): 2  Bolus   Total Volume per Flush (mL): 150   Frequency: Every 6 Hours    Start Time: 18:00  Entered: Apr 12 2025  4:38PM    The following pending diet order is being considered for treatment of Severe protein-calorie malnutrition:  Diet Regular-  Supplement Feeding Modality:  Oral  Ensure Plus High Protein Cans or Servings Per Day:  1       Frequency:  Two Times a day  Entered: Apr 10 2025  9:04PM  
This patient has been assessed with a concern for Malnutrition and has been determined to have a diagnosis/diagnoses of Severe protein-calorie malnutrition.    This patient is being managed with:   Diet DASH/TLC-  Sodium & Cholesterol Restricted  Entered: Apr 7 2025  9:40PM    The following pending diet order is being considered for treatment of Severe protein-calorie malnutrition:  Diet Regular-  Supplement Feeding Modality:  Oral  Ensure Plus High Protein Cans or Servings Per Day:  1       Frequency:  Two Times a day  Entered: Apr 10 2025  9:04PM  
This patient has been assessed with a concern for Malnutrition and has been determined to have a diagnosis/diagnoses of Severe protein-calorie malnutrition.    This patient is being managed with:   Diet NPO with Tube Feed-  Tube Feeding Modality: Nasogastric  Jevity 1.2 Jonh (JEVITY1.2RTH)  Total Volume for 24 Hours (mL): 1200  Bolus  Total Volume of Bolus (mL):  300  Total # of Feeds: 4  Tube Feed Frequency: Every 6 hours   Tube Feed Start Time: 18:00  Bolus Feed Rate (mL per Hour): 200   Bolus Feed Duration (in Hours): 2  Bolus   Total Volume per Flush (mL): 150   Frequency: Every 6 Hours    Start Time: 18:00  Entered: Apr 12 2025  4:38PM    The following pending diet order is being considered for treatment of Severe protein-calorie malnutrition:  Diet Regular-  Supplement Feeding Modality:  Oral  Ensure Plus High Protein Cans or Servings Per Day:  1       Frequency:  Two Times a day  Entered: Apr 10 2025  9:04PM  
This patient has been assessed with a concern for Malnutrition and has been determined to have a diagnosis/diagnoses of Severe protein-calorie malnutrition.    This patient is being managed with:   Diet NPO with Tube Feed-  Tube Feeding Modality: Nasogastric  Jevity 1.2 Jonh (JEVITY1.2RTH)  Total Volume for 24 Hours (mL): 1600  Bolus  Total Volume of Bolus (mL):  400  Total # of Feeds: 4  Tube Feed Frequency: Every 6 hours   Tube Feed Start Time: 00:00  Bolus Feed Rate (mL per Hour): 400   Bolus Feed Duration (in Hours): 1  Free Water Flush Instructions:  40 mL pre/post feeds -- team to adjust water flushes prn  Entered: Apr 14 2025 11:46AM    Diet Regular-  Supplement Feeding Modality:  Oral  Ensure Plus High Protein Cans or Servings Per Day:  1       Frequency:  Two Times a day  Entered: Apr 10 2025  9:04PM  
This patient has been assessed with a concern for Malnutrition and has been determined to have a diagnosis/diagnoses of Severe protein-calorie malnutrition.    This patient is being managed with:   Diet NPO with Tube Feed-  Tube Feeding Modality: Nasogastric  Jevity 1.2 Jonh (JEVITY1.2RTH)  Total Volume for 24 Hours (mL): 1600  Bolus  Total Volume of Bolus (mL):  400  Total # of Feeds: 4  Tube Feed Frequency: Every 6 hours   Tube Feed Start Time: 00:00  Bolus Feed Rate (mL per Hour): 400   Bolus Feed Duration (in Hours): 1  Free Water Flush Instructions:  40 mL pre/post feeds -- team to adjust water flushes prn  Entered: Apr 14 2025 11:46AM    Diet Regular-  Supplement Feeding Modality:  Oral  Ensure Plus High Protein Cans or Servings Per Day:  1       Frequency:  Two Times a day  Entered: Apr 10 2025  9:04PM  
This patient has been assessed with a concern for Malnutrition and has been determined to have a diagnosis/diagnoses of Severe protein-calorie malnutrition.    This patient is being managed with:   Diet DASH/TLC-  Sodium & Cholesterol Restricted  Entered: Apr 7 2025  9:40PM    The following pending diet order is being considered for treatment of Severe protein-calorie malnutrition:  Diet Regular-  Supplement Feeding Modality:  Oral  Ensure Plus High Protein Cans or Servings Per Day:  1       Frequency:  Two Times a day  Entered: Apr 10 2025  9:04PM  
This patient has been assessed with a concern for Malnutrition and has been determined to have a diagnosis/diagnoses of Severe protein-calorie malnutrition.    This patient is being managed with:   Diet NPO with Tube Feed-  Tube Feeding Modality: Nasogastric  Jevity 1.2 Jonh (JEVITY1.2RTH)  Total Volume for 24 Hours (mL): 1600  Bolus  Total Volume of Bolus (mL):  400  Total # of Feeds: 4  Tube Feed Frequency: Every 6 hours   Tube Feed Start Time: 00:00  Bolus Feed Rate (mL per Hour): 400   Bolus Feed Duration (in Hours): 1  Free Water Flush Instructions:  40 mL pre/post feeds -- team to adjust water flushes prn  Entered: Apr 14 2025 11:46AM    Diet Regular-  Supplement Feeding Modality:  Oral  Ensure Plus High Protein Cans or Servings Per Day:  1       Frequency:  Two Times a day  Entered: Apr 10 2025  9:04PM

## 2025-04-15 NOTE — PROGRESS NOTE ADULT - ASSESSMENT
89  year old F, pmhx of chronic afib (on Eliquis), GERD, dementia, FTT , Orthostatic hypotension on midodrine ,Patient presents for evaluation of change in mental status.  Daughter states over the past few months patient has been having increasing cognitive issues including hallucinations poor sleep.  Daughter states that yesterday patient seemed to be getting sick and she was given Tylenol at night.  Patient is on Macrobid for UTI prophylaxis due to frequent UTIs.  This daughter had several concerns regarding patient including further decline in mental status/increased confusion/decreased ability to follow commands.  Daughter states it appeared like the left eye had not been opening as much is normal but now it is opening normally.  Patient also complained of left upper extremity pain and swelling.  Patient was unable to raise her left upper extremity secondary to pain.  No trauma.  Patient with decreased p.o. intake but was able to eat a little yesterday. Admitted to SDU for sepsis secondary to complicated UTI/Pylenephritis      A/P   #TME 2/2 Severe Sepsis present on admission secondary to acute pyelonephritis/ Hx of recurrent UTIs/ lactic acidosis likley 2/2 Sepsis and hypotension / Suspected PNA   -give  Albumin infusion, taper off pressors at tolerated, keep MAP above 60  - c/w  Midodrine 15 mg Q 8 houtds    - ID is following, recommendations noted:   -continue with Cefepime to 1 gm iv q12h and  Linezolid 600 mg q12h to IV  - repeat urine Cx is NTD , pt needs ID follow up   - check procalcitonin level   - CXR noted   - check nasal swab for MRSA   - Apply silver sulfadiazine cream to b/l lower extremities under dressing  - trend WBC       # AMS/ toxic metabolic encephalopathy   - NPO , NGT feedings   - supportive care, fall and aspiration precautions   - treat underlying infection   - will repeat HCT when more stable and off pressors     # Gross hematuria / acute blood loss anemia  -  Lovenox held on 4/12  - monitor H/H, keep Hb above 7.5, stable now   -  anemia work up     #Chronic Atrial Fibrillation with acute RVR  -CHADS2-VASC = 5.   -  therapeutic Lovenox  held ( Hb dropped due to gross hematuria)  - EP follow up , load with  Digoxin  for better rate control   - hold off with Amiodarone ( AC was held)    - resume eliquis 2.5 mg Q 12 hours     #Sacral ulcer  - un stageable sacral ulcer with minimal white discharge.  - wound care eval   - change position Q 2 hours, off load pressure points   - consulted by  dietitian     #Left shoulder pain and UE swelling  - can start Lidocaine topical   - Xray shoulder : Acromioclavicular arthrosis. High riding humerus which could reflect rotator cuff tear of uncertain age. Bones appear osteopenic with no acute fracture or dislocation. Glenohumeral arthritic changes.  - f/u LUE duplex negative for DVT   - keep LUE elevated     #Hx of Lumbar Compression Fracture  #Hx of Pathologic L1 vertebrae fracture  #Hx of Conus Medullaris Syndrome  - was on  gabapentin and robaxin,  tylenol PRN at home   - held on admission, will give IV Tylenol PRN for pain   - OP neurosurgery f/u     #chronic HFpEF / PHTN   - not in exacerbation   - TTE 1/28/2024- EF 60-65% mod pulm HTN   - Currently euvolemic on exam  - monitor for fluid overload     # diarrhea  - consult dietitian   - start Probiotics  - add banana flakes to feedings   - monitor and replete electrolytes   - maintain volume status         # GI prophylaxis     #Progress Note Handoff  Pending (specify):  give Albumin, taper off pressors, keep MAP above 60, monitor Na level, BUN/cr and urine output, EP follow up for rate control,  load with Dognoxin, hold off with Amiodarone ( AC held), supportive care, consult dietitian, add probiotics ,  lower volume in bolus feeds ( pt developed diarrhea) , check IVC, maintain fluid balance, resume Eliquis    Family discussion: I spoke with pt's daughter at the bedside, she agreed with a plan of care   Disposition: SDU    89  year old F, pmhx of chronic afib (on Eliquis), GERD, dementia, FTT , Orthostatic hypotension on midodrine ,Patient presents for evaluation of change in mental status.  Daughter states over the past few months patient has been having increasing cognitive issues including hallucinations poor sleep.  Daughter states that yesterday patient seemed to be getting sick and she was given Tylenol at night.  Patient is on Macrobid for UTI prophylaxis due to frequent UTIs.  This daughter had several concerns regarding patient including further decline in mental status/increased confusion/decreased ability to follow commands.  Daughter states it appeared like the left eye had not been opening as much is normal but now it is opening normally.  Patient also complained of left upper extremity pain and swelling.  Patient was unable to raise her left upper extremity secondary to pain.  No trauma.  Patient with decreased p.o. intake but was able to eat a little yesterday. Admitted to SDU for sepsis secondary to complicated UTI/Pylenephritis      A/P   #TME 2/2 Severe Sepsis present on admission secondary to acute pyelonephritis/ Hx of recurrent UTIs/ lactic acidosis likley 2/2 Sepsis and hypotension / Suspected PNA / R/o C. dif/ Diarrhea   - check IVC, will give fluids if collapsable ( pt has diarrhea), received 250 ml this morning,  keep MAP above 60  - c/w  Midodrine 15 mg Q 8 hours, on Phenylephine now , taper off as tolerated    - ID follow up today , pt completed 7 days course of systemic Abx, developed thrombocytopenia ans worsening leukocytosis with diarrhea, d/c Abx if OK with ID   - treated with  Cefepime to 1 gm iv q12h and  Linezolid 600 mg q12h to IV  - repeat urine Cx is NTD  - f/u  procalcitonin level   - CXR noted   - Apply silver sulfadiazine cream to b/l lower extremities under dressing  - trend WBC   - start po Vancomycin, check stool for C.Dif, avoid PPIs, c/w Probiotics   - maintain fluids balance, monitor and replete electrolytes      # AMS/ toxic metabolic encephalopathy   - NPO , NGT feedings   - supportive care, fall and aspiration precautions   - treat underlying infection   - will repeat HCT when more stable and off pressors     # HIRAL / Hypovolemic hyponatremia   - likely prerenal, pt has diarrhea   - check IVC, IV hydration if collapsable   - flush David cath   - monitor BUN/cr and urine output   - keep MAP above 60   - c/w sodium bicarb, monitor bicarb level     # Gross hematuria / acute blood loss anemia  - monitor H/H, keep Hb above 7.5, stable now   - hematuria is resolving now, flush David today     #Chronic Atrial Fibrillation with acute RVR  -CHADS2-VASC = 5.   -  therapeutic Lovenox  held ( Hb dropped due to gross hematuria)  - EP consulted, pt was loaded with  Digoxin , HR is  better rate controlled now   - now on  Eliquis 2.5 mg Q 12 hours ( AC restarted on 4/14)     #Sacral ulcer  - un stageable sacral ulcer with minimal white discharge.  - wound care eval   - change position Q 2 hours, off load pressure points   - consulted by  dietitian     #Left shoulder pain and UE swelling  - Lidocaine topical   - Xray shoulder : Acromioclavicular arthrosis. High riding humerus which could reflect rotator cuff tear of uncertain age. Bones appear osteopenic with no acute fracture or dislocation. Glenohumeral arthritic changes.  - f/u LUE duplex negative for DVT   - keep LUE elevated     #Hx of Lumbar Compression Fracture  #Hx of Pathologic L1 vertebrae fracture  #Hx of Conus Medullaris Syndrome  - was on  gabapentin and robaxin,  tylenol PRN at home   - held on admission,  IV Tylenol PRN for pain PRN   - may start standing Tylenol if pt is uncomfortable   - OP neurosurgery f/u     #chronic HFpEF / PHTN   - not in exacerbation   - TTE 1/28/2024- EF 60-65% mod pulm HTN   - monitor for fluid overload, check IVC     # GI prophylaxis   - on Pepcid and Probiotics     # DVT prophylaxis   - on Eliquis     #Progress Note Handoff  Pending (specify):  check IVC, give fluids ( pt developed worsening HIRAL),  taper off pressors, keep MAP above 60, monitor Na level, BUN/cr and urine output,  flush Davdi, start po Vanco, check stool for C.dif, GI PCR and WBC, monitor electrolytes and Na level, ID f/u to comment on duration of systemic Abx, f/u  procalcitonin level,  monitor platelets ( dropped, likely Zyvox side effect),  supportive care, if pt is uncomfortable start standing Tylenol for pain control.   Family discussion: I spoke with pt's daughter at the bedside, she agreed with a plan of care   Disposition: SDU

## 2025-04-15 NOTE — PROGRESS NOTE ADULT - SUBJECTIVE AND OBJECTIVE BOX
Patient is a 89y old  Female who presents with a chief complaint of sepsis (14 Apr 2025 17:28)      Over Night Events:  Patient seen and examined. on 0.24 of neo. sating well on NC. not following commands. arousable      ROS:  See HPI    PHYSICAL EXAM    ICU Vital Signs Last 24 Hrs  T(C): 37.3 (15 Apr 2025 04:00), Max: 37.5 (15 Apr 2025 00:00)  T(F): 99.2 (15 Apr 2025 04:00), Max: 99.5 (15 Apr 2025 00:00)  HR: 101 (15 Apr 2025 04:00) (98 - 130)  BP: 107/55 (15 Apr 2025 04:00) (76/41 - 117/63)  BP(mean): 98 (15 Apr 2025 04:00) (53 - 98)  ABP: --  ABP(mean): --  RR: 18 (15 Apr 2025 04:00) (18 - 18)  SpO2: 100% (15 Apr 2025 04:00) (97% - 100%)    O2 Parameters below as of 14 Apr 2025 16:00  Patient On (Oxygen Delivery Method): nasal cannula            General: ill appearing              Lymph Nodes: NO cervical LN   Lungs: Bilateral crackles  Cardiovascular: Regular   Abdomen: Soft, Positive BS  Extremities: No clubbing   Skin: warm   Neurological: A&o x0  Musculoskeletal: move all ext     I&O's Detail    14 Apr 2025 07:01  -  15 Apr 2025 07:00  --------------------------------------------------------  IN:    Enteral Tube Flush: 80 mL    IV PiggyBack: 200 mL    Jevity 1.2: 1200 mL    Lactated Ringers Bolus: 250 mL  Total IN: 1730 mL    OUT:    Indwelling Catheter - Urethral (mL): 150 mL  Total OUT: 150 mL    Total NET: 1580 mL          LABS:                          10.5   18.41 )-----------( 126      ( 15 Apr 2025 04:41 )             33.2         15 Apr 2025 04:41    129    |  97     |  45     ----------------------------<  122    4.4     |  19     |  1.7      Ca    8.4        15 Apr 2025 04:41  Mg     2.2       15 Apr 2025 04:41    TPro  4.5    /  Alb  3.0    /  TBili  0.4    /  DBili  x      /  AST  26     /  ALT  15     /  AlkPhos  189    15 Apr 2025 04:41  Amylase x     lipase x                                                                                        Urinalysis Basic - ( 15 Apr 2025 04:41 )    Color: x / Appearance: x / SG: x / pH: x  Gluc: 122 mg/dL / Ketone: x  / Bili: x / Urobili: x   Blood: x / Protein: x / Nitrite: x   Leuk Esterase: x / RBC: x / WBC x   Sq Epi: x / Non Sq Epi: x / Bacteria: x                                                                                                                                                     MEDICATIONS  (STANDING):  apixaban 2.5 milliGRAM(s) Oral every 12 hours  cefepime   IVPB 1000 milliGRAM(s) IV Intermittent every 12 hours  chlorhexidine 2% Cloths 1 Application(s) Topical <User Schedule>  chlorhexidine 2% Cloths 1 Application(s) Topical <User Schedule>  famotidine    Tablet 20 milliGRAM(s) Oral every 48 hours  lactated ringers Bolus 250 milliLiter(s) IV Bolus once  lactobacillus acidophilus 1 Tablet(s) Oral three times a day with meals  linezolid  IVPB 600 milliGRAM(s) IV Intermittent every 12 hours  linezolid  IVPB      metoprolol tartrate 25 milliGRAM(s) Oral three times a day  midodrine 15 milliGRAM(s) Oral every 8 hours  multivitamin 1 Tablet(s) Oral daily  phenylephrine    Infusion 0.1 MICROgram(s)/kG/Min (2.38 mL/Hr) IV Continuous <Continuous>  silver sulfADIAZINE 1% Cream 1 Application(s) Topical two times a day  sodium bicarbonate 1300 milliGRAM(s) Oral three times a day    MEDICATIONS  (PRN):  acetaminophen     Tablet .. 650 milliGRAM(s) Oral every 6 hours PRN Temp greater or equal to 38C (100.4F), Mild Pain (1 - 3)  aluminum hydroxide/magnesium hydroxide/simethicone Suspension 30 milliLiter(s) Oral every 4 hours PRN Dyspepsia  haloperidol    Injectable 0.5 milliGRAM(s) IntraMuscular at bedtime PRN Agitation  melatonin 3 milliGRAM(s) Oral at bedtime PRN Insomnia  ondansetron Injectable 4 milliGRAM(s) IV Push every 8 hours PRN Nausea and/or Vomiting               Patient is a 89y old  Female who presents with a chief complaint of sepsis (14 Apr 2025 17:28)      Over Night Events:  Patient seen and examined. on 0.24 of neo. sating well on NC. not following commands. arousable, low grade fever    PHYSICAL EXAM    ICU Vital Signs Last 24 Hrs  T(C): 37.3 (15 Apr 2025 04:00), Max: 37.5 (15 Apr 2025 00:00)  T(F): 99.2 (15 Apr 2025 04:00), Max: 99.5 (15 Apr 2025 00:00)  HR: 101 (15 Apr 2025 04:00) (98 - 130)  BP: 107/55 (15 Apr 2025 04:00) (76/41 - 117/63)  BP(mean): 98 (15 Apr 2025 04:00) (53 - 98)  RR: 18 (15 Apr 2025 04:00) (18 - 18)  SpO2: 100% (15 Apr 2025 04:00) (97% - 100%)    O2 Parameters below as of 14 Apr 2025 16:00  Patient On (Oxygen Delivery Method): nasal cannula            General: ill appearing              pale  Lungs: Bilateral crackles  Cardiovascular: LOUISA 2.6  Abdomen: Soft, Positive BS  Extremities: No clubbing   Neurological: A&o x0  Musculoskeletal: move all ext     I&O's Detail    14 Apr 2025 07:01  -  15 Apr 2025 07:00  --------------------------------------------------------  IN:    Enteral Tube Flush: 80 mL    IV PiggyBack: 200 mL    Jevity 1.2: 1200 mL    Lactated Ringers Bolus: 250 mL  Total IN: 1730 mL    OUT:    Indwelling Catheter - Urethral (mL): 150 mL  Total OUT: 150 mL    Total NET: 1580 mL          LABS:                          10.5   18.41 )-----------( 126      ( 15 Apr 2025 04:41 )             33.2         15 Apr 2025 04:41    129    |  97     |  45     ----------------------------<  122    4.4     |  19     |  1.7      Ca    8.4        15 Apr 2025 04:41  Mg     2.2       15 Apr 2025 04:41    TPro  4.5    /  Alb  3.0    /  TBili  0.4    /  DBili  x      /  AST  26     /  ALT  15     /  AlkPhos  189    15 Apr 2025 04:41  Amylase x     lipase x                                                                                        Urinalysis Basic - ( 15 Apr 2025 04:41 )    Color: x / Appearance: x / SG: x / pH: x  Gluc: 122 mg/dL / Ketone: x  / Bili: x / Urobili: x   Blood: x / Protein: x / Nitrite: x   Leuk Esterase: x / RBC: x / WBC x   Sq Epi: x / Non Sq Epi: x / Bacteria: x                                                                                                                                                     MEDICATIONS  (STANDING):  apixaban 2.5 milliGRAM(s) Oral every 12 hours  cefepime   IVPB 1000 milliGRAM(s) IV Intermittent every 12 hours  chlorhexidine 2% Cloths 1 Application(s) Topical <User Schedule>  chlorhexidine 2% Cloths 1 Application(s) Topical <User Schedule>  famotidine    Tablet 20 milliGRAM(s) Oral every 48 hours  lactated ringers Bolus 250 milliLiter(s) IV Bolus once  lactobacillus acidophilus 1 Tablet(s) Oral three times a day with meals  linezolid  IVPB 600 milliGRAM(s) IV Intermittent every 12 hours  linezolid  IVPB      metoprolol tartrate 25 milliGRAM(s) Oral three times a day  midodrine 15 milliGRAM(s) Oral every 8 hours  multivitamin 1 Tablet(s) Oral daily  phenylephrine    Infusion 0.1 MICROgram(s)/kG/Min (2.38 mL/Hr) IV Continuous <Continuous>  silver sulfADIAZINE 1% Cream 1 Application(s) Topical two times a day  sodium bicarbonate 1300 milliGRAM(s) Oral three times a day    MEDICATIONS  (PRN):  acetaminophen     Tablet .. 650 milliGRAM(s) Oral every 6 hours PRN Temp greater or equal to 38C (100.4F), Mild Pain (1 - 3)  aluminum hydroxide/magnesium hydroxide/simethicone Suspension 30 milliLiter(s) Oral every 4 hours PRN Dyspepsia  haloperidol    Injectable 0.5 milliGRAM(s) IntraMuscular at bedtime PRN Agitation  melatonin 3 milliGRAM(s) Oral at bedtime PRN Insomnia  ondansetron Injectable 4 milliGRAM(s) IV Push every 8 hours PRN Nausea and/or Vomiting

## 2025-04-15 NOTE — PROGRESS NOTE ADULT - ASSESSMENT
89F w/  recurrent UTI, orthostatic hypotension on midodrine, chronic afib, GERD admitted to metabolic encephalopathy 2/2 septic shock 2/2 acute pyelonephritis    Spoke with patient's daughter at bedside regarding goals of care going forward. Confirmed DNR/DNI. She wanted to have a discussion regarding comfort measures going forward since the patient has had a significant decline recently. She feels as though performing these continued interventions would be futile and would therefore would just want to make her comfortable. She wants to continue the high flow nasal cannula and the pressors at current rate without adjusting them further. She was agreeable to initiating DNR/DNI/CMO with no escalation of care. Filled out and placed MOLST in chart.    - DNR/DNI/CMO  - no labs  - no IVF  - no artificial feeding  - no vitals  - no pulse ox  - no increase in 02 supplementation  - no injections  - no FS  - no escalation of care, no increase in pressor support  - will follow  Morphine 1 mg IV Q2H PRN for pain or dyspnea  Haldol 0.5 mg IV Q2H PRN for anxiety or agitation     89F w/  recurrent UTI, orthostatic hypotension on midodrine, chronic afib, GERD admitted to metabolic encephalopathy 2/2 septic shock 2/2 acute pyelonephritis    Spoke with patient's daughter at bedside regarding goals of care going forward. Confirmed DNR/DNI. She wanted to have a discussion regarding comfort measures going forward since the patient has had a significant decline recently. She feels as though performing these continued interventions would be futile and would therefore would just want to make her comfortable. She wants to continue the high flow nasal cannula and the pressors at current rate without adjusting them further. She was agreeable to initiating DNR/DNI/CMO with no escalation of care. Filled out and placed MOLST in chart.    - DNR/DNI/CMO  - no labs  - no IVF  - no artificial feeding  - no vitals  - no pulse ox  - no increase in 02 supplementation  - no injections  - no FS  - no escalation of care, no increase in pressor support  - will follow  Morphine 1 mg IV Q2H PRN for pain or dyspnea  Ativan 0.5 mg IV Q2H PRN for anxiety or agitation

## 2025-04-15 NOTE — PROGRESS NOTE ADULT - PROBLEM SELECTOR PROBLEM 5
Advanced directives, counseling/discussion

## 2025-04-15 NOTE — PROGRESS NOTE ADULT - SUBJECTIVE AND OBJECTIVE BOX
SUNNY COLBERT  89y, Female    All available historical data reviewed    OVERNIGHT EVENTS:  none    ROS:  unable to obtain history secondary to patient's mental status   no pressors  no fevers  rectal tube  hanson in place  NC 2 lit    VITALS:  T(F): 97.9, Max: 99.5 (04-15-25 @ 00:00)  HR: 103  BP: 85/52  RR: 20Vital Signs Last 24 Hrs  T(C): 36.6 (15 Apr 2025 11:00), Max: 37.5 (15 Apr 2025 00:00)  T(F): 97.9 (15 Apr 2025 11:00), Max: 99.5 (15 Apr 2025 00:00)  HR: 103 (15 Apr 2025 13:15) (78 - 148)  BP: 85/52 (15 Apr 2025 13:15) (81/49 - 117/63)  BP(mean): 60 (15 Apr 2025 13:15) (54 - 98)  RR: 20 (15 Apr 2025 11:00) (18 - 20)  SpO2: 99% (15 Apr 2025 11:00) (98% - 100%)    Parameters below as of 2025 16:00  Patient On (Oxygen Delivery Method): nasal cannula        TESTS & MEASUREMENTS:                        10.5   18.41 )-----------( 126      ( 15 Apr 2025 04:41 )             33.2     04-15    129[L]  |  97[L]  |  45[H]  ----------------------------<  122[H]  4.4   |  19  |  1.7[H]    Ca    8.4      15 Apr 2025 04:41  Mg     2.2     04-15    TPro  4.5[L]  /  Alb  3.0[L]  /  TBili  0.4  /  DBili  x   /  AST  26  /  ALT  15  /  AlkPhos  189[H]  04-15    LIVER FUNCTIONS - ( 15 Apr 2025 04:41 )  Alb: 3.0 g/dL / Pro: 4.5 g/dL / ALK PHOS: 189 U/L / ALT: 15 U/L / AST: 26 U/L / GGT: x             Culture - Urine (collected 04-10-25 @ 10:20)  Source: Clean Catch Clean Catch (Midstream)  Final Report (25 @ 23:50):    <10,000 CFU/mL Normal Urogenital Lorena      Urinalysis Basic - ( 15 Apr 2025 13:53 )    Color: Red / Appearance: Turbid / S.023 / pH: x  Gluc: x / Ketone: Negative mg/dL  / Bili: Negative / Urobili: 0.2 mg/dL   Blood: x / Protein: 300 mg/dL / Nitrite: Negative   Leuk Esterase: Large / RBC: x / WBC x   Sq Epi: x / Non Sq Epi: x / Bacteria: x          Social History:  Tobacco Use: No  Alcohol Use: No  Drug Use: No    RADIOLOGY & ADDITIONAL TESTS:  Personal review of radiological diagnostics performed  Echo and EKG results noted when applicable.     MEDICATIONS:  acetaminophen     Tablet .. 650 milliGRAM(s) Oral every 6 hours PRN  aluminum hydroxide/magnesium hydroxide/simethicone Suspension 30 milliLiter(s) Oral every 4 hours PRN  apixaban 2.5 milliGRAM(s) Oral every 12 hours  cefepime   IVPB 1000 milliGRAM(s) IV Intermittent every 12 hours  chlorhexidine 2% Cloths 1 Application(s) Topical <User Schedule>  chlorhexidine 2% Cloths 1 Application(s) Topical <User Schedule>  famotidine    Tablet 20 milliGRAM(s) Oral every 48 hours  haloperidol    Injectable 0.5 milliGRAM(s) IntraMuscular at bedtime PRN  lactated ringers Bolus 250 milliLiter(s) IV Bolus once  lactated ringers. 1000 milliLiter(s) IV Continuous <Continuous>  lactobacillus acidophilus 1 Tablet(s) Oral three times a day with meals  linezolid  IVPB 600 milliGRAM(s) IV Intermittent every 12 hours  linezolid  IVPB      melatonin 3 milliGRAM(s) Oral at bedtime PRN  metoprolol tartrate 25 milliGRAM(s) Oral three times a day  midodrine 20 milliGRAM(s) Oral every 8 hours  multivitamin 1 Tablet(s) Oral daily  ondansetron Injectable 4 milliGRAM(s) IV Push every 8 hours PRN  phenylephrine    Infusion 0.1 MICROgram(s)/kG/Min IV Continuous <Continuous>  silver sulfADIAZINE 1% Cream 1 Application(s) Topical two times a day  sodium bicarbonate 1300 milliGRAM(s) Oral three times a day      ANTIBIOTICS:  cefepime   IVPB 1000 milliGRAM(s) IV Intermittent every 12 hours  linezolid  IVPB 600 milliGRAM(s) IV Intermittent every 12 hours  linezolid  IVPB

## 2025-04-15 NOTE — PROGRESS NOTE ADULT - ASSESSMENT
IMPRESSION:    AMS/TME  Sepsis/ UTI/ Pyelonephritis   hypotension on pressors  HIRAL  Metabolic acidosis/mixed  bibasilar atelectasis/effusion  Rapid A fib  HO afib (on Eliquis),  HO dementia  HI Orthostatic hypotension on midodrine       PLAN:    CNS: Avoid CNS depressants. on haldol prn for agitation    HEENT: Oral care    PULMONARY:  HOB @ 45 degrees, on NC, aspiration precaution, keep Sao2 92 to 96%, Incentive iron, CXR noted bilateral edema    CARDIOVASCULAR: echo noted hfmref . keep even to negative fluid balance. Midodrine to 20Tid, keep MAP >60. amiodarone drip for rate control. Dig level noted hold further dosing until renal function recovers    GI: GI prophylaxis, NG feeds,       RENAL:  F/u  lytes. Correct as needed. c/w Po bicarb.    INFECTIOUS DISEASE: continue with Cefepime and Linezolid per id     HEMATOLOGICAL:  DVT prophylaxis. screen HIT. therapeutic AC with eliquis PO    ENDOCRINE:  Follow up FS.  Insulin protocol if needed.    SDU  Poor prognosis  GOC discussion - palliative care follow up   IMPRESSION:    AMS/TME  Sepsis/ UTI/ Pyelonephritis   hypotension on pressors  HIRAL  Metabolic acidosis/mixed  bibasilar atelectasis/effusion  Rapid A fib  HO afib (on Eliquis),  HO dementia  HI Orthostatic hypotension on midodrine       PLAN:    CNS: Avoid CNS depressants. on haldol prn for agitation    HEENT: Oral care    PULMONARY:  HOB @ 45 degrees, on NC, aspiration precaution, keep Sao2 92 to 96%, Incentive iron, CXR noted bilateral edema    CARDIOVASCULAR: echo noted hfmref . keep even to negative fluid balance. Midodrine to 20Tid, keep MAP >60.     GI: GI prophylaxis, NG feeds,       RENAL:  F/u  lytes. Correct as needed. c/w Po bicarb.    INFECTIOUS DISEASE: check GI PCR and C diff. continue with Cefepime and Linezolid per id follow up on end date    HEMATOLOGICAL:  DVT prophylaxis. screen HIT. therapeutic AC with eliquis PO    ENDOCRINE:  Follow up FS.  Insulin protocol if needed.    SDU  Poor prognosis  GOC discussion - palliative care follow up   IMPRESSION:    AMS/TME  Sepsis/ UTI/ Pyelonephritis   hypotension on pressors  HIRAL  Metabolic acidosis/mixed  bibasilar atelectasis/effusion  Rapid A fib  HO afib (on Eliquis),  HO dementia  HI Orthostatic hypotension on midodrine       PLAN:    CNS: Avoid CNS depressants. on haldol prn for agitation    HEENT: Oral care    PULMONARY:  HOB @ 45 degrees, on NC, aspiration precaution, keep Sao2 92 to 96%, Incentive iron, CXR noted bilateral edema    CARDIOVASCULAR: echo noted hfmref . keep even to negative fluid balance. Midodrine to 20Tid, keep MAP >60.     GI: GI prophylaxis, NG feeds,       RENAL:  F/u  lytes. Correct as needed. c/w Po bicarb.    INFECTIOUS DISEASE: ABX PER ID, procal./ fungitell, consider changing, ABX    HEMATOLOGICAL:  DVT prophylaxis. screen HIT. therapeutic AC with eliquis PO    ENDOCRINE:  Follow up FS.  Insulin protocol if needed.    SDU  Poor prognosis  GOC discussion - palliative care follow up

## 2025-04-15 NOTE — PROGRESS NOTE ADULT - GASTROINTESTINAL
soft/nontender/nondistended/no guarding/no rigidity
normal/soft/nontender/nondistended/normal active bowel sounds
soft/nontender/nondistended/no guarding/no rigidity
soft/nontender/nondistended/no guarding/no rigidity

## 2025-04-15 NOTE — PROGRESS NOTE ADULT - SUBJECTIVE AND OBJECTIVE BOX
89  year old F, pmhx of chronic afib (on Eliquis), GERD, dementia, FTT , Orthostatic hypotension on midodrine  Patient presents for evaluation of change in mental status.  Daughter states over the past few months patient has been having increasing cognitive issues including hallucinations poor sleep.  Daughter states that  patient seemed to be getting sick and she was given Tylenol at night.  Patient is on Macrobid for UTI prophylaxis due to frequent UTIs.  This daughter had several concerns regarding patient including further decline in mental status/increased confusion/decreased ability to follow commands.  Daughter states it appeared like the left eye had not been opening as much is normal but now it is opening normally.  Patient also complained of left upper extremity pain and swelling.  Patient was unable to raise her left upper extremity secondary to pain.  No trauma.  Patient with decreased p.o. intake.   Pt was admitted to SDU with Acute pyelonephritis with mild right hydroureteronephrosis , urine Cx is growing  E faeium, she has Metabolic encephalopathy with underlying dementia.   She  was found to have A-fib with RVR, was hypotensive, BP improved after fluid resuscitation HR controlled with BB. ID recommended to add  Zyvox  to Abx regimen.   Pt  developed gross hematuria,  AC  held on 4/12,  NGT was put  in for feedings and medications. Hb was stable, HR controlled with Digoxin AC resumed on 4/14 , pt developed diarrhea and worsening leukocytosis, pt was started on po Vanco, will f/u stool for C.dif and GI PCR, will check IVC today and give fluids, pt developed worsening HIRAL.   Today pt is moving her head from side to side, unresponsive.     PAST MEDICAL & SURGICAL HISTORY:  Chronic atrial fibrillation      GERD (gastroesophageal reflux disease)      Chronic lower back pain      No significant past surgical history      Vital Signs Last 24 Hrs  T(C): 37.3 (15 Apr 2025 04:00), Max: 37.5 (15 Apr 2025 00:00)  T(F): 99.2 (15 Apr 2025 04:00), Max: 99.5 (15 Apr 2025 00:00)  HR: 101 (15 Apr 2025 04:00) (98 - 130)  BP: 107/55 (15 Apr 2025 04:00) (76/41 - 117/63)  BP(mean): 98 (15 Apr 2025 04:00) (53 - 98)  RR: 18 (15 Apr 2025 04:00) (18 - 18)  SpO2: 100% (15 Apr 2025 04:00) (97% - 100%)    Parameters below as of 14 Apr 2025 16:00  Patient On (Oxygen Delivery Method): nasal cannula        PHYSICAL EXAM:  GENERAL:  frail with temporal muscle waisting with her eyes closed   HEAD:  Atraumatic, Normocephalic  NECK: Supple, No JVD, Normal thyroid  NERVOUS SYSTEM: altered,  does not follow commands   CHEST/LUNG: decreased BS at bases   HEART: irregularly irregular, tachycardic   ABDOMEN: Soft, Nontender, Nondistended; Bowel sounds present  EXTREMITIES:  dressing noted on LE b/l below knees   SKIN: paper thin skin with multiply skin tears on UE       LABS:                                10.5   18.41 )-----------( 126      ( 15 Apr 2025 04:41 )             33.2   04-15    129[L]  |  97[L]  |  45[H]  ----------------------------<  122[H]  4.4   |  19  |  1.7[H]    Ca    8.4      15 Apr 2025 04:41  Mg     2.2     04-15    TPro  4.5[L]  /  Alb  3.0[L]  /  TBili  0.4  /  DBili  x   /  AST  26  /  ALT  15  /  AlkPhos  189[H]  04-15        Urinalysis Basic - ( 09 Apr 2025 04:45 )    Color: x / Appearance: x / SG: x / pH: x  Gluc: 82 mg/dL / Ketone: x  / Bili: x / Urobili: x   Blood: x / Protein: x / Nitrite: x   Leuk Esterase: x / RBC: x / WBC x   Sq Epi: x / Non Sq Epi: x / Bacteria: x    Urinalysis with Rflx Culture (collected 07 Apr 2025 12:05)    Culture - Urine (collected 07 Apr 2025 12:05)  Source: Catheterized None  Final Report (09 Apr 2025 12:53):    >100,000 CFU/ml Enterococcus faecium  Culture - Urine (04.07.25 @ 12:05)   - Ampicillin: R >8 Predicts results to ampicillin/sulbactam, amoxacillin-clavulanate and piperacillin-tazobactam.  - Ciprofloxacin: I 2  - Levofloxacin: I 4  - Nitrofurantoin: S <=32 Should not be used to treat pyelonephritis.  - Tetracycline: R >8  - Vancomycin: S 0.5  Specimen Source: Catheterized None  Culture Results:   >100,000 CFU/ml Enterococcus faecium  Organism Identification: Enterococcus faecium  Organism: Enterococcus faeciumOrganism: Enterococcus faecium (09 Apr 2025 12:53)  Organism: Enterococcus faecium (09 Apr 2025 12:53)    Culture - Blood (collected 07 Apr 2025 12:02)  Source: Blood Blood-Peripheral  Preliminary Report (08 Apr 2025 22:02):    No growth at 24 hours      Culture - Blood (collected 07 Apr 2025 12:02)  Source: Blood Blood-Peripheral  Preliminary Report (08 Apr 2025 22:02):    No growth at 24 hours      Culture - Urine (04.10.25 @ 10:20)   Specimen Source: Clean Catch Clean Catch (Midstream)  Culture Results:   <10,000 CFU/mL Normal Urogenital Lorena  RADIOLOGY & ADDITIONAL TESTS:  < from: Xray Hand 3 Views, Left (04.08.25 @ 12:59) >  impression:    Soft tissue swelling suggested at the level of the fifth digit.    The bones are diffusely osteopenic. There are arthritic changes with   joint space narrowing and osteophyte formation. Findings appear most   pronounced at the second distal interphalangeal joint. Arthritic changes   are also noted at the basal joint. There is no acute fracture.    Chondrocalcinosis within the region of the triangular fibrocartilage    < from: [facility] Duplex Upper Ext Vein Scan, Left (04.08.25 @ 10:33) >  IMPRESSION:  No evidence of left upper extremity deep venous thrombosis. Study limited   due to patient positioning and overlying dressings      < from: [facility] Duplex Lower Ext Vein Scan, Bilat (04.08.25 @ 10:30) >  IMPRESSION:  No evidence of DVT in the visualized portions of the bilateral lower   extremities. Visualization limited secondary to dressings in the lower   extremities and limb contractures    < from: TTE Echo Complete w/o Contrast w/ Doppler (04.09.25 @ 10:47) >  Summary:   1. Left ventricular ejection fraction, by visual estimation, is 45 to   50%.   2. Mildly decreased global left ventricular systolic function.   3. The left ventricular diastolic function could not be assessed in this   study.   4. Severely reduced RV systolic function.   5. Severely enlarged right atrium.   6. Left atrial enlargement.   7. Sclerotic aortic valve with mildly decreased opening.   8. Mild aortic regurgitation.   9. Mild-to-moderate mitral regurgitation.  10. Moderate tricuspid regurgitation.  11. Estimated pulmonary artery systolic pressure is 65.8 mmHg assuming a   right atrial pressure of 8 mmHg, which is consistent with severe   pulmonary hypertension.  12. Small pericardial effusion visualized on limited views.    < from: Xray Chest 1 View- PORTABLE-Urgent (Xray Chest 1 View- PORTABLE-Urgent .) (04.10.25 @ 08:35) >    Impression:      Small to moderate left pleural effusion is unchanged. Developing opacity   in the right lung base    < from: Xray Chest 1 View- PORTABLE-Routine (Xray Chest 1 View- PORTABLE-Routine in AM.) (04.11.25 @ 06:06) >    IMPRESSION: Low lung volume.    Bilateral opacities, slightly worse.    < from: Xray Chest 1 View-PORTABLE IMMEDIATE (Xray Chest 1 View-PORTABLE IMMEDIATE .) (04.13.25 @ 15:50) >  IMPRESSION:    Bilateral lower lobe opacity and pleural effusion unchanged. No air leak.    < end of copied text >        MEDICATIONS  (STANDING):  apixaban 2.5 milliGRAM(s) Oral every 12 hours  cefepime   IVPB 1000 milliGRAM(s) IV Intermittent every 12 hours  chlorhexidine 2% Cloths 1 Application(s) Topical <User Schedule>  chlorhexidine 2% Cloths 1 Application(s) Topical <User Schedule>  digoxin  Injectable 125 MICROGram(s) IV Push every 8 hours  lactated ringers Bolus 250 milliLiter(s) IV Bolus once  lactobacillus acidophilus 1 Tablet(s) Oral three times a day with meals  linezolid  IVPB      linezolid  IVPB 600 milliGRAM(s) IV Intermittent every 12 hours  metoprolol tartrate 25 milliGRAM(s) Oral three times a day  midodrine 15 milliGRAM(s) Oral every 8 hours  multivitamin 1 Tablet(s) Oral daily  phenylephrine    Infusion 0.1 MICROgram(s)/kG/Min (2.38 mL/Hr) IV Continuous <Continuous>  silver sulfADIAZINE 1% Cream 1 Application(s) Topical two times a day  sodium bicarbonate 1300 milliGRAM(s) Oral three times a day    MEDICATIONS  (PRN):  acetaminophen     Tablet .. 650 milliGRAM(s) Oral every 6 hours PRN Temp greater or equal to 38C (100.4F), Mild Pain (1 - 3)  aluminum hydroxide/magnesium hydroxide/simethicone Suspension 30 milliLiter(s) Oral every 4 hours PRN Dyspepsia  haloperidol    Injectable 0.5 milliGRAM(s) IntraMuscular at bedtime PRN Agitation  melatonin 3 milliGRAM(s) Oral at bedtime PRN Insomnia  ondansetron Injectable 4 milliGRAM(s) IV Push every 8 hours PRN Nausea and/or Vomiting     89  year old F, pmhx of chronic afib (on Eliquis), GERD, dementia, FTT , Orthostatic hypotension on midodrine  Patient presents for evaluation of change in mental status.  Daughter states over the past few months patient has been having increasing cognitive issues including hallucinations poor sleep.  Daughter states that  patient seemed to be getting sick and she was given Tylenol at night.  Patient is on Macrobid for UTI prophylaxis due to frequent UTIs.  This daughter had several concerns regarding patient including further decline in mental status/increased confusion/decreased ability to follow commands.  Daughter states it appeared like the left eye had not been opening as much is normal but now it is opening normally.  Patient also complained of left upper extremity pain and swelling.  Patient was unable to raise her left upper extremity secondary to pain.  No trauma.  Patient with decreased p.o. intake.   Pt was admitted to SDU with Acute pyelonephritis with mild right hydroureteronephrosis , urine Cx is growing  E faeium, she has Metabolic encephalopathy with underlying dementia.   She  was found to have A-fib with RVR, was hypotensive, BP improved after fluid resuscitation , ID recommended to add  Zyvox  to Abx regimen.   Pt  developed gross hematuria,  AC  held on 4/12,  NGT was put  in for feedings and medications. Hb was stable, HR controlled with Digoxin AC resumed on 4/14 , pt developed diarrhea and worsening leukocytosis, pt was started on po Vanco, will f/u stool for C.dif and GI PCR,  check IVC today and give fluids for worsening HIRAL.   Today pt is moving her head from side to side, unresponsive, has significant amount of loose stool in collecting bag.     PAST MEDICAL & SURGICAL HISTORY:  Chronic atrial fibrillation      GERD (gastroesophageal reflux disease)      Chronic lower back pain      No significant past surgical history      Vital Signs Last 24 Hrs  T(C): 37.3 (15 Apr 2025 04:00), Max: 37.5 (15 Apr 2025 00:00)  T(F): 99.2 (15 Apr 2025 04:00), Max: 99.5 (15 Apr 2025 00:00)  HR: 108 (15 Apr 2025 08:30) (92 - 130)  BP: 97/53 (15 Apr 2025 08:30) (76/41 - 117/63)  BP(mean): 68 (15 Apr 2025 08:30) (53 - 98)  RR: 20 (15 Apr 2025 08:30) (18 - 20)  SpO2: 99% (15 Apr 2025 08:30) (98% - 100%)    Parameters below as of 14 Apr 2025 16:00  Patient On (Oxygen Delivery Method): nasal cannula        PHYSICAL EXAM:  GENERAL:  frail with temporal muscle waisting with her eyes closed   HEAD:  Atraumatic, Normocephalic  NECK: Supple, No JVD, Normal thyroid  NERVOUS SYSTEM: altered,  does not follow commands   CHEST/LUNG: decreased BS at bases   HEART: irregularly irregular, tachycardic   ABDOMEN: Soft, Nontender, Nondistended; Bowel sounds present, collecting bag noted with significant amount of watery stool noted   EXTREMITIES:  dressing noted on LE b/l below knees   SKIN: paper thin skin with multiply skin tears on UE       LABS:                                 10.5   18.41 )-----------( 126      ( 15 Apr 2025 04:41 )             33.2   04-15    129[L]  |  97[L]  |  45[H]  ----------------------------<  122[H]  4.4   |  19  |  1.7[H]    Ca    8.4      15 Apr 2025 04:41  Mg     2.2     04-15    TPro  4.5[L]  /  Alb  3.0[L]  /  TBili  0.4  /  DBili  x   /  AST  26  /  ALT  15  /  AlkPhos  189[H]  04-15        Urinalysis Basic - ( 09 Apr 2025 04:45 )    Color: x / Appearance: x / SG: x / pH: x  Gluc: 82 mg/dL / Ketone: x  / Bili: x / Urobili: x   Blood: x / Protein: x / Nitrite: x   Leuk Esterase: x / RBC: x / WBC x   Sq Epi: x / Non Sq Epi: x / Bacteria: x    Urinalysis with Rflx Culture (collected 07 Apr 2025 12:05)    Culture - Urine (collected 07 Apr 2025 12:05)  Source: Catheterized None  Final Report (09 Apr 2025 12:53):    >100,000 CFU/ml Enterococcus faecium  Culture - Urine (04.07.25 @ 12:05)   - Ampicillin: R >8 Predicts results to ampicillin/sulbactam, amoxacillin-clavulanate and piperacillin-tazobactam.  - Ciprofloxacin: I 2  - Levofloxacin: I 4  - Nitrofurantoin: S <=32 Should not be used to treat pyelonephritis.  - Tetracycline: R >8  - Vancomycin: S 0.5  Specimen Source: Catheterized None  Culture Results:   >100,000 CFU/ml Enterococcus faecium  Organism Identification: Enterococcus faecium  Organism: Enterococcus faeciumOrganism: Enterococcus faecium (09 Apr 2025 12:53)  Organism: Enterococcus faecium (09 Apr 2025 12:53)    Culture - Blood (collected 07 Apr 2025 12:02)  Source: Blood Blood-Peripheral  Preliminary Report (08 Apr 2025 22:02):    No growth at 24 hours      Culture - Blood (collected 07 Apr 2025 12:02)  Source: Blood Blood-Peripheral  Preliminary Report (08 Apr 2025 22:02):    No growth at 24 hours      Culture - Urine (04.10.25 @ 10:20)   Specimen Source: Clean Catch Clean Catch (Midstream)  Culture Results:   <10,000 CFU/mL Normal Urogenital [place]na  RADIOLOGY & ADDITIONAL TESTS:  < from: Xray Hand 3 Views, Left (04.08.25 @ 12:59) >  impression:    Soft tissue swelling suggested at the level of the fifth digit.    The bones are diffusely osteopenic. There are arthritic changes with   joint space narrowing and osteophyte formation. Findings appear most   pronounced at the second distal interphalangeal joint. Arthritic changes   are also noted at the basal joint. There is no acute fracture.    Chondrocalcinosis within the region of the triangular fibrocartilage    < from: [facility] Duplex Upper Ext Vein Scan, Left (04.08.25 @ 10:33) >  IMPRESSION:  No evidence of left upper extremity deep venous thrombosis. Study limited   due to patient positioning and overlying dressings      < from: [facility] Duplex Lower Ext Vein Scan, Bilat (04.08.25 @ 10:30) >  IMPRESSION:  No evidence of DVT in the visualized portions of the bilateral lower   extremities. Visualization limited secondary to dressings in the lower   extremities and limb contractures    < from: TTE Echo Complete w/o Contrast w/ Doppler (04.09.25 @ 10:47) >  Summary:   1. Left ventricular ejection fraction, by visual estimation, is 45 to   50%.   2. Mildly decreased global left ventricular systolic function.   3. The left ventricular diastolic function could not be assessed in this   study.   4. Severely reduced RV systolic function.   5. Severely enlarged right atrium.   6. Left atrial enlargement.   7. Sclerotic aortic valve with mildly decreased opening.   8. Mild aortic regurgitation.   9. Mild-to-moderate mitral regurgitation.  10. Moderate tricuspid regurgitation.  11. Estimated pulmonary artery systolic pressure is 65.8 mmHg assuming a   right atrial pressure of 8 mmHg, which is consistent with severe   pulmonary hypertension.  12. Small pericardial effusion visualized on limited views.    < from: Xray Chest 1 View- PORTABLE-Urgent (Xray Chest 1 View- PORTABLE-Urgent .) (04.10.25 @ 08:35) >    Impression:      Small to moderate left pleural effusion is unchanged. Developing opacity   in the right lung base    < from: Xray Chest 1 View- PORTABLE-Routine (Xray Chest 1 View- PORTABLE-Routine in AM.) (04.11.25 @ 06:06) >    IMPRESSION: Low lung volume.    Bilateral opacities, slightly worse.    < from: Xray Chest 1 View-PORTABLE IMMEDIATE (Xray Chest 1 View-PORTABLE IMMEDIATE .) (04.13.25 @ 15:50) >  IMPRESSION:    Bilateral lower lobe opacity and pleural effusion unchanged. No air leak.      MEDICATIONS  (STANDING):  apixaban 2.5 milliGRAM(s) Oral every 12 hours  cefepime   IVPB 1000 milliGRAM(s) IV Intermittent every 12 hours  chlorhexidine 2% Cloths 1 Application(s) Topical <User Schedule>  chlorhexidine 2% Cloths 1 Application(s) Topical <User Schedule>  famotidine    Tablet 20 milliGRAM(s) Oral every 48 hours  lactated ringers Bolus 250 milliLiter(s) IV Bolus once  lactobacillus acidophilus 1 Tablet(s) Oral three times a day with meals  linezolid  IVPB 600 milliGRAM(s) IV Intermittent every 12 hours  linezolid  IVPB      metoprolol tartrate 25 milliGRAM(s) Oral three times a day  midodrine 15 milliGRAM(s) Oral every 8 hours  multivitamin 1 Tablet(s) Oral daily  phenylephrine    Infusion 0.1 MICROgram(s)/kG/Min (2.38 mL/Hr) IV Continuous <Continuous>  silver sulfADIAZINE 1% Cream 1 Application(s) Topical two times a day  sodium bicarbonate 1300 milliGRAM(s) Oral three times a day  vancomycin    Solution 125 milliGRAM(s) Oral every 6 hours    MEDICATIONS  (PRN):  acetaminophen     Tablet .. 650 milliGRAM(s) Oral every 6 hours PRN Temp greater or equal to 38C (100.4F), Mild Pain (1 - 3)  aluminum hydroxide/magnesium hydroxide/simethicone Suspension 30 milliLiter(s) Oral every 4 hours PRN Dyspepsia  haloperidol    Injectable 0.5 milliGRAM(s) IntraMuscular at bedtime PRN Agitation  melatonin 3 milliGRAM(s) Oral at bedtime PRN Insomnia  ondansetron Injectable 4 milliGRAM(s) IV Push every 8 hours PRN Nausea and/or Vomiting

## 2025-04-15 NOTE — PROGRESS NOTE ADULT - ASSESSMENT
89  year old F, pmhx of chronic afib (on Eliquis), GERD, dementia, FTT , Orthostatic hypotension on midodrine ,Patient presents for evaluation of change in mental status. AMS likely secondary to TME secondary to septic shock 2/2 pyelonephritis. Course complicated by Afib with RVR with trial of rate control      #TME 2/2 Severe Sepsis present on admission secondary to acute pyelonephritis  #Hx of recurrent UTIs on bactrim at home  # Suspected PNA   -s/p IV albuminx3  - On phenylephrine. Requirements decreasing from 0.4 to 0.25  - c/w  Midodrine 15 mg Q 8 hours    -continue with Cefepime to 1 gm iv q12h and  Linezolid 600 mg q12h to IV as per ID  - F/u procal  - CXR showing bilateral lower lobe opacities  - MRSA negative  - NPO , NGT feedings   - will repeat HCT when more stable and off pressors     # Gross hematuria / acute blood loss anemia - resolved  - monitor H/H, keep Hb above 7.5, stable now   - Hematuria resolved and urine is clear      #Chronic Atrial Fibrillation with acute RVR  -CHADS2-VASC = 5.   -  therapeutic Lovenox  held ( Hb dropped due to gross hematuria) on 4/12  - Due to low GFR and family refusing frequent labs due to skin sloughing on minor trauma, Decided to restart AC on eliquis 2.5  - C/w metoprolol 25 TID and EP agrees with starting dig  - F/u Dig level    # diarrhea  - consult dietitian   - start Probiotics  - add banatrol  - monitor and replete electrolytes     #Sacral ulcer  - un stageable sacral ulcer with minimal white discharge.  - Continue wound care  - change position Q 2 hours, off load pressure points     #Left shoulder pain and UE swelling  - can start Lidocaine topical   - Xray shoulder : Acromioclavicular arthrosis. High riding humerus which could reflect rotator cuff tear of uncertain age. Bones appear osteopenic with no acute fracture or dislocation. Glenohumeral arthritic changes.  - f/u LUE duplex negative for DVT   - keep LUE elevated     #Hx of Lumbar Compression Fracture  #Hx of Pathologic L1 vertebrae fracture  #Hx of Conus Medullaris Syndrome  - was on  gabapentin and robaxin,  tylenol PRN at home   - held on admission, will give IV Tylenol PRN for pain   - OP neurosurgery f/u     #chronic HFpEF / PHTN   - not in exacerbation   - TTE 1/28/2024- EF 60-65% mod pulm HTN   - Currently euvolemic on exam  - monitor for fluid overload       DVT PPX:  Eliquis 2.5  GI PPX: Femotidine  DIET: NPO with tube feeds  ACTIVITY: AT  CODE STATUS: Full code  Disposition: SDU   89  year old F, pmhx of chronic afib (on Eliquis), GERD, dementia, FTT , Orthostatic hypotension on midodrine ,Patient presents for evaluation of change in mental status. AMS likely secondary to TME secondary to septic shock 2/2 pyelonephritis. Course complicated by Afib with RVR with trial of rate control      #TME 2/2 Severe Sepsis present on admission secondary to acute pyelonephritis  #Hx of recurrent UTIs on bactrim at home  # Suspected PNA   - Urine culture positive for enterococcus sensitive to zyvox  -continue with Cefepime to 1 gm iv q12h and  Linezolid 600 mg q12h to IV as per ID  -s/p IV albuminx3  - On phenylephrine.  - Increase midodrine to 20 TID to try to calderon off pressors  - Perform IVC today to assess volume status in the setting of diarrhea and HIRAL (prerenal?)  - Albumin if patient volume depleted  - CXR showing bilateral lower lobe opacities. Patient is not coughing. Unlikely to have PNA  - MRSA negative  - NPO , NGT feedings   - will repeat CTH when more stable and off pressors   - 4/15 - pressor requirements are decreasing from 0.4 to 0.25 . Will increase midodrine to 20 TID to try to wean off pressors.F/u With ID duration of abx.Patient starting to develop thrombocytopenia (zyvox?)    # Gross hematuria / acute blood loss anemia -   - monitor H/H, keep Hb above 7.5, stable now   - Pink urine today  - Monitor    #HIRAL  - Baseline of 0.7-0.8  - Creat 4/15. 1.7  - concern for hanson obstruction due to hematuria and clots. BS 0cc.  - Likely prerenal in the setting of diarrhea  - Check IVC. Fluids if needed  - BUN/Creat> 20, send urine studies   - If not improving RBUS    #Chronic Atrial Fibrillation with acute RVR  -CHADS2-VASC = 5.   -  therapeutic Lovenox  held ( Hb dropped due to gross hematuria) on 4/12  - Due to low GFR and family refusing frequent labs due to skin sloughing on minor trauma, Decided to restart AC on eliquis 2.5  - C/w metoprolol 25 TID and EP agrees with starting dig  - Patient received dig load on 4/14. on 62.5 q48 for maintenance   - Dig level 2.3  - 4/15 HR better controlled on dig and metoprolol. 90s    # diarrhea  - consult dietitian   - start Probiotics  - add banatrol  - 4/15 Patient still having diarrhea. WBC going up. Will send for GI PCR and lainez stain. start po vanc for now      #Left shoulder pain and UE swelling  - can start Lidocaine topical   - Xray shoulder : Acromioclavicular arthrosis. High riding humerus which could reflect rotator cuff tear of uncertain age. Bones appear osteopenic with no acute fracture or dislocation. Glenohumeral arthritic changes.  - f/u LUE duplex negative for DVT   - keep LUE elevated     #Hx of Lumbar Compression Fracture  #Hx of Pathologic L1 vertebrae fracture  #Hx of Conus Medullaris Syndrome  - was on  gabapentin and robaxin,  tylenol PRN at home   - held on admission, will give IV Tylenol PRN for pain   - OP neurosurgery f/u     #chronic HFpEF / PHTN   - not in exacerbation   - TTE 1/28/2024- EF 60-65% mod pulm HTN   - Currently euvolemic on exam  - monitor for fluid overload       DVT PPX:  Eliquis 2.5  GI PPX: Femotidine  DIET: NPO with tube feeds  ACTIVITY: AT  CODE STATUS: Full code  Disposition: SDU

## 2025-04-16 NOTE — DISCHARGE NOTE FOR THE EXPIRED PATIENT - HOSPITAL COURSE
The patient was a 89-year-old female with a medical history significant for chronic atrial fibrillation on Eliquis, gastroesophageal reflux disease, dementia, failure to thrive, and orthostatic hypotension managed with midodrine. She presented with altered mental status, which was likely secondary to toxic metabolic encephalopathy due to septic shock originating from acute pyelonephritis, complicated by atrial fibrillation with rapid ventricular response.    Upon admission, she was diagnosed with severe sepsis secondary to pyelonephritis. Her urine culture was positive for enterococcus sensitive to Zyvox, and she was treated with Cefepime and Linezolid as per infectious disease recommendations. She received intravenous albumin and was on phenylephrine, with her midodrine dose increased to try to wean off pressors. There was concern for pneumonia due to bilateral lower lobe opacities on chest X-ray, but MRSA was negative, and she was unlikely to have pneumonia as she had no cough.    She also had gross hematuria leading to acute blood loss anemia, with stable hemoglobin levels maintained above 7.5. Her acute kidney injury was likely prerenal due to diarrhea. The hanson catheter was suspected to be obstructed by hematuria and clots. Monitoring and intervention were planned based on her volume status.    Her chronic atrial fibrillation was complicated by rapid ventricular response, and therapeutic Lovenox was held due to hematuria and decreased hemoglobin. The patient's anticoagulation was switched to Eliquis, and she was managed with metoprolol and digoxin for rate control. The heart rate improved to the 90s with adequate rate control.    The patient experienced persistent diarrhea, and dietary consultation along with probiotics and Banatrol were initiated. An increase in white blood cell count prompted further investigation and initiation of oral vancomycin.    She had left shoulder pain and upper extremity swelling, which was managed with topical lidocaine. Imaging suggested acromioclavicular arthrosis and possible rotator cuff tear, with osteopenic changes but no acute fracture. Past medical history included lumbar compression fracture, pathologic L1 vertebra fracture, and conus medullaris syndrome, for which she was managed with pain medications.    Her heart failure with preserved ejection fraction and pulmonary hypertension was stable, and she was euvolemic on examination, with no signs of fluid overload.  Time of Death: 12:25, 4/16/25  Not applicable for organ donation- tissues, eyes, due to age   Reference No: [id number]

## 2025-04-22 DIAGNOSIS — R19.7 DIARRHEA, UNSPECIFIED: ICD-10-CM

## 2025-04-22 DIAGNOSIS — F03.90 UNSPECIFIED DEMENTIA, UNSPECIFIED SEVERITY, WITHOUT BEHAVIORAL DISTURBANCE, PSYCHOTIC DISTURBANCE, MOOD DISTURBANCE, AND ANXIETY: ICD-10-CM

## 2025-04-22 DIAGNOSIS — J96.01 ACUTE RESPIRATORY FAILURE WITH HYPOXIA: ICD-10-CM

## 2025-04-22 DIAGNOSIS — N30.91 CYSTITIS, UNSPECIFIED WITH HEMATURIA: ICD-10-CM

## 2025-04-22 DIAGNOSIS — D62 ACUTE POSTHEMORRHAGIC ANEMIA: ICD-10-CM

## 2025-04-22 DIAGNOSIS — Z23 ENCOUNTER FOR IMMUNIZATION: ICD-10-CM

## 2025-04-22 DIAGNOSIS — A41.81 SEPSIS DUE TO ENTEROCOCCUS: ICD-10-CM

## 2025-04-22 DIAGNOSIS — Z85.848 PERSONAL HISTORY OF MALIGNANT NEOPLASM OF OTHER PARTS OF NERVOUS TISSUE: ICD-10-CM

## 2025-04-22 DIAGNOSIS — Z66 DO NOT RESUSCITATE: ICD-10-CM

## 2025-04-22 DIAGNOSIS — A41.9 SEPSIS, UNSPECIFIED ORGANISM: ICD-10-CM

## 2025-04-22 DIAGNOSIS — I50.32 CHRONIC DIASTOLIC (CONGESTIVE) HEART FAILURE: ICD-10-CM

## 2025-04-22 DIAGNOSIS — E87.1 HYPO-OSMOLALITY AND HYPONATREMIA: ICD-10-CM

## 2025-04-22 DIAGNOSIS — N10 ACUTE PYELONEPHRITIS: ICD-10-CM

## 2025-04-22 DIAGNOSIS — M25.512 PAIN IN LEFT SHOULDER: ICD-10-CM

## 2025-04-22 DIAGNOSIS — E87.20 ACIDOSIS, UNSPECIFIED: ICD-10-CM

## 2025-04-22 DIAGNOSIS — I48.20 CHRONIC ATRIAL FIBRILLATION, UNSPECIFIED: ICD-10-CM

## 2025-04-22 DIAGNOSIS — J98.11 ATELECTASIS: ICD-10-CM

## 2025-04-22 DIAGNOSIS — G92.8 OTHER TOXIC ENCEPHALOPATHY: ICD-10-CM

## 2025-04-22 DIAGNOSIS — I27.20 PULMONARY HYPERTENSION, UNSPECIFIED: ICD-10-CM

## 2025-04-22 DIAGNOSIS — E86.1 HYPOVOLEMIA: ICD-10-CM

## 2025-04-22 DIAGNOSIS — D84.81 IMMUNODEFICIENCY DUE TO CONDITIONS CLASSIFIED ELSEWHERE: ICD-10-CM

## 2025-04-22 DIAGNOSIS — Z51.5 ENCOUNTER FOR PALLIATIVE CARE: ICD-10-CM

## 2025-04-22 DIAGNOSIS — Z79.01 LONG TERM (CURRENT) USE OF ANTICOAGULANTS: ICD-10-CM

## 2025-04-22 DIAGNOSIS — R62.7 ADULT FAILURE TO THRIVE: ICD-10-CM

## 2025-04-22 DIAGNOSIS — L89.150 PRESSURE ULCER OF SACRAL REGION, UNSTAGEABLE: ICD-10-CM

## 2025-04-22 DIAGNOSIS — R33.9 RETENTION OF URINE, UNSPECIFIED: ICD-10-CM

## 2025-04-22 DIAGNOSIS — Z92.3 PERSONAL HISTORY OF IRRADIATION: ICD-10-CM

## 2025-04-22 DIAGNOSIS — R65.20 SEVERE SEPSIS WITHOUT SEPTIC SHOCK: ICD-10-CM

## 2025-04-22 DIAGNOSIS — R65.21 SEVERE SEPSIS WITH SEPTIC SHOCK: ICD-10-CM

## 2025-04-22 DIAGNOSIS — K21.9 GASTRO-ESOPHAGEAL REFLUX DISEASE WITHOUT ESOPHAGITIS: ICD-10-CM

## 2025-04-22 DIAGNOSIS — N18.32 CHRONIC KIDNEY DISEASE, STAGE 3B: ICD-10-CM

## 2025-04-22 DIAGNOSIS — E43 UNSPECIFIED SEVERE PROTEIN-CALORIE MALNUTRITION: ICD-10-CM
